# Patient Record
Sex: FEMALE | Race: WHITE | NOT HISPANIC OR LATINO | Employment: OTHER | URBAN - METROPOLITAN AREA
[De-identification: names, ages, dates, MRNs, and addresses within clinical notes are randomized per-mention and may not be internally consistent; named-entity substitution may affect disease eponyms.]

---

## 2017-01-03 ENCOUNTER — HOSPITAL ENCOUNTER (OUTPATIENT)
Dept: MAMMOGRAPHY | Facility: HOSPITAL | Age: 72
Discharge: HOME | End: 2017-01-03
Attending: FAMILY MEDICINE | Admitting: FAMILY MEDICINE

## 2017-01-03 DIAGNOSIS — Z12.31 VISIT FOR SCREENING MAMMOGRAM: ICD-10-CM

## 2017-01-03 PROCEDURE — 77063 BREAST TOMOSYNTHESIS BI: CPT | Performed by: RADIOLOGY

## 2017-01-03 PROCEDURE — G0202 SCR MAMMO BI INCL CAD: HCPCS

## 2017-01-03 PROCEDURE — G0202 SCR MAMMO BI INCL CAD: HCPCS | Performed by: RADIOLOGY

## 2017-01-03 PROCEDURE — 77063 BREAST TOMOSYNTHESIS BI: CPT

## 2017-01-24 ENCOUNTER — HOSPITAL ENCOUNTER (OUTPATIENT)
Dept: MAMMOGRAPHY | Facility: HOSPITAL | Age: 72
Discharge: HOME | End: 2017-01-24
Admitting: FAMILY MEDICINE

## 2017-01-24 DIAGNOSIS — R92.8 ABNORMAL MAMMOGRAM: ICD-10-CM

## 2017-01-24 PROCEDURE — G0206 DX MAMMO INCL CAD UNI: HCPCS

## 2017-01-24 PROCEDURE — G0206 DX MAMMO INCL CAD UNI: HCPCS | Performed by: RADIOLOGY

## 2017-01-24 PROCEDURE — G0279 TOMOSYNTHESIS, MAMMO: HCPCS | Performed by: RADIOLOGY

## 2017-01-24 PROCEDURE — G0279 TOMOSYNTHESIS, MAMMO: HCPCS

## 2017-09-23 ENCOUNTER — HOSPITAL ENCOUNTER (EMERGENCY)
Facility: HOSPITAL | Age: 72
Discharge: HOME | End: 2017-09-23
Attending: EMERGENCY MEDICINE | Admitting: EMERGENCY MEDICINE

## 2017-09-23 ENCOUNTER — APPOINTMENT (OUTPATIENT)
Dept: CT IMAGING | Facility: HOSPITAL | Age: 72
End: 2017-09-23

## 2017-09-23 VITALS
OXYGEN SATURATION: 91 % | DIASTOLIC BLOOD PRESSURE: 60 MMHG | BODY MASS INDEX: 24.6 KG/M2 | HEART RATE: 58 BPM | SYSTOLIC BLOOD PRESSURE: 146 MMHG | RESPIRATION RATE: 16 BRPM | TEMPERATURE: 97.5 F | HEIGHT: 59 IN | WEIGHT: 122 LBS

## 2017-09-23 DIAGNOSIS — K57.90 DIVERTICULOSIS OF INTESTINE WITHOUT BLEEDING, UNSPECIFIED INTESTINAL TRACT LOCATION: Primary | ICD-10-CM

## 2017-09-23 DIAGNOSIS — R10.32 LEFT LOWER QUADRANT PAIN: ICD-10-CM

## 2017-09-23 LAB
ALBUMIN SERPL-MCNC: 4.3 G/DL (ref 3.2–4.8)
ALBUMIN/GLOB SERPL: 1.3 G/DL (ref 1.5–2.5)
ALP SERPL-CCNC: 74 U/L (ref 25–100)
ALT SERPL W P-5'-P-CCNC: 14 U/L (ref 7–40)
ANION GAP SERPL CALCULATED.3IONS-SCNC: 5 MMOL/L (ref 3–11)
AST SERPL-CCNC: 20 U/L (ref 0–33)
BASOPHILS # BLD AUTO: 0.03 10*3/MM3 (ref 0–0.2)
BASOPHILS NFR BLD AUTO: 0.4 % (ref 0–1)
BILIRUB SERPL-MCNC: 0.5 MG/DL (ref 0.3–1.2)
BILIRUB UR QL STRIP: NEGATIVE
BUN BLD-MCNC: 30 MG/DL (ref 9–23)
BUN/CREAT SERPL: 27.3 (ref 7–25)
CALCIUM SPEC-SCNC: 9.5 MG/DL (ref 8.7–10.4)
CHLORIDE SERPL-SCNC: 108 MMOL/L (ref 99–109)
CLARITY UR: CLEAR
CO2 SERPL-SCNC: 27 MMOL/L (ref 20–31)
COLOR UR: YELLOW
CREAT BLD-MCNC: 1.1 MG/DL (ref 0.6–1.3)
DEPRECATED RDW RBC AUTO: 43.7 FL (ref 37–54)
EOSINOPHIL # BLD AUTO: 0.26 10*3/MM3 (ref 0–0.3)
EOSINOPHIL NFR BLD AUTO: 3.1 % (ref 0–3)
ERYTHROCYTE [DISTWIDTH] IN BLOOD BY AUTOMATED COUNT: 13.6 % (ref 11.3–14.5)
GFR SERPL CREATININE-BSD FRML MDRD: 49 ML/MIN/1.73
GLOBULIN UR ELPH-MCNC: 3.4 GM/DL
GLUCOSE BLD-MCNC: 153 MG/DL (ref 70–100)
GLUCOSE UR STRIP-MCNC: NEGATIVE MG/DL
HCT VFR BLD AUTO: 42.2 % (ref 34.5–44)
HGB BLD-MCNC: 13.8 G/DL (ref 11.5–15.5)
HGB UR QL STRIP.AUTO: NEGATIVE
HOLD SPECIMEN: NORMAL
IMM GRANULOCYTES # BLD: 0.03 10*3/MM3 (ref 0–0.03)
IMM GRANULOCYTES NFR BLD: 0.4 % (ref 0–0.6)
KETONES UR QL STRIP: NEGATIVE
LEUKOCYTE ESTERASE UR QL STRIP.AUTO: NEGATIVE
LIPASE SERPL-CCNC: 25 U/L (ref 6–51)
LYMPHOCYTES # BLD AUTO: 1.75 10*3/MM3 (ref 0.6–4.8)
LYMPHOCYTES NFR BLD AUTO: 20.9 % (ref 24–44)
MCH RBC QN AUTO: 28.6 PG (ref 27–31)
MCHC RBC AUTO-ENTMCNC: 32.7 G/DL (ref 32–36)
MCV RBC AUTO: 87.6 FL (ref 80–99)
MONOCYTES # BLD AUTO: 0.36 10*3/MM3 (ref 0–1)
MONOCYTES NFR BLD AUTO: 4.3 % (ref 0–12)
NEUTROPHILS # BLD AUTO: 5.93 10*3/MM3 (ref 1.5–8.3)
NEUTROPHILS NFR BLD AUTO: 70.9 % (ref 41–71)
NITRITE UR QL STRIP: NEGATIVE
NRBC BLD MANUAL-RTO: 0 /100 WBC (ref 0–0)
PH UR STRIP.AUTO: <=5 [PH] (ref 5–8)
PLATELET # BLD AUTO: 211 10*3/MM3 (ref 150–450)
PMV BLD AUTO: 11.5 FL (ref 6–12)
POTASSIUM BLD-SCNC: 4.3 MMOL/L (ref 3.5–5.5)
PROT SERPL-MCNC: 7.7 G/DL (ref 5.7–8.2)
PROT UR QL STRIP: NEGATIVE
RBC # BLD AUTO: 4.82 10*6/MM3 (ref 3.89–5.14)
SODIUM BLD-SCNC: 140 MMOL/L (ref 132–146)
SP GR UR STRIP: 1.02 (ref 1–1.03)
UROBILINOGEN UR QL STRIP: NORMAL
WBC NRBC COR # BLD: 8.36 10*3/MM3 (ref 3.5–10.8)
WHOLE BLOOD HOLD SPECIMEN: NORMAL
WHOLE BLOOD HOLD SPECIMEN: NORMAL

## 2017-09-23 PROCEDURE — 80053 COMPREHEN METABOLIC PANEL: CPT | Performed by: EMERGENCY MEDICINE

## 2017-09-23 PROCEDURE — 25010000002 MORPHINE PER 10 MG: Performed by: EMERGENCY MEDICINE

## 2017-09-23 PROCEDURE — 96375 TX/PRO/DX INJ NEW DRUG ADDON: CPT

## 2017-09-23 PROCEDURE — 96374 THER/PROPH/DIAG INJ IV PUSH: CPT

## 2017-09-23 PROCEDURE — 99283 EMERGENCY DEPT VISIT LOW MDM: CPT

## 2017-09-23 PROCEDURE — 83690 ASSAY OF LIPASE: CPT | Performed by: EMERGENCY MEDICINE

## 2017-09-23 PROCEDURE — 74176 CT ABD & PELVIS W/O CONTRAST: CPT

## 2017-09-23 PROCEDURE — 81003 URINALYSIS AUTO W/O SCOPE: CPT | Performed by: EMERGENCY MEDICINE

## 2017-09-23 PROCEDURE — 96361 HYDRATE IV INFUSION ADD-ON: CPT

## 2017-09-23 PROCEDURE — 85025 COMPLETE CBC W/AUTO DIFF WBC: CPT | Performed by: EMERGENCY MEDICINE

## 2017-09-23 PROCEDURE — 25010000002 ONDANSETRON PER 1 MG: Performed by: EMERGENCY MEDICINE

## 2017-09-23 RX ORDER — SODIUM CHLORIDE 0.9 % (FLUSH) 0.9 %
10 SYRINGE (ML) INJECTION AS NEEDED
Status: DISCONTINUED | OUTPATIENT
Start: 2017-09-23 | End: 2017-09-23 | Stop reason: HOSPADM

## 2017-09-23 RX ORDER — AMLODIPINE BESYLATE 5 MG/1
5 TABLET ORAL DAILY
COMMUNITY
End: 2022-01-28

## 2017-09-23 RX ORDER — FOLIC ACID 1 MG/1
1 TABLET ORAL DAILY
COMMUNITY
End: 2020-10-14

## 2017-09-23 RX ORDER — PRAVASTATIN SODIUM 10 MG
10 TABLET ORAL DAILY
COMMUNITY
End: 2022-10-28 | Stop reason: DRUGHIGH

## 2017-09-23 RX ORDER — LISINOPRIL 20 MG/1
20 TABLET ORAL DAILY
COMMUNITY
End: 2021-08-16 | Stop reason: DRUGHIGH

## 2017-09-23 RX ORDER — LEVOTHYROXINE SODIUM 0.07 MG/1
75 TABLET ORAL DAILY
COMMUNITY

## 2017-09-23 RX ORDER — MORPHINE SULFATE 4 MG/ML
4 INJECTION, SOLUTION INTRAMUSCULAR; INTRAVENOUS ONCE
Status: COMPLETED | OUTPATIENT
Start: 2017-09-23 | End: 2017-09-23

## 2017-09-23 RX ORDER — DIPHENHYDRAMINE HYDROCHLORIDE 25 MG/1
TABLET ORAL
COMMUNITY
End: 2020-10-14

## 2017-09-23 RX ORDER — METOPROLOL TARTRATE 100 MG/1
100 TABLET ORAL NIGHTLY
COMMUNITY
End: 2022-07-29

## 2017-09-23 RX ORDER — ONDANSETRON 2 MG/ML
4 INJECTION INTRAMUSCULAR; INTRAVENOUS ONCE
Status: COMPLETED | OUTPATIENT
Start: 2017-09-23 | End: 2017-09-23

## 2017-09-23 RX ORDER — CHLORTHALIDONE 25 MG/1
25 TABLET ORAL DAILY
COMMUNITY
End: 2020-10-14

## 2017-09-23 RX ORDER — HYDROCODONE BITARTRATE AND ACETAMINOPHEN 5; 325 MG/1; MG/1
1 TABLET ORAL EVERY 6 HOURS PRN
Qty: 15 TABLET | Refills: 0 | OUTPATIENT
Start: 2017-09-23 | End: 2020-10-14

## 2017-09-23 RX ORDER — INSULIN GLARGINE 100 [IU]/ML
14 INJECTION, SOLUTION SUBCUTANEOUS NIGHTLY
COMMUNITY
End: 2020-10-14

## 2017-09-23 RX ORDER — OXYBUTYNIN CHLORIDE 5 MG/1
5 TABLET ORAL DAILY
COMMUNITY
End: 2022-01-28

## 2017-09-23 RX ADMIN — SODIUM CHLORIDE 1000 ML: 9 INJECTION, SOLUTION INTRAVENOUS at 10:03

## 2017-09-23 RX ADMIN — ONDANSETRON 4 MG: 2 INJECTION INTRAMUSCULAR; INTRAVENOUS at 10:04

## 2017-09-23 RX ADMIN — MORPHINE SULFATE 4 MG: 4 INJECTION, SOLUTION INTRAMUSCULAR; INTRAVENOUS at 10:05

## 2017-09-23 NOTE — ED PROVIDER NOTES
Subjective   HPI Comments: Marilyn Kunz is a 72 y.o. female with diabetes who presents to the ED with c/o LLQ abdominal pain. The pain is described as a sharp and cramping sensation that radiates into her back and across her lower abdomen.  She states that she has had this pain for about 1 week now and began taking leftover antibiotics she had from the last time she had diverticulitis. She reported to the ED 4 days ago and it was confirmed that she had diverticulitis, although a CT was not done. She states that over the past 2 days, the pain has worsened significantly which prompted her visit to the ED. She also complains of some nausea, but denies any chest pain, SoA, dysuria, or any other complaints at this time    Patient is a 72 y.o. female presenting with abdominal pain.   History provided by:  Patient  Abdominal Pain   Pain location:  LLQ  Pain quality: cramping and sharp    Pain radiates to:  Back and RLQ  Pain severity:  Unable to specify  Onset quality:  Gradual  Timing:  Constant  Progression:  Worsening  Chronicity:  New  Relieved by:  Nothing  Worsened by:  Nothing  Ineffective treatments:  None tried  Associated symptoms: nausea    Associated symptoms: no chest pain, no chills, no dysuria, no fever and no shortness of breath        Review of Systems   Constitutional: Negative for chills and fever.   Respiratory: Negative for shortness of breath.    Cardiovascular: Negative for chest pain.   Gastrointestinal: Positive for abdominal pain and nausea.   Genitourinary: Positive for flank pain and frequency (Chronic). Negative for dysuria.   Musculoskeletal: Positive for back pain.   All other systems reviewed and are negative.      Past Medical History:   Diagnosis Date   • Disease of thyroid gland    • Hyperlipidemia    • Hypertension    • Psoriatic arthritis    • Type 1 diabetes        Allergies   Allergen Reactions   • Penicillins        Past Surgical History:   Procedure Laterality Date   • HAND  SURGERY         Family History   Problem Relation Age of Onset   • Breast cancer Neg Hx    • Ovarian cancer Neg Hx        Social History     Social History   • Marital status: Single     Spouse name: N/A   • Number of children: N/A   • Years of education: N/A     Social History Main Topics   • Smoking status: Never Smoker   • Smokeless tobacco: None   • Alcohol use No   • Drug use: No   • Sexual activity: Not Asked     Other Topics Concern   • None     Social History Narrative   • None         Objective   Physical Exam   Constitutional: She is oriented to person, place, and time. She appears well-developed and well-nourished. No distress.   HENT:   Head: Normocephalic and atraumatic.   Nose: Nose normal.   Eyes: Conjunctivae are normal. No scleral icterus.   Neck: Normal range of motion. Neck supple.   Cardiovascular: Normal rate, regular rhythm, normal heart sounds and intact distal pulses.    No murmur heard.  Pulmonary/Chest: Effort normal and breath sounds normal. No respiratory distress.   Abdominal: Soft. Bowel sounds are normal. There is tenderness (Mild tenderness to palpation of the suprapubic region and moderate tenderness to palpation to the LLQ).   No flank tenderness to palpation   Musculoskeletal: Normal range of motion. She exhibits no edema.   Lower extremities are normal.   Neurological: She is alert and oriented to person, place, and time.   Skin: Skin is warm and dry.   Psychiatric: She has a normal mood and affect. Her behavior is normal.   Nursing note and vitals reviewed.      Procedures         ED Course  ED Course   Comment By Time   Dr. Page is at the bedside re evaluating the patient. Harrison PETTY May 09/23 1145     Recent Results (from the past 24 hour(s))   Comprehensive Metabolic Panel    Collection Time: 09/23/17  9:49 AM   Result Value Ref Range    Glucose 153 (H) 70 - 100 mg/dL    BUN 30 (H) 9 - 23 mg/dL    Creatinine 1.10 0.60 - 1.30 mg/dL    Sodium 140 132 - 146 mmol/L    Potassium 4.3  3.5 - 5.5 mmol/L    Chloride 108 99 - 109 mmol/L    CO2 27.0 20.0 - 31.0 mmol/L    Calcium 9.5 8.7 - 10.4 mg/dL    Total Protein 7.7 5.7 - 8.2 g/dL    Albumin 4.30 3.20 - 4.80 g/dL    ALT (SGPT) 14 7 - 40 U/L    AST (SGOT) 20 0 - 33 U/L    Alkaline Phosphatase 74 25 - 100 U/L    Total Bilirubin 0.5 0.3 - 1.2 mg/dL    eGFR Non African Amer 49 (L) >60 mL/min/1.73    Globulin 3.4 gm/dL    A/G Ratio 1.3 (L) 1.5 - 2.5 g/dL    BUN/Creatinine Ratio 27.3 (H) 7.0 - 25.0    Anion Gap 5.0 3.0 - 11.0 mmol/L   Lipase    Collection Time: 09/23/17  9:49 AM   Result Value Ref Range    Lipase 25 6 - 51 U/L   Urinalysis With / Culture If Indicated    Collection Time: 09/23/17  9:49 AM   Result Value Ref Range    Color, UA Yellow Yellow, Straw    Appearance, UA Clear Clear    pH, UA <=5.0 5.0 - 8.0    Specific Gravity, UA 1.016 1.001 - 1.030    Glucose, UA Negative Negative    Ketones, UA Negative Negative    Bilirubin, UA Negative Negative    Blood, UA Negative Negative    Protein, UA Negative Negative    Leuk Esterase, UA Negative Negative    Nitrite, UA Negative Negative    Urobilinogen, UA 0.2 E.U./dL 0.2 - 1.0 E.U./dL   Light Blue Top    Collection Time: 09/23/17  9:49 AM   Result Value Ref Range    Extra Tube hold for add-on    Green Top (Gel)    Collection Time: 09/23/17  9:49 AM   Result Value Ref Range    Extra Tube Hold for add-ons.    Lavender Top    Collection Time: 09/23/17  9:49 AM   Result Value Ref Range    Extra Tube hold for add-on    CBC Auto Differential    Collection Time: 09/23/17  9:49 AM   Result Value Ref Range    WBC 8.36 3.50 - 10.80 10*3/mm3    RBC 4.82 3.89 - 5.14 10*6/mm3    Hemoglobin 13.8 11.5 - 15.5 g/dL    Hematocrit 42.2 34.5 - 44.0 %    MCV 87.6 80.0 - 99.0 fL    MCH 28.6 27.0 - 31.0 pg    MCHC 32.7 32.0 - 36.0 g/dL    RDW 13.6 11.3 - 14.5 %    RDW-SD 43.7 37.0 - 54.0 fl    MPV 11.5 6.0 - 12.0 fL    Platelets 211 150 - 450 10*3/mm3    Neutrophil % 70.9 41.0 - 71.0 %    Lymphocyte % 20.9 (L) 24.0 -  "44.0 %    Monocyte % 4.3 0.0 - 12.0 %    Eosinophil % 3.1 (H) 0.0 - 3.0 %    Basophil % 0.4 0.0 - 1.0 %    Immature Grans % 0.4 0.0 - 0.6 %    Neutrophils, Absolute 5.93 1.50 - 8.30 10*3/mm3    Lymphocytes, Absolute 1.75 0.60 - 4.80 10*3/mm3    Monocytes, Absolute 0.36 0.00 - 1.00 10*3/mm3    Eosinophils, Absolute 0.26 0.00 - 0.30 10*3/mm3    Basophils, Absolute 0.03 0.00 - 0.20 10*3/mm3    Immature Grans, Absolute 0.03 0.00 - 0.03 10*3/mm3    nRBC 0.0 0.0 - 0.0 /100 WBC     Note: In addition to lab results from this visit, the labs listed above may include labs taken at another facility or during a different encounter within the last 24 hours. Please correlate lab times with ED admission and discharge times for further clarification of the services performed during this visit.    CT Abdomen Pelvis Without Contrast    (Results Pending)     Vitals:    09/23/17 0947 09/23/17 0950 09/23/17 1030   BP:  (!) 182/81 160/75   BP Location:  Right arm    Patient Position:  Lying    Pulse:  73    Resp:  18    Temp: 98.1 °F (36.7 °C)     TempSrc: Oral     SpO2:  96% 92%   Weight: 122 lb (55.3 kg)     Height: 59\" (149.9 cm)       Medications   sodium chloride 0.9 % flush 10 mL (not administered)   sodium chloride 0.9 % bolus 1,000 mL (0 mL Intravenous Stopped 9/23/17 1051)   Morphine sulfate (PF) injection 4 mg (4 mg Intravenous Given 9/23/17 1005)   ondansetron (ZOFRAN) injection 4 mg (4 mg Intravenous Given 9/23/17 1004)     ECG/EMG Results (last 24 hours)     ** No results found for the last 24 hours. **                        MDM  Number of Diagnoses or Management Options  Diverticulosis of intestine without bleeding, unspecified intestinal tract location: new and requires workup  Left lower quadrant pain: new and requires workup  Diagnosis management comments: No acute abdomen maladies identified on laboratory evaluation, or CT scan of the abdomen and pelvis.    I suspect patient had diverticulitis, that is improving, " therefore not visualized on CT    We will give patient pain medication in the form of Lortab to go home with.       Amount and/or Complexity of Data Reviewed  Clinical lab tests: ordered and reviewed  Tests in the radiology section of CPT®: ordered and reviewed  Obtain history from someone other than the patient: yes  Review and summarize past medical records: yes  Independent visualization of images, tracings, or specimens: yes    Patient Progress  Patient progress: stable      Final diagnoses:   Diverticulosis of intestine without bleeding, unspecified intestinal tract location   Left lower quadrant pain       Documentation assistance provided by carmelina May.  Information recorded by the carmelina was done at my direction and has been verified and validated by me.     Harrison May  09/23/17 1019       Harrison May  09/23/17 1145       Harrison May  09/23/17 1149       Cammy Page MD  09/23/17 1211

## 2017-09-23 NOTE — DISCHARGE INSTRUCTIONS
ADvised to take pain medication as prescribed.     Followup with PCP within next week for repeat evaluation.

## 2017-09-29 ENCOUNTER — HOSPITAL ENCOUNTER (OUTPATIENT)
Dept: GENERAL RADIOLOGY | Facility: HOSPITAL | Age: 72
Discharge: HOME | End: 2017-09-29
Attending: FAMILY MEDICINE | Admitting: FAMILY MEDICINE

## 2017-09-29 ENCOUNTER — TRANSCRIBE ORDERS (OUTPATIENT)
Dept: ADMINISTRATIVE | Facility: HOSPITAL | Age: 72
End: 2017-09-29

## 2017-09-29 DIAGNOSIS — K57.32 DIVERTICULITIS, COLON: Primary | ICD-10-CM

## 2017-09-29 PROCEDURE — 72110 X-RAY EXAM L-2 SPINE 4/>VWS: CPT

## 2017-10-03 ENCOUNTER — TRANSCRIBE ORDERS (OUTPATIENT)
Dept: ADMINISTRATIVE | Facility: HOSPITAL | Age: 72
End: 2017-10-03

## 2017-10-03 DIAGNOSIS — M54.5 LOW BACK PAIN, UNSPECIFIED BACK PAIN LATERALITY, UNSPECIFIED CHRONICITY, WITH SCIATICA PRESENCE UNSPECIFIED: Primary | ICD-10-CM

## 2017-10-19 ENCOUNTER — HOSPITAL ENCOUNTER (OUTPATIENT)
Dept: GENERAL RADIOLOGY | Facility: HOSPITAL | Age: 72
Discharge: HOME | End: 2017-10-19
Attending: FAMILY MEDICINE | Admitting: FAMILY MEDICINE

## 2017-10-19 ENCOUNTER — TRANSCRIBE ORDERS (OUTPATIENT)
Dept: ADMINISTRATIVE | Facility: HOSPITAL | Age: 72
End: 2017-10-19

## 2017-10-19 DIAGNOSIS — M43.10 SPONDYLOLISTHESIS, UNSPECIFIED SPINAL REGION: Primary | ICD-10-CM

## 2017-10-19 PROCEDURE — 72120 X-RAY BEND ONLY L-S SPINE: CPT

## 2018-01-02 ENCOUNTER — TRANSCRIBE ORDERS (OUTPATIENT)
Dept: ADMINISTRATIVE | Facility: HOSPITAL | Age: 73
End: 2018-01-02

## 2018-01-02 DIAGNOSIS — Z12.31 VISIT FOR SCREENING MAMMOGRAM: Primary | ICD-10-CM

## 2018-02-05 ENCOUNTER — HOSPITAL ENCOUNTER (OUTPATIENT)
Dept: MAMMOGRAPHY | Facility: HOSPITAL | Age: 73
Discharge: HOME | End: 2018-02-05
Attending: FAMILY MEDICINE | Admitting: FAMILY MEDICINE

## 2018-02-05 DIAGNOSIS — Z12.31 VISIT FOR SCREENING MAMMOGRAM: ICD-10-CM

## 2018-02-05 PROCEDURE — 77063 BREAST TOMOSYNTHESIS BI: CPT

## 2018-02-05 PROCEDURE — 77067 SCR MAMMO BI INCL CAD: CPT

## 2018-02-05 PROCEDURE — 77063 BREAST TOMOSYNTHESIS BI: CPT | Performed by: RADIOLOGY

## 2018-02-05 PROCEDURE — 77067 SCR MAMMO BI INCL CAD: CPT | Performed by: RADIOLOGY

## 2019-01-08 ENCOUNTER — TELEPHONE (OUTPATIENT)
Dept: GASTROENTEROLOGY | Facility: CLINIC | Age: 74
End: 2019-01-08

## 2019-01-08 ENCOUNTER — TRANSCRIBE ORDERS (OUTPATIENT)
Dept: ADMINISTRATIVE | Facility: HOSPITAL | Age: 74
End: 2019-01-08

## 2019-01-08 DIAGNOSIS — Z12.31 VISIT FOR SCREENING MAMMOGRAM: Primary | ICD-10-CM

## 2019-01-08 DIAGNOSIS — Z12.11 SCREENING FOR COLON CANCER: Primary | ICD-10-CM

## 2019-01-08 NOTE — TELEPHONE ENCOUNTER
PATIENT CALLED M REGARDING FOOD DAY PRIOR TO PROCEDURE; PATIENT ASKED TO CALL BACK BECAUSE SHE IS DIABETIC AND TAKES INSULIN.

## 2019-01-09 NOTE — TELEPHONE ENCOUNTER
I called patient back. Informed her that she can't eat anything but she can drink apple juice, jello, broth, black coffee, soda. Patient voiced understanding.

## 2019-01-15 ENCOUNTER — OUTSIDE FACILITY SERVICE (OUTPATIENT)
Dept: GASTROENTEROLOGY | Facility: CLINIC | Age: 74
End: 2019-01-15

## 2019-01-15 PROCEDURE — G0105 COLORECTAL SCRN; HI RISK IND: HCPCS | Performed by: INTERNAL MEDICINE

## 2019-01-15 PROCEDURE — G0500 MOD SEDAT ENDO SERVICE >5YRS: HCPCS | Performed by: INTERNAL MEDICINE

## 2019-02-20 ENCOUNTER — HOSPITAL ENCOUNTER (OUTPATIENT)
Dept: MAMMOGRAPHY | Facility: HOSPITAL | Age: 74
Discharge: HOME | End: 2019-02-20
Attending: FAMILY MEDICINE | Admitting: FAMILY MEDICINE

## 2019-02-20 DIAGNOSIS — Z12.31 VISIT FOR SCREENING MAMMOGRAM: ICD-10-CM

## 2019-02-20 PROCEDURE — 77067 SCR MAMMO BI INCL CAD: CPT | Performed by: RADIOLOGY

## 2019-02-20 PROCEDURE — 77063 BREAST TOMOSYNTHESIS BI: CPT | Performed by: RADIOLOGY

## 2019-02-20 PROCEDURE — 77067 SCR MAMMO BI INCL CAD: CPT

## 2019-02-20 PROCEDURE — 77063 BREAST TOMOSYNTHESIS BI: CPT

## 2019-05-21 ENCOUNTER — TRANSCRIBE ORDERS (OUTPATIENT)
Dept: ADMINISTRATIVE | Facility: HOSPITAL | Age: 74
End: 2019-05-21

## 2019-05-21 ENCOUNTER — HOSPITAL ENCOUNTER (OUTPATIENT)
Dept: GENERAL RADIOLOGY | Facility: HOSPITAL | Age: 74
Discharge: HOME | End: 2019-05-21
Admitting: FAMILY MEDICINE

## 2019-05-21 DIAGNOSIS — M17.12 ARTHRITIS OF LEFT KNEE: Primary | ICD-10-CM

## 2019-05-21 PROCEDURE — 73562 X-RAY EXAM OF KNEE 3: CPT

## 2019-06-10 ENCOUNTER — TRANSCRIBE ORDERS (OUTPATIENT)
Dept: ADMINISTRATIVE | Facility: HOSPITAL | Age: 74
End: 2019-06-10

## 2019-06-10 ENCOUNTER — HOSPITAL ENCOUNTER (OUTPATIENT)
Dept: GENERAL RADIOLOGY | Facility: HOSPITAL | Age: 74
Discharge: HOME | End: 2019-06-10
Admitting: INTERNAL MEDICINE

## 2019-06-10 DIAGNOSIS — Z01.811 PRE-OP CHEST EXAM: Primary | ICD-10-CM

## 2019-06-10 PROCEDURE — 71046 X-RAY EXAM CHEST 2 VIEWS: CPT

## 2019-08-23 ENCOUNTER — TRANSCRIBE ORDERS (OUTPATIENT)
Dept: ADMINISTRATIVE | Facility: HOSPITAL | Age: 74
End: 2019-08-23

## 2019-08-23 ENCOUNTER — HOSPITAL ENCOUNTER (OUTPATIENT)
Dept: GENERAL RADIOLOGY | Facility: HOSPITAL | Age: 74
Discharge: HOME | End: 2019-08-23
Admitting: NURSE PRACTITIONER

## 2019-08-23 DIAGNOSIS — R07.81 RIB PAIN ON RIGHT SIDE: Primary | ICD-10-CM

## 2019-08-23 PROCEDURE — 71101 X-RAY EXAM UNILAT RIBS/CHEST: CPT

## 2020-01-17 ENCOUNTER — TRANSCRIBE ORDERS (OUTPATIENT)
Dept: ADMINISTRATIVE | Facility: HOSPITAL | Age: 75
End: 2020-01-17

## 2020-01-17 DIAGNOSIS — Z12.31 VISIT FOR SCREENING MAMMOGRAM: Primary | ICD-10-CM

## 2020-03-25 ENCOUNTER — APPOINTMENT (OUTPATIENT)
Dept: MAMMOGRAPHY | Facility: HOSPITAL | Age: 75
End: 2020-03-25

## 2020-05-22 ENCOUNTER — HOSPITAL ENCOUNTER (OUTPATIENT)
Dept: MAMMOGRAPHY | Facility: HOSPITAL | Age: 75
Discharge: HOME | End: 2020-05-22
Admitting: FAMILY MEDICINE

## 2020-05-22 DIAGNOSIS — Z12.31 VISIT FOR SCREENING MAMMOGRAM: ICD-10-CM

## 2020-05-22 PROCEDURE — 77063 BREAST TOMOSYNTHESIS BI: CPT | Performed by: RADIOLOGY

## 2020-05-22 PROCEDURE — 77067 SCR MAMMO BI INCL CAD: CPT | Performed by: RADIOLOGY

## 2020-05-22 PROCEDURE — 77063 BREAST TOMOSYNTHESIS BI: CPT

## 2020-05-22 PROCEDURE — 77067 SCR MAMMO BI INCL CAD: CPT

## 2020-10-14 ENCOUNTER — APPOINTMENT (OUTPATIENT)
Dept: GENERAL RADIOLOGY | Facility: HOSPITAL | Age: 75
End: 2020-10-14

## 2020-10-14 ENCOUNTER — APPOINTMENT (OUTPATIENT)
Dept: CT IMAGING | Facility: HOSPITAL | Age: 75
End: 2020-10-14

## 2020-10-14 ENCOUNTER — HOSPITAL ENCOUNTER (EMERGENCY)
Facility: HOSPITAL | Age: 75
Discharge: HOME | End: 2020-10-14
Attending: EMERGENCY MEDICINE | Admitting: EMERGENCY MEDICINE

## 2020-10-14 VITALS
BODY MASS INDEX: 24.6 KG/M2 | HEIGHT: 59 IN | DIASTOLIC BLOOD PRESSURE: 74 MMHG | HEART RATE: 64 BPM | TEMPERATURE: 98.3 F | RESPIRATION RATE: 18 BRPM | OXYGEN SATURATION: 96 % | WEIGHT: 122 LBS | SYSTOLIC BLOOD PRESSURE: 174 MMHG

## 2020-10-14 DIAGNOSIS — E11.65 TYPE 2 DIABETES MELLITUS WITH HYPERGLYCEMIA, UNSPECIFIED WHETHER LONG TERM INSULIN USE (HCC): ICD-10-CM

## 2020-10-14 DIAGNOSIS — I10 ELEVATED BLOOD PRESSURE READING WITH DIAGNOSIS OF HYPERTENSION: ICD-10-CM

## 2020-10-14 DIAGNOSIS — R07.9 CHEST PAIN, UNSPECIFIED TYPE: Primary | ICD-10-CM

## 2020-10-14 LAB
ALBUMIN SERPL-MCNC: 4.6 G/DL (ref 3.5–5.2)
ALBUMIN/GLOB SERPL: 1.4 G/DL
ALP SERPL-CCNC: 73 U/L (ref 39–117)
ALT SERPL W P-5'-P-CCNC: 17 U/L (ref 1–33)
ANION GAP SERPL CALCULATED.3IONS-SCNC: 11 MMOL/L (ref 5–15)
AST SERPL-CCNC: 24 U/L (ref 1–32)
BASOPHILS # BLD AUTO: 0.04 10*3/MM3 (ref 0–0.2)
BASOPHILS NFR BLD AUTO: 0.3 % (ref 0–1.5)
BILIRUB SERPL-MCNC: 0.4 MG/DL (ref 0–1.2)
BUN SERPL-MCNC: 17 MG/DL (ref 8–23)
BUN/CREAT SERPL: 18.7 (ref 7–25)
CALCIUM SPEC-SCNC: 9.4 MG/DL (ref 8.6–10.5)
CHLORIDE SERPL-SCNC: 104 MMOL/L (ref 98–107)
CO2 SERPL-SCNC: 25 MMOL/L (ref 22–29)
CREAT SERPL-MCNC: 0.91 MG/DL (ref 0.57–1)
DEPRECATED RDW RBC AUTO: 43 FL (ref 37–54)
EOSINOPHIL # BLD AUTO: 0.08 10*3/MM3 (ref 0–0.4)
EOSINOPHIL NFR BLD AUTO: 0.7 % (ref 0.3–6.2)
ERYTHROCYTE [DISTWIDTH] IN BLOOD BY AUTOMATED COUNT: 13.2 % (ref 12.3–15.4)
GFR SERPL CREATININE-BSD FRML MDRD: 60 ML/MIN/1.73
GLOBULIN UR ELPH-MCNC: 3.2 GM/DL
GLUCOSE BLDC GLUCOMTR-MCNC: 200 MG/DL (ref 70–130)
GLUCOSE SERPL-MCNC: 203 MG/DL (ref 65–99)
HCT VFR BLD AUTO: 39.8 % (ref 34–46.6)
HGB BLD-MCNC: 12.6 G/DL (ref 12–15.9)
HOLD SPECIMEN: NORMAL
HOLD SPECIMEN: NORMAL
IMM GRANULOCYTES # BLD AUTO: 0.04 10*3/MM3 (ref 0–0.05)
IMM GRANULOCYTES NFR BLD AUTO: 0.3 % (ref 0–0.5)
LIPASE SERPL-CCNC: 13 U/L (ref 13–60)
LYMPHOCYTES # BLD AUTO: 1.55 10*3/MM3 (ref 0.7–3.1)
LYMPHOCYTES NFR BLD AUTO: 13.1 % (ref 19.6–45.3)
MCH RBC QN AUTO: 28.3 PG (ref 26.6–33)
MCHC RBC AUTO-ENTMCNC: 31.7 G/DL (ref 31.5–35.7)
MCV RBC AUTO: 89.4 FL (ref 79–97)
MONOCYTES # BLD AUTO: 0.69 10*3/MM3 (ref 0.1–0.9)
MONOCYTES NFR BLD AUTO: 5.8 % (ref 5–12)
NEUTROPHILS NFR BLD AUTO: 79.8 % (ref 42.7–76)
NEUTROPHILS NFR BLD AUTO: 9.47 10*3/MM3 (ref 1.7–7)
NRBC BLD AUTO-RTO: 0 /100 WBC (ref 0–0.2)
NT-PROBNP SERPL-MCNC: 838.8 PG/ML (ref 0–1800)
PLATELET # BLD AUTO: 211 10*3/MM3 (ref 140–450)
PMV BLD AUTO: 11.4 FL (ref 6–12)
POTASSIUM SERPL-SCNC: 4.5 MMOL/L (ref 3.5–5.2)
PROT SERPL-MCNC: 7.8 G/DL (ref 6–8.5)
RBC # BLD AUTO: 4.45 10*6/MM3 (ref 3.77–5.28)
SODIUM SERPL-SCNC: 140 MMOL/L (ref 136–145)
TROPONIN T SERPL-MCNC: <0.01 NG/ML (ref 0–0.03)
TROPONIN T SERPL-MCNC: <0.01 NG/ML (ref 0–0.03)
WBC # BLD AUTO: 11.87 10*3/MM3 (ref 3.4–10.8)
WHOLE BLOOD HOLD SPECIMEN: NORMAL
WHOLE BLOOD HOLD SPECIMEN: NORMAL

## 2020-10-14 PROCEDURE — 83690 ASSAY OF LIPASE: CPT | Performed by: EMERGENCY MEDICINE

## 2020-10-14 PROCEDURE — 0 IOPAMIDOL PER 1 ML: Performed by: EMERGENCY MEDICINE

## 2020-10-14 PROCEDURE — 71275 CT ANGIOGRAPHY CHEST: CPT

## 2020-10-14 PROCEDURE — 85025 COMPLETE CBC W/AUTO DIFF WBC: CPT | Performed by: EMERGENCY MEDICINE

## 2020-10-14 PROCEDURE — 71045 X-RAY EXAM CHEST 1 VIEW: CPT

## 2020-10-14 PROCEDURE — 93005 ELECTROCARDIOGRAM TRACING: CPT | Performed by: EMERGENCY MEDICINE

## 2020-10-14 PROCEDURE — 99285 EMERGENCY DEPT VISIT HI MDM: CPT

## 2020-10-14 PROCEDURE — 80053 COMPREHEN METABOLIC PANEL: CPT | Performed by: EMERGENCY MEDICINE

## 2020-10-14 PROCEDURE — 83880 ASSAY OF NATRIURETIC PEPTIDE: CPT | Performed by: EMERGENCY MEDICINE

## 2020-10-14 PROCEDURE — 82962 GLUCOSE BLOOD TEST: CPT

## 2020-10-14 PROCEDURE — 84484 ASSAY OF TROPONIN QUANT: CPT | Performed by: EMERGENCY MEDICINE

## 2020-10-14 RX ORDER — ASPIRIN 81 MG/1
81 TABLET ORAL DAILY
Qty: 30 TABLET | Refills: 0 | Status: SHIPPED | OUTPATIENT
Start: 2020-10-14

## 2020-10-14 RX ORDER — FAMOTIDINE 20 MG/1
20 TABLET, FILM COATED ORAL 2 TIMES DAILY
Qty: 28 TABLET | Refills: 0 | Status: SHIPPED | OUTPATIENT
Start: 2020-10-14 | End: 2022-08-23

## 2020-10-14 RX ORDER — SECUKINUMAB 150 MG/ML
INJECTION SUBCUTANEOUS
COMMUNITY
End: 2022-05-07 | Stop reason: SDUPTHER

## 2020-10-14 RX ORDER — LEVOCETIRIZINE DIHYDROCHLORIDE 5 MG/1
5 TABLET, FILM COATED ORAL EVERY EVENING
COMMUNITY

## 2020-10-14 RX ORDER — SODIUM CHLORIDE 0.9 % (FLUSH) 0.9 %
10 SYRINGE (ML) INJECTION AS NEEDED
Status: DISCONTINUED | OUTPATIENT
Start: 2020-10-14 | End: 2020-10-15 | Stop reason: HOSPADM

## 2020-10-14 RX ADMIN — IOPAMIDOL 50 ML: 755 INJECTION, SOLUTION INTRAVENOUS at 19:53

## 2020-10-14 NOTE — ED PROVIDER NOTES
Subjective   Marilyn Kunz is a 75 y.o. female who presents to the ED via EMS with complaints of left sided chest pain onset yesterday evening. At onset, Ms. Kunz took her blood pressure measuring 150/75 with a pulse of 73 bpm. The pain lasted several minutes and occurred while sitting. Today at 1420, Ms. Kunz felt the same pain while sitting down. Her blood pressure was 187/92 and her pulse was 67 bpm. Ms. Kunz rates the pain 7/10 and reports that it does not radiate. Ms. Kunz then went to her PCP where she had an abnormal EKG and was brought to the ED via EMS. Ms. Kunz denies arm pain, shortness of breath, palpitations, dizziness, headache, abdominal pain, weakness, and urinary symptoms. She does endorse increased urinary frequency but suspects it may be related to her type 1 diabetes mellitus. Ms. Kunz also states that her PCP called a few days ago saying that she had a bad urinalysis and that she should monitor protein intake. Ms. Kunz has been taking Clindamycin for two days for a tooth problem. She has also been taking Sudafed once a day for the last two days in addition to a nasal spray. Ms. Kunz has a medical history of asthma, type 1 diabetes mellitus, chronic kidney disease, hypothyroid, and psoriatic arthritis. She has no pertinent surgical history. Ms. Kunz is a non-smoker. She does not use drugs or alcohol. Ms. Kunz has no other acute complaints at this time.      History provided by:  Patient and EMS personnel   used: No    Chest Pain  Pain location:  L chest  Pain quality: sharp    Pain radiates to:  Does not radiate  Pain severity:  Severe (7/10)  Onset quality:  Sudden  Timing:  Rare  Progression:  Unchanged  Chronicity:  New  Context: at rest    Relieved by:  None tried  Worsened by:  Nothing  Ineffective treatments:  None tried  Associated symptoms: no abdominal pain, no dizziness, no headache, no palpitations, no shortness  of breath and no weakness    Risk factors: diabetes mellitus        Review of Systems   Respiratory: Negative for shortness of breath.    Cardiovascular: Positive for chest pain (Left). Negative for palpitations.   Gastrointestinal: Negative for abdominal pain.   Genitourinary: Negative for difficulty urinating, dysuria, frequency, hematuria and urgency.   Musculoskeletal: Negative for myalgias (Arm pain).   Neurological: Negative for dizziness, weakness and headaches.   All other systems reviewed and are negative.      Past Medical History:   Diagnosis Date   • Disease of thyroid gland    • Hyperlipidemia    • Hypertension    • Psoriatic arthritis (CMS/HCC)    • Type 1 diabetes (CMS/HCC)        Allergies   Allergen Reactions   • Penicillins        Past Surgical History:   Procedure Laterality Date   • HAND SURGERY         Family History   Problem Relation Age of Onset   • Breast cancer Neg Hx    • Ovarian cancer Neg Hx        Social History     Socioeconomic History   • Marital status: Single     Spouse name: Not on file   • Number of children: Not on file   • Years of education: Not on file   • Highest education level: Not on file   Tobacco Use   • Smoking status: Never Smoker   • Smokeless tobacco: Never Used   Substance and Sexual Activity   • Alcohol use: No   • Drug use: No         Objective   Physical Exam  Vitals signs and nursing note reviewed.   Constitutional:       General: She is awake.      Appearance: Normal appearance. She is well-developed.   HENT:      Head: Normocephalic and atraumatic.      Jaw: There is normal jaw occlusion.      Nose: Nose normal.      Mouth/Throat:      Lips: Pink.      Mouth: Mucous membranes are moist.      Pharynx: Oropharynx is clear.   Eyes:      General: Lids are normal.      Conjunctiva/sclera: Conjunctivae normal.      Pupils: Pupils are equal, round, and reactive to light.   Neck:      Musculoskeletal: Full passive range of motion without pain, normal range of motion  and neck supple. No muscular tenderness.      Vascular: No JVD.   Cardiovascular:      Rate and Rhythm: Normal rate and regular rhythm.      Pulses: Normal pulses.      Heart sounds: Normal heart sounds.      Comments: Heart sounds normal.  Pulmonary:      Effort: Pulmonary effort is normal.      Breath sounds: Normal breath sounds and air entry.      Comments: Lungs are clear.  Abdominal:      General: Abdomen is flat. Bowel sounds are normal.      Palpations: Abdomen is soft.      Tenderness: There is no abdominal tenderness.      Comments: Bowel sounds normal.   Musculoskeletal: Normal range of motion.         General: No tenderness.      Comments: There is no calf pain.   Skin:     General: Skin is warm and dry.   Neurological:      General: No focal deficit present.      Mental Status: She is alert and oriented to person, place, and time. Mental status is at baseline.   Psychiatric:         Attention and Perception: Attention and perception normal.         Mood and Affect: Mood normal.         Speech: Speech normal.         Behavior: Behavior normal. Behavior is cooperative.         Thought Content: Thought content normal.         Judgment: Judgment normal.         Procedures         ED Course  ED Course as of Oct 15 1516   Wed Oct 14, 2020   1930 Troponin T: <0.010 [RS]   1930 proBNP: 838.8 [RS]   2116 Troponin T: <0.010 [RS]   2122 Patient has negative EKGs with 2 sets of negative heart test.  With the patient's presenting symptoms and risk factors, we will refer her to the chest pain clinic.  She does not want to be admitted to the hospital.  She is to return to the ER for any concerns or worsening.  She is to take a daily aspirin as well as refrain from any strenuous activity until cleared by cardiology. I had a discussion with the patient/family regarding diagnosis, diagnostic results, treatment plan, and medications.  The patient/family indicated understanding of these instructions.  I spent adequate time  at the bedside proceeding discharge necessary to personally discuss the aftercare instructions, giving patient education, providing explanations of the results of our evaluations/findings, and my decision making to assure that the patient/family understand the plan of care.  Time was allotted to answer questions at that time and throughout the ED course.  Emphasis was placed on timely follow-up after discharge.  I also discussed the potential for the development of an acute emergent condition requiring further evaluation, admission, or even surgical intervention. I discussed that we found nothing during the visit today indicating the need for further workup, admission, or the presence of an unstable medical condition.  I encouraged the patient to return to the emergency department immediately for ANY concerns, worsening, new complaints, or if symptoms persist and unable to seek follow-up in a timely fashion.  The patient/family expressed understanding and agreement with this plan.     [RS]      ED Course User Index  [RS] Sachin Caban MD     Recent Results (from the past 24 hour(s))   Troponin    Collection Time: 10/14/20  6:55 PM    Specimen: Blood   Result Value Ref Range    Troponin T <0.010 0.000 - 0.030 ng/mL   Comprehensive Metabolic Panel    Collection Time: 10/14/20  6:55 PM    Specimen: Blood   Result Value Ref Range    Glucose 203 (H) 65 - 99 mg/dL    BUN 17 8 - 23 mg/dL    Creatinine 0.91 0.57 - 1.00 mg/dL    Sodium 140 136 - 145 mmol/L    Potassium 4.5 3.5 - 5.2 mmol/L    Chloride 104 98 - 107 mmol/L    CO2 25.0 22.0 - 29.0 mmol/L    Calcium 9.4 8.6 - 10.5 mg/dL    Total Protein 7.8 6.0 - 8.5 g/dL    Albumin 4.60 3.50 - 5.20 g/dL    ALT (SGPT) 17 1 - 33 U/L    AST (SGOT) 24 1 - 32 U/L    Alkaline Phosphatase 73 39 - 117 U/L    Total Bilirubin 0.4 0.0 - 1.2 mg/dL    eGFR Non African Amer 60 (L) >60 mL/min/1.73    Globulin 3.2 gm/dL    A/G Ratio 1.4 g/dL    BUN/Creatinine Ratio 18.7 7.0 - 25.0     Anion Gap 11.0 5.0 - 15.0 mmol/L   Lipase    Collection Time: 10/14/20  6:55 PM    Specimen: Blood   Result Value Ref Range    Lipase 13 13 - 60 U/L   BNP    Collection Time: 10/14/20  6:55 PM    Specimen: Blood   Result Value Ref Range    proBNP 838.8 0.0-1,800.0 pg/mL   Light Blue Top    Collection Time: 10/14/20  6:55 PM   Result Value Ref Range    Extra Tube hold for add-on    Green Top (Gel)    Collection Time: 10/14/20  6:55 PM   Result Value Ref Range    Extra Tube Hold for add-ons.    Lavender Top    Collection Time: 10/14/20  6:55 PM   Result Value Ref Range    Extra Tube hold for add-on    Gold Top - SST    Collection Time: 10/14/20  6:55 PM   Result Value Ref Range    Extra Tube Hold for add-ons.    CBC Auto Differential    Collection Time: 10/14/20  6:55 PM    Specimen: Blood   Result Value Ref Range    WBC 11.87 (H) 3.40 - 10.80 10*3/mm3    RBC 4.45 3.77 - 5.28 10*6/mm3    Hemoglobin 12.6 12.0 - 15.9 g/dL    Hematocrit 39.8 34.0 - 46.6 %    MCV 89.4 79.0 - 97.0 fL    MCH 28.3 26.6 - 33.0 pg    MCHC 31.7 31.5 - 35.7 g/dL    RDW 13.2 12.3 - 15.4 %    RDW-SD 43.0 37.0 - 54.0 fl    MPV 11.4 6.0 - 12.0 fL    Platelets 211 140 - 450 10*3/mm3    Neutrophil % 79.8 (H) 42.7 - 76.0 %    Lymphocyte % 13.1 (L) 19.6 - 45.3 %    Monocyte % 5.8 5.0 - 12.0 %    Eosinophil % 0.7 0.3 - 6.2 %    Basophil % 0.3 0.0 - 1.5 %    Immature Grans % 0.3 0.0 - 0.5 %    Neutrophils, Absolute 9.47 (H) 1.70 - 7.00 10*3/mm3    Lymphocytes, Absolute 1.55 0.70 - 3.10 10*3/mm3    Monocytes, Absolute 0.69 0.10 - 0.90 10*3/mm3    Eosinophils, Absolute 0.08 0.00 - 0.40 10*3/mm3    Basophils, Absolute 0.04 0.00 - 0.20 10*3/mm3    Immature Grans, Absolute 0.04 0.00 - 0.05 10*3/mm3    nRBC 0.0 0.0 - 0.2 /100 WBC   Troponin    Collection Time: 10/14/20  8:44 PM    Specimen: Blood   Result Value Ref Range    Troponin T <0.010 0.000 - 0.030 ng/mL   POC Glucose Once    Collection Time: 10/14/20  8:45 PM    Specimen: Blood   Result Value Ref Range     Glucose 200 (H) 70 - 130 mg/dL     Note: In addition to lab results from this visit, the labs listed above may include labs taken at another facility or during a different encounter within the last 24 hours. Please correlate lab times with ED admission and discharge times for further clarification of the services performed during this visit.    CT Angiogram Chest   Final Result   No acute intrathoracic abnormality. No evidence of pulmonary embolus.      Signer Name: KRISSY Kennedy MD    Signed: 10/14/2020 8:10 PM    Workstation Name: [facility]     [facility]      XR Chest 1 View   Preliminary Result   Mildly increased perihilar lung markings may represent mild   congestion versus peribronchial inflammatory process without   consolidation. No pneumothorax or pleural effusion.        DICTATED:   10/14/2020   EDITED/ls :   10/14/2020             Vitals:    10/14/20 1810 10/14/20 1930 10/14/20 2100 10/14/20 2130   BP: 169/76 165/74 155/59 174/74   Pulse: 73  63 64   Resp: 18      Temp: 98.3 °F (36.8 °C)      TempSrc: Oral      SpO2: 97%  96% 96%   Weight:       Height:         Medications   iopamidol (ISOVUE-370) 76 % injection 50 mL (50 mL Intravenous Given 10/14/20 1953)     ECG/EMG Results (last 24 hours)     Procedure Component Value Units Date/Time    ECG 12 Lead [[id number]] Collected: 10/14/20 1816     Updated: 10/14/20 1836    Narrative:      Test Reason : chest pain  Blood Pressure : **/** mmHG  Vent. Rate : 066 BPM     Atrial Rate : 066 BPM     P-R Int : 122 ms          QRS Dur : 084 ms      QT Int : 434 ms       P-R-T Axes : 078 063 006 degrees     QTc Int : 454 ms    Normal sinus rhythm  Normal ECG  When compared with ECG of 19-MAR-2014 16:37,  Questionable change in QRS axis  T wave inversion now evident in Inferior leads  Confirmed by NABOR MOYA MD ([id number]) on 10/14/2020 6:36:28 PM    Referred By:  GORDY CANTOR           Confirmed By:NABOR MOYA MD        ECG 12 Lead   Final  Result   Test Reason : chest pain   Blood Pressure : **/** mmHG   Vent. Rate : 068 BPM     Atrial Rate : 068 BPM      P-R Int : 116 ms          QRS Dur : 072 ms       QT Int : 414 ms       P-R-T Axes : 075 061 004 degrees      QTc Int : 440 ms      Normal sinus rhythm   Normal ECG   When compared with ECG of 14-OCT-2020 18:16,   No significant change was found   Confirmed by NABOR MOYA MD ([id number]) on 10/14/2020 9:18:42 PM      Referred By:  NELLY           Confirmed By:NABOR MOYA MD      ECG 12 Lead   Final Result   Test Reason : chest pain   Blood Pressure : **/** mmHG   Vent. Rate : 066 BPM     Atrial Rate : 066 BPM      P-R Int : 122 ms          QRS Dur : 084 ms       QT Int : 434 ms       P-R-T Axes : 078 063 006 degrees      QTc Int : 454 ms      Normal sinus rhythm   Normal ECG   When compared with ECG of 19-MAR-2014 16:37,   Questionable change in QRS axis   T wave inversion now evident in Inferior leads   Confirmed by NABOR MOYA MD ([id number]) on 10/14/2020 6:36:28 PM      Referred By:  GORDY CANTOR           Confirmed By:NABOR MOYA MD          COVID-19 RISK SCREEN    Has the patient had close contact without PPE with a lab confirmed COVID-19 (+) person or a person under investigation (PUI) for COVID-19 infection?  -- NO     Has the patient had respiratory symptoms, worsened/new cough and/or SOA, unexplained fever, or sudden loss of smell and/or taste in the past 7 days? --  NO    Does the patient have baseline higher exposure risk such as working in healthcare field or currently residing in healthcare facility?  --  NO                                       MDM  Number of Diagnoses or Management Options  Chest pain, unspecified type:   Elevated blood pressure reading with diagnosis of hypertension:   Type 2 diabetes mellitus with hyperglycemia, unspecified whether long term insulin use (CMS/[facility]):      Amount and/or Complexity of Data Reviewed  Clinical lab tests: reviewed  Tests in the radiology section of  CPT®: reviewed  Independent visualization of images, tracings, or specimens: yes        Final diagnoses:   Chest pain, unspecified type   Elevated blood pressure reading with diagnosis of hypertension   Type 2 diabetes mellitus with hyperglycemia, unspecified whether long term insulin use (CMS/[facility])       Documentation assistance provided by carmelina Chan.  Information recorded by the scribe was done at my direction and has been verified and validated by me.     Santiago Chan  10/14/20 5567       Sachin Caban MD  10/15/20 [id number]

## 2020-10-16 ENCOUNTER — APPOINTMENT (OUTPATIENT)
Dept: GENERAL RADIOLOGY | Facility: HOSPITAL | Age: 75
End: 2020-10-16

## 2020-10-16 ENCOUNTER — HOSPITAL ENCOUNTER (OUTPATIENT)
Facility: HOSPITAL | Age: 75
Discharge: HOME | End: 2020-10-22
Attending: EMERGENCY MEDICINE | Admitting: INTERNAL MEDICINE

## 2020-10-16 DIAGNOSIS — R07.9 CHEST PAIN, UNSPECIFIED TYPE: Primary | ICD-10-CM

## 2020-10-16 DIAGNOSIS — J18.9 PNEUMONIA OF LEFT LOWER LOBE DUE TO INFECTIOUS ORGANISM: ICD-10-CM

## 2020-10-16 DIAGNOSIS — R13.10 ODYNOPHAGIA: ICD-10-CM

## 2020-10-16 PROBLEM — E11.9 DIABETES MELLITUS: Status: ACTIVE | Noted: 2020-10-16

## 2020-10-16 PROBLEM — E78.5 HLD (HYPERLIPIDEMIA): Status: ACTIVE | Noted: 2020-10-16

## 2020-10-16 PROBLEM — I10 ESSENTIAL HYPERTENSION: Status: ACTIVE | Noted: 2020-10-16

## 2020-10-16 LAB
ALBUMIN SERPL-MCNC: 4.5 G/DL (ref 3.5–5.2)
ALBUMIN/GLOB SERPL: 1.6 G/DL
ALP SERPL-CCNC: 65 U/L (ref 39–117)
ALT SERPL W P-5'-P-CCNC: 20 U/L (ref 1–33)
ANION GAP SERPL CALCULATED.3IONS-SCNC: 9 MMOL/L (ref 5–15)
AST SERPL-CCNC: 32 U/L (ref 1–32)
BASOPHILS # BLD AUTO: 0.05 10*3/MM3 (ref 0–0.2)
BASOPHILS NFR BLD AUTO: 0.7 % (ref 0–1.5)
BILIRUB SERPL-MCNC: 0.7 MG/DL (ref 0–1.2)
BUN SERPL-MCNC: 21 MG/DL (ref 8–23)
BUN/CREAT SERPL: 20 (ref 7–25)
CALCIUM SPEC-SCNC: 8.9 MG/DL (ref 8.6–10.5)
CHLORIDE SERPL-SCNC: 105 MMOL/L (ref 98–107)
CO2 SERPL-SCNC: 25 MMOL/L (ref 22–29)
CREAT SERPL-MCNC: 1.05 MG/DL (ref 0.57–1)
D-LACTATE SERPL-SCNC: 0.9 MMOL/L (ref 0.5–2)
DEPRECATED RDW RBC AUTO: 43.8 FL (ref 37–54)
EOSINOPHIL # BLD AUTO: 0.2 10*3/MM3 (ref 0–0.4)
EOSINOPHIL NFR BLD AUTO: 2.7 % (ref 0.3–6.2)
ERYTHROCYTE [DISTWIDTH] IN BLOOD BY AUTOMATED COUNT: 13.2 % (ref 12.3–15.4)
GFR SERPL CREATININE-BSD FRML MDRD: 51 ML/MIN/1.73
GLOBULIN UR ELPH-MCNC: 2.8 GM/DL
GLUCOSE BLDC GLUCOMTR-MCNC: 123 MG/DL (ref 70–130)
GLUCOSE BLDC GLUCOMTR-MCNC: 69 MG/DL (ref 70–130)
GLUCOSE SERPL-MCNC: 107 MG/DL (ref 65–99)
HCT VFR BLD AUTO: 38.8 % (ref 34–46.6)
HGB BLD-MCNC: 12.2 G/DL (ref 12–15.9)
HOLD SPECIMEN: NORMAL
HOLD SPECIMEN: NORMAL
IMM GRANULOCYTES # BLD AUTO: 0.02 10*3/MM3 (ref 0–0.05)
IMM GRANULOCYTES NFR BLD AUTO: 0.3 % (ref 0–0.5)
LIPASE SERPL-CCNC: 15 U/L (ref 13–60)
LYMPHOCYTES # BLD AUTO: 1.48 10*3/MM3 (ref 0.7–3.1)
LYMPHOCYTES NFR BLD AUTO: 19.6 % (ref 19.6–45.3)
MCH RBC QN AUTO: 28.2 PG (ref 26.6–33)
MCHC RBC AUTO-ENTMCNC: 31.4 G/DL (ref 31.5–35.7)
MCV RBC AUTO: 89.8 FL (ref 79–97)
MONOCYTES # BLD AUTO: 0.64 10*3/MM3 (ref 0.1–0.9)
MONOCYTES NFR BLD AUTO: 8.5 % (ref 5–12)
NEUTROPHILS NFR BLD AUTO: 5.15 10*3/MM3 (ref 1.7–7)
NEUTROPHILS NFR BLD AUTO: 68.2 % (ref 42.7–76)
NRBC BLD AUTO-RTO: 0 /100 WBC (ref 0–0.2)
NT-PROBNP SERPL-MCNC: 412.8 PG/ML (ref 0–1800)
PLATELET # BLD AUTO: 214 10*3/MM3 (ref 140–450)
PMV BLD AUTO: 11.7 FL (ref 6–12)
POTASSIUM SERPL-SCNC: 4.4 MMOL/L (ref 3.5–5.2)
PROT SERPL-MCNC: 7.3 G/DL (ref 6–8.5)
RBC # BLD AUTO: 4.32 10*6/MM3 (ref 3.77–5.28)
SARS-COV-2 RDRP RESP QL NAA+PROBE: NOT DETECTED
SODIUM SERPL-SCNC: 139 MMOL/L (ref 136–145)
TROPONIN T SERPL-MCNC: <0.01 NG/ML (ref 0–0.03)
TROPONIN T SERPL-MCNC: <0.01 NG/ML (ref 0–0.03)
WBC # BLD AUTO: 7.54 10*3/MM3 (ref 3.4–10.8)
WHOLE BLOOD HOLD SPECIMEN: NORMAL
WHOLE BLOOD HOLD SPECIMEN: NORMAL

## 2020-10-16 PROCEDURE — 25010000002 CEFTRIAXONE PER 250 MG: Performed by: EMERGENCY MEDICINE

## 2020-10-16 PROCEDURE — 96372 THER/PROPH/DIAG INJ SC/IM: CPT

## 2020-10-16 PROCEDURE — 99220 PR INITIAL OBSERVATION CARE/DAY 70 MINUTES: CPT | Performed by: FAMILY MEDICINE

## 2020-10-16 PROCEDURE — G0378 HOSPITAL OBSERVATION PER HR: HCPCS

## 2020-10-16 PROCEDURE — 85025 COMPLETE CBC W/AUTO DIFF WBC: CPT | Performed by: EMERGENCY MEDICINE

## 2020-10-16 PROCEDURE — 83690 ASSAY OF LIPASE: CPT | Performed by: EMERGENCY MEDICINE

## 2020-10-16 PROCEDURE — 96367 TX/PROPH/DG ADDL SEQ IV INF: CPT

## 2020-10-16 PROCEDURE — 93005 ELECTROCARDIOGRAM TRACING: CPT | Performed by: EMERGENCY MEDICINE

## 2020-10-16 PROCEDURE — 84484 ASSAY OF TROPONIN QUANT: CPT | Performed by: EMERGENCY MEDICINE

## 2020-10-16 PROCEDURE — 83880 ASSAY OF NATRIURETIC PEPTIDE: CPT | Performed by: EMERGENCY MEDICINE

## 2020-10-16 PROCEDURE — 63710000001 INSULIN DETEMIR PER 5 UNITS: Performed by: NURSE PRACTITIONER

## 2020-10-16 PROCEDURE — 25010000002 HEPARIN (PORCINE) PER 1000 UNITS: Performed by: NURSE PRACTITIONER

## 2020-10-16 PROCEDURE — 87040 BLOOD CULTURE FOR BACTERIA: CPT | Performed by: NURSE PRACTITIONER

## 2020-10-16 PROCEDURE — C9803 HOPD COVID-19 SPEC COLLECT: HCPCS

## 2020-10-16 PROCEDURE — 83605 ASSAY OF LACTIC ACID: CPT | Performed by: NURSE PRACTITIONER

## 2020-10-16 PROCEDURE — 96365 THER/PROPH/DIAG IV INF INIT: CPT

## 2020-10-16 PROCEDURE — 71045 X-RAY EXAM CHEST 1 VIEW: CPT

## 2020-10-16 PROCEDURE — 82962 GLUCOSE BLOOD TEST: CPT

## 2020-10-16 PROCEDURE — 87635 SARS-COV-2 COVID-19 AMP PRB: CPT | Performed by: NURSE PRACTITIONER

## 2020-10-16 PROCEDURE — 80053 COMPREHEN METABOLIC PANEL: CPT | Performed by: EMERGENCY MEDICINE

## 2020-10-16 PROCEDURE — 25010000002 AZITHROMYCIN PER 500 MG: Performed by: NURSE PRACTITIONER

## 2020-10-16 PROCEDURE — 99284 EMERGENCY DEPT VISIT MOD MDM: CPT

## 2020-10-16 RX ORDER — AMLODIPINE BESYLATE 5 MG/1
5 TABLET ORAL DAILY
Status: DISCONTINUED | OUTPATIENT
Start: 2020-10-17 | End: 2020-10-22 | Stop reason: HOSPADM

## 2020-10-16 RX ORDER — ASPIRIN 81 MG/1
324 TABLET, CHEWABLE ORAL ONCE
Status: COMPLETED | OUTPATIENT
Start: 2020-10-16 | End: 2020-10-16

## 2020-10-16 RX ORDER — CETIRIZINE HYDROCHLORIDE 10 MG/1
5 TABLET ORAL DAILY
Status: DISCONTINUED | OUTPATIENT
Start: 2020-10-17 | End: 2020-10-22 | Stop reason: HOSPADM

## 2020-10-16 RX ORDER — OXYBUTYNIN CHLORIDE 5 MG/1
5 TABLET ORAL DAILY
Status: DISCONTINUED | OUTPATIENT
Start: 2020-10-17 | End: 2020-10-22 | Stop reason: HOSPADM

## 2020-10-16 RX ORDER — ONDANSETRON 4 MG/1
4 TABLET, FILM COATED ORAL EVERY 6 HOURS PRN
Status: DISCONTINUED | OUTPATIENT
Start: 2020-10-16 | End: 2020-10-22 | Stop reason: HOSPADM

## 2020-10-16 RX ORDER — SODIUM CHLORIDE 0.9 % (FLUSH) 0.9 %
10 SYRINGE (ML) INJECTION AS NEEDED
Status: DISCONTINUED | OUTPATIENT
Start: 2020-10-16 | End: 2020-10-22 | Stop reason: HOSPADM

## 2020-10-16 RX ORDER — LEVOTHYROXINE SODIUM 0.07 MG/1
75 TABLET ORAL
Status: DISCONTINUED | OUTPATIENT
Start: 2020-10-17 | End: 2020-10-22 | Stop reason: HOSPADM

## 2020-10-16 RX ORDER — IPRATROPIUM BROMIDE AND ALBUTEROL SULFATE 2.5; .5 MG/3ML; MG/3ML
3 SOLUTION RESPIRATORY (INHALATION)
Status: DISCONTINUED | OUTPATIENT
Start: 2020-10-16 | End: 2020-10-19

## 2020-10-16 RX ORDER — LABETALOL HYDROCHLORIDE 5 MG/ML
10 INJECTION, SOLUTION INTRAVENOUS EVERY 6 HOURS PRN
Status: DISCONTINUED | OUTPATIENT
Start: 2020-10-16 | End: 2020-10-22 | Stop reason: HOSPADM

## 2020-10-16 RX ORDER — HEPARIN SODIUM 5000 [USP'U]/ML
5000 INJECTION, SOLUTION INTRAVENOUS; SUBCUTANEOUS EVERY 12 HOURS SCHEDULED
Status: DISCONTINUED | OUTPATIENT
Start: 2020-10-16 | End: 2020-10-22 | Stop reason: HOSPADM

## 2020-10-16 RX ORDER — ONDANSETRON 2 MG/ML
4 INJECTION INTRAMUSCULAR; INTRAVENOUS EVERY 6 HOURS PRN
Status: DISCONTINUED | OUTPATIENT
Start: 2020-10-16 | End: 2020-10-22 | Stop reason: HOSPADM

## 2020-10-16 RX ORDER — BISACODYL 10 MG
10 SUPPOSITORY, RECTAL RECTAL DAILY PRN
Status: DISCONTINUED | OUTPATIENT
Start: 2020-10-16 | End: 2020-10-22 | Stop reason: HOSPADM

## 2020-10-16 RX ORDER — ASPIRIN 81 MG/1
81 TABLET ORAL DAILY
Status: DISCONTINUED | OUTPATIENT
Start: 2020-10-17 | End: 2020-10-22 | Stop reason: HOSPADM

## 2020-10-16 RX ORDER — BISACODYL 5 MG/1
5 TABLET, DELAYED RELEASE ORAL DAILY PRN
Status: DISCONTINUED | OUTPATIENT
Start: 2020-10-16 | End: 2020-10-22 | Stop reason: HOSPADM

## 2020-10-16 RX ORDER — DEXTROSE MONOHYDRATE 25 G/50ML
25 INJECTION, SOLUTION INTRAVENOUS
Status: DISCONTINUED | OUTPATIENT
Start: 2020-10-16 | End: 2020-10-22 | Stop reason: HOSPADM

## 2020-10-16 RX ORDER — ACETAMINOPHEN 325 MG/1
650 TABLET ORAL EVERY 4 HOURS PRN
Status: DISCONTINUED | OUTPATIENT
Start: 2020-10-16 | End: 2020-10-22 | Stop reason: HOSPADM

## 2020-10-16 RX ORDER — PRAVASTATIN SODIUM 20 MG
10 TABLET ORAL DAILY
Status: DISCONTINUED | OUTPATIENT
Start: 2020-10-17 | End: 2020-10-22 | Stop reason: HOSPADM

## 2020-10-16 RX ORDER — SODIUM CHLORIDE 0.9 % (FLUSH) 0.9 %
10 SYRINGE (ML) INJECTION EVERY 12 HOURS SCHEDULED
Status: DISCONTINUED | OUTPATIENT
Start: 2020-10-16 | End: 2020-10-22 | Stop reason: HOSPADM

## 2020-10-16 RX ORDER — SACCHAROMYCES BOULARDII 250 MG
250 CAPSULE ORAL 2 TIMES DAILY
Status: DISCONTINUED | OUTPATIENT
Start: 2020-10-16 | End: 2020-10-22 | Stop reason: HOSPADM

## 2020-10-16 RX ORDER — NICOTINE POLACRILEX 4 MG
15 LOZENGE BUCCAL
Status: DISCONTINUED | OUTPATIENT
Start: 2020-10-16 | End: 2020-10-22 | Stop reason: HOSPADM

## 2020-10-16 RX ORDER — LISINOPRIL 20 MG/1
20 TABLET ORAL DAILY
Status: DISCONTINUED | OUTPATIENT
Start: 2020-10-17 | End: 2020-10-22 | Stop reason: HOSPADM

## 2020-10-16 RX ORDER — ACETAMINOPHEN 650 MG/1
650 SUPPOSITORY RECTAL EVERY 4 HOURS PRN
Status: DISCONTINUED | OUTPATIENT
Start: 2020-10-16 | End: 2020-10-22 | Stop reason: HOSPADM

## 2020-10-16 RX ORDER — INSULIN DEGLUDEC INJECTION 100 U/ML
14 INJECTION, SOLUTION SUBCUTANEOUS NIGHTLY
COMMUNITY
End: 2022-01-28

## 2020-10-16 RX ORDER — ACETAMINOPHEN 160 MG/5ML
650 SOLUTION ORAL EVERY 4 HOURS PRN
Status: DISCONTINUED | OUTPATIENT
Start: 2020-10-16 | End: 2020-10-22 | Stop reason: HOSPADM

## 2020-10-16 RX ORDER — CLINDAMYCIN HYDROCHLORIDE 300 MG/1
300 CAPSULE ORAL 3 TIMES DAILY
COMMUNITY
End: 2021-08-16

## 2020-10-16 RX ORDER — METOPROLOL TARTRATE 100 MG/1
100 TABLET ORAL NIGHTLY
Status: DISCONTINUED | OUTPATIENT
Start: 2020-10-16 | End: 2020-10-22 | Stop reason: HOSPADM

## 2020-10-16 RX ORDER — AMOXICILLIN 250 MG
2 CAPSULE ORAL 2 TIMES DAILY PRN
Status: DISCONTINUED | OUTPATIENT
Start: 2020-10-16 | End: 2020-10-22 | Stop reason: HOSPADM

## 2020-10-16 RX ORDER — FAMOTIDINE 20 MG/1
20 TABLET, FILM COATED ORAL 2 TIMES DAILY
Status: DISCONTINUED | OUTPATIENT
Start: 2020-10-16 | End: 2020-10-19

## 2020-10-16 RX ADMIN — AZITHROMYCIN 500 MG: 500 INJECTION, POWDER, LYOPHILIZED, FOR SOLUTION INTRAVENOUS at 21:47

## 2020-10-16 RX ADMIN — SODIUM CHLORIDE, PRESERVATIVE FREE 10 ML: 5 INJECTION INTRAVENOUS at 21:46

## 2020-10-16 RX ADMIN — ASPIRIN 324 MG: 81 TABLET, CHEWABLE ORAL at 13:42

## 2020-10-16 RX ADMIN — METOPROLOL TARTRATE 100 MG: 100 TABLET ORAL at 21:45

## 2020-10-16 RX ADMIN — INSULIN DETEMIR 10 UNITS: 100 INJECTION, SOLUTION SUBCUTANEOUS at 23:15

## 2020-10-16 RX ADMIN — CEFTRIAXONE 1 G: 1 INJECTION, POWDER, FOR SOLUTION INTRAMUSCULAR; INTRAVENOUS at 14:53

## 2020-10-16 RX ADMIN — HEPARIN SODIUM 5000 UNITS: 5000 INJECTION INTRAVENOUS; SUBCUTANEOUS at 21:45

## 2020-10-16 RX ADMIN — Medication 250 MG: at 21:45

## 2020-10-16 RX ADMIN — FAMOTIDINE 20 MG: 20 TABLET, FILM COATED ORAL at 21:45

## 2020-10-17 LAB
ANION GAP SERPL CALCULATED.3IONS-SCNC: 8 MMOL/L (ref 5–15)
BASOPHILS # BLD AUTO: 0.05 10*3/MM3 (ref 0–0.2)
BASOPHILS NFR BLD AUTO: 0.6 % (ref 0–1.5)
BUN SERPL-MCNC: 17 MG/DL (ref 8–23)
BUN/CREAT SERPL: 19.3 (ref 7–25)
CALCIUM SPEC-SCNC: 8.9 MG/DL (ref 8.6–10.5)
CHLORIDE SERPL-SCNC: 108 MMOL/L (ref 98–107)
CO2 SERPL-SCNC: 27 MMOL/L (ref 22–29)
CREAT SERPL-MCNC: 0.88 MG/DL (ref 0.57–1)
DEPRECATED RDW RBC AUTO: 44.1 FL (ref 37–54)
EOSINOPHIL # BLD AUTO: 0.21 10*3/MM3 (ref 0–0.4)
EOSINOPHIL NFR BLD AUTO: 2.7 % (ref 0.3–6.2)
ERYTHROCYTE [DISTWIDTH] IN BLOOD BY AUTOMATED COUNT: 13.1 % (ref 12.3–15.4)
GFR SERPL CREATININE-BSD FRML MDRD: 63 ML/MIN/1.73
GLUCOSE BLDC GLUCOMTR-MCNC: 125 MG/DL (ref 70–130)
GLUCOSE BLDC GLUCOMTR-MCNC: 196 MG/DL (ref 70–130)
GLUCOSE BLDC GLUCOMTR-MCNC: 242 MG/DL (ref 70–130)
GLUCOSE BLDC GLUCOMTR-MCNC: 293 MG/DL (ref 70–130)
GLUCOSE BLDC GLUCOMTR-MCNC: 49 MG/DL (ref 70–130)
GLUCOSE BLDC GLUCOMTR-MCNC: 49 MG/DL (ref 70–130)
GLUCOSE SERPL-MCNC: 50 MG/DL (ref 65–99)
HCT VFR BLD AUTO: 38.8 % (ref 34–46.6)
HGB BLD-MCNC: 12.2 G/DL (ref 12–15.9)
IMM GRANULOCYTES # BLD AUTO: 0.03 10*3/MM3 (ref 0–0.05)
IMM GRANULOCYTES NFR BLD AUTO: 0.4 % (ref 0–0.5)
LYMPHOCYTES # BLD AUTO: 1.61 10*3/MM3 (ref 0.7–3.1)
LYMPHOCYTES NFR BLD AUTO: 20.9 % (ref 19.6–45.3)
MCH RBC QN AUTO: 29 PG (ref 26.6–33)
MCHC RBC AUTO-ENTMCNC: 31.4 G/DL (ref 31.5–35.7)
MCV RBC AUTO: 92.4 FL (ref 79–97)
MONOCYTES # BLD AUTO: 0.75 10*3/MM3 (ref 0.1–0.9)
MONOCYTES NFR BLD AUTO: 9.7 % (ref 5–12)
NEUTROPHILS NFR BLD AUTO: 5.06 10*3/MM3 (ref 1.7–7)
NEUTROPHILS NFR BLD AUTO: 65.7 % (ref 42.7–76)
NRBC BLD AUTO-RTO: 0 /100 WBC (ref 0–0.2)
PLATELET # BLD AUTO: 196 10*3/MM3 (ref 140–450)
PMV BLD AUTO: 11.9 FL (ref 6–12)
POTASSIUM SERPL-SCNC: 4 MMOL/L (ref 3.5–5.2)
RBC # BLD AUTO: 4.2 10*6/MM3 (ref 3.77–5.28)
SODIUM SERPL-SCNC: 143 MMOL/L (ref 136–145)
WBC # BLD AUTO: 7.71 10*3/MM3 (ref 3.4–10.8)

## 2020-10-17 PROCEDURE — 25010000002 HEPARIN (PORCINE) PER 1000 UNITS: Performed by: NURSE PRACTITIONER

## 2020-10-17 PROCEDURE — 94799 UNLISTED PULMONARY SVC/PX: CPT

## 2020-10-17 PROCEDURE — 93010 ELECTROCARDIOGRAM REPORT: CPT | Performed by: INTERNAL MEDICINE

## 2020-10-17 PROCEDURE — 94640 AIRWAY INHALATION TREATMENT: CPT

## 2020-10-17 PROCEDURE — 25010000002 CEFTRIAXONE PER 250 MG: Performed by: NURSE PRACTITIONER

## 2020-10-17 PROCEDURE — G0378 HOSPITAL OBSERVATION PER HR: HCPCS

## 2020-10-17 PROCEDURE — 85025 COMPLETE CBC W/AUTO DIFF WBC: CPT | Performed by: NURSE PRACTITIONER

## 2020-10-17 PROCEDURE — 96372 THER/PROPH/DIAG INJ SC/IM: CPT

## 2020-10-17 PROCEDURE — 99226 PR SBSQ OBSERVATION CARE/DAY 35 MINUTES: CPT | Performed by: INTERNAL MEDICINE

## 2020-10-17 PROCEDURE — 82962 GLUCOSE BLOOD TEST: CPT

## 2020-10-17 PROCEDURE — 63710000001 INSULIN DETEMIR PER 5 UNITS: Performed by: FAMILY MEDICINE

## 2020-10-17 PROCEDURE — 25010000002 AZITHROMYCIN PER 500 MG: Performed by: NURSE PRACTITIONER

## 2020-10-17 PROCEDURE — 96366 THER/PROPH/DIAG IV INF ADDON: CPT

## 2020-10-17 PROCEDURE — 93005 ELECTROCARDIOGRAM TRACING: CPT | Performed by: INTERNAL MEDICINE

## 2020-10-17 PROCEDURE — 80048 BASIC METABOLIC PNL TOTAL CA: CPT | Performed by: NURSE PRACTITIONER

## 2020-10-17 RX ORDER — NITROGLYCERIN 0.4 MG/1
0.4 TABLET SUBLINGUAL
Status: DISCONTINUED | OUTPATIENT
Start: 2020-10-17 | End: 2020-10-22 | Stop reason: HOSPADM

## 2020-10-17 RX ADMIN — INSULIN LISPRO 3 UNITS: 100 INJECTION, SOLUTION INTRAVENOUS; SUBCUTANEOUS at 11:49

## 2020-10-17 RX ADMIN — METOPROLOL TARTRATE 100 MG: 100 TABLET ORAL at 20:43

## 2020-10-17 RX ADMIN — SODIUM CHLORIDE, PRESERVATIVE FREE 10 ML: 5 INJECTION INTRAVENOUS at 09:00

## 2020-10-17 RX ADMIN — Medication 250 MG: at 08:12

## 2020-10-17 RX ADMIN — INSULIN LISPRO 5 UNITS: 100 INJECTION, SOLUTION INTRAVENOUS; SUBCUTANEOUS at 18:21

## 2020-10-17 RX ADMIN — LEVOTHYROXINE SODIUM 75 MCG: 75 TABLET ORAL at 06:18

## 2020-10-17 RX ADMIN — AZITHROMYCIN 500 MG: 500 INJECTION, POWDER, LYOPHILIZED, FOR SOLUTION INTRAVENOUS at 20:44

## 2020-10-17 RX ADMIN — HEPARIN SODIUM 5000 UNITS: 5000 INJECTION INTRAVENOUS; SUBCUTANEOUS at 08:13

## 2020-10-17 RX ADMIN — IPRATROPIUM BROMIDE AND ALBUTEROL SULFATE 3 ML: 2.5; .5 SOLUTION RESPIRATORY (INHALATION) at 13:33

## 2020-10-17 RX ADMIN — PRAVASTATIN SODIUM 10 MG: 20 TABLET ORAL at 08:12

## 2020-10-17 RX ADMIN — CEFTRIAXONE SODIUM 1 G: 1 INJECTION, POWDER, FOR SOLUTION INTRAMUSCULAR; INTRAVENOUS at 10:13

## 2020-10-17 RX ADMIN — FAMOTIDINE 20 MG: 20 TABLET, FILM COATED ORAL at 20:43

## 2020-10-17 RX ADMIN — SODIUM CHLORIDE, PRESERVATIVE FREE 10 ML: 5 INJECTION INTRAVENOUS at 20:43

## 2020-10-17 RX ADMIN — CETIRIZINE HYDROCHLORIDE 5 MG: 10 TABLET, FILM COATED ORAL at 08:13

## 2020-10-17 RX ADMIN — INSULIN DETEMIR 5 UNITS: 100 INJECTION, SOLUTION SUBCUTANEOUS at 20:44

## 2020-10-17 RX ADMIN — IPRATROPIUM BROMIDE AND ALBUTEROL SULFATE 3 ML: 2.5; .5 SOLUTION RESPIRATORY (INHALATION) at 20:26

## 2020-10-17 RX ADMIN — AMLODIPINE BESYLATE 5 MG: 5 TABLET ORAL at 08:13

## 2020-10-17 RX ADMIN — OXYBUTYNIN CHLORIDE 5 MG: 5 TABLET ORAL at 08:13

## 2020-10-17 RX ADMIN — Medication 250 MG: at 20:43

## 2020-10-17 RX ADMIN — ASPIRIN 81 MG: 81 TABLET, COATED ORAL at 08:13

## 2020-10-17 RX ADMIN — IPRATROPIUM BROMIDE AND ALBUTEROL SULFATE 3 ML: 2.5; .5 SOLUTION RESPIRATORY (INHALATION) at 07:39

## 2020-10-17 RX ADMIN — HEPARIN SODIUM 5000 UNITS: 5000 INJECTION INTRAVENOUS; SUBCUTANEOUS at 20:42

## 2020-10-17 RX ADMIN — LISINOPRIL 20 MG: 20 TABLET ORAL at 08:13

## 2020-10-17 RX ADMIN — NITROGLYCERIN 0.4 MG: 0.4 TABLET, ORALLY DISINTEGRATING SUBLINGUAL at 18:34

## 2020-10-17 RX ADMIN — FAMOTIDINE 20 MG: 20 TABLET, FILM COATED ORAL at 08:13

## 2020-10-18 LAB
ANION GAP SERPL CALCULATED.3IONS-SCNC: 11 MMOL/L (ref 5–15)
BASOPHILS # BLD AUTO: 0.05 10*3/MM3 (ref 0–0.2)
BASOPHILS NFR BLD AUTO: 0.6 % (ref 0–1.5)
BUN SERPL-MCNC: 17 MG/DL (ref 8–23)
BUN/CREAT SERPL: 17.9 (ref 7–25)
CALCIUM SPEC-SCNC: 9.2 MG/DL (ref 8.6–10.5)
CHLORIDE SERPL-SCNC: 103 MMOL/L (ref 98–107)
CO2 SERPL-SCNC: 25 MMOL/L (ref 22–29)
CREAT SERPL-MCNC: 0.95 MG/DL (ref 0.57–1)
DEPRECATED RDW RBC AUTO: 43.8 FL (ref 37–54)
EOSINOPHIL # BLD AUTO: 0.13 10*3/MM3 (ref 0–0.4)
EOSINOPHIL NFR BLD AUTO: 1.5 % (ref 0.3–6.2)
ERYTHROCYTE [DISTWIDTH] IN BLOOD BY AUTOMATED COUNT: 13.2 % (ref 12.3–15.4)
GFR SERPL CREATININE-BSD FRML MDRD: 57 ML/MIN/1.73
GLUCOSE BLDC GLUCOMTR-MCNC: 137 MG/DL (ref 70–130)
GLUCOSE BLDC GLUCOMTR-MCNC: 150 MG/DL (ref 70–130)
GLUCOSE BLDC GLUCOMTR-MCNC: 213 MG/DL (ref 70–130)
GLUCOSE BLDC GLUCOMTR-MCNC: 249 MG/DL (ref 70–130)
GLUCOSE BLDC GLUCOMTR-MCNC: 331 MG/DL (ref 70–130)
GLUCOSE SERPL-MCNC: 170 MG/DL (ref 65–99)
HCT VFR BLD AUTO: 39 % (ref 34–46.6)
HGB BLD-MCNC: 12.2 G/DL (ref 12–15.9)
IMM GRANULOCYTES # BLD AUTO: 0.02 10*3/MM3 (ref 0–0.05)
IMM GRANULOCYTES NFR BLD AUTO: 0.2 % (ref 0–0.5)
LYMPHOCYTES # BLD AUTO: 1.76 10*3/MM3 (ref 0.7–3.1)
LYMPHOCYTES NFR BLD AUTO: 20.5 % (ref 19.6–45.3)
MCH RBC QN AUTO: 28.1 PG (ref 26.6–33)
MCHC RBC AUTO-ENTMCNC: 31.3 G/DL (ref 31.5–35.7)
MCV RBC AUTO: 89.9 FL (ref 79–97)
MONOCYTES # BLD AUTO: 0.72 10*3/MM3 (ref 0.1–0.9)
MONOCYTES NFR BLD AUTO: 8.4 % (ref 5–12)
NEUTROPHILS NFR BLD AUTO: 5.89 10*3/MM3 (ref 1.7–7)
NEUTROPHILS NFR BLD AUTO: 68.8 % (ref 42.7–76)
NRBC BLD AUTO-RTO: 0 /100 WBC (ref 0–0.2)
PLATELET # BLD AUTO: 223 10*3/MM3 (ref 140–450)
PMV BLD AUTO: 12.1 FL (ref 6–12)
POTASSIUM SERPL-SCNC: 4.1 MMOL/L (ref 3.5–5.2)
RBC # BLD AUTO: 4.34 10*6/MM3 (ref 3.77–5.28)
SARS-COV-2 RDRP RESP QL NAA+PROBE: NOT DETECTED
SODIUM SERPL-SCNC: 139 MMOL/L (ref 136–145)
WBC # BLD AUTO: 8.57 10*3/MM3 (ref 3.4–10.8)

## 2020-10-18 PROCEDURE — G0378 HOSPITAL OBSERVATION PER HR: HCPCS

## 2020-10-18 PROCEDURE — 94799 UNLISTED PULMONARY SVC/PX: CPT

## 2020-10-18 PROCEDURE — 87635 SARS-COV-2 COVID-19 AMP PRB: CPT | Performed by: NURSE PRACTITIONER

## 2020-10-18 PROCEDURE — 25010000002 CEFTRIAXONE PER 250 MG: Performed by: NURSE PRACTITIONER

## 2020-10-18 PROCEDURE — 82962 GLUCOSE BLOOD TEST: CPT

## 2020-10-18 PROCEDURE — 80048 BASIC METABOLIC PNL TOTAL CA: CPT | Performed by: INTERNAL MEDICINE

## 2020-10-18 PROCEDURE — 87186 SC STD MICRODIL/AGAR DIL: CPT | Performed by: NURSE PRACTITIONER

## 2020-10-18 PROCEDURE — 92610 EVALUATE SWALLOWING FUNCTION: CPT

## 2020-10-18 PROCEDURE — 99225 PR SBSQ OBSERVATION CARE/DAY 25 MINUTES: CPT | Performed by: NURSE PRACTITIONER

## 2020-10-18 PROCEDURE — 96366 THER/PROPH/DIAG IV INF ADDON: CPT

## 2020-10-18 PROCEDURE — 96372 THER/PROPH/DIAG INJ SC/IM: CPT

## 2020-10-18 PROCEDURE — 25010000002 HEPARIN (PORCINE) PER 1000 UNITS: Performed by: NURSE PRACTITIONER

## 2020-10-18 PROCEDURE — 85025 COMPLETE CBC W/AUTO DIFF WBC: CPT | Performed by: INTERNAL MEDICINE

## 2020-10-18 PROCEDURE — 87070 CULTURE OTHR SPECIMN AEROBIC: CPT | Performed by: NURSE PRACTITIONER

## 2020-10-18 PROCEDURE — 63710000001 INSULIN DETEMIR PER 5 UNITS: Performed by: FAMILY MEDICINE

## 2020-10-18 PROCEDURE — 87205 SMEAR GRAM STAIN: CPT | Performed by: NURSE PRACTITIONER

## 2020-10-18 PROCEDURE — 25010000002 AZITHROMYCIN PER 500 MG: Performed by: NURSE PRACTITIONER

## 2020-10-18 RX ADMIN — IPRATROPIUM BROMIDE AND ALBUTEROL SULFATE 3 ML: 2.5; .5 SOLUTION RESPIRATORY (INHALATION) at 01:24

## 2020-10-18 RX ADMIN — METOPROLOL TARTRATE 100 MG: 100 TABLET ORAL at 20:25

## 2020-10-18 RX ADMIN — ASPIRIN 81 MG: 81 TABLET, COATED ORAL at 08:58

## 2020-10-18 RX ADMIN — HEPARIN SODIUM 5000 UNITS: 5000 INJECTION INTRAVENOUS; SUBCUTANEOUS at 08:58

## 2020-10-18 RX ADMIN — AZITHROMYCIN 500 MG: 500 INJECTION, POWDER, LYOPHILIZED, FOR SOLUTION INTRAVENOUS at 20:25

## 2020-10-18 RX ADMIN — Medication 250 MG: at 08:58

## 2020-10-18 RX ADMIN — LEVOTHYROXINE SODIUM 75 MCG: 75 TABLET ORAL at 06:35

## 2020-10-18 RX ADMIN — HEPARIN SODIUM 5000 UNITS: 5000 INJECTION INTRAVENOUS; SUBCUTANEOUS at 20:25

## 2020-10-18 RX ADMIN — INSULIN DETEMIR 5 UNITS: 100 INJECTION, SOLUTION SUBCUTANEOUS at 21:57

## 2020-10-18 RX ADMIN — LISINOPRIL 20 MG: 20 TABLET ORAL at 08:58

## 2020-10-18 RX ADMIN — PRAVASTATIN SODIUM 10 MG: 20 TABLET ORAL at 08:58

## 2020-10-18 RX ADMIN — Medication 250 MG: at 20:25

## 2020-10-18 RX ADMIN — INSULIN LISPRO 5 UNITS: 100 INJECTION, SOLUTION INTRAVENOUS; SUBCUTANEOUS at 11:57

## 2020-10-18 RX ADMIN — CEFTRIAXONE SODIUM 1 G: 1 INJECTION, POWDER, FOR SOLUTION INTRAMUSCULAR; INTRAVENOUS at 09:00

## 2020-10-18 RX ADMIN — FAMOTIDINE 20 MG: 20 TABLET, FILM COATED ORAL at 08:58

## 2020-10-18 RX ADMIN — IPRATROPIUM BROMIDE AND ALBUTEROL SULFATE 3 ML: 2.5; .5 SOLUTION RESPIRATORY (INHALATION) at 23:42

## 2020-10-18 RX ADMIN — CETIRIZINE HYDROCHLORIDE 5 MG: 10 TABLET, FILM COATED ORAL at 08:59

## 2020-10-18 RX ADMIN — SODIUM CHLORIDE, PRESERVATIVE FREE 10 ML: 5 INJECTION INTRAVENOUS at 20:48

## 2020-10-18 RX ADMIN — OXYBUTYNIN CHLORIDE 5 MG: 5 TABLET ORAL at 08:59

## 2020-10-18 RX ADMIN — FAMOTIDINE 20 MG: 20 TABLET, FILM COATED ORAL at 20:25

## 2020-10-18 RX ADMIN — INSULIN LISPRO 10 UNITS: 100 INJECTION, SOLUTION INTRAVENOUS; SUBCUTANEOUS at 17:13

## 2020-10-18 RX ADMIN — AMLODIPINE BESYLATE 5 MG: 5 TABLET ORAL at 08:59

## 2020-10-18 RX ADMIN — SODIUM CHLORIDE, PRESERVATIVE FREE 10 ML: 5 INJECTION INTRAVENOUS at 08:59

## 2020-10-18 RX ADMIN — IPRATROPIUM BROMIDE AND ALBUTEROL SULFATE 3 ML: 2.5; .5 SOLUTION RESPIRATORY (INHALATION) at 07:50

## 2020-10-19 ENCOUNTER — APPOINTMENT (OUTPATIENT)
Dept: CARDIOLOGY | Facility: HOSPITAL | Age: 75
End: 2020-10-19

## 2020-10-19 LAB
BH CV NUCLEAR PRIOR STUDY: 3
BH CV STRESS BP STAGE 1: NORMAL
BH CV STRESS BP STAGE 2: NORMAL
BH CV STRESS BP STAGE 4: NORMAL
BH CV STRESS COMMENTS STAGE 1: NORMAL
BH CV STRESS DOSE REGADENOSON STAGE 1: 0.4
BH CV STRESS DURATION MIN STAGE 1: 1
BH CV STRESS DURATION MIN STAGE 2: 1
BH CV STRESS DURATION MIN STAGE 3: 1
BH CV STRESS DURATION MIN STAGE 4: 1
BH CV STRESS DURATION SEC STAGE 1: 0
BH CV STRESS DURATION SEC STAGE 2: 0
BH CV STRESS DURATION SEC STAGE 3: 0
BH CV STRESS DURATION SEC STAGE 4: 0
BH CV STRESS HR STAGE 1: 99
BH CV STRESS HR STAGE 2: 99
BH CV STRESS HR STAGE 3: 98
BH CV STRESS HR STAGE 4: 97
BH CV STRESS O2 STAGE 1: 98
BH CV STRESS O2 STAGE 2: 99
BH CV STRESS O2 STAGE 3: 98
BH CV STRESS O2 STAGE 4: 97
BH CV STRESS PROTOCOL 1: NORMAL
BH CV STRESS RECOVERY BP: NORMAL MMHG
BH CV STRESS RECOVERY HR: 94 BPM
BH CV STRESS RECOVERY O2: 95 %
BH CV STRESS STAGE 1: 1
BH CV STRESS STAGE 2: 2
BH CV STRESS STAGE 3: 3
BH CV STRESS STAGE 4: 4
GLUCOSE BLDC GLUCOMTR-MCNC: 138 MG/DL (ref 70–130)
GLUCOSE BLDC GLUCOMTR-MCNC: 237 MG/DL (ref 70–130)
GLUCOSE BLDC GLUCOMTR-MCNC: 278 MG/DL (ref 70–130)
GLUCOSE BLDC GLUCOMTR-MCNC: 371 MG/DL (ref 70–130)
LV EF NUC BP: 72 %
MAXIMAL PREDICTED HEART RATE: 145 BPM
PERCENT MAX PREDICTED HR: 71.72 %
STRESS BASELINE BP: NORMAL MMHG
STRESS BASELINE HR: 58 BPM
STRESS O2 SAT REST: 94 %
STRESS PERCENT HR: 84 %
STRESS POST O2 SAT PEAK: 97 %
STRESS POST PEAK BP: NORMAL MMHG
STRESS POST PEAK HR: 104 BPM
STRESS TARGET HR: 123 BPM

## 2020-10-19 PROCEDURE — G0378 HOSPITAL OBSERVATION PER HR: HCPCS

## 2020-10-19 PROCEDURE — 25010000002 AZITHROMYCIN PER 500 MG: Performed by: NURSE PRACTITIONER

## 2020-10-19 PROCEDURE — A9555 RB82 RUBIDIUM: HCPCS | Performed by: NURSE PRACTITIONER

## 2020-10-19 PROCEDURE — 0 RUBIDIUM CHLORIDE: Performed by: NURSE PRACTITIONER

## 2020-10-19 PROCEDURE — 78492 MYOCRD IMG PET MLT RST&STRS: CPT | Performed by: INTERNAL MEDICINE

## 2020-10-19 PROCEDURE — 93017 CV STRESS TEST TRACING ONLY: CPT

## 2020-10-19 PROCEDURE — 99204 OFFICE O/P NEW MOD 45 MIN: CPT | Performed by: PHYSICIAN ASSISTANT

## 2020-10-19 PROCEDURE — 25010000002 REGADENOSON 0.4 MG/5ML SOLUTION: Performed by: NURSE PRACTITIONER

## 2020-10-19 PROCEDURE — 25010000002 HEPARIN (PORCINE) PER 1000 UNITS: Performed by: NURSE PRACTITIONER

## 2020-10-19 PROCEDURE — 93018 CV STRESS TEST I&R ONLY: CPT | Performed by: INTERNAL MEDICINE

## 2020-10-19 PROCEDURE — 99226 PR SBSQ OBSERVATION CARE/DAY 35 MINUTES: CPT | Performed by: NURSE PRACTITIONER

## 2020-10-19 PROCEDURE — 96372 THER/PROPH/DIAG INJ SC/IM: CPT

## 2020-10-19 PROCEDURE — 96366 THER/PROPH/DIAG IV INF ADDON: CPT

## 2020-10-19 PROCEDURE — 78492 MYOCRD IMG PET MLT RST&STRS: CPT

## 2020-10-19 PROCEDURE — 82962 GLUCOSE BLOOD TEST: CPT

## 2020-10-19 PROCEDURE — 25010000002 CEFTRIAXONE PER 250 MG: Performed by: NURSE PRACTITIONER

## 2020-10-19 PROCEDURE — 63710000001 INSULIN DETEMIR PER 5 UNITS: Performed by: FAMILY MEDICINE

## 2020-10-19 RX ORDER — PANTOPRAZOLE SODIUM 40 MG/1
40 TABLET, DELAYED RELEASE ORAL
Status: DISCONTINUED | OUTPATIENT
Start: 2020-10-20 | End: 2020-10-20

## 2020-10-19 RX ORDER — IPRATROPIUM BROMIDE AND ALBUTEROL SULFATE 2.5; .5 MG/3ML; MG/3ML
3 SOLUTION RESPIRATORY (INHALATION) EVERY 6 HOURS PRN
Status: DISCONTINUED | OUTPATIENT
Start: 2020-10-19 | End: 2020-10-22 | Stop reason: HOSPADM

## 2020-10-19 RX ORDER — LIDOCAINE HYDROCHLORIDE 20 MG/ML
10 SOLUTION OROPHARYNGEAL
Status: DISCONTINUED | OUTPATIENT
Start: 2020-10-19 | End: 2020-10-22 | Stop reason: HOSPADM

## 2020-10-19 RX ADMIN — SODIUM CHLORIDE, PRESERVATIVE FREE 10 ML: 5 INJECTION INTRAVENOUS at 09:32

## 2020-10-19 RX ADMIN — RUBIDIUM CHLORIDE RB-82 1 DOSE: 150 INJECTION, SOLUTION INTRAVENOUS at 09:00

## 2020-10-19 RX ADMIN — OXYBUTYNIN CHLORIDE 5 MG: 5 TABLET ORAL at 09:31

## 2020-10-19 RX ADMIN — Medication 250 MG: at 20:02

## 2020-10-19 RX ADMIN — INSULIN DETEMIR 5 UNITS: 100 INJECTION, SOLUTION SUBCUTANEOUS at 21:00

## 2020-10-19 RX ADMIN — CETIRIZINE HYDROCHLORIDE 5 MG: 10 TABLET, FILM COATED ORAL at 09:31

## 2020-10-19 RX ADMIN — AMLODIPINE BESYLATE 5 MG: 5 TABLET ORAL at 09:32

## 2020-10-19 RX ADMIN — INSULIN LISPRO 5 UNITS: 100 INJECTION, SOLUTION INTRAVENOUS; SUBCUTANEOUS at 09:32

## 2020-10-19 RX ADMIN — LISINOPRIL 20 MG: 20 TABLET ORAL at 09:32

## 2020-10-19 RX ADMIN — SODIUM CHLORIDE, PRESERVATIVE FREE 10 ML: 5 INJECTION INTRAVENOUS at 20:04

## 2020-10-19 RX ADMIN — HEPARIN SODIUM 5000 UNITS: 5000 INJECTION INTRAVENOUS; SUBCUTANEOUS at 20:02

## 2020-10-19 RX ADMIN — REGADENOSON 0.4 MG: 0.08 INJECTION, SOLUTION INTRAVENOUS at 09:02

## 2020-10-19 RX ADMIN — CEFTRIAXONE SODIUM 1 G: 1 INJECTION, POWDER, FOR SOLUTION INTRAMUSCULAR; INTRAVENOUS at 09:30

## 2020-10-19 RX ADMIN — FAMOTIDINE 20 MG: 20 TABLET, FILM COATED ORAL at 09:31

## 2020-10-19 RX ADMIN — RUBIDIUM CHLORIDE RB-82 1 DOSE: 150 INJECTION, SOLUTION INTRAVENOUS at 08:50

## 2020-10-19 RX ADMIN — AZITHROMYCIN 500 MG: 500 INJECTION, POWDER, LYOPHILIZED, FOR SOLUTION INTRAVENOUS at 20:04

## 2020-10-19 RX ADMIN — ASPIRIN 81 MG: 81 TABLET, COATED ORAL at 09:31

## 2020-10-19 RX ADMIN — LEVOTHYROXINE SODIUM 75 MCG: 75 TABLET ORAL at 05:30

## 2020-10-19 RX ADMIN — METOPROLOL TARTRATE 100 MG: 100 TABLET ORAL at 20:02

## 2020-10-19 RX ADMIN — PRAVASTATIN SODIUM 10 MG: 20 TABLET ORAL at 09:31

## 2020-10-19 RX ADMIN — Medication 250 MG: at 09:32

## 2020-10-19 RX ADMIN — HEPARIN SODIUM 5000 UNITS: 5000 INJECTION INTRAVENOUS; SUBCUTANEOUS at 09:31

## 2020-10-20 ENCOUNTER — ANESTHESIA EVENT (OUTPATIENT)
Dept: GASTROENTEROLOGY | Facility: HOSPITAL | Age: 75
End: 2020-10-20

## 2020-10-20 ENCOUNTER — ANESTHESIA (OUTPATIENT)
Dept: GASTROENTEROLOGY | Facility: HOSPITAL | Age: 75
End: 2020-10-20

## 2020-10-20 PROBLEM — R13.10 ODYNOPHAGIA: Status: ACTIVE | Noted: 2020-10-16

## 2020-10-20 LAB
GLUCOSE BLDC GLUCOMTR-MCNC: 205 MG/DL (ref 70–130)
GLUCOSE BLDC GLUCOMTR-MCNC: 209 MG/DL (ref 70–130)
GLUCOSE BLDC GLUCOMTR-MCNC: 210 MG/DL (ref 70–130)
GLUCOSE BLDC GLUCOMTR-MCNC: 252 MG/DL (ref 70–130)
GLUCOSE BLDC GLUCOMTR-MCNC: 283 MG/DL (ref 70–130)
GLUCOSE BLDC GLUCOMTR-MCNC: 290 MG/DL (ref 70–130)
GLUCOSE BLDC GLUCOMTR-MCNC: 36 MG/DL (ref 70–130)
GLUCOSE BLDC GLUCOMTR-MCNC: 37 MG/DL (ref 70–130)

## 2020-10-20 PROCEDURE — 63710000001 LIDOCAINE VISCOUS HCL 2 % SOLUTION 15 ML CUP: Performed by: INTERNAL MEDICINE

## 2020-10-20 PROCEDURE — 96375 TX/PRO/DX INJ NEW DRUG ADDON: CPT

## 2020-10-20 PROCEDURE — 63710000001 PANTOPRAZOLE 40 MG TABLET DELAYED-RELEASE: Performed by: INTERNAL MEDICINE

## 2020-10-20 PROCEDURE — 63710000001 METOPROLOL TARTRATE 100 MG TABLET: Performed by: NURSE PRACTITIONER

## 2020-10-20 PROCEDURE — A9270 NON-COVERED ITEM OR SERVICE: HCPCS | Performed by: NURSE PRACTITIONER

## 2020-10-20 PROCEDURE — 25010000002 PROPOFOL 10 MG/ML EMULSION: Performed by: NURSE ANESTHETIST, CERTIFIED REGISTERED

## 2020-10-20 PROCEDURE — A9270 NON-COVERED ITEM OR SERVICE: HCPCS | Performed by: INTERNAL MEDICINE

## 2020-10-20 PROCEDURE — 25010000002 AZITHROMYCIN PER 500 MG: Performed by: INTERNAL MEDICINE

## 2020-10-20 PROCEDURE — 88312 SPECIAL STAINS GROUP 1: CPT | Performed by: INTERNAL MEDICINE

## 2020-10-20 PROCEDURE — G0378 HOSPITAL OBSERVATION PER HR: HCPCS

## 2020-10-20 PROCEDURE — 99226 PR SBSQ OBSERVATION CARE/DAY 35 MINUTES: CPT | Performed by: INTERNAL MEDICINE

## 2020-10-20 PROCEDURE — 63710000001 SACCHAROMYCES BOULARDII 250 MG CAPSULE: Performed by: NURSE PRACTITIONER

## 2020-10-20 PROCEDURE — 88305 TISSUE EXAM BY PATHOLOGIST: CPT | Performed by: INTERNAL MEDICINE

## 2020-10-20 PROCEDURE — 96372 THER/PROPH/DIAG INJ SC/IM: CPT

## 2020-10-20 PROCEDURE — 82962 GLUCOSE BLOOD TEST: CPT

## 2020-10-20 PROCEDURE — 25010000002 HEPARIN (PORCINE) PER 1000 UNITS: Performed by: NURSE PRACTITIONER

## 2020-10-20 PROCEDURE — 63710000001 ALUMINUM-MAGNESIUM HYDROXIDE-SIMETHICONE 400-400-40 MG/5ML SUSPENSION 30 ML CUP: Performed by: INTERNAL MEDICINE

## 2020-10-20 PROCEDURE — 25010000002 CEFTRIAXONE PER 250 MG: Performed by: NURSE PRACTITIONER

## 2020-10-20 PROCEDURE — 43239 EGD BIOPSY SINGLE/MULTIPLE: CPT | Performed by: INTERNAL MEDICINE

## 2020-10-20 RX ORDER — 0.9 % SODIUM CHLORIDE 0.9 %
10 VIAL (ML) INJECTION AS NEEDED
Status: DISCONTINUED | OUTPATIENT
Start: 2020-10-20 | End: 2020-10-20 | Stop reason: HOSPADM

## 2020-10-20 RX ORDER — PROPOFOL 10 MG/ML
VIAL (ML) INTRAVENOUS AS NEEDED
Status: DISCONTINUED | OUTPATIENT
Start: 2020-10-20 | End: 2020-10-20 | Stop reason: SURG

## 2020-10-20 RX ORDER — PANTOPRAZOLE SODIUM 40 MG/1
40 TABLET, DELAYED RELEASE ORAL
Status: DISCONTINUED | OUTPATIENT
Start: 2020-10-20 | End: 2020-10-22 | Stop reason: HOSPADM

## 2020-10-20 RX ORDER — SODIUM CHLORIDE, SODIUM LACTATE, POTASSIUM CHLORIDE, CALCIUM CHLORIDE 600; 310; 30; 20 MG/100ML; MG/100ML; MG/100ML; MG/100ML
9 INJECTION, SOLUTION INTRAVENOUS CONTINUOUS
Status: DISCONTINUED | OUTPATIENT
Start: 2020-10-20 | End: 2020-10-22 | Stop reason: HOSPADM

## 2020-10-20 RX ORDER — LIDOCAINE HYDROCHLORIDE 10 MG/ML
INJECTION, SOLUTION EPIDURAL; INFILTRATION; INTRACAUDAL; PERINEURAL AS NEEDED
Status: DISCONTINUED | OUTPATIENT
Start: 2020-10-20 | End: 2020-10-20 | Stop reason: SURG

## 2020-10-20 RX ORDER — ONDANSETRON 2 MG/ML
4 INJECTION INTRAMUSCULAR; INTRAVENOUS ONCE AS NEEDED
Status: DISCONTINUED | OUTPATIENT
Start: 2020-10-20 | End: 2020-10-20 | Stop reason: HOSPADM

## 2020-10-20 RX ADMIN — SODIUM CHLORIDE, PRESERVATIVE FREE 10 ML: 5 INJECTION INTRAVENOUS at 10:40

## 2020-10-20 RX ADMIN — HEPARIN SODIUM 5000 UNITS: 5000 INJECTION INTRAVENOUS; SUBCUTANEOUS at 20:31

## 2020-10-20 RX ADMIN — LIDOCAINE HYDROCHLORIDE: 20 SOLUTION ORAL; TOPICAL at 18:39

## 2020-10-20 RX ADMIN — PANTOPRAZOLE SODIUM 40 MG: 40 TABLET, DELAYED RELEASE ORAL at 05:57

## 2020-10-20 RX ADMIN — AMLODIPINE BESYLATE 5 MG: 5 TABLET ORAL at 09:47

## 2020-10-20 RX ADMIN — HEPARIN SODIUM 5000 UNITS: 5000 INJECTION INTRAVENOUS; SUBCUTANEOUS at 09:46

## 2020-10-20 RX ADMIN — ASPIRIN 81 MG: 81 TABLET, COATED ORAL at 09:47

## 2020-10-20 RX ADMIN — LISINOPRIL 20 MG: 20 TABLET ORAL at 09:47

## 2020-10-20 RX ADMIN — OXYBUTYNIN CHLORIDE 5 MG: 5 TABLET ORAL at 09:47

## 2020-10-20 RX ADMIN — LIDOCAINE HYDROCHLORIDE: 20 SOLUTION ORAL; TOPICAL at 13:57

## 2020-10-20 RX ADMIN — CEFTRIAXONE SODIUM 1 G: 1 INJECTION, POWDER, FOR SOLUTION INTRAMUSCULAR; INTRAVENOUS at 09:48

## 2020-10-20 RX ADMIN — LIDOCAINE HYDROCHLORIDE 80 MG: 10 INJECTION, SOLUTION EPIDURAL; INFILTRATION; INTRACAUDAL; PERINEURAL at 11:19

## 2020-10-20 RX ADMIN — Medication 250 MG: at 20:30

## 2020-10-20 RX ADMIN — METOPROLOL TARTRATE 100 MG: 100 TABLET ORAL at 20:30

## 2020-10-20 RX ADMIN — Medication 250 MG: at 09:47

## 2020-10-20 RX ADMIN — DEXTROSE 50 % IN WATER (D50W) INTRAVENOUS SYRINGE 25 G: at 00:51

## 2020-10-20 RX ADMIN — PRAVASTATIN SODIUM 10 MG: 20 TABLET ORAL at 09:46

## 2020-10-20 RX ADMIN — LEVOTHYROXINE SODIUM 75 MCG: 75 TABLET ORAL at 05:57

## 2020-10-20 RX ADMIN — AZITHROMYCIN MONOHYDRATE 500 MG: 500 INJECTION, POWDER, LYOPHILIZED, FOR SOLUTION INTRAVENOUS at 20:32

## 2020-10-20 RX ADMIN — INSULIN LISPRO 8 UNITS: 100 INJECTION, SOLUTION INTRAVENOUS; SUBCUTANEOUS at 18:30

## 2020-10-20 RX ADMIN — CETIRIZINE HYDROCHLORIDE 5 MG: 10 TABLET, FILM COATED ORAL at 09:47

## 2020-10-20 RX ADMIN — SODIUM CHLORIDE, POTASSIUM CHLORIDE, SODIUM LACTATE AND CALCIUM CHLORIDE 9 ML/HR: 600; 310; 30; 20 INJECTION, SOLUTION INTRAVENOUS at 10:40

## 2020-10-20 RX ADMIN — LIDOCAINE HYDROCHLORIDE: 20 SOLUTION ORAL; TOPICAL at 20:29

## 2020-10-20 RX ADMIN — PROPOFOL 100 MG: 10 INJECTION, EMULSION INTRAVENOUS at 11:19

## 2020-10-20 RX ADMIN — PROPOFOL 100 MCG/KG/MIN: 10 INJECTION, EMULSION INTRAVENOUS at 11:19

## 2020-10-20 RX ADMIN — PANTOPRAZOLE SODIUM 40 MG: 40 TABLET, DELAYED RELEASE ORAL at 18:30

## 2020-10-20 RX ADMIN — SODIUM CHLORIDE, PRESERVATIVE FREE 10 ML: 5 INJECTION INTRAVENOUS at 11:58

## 2020-10-20 NOTE — ANESTHESIA POSTPROCEDURE EVALUATION
Patient: Marilyn Kunz    Procedure Summary     Date: 10/20/20 Room / Location:  [facility] /  [facility]    Anesthesia Start: 1115 Anesthesia Stop:     Procedure: ESOPHAGOGASTRODUODENOSCOPY (N/A ) Diagnosis:       Odynophagia      (Odynophagia [R13.10])    Surgeon: Brunner, Mark I, MD Provider: Sajan Crowder MD    Anesthesia Type: general, MAC ASA Status: 3          Anesthesia Type: general, MAC    Vitals  No vitals data found for the desired time range.          Post Anesthesia Care and Evaluation    Patient location during evaluation: PACU  Patient participation: complete - patient participated  Level of consciousness: awake  Pain score: 0  Pain management: adequate  Airway patency: patent  Anesthetic complications: No anesthetic complications  PONV Status: none  Cardiovascular status: acceptable and stable  Respiratory status: nasal cannula, unassisted, acceptable and spontaneous ventilation  Hydration status: acceptable

## 2020-10-20 NOTE — ANESTHESIA PREPROCEDURE EVALUATION
Anesthesia Evaluation     Patient summary reviewed and Nursing notes reviewed   NPO Solid Status: > 8 hours  NPO Liquid Status: > 8 hours           Airway   Mallampati: II  TM distance: >3 FB  Neck ROM: full  No difficulty expected  Dental      Pulmonary    (+) pneumonia stable , asthma (MILD No MDI ),  (-) COPD, shortness of breath, recent URI, sleep apnea, not a smoker    ROS comment: L PNA mid lung   Cardiovascular     ECG reviewed  Patient on routine beta blocker    (+) hypertension, hyperlipidemia,   (-) valvular problems/murmurs (no murmur heard on admission ), past MI, dysrhythmias, angina, cardiac stents    ROS comment: ECG NSR LAE Nonspecific ST and T wave abnormality    GXT Abnormal/acceptable negative IV Lexiscan hybrid PET cardiac CT scan stress study  Low probability for significant focal obstructive CAD c  preserved EF 0.72.       Neuro/Psych  (-) seizures, CVA  GI/Hepatic/Renal/Endo    (+)   renal disease CRI, diabetes mellitus (BSL low 30's now 205 ) type 1 poorly controlled using insulin, thyroid problem hypothyroidism    ROS Comment: odynopagia     Musculoskeletal     Abdominal    Substance History      OB/GYN          Other   arthritis (psoriatic arthritis ), blood dyscrasia (leiden factor V),     ROS/Med Hx Other: L mid-lung CAP being treated   Chest Pain- secondary to PNA  (Troponin negative x 4  -No acute EKG changes  -CTA on 10/14 negative for PE)                Anesthesia Plan    ASA 3     general and MAC     intravenous induction     Anesthetic plan, all risks, benefits, and alternatives have been provided, discussed and informed consent has been obtained with: patient.    Plan discussed with CRNA.

## 2020-10-21 LAB
ANION GAP SERPL CALCULATED.3IONS-SCNC: 12 MMOL/L (ref 5–15)
BACTERIA SPEC AEROBE CULT: NORMAL
BACTERIA SPEC AEROBE CULT: NORMAL
BASOPHILS # BLD AUTO: 0.07 10*3/MM3 (ref 0–0.2)
BASOPHILS NFR BLD AUTO: 0.9 % (ref 0–1.5)
BUN SERPL-MCNC: 19 MG/DL (ref 8–23)
BUN/CREAT SERPL: 20.4 (ref 7–25)
CALCIUM SPEC-SCNC: 9.6 MG/DL (ref 8.6–10.5)
CHLORIDE SERPL-SCNC: 99 MMOL/L (ref 98–107)
CO2 SERPL-SCNC: 24 MMOL/L (ref 22–29)
CREAT SERPL-MCNC: 0.93 MG/DL (ref 0.57–1)
DEPRECATED RDW RBC AUTO: 43.2 FL (ref 37–54)
EOSINOPHIL # BLD AUTO: 0.34 10*3/MM3 (ref 0–0.4)
EOSINOPHIL NFR BLD AUTO: 4.2 % (ref 0.3–6.2)
ERYTHROCYTE [DISTWIDTH] IN BLOOD BY AUTOMATED COUNT: 13.2 % (ref 12.3–15.4)
GFR SERPL CREATININE-BSD FRML MDRD: 59 ML/MIN/1.73
GLUCOSE BLDC GLUCOMTR-MCNC: 273 MG/DL (ref 70–130)
GLUCOSE BLDC GLUCOMTR-MCNC: 287 MG/DL (ref 70–130)
GLUCOSE BLDC GLUCOMTR-MCNC: 329 MG/DL (ref 70–130)
GLUCOSE BLDC GLUCOMTR-MCNC: 411 MG/DL (ref 70–130)
GLUCOSE BLDC GLUCOMTR-MCNC: 439 MG/DL (ref 70–130)
GLUCOSE BLDC GLUCOMTR-MCNC: 452 MG/DL (ref 70–130)
GLUCOSE BLDC GLUCOMTR-MCNC: 516 MG/DL (ref 70–130)
GLUCOSE BLDC GLUCOMTR-MCNC: 531 MG/DL (ref 70–130)
GLUCOSE SERPL-MCNC: 356 MG/DL (ref 65–99)
HCT VFR BLD AUTO: 38.3 % (ref 34–46.6)
HGB BLD-MCNC: 12.2 G/DL (ref 12–15.9)
IMM GRANULOCYTES # BLD AUTO: 0.02 10*3/MM3 (ref 0–0.05)
IMM GRANULOCYTES NFR BLD AUTO: 0.2 % (ref 0–0.5)
LYMPHOCYTES # BLD AUTO: 1.89 10*3/MM3 (ref 0.7–3.1)
LYMPHOCYTES NFR BLD AUTO: 23.4 % (ref 19.6–45.3)
MCH RBC QN AUTO: 29.1 PG (ref 26.6–33)
MCHC RBC AUTO-ENTMCNC: 31.9 G/DL (ref 31.5–35.7)
MCV RBC AUTO: 91.4 FL (ref 79–97)
MONOCYTES # BLD AUTO: 0.7 10*3/MM3 (ref 0.1–0.9)
MONOCYTES NFR BLD AUTO: 8.7 % (ref 5–12)
NEUTROPHILS NFR BLD AUTO: 5.05 10*3/MM3 (ref 1.7–7)
NEUTROPHILS NFR BLD AUTO: 62.6 % (ref 42.7–76)
NRBC BLD AUTO-RTO: 0 /100 WBC (ref 0–0.2)
PLATELET # BLD AUTO: 222 10*3/MM3 (ref 140–450)
PMV BLD AUTO: 12.1 FL (ref 6–12)
POTASSIUM SERPL-SCNC: 5 MMOL/L (ref 3.5–5.2)
RBC # BLD AUTO: 4.19 10*6/MM3 (ref 3.77–5.28)
SODIUM SERPL-SCNC: 135 MMOL/L (ref 136–145)
WBC # BLD AUTO: 8.07 10*3/MM3 (ref 3.4–10.8)

## 2020-10-21 PROCEDURE — A9270 NON-COVERED ITEM OR SERVICE: HCPCS | Performed by: NURSE PRACTITIONER

## 2020-10-21 PROCEDURE — 63710000001 OXYBUTYNIN 5 MG TABLET: Performed by: NURSE PRACTITIONER

## 2020-10-21 PROCEDURE — 25010000002 AZITHROMYCIN PER 500 MG: Performed by: INTERNAL MEDICINE

## 2020-10-21 PROCEDURE — A9270 NON-COVERED ITEM OR SERVICE: HCPCS | Performed by: INTERNAL MEDICINE

## 2020-10-21 PROCEDURE — 63710000001 ASPIRIN 81 MG TABLET DELAYED-RELEASE: Performed by: NURSE PRACTITIONER

## 2020-10-21 PROCEDURE — 63710000001 CETIRIZINE 10 MG TABLET: Performed by: NURSE PRACTITIONER

## 2020-10-21 PROCEDURE — 63710000001 LISINOPRIL 20 MG TABLET: Performed by: NURSE PRACTITIONER

## 2020-10-21 PROCEDURE — 63710000001 SACCHAROMYCES BOULARDII 250 MG CAPSULE: Performed by: NURSE PRACTITIONER

## 2020-10-21 PROCEDURE — 85025 COMPLETE CBC W/AUTO DIFF WBC: CPT | Performed by: INTERNAL MEDICINE

## 2020-10-21 PROCEDURE — 99212 OFFICE O/P EST SF 10 MIN: CPT | Performed by: PHYSICIAN ASSISTANT

## 2020-10-21 PROCEDURE — 63710000001 AMLODIPINE 5 MG TABLET: Performed by: NURSE PRACTITIONER

## 2020-10-21 PROCEDURE — G0378 HOSPITAL OBSERVATION PER HR: HCPCS

## 2020-10-21 PROCEDURE — 25010000002 CEFTRIAXONE PER 250 MG: Performed by: INTERNAL MEDICINE

## 2020-10-21 PROCEDURE — 80048 BASIC METABOLIC PNL TOTAL CA: CPT | Performed by: INTERNAL MEDICINE

## 2020-10-21 PROCEDURE — 63710000001 ALUMINUM-MAGNESIUM HYDROXIDE-SIMETHICONE 400-400-40 MG/5ML SUSPENSION 30 ML CUP: Performed by: INTERNAL MEDICINE

## 2020-10-21 PROCEDURE — 63710000001 PANTOPRAZOLE 40 MG TABLET DELAYED-RELEASE: Performed by: INTERNAL MEDICINE

## 2020-10-21 PROCEDURE — 63710000001 INSULIN DETEMIR PER 5 UNITS: Performed by: INTERNAL MEDICINE

## 2020-10-21 PROCEDURE — 63710000001 LEVOTHYROXINE 75 MCG TABLET: Performed by: NURSE PRACTITIONER

## 2020-10-21 PROCEDURE — 63710000001 LIDOCAINE VISCOUS HCL 2 % SOLUTION 15 ML CUP: Performed by: INTERNAL MEDICINE

## 2020-10-21 PROCEDURE — 63710000001 PRAVASTATIN 20 MG TABLET: Performed by: NURSE PRACTITIONER

## 2020-10-21 PROCEDURE — 99226 PR SBSQ OBSERVATION CARE/DAY 35 MINUTES: CPT | Performed by: INTERNAL MEDICINE

## 2020-10-21 PROCEDURE — 25010000002 HEPARIN (PORCINE) PER 1000 UNITS: Performed by: NURSE PRACTITIONER

## 2020-10-21 PROCEDURE — 82962 GLUCOSE BLOOD TEST: CPT

## 2020-10-21 PROCEDURE — 63710000001 METOPROLOL TARTRATE 100 MG TABLET: Performed by: NURSE PRACTITIONER

## 2020-10-21 RX ADMIN — SODIUM CHLORIDE, PRESERVATIVE FREE 10 ML: 5 INJECTION INTRAVENOUS at 09:56

## 2020-10-21 RX ADMIN — CETIRIZINE HYDROCHLORIDE 5 MG: 10 TABLET, FILM COATED ORAL at 09:58

## 2020-10-21 RX ADMIN — LIDOCAINE HYDROCHLORIDE: 20 SOLUTION ORAL; TOPICAL at 17:17

## 2020-10-21 RX ADMIN — AZITHROMYCIN MONOHYDRATE 500 MG: 500 INJECTION, POWDER, LYOPHILIZED, FOR SOLUTION INTRAVENOUS at 23:35

## 2020-10-21 RX ADMIN — PRAVASTATIN SODIUM 10 MG: 20 TABLET ORAL at 09:59

## 2020-10-21 RX ADMIN — LISINOPRIL 20 MG: 20 TABLET ORAL at 09:58

## 2020-10-21 RX ADMIN — INSULIN LISPRO 14 UNITS: 100 INJECTION, SOLUTION INTRAVENOUS; SUBCUTANEOUS at 17:17

## 2020-10-21 RX ADMIN — HEPARIN SODIUM 5000 UNITS: 5000 INJECTION INTRAVENOUS; SUBCUTANEOUS at 21:10

## 2020-10-21 RX ADMIN — ASPIRIN 81 MG: 81 TABLET, COATED ORAL at 09:58

## 2020-10-21 RX ADMIN — LEVOTHYROXINE SODIUM 75 MCG: 75 TABLET ORAL at 06:12

## 2020-10-21 RX ADMIN — METOPROLOL TARTRATE 100 MG: 100 TABLET ORAL at 21:10

## 2020-10-21 RX ADMIN — OXYBUTYNIN CHLORIDE 5 MG: 5 TABLET ORAL at 09:58

## 2020-10-21 RX ADMIN — LIDOCAINE HYDROCHLORIDE: 20 SOLUTION ORAL; TOPICAL at 11:09

## 2020-10-21 RX ADMIN — INSULIN LISPRO 14 UNITS: 100 INJECTION, SOLUTION INTRAVENOUS; SUBCUTANEOUS at 11:08

## 2020-10-21 RX ADMIN — INSULIN LISPRO 5 UNITS: 100 INJECTION, SOLUTION INTRAVENOUS; SUBCUTANEOUS at 17:17

## 2020-10-21 RX ADMIN — Medication 250 MG: at 09:58

## 2020-10-21 RX ADMIN — PANTOPRAZOLE SODIUM 40 MG: 40 TABLET, DELAYED RELEASE ORAL at 09:58

## 2020-10-21 RX ADMIN — HEPARIN SODIUM 5000 UNITS: 5000 INJECTION INTRAVENOUS; SUBCUTANEOUS at 09:58

## 2020-10-21 RX ADMIN — SODIUM CHLORIDE, PRESERVATIVE FREE 10 ML: 5 INJECTION INTRAVENOUS at 21:11

## 2020-10-21 RX ADMIN — PANTOPRAZOLE SODIUM 40 MG: 40 TABLET, DELAYED RELEASE ORAL at 17:21

## 2020-10-21 RX ADMIN — INSULIN DETEMIR 5 UNITS: 100 INJECTION, SOLUTION SUBCUTANEOUS at 21:14

## 2020-10-21 RX ADMIN — Medication 250 MG: at 21:10

## 2020-10-21 RX ADMIN — CEFTRIAXONE SODIUM 1 G: 1 INJECTION, POWDER, FOR SOLUTION INTRAMUSCULAR; INTRAVENOUS at 09:56

## 2020-10-21 RX ADMIN — LIDOCAINE HYDROCHLORIDE: 20 SOLUTION ORAL; TOPICAL at 10:00

## 2020-10-21 RX ADMIN — INSULIN LISPRO 12 UNITS: 100 INJECTION, SOLUTION INTRAVENOUS; SUBCUTANEOUS at 09:57

## 2020-10-21 RX ADMIN — AMLODIPINE BESYLATE 5 MG: 5 TABLET ORAL at 09:58

## 2020-10-22 VITALS
RESPIRATION RATE: 18 BRPM | HEIGHT: 59 IN | TEMPERATURE: 97.7 F | SYSTOLIC BLOOD PRESSURE: 150 MMHG | WEIGHT: 118.25 LBS | HEART RATE: 66 BPM | BODY MASS INDEX: 23.84 KG/M2 | DIASTOLIC BLOOD PRESSURE: 71 MMHG | OXYGEN SATURATION: 96 %

## 2020-10-22 LAB
ANION GAP SERPL CALCULATED.3IONS-SCNC: 11 MMOL/L (ref 5–15)
BACTERIA SPEC RESP CULT: ABNORMAL
BACTERIA SPEC RESP CULT: ABNORMAL
BUN SERPL-MCNC: 22 MG/DL (ref 8–23)
BUN/CREAT SERPL: 25.3 (ref 7–25)
CALCIUM SPEC-SCNC: 8.9 MG/DL (ref 8.6–10.5)
CHLORIDE SERPL-SCNC: 100 MMOL/L (ref 98–107)
CO2 SERPL-SCNC: 25 MMOL/L (ref 22–29)
CREAT SERPL-MCNC: 0.87 MG/DL (ref 0.57–1)
CYTO UR: NORMAL
DEPRECATED RDW RBC AUTO: 43.5 FL (ref 37–54)
ERYTHROCYTE [DISTWIDTH] IN BLOOD BY AUTOMATED COUNT: 13.2 % (ref 12.3–15.4)
GFR SERPL CREATININE-BSD FRML MDRD: 63 ML/MIN/1.73
GLUCOSE BLDC GLUCOMTR-MCNC: 218 MG/DL (ref 70–130)
GLUCOSE BLDC GLUCOMTR-MCNC: 256 MG/DL (ref 70–130)
GLUCOSE BLDC GLUCOMTR-MCNC: 281 MG/DL (ref 70–130)
GLUCOSE SERPL-MCNC: 279 MG/DL (ref 65–99)
GRAM STN SPEC: ABNORMAL
HCT VFR BLD AUTO: 33.4 % (ref 34–46.6)
HGB BLD-MCNC: 10.6 G/DL (ref 12–15.9)
LAB AP CASE REPORT: NORMAL
LAB AP CLINICAL INFORMATION: NORMAL
MCH RBC QN AUTO: 28.8 PG (ref 26.6–33)
MCHC RBC AUTO-ENTMCNC: 31.7 G/DL (ref 31.5–35.7)
MCV RBC AUTO: 90.8 FL (ref 79–97)
PATH REPORT.FINAL DX SPEC: NORMAL
PATH REPORT.GROSS SPEC: NORMAL
PLATELET # BLD AUTO: 204 10*3/MM3 (ref 140–450)
PMV BLD AUTO: 12 FL (ref 6–12)
POTASSIUM SERPL-SCNC: 4 MMOL/L (ref 3.5–5.2)
RBC # BLD AUTO: 3.68 10*6/MM3 (ref 3.77–5.28)
SODIUM SERPL-SCNC: 136 MMOL/L (ref 136–145)
WBC # BLD AUTO: 8.08 10*3/MM3 (ref 3.4–10.8)

## 2020-10-22 PROCEDURE — 63710000001 INSULIN DETEMIR PER 5 UNITS: Performed by: INTERNAL MEDICINE

## 2020-10-22 PROCEDURE — 63710000001 SACCHAROMYCES BOULARDII 250 MG CAPSULE: Performed by: NURSE PRACTITIONER

## 2020-10-22 PROCEDURE — 85027 COMPLETE CBC AUTOMATED: CPT | Performed by: INTERNAL MEDICINE

## 2020-10-22 PROCEDURE — 25010000002 CEFTRIAXONE PER 250 MG: Performed by: INTERNAL MEDICINE

## 2020-10-22 PROCEDURE — A9270 NON-COVERED ITEM OR SERVICE: HCPCS | Performed by: NURSE PRACTITIONER

## 2020-10-22 PROCEDURE — A9270 NON-COVERED ITEM OR SERVICE: HCPCS | Performed by: INTERNAL MEDICINE

## 2020-10-22 PROCEDURE — 63710000001 CETIRIZINE 10 MG TABLET: Performed by: NURSE PRACTITIONER

## 2020-10-22 PROCEDURE — 63710000001 PANTOPRAZOLE 40 MG TABLET DELAYED-RELEASE: Performed by: INTERNAL MEDICINE

## 2020-10-22 PROCEDURE — 63710000001 ASPIRIN 81 MG TABLET DELAYED-RELEASE: Performed by: NURSE PRACTITIONER

## 2020-10-22 PROCEDURE — 25010000002 HEPARIN (PORCINE) PER 1000 UNITS: Performed by: NURSE PRACTITIONER

## 2020-10-22 PROCEDURE — 63710000001 AMLODIPINE 5 MG TABLET: Performed by: NURSE PRACTITIONER

## 2020-10-22 PROCEDURE — 63710000001 INSULIN LISPRO (HUMAN) PER 5 UNITS: Performed by: INTERNAL MEDICINE

## 2020-10-22 PROCEDURE — 63710000001 LIDOCAINE VISCOUS HCL 2 % SOLUTION: Performed by: PHYSICIAN ASSISTANT

## 2020-10-22 PROCEDURE — G0378 HOSPITAL OBSERVATION PER HR: HCPCS

## 2020-10-22 PROCEDURE — 82962 GLUCOSE BLOOD TEST: CPT

## 2020-10-22 PROCEDURE — 80048 BASIC METABOLIC PNL TOTAL CA: CPT | Performed by: INTERNAL MEDICINE

## 2020-10-22 PROCEDURE — 63710000001 LISINOPRIL 20 MG TABLET: Performed by: NURSE PRACTITIONER

## 2020-10-22 PROCEDURE — 63710000001 INSULIN LISPRO (HUMAN) PER 5 UNITS: Performed by: NURSE PRACTITIONER

## 2020-10-22 PROCEDURE — 63710000001 PRAVASTATIN 20 MG TABLET: Performed by: NURSE PRACTITIONER

## 2020-10-22 PROCEDURE — 63710000001 OXYBUTYNIN 5 MG TABLET: Performed by: NURSE PRACTITIONER

## 2020-10-22 PROCEDURE — A9270 NON-COVERED ITEM OR SERVICE: HCPCS | Performed by: PHYSICIAN ASSISTANT

## 2020-10-22 PROCEDURE — 63710000001 LEVOTHYROXINE 75 MCG TABLET: Performed by: NURSE PRACTITIONER

## 2020-10-22 PROCEDURE — 99217 PR OBSERVATION CARE DISCHARGE MANAGEMENT: CPT | Performed by: INTERNAL MEDICINE

## 2020-10-22 RX ORDER — PANTOPRAZOLE SODIUM 40 MG/1
40 TABLET, DELAYED RELEASE ORAL
Qty: 60 TABLET | Refills: 0 | Status: SHIPPED | OUTPATIENT
Start: 2020-10-22 | End: 2021-08-16

## 2020-10-22 RX ORDER — DOXYCYCLINE HYCLATE 100 MG
100 TABLET ORAL EVERY 12 HOURS
Qty: 14 TABLET | Refills: 0 | Status: SHIPPED | OUTPATIENT
Start: 2020-10-22 | End: 2020-10-22 | Stop reason: HOSPADM

## 2020-10-22 RX ORDER — SACCHAROMYCES BOULARDII 250 MG
250 CAPSULE ORAL 2 TIMES DAILY
Qty: 14 CAPSULE | Refills: 0 | Status: SHIPPED | OUTPATIENT
Start: 2020-10-22 | End: 2020-10-22 | Stop reason: HOSPADM

## 2020-10-22 RX ORDER — LIDOCAINE HYDROCHLORIDE 20 MG/ML
10 SOLUTION OROPHARYNGEAL
Qty: 100 ML | Refills: 0 | Status: SHIPPED | OUTPATIENT
Start: 2020-10-22 | End: 2021-08-16

## 2020-10-22 RX ADMIN — SODIUM CHLORIDE, PRESERVATIVE FREE 10 ML: 5 INJECTION INTRAVENOUS at 08:31

## 2020-10-22 RX ADMIN — ASPIRIN 81 MG: 81 TABLET, COATED ORAL at 08:33

## 2020-10-22 RX ADMIN — INSULIN LISPRO 5 UNITS: 100 INJECTION, SOLUTION INTRAVENOUS; SUBCUTANEOUS at 12:47

## 2020-10-22 RX ADMIN — INSULIN DETEMIR 5 UNITS: 100 INJECTION, SOLUTION SUBCUTANEOUS at 08:53

## 2020-10-22 RX ADMIN — INSULIN LISPRO 5 UNITS: 100 INJECTION, SOLUTION INTRAVENOUS; SUBCUTANEOUS at 08:37

## 2020-10-22 RX ADMIN — CEFTRIAXONE SODIUM 1 G: 1 INJECTION, POWDER, FOR SOLUTION INTRAMUSCULAR; INTRAVENOUS at 10:27

## 2020-10-22 RX ADMIN — OXYBUTYNIN CHLORIDE 5 MG: 5 TABLET ORAL at 08:33

## 2020-10-22 RX ADMIN — Medication 250 MG: at 08:33

## 2020-10-22 RX ADMIN — CETIRIZINE HYDROCHLORIDE 5 MG: 10 TABLET, FILM COATED ORAL at 08:33

## 2020-10-22 RX ADMIN — LIDOCAINE HYDROCHLORIDE 10 ML: 20 SOLUTION ORAL; TOPICAL at 08:37

## 2020-10-22 RX ADMIN — INSULIN LISPRO 8 UNITS: 100 INJECTION, SOLUTION INTRAVENOUS; SUBCUTANEOUS at 08:34

## 2020-10-22 RX ADMIN — PANTOPRAZOLE SODIUM 40 MG: 40 TABLET, DELAYED RELEASE ORAL at 08:32

## 2020-10-22 RX ADMIN — AMLODIPINE BESYLATE 5 MG: 5 TABLET ORAL at 08:32

## 2020-10-22 RX ADMIN — LEVOTHYROXINE SODIUM 75 MCG: 75 TABLET ORAL at 06:16

## 2020-10-22 RX ADMIN — PRAVASTATIN SODIUM 10 MG: 20 TABLET ORAL at 08:32

## 2020-10-22 RX ADMIN — HEPARIN SODIUM 5000 UNITS: 5000 INJECTION INTRAVENOUS; SUBCUTANEOUS at 08:33

## 2020-10-22 RX ADMIN — INSULIN LISPRO 5 UNITS: 100 INJECTION, SOLUTION INTRAVENOUS; SUBCUTANEOUS at 12:46

## 2020-10-22 RX ADMIN — LISINOPRIL 20 MG: 20 TABLET ORAL at 08:51

## 2021-03-30 ENCOUNTER — TRANSCRIBE ORDERS (OUTPATIENT)
Dept: ADMINISTRATIVE | Facility: HOSPITAL | Age: 76
End: 2021-03-30

## 2021-03-30 DIAGNOSIS — R10.9 ABDOMINAL PAIN IN FEMALE PATIENT: Primary | ICD-10-CM

## 2021-03-30 DIAGNOSIS — Z12.31 VISIT FOR SCREENING MAMMOGRAM: Primary | ICD-10-CM

## 2021-04-06 ENCOUNTER — HOSPITAL ENCOUNTER (OUTPATIENT)
Dept: MAMMOGRAPHY | Facility: HOSPITAL | Age: 76
Discharge: HOME | End: 2021-04-06

## 2021-04-06 DIAGNOSIS — Z12.31 VISIT FOR SCREENING MAMMOGRAM: ICD-10-CM

## 2021-04-20 ENCOUNTER — HOSPITAL ENCOUNTER (OUTPATIENT)
Dept: CT IMAGING | Facility: HOSPITAL | Age: 76
Discharge: HOME | End: 2021-04-20
Admitting: FAMILY MEDICINE

## 2021-04-20 DIAGNOSIS — R10.9 ABDOMINAL PAIN IN FEMALE PATIENT: ICD-10-CM

## 2021-04-20 PROCEDURE — 74176 CT ABD & PELVIS W/O CONTRAST: CPT

## 2021-04-28 ENCOUNTER — HOSPITAL ENCOUNTER (OUTPATIENT)
Dept: GENERAL RADIOLOGY | Facility: HOSPITAL | Age: 76
Discharge: HOME | End: 2021-04-28
Admitting: FAMILY MEDICINE

## 2021-04-28 ENCOUNTER — TRANSCRIBE ORDERS (OUTPATIENT)
Dept: ADMINISTRATIVE | Facility: HOSPITAL | Age: 76
End: 2021-04-28

## 2021-04-28 DIAGNOSIS — M43.16 SPONDYLOLISTHESIS OF LUMBAR REGION: Primary | ICD-10-CM

## 2021-04-28 PROCEDURE — 72114 X-RAY EXAM L-S SPINE BENDING: CPT

## 2021-05-24 ENCOUNTER — HOSPITAL ENCOUNTER (OUTPATIENT)
Dept: MAMMOGRAPHY | Facility: HOSPITAL | Age: 76
Discharge: HOME | End: 2021-05-24
Admitting: FAMILY MEDICINE

## 2021-05-24 PROCEDURE — 77063 BREAST TOMOSYNTHESIS BI: CPT

## 2021-05-24 PROCEDURE — 77067 SCR MAMMO BI INCL CAD: CPT | Performed by: RADIOLOGY

## 2021-05-24 PROCEDURE — 77067 SCR MAMMO BI INCL CAD: CPT

## 2021-05-24 PROCEDURE — 77063 BREAST TOMOSYNTHESIS BI: CPT | Performed by: RADIOLOGY

## 2021-08-16 ENCOUNTER — LAB (OUTPATIENT)
Dept: LAB | Facility: HOSPITAL | Age: 76
End: 2021-08-16

## 2021-08-16 ENCOUNTER — CONSULT (OUTPATIENT)
Dept: ONCOLOGY | Facility: CLINIC | Age: 76
End: 2021-08-16

## 2021-08-16 VITALS
RESPIRATION RATE: 16 BRPM | DIASTOLIC BLOOD PRESSURE: 61 MMHG | HEIGHT: 59 IN | WEIGHT: 127 LBS | OXYGEN SATURATION: 97 % | TEMPERATURE: 97.8 F | SYSTOLIC BLOOD PRESSURE: 173 MMHG | BODY MASS INDEX: 25.6 KG/M2 | HEART RATE: 67 BPM

## 2021-08-16 DIAGNOSIS — D68.51 FACTOR V LEIDEN (HCC): ICD-10-CM

## 2021-08-16 DIAGNOSIS — D68.51 FACTOR V LEIDEN (HCC): Primary | ICD-10-CM

## 2021-08-16 PROCEDURE — 81241 F5 GENE: CPT

## 2021-08-16 PROCEDURE — 36415 COLL VENOUS BLD VENIPUNCTURE: CPT

## 2021-08-16 PROCEDURE — 99204 OFFICE O/P NEW MOD 45 MIN: CPT | Performed by: INTERNAL MEDICINE

## 2021-08-16 RX ORDER — FLUTICASONE PROPIONATE 50 MCG
2 SPRAY, SUSPENSION (ML) NASAL DAILY
COMMUNITY
Start: 2021-06-01

## 2021-08-16 RX ORDER — METOPROLOL SUCCINATE 100 MG/1
TABLET, EXTENDED RELEASE ORAL
COMMUNITY
Start: 2021-06-19 | End: 2022-01-28

## 2021-08-16 RX ORDER — LISINOPRIL 40 MG/1
40 TABLET ORAL DAILY
COMMUNITY
Start: 2021-07-29 | End: 2022-07-19 | Stop reason: ALTCHOICE

## 2021-08-16 RX ORDER — AMLODIPINE BESYLATE 10 MG/1
10 TABLET ORAL DAILY
COMMUNITY
Start: 2021-07-30 | End: 2022-07-19 | Stop reason: ALTCHOICE

## 2021-08-16 RX ORDER — OXYBUTYNIN CHLORIDE 5 MG/1
TABLET, EXTENDED RELEASE ORAL
COMMUNITY
Start: 2021-07-12 | End: 2022-01-28

## 2021-08-16 NOTE — PROGRESS NOTES
New Patient Office Visit      Date: 2021     Patient Name: Marilyn Kunz  MRN: [id number]  : 1945  Referring Physician: Eder Baca    Chief Complaint: Establish care for heterozygous factor V Leiden    History of Present Illness: Marilyn Kunz is a pleasant 76 y.o. female past medical history of heterozygous factor V Leiden, hypertension, hyperlipidemia, hypothyroidism, type 2 diabetes, seasonal allergies, arthritis who presents today for evaluation of heterozygous factor V Leiden. The patient states that her sister had a hypercoagulable work-up after she presented with hot flashes.  Was noted to have a heterozygous factor V Leiden at that time about 20 years ago.  States that she was told the rest of her family need to be tested.  The patient had this test completed and it was noted to be positive for heterozygous factor V Leiden.  This was confirmed in .  She herself nor anyone in her family has a history of developing a blood clot.  She has had surgeries in the past as well as long distance travel and not had any issues with clotting.  She has significant arthritis in her left knee and is scheduled for a left knee replacement in the middle of September.  She is overall in good general health and states she is compliant with her home medications    Oncology History:    Oncology/Hematology History    No history exists.       Subjective      Review of Systems:     Constitutional: Negative for fevers, chills, or weight loss  Eyes: Negative for blurred vision or discharge         Ear/Nose/Throat: Negative for difficulty swallowing, sore throat, LAD                                                       Respiratory: Negative for cough, SOA, wheezing                                                                                        Cardiovascular: Negative for chest pain or palpitations                                                                  Gastrointestinal: Negative for  nausea, vomiting or diarrhea                                                                     Genitourinary: Negative for dysuria or hematuria                                                                                           Musculoskeletal: Negative for any joint pains or muscle aches                                                                        Neurologic: Negative for any weakness, headaches, dizziness                                                                         Hematologic: Negative for any easy bleeding or bruising                                                                                   Psychiatric: Negative for anxiety or depression                             Past Medical History:   Past Medical History:   Diagnosis Date   • Anemia    • Asthma    • Chronic kidney disease     pt told it was dx from Dr Baca and to not eat much protein   • Disease of thyroid gland    • Factor 5 Leiden mutation, heterozygous (CMS/HCC) 2012   • Hyperlipidemia    • Hypertension    • Murmur, cardiac    • Psoriatic arthritis (CMS/HCC)     hands   • Type 1 diabetes (CMS/HCC)        Past Surgical History:   Past Surgical History:   Procedure Laterality Date   • CATARACT EXTRACTION Bilateral    • ENDOSCOPY N/A 10/20/2020    Procedure: ESOPHAGOGASTRODUODENOSCOPY;  Surgeon: Brunner, Mark I, MD;  Location: [facility] ENDOSCOPY;  Service: Gastroenterology;  Laterality: N/A;   • HAND SURGERY Left 1987    no hardware   • PATENT DUCTUS ARTERIOUS LIGATION         Family History:   Family History   Problem Relation Age of Onset   • Cancer Mother    • Pancreatic cancer Mother    • Cancer Father    • Lung cancer Father    • Cancer Sister    • Colon cancer Sister    • Breast cancer Neg Hx    • Ovarian cancer Neg Hx        Social History:   Social History     Socioeconomic History   • Marital status: Single     Spouse name: Not on file   • Number of children: Not on file   • Years of education: Not on file    • Highest education level: Not on file   Tobacco Use   • Smoking status: Never Smoker   • Smokeless tobacco: Never Used   Vaping Use   • Vaping Use: Never used   Substance and Sexual Activity   • Alcohol use: No   • Drug use: No       Medications:     Current Outpatient Medications:   •  amLODIPine (NORVASC) 10 MG tablet, , Disp: , Rfl:   •  amLODIPine (NORVASC) 5 MG tablet, Take 5 mg by mouth Daily., Disp: , Rfl:   •  aspirin 81 MG EC tablet, Take 1 tablet by mouth Daily., Disp: 30 tablet, Rfl: 0  •  Diclofenac Sodium (VOLTAREN) 1 % gel gel, , Disp: , Rfl:   •  famotidine (PEPCID) 20 MG tablet, Take 1 tablet by mouth 2 (Two) Times a Day., Disp: 28 tablet, Rfl: 0  •  fluticasone (FLONASE) 50 MCG/ACT nasal spray, 2 sprays by Each Nare route Daily. Shake well before using., Disp: , Rfl:   •  insulin aspart (novoLOG FLEXPEN) 100 UNIT/ML solution pen-injector sc pen, Inject  under the skin 3 (Three) Times a Day With Meals., Disp: , Rfl:   •  Insulin Degludec (Tresiba) 100 UNIT/ML solution injection, Inject 14 Units under the skin into the appropriate area as directed Every Night., Disp: , Rfl:   •  levocetirizine (XYZAL) 5 MG tablet, Take 5 mg by mouth Every Evening., Disp: , Rfl:   •  levothyroxine (SYNTHROID, LEVOTHROID) 75 MCG tablet, Take 75 mcg by mouth Daily., Disp: , Rfl:   •  lisinopril (PRINIVIL,ZESTRIL) 40 MG tablet, , Disp: , Rfl:   •  metoprolol succinate XL (TOPROL-XL) 100 MG 24 hr tablet, , Disp: , Rfl:   •  metoprolol tartrate (LOPRESSOR) 100 MG tablet, Take 100 mg by mouth Every Night., Disp: , Rfl:   •  oxybutynin (DITROPAN) 5 MG tablet, Take 5 mg by mouth Daily., Disp: , Rfl:   •  oxybutynin XL (DITROPAN-XL) 5 MG 24 hr tablet, , Disp: , Rfl:   •  pravastatin (PRAVACHOL) 10 MG tablet, Take 10 mg by mouth Daily., Disp: , Rfl:   •  Secukinumab (Cosentyx) 150 MG/ML solution prefilled syringe, Inject  under the skin into the appropriate area as directed., Disp: , Rfl:     Allergies:   Allergies   Allergen  "Reactions   • Atorvastatin Other (See Comments)   • Colesevelam Other (See Comments)   • Penicillins Rash       Objective     Physical Exam:  Vital Signs:   Vitals:    08/16/21 0755   BP: 173/61   Pulse: 67   Resp: 16   Temp: 97.8 °F (36.6 °C)   SpO2: 97%   Weight: 57.6 kg (127 lb)   Height: 149.9 cm (59\")   PainSc: 0-No pain     Pain Score    08/16/21 0755   PainSc: 0-No pain     ECOG Performance Status: 0 - Asymptomatic    Constitutional: NAD, ECOG 0   Eyes: PERRLA, scleral anicteric  ENT: No LAD, no thyromegaly  Respiratory: CTAB, no wheezing, rales, rhonchi  Cardiovascular: RRR, no murmurs, pulses 2+ bilaterally  Abdomen: soft, NT/ND, no HSM  Musculoskeletal: strength 5/5 bilaterally, no c/c/e  Neurologic: A&O x 3, CN II-XII intact grossly  Psych: mood and affect congruent, no SI or HI    Results Review:   No visits with results within 2 Week(s) from this visit.   Latest known visit with results is:   No results displayed because visit has over 200 results.          No results found.    Assessment / Plan      Assessment/Plan:   1. Factor V Leiden (CMS/[facility]) (Primary)  -Patient with confirmed heterozygous factor V Leiden mutation in 2019  -No personal or family history of blood clots  -Patient was scheduled left knee replacement in September 2021  -Per patient she needs repeat testing for confirmation.  Will order today  -As she only has a heterozygous factor V Leiden mutation and no personal or family history of blood clots, she does not require prophylactic anticoagulation post surgery.  -Counseled patient on signs and symptoms of blood clots including but not limited to lower extremity swelling, redness, pain, chest tightness, shortness of breath  -She does not require any further hematologic work-up at this time and can follow-up in the clinic as needed  -     Factor 5 Leiden; Future           Follow Up:   Follow-up as needed     Reilly Martinez MD  Hematology and Oncology     Please note that portions of " this note may have been completed with a voice recognition program. Efforts were made to edit the dictations, but occasionally words are mistranscribed.

## 2021-08-18 LAB — F5 GENE MUT ANL BLD/T: ABNORMAL

## 2021-09-02 ENCOUNTER — APPOINTMENT (OUTPATIENT)
Dept: PREADMISSION TESTING | Facility: HOSPITAL | Age: 76
End: 2021-09-02

## 2021-09-13 ENCOUNTER — APPOINTMENT (OUTPATIENT)
Dept: PREADMISSION TESTING | Facility: HOSPITAL | Age: 76
End: 2021-09-13

## 2021-11-01 ENCOUNTER — LAB (OUTPATIENT)
Dept: LAB | Facility: HOSPITAL | Age: 76
End: 2021-11-01

## 2021-11-01 ENCOUNTER — TRANSCRIBE ORDERS (OUTPATIENT)
Dept: LAB | Facility: HOSPITAL | Age: 76
End: 2021-11-01

## 2021-11-01 DIAGNOSIS — L40.9 PSORIASIS: ICD-10-CM

## 2021-11-01 DIAGNOSIS — M15.4 EROSIVE OSTEOARTHRITIS: ICD-10-CM

## 2021-11-01 DIAGNOSIS — L40.50 PSORIATIC ARTHROPATHY (HCC): ICD-10-CM

## 2021-11-01 DIAGNOSIS — L40.50 PSORIATIC ARTHROPATHY (HCC): Primary | ICD-10-CM

## 2021-11-01 PROCEDURE — 86481 TB AG RESPONSE T-CELL SUSP: CPT

## 2021-11-01 PROCEDURE — 36415 COLL VENOUS BLD VENIPUNCTURE: CPT

## 2021-11-03 LAB
TSPOT INTERPRETATION: NEGATIVE
TSPOT NIL CONTROL INTERPRETATION: NORMAL
TSPOT PANEL A: 0
TSPOT PANEL B: 3
TSPOT POS CONTROL INTERPRETATION: NORMAL

## 2022-01-28 ENCOUNTER — OFFICE VISIT (OUTPATIENT)
Dept: ORTHOPEDIC SURGERY | Facility: CLINIC | Age: 77
End: 2022-01-28

## 2022-01-28 VITALS
WEIGHT: 120 LBS | DIASTOLIC BLOOD PRESSURE: 78 MMHG | BODY MASS INDEX: 24.19 KG/M2 | SYSTOLIC BLOOD PRESSURE: 122 MMHG | HEIGHT: 59 IN

## 2022-01-28 DIAGNOSIS — E11.9 TYPE 2 DIABETES MELLITUS WITHOUT COMPLICATION, WITH LONG-TERM CURRENT USE OF INSULIN: ICD-10-CM

## 2022-01-28 DIAGNOSIS — M79.671 PAIN IN BOTH FEET: Primary | ICD-10-CM

## 2022-01-28 DIAGNOSIS — I87.8 VENOUS STASIS: ICD-10-CM

## 2022-01-28 DIAGNOSIS — M79.672 PAIN IN BOTH FEET: Primary | ICD-10-CM

## 2022-01-28 DIAGNOSIS — L40.50 PSORIATIC ARTHRITIS: ICD-10-CM

## 2022-01-28 DIAGNOSIS — Z79.4 TYPE 2 DIABETES MELLITUS WITHOUT COMPLICATION, WITH LONG-TERM CURRENT USE OF INSULIN: ICD-10-CM

## 2022-01-28 PROCEDURE — 99204 OFFICE O/P NEW MOD 45 MIN: CPT | Performed by: ORTHOPAEDIC SURGERY

## 2022-01-28 RX ORDER — OXYBUTYNIN CHLORIDE 5 MG/1
5 TABLET, EXTENDED RELEASE ORAL DAILY
COMMUNITY
Start: 2021-09-13 | End: 2022-09-22 | Stop reason: DRUGHIGH

## 2022-01-28 RX ORDER — INSULIN DEGLUDEC INJECTION 100 U/ML
16-20 INJECTION, SOLUTION SUBCUTANEOUS NIGHTLY
Status: ON HOLD | COMMUNITY
Start: 2021-12-20 | End: 2023-03-09 | Stop reason: SDUPTHER

## 2022-01-28 NOTE — PROGRESS NOTES
NEW PATIENT    Patient: Marilyn Kunz  : 1945    Primary Care Provider: Peter Baca MD    Requesting Provider: As above    Pain of the Left Foot and Pain of the Right Foot      History    Chief Complaint: Bilateral foot pain, right worse than left    History of Present Illness: This is an extremely pleasant 76-year-old woman with psoriatic arthritis.  She is followed by Dr. Eun Eagle [facility].  She also has a 20+ year history of diabetes.  She reports that her most recent hemoglobin A1c was 6.7.  She also has a heterogeneous factor V Leiden, looking at the hematology consult they did not recommend any unusual postop anticoagulation measures.  Neither she nor her family has had a clot.  She had a left total knee done in 2021.  She is here due to bilateral foot problems.  The right is worse than the left.  She has had progressive deformity bilaterally with especially the right second toe rigidly crossing the great toe.  She has been worried that she might get an infection in the great toe and lose her toe.  She has seen podiatry and tried many different spacers and pads.  She has a Ziploc bag full of these.  She has not as yet had any open wounds but she has been concerned.  Interestingly she has a twin brother who lives in [place] who had what sounds like a fusion of his great toe 2 weeks ago.  He however does not have the significant hand arthritis involvement that she does.  She cannot close either hand into a fist.  She is able to use her 4-prong cane.  She had difficulty using a walker after her knee replacement, even a walker with elbow rests.    Current Outpatient Medications on File Prior to Visit   Medication Sig Dispense Refill   • amLODIPine (NORVASC) 10 MG tablet      • aspirin 81 MG EC tablet Take 1 tablet by mouth Daily. 30 tablet 0   • famotidine (PEPCID) 20 MG tablet Take 1 tablet by mouth 2 (Two) Times a Day. 28 tablet 0   • fluticasone (FLONASE)  50 MCG/ACT nasal spray 2 sprays by Each Nare route Daily. Shake well before using.     • insulin aspart (novoLOG FLEXPEN) 100 UNIT/ML solution pen-injector sc pen Inject  under the skin 3 (Three) Times a Day With Meals.     • levocetirizine (XYZAL) 5 MG tablet Take 5 mg by mouth Every Evening.     • levothyroxine (SYNTHROID, LEVOTHROID) 75 MCG tablet Take 75 mcg by mouth Daily.     • lisinopril (PRINIVIL,ZESTRIL) 40 MG tablet      • metoprolol tartrate (LOPRESSOR) 100 MG tablet Take 100 mg by mouth Every Night.     • oxybutynin XL (DITROPAN-XL) 5 MG 24 hr tablet 5 mg.     • pravastatin (PRAVACHOL) 10 MG tablet Take 10 mg by mouth Daily.     • Secukinumab (Cosentyx) 150 MG/ML solution prefilled syringe Inject  under the skin into the appropriate area as directed.     • Tresiba FlexTouch 100 UNIT/ML solution pen-injector injection      • [DISCONTINUED] amLODIPine (NORVASC) 5 MG tablet Take 5 mg by mouth Daily.     • [DISCONTINUED] Diclofenac Sodium (VOLTAREN) 1 % gel gel      • [DISCONTINUED] Insulin Degludec (Tresiba) 100 UNIT/ML solution injection Inject 14 Units under the skin into the appropriate area as directed Every Night.     • [DISCONTINUED] metoprolol succinate XL (TOPROL-XL) 100 MG 24 hr tablet      • [DISCONTINUED] oxybutynin (DITROPAN) 5 MG tablet Take 5 mg by mouth Daily.     • [DISCONTINUED] oxybutynin XL (DITROPAN-XL) 5 MG 24 hr tablet        No current facility-administered medications on file prior to visit.      Allergies   Allergen Reactions   • Atorvastatin Other (See Comments)   • Colesevelam Other (See Comments)   • Penicillins Rash      Past Medical History:   Diagnosis Date   • Anemia    • Asthma    • Chronic kidney disease     pt told it was dx from Dr Baca and to not eat much protein   • Disease of thyroid gland    • Factor 5 Leiden mutation, heterozygous (HCC) 2012   • Hyperlipidemia    • Hypertension    • Murmur, cardiac    • Psoriatic arthritis (HCC)     hands   • Type 1 diabetes  "(HCC)      Past Surgical History:   Procedure Laterality Date   • CATARACT EXTRACTION Bilateral    • ENDOSCOPY N/A 10/20/2020    Procedure: ESOPHAGOGASTRODUODENOSCOPY;  Surgeon: Brunner, Mark I, MD;  Location: [facility] ENDOSCOPY;  Service: Gastroenterology;  Laterality: N/A;   • HAND SURGERY Left 1987    no hardware   • PATENT DUCTUS ARTERIOUS LIGATION       Family History   Problem Relation Age of Onset   • Cancer Mother    • Pancreatic cancer Mother    • Cancer Father    • Lung cancer Father    • Cancer Sister    • Colon cancer Sister    • Breast cancer Neg Hx    • Ovarian cancer Neg Hx       Social History     Socioeconomic History   • Marital status: Single   Tobacco Use   • Smoking status: Never Smoker   • Smokeless tobacco: Never Used   Vaping Use   • Vaping Use: Never used   Substance and Sexual Activity   • Alcohol use: No   • Drug use: No        Review of Systems   Constitutional: Negative.    HENT: Negative.    Eyes: Negative.    Respiratory: Negative.    Cardiovascular: Negative.    Gastrointestinal: Negative.    Endocrine: Negative.    Genitourinary: Negative.    Musculoskeletal: Positive for arthralgias.   Skin: Negative.    Allergic/Immunologic: Negative.    Neurological: Negative.    Hematological: Negative.    Psychiatric/Behavioral: Negative.        The following portions of the patient's history were reviewed and updated as appropriate: allergies, current medications, past family history, past medical history, past social history, past surgical history and problem list.    Physical Exam:   /78   Ht 149.9 cm (59.02\")   Wt 54.4 kg (120 lb)   BMI 24.22 kg/m²   GENERAL: Body habitus: normal weight for height    Lower extremity edema: Right: 1+ pitting; Left: 2+ pitting    Varicose veins:  Right: venous stasis pigment; Left: venous stasis pigment    Gait: using cane     Mental Status:  awake and alert; oriented to person, place, and time    Voice:  clear  SKIN:  Lower extremity: warm and " dry    Hair Growth(lower extremity):  Right:diminished; Left:  diminished  NAILS: Toenails: thick  HEENT: Head: Normocephalic, atraumatic,  without obvious abnormality.  eye: normal external eye, no icterus  ears:normal external ears  PULM:  Repiratory effort normal  CV:  Dorsalis Pedis:  Right: 2+; Left:2+    Posterior Tibial: Right:2+; Left:2+    Capillary Refill:  Brisk  MSK:  Hand:All joints are very stiff      Tibia:  Right:  non tender; Left:  non tender      Ankle:  Right: non tender; Left:  non tender      Foot:  Right:  Second toe very stiffly crosses the great toe, it is not reducible at the MTPJ, it causes an indention in the great toenail and the soft tissue, no active ulceration however, no signs of infection, the other small toes are also quite stiff and angled medially great toe is stiff with osteophytes; Left:  Milder angulation of the small toes but just is stiff, great toe is stiff with osteophytes      NEURO:      Happy Jack-Miguel 5.07 monofilament test: normal    Lower extremity sensation: intact           Motor Function: All motors fire, stiffness in the toes makes strength evaluation unreliable         Medical Decision Making    Data Review:   ordered and reviewed x-rays today and reviewed outside records-I reviewed her outside records from podiatry    Assessment and Plan/ Diagnosis/Treatment options:   1. Pain in both feet  Toes are quite stiff bilaterally with arthritic change in the great toe and feet.  On the right she is very worried about the second toe causing an infection in the great toe.  She has tried multiple different pads as noted above she brought with her a bag full of them.  She does have sensation, she was intact to the Happy Jack Miguel fiber, so I think the risk of ulceration on the great toe is fairly low, but it is not 0.  She reports is very difficult to wear any type of shoe.  The second toe rubs in all of her shoes.  She is interested in surgery to help prevent problems.   We talked about the options.  I think there is 2 surgical options.  One is similar to what it sounds like her brother had, a fusion of the first MTPJ, however he would also have to add straightening toes 2 through 5.  This is a big surgery.  She would have to be nonweightbearing.  She lives alone, she cannot use crutches or walker, I think it would be inappropriate to recommend the surgery to her and we discussed it very frankly.  The second surgical option is simple amputation of the toe.  She reports that that is what she discussed with Dr. Vikash Yeager.  If we did that she could be weightbearing in a postop shoe, we can do it as an outpatient, stitches can be removed at 2 to 3 weeks.  She would like to proceed with this.  We will plan to amputate the right second toe as an outpatient.  We discussed the risks including but not limited to: death, infection, neurovascular damage, strokes, heart attacks, blood clots, chronic pain, deformity, stiffness, need for  further surgery,  amputation, etc.  Questions asked and answered in detail.  We discussed her factor Leiden, given the hematology consult we will not need to do any unusual postop anticoagulation.      - XR Foot 2 View Bilateral    2. Venous stasis  She does have venous stasis, she reports she has trouble pulling on her compression stockings.  We talked about using thick rubber gloves, that is a very useful trick for people with very arthritic hands to prolong her compression stockings.    3. Type 2 diabetes mellitus without complication, with long-term current use of insulin (HCC)  Despite her longstanding diabetes, she does not have any obvious neuropathy      4. Psoriatic arthritis- significant joint stiffness in hands and feet      Part of this encounter note is an electronic transcription/translation of spoken language to printed text. The electronic translation of spoken language may permit erroneous, or at times, nonsensical words or phrases to be  inadvertently transcribed; Although I have reviewed the note for such errors, some may still exist.          Layla Ye MD

## 2022-01-31 DIAGNOSIS — M79.671 PAIN IN BOTH FEET: Primary | ICD-10-CM

## 2022-01-31 DIAGNOSIS — M79.672 PAIN IN BOTH FEET: Primary | ICD-10-CM

## 2022-04-29 ENCOUNTER — HOSPITAL ENCOUNTER (OUTPATIENT)
Dept: GENERAL RADIOLOGY | Facility: HOSPITAL | Age: 77
Discharge: HOME | End: 2022-04-29
Admitting: FAMILY MEDICINE

## 2022-04-29 ENCOUNTER — TRANSCRIBE ORDERS (OUTPATIENT)
Dept: ADMINISTRATIVE | Facility: HOSPITAL | Age: 77
End: 2022-04-29

## 2022-04-29 DIAGNOSIS — M79.641 PAIN IN RIGHT HAND: Primary | ICD-10-CM

## 2022-04-29 DIAGNOSIS — M79.641 PAIN IN RIGHT HAND: ICD-10-CM

## 2022-04-29 PROCEDURE — 73130 X-RAY EXAM OF HAND: CPT

## 2022-05-04 ENCOUNTER — APPOINTMENT (OUTPATIENT)
Dept: GENERAL RADIOLOGY | Facility: HOSPITAL | Age: 77
End: 2022-05-04

## 2022-05-04 ENCOUNTER — APPOINTMENT (OUTPATIENT)
Dept: MRI IMAGING | Facility: HOSPITAL | Age: 77
End: 2022-05-04

## 2022-05-04 ENCOUNTER — APPOINTMENT (OUTPATIENT)
Dept: CT IMAGING | Facility: HOSPITAL | Age: 77
End: 2022-05-04

## 2022-05-04 ENCOUNTER — HOSPITAL ENCOUNTER (OUTPATIENT)
Facility: HOSPITAL | Age: 77
Setting detail: OBSERVATION
Discharge: HOME | End: 2022-05-06
Attending: EMERGENCY MEDICINE | Admitting: INTERNAL MEDICINE

## 2022-05-04 DIAGNOSIS — I63.9 CEREBROVASCULAR ACCIDENT (CVA), UNSPECIFIED MECHANISM: ICD-10-CM

## 2022-05-04 DIAGNOSIS — L40.50 PSORIATIC ARTHRITIS: ICD-10-CM

## 2022-05-04 DIAGNOSIS — R29.898 WEAKNESS OF RIGHT UPPER EXTREMITY: Primary | ICD-10-CM

## 2022-05-04 PROBLEM — E11.9 DIABETES MELLITUS: Status: ACTIVE | Noted: 2022-05-04

## 2022-05-04 PROBLEM — E10.9 TYPE 1 DIABETES MELLITUS (HCC): Status: ACTIVE | Noted: 2022-05-04

## 2022-05-04 LAB
ALBUMIN SERPL-MCNC: 4.7 G/DL (ref 3.5–5.2)
ALBUMIN/GLOB SERPL: 1.4 G/DL
ALP SERPL-CCNC: 73 U/L (ref 39–117)
ALT SERPL W P-5'-P-CCNC: 13 U/L (ref 1–33)
ALT SERPL W P-5'-P-CCNC: 14 U/L (ref 1–33)
ANION GAP SERPL CALCULATED.3IONS-SCNC: 17 MMOL/L (ref 5–15)
APTT PPP: 22.9 SECONDS (ref 22–39)
AST SERPL-CCNC: 27 U/L (ref 1–32)
AST SERPL-CCNC: 27 U/L (ref 1–32)
BASOPHILS # BLD AUTO: 0.05 10*3/MM3 (ref 0–0.2)
BASOPHILS NFR BLD AUTO: 0.5 % (ref 0–1.5)
BILIRUB SERPL-MCNC: 0.6 MG/DL (ref 0–1.2)
BILIRUB UR QL STRIP: NEGATIVE
BUN SERPL-MCNC: 28 MG/DL (ref 8–23)
BUN/CREAT SERPL: 26.2 (ref 7–25)
CALCIUM SPEC-SCNC: 10.1 MG/DL (ref 8.6–10.5)
CHLORIDE SERPL-SCNC: 101 MMOL/L (ref 98–107)
CLARITY UR: CLEAR
CO2 SERPL-SCNC: 21 MMOL/L (ref 22–29)
COLOR UR: YELLOW
CREAT SERPL-MCNC: 1.07 MG/DL (ref 0.57–1)
CRP SERPL-MCNC: 0.61 MG/DL (ref 0–0.5)
D-LACTATE SERPL-SCNC: 1.1 MMOL/L (ref 0.5–2)
DEPRECATED RDW RBC AUTO: 42.6 FL (ref 37–54)
EGFRCR SERPLBLD CKD-EPI 2021: 53.9 ML/MIN/1.73
EOSINOPHIL # BLD AUTO: 0.09 10*3/MM3 (ref 0–0.4)
EOSINOPHIL NFR BLD AUTO: 0.9 % (ref 0.3–6.2)
ERYTHROCYTE [DISTWIDTH] IN BLOOD BY AUTOMATED COUNT: 13.2 % (ref 12.3–15.4)
ERYTHROCYTE [SEDIMENTATION RATE] IN BLOOD: 44 MM/HR (ref 0–30)
FLUAV RNA RESP QL NAA+PROBE: NOT DETECTED
FLUBV RNA RESP QL NAA+PROBE: NOT DETECTED
GLOBULIN UR ELPH-MCNC: 3.4 GM/DL
GLUCOSE SERPL-MCNC: 95 MG/DL (ref 65–99)
GLUCOSE UR STRIP-MCNC: NEGATIVE MG/DL
HCT VFR BLD AUTO: 37.6 % (ref 34–46.6)
HGB BLD-MCNC: 12.3 G/DL (ref 12–15.9)
HGB UR QL STRIP.AUTO: NEGATIVE
HOLD SPECIMEN: NORMAL
HOLD SPECIMEN: NORMAL
IMM GRANULOCYTES # BLD AUTO: 0.02 10*3/MM3 (ref 0–0.05)
IMM GRANULOCYTES NFR BLD AUTO: 0.2 % (ref 0–0.5)
KETONES UR QL STRIP: NEGATIVE
LEUKOCYTE ESTERASE UR QL STRIP.AUTO: NEGATIVE
LYMPHOCYTES # BLD AUTO: 1.87 10*3/MM3 (ref 0.7–3.1)
LYMPHOCYTES NFR BLD AUTO: 18.2 % (ref 19.6–45.3)
MCH RBC QN AUTO: 28.7 PG (ref 26.6–33)
MCHC RBC AUTO-ENTMCNC: 32.7 G/DL (ref 31.5–35.7)
MCV RBC AUTO: 87.9 FL (ref 79–97)
MONOCYTES # BLD AUTO: 0.76 10*3/MM3 (ref 0.1–0.9)
MONOCYTES NFR BLD AUTO: 7.4 % (ref 5–12)
NEUTROPHILS NFR BLD AUTO: 7.49 10*3/MM3 (ref 1.7–7)
NEUTROPHILS NFR BLD AUTO: 72.8 % (ref 42.7–76)
NITRITE UR QL STRIP: NEGATIVE
NRBC BLD AUTO-RTO: 0 /100 WBC (ref 0–0.2)
PH UR STRIP.AUTO: 6.5 [PH] (ref 5–8)
PLATELET # BLD AUTO: 249 10*3/MM3 (ref 140–450)
PMV BLD AUTO: 11.2 FL (ref 6–12)
POTASSIUM SERPL-SCNC: 4.4 MMOL/L (ref 3.5–5.2)
PROCALCITONIN SERPL-MCNC: 0.11 NG/ML (ref 0–0.25)
PROT SERPL-MCNC: 8.1 G/DL (ref 6–8.5)
PROT UR QL STRIP: NEGATIVE
RBC # BLD AUTO: 4.28 10*6/MM3 (ref 3.77–5.28)
SARS-COV-2 RNA RESP QL NAA+PROBE: NOT DETECTED
SODIUM SERPL-SCNC: 139 MMOL/L (ref 136–145)
SP GR UR STRIP: 1.02 (ref 1–1.03)
TROPONIN T SERPL-MCNC: <0.01 NG/ML (ref 0–0.03)
TSH SERPL DL<=0.05 MIU/L-ACNC: 2.28 UIU/ML (ref 0.27–4.2)
UROBILINOGEN UR QL STRIP: NORMAL
WBC NRBC COR # BLD: 10.28 10*3/MM3 (ref 3.4–10.8)
WHOLE BLOOD HOLD SPECIMEN: NORMAL
WHOLE BLOOD HOLD SPECIMEN: NORMAL

## 2022-05-04 PROCEDURE — 99214 OFFICE O/P EST MOD 30 MIN: CPT

## 2022-05-04 PROCEDURE — 83605 ASSAY OF LACTIC ACID: CPT | Performed by: INTERNAL MEDICINE

## 2022-05-04 PROCEDURE — 99219 PR INITIAL OBSERVATION CARE/DAY 50 MINUTES: CPT | Performed by: INTERNAL MEDICINE

## 2022-05-04 PROCEDURE — 0 IOPAMIDOL PER 1 ML: Performed by: EMERGENCY MEDICINE

## 2022-05-04 PROCEDURE — 0042T HC CT CEREBRAL PERFUSION W/WO CONTRAST: CPT

## 2022-05-04 PROCEDURE — 80053 COMPREHEN METABOLIC PANEL: CPT | Performed by: EMERGENCY MEDICINE

## 2022-05-04 PROCEDURE — 85730 THROMBOPLASTIN TIME PARTIAL: CPT

## 2022-05-04 PROCEDURE — G0378 HOSPITAL OBSERVATION PER HR: HCPCS

## 2022-05-04 PROCEDURE — 70496 CT ANGIOGRAPHY HEAD: CPT

## 2022-05-04 PROCEDURE — 86140 C-REACTIVE PROTEIN: CPT | Performed by: NURSE PRACTITIONER

## 2022-05-04 PROCEDURE — 93005 ELECTROCARDIOGRAM TRACING: CPT

## 2022-05-04 PROCEDURE — 87636 SARSCOV2 & INF A&B AMP PRB: CPT | Performed by: INTERNAL MEDICINE

## 2022-05-04 PROCEDURE — 70450 CT HEAD/BRAIN W/O DYE: CPT

## 2022-05-04 PROCEDURE — 70551 MRI BRAIN STEM W/O DYE: CPT

## 2022-05-04 PROCEDURE — 84484 ASSAY OF TROPONIN QUANT: CPT

## 2022-05-04 PROCEDURE — C9803 HOPD COVID-19 SPEC COLLECT: HCPCS

## 2022-05-04 PROCEDURE — 84443 ASSAY THYROID STIM HORMONE: CPT | Performed by: NURSE PRACTITIONER

## 2022-05-04 PROCEDURE — 84450 TRANSFERASE (AST) (SGOT): CPT

## 2022-05-04 PROCEDURE — 87086 URINE CULTURE/COLONY COUNT: CPT | Performed by: INTERNAL MEDICINE

## 2022-05-04 PROCEDURE — 36415 COLL VENOUS BLD VENIPUNCTURE: CPT

## 2022-05-04 PROCEDURE — 99284 EMERGENCY DEPT VISIT MOD MDM: CPT

## 2022-05-04 PROCEDURE — 84460 ALANINE AMINO (ALT) (SGPT): CPT

## 2022-05-04 PROCEDURE — 81003 URINALYSIS AUTO W/O SCOPE: CPT | Performed by: INTERNAL MEDICINE

## 2022-05-04 PROCEDURE — 84145 PROCALCITONIN (PCT): CPT | Performed by: INTERNAL MEDICINE

## 2022-05-04 PROCEDURE — 85652 RBC SED RATE AUTOMATED: CPT | Performed by: NURSE PRACTITIONER

## 2022-05-04 PROCEDURE — 71045 X-RAY EXAM CHEST 1 VIEW: CPT

## 2022-05-04 PROCEDURE — 85025 COMPLETE CBC W/AUTO DIFF WBC: CPT

## 2022-05-04 PROCEDURE — 70498 CT ANGIOGRAPHY NECK: CPT

## 2022-05-04 RX ORDER — ASPIRIN 300 MG/1
300 SUPPOSITORY RECTAL DAILY
Status: DISCONTINUED | OUTPATIENT
Start: 2022-05-05 | End: 2022-05-06 | Stop reason: HOSPADM

## 2022-05-04 RX ORDER — ASPIRIN 81 MG/1
81 TABLET, CHEWABLE ORAL DAILY
Status: DISCONTINUED | OUTPATIENT
Start: 2022-05-05 | End: 2022-05-06 | Stop reason: HOSPADM

## 2022-05-04 RX ORDER — SODIUM CHLORIDE 0.9 % (FLUSH) 0.9 %
10 SYRINGE (ML) INJECTION AS NEEDED
Status: DISCONTINUED | OUTPATIENT
Start: 2022-05-04 | End: 2022-05-05

## 2022-05-04 RX ADMIN — IOPAMIDOL 115 ML: 755 INJECTION, SOLUTION INTRAVENOUS at 20:48

## 2022-05-05 ENCOUNTER — APPOINTMENT (OUTPATIENT)
Dept: GENERAL RADIOLOGY | Facility: HOSPITAL | Age: 77
End: 2022-05-05

## 2022-05-05 ENCOUNTER — APPOINTMENT (OUTPATIENT)
Dept: CARDIOLOGY | Facility: HOSPITAL | Age: 77
End: 2022-05-05

## 2022-05-05 PROBLEM — N18.2 CKD (CHRONIC KIDNEY DISEASE) STAGE 2, GFR 60-89 ML/MIN: Status: ACTIVE | Noted: 2022-05-05

## 2022-05-05 PROBLEM — L40.50 PSORIATIC ARTHRITIS: Status: ACTIVE | Noted: 2022-05-05

## 2022-05-05 LAB
ANION GAP SERPL CALCULATED.3IONS-SCNC: 15 MMOL/L (ref 5–15)
BACTERIA SPEC AEROBE CULT: NO GROWTH
BASOPHILS # BLD AUTO: 0.04 10*3/MM3 (ref 0–0.2)
BASOPHILS NFR BLD AUTO: 0.4 % (ref 0–1.5)
BH CV ECHO MEAS - AO MAX PG: 8.9 MMHG
BH CV ECHO MEAS - AO MEAN PG: 4.4 MMHG
BH CV ECHO MEAS - AO ROOT DIAM: 2.6 CM
BH CV ECHO MEAS - AO V2 MAX: 148.8 CM/SEC
BH CV ECHO MEAS - AO V2 VTI: 31.9 CM
BH CV ECHO MEAS - AVA(I,D): 2.22 CM2
BH CV ECHO MEAS - EDV(CUBED): 82.2 ML
BH CV ECHO MEAS - EDV(MOD-SP2): 69 ML
BH CV ECHO MEAS - EDV(MOD-SP4): 72 ML
BH CV ECHO MEAS - EF(MOD-BP): 72 %
BH CV ECHO MEAS - EF(MOD-SP2): 73.9 %
BH CV ECHO MEAS - EF(MOD-SP4): 72.2 %
BH CV ECHO MEAS - ESV(CUBED): 16.5 ML
BH CV ECHO MEAS - ESV(MOD-SP2): 18 ML
BH CV ECHO MEAS - ESV(MOD-SP4): 20 ML
BH CV ECHO MEAS - FS: 41.5 %
BH CV ECHO MEAS - IVS/LVPW: 0.79 CM
BH CV ECHO MEAS - IVSD: 0.56 CM
BH CV ECHO MEAS - LV DIASTOLIC VOL/BSA (35-75): 46.9 CM2
BH CV ECHO MEAS - LV MASS(C)D: 79.7 GRAMS
BH CV ECHO MEAS - LV MAX PG: 4.5 MMHG
BH CV ECHO MEAS - LV MEAN PG: 2.12 MMHG
BH CV ECHO MEAS - LV SYSTOLIC VOL/BSA (12-30): 13 CM2
BH CV ECHO MEAS - LV V1 MAX: 105.6 CM/SEC
BH CV ECHO MEAS - LV V1 VTI: 26 CM
BH CV ECHO MEAS - LVIDD: 4.3 CM
BH CV ECHO MEAS - LVIDS: 2.5 CM
BH CV ECHO MEAS - LVOT AREA: 2.7 CM2
BH CV ECHO MEAS - LVOT DIAM: 1.86 CM
BH CV ECHO MEAS - LVPWD: 0.71 CM
BH CV ECHO MEAS - MV A MAX VEL: 62.4 CM/SEC
BH CV ECHO MEAS - MV DEC SLOPE: 514 CM/SEC2
BH CV ECHO MEAS - MV DEC TIME: 0.2 MSEC
BH CV ECHO MEAS - MV E MAX VEL: 97.4 CM/SEC
BH CV ECHO MEAS - MV E/A: 1.56
BH CV ECHO MEAS - MV P1/2T: 81.8 MSEC
BH CV ECHO MEAS - MVA(P1/2T): 2.7 CM2
BH CV ECHO MEAS - PA ACC SLOPE: 1296 CM/SEC2
BH CV ECHO MEAS - PA ACC TIME: 0.1 SEC
BH CV ECHO MEAS - PA PR(ACCEL): 33.1 MMHG
BH CV ECHO MEAS - PI END-D VEL: 117.3 CM/SEC
BH CV ECHO MEAS - RAP SYSTOLE: 8 MMHG
BH CV ECHO MEAS - RVSP: 46.3 MMHG
BH CV ECHO MEAS - SI(MOD-SP2): 33.2 ML/M2
BH CV ECHO MEAS - SI(MOD-SP4): 33.9 ML/M2
BH CV ECHO MEAS - SV(LVOT): 70.7 ML
BH CV ECHO MEAS - SV(MOD-SP2): 51 ML
BH CV ECHO MEAS - SV(MOD-SP4): 52 ML
BH CV ECHO MEAS - TAPSE (>1.6): 1.9 CM
BH CV ECHO MEAS - TR MAX PG: 38.3 MMHG
BH CV ECHO MEAS - TR MAX VEL: 309.5 CM/SEC
BH CV LOWER VASCULAR LEFT COMMON FEMORAL AUGMENT: NORMAL
BH CV LOWER VASCULAR LEFT COMMON FEMORAL COMPRESS: NORMAL
BH CV LOWER VASCULAR LEFT COMMON FEMORAL PHASIC: NORMAL
BH CV LOWER VASCULAR LEFT COMMON FEMORAL SPONT: NORMAL
BH CV LOWER VASCULAR LEFT DISTAL FEMORAL COMPRESS: NORMAL
BH CV LOWER VASCULAR LEFT GASTRONEMIUS COMPRESS: NORMAL
BH CV LOWER VASCULAR LEFT GREATER SAPH AK COMPRESS: NORMAL
BH CV LOWER VASCULAR LEFT GREATER SAPH BK COMPRESS: NORMAL
BH CV LOWER VASCULAR LEFT LESSER SAPH COMPRESS: NORMAL
BH CV LOWER VASCULAR LEFT MID FEMORAL AUGMENT: NORMAL
BH CV LOWER VASCULAR LEFT MID FEMORAL COMPRESS: NORMAL
BH CV LOWER VASCULAR LEFT MID FEMORAL PHASIC: NORMAL
BH CV LOWER VASCULAR LEFT MID FEMORAL SPONT: NORMAL
BH CV LOWER VASCULAR LEFT PERONEAL COMPRESS: NORMAL
BH CV LOWER VASCULAR LEFT POPLITEAL AUGMENT: NORMAL
BH CV LOWER VASCULAR LEFT POPLITEAL COMPRESS: NORMAL
BH CV LOWER VASCULAR LEFT POPLITEAL PHASIC: NORMAL
BH CV LOWER VASCULAR LEFT POPLITEAL SPONT: NORMAL
BH CV LOWER VASCULAR LEFT POSTERIOR TIBIAL COMPRESS: NORMAL
BH CV LOWER VASCULAR LEFT PROFUNDA FEMORAL COMPRESS: NORMAL
BH CV LOWER VASCULAR LEFT PROXIMAL FEMORAL COMPRESS: NORMAL
BH CV LOWER VASCULAR LEFT SAPHENOFEMORAL JUNCTION COMPRESS: NORMAL
BH CV LOWER VASCULAR RIGHT COMMON FEMORAL AUGMENT: NORMAL
BH CV LOWER VASCULAR RIGHT COMMON FEMORAL COMPRESS: NORMAL
BH CV LOWER VASCULAR RIGHT COMMON FEMORAL PHASIC: NORMAL
BH CV LOWER VASCULAR RIGHT COMMON FEMORAL SPONT: NORMAL
BH CV LOWER VASCULAR RIGHT DISTAL FEMORAL COMPRESS: NORMAL
BH CV LOWER VASCULAR RIGHT GASTRONEMIUS COMPRESS: NORMAL
BH CV LOWER VASCULAR RIGHT GREATER SAPH AK COMPRESS: NORMAL
BH CV LOWER VASCULAR RIGHT GREATER SAPH BK COMPRESS: NORMAL
BH CV LOWER VASCULAR RIGHT LESSER SAPH COMPRESS: NORMAL
BH CV LOWER VASCULAR RIGHT MID FEMORAL AUGMENT: NORMAL
BH CV LOWER VASCULAR RIGHT MID FEMORAL COMPRESS: NORMAL
BH CV LOWER VASCULAR RIGHT MID FEMORAL PHASIC: NORMAL
BH CV LOWER VASCULAR RIGHT MID FEMORAL SPONT: NORMAL
BH CV LOWER VASCULAR RIGHT PERONEAL COMPRESS: NORMAL
BH CV LOWER VASCULAR RIGHT POPLITEAL AUGMENT: NORMAL
BH CV LOWER VASCULAR RIGHT POPLITEAL COMPRESS: NORMAL
BH CV LOWER VASCULAR RIGHT POPLITEAL PHASIC: NORMAL
BH CV LOWER VASCULAR RIGHT POPLITEAL SPONT: NORMAL
BH CV LOWER VASCULAR RIGHT POSTERIOR TIBIAL COMPRESS: NORMAL
BH CV LOWER VASCULAR RIGHT PROFUNDA FEMORAL COMPRESS: NORMAL
BH CV LOWER VASCULAR RIGHT PROXIMAL FEMORAL COMPRESS: NORMAL
BH CV LOWER VASCULAR RIGHT SAPHENOFEMORAL JUNCTION COMPRESS: NORMAL
BH CV VAS BP RIGHT ARM: NORMAL MMHG
BH CV XLRA - RV BASE: 3.7 CM
BH CV XLRA - RV LENGTH: 6.2 CM
BH CV XLRA - RV MID: 2.8 CM
BH CV XLRA - TDI S': 15.7 CM/SEC
BUN SERPL-MCNC: 26 MG/DL (ref 8–23)
BUN/CREAT SERPL: 26.3 (ref 7–25)
CALCIUM SPEC-SCNC: 9.8 MG/DL (ref 8.6–10.5)
CHLORIDE SERPL-SCNC: 100 MMOL/L (ref 98–107)
CHOLEST SERPL-MCNC: 167 MG/DL (ref 0–200)
CO2 SERPL-SCNC: 22 MMOL/L (ref 22–29)
CREAT SERPL-MCNC: 0.99 MG/DL (ref 0.57–1)
DEPRECATED RDW RBC AUTO: 40.6 FL (ref 37–54)
EGFRCR SERPLBLD CKD-EPI 2021: 59.2 ML/MIN/1.73
EOSINOPHIL # BLD AUTO: 0.09 10*3/MM3 (ref 0–0.4)
EOSINOPHIL NFR BLD AUTO: 0.8 % (ref 0.3–6.2)
ERYTHROCYTE [DISTWIDTH] IN BLOOD BY AUTOMATED COUNT: 13.2 % (ref 12.3–15.4)
GLUCOSE BLDC GLUCOMTR-MCNC: 140 MG/DL (ref 70–130)
GLUCOSE BLDC GLUCOMTR-MCNC: 144 MG/DL (ref 70–130)
GLUCOSE BLDC GLUCOMTR-MCNC: 174 MG/DL (ref 70–130)
GLUCOSE BLDC GLUCOMTR-MCNC: 235 MG/DL (ref 70–130)
GLUCOSE BLDC GLUCOMTR-MCNC: 269 MG/DL (ref 70–130)
GLUCOSE SERPL-MCNC: 163 MG/DL (ref 65–99)
HBA1C MFR BLD: 6.9 % (ref 4.8–5.6)
HCT VFR BLD AUTO: 33 % (ref 34–46.6)
HDLC SERPL-MCNC: 79 MG/DL (ref 40–60)
HGB BLD-MCNC: 11.1 G/DL (ref 12–15.9)
HOLD SPECIMEN: NORMAL
IMM GRANULOCYTES # BLD AUTO: 0.03 10*3/MM3 (ref 0–0.05)
IMM GRANULOCYTES NFR BLD AUTO: 0.3 % (ref 0–0.5)
LDLC SERPL CALC-MCNC: 76 MG/DL (ref 0–100)
LDLC/HDLC SERPL: 0.95 {RATIO}
LEFT ATRIUM VOLUME INDEX: 41.6 ML/M2
LEFT ATRIUM VOLUME: 64 CM3
LYMPHOCYTES # BLD AUTO: 2.38 10*3/MM3 (ref 0.7–3.1)
LYMPHOCYTES NFR BLD AUTO: 21.6 % (ref 19.6–45.3)
MAGNESIUM SERPL-MCNC: 2.1 MG/DL (ref 1.6–2.4)
MAXIMAL PREDICTED HEART RATE: 144 BPM
MAXIMAL PREDICTED HEART RATE: 144 BPM
MCH RBC QN AUTO: 28.7 PG (ref 26.6–33)
MCHC RBC AUTO-ENTMCNC: 33.6 G/DL (ref 31.5–35.7)
MCV RBC AUTO: 85.3 FL (ref 79–97)
MONOCYTES # BLD AUTO: 0.96 10*3/MM3 (ref 0.1–0.9)
MONOCYTES NFR BLD AUTO: 8.7 % (ref 5–12)
NEUTROPHILS NFR BLD AUTO: 68.2 % (ref 42.7–76)
NEUTROPHILS NFR BLD AUTO: 7.5 10*3/MM3 (ref 1.7–7)
NRBC BLD AUTO-RTO: 0 /100 WBC (ref 0–0.2)
PLATELET # BLD AUTO: 235 10*3/MM3 (ref 140–450)
PMV BLD AUTO: 11 FL (ref 6–12)
POTASSIUM SERPL-SCNC: 4.1 MMOL/L (ref 3.5–5.2)
QT INTERVAL: 438 MS
QTC INTERVAL: 486 MS
RBC # BLD AUTO: 3.87 10*6/MM3 (ref 3.77–5.28)
SODIUM SERPL-SCNC: 137 MMOL/L (ref 136–145)
STRESS TARGET HR: 122 BPM
STRESS TARGET HR: 122 BPM
TRIGL SERPL-MCNC: 63 MG/DL (ref 0–150)
TROPONIN T SERPL-MCNC: <0.01 NG/ML (ref 0–0.03)
VLDLC SERPL-MCNC: 12 MG/DL (ref 5–40)
WBC NRBC COR # BLD: 11 10*3/MM3 (ref 3.4–10.8)

## 2022-05-05 PROCEDURE — 25010000002 SULFUR HEXAFLUORIDE MICROSPH 60.7-25 MG RECONSTITUTED SUSPENSION

## 2022-05-05 PROCEDURE — G0378 HOSPITAL OBSERVATION PER HR: HCPCS

## 2022-05-05 PROCEDURE — 99225 PR SBSQ OBSERVATION CARE/DAY 25 MINUTES: CPT | Performed by: INTERNAL MEDICINE

## 2022-05-05 PROCEDURE — 83735 ASSAY OF MAGNESIUM: CPT | Performed by: NURSE PRACTITIONER

## 2022-05-05 PROCEDURE — 96361 HYDRATE IV INFUSION ADD-ON: CPT

## 2022-05-05 PROCEDURE — 80048 BASIC METABOLIC PNL TOTAL CA: CPT | Performed by: NURSE PRACTITIONER

## 2022-05-05 PROCEDURE — 93306 TTE W/DOPPLER COMPLETE: CPT | Performed by: INTERNAL MEDICINE

## 2022-05-05 PROCEDURE — 63710000001 INSULIN DETEMIR PER 5 UNITS: Performed by: INTERNAL MEDICINE

## 2022-05-05 PROCEDURE — 92523 SPEECH SOUND LANG COMPREHEN: CPT | Performed by: SPEECH-LANGUAGE PATHOLOGIST

## 2022-05-05 PROCEDURE — 93970 EXTREMITY STUDY: CPT

## 2022-05-05 PROCEDURE — 97165 OT EVAL LOW COMPLEX 30 MIN: CPT

## 2022-05-05 PROCEDURE — 63710000001 INSULIN LISPRO (HUMAN) PER 5 UNITS: Performed by: INTERNAL MEDICINE

## 2022-05-05 PROCEDURE — 99214 OFFICE O/P EST MOD 30 MIN: CPT | Performed by: STUDENT IN AN ORGANIZED HEALTH CARE EDUCATION/TRAINING PROGRAM

## 2022-05-05 PROCEDURE — 83036 HEMOGLOBIN GLYCOSYLATED A1C: CPT

## 2022-05-05 PROCEDURE — 93970 EXTREMITY STUDY: CPT | Performed by: INTERNAL MEDICINE

## 2022-05-05 PROCEDURE — 63710000001 INSULIN LISPRO (HUMAN) PER 5 UNITS: Performed by: NURSE PRACTITIONER

## 2022-05-05 PROCEDURE — 96360 HYDRATION IV INFUSION INIT: CPT

## 2022-05-05 PROCEDURE — 73030 X-RAY EXAM OF SHOULDER: CPT

## 2022-05-05 PROCEDURE — 93010 ELECTROCARDIOGRAM REPORT: CPT | Performed by: INTERNAL MEDICINE

## 2022-05-05 PROCEDURE — 93005 ELECTROCARDIOGRAM TRACING: CPT | Performed by: INTERNAL MEDICINE

## 2022-05-05 PROCEDURE — 93306 TTE W/DOPPLER COMPLETE: CPT

## 2022-05-05 PROCEDURE — 84484 ASSAY OF TROPONIN QUANT: CPT | Performed by: INTERNAL MEDICINE

## 2022-05-05 PROCEDURE — 82962 GLUCOSE BLOOD TEST: CPT

## 2022-05-05 PROCEDURE — 80061 LIPID PANEL: CPT

## 2022-05-05 PROCEDURE — 85025 COMPLETE CBC W/AUTO DIFF WBC: CPT | Performed by: NURSE PRACTITIONER

## 2022-05-05 RX ORDER — OXYBUTYNIN CHLORIDE 5 MG/1
5 TABLET, EXTENDED RELEASE ORAL DAILY
Status: DISCONTINUED | OUTPATIENT
Start: 2022-05-05 | End: 2022-05-06 | Stop reason: HOSPADM

## 2022-05-05 RX ORDER — ASPIRIN 81 MG/1
81 TABLET ORAL DAILY
Status: DISCONTINUED | OUTPATIENT
Start: 2022-05-05 | End: 2022-05-05

## 2022-05-05 RX ORDER — SODIUM CHLORIDE 0.9 % (FLUSH) 0.9 %
10 SYRINGE (ML) INJECTION AS NEEDED
Status: DISCONTINUED | OUTPATIENT
Start: 2022-05-05 | End: 2022-05-06 | Stop reason: HOSPADM

## 2022-05-05 RX ORDER — FLUTICASONE PROPIONATE 50 MCG
2 SPRAY, SUSPENSION (ML) NASAL DAILY
Status: DISCONTINUED | OUTPATIENT
Start: 2022-05-05 | End: 2022-05-06 | Stop reason: HOSPADM

## 2022-05-05 RX ORDER — LISINOPRIL 40 MG/1
40 TABLET ORAL
Status: DISCONTINUED | OUTPATIENT
Start: 2022-05-05 | End: 2022-05-06 | Stop reason: HOSPADM

## 2022-05-05 RX ORDER — FAMOTIDINE 20 MG/1
20 TABLET, FILM COATED ORAL DAILY
Status: DISCONTINUED | OUTPATIENT
Start: 2022-05-06 | End: 2022-05-06 | Stop reason: HOSPADM

## 2022-05-05 RX ORDER — INSULIN LISPRO 100 [IU]/ML
4 INJECTION, SOLUTION INTRAVENOUS; SUBCUTANEOUS
Status: DISCONTINUED | OUTPATIENT
Start: 2022-05-05 | End: 2022-05-06 | Stop reason: HOSPADM

## 2022-05-05 RX ORDER — SODIUM CHLORIDE 9 MG/ML
75 INJECTION, SOLUTION INTRAVENOUS CONTINUOUS
Status: DISCONTINUED | OUTPATIENT
Start: 2022-05-05 | End: 2022-05-05

## 2022-05-05 RX ORDER — FAMOTIDINE 20 MG/1
20 TABLET, FILM COATED ORAL 2 TIMES DAILY
Status: DISCONTINUED | OUTPATIENT
Start: 2022-05-05 | End: 2022-05-05

## 2022-05-05 RX ORDER — DEXTROSE MONOHYDRATE 25 G/50ML
25 INJECTION, SOLUTION INTRAVENOUS
Status: DISCONTINUED | OUTPATIENT
Start: 2022-05-05 | End: 2022-05-06 | Stop reason: HOSPADM

## 2022-05-05 RX ORDER — INSULIN LISPRO 100 [IU]/ML
0-9 INJECTION, SOLUTION INTRAVENOUS; SUBCUTANEOUS
Status: DISCONTINUED | OUTPATIENT
Start: 2022-05-05 | End: 2022-05-06 | Stop reason: HOSPADM

## 2022-05-05 RX ORDER — SODIUM CHLORIDE 0.9 % (FLUSH) 0.9 %
10 SYRINGE (ML) INJECTION AS NEEDED
Status: DISCONTINUED | OUTPATIENT
Start: 2022-05-05 | End: 2022-05-05

## 2022-05-05 RX ORDER — ACETAMINOPHEN 160 MG/5ML
650 SOLUTION ORAL EVERY 4 HOURS PRN
Status: DISCONTINUED | OUTPATIENT
Start: 2022-05-05 | End: 2022-05-06 | Stop reason: HOSPADM

## 2022-05-05 RX ORDER — CETIRIZINE HYDROCHLORIDE 10 MG/1
5 TABLET ORAL NIGHTLY
Status: DISCONTINUED | OUTPATIENT
Start: 2022-05-05 | End: 2022-05-06 | Stop reason: HOSPADM

## 2022-05-05 RX ORDER — DULOXETIN HYDROCHLORIDE 20 MG/1
20 CAPSULE, DELAYED RELEASE ORAL DAILY
Status: DISCONTINUED | OUTPATIENT
Start: 2022-05-05 | End: 2022-05-06 | Stop reason: HOSPADM

## 2022-05-05 RX ORDER — ACETAMINOPHEN 650 MG/1
650 SUPPOSITORY RECTAL EVERY 4 HOURS PRN
Status: DISCONTINUED | OUTPATIENT
Start: 2022-05-05 | End: 2022-05-06 | Stop reason: HOSPADM

## 2022-05-05 RX ORDER — ACETAMINOPHEN 325 MG/1
650 TABLET ORAL EVERY 4 HOURS PRN
Status: DISCONTINUED | OUTPATIENT
Start: 2022-05-05 | End: 2022-05-06 | Stop reason: HOSPADM

## 2022-05-05 RX ORDER — METOPROLOL TARTRATE 100 MG/1
100 TABLET ORAL NIGHTLY
Status: DISCONTINUED | OUTPATIENT
Start: 2022-05-05 | End: 2022-05-06 | Stop reason: HOSPADM

## 2022-05-05 RX ORDER — INSULIN LISPRO 100 [IU]/ML
2 INJECTION, SOLUTION INTRAVENOUS; SUBCUTANEOUS ONCE
Status: COMPLETED | OUTPATIENT
Start: 2022-05-05 | End: 2022-05-05

## 2022-05-05 RX ORDER — LEVOTHYROXINE SODIUM 0.07 MG/1
75 TABLET ORAL
Status: DISCONTINUED | OUTPATIENT
Start: 2022-05-05 | End: 2022-05-06 | Stop reason: HOSPADM

## 2022-05-05 RX ORDER — SODIUM CHLORIDE 0.9 % (FLUSH) 0.9 %
10 SYRINGE (ML) INJECTION EVERY 12 HOURS SCHEDULED
Status: DISCONTINUED | OUTPATIENT
Start: 2022-05-05 | End: 2022-05-06 | Stop reason: HOSPADM

## 2022-05-05 RX ORDER — PRAVASTATIN SODIUM 10 MG
10 TABLET ORAL NIGHTLY
Status: DISCONTINUED | OUTPATIENT
Start: 2022-05-05 | End: 2022-05-06 | Stop reason: HOSPADM

## 2022-05-05 RX ORDER — NICOTINE POLACRILEX 4 MG
15 LOZENGE BUCCAL
Status: DISCONTINUED | OUTPATIENT
Start: 2022-05-05 | End: 2022-05-06 | Stop reason: HOSPADM

## 2022-05-05 RX ORDER — SODIUM CHLORIDE 0.9 % (FLUSH) 0.9 %
10 SYRINGE (ML) INJECTION EVERY 12 HOURS SCHEDULED
Status: DISCONTINUED | OUTPATIENT
Start: 2022-05-05 | End: 2022-05-05

## 2022-05-05 RX ADMIN — Medication 10 ML: at 21:34

## 2022-05-05 RX ADMIN — INSULIN LISPRO 4 UNITS: 100 INJECTION, SOLUTION INTRAVENOUS; SUBCUTANEOUS at 12:33

## 2022-05-05 RX ADMIN — INSULIN LISPRO 4 UNITS: 100 INJECTION, SOLUTION INTRAVENOUS; SUBCUTANEOUS at 17:12

## 2022-05-05 RX ADMIN — ACETAMINOPHEN 650 MG: 325 TABLET ORAL at 03:15

## 2022-05-05 RX ADMIN — PRAVASTATIN SODIUM 10 MG: 10 TABLET ORAL at 21:33

## 2022-05-05 RX ADMIN — INSULIN DETEMIR 15 UNITS: 100 INJECTION, SOLUTION SUBCUTANEOUS at 21:33

## 2022-05-05 RX ADMIN — OXYBUTYNIN CHLORIDE 5 MG: 5 TABLET, EXTENDED RELEASE ORAL at 08:25

## 2022-05-05 RX ADMIN — LEVOTHYROXINE SODIUM 75 MCG: 0.07 TABLET ORAL at 05:22

## 2022-05-05 RX ADMIN — CETIRIZINE HYDROCHLORIDE 5 MG: 10 TABLET, FILM COATED ORAL at 02:16

## 2022-05-05 RX ADMIN — INSULIN LISPRO 6 UNITS: 100 INJECTION, SOLUTION INTRAVENOUS; SUBCUTANEOUS at 17:12

## 2022-05-05 RX ADMIN — SODIUM CHLORIDE 75 ML/HR: 9 INJECTION, SOLUTION INTRAVENOUS at 03:15

## 2022-05-05 RX ADMIN — Medication 10 ML: at 02:18

## 2022-05-05 RX ADMIN — INSULIN LISPRO 2 UNITS: 100 INJECTION, SOLUTION INTRAVENOUS; SUBCUTANEOUS at 21:33

## 2022-05-05 RX ADMIN — FAMOTIDINE 20 MG: 20 TABLET ORAL at 02:16

## 2022-05-05 RX ADMIN — Medication 10 ML: at 02:16

## 2022-05-05 RX ADMIN — Medication 10 ML: at 08:25

## 2022-05-05 RX ADMIN — LISINOPRIL 40 MG: 40 TABLET ORAL at 17:12

## 2022-05-05 RX ADMIN — ASPIRIN 81 MG 81 MG: 81 TABLET ORAL at 08:25

## 2022-05-05 RX ADMIN — SULFUR HEXAFLUORIDE 2 ML: KIT at 10:31

## 2022-05-05 RX ADMIN — CETIRIZINE HYDROCHLORIDE 5 MG: 10 TABLET, FILM COATED ORAL at 21:33

## 2022-05-05 RX ADMIN — METOPROLOL TARTRATE 100 MG: 100 TABLET, FILM COATED ORAL at 21:33

## 2022-05-05 RX ADMIN — DULOXETINE HYDROCHLORIDE 20 MG: 20 CAPSULE, DELAYED RELEASE ORAL at 14:53

## 2022-05-05 RX ADMIN — INSULIN LISPRO 2 UNITS: 100 INJECTION, SOLUTION INTRAVENOUS; SUBCUTANEOUS at 05:22

## 2022-05-05 RX ADMIN — PRAVASTATIN SODIUM 10 MG: 10 TABLET ORAL at 02:16

## 2022-05-05 RX ADMIN — FLUTICASONE PROPIONATE 2 SPRAY: 50 SPRAY, METERED NASAL at 08:25

## 2022-05-05 NOTE — PROGRESS NOTES
Stroke Progress Note       Chief Complaint:  Right arm pain     Subjective    Subjective     Subjective:  No acute events overnight     Review of Systems   Constitutional: No fatigue  HENT: Negative for nosebleeds and rhinorrhea.    Eyes: Negative for redness.   Respiratory: Negative for cough.    Gastrointestinal: Negative for anal bleeding.   Endocrine: Negative for polydipsia.   Genitourinary: Negative for enuresis and urgency.   Musculoskeletal: joint pain  Neurological: Negative for tremors.   Psychiatric/Behavioral: Negative for hallucinations.     Objective      Temp:  [97.2 °F (36.2 °C)-98 °F (36.7 °C)] 97.7 °F (36.5 °C)  Heart Rate:  [63-86] 82  Resp:  [16-20] 18  BP: (126-177)/(64-81) 139/78    NEURO    MENTAL STATUS: AAOx3, memory intact, fund of knowledge appropriate    LANG/SPEECH: Naming and repetition intact, fluent, follows 3-step commands    CRANIAL NERVES:    Pupils equal and reactive, EOMI intact, no gaze deviation, no nystagmus  No facial droop, cough and gag intact, shoulder shrug intact, tongue midline     MOTOR:  Moves all extremities equally    SENSORY:dysesthesia on the right arm     COORDINATION: Normal finger to nose and heel to shin, no tremor, no dysmetria    STATION: Not assessed due to patient condition    GAIT: Not assessed due to patient condition  Results Review:    I reviewed the patient's new clinical results.    Lab Results (last 24 hours)     Procedure Component Value Units Date/Time    POC Glucose Once [[id number]]  (Abnormal) Collected: 05/05/22 1126    Specimen: Blood Updated: 05/05/22 1130     Glucose 235 mg/dL      Comment: Meter: [id number] : [id number] Izaiah Vasquez       Urine Culture - Urine, Urine, Random Void [[id number]]  (Normal) Collected: 05/04/22 2220    Specimen: Urine, Random Void Updated: 05/05/22 1013     Urine Culture No growth    POC Glucose Once [[id number]]  (Abnormal) Collected: 05/05/22 0746    Specimen: Blood Updated: 05/05/22 0748     Glucose 144 mg/dL       Comment: Meter: [id number] : [id number] Izaiah Vasquez       Basic Metabolic Panel [[id number]]  (Abnormal) Collected: 05/05/22 0204    Specimen: Blood Updated: 05/05/22 0257     Glucose 163 mg/dL      BUN 26 mg/dL      Creatinine 0.99 mg/dL      Sodium 137 mmol/L      Potassium 4.1 mmol/L      Chloride 100 mmol/L      CO2 22.0 mmol/L      Calcium 9.8 mg/dL      BUN/Creatinine Ratio 26.3     Anion Gap 15.0 mmol/L      eGFR 59.2 mL/min/1.73      Comment: National Kidney Foundation and American Society of Nephrology (ASN) Task Force recommended calculation based on the Chronic Kidney Disease Epidemiology Collaboration (CKD-EPI) equation refit without adjustment for race.       Narrative:      GFR Normal >60  Chronic Kidney Disease <60  Kidney Failure <15      Lipid Panel [[id number]]  (Abnormal) Collected: 05/05/22 0204    Specimen: Blood Updated: 05/05/22 0257     Total Cholesterol 167 mg/dL      Triglycerides 63 mg/dL      HDL Cholesterol 79 mg/dL      LDL Cholesterol  76 mg/dL      VLDL Cholesterol 12 mg/dL      LDL/HDL Ratio 0.95    Narrative:      Cholesterol Reference Ranges  (U.S. Department of Health and Human Services ATP III Classifications)    Desirable          <200 mg/dL  Borderline High    200-239 mg/dL  High Risk          >240 mg/dL      Triglyceride Reference Ranges  (U.S. Department of Health and Human Services ATP III Classifications)    Normal           <150 mg/dL  Borderline High  150-199 mg/dL  High             200-499 mg/dL  Very High        >500 mg/dL    HDL Reference Ranges  (U.S. Department of Health and Human Services ATP III Classifications)    Low     <40 mg/dl (major risk factor for CHD)  High    >60 mg/dl ('negative' risk factor for CHD)        LDL Reference Ranges  (U.S. Department of Health and Human Services ATP III Classifications)    Optimal          <100 mg/dL  Near Optimal     100-129 mg/dL  Borderline High  130-159 mg/dL  High             160-189 mg/dL  Very High        >189  mg/dL    Troponin [[id number]]  (Normal) Collected: 05/05/22 0204    Specimen: Blood Updated: 05/05/22 0257     Troponin T <0.010 ng/mL     Narrative:      Troponin T Reference Range:  <= 0.03 ng/mL-   Negative for AMI  >0.03 ng/mL-     Abnormal for myocardial necrosis.  Clinicians would have to utilize clinical acumen, EKG, Troponin and serial changes to determine if it is an Acute Myocardial Infarction or myocardial injury due to an underlying chronic condition.       Results may be falsely decreased if patient taking Biotin.      Magnesium [[id number]]  (Normal) Collected: 05/05/22 0204    Specimen: Blood Updated: 05/05/22 0257     Magnesium 2.1 mg/dL     Hemoglobin A1c [[id number]]  (Abnormal) Collected: 05/05/22 0204    Specimen: Blood Updated: 05/05/22 0248     Hemoglobin A1C 6.90 %     Narrative:      Hemoglobin A1C Ranges:    Increased Risk for Diabetes  5.7% to 6.4%  Diabetes                     >= 6.5%  Diabetic Goal                < 7.0%    CBC Auto Differential [[id number]]  (Abnormal) Collected: 05/05/22 0204    Specimen: Blood Updated: 05/05/22 0231     WBC 11.00 10*3/mm3      RBC 3.87 10*6/mm3      Hemoglobin 11.1 g/dL      Hematocrit 33.0 %      MCV 85.3 fL      MCH 28.7 pg      MCHC 33.6 g/dL      RDW 13.2 %      RDW-SD 40.6 fl      MPV 11.0 fL      Platelets 235 10*3/mm3      Neutrophil % 68.2 %      Lymphocyte % 21.6 %      Monocyte % 8.7 %      Eosinophil % 0.8 %      Basophil % 0.4 %      Immature Grans % 0.3 %      Neutrophils, Absolute 7.50 10*3/mm3      Lymphocytes, Absolute 2.38 10*3/mm3      Monocytes, Absolute 0.96 10*3/mm3      Eosinophils, Absolute 0.09 10*3/mm3      Basophils, Absolute 0.04 10*3/mm3      Immature Grans, Absolute 0.03 10*3/mm3      nRBC 0.0 /100 WBC     POC Glucose Once [[id number]]  (Abnormal) Collected: 05/05/22 0113    Specimen: Blood Updated: 05/05/22 0133     Glucose 140 mg/dL      Comment: Meter: [id number] : [id number] Pam Aponte  [[id number]] Collected: 05/04/22 2049    Specimen: Blood Updated: 05/05/22 0102    Narrative:      The following orders were created for panel order Forrest Draw.  Procedure                               Abnormality         Status                     ---------                               -----------         ------                     Green Top (Gel)[[id number]]                                  Final result               Lavender Top[[id number]]                                     Final result               Gold Top - SST[[id number]]                                   Final result               Gray Top[[id number]]                                         Final result               Light Blue Top[[id number]]                                   Final result                 Please view results for these tests on the individual orders.    Schmidt Top [[id number]] Collected: 05/04/22 2049    Specimen: Blood Updated: 05/05/22 0102     Extra Tube Hold for add-ons.     Comment: Auto resulted.       Light Blue Top [[id number]] Collected: 05/04/22 2237    Specimen: Blood Updated: 05/04/22 2347     Extra Tube hold for add-on     Comment: Auto resulted       Sedimentation Rate [[id number]]  (Abnormal) Collected: 05/04/22 2049    Specimen: Blood Updated: 05/04/22 2342     Sed Rate 44 mm/hr     aPTT [[id number]]  (Normal) Collected: 05/04/22 2237    Specimen: Blood Updated: 05/04/22 2334     PTT 22.9 seconds     Narrative:      PTT = The equivalent PTT values for the therapeutic range of heparin levels at 0.3 to 0.5 U/ml are 60 to 70 seconds.    C-reactive Protein [[id number]]  (Abnormal) Collected: 05/04/22 2049    Specimen: Blood Updated: 05/04/22 2331     C-Reactive Protein 0.61 mg/dL     Lactic Acid, Plasma [[id number]]  (Normal) Collected: 05/04/22 2237    Specimen: Blood Updated: 05/04/22 2313     Lactate 1.1 mmol/L      Comment: Falsely depressed results may occur on samples drawn from patients receiving N-Acetylcysteine (NAC) or  "Metamizole.       Procalcitonin [[id number]]  (Normal) Collected: 05/04/22 2049    Specimen: Blood Updated: 05/04/22 2256     Procalcitonin 0.11 ng/mL     Narrative:      As a Marker for Sepsis (Non-Neonates):    1. <0.5 ng/mL represents a low risk of severe sepsis and/or septic shock.  2. >2 ng/mL represents a high risk of severe sepsis and/or septic shock.    As a Marker for Lower Respiratory Tract Infections that require antibiotic therapy:    PCT on Admission    Antibiotic Therapy       6-12 Hrs later    >0.5                Strongly Recommended  >0.25 - <0.5        Recommended   0.1 - 0.25          Discouraged              Remeasure/reassess PCT  <0.1                Strongly Discouraged     Remeasure/reassess PCT    As 28 day mortality risk marker: \"Change in Procalcitonin Result\" (>80% or <=80%) if Day 0 (or Day 1) and Day 4 values are available. Refer to [email or web address]    Change in PCT <=80%  A decrease of PCT levels below or equal to 80% defines a positive change in PCT test result representing a higher risk for 28-day all-cause mortality of patients diagnosed with severe sepsis for septic shock.    Change in PCT >80%  A decrease of PCT levels of more than 80% defines a negative change in PCT result representing a lower risk for 28-day all-cause mortality of patients diagnosed with severe sepsis or septic shock.       COVID PRE-OP / PRE-PROCEDURE SCREENING ORDER (NO ISOLATION) - Swab, Nasopharynx [[id number]]  (Normal) Collected: 05/04/22 2220    Specimen: Swab from Nasopharynx Updated: 05/04/22 2251    Narrative:      The following orders were created for panel order COVID PRE-OP / PRE-PROCEDURE SCREENING ORDER (NO ISOLATION) - Swab, Nasopharynx.  Procedure                               Abnormality         Status                     ---------                               -----------         ------                     COVID-19 and FLU A/B PCR...[[id number]]  Normal              Final " result                 Please view results for these tests on the individual orders.    COVID-19 and FLU A/B PCR - Swab, Nasopharynx [[id number]]  (Normal) Collected: 05/04/22 2220    Specimen: Swab from Nasopharynx Updated: 05/04/22 2251     COVID19 Not Detected     Influenza A PCR Not Detected     Influenza B PCR Not Detected    Narrative:      Fact sheet for providers: https://www.fda.gov/media/432678/download    Fact sheet for patients: https://www.fda.gov/media/216567/download    Test performed by PCR.    TSH [[id number]]  (Normal) Collected: 05/04/22 2049    Specimen: Blood Updated: 05/04/22 2238     TSH 2.280 uIU/mL     Urinalysis With Culture If Indicated - Urine, Random Void [[id number]]  (Normal) Collected: 05/04/22 2220    Specimen: Urine, Random Void Updated: 05/04/22 2234     Color, UA Yellow     Appearance, UA Clear     pH, UA 6.5     Specific Gravity, UA 1.024     Glucose, UA Negative     Ketones, UA Negative     Bilirubin, UA Negative     Blood, UA Negative     Protein, UA Negative     Leuk Esterase, UA Negative     Nitrite, UA Negative     Urobilinogen, UA 0.2 E.U./dL    Narrative:      Urine microscopic not indicated.    Comprehensive Metabolic Panel [[id number]]  (Abnormal) Collected: 05/04/22 2049    Specimen: Blood Updated: 05/04/22 2207     Glucose 95 mg/dL      BUN 28 mg/dL      Creatinine 1.07 mg/dL      Sodium 139 mmol/L      Potassium 4.4 mmol/L      Comment: Specimen hemolyzed.  Results may be affected.        Chloride 101 mmol/L      CO2 21.0 mmol/L      Calcium 10.1 mg/dL      Total Protein 8.1 g/dL      Albumin 4.70 g/dL      ALT (SGPT) 14 U/L      Comment: Specimen hemolyzed.  Results may be affected.        AST (SGOT) 27 U/L      Alkaline Phosphatase 73 U/L      Total Bilirubin 0.6 mg/dL      Globulin 3.4 gm/dL      Comment: Calculated Result        A/G Ratio 1.4 g/dL      BUN/Creatinine Ratio 26.2     Anion Gap 17.0 mmol/L      eGFR 53.9 mL/min/1.73      Comment: National Kidney  Foundation and American Society of Nephrology (ASN) Task Force recommended calculation based on the Chronic Kidney Disease Epidemiology Collaboration (CKD-EPI) equation refit without adjustment for race.       Narrative:      GFR Normal >60  Chronic Kidney Disease <60  Kidney Failure <15      Green Top (Gel) [[id number]] Collected: 05/04/22 2049    Specimen: Blood Updated: 05/04/22 2202     Extra Tube Hold for add-ons.     Comment: Auto resulted.       Lavender Top [[id number]] Collected: 05/04/22 2049    Specimen: Blood Updated: 05/04/22 2202     Extra Tube hold for add-on     Comment: Auto resulted       Gold Top - SST [[id number]] Collected: 05/04/22 2049    Specimen: Blood Updated: 05/04/22 2202     Extra Tube Hold for add-ons.     Comment: Auto resulted.       ALT [[id number]]  (Normal) Collected: 05/04/22 2049    Specimen: Blood Updated: 05/04/22 2122     ALT (SGPT) 13 U/L      Comment: Specimen hemolyzed.  Results may be affected.       AST [[id number]]  (Normal) Collected: 05/04/22 2049    Specimen: Blood Updated: 05/04/22 2122     AST (SGOT) 27 U/L     Troponin [[id number]]  (Normal) Collected: 05/04/22 2049    Specimen: Blood Updated: 05/04/22 2119     Troponin T <0.010 ng/mL     Narrative:      Troponin T Reference Range:  <= 0.03 ng/mL-   Negative for AMI  >0.03 ng/mL-     Abnormal for myocardial necrosis.  Clinicians would have to utilize clinical acumen, EKG, Troponin and serial changes to determine if it is an Acute Myocardial Infarction or myocardial injury due to an underlying chronic condition.       Results may be falsely decreased if patient taking Biotin.      CBC & Differential [[id number]]  (Abnormal) Collected: 05/04/22 2049    Specimen: Blood Updated: 05/04/22 2102    Narrative:      The following orders were created for panel order CBC & Differential.  Procedure                               Abnormality         Status                     ---------                               -----------          ------                     CBC Auto Differential[[id number]]        Abnormal            Final result                 Please view results for these tests on the individual orders.    CBC Auto Differential [[id number]]  (Abnormal) Collected: 05/04/22 2049    Specimen: Blood Updated: 05/04/22 2102     WBC 10.28 10*3/mm3      RBC 4.28 10*6/mm3      Hemoglobin 12.3 g/dL      Hematocrit 37.6 %      MCV 87.9 fL      MCH 28.7 pg      MCHC 32.7 g/dL      RDW 13.2 %      RDW-SD 42.6 fl      MPV 11.2 fL      Platelets 249 10*3/mm3      Neutrophil % 72.8 %      Lymphocyte % 18.2 %      Monocyte % 7.4 %      Eosinophil % 0.9 %      Basophil % 0.5 %      Immature Grans % 0.2 %      Neutrophils, Absolute 7.49 10*3/mm3      Lymphocytes, Absolute 1.87 10*3/mm3      Monocytes, Absolute 0.76 10*3/mm3      Eosinophils, Absolute 0.09 10*3/mm3      Basophils, Absolute 0.05 10*3/mm3      Immature Grans, Absolute 0.02 10*3/mm3      nRBC 0.0 /100 WBC         CT Angiogram Neck    Result Date: 5/4/2022  1. No focal area of decreased cerebral blood flow (CBF) is seen to suggest an acute infarct in a large vessel territory.  No defects are seen to suggest a core infarct or an area of reversible ischemia.   CTA head and neck:  Technique: Axial volumetric contrast-enhanced CT angiographic images of the head and neck were acquired utilizing 115 mL Isovue-370  IV contrast. 3-D reconstructions were created for interpretation. Automated exposure control and iterative reconstruction methods were used.   Findings: Vascular findings:  Study is slightly limited due to venous enhancement. Patient had a aborted scan with immediate repeat causing venous contamination.  CTA NECK: Limited imaging of the aortic arch is unremarkable  The origin of the left subclavian artery and subclavian artery is grossly unremarkable in appearance.  Origin of the left vertebral artery is somewhat motion compromise. The cervical portion of the left vertebral artery appears to  opacify unremarkably to the skull base. Evaluation is somewhat limited due to venous flow from prior study.  Left common carotid artery, carotid bifurcation and external carotid artery grossly unremarkable in appearance. Streak artifact somewhat limits evaluation of the bifurcation and proximal ICA. No acute abnormality noted of the cervical portion of the left ICA  The brachiocephalic artery and origin of the right common carotid artery is grossly unremarkable. The common carotid artery, carotid bifurcation and external carotid artery grossly unremarkable streak artifact from dental hardware somewhat limits evaluation.  There is mild narrowing of less than 50% of the mid right internal carotid artery.  The right vertebral artery is somewhat diminutive but appears patent to the base of the head.  CTA head:  Posterior circulation demonstrates a diminutive right sided vertebral artery and the prominent left vertebral artery joins to form the basilar artery. Basilar artery is unremarkable in appearance.  There appears be a fetal origin of the left PCA with minimal contribution from the basilar artery questioned. The right PCA is unremarkable.  The petrous portion of the left internal carotid artery in the cavernous portion are unremarkable. The petrous portion of the right internal carotid artery and the cavernous portion unremarkable.  Intracranial portions of the internal carotid arteries demonstrate no acute abnormality. The left MCA and left BRAD are unremarkable. Right intracranial internal carotid artery, right MCA and right BRAD are grossly unremarkable. No evidence of significant stenosis, large vessel occlusion or aneurysm noted    Nonvascular findings: Limited imaging of the lung apices demonstrates mild prominence of the paraseptal markings and groundglass opacities which may represent a degree of underlying pulmonary edema. This is limited motion compromise imaging.  Limited imaging of the upper mediastinum  is unremarkable. Limited imaging of the soft tissues of the head and neck are grossly unremarkable in appearance.  .  IMPRESSION:  No significant vascular abnormality. No significant stenosis, aneurysm or dissection.  Findings were discussed with the stroke coordinator at 9:21 PM  This report was finalized on 5/4/2022 9:27 PM by Fabian Fernandez.      MRI Brain Without Contrast    Result Date: 5/5/2022  1.  No acute intracranial abnormality. No acute infarct. 2.  Moderate chronic small vessel ischemic changes. Mild volume loss. 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 Electronically signed by:  Vasquez Smith DO  5/4/2022 10:08 PM Mountain Time    XR Chest 1 View    Result Date: 5/4/2022  IMPRESSION : Mild pulmonary vascular congestion[  This report was finalized on 5/4/2022 9:37 PM by Fabian Fernandez.      CT Head Without Contrast Stroke Protocol    Result Date: 5/4/2022  No acute intracranial hemorrhage  White matter changes and areas of low-attenuation within the brain parenchyma suggesting prior small vessel ischemic change.  Findings discussed with the stroke coordinator at 8:36 PM  This report was finalized on 5/4/2022 8:40 PM by Fabian Fernandez.      CT Angiogram Head w AI Analysis of LVO    Result Date: 5/4/2022  1. No focal area of decreased cerebral blood flow (CBF) is seen to suggest an acute infarct in a large vessel territory.  No defects are seen to suggest a core infarct or an area of reversible ischemia.   CTA head and neck:  Technique: Axial volumetric contrast-enhanced CT angiographic images of the head and neck were acquired utilizing 115 mL Isovue-370  IV contrast. 3-D reconstructions were created for interpretation. Automated exposure control and iterative reconstruction methods were used.   Findings: Vascular findings:  Study is slightly limited due to venous enhancement. Patient had a aborted scan with immediate repeat causing venous contamination.  CTA NECK: Limited imaging of the  aortic arch is unremarkable  The origin of the left subclavian artery and subclavian artery is grossly unremarkable in appearance.  Origin of the left vertebral artery is somewhat motion compromise. The cervical portion of the left vertebral artery appears to opacify unremarkably to the skull base. Evaluation is somewhat limited due to venous flow from prior study.  Left common carotid artery, carotid bifurcation and external carotid artery grossly unremarkable in appearance. Streak artifact somewhat limits evaluation of the bifurcation and proximal ICA. No acute abnormality noted of the cervical portion of the left ICA  The brachiocephalic artery and origin of the right common carotid artery is grossly unremarkable. The common carotid artery, carotid bifurcation and external carotid artery grossly unremarkable streak artifact from dental hardware somewhat limits evaluation.  There is mild narrowing of less than 50% of the mid right internal carotid artery.  The right vertebral artery is somewhat diminutive but appears patent to the base of the head.  CTA head:  Posterior circulation demonstrates a diminutive right sided vertebral artery and the prominent left vertebral artery joins to form the basilar artery. Basilar artery is unremarkable in appearance.  There appears be a fetal origin of the left PCA with minimal contribution from the basilar artery questioned. The right PCA is unremarkable.  The petrous portion of the left internal carotid artery in the cavernous portion are unremarkable. The petrous portion of the right internal carotid artery and the cavernous portion unremarkable.  Intracranial portions of the internal carotid arteries demonstrate no acute abnormality. The left MCA and left BRAD are unremarkable. Right intracranial internal carotid artery, right MCA and right BRAD are grossly unremarkable. No evidence of significant stenosis, large vessel occlusion or aneurysm noted    Nonvascular findings:  Limited imaging of the lung apices demonstrates mild prominence of the paraseptal markings and groundglass opacities which may represent a degree of underlying pulmonary edema. This is limited motion compromise imaging.  Limited imaging of the upper mediastinum is unremarkable. Limited imaging of the soft tissues of the head and neck are grossly unremarkable in appearance.  .  IMPRESSION:  No significant vascular abnormality. No significant stenosis, aneurysm or dissection.  Findings were discussed with the stroke coordinator at 9:21 PM  This report was finalized on 5/4/2022 9:27 PM by Fabian Fernandez.      CT CEREBRAL PERFUSION WITH & WITHOUT CONTRAST    Result Date: 5/4/2022  1. No focal area of decreased cerebral blood flow (CBF) is seen to suggest an acute infarct in a large vessel territory.  No defects are seen to suggest a core infarct or an area of reversible ischemia.   CTA head and neck:  Technique: Axial volumetric contrast-enhanced CT angiographic images of the head and neck were acquired utilizing 115 mL Isovue-370  IV contrast. 3-D reconstructions were created for interpretation. Automated exposure control and iterative reconstruction methods were used.   Findings: Vascular findings:  Study is slightly limited due to venous enhancement. Patient had a aborted scan with immediate repeat causing venous contamination.  CTA NECK: Limited imaging of the aortic arch is unremarkable  The origin of the left subclavian artery and subclavian artery is grossly unremarkable in appearance.  Origin of the left vertebral artery is somewhat motion compromise. The cervical portion of the left vertebral artery appears to opacify unremarkably to the skull base. Evaluation is somewhat limited due to venous flow from prior study.  Left common carotid artery, carotid bifurcation and external carotid artery grossly unremarkable in appearance. Streak artifact somewhat limits evaluation of the bifurcation and proximal ICA. No  acute abnormality noted of the cervical portion of the left ICA  The brachiocephalic artery and origin of the right common carotid artery is grossly unremarkable. The common carotid artery, carotid bifurcation and external carotid artery grossly unremarkable streak artifact from dental hardware somewhat limits evaluation.  There is mild narrowing of less than 50% of the mid right internal carotid artery.  The right vertebral artery is somewhat diminutive but appears patent to the base of the head.  CTA head:  Posterior circulation demonstrates a diminutive right sided vertebral artery and the prominent left vertebral artery joins to form the basilar artery. Basilar artery is unremarkable in appearance.  There appears be a fetal origin of the left PCA with minimal contribution from the basilar artery questioned. The right PCA is unremarkable.  The petrous portion of the left internal carotid artery in the cavernous portion are unremarkable. The petrous portion of the right internal carotid artery and the cavernous portion unremarkable.  Intracranial portions of the internal carotid arteries demonstrate no acute abnormality. The left MCA and left BRAD are unremarkable. Right intracranial internal carotid artery, right MCA and right BRAD are grossly unremarkable. No evidence of significant stenosis, large vessel occlusion or aneurysm noted    Nonvascular findings: Limited imaging of the lung apices demonstrates mild prominence of the paraseptal markings and groundglass opacities which may represent a degree of underlying pulmonary edema. This is limited motion compromise imaging.  Limited imaging of the upper mediastinum is unremarkable. Limited imaging of the soft tissues of the head and neck are grossly unremarkable in appearance.  .  IMPRESSION:  No significant vascular abnormality. No significant stenosis, aneurysm or dissection.  Findings were discussed with the stroke coordinator at 9:21 PM  This report was  finalized on 5/4/2022 9:27 PM by Fabian Fernandez.                Assessment/Plan     Assessment/Plan:    76-year-old with vascular risk factors presents with right shoulder pain and radiating sensation to her forearm and inability to use her hand.   Acute stroke imaging is unrevealing. No acute infarct. No flow limiting stenosis/occlusion.       Her exam  is suggestive of peripheral neuropathy vs progression of arthritis    Plan  -motor sensory neuropathy panel sent  -x ray of right shoulder  -supplement with folate and vitamin B12  -will start her on Cymbalta low dose.   -outpatient neurology follow up.         Don Mckeon MD  05/05/22  13:12 EDT

## 2022-05-05 NOTE — PLAN OF CARE
Goal Outcome Evaluation:  Plan of Care Reviewed With: patient        Progress:  (initial eval)   SLP evaluation completed. Will sign-off for [facility] at this time, no observed deficits. Please see note for further details and recommendations.

## 2022-05-05 NOTE — CONSULTS
Stroke Consult Note    Patient Name: Marilyn Kunz   MRN: [id number]  Age: 76 y.o.  Sex: female  : 1945    Primary Care Physician: Peter Baca MD  Referring Physician:  No ref. provider found    TIME STROKE TEAM CALLED: 20:28 EST     TIME PATIENT SEEN: 20:35 EST    Handedness: Right Hand  Race:     Chief Complaint/Reason for Consultation: Right upper extremity weakness    Subjective .  HPI: Ms. Kunz is a 76-year-old  female with past medical history significant for HTN, T1DM, HLD, disease of the thyroid, factor V Leiden mutation heterozygous, cardiac murmur, psoriatic arthritis (bilateral hands), chronic kidney disease, asthma, anemia.  She presents to [facility] ED for further evaluation of right-sided weakness.  She states today around 8:00 AM when she woke up, she was normal.  Sometime between the times of 8:00 AM to 9:00 AM she began to have difficulty with her right arm.  She reports that she had weakness and was unable to hold her diabetic syringe which she kept dropping.  She continued to have difficulty with the right hand throughout the day and felt that she needed to seek medical attention due to it being her dominant hand.    Patient was called as a code stroke from the ED lobby.  Due to her chief complaint and vascular risk factors, she was taken immediately to the CT scanner.  Patient was placed on the CT scanner and neurological examination was conducted.  She is able to follow commands and state her name, age, and month.  She she did demonstrate right upper extremity drift and right hand  weakness.  She did not complain of any dizziness, headache, or lightheadedness.  Her current NIH score is a 1.  Advanced imaging was obtained.  Noncontrast CT head reveals no acute cranial abnormalities or hemorrhaging.  CTA head and neck revealed no significant vascular abnormalities with no LVO, stenosis, or aneurysm.  CT perfusion reveals no perfusion  deficit.    Patient states that she lives independently at home and ambulates independently.  She is compliant with all medications.  She is on home antiplatelet therapy of aspirin 81 mg and she is naïve to anticoagulation therapy.        Last Known Normal Date/Time: Wednesday, May 4, 2022 08:00 EST     Review of Systems   Constitutional: Negative for fatigue.   HENT: Negative for trouble swallowing.    Eyes: Negative for photophobia and visual disturbance.   Respiratory: Negative for chest tightness and shortness of breath.    Cardiovascular: Negative for chest pain and palpitations.   Gastrointestinal: Negative for nausea and vomiting.   Genitourinary: Negative for difficulty urinating and dysuria.   Musculoskeletal: Positive for arthralgias (bilateral hands).   Skin: Negative for color change.   Neurological: Positive for weakness (right upper extremity). Negative for dizziness, facial asymmetry, speech difficulty and headaches.   Psychiatric/Behavioral: Negative for agitation, behavioral problems and confusion.      Past Medical History:   Diagnosis Date   • Anemia    • Asthma    • Chronic kidney disease     pt told it was dx from Dr Baca and to not eat much protein   • Disease of thyroid gland    • Factor 5 Leiden mutation, heterozygous (HCC) 2012   • Hyperlipidemia    • Hypertension    • Murmur, cardiac    • Psoriatic arthritis (HCC)     hands   • Type 1 diabetes (HCC)      Past Surgical History:   Procedure Laterality Date   • CATARACT EXTRACTION Bilateral    • ENDOSCOPY N/A 10/20/2020    Procedure: ESOPHAGOGASTRODUODENOSCOPY;  Surgeon: Brunner, Mark I, MD;  Location: [facility] ENDOSCOPY;  Service: Gastroenterology;  Laterality: N/A;   • HAND SURGERY Left 1987    no hardware   • PATENT DUCTUS ARTERIOUS LIGATION       Family History   Problem Relation Age of Onset   • Cancer Mother    • Pancreatic cancer Mother    • Cancer Father    • Lung cancer Father    • Cancer Sister    • Colon cancer Sister    •  Breast cancer Neg Hx    • Ovarian cancer Neg Hx      Social History     Socioeconomic History   • Marital status: Single   Tobacco Use   • Smoking status: Never Smoker   • Smokeless tobacco: Never Used   Vaping Use   • Vaping Use: Never used   Substance and Sexual Activity   • Alcohol use: No   • Drug use: No     Allergies   Allergen Reactions   • Atorvastatin Other (See Comments)   • Colesevelam Other (See Comments)   • Penicillins Rash     Prior to Admission medications    Medication Sig Start Date End Date Taking? Authorizing Provider   amLODIPine (NORVASC) 10 MG tablet  7/30/21   Lashay Gillette MD   aspirin 81 MG EC tablet Take 1 tablet by mouth Daily. 10/14/20   Sachin Caban MD   famotidine (PEPCID) 20 MG tablet Take 1 tablet by mouth 2 (Two) Times a Day. 10/14/20   Sachin Caban MD   fluticasone (FLONASE) 50 MCG/ACT nasal spray 2 sprays by Each Nare route Daily. Shake well before using. 6/1/21   Lashay Gillette MD   insulin aspart (novoLOG FLEXPEN) 100 UNIT/ML solution pen-injector sc pen Inject  under the skin 3 (Three) Times a Day With Meals.    Lashay Gillette MD   levocetirizine (XYZAL) 5 MG tablet Take 5 mg by mouth Every Evening.    Lashay Gillette MD   levothyroxine (SYNTHROID, LEVOTHROID) 75 MCG tablet Take 75 mcg by mouth Daily.    Lashay Gillette MD   lisinopril (PRINIVIL,ZESTRIL) 40 MG tablet  7/29/21   Lashay Gillette MD   metoprolol tartrate (LOPRESSOR) 100 MG tablet Take 100 mg by mouth Every Night.    Lashay Gillette MD   oxybutynin XL (DITROPAN-XL) 5 MG 24 hr tablet 5 mg. 9/13/21   Lashay Gillette MD   pravastatin (PRAVACHOL) 10 MG tablet Take 10 mg by mouth Daily.    Lashay Gillette MD   Secukinumab (Cosentyx) 150 MG/ML solution prefilled syringe Inject  under the skin into the appropriate area as directed.    Lashay Gillette MD   Tresiba FlexTouch 100 UNIT/ML solution pen-injector injection  12/20/21   Leta  MD Lashay             Objective     Temp:  [97.8 °F (36.6 °C)] 97.8 °F (36.6 °C)  Heart Rate:  [72-86] 78  Resp:  [16-20] 20  BP: (126-177)/(71-75) 126/75  Neurological Exam  Mental Status  Awake, alert and oriented to person, place and time.Alert. Oriented to person, place and time. Oriented to person, place, and time. Memory is normal. Recent and remote memory are intact. Speech is normal. Language is fluent with no aphasia. Attention and concentration are normal. Fund of knowledge is appropriate for level of education.    Cranial Nerves  CN II: Right visual acuity: counts fingers. Left visual acuity: counts fingers. Visual fields full to confrontation.  CN III, IV, VI: Extraocular movements intact bilaterally. Normal lids and orbits bilaterally. Pupils equal round and reactive to light bilaterally.  CN V:  Right: Facial sensation is normal.  Left: Facial sensation is normal on the left.  CN VII: Full and symmetric facial movement.  CN VIII: Bilateral hearing appears to be intact.  CN IX, X: Palate elevates symmetrically  CN XI:  Right: Sternocleidomastoid strength is weak. Trapezius strength is weak.  Left: Sternocleidomastoid strength is normal. Trapezius strength is normal.  CN XII: Tongue midline without atrophy or fasciculations.    Motor  Normal muscle bulk throughout. No fasciculations present. Normal muscle tone. No abnormal involuntary movements.  +3/5 right upper extremity with drift; +5/5 left upper extremity and bilateral lower extremities.    Sensory  Normal sensation.Light touch is normal in upper and lower extremities.     Coordination  Right: Finger-to-nose normal. Rapid alternating movement abnormality: Heel-to-shin normal.Left: Finger-to-nose normal. Heel-to-shin abnormality:  Unable to conduct rapid alternating movements secondary to psoriatic arthritis in bilateral hands.    Gait    Not observed.      Physical Exam  Vitals reviewed.   Constitutional:       General: She is not in acute  distress.  HENT:      Head: Normocephalic.      Mouth/Throat:      Mouth: Mucous membranes are moist.      Pharynx: Oropharynx is clear.   Eyes:      General: Lids are normal.      Extraocular Movements: Extraocular movements intact.      Pupils: Pupils are equal, round, and reactive to light.   Cardiovascular:      Rate and Rhythm: Normal rate.      Pulses: Normal pulses.   Pulmonary:      Effort: Pulmonary effort is normal. No respiratory distress.   Musculoskeletal:         General: Deformity (Bilateral hands secondary to psoriatic arthritis) present.      Cervical back: Normal range of motion.      Right lower leg: No edema.      Left lower leg: No edema.   Skin:     General: Skin is warm and dry.      Comments: Petechiae noted on bilateral arms   Neurological:      Mental Status: She is alert and oriented to person, place, and time.      Cranial Nerves: Cranial nerves are intact.      Sensory: Sensation is intact.      Motor: Weakness present.   Psychiatric:         Speech: Speech normal.         Behavior: Behavior is cooperative.         Cognition and Memory: Cognition and memory normal.         Acute Stroke Data    IV Thrombolytic (TPA/Tenecteplase) Inclusion / Exclusion Criteria    Time: 20:35 EDT  Person Administering Scale: JAGDISH Bhatia    Inclusion Criteria  [x]   18 years of age or greater   []   Onset of symptoms < 4.5 hours before beginning treatment (stroke onset = time patient was last seen well or without symptoms).   []   Diagnosis of acute ischemic stroke causing measurable disabling deficit (Complete Hemianopia, Any Aphasia, Visual or Sensory Extinction, Any weakness limiting sustained effort against gravity)   []   Any remaining deficit considered potentially disabling in view of patient and practitioner   Exclusion criteria (Do not proceed with Alteplase if any are checked under exclusion criteria)  [x]   Onset unknown or GREATER than 4.5 hours   []   ICH on CT/MRI   []   CT  demonstrates hypodensity representing acute or subacute infarct   []   Significant head trauma or prior stroke in the previous 3 months   []   Symptoms suggestive of subarachnoid hemorrhage   []   History of un-ruptured intracranial aneurysm GREATER than 10 mm   []   Recent intracranial or intraspinal surgery within the last 3 months   []   Arterial puncture at a non-compressible site in the previous 7 days   []   Active internal bleeding   []   Acute bleeding tendency   []   Platelet count LESS than 100,000 for known hematological diseases such as leukemia, thrombocytopenia or chronic cirrhosis   []   Current use of anticoagulant with INR GREATER than 1.7 or PT GREATER than 15 seconds, aPTT GREATER than 40 seconds   []   Heparin received within 48 hours, resulting in abnormally elevated aPTT GREATER than upper limit of normal   []   Current use of direct thrombin inhibitors or direct factor Xa inhibitors in the past 48 hours   []   Elevated blood pressure refractory to treatment (systolic GREATER than 185 mm/Hg or diastolic  GREATER than 110 mm/Hg   []   Suspected infective endocarditis and aortic arch dissection   []   Current use of therapeutic treatment dose of low-molecular-weight heparin (LMWH) within the previous 24 hours   []   Structural GI malignancy or bleed   Relative exclusion for all patients  []   Only minor nondisabling symptoms   []   Pregnancy   []   Seizure at onset with postictal residual neurological impairments   []   Major surgery or previous trauma within past 14 days   []   History of previous spontaneous ICH, intracranial neoplasm, or AV malformation   []   Postpartum (within previous 14 days)   []   Recent GI or urinary tract hemorrhage (within previous 21 days)   []   Recent acute MI (within previous 3 months)   []   History of unruptured intracranial aneurysm LESS than 10 mm   []   History of ruptured intracranial aneurysm   []   Blood glucose LESS than 50 mg/dL (2.7 mmol/L)   []   Dural  puncture within the last 7 days   []   Known GREATER than 10 cerebral microbleeds   Additional exclusions for patients with symptoms onset between 3 and 4.5 hours.  []   Age > 80.   []   On any anticoagulants regardless of INR  >>> Warfarin (Coumadin), Heparin, Enoxaparin (Lovenox), fondaparinux (Arixtra), bivalirudin (Angiomax), Argatroban, dabigatran (Pradaxa), rivaroxaban (Xarelto), or apixaban (Eliquis)   []   Severe stroke (NIHSS > 25).   []   History of BOTH diabetes and previous ischemic stroke.   []   The risks and benefits have been discussed with the patient or family related to the administration of IV alteplase for stroke symptoms.   []   I have discussed and reviewed the patient's case and imaging with the attending prior to IV Thrombolytic (TPA/Tenecteplase).   N/A Time Thrombolytic administered       Hospital Meds:  Scheduled- [START ON 5/5/2022] aspirin, 81 mg, Oral, Daily   Or  [START ON 5/5/2022] aspirin, 300 mg, Rectal, Daily      Infusions-     PRNs- sodium chloride    Functional Status Prior to Current Stroke/Stutsman Score: 0    MODIFIED FARHEEN SCALE (to be assessed for each patient having history of stroke) []Stroke history but not assessed  [x]0: No symptoms at all  []1: No significant disability despite symptoms  []2: Slight disability  []3: Moderate disability  []4: Moderately severe disability  []5: Severe disability  []6: Death     NIH Stroke Scale  Time: 20:35 EDT  Person Administering Scale: JAGDISH Bhatia    1a  Level of consciousness: 0=alert; keenly responsive   1b. LOC questions:  0=Performs both tasks correctly   1c. LOC commands: 0=Performs both tasks correctly   2.  Best Gaze: 0=normal   3.  Visual: 0=No visual loss   4. Facial Palsy: 0=Normal symmetric movement   5a.  Motor left arm: 0=No drift, limb holds 90 (or 45) degrees for full 10 seconds   5b.  Motor right arm: 1=Drift, limb holds 90 (or 45) degrees but drifts down before full 10 seconds: does not hit bed   6a.  motor left le=No drift, limb holds 90 (or 45) degrees for full 10 seconds   6b  Motor right le=No drift, limb holds 90 (or 45) degrees for full 10 seconds   7. Limb Ataxia: 0=Absent   8.  Sensory: 0=Normal; no sensory loss   9. Best Language:  0=No aphasia, normal   10. Dysarthria: 0=Normal   11. Extinction and Inattention: 0=No abnormality    Total:   1       Results Reviewed:  I have personally reviewed current lab, radiology, and data and agree with results.    Lab Results   Component Value Date    GLUCOSE 95 2022    BUN 28 (H) 2022    CREATININE 1.07 (H) 2022    EGFRIFNONA 63 10/22/2020    BCR 26.2 (H) 2022    K 4.4 2022    CO2 21.0 (L) 2022    CALCIUM 10.1 2022    ALBUMIN 4.70 2022    AST 27 2022    AST 27 2022    ALT 13 2022    ALT 14 2022     WBC   Date Value Ref Range Status   2022 10.28 3.40 - 10.80 10*3/mm3 Final     RBC   Date Value Ref Range Status   2022 4.28 3.77 - 5.28 10*6/mm3 Final     Hemoglobin   Date Value Ref Range Status   2022 12.3 12.0 - 15.9 g/dL Final     Hematocrit   Date Value Ref Range Status   2022 37.6 34.0 - 46.6 % Final     MCV   Date Value Ref Range Status   2022 87.9 79.0 - 97.0 fL Final     MCH   Date Value Ref Range Status   2022 28.7 26.6 - 33.0 pg Final     MCHC   Date Value Ref Range Status   2022 32.7 31.5 - 35.7 g/dL Final     RDW   Date Value Ref Range Status   2022 13.2 12.3 - 15.4 % Final     RDW-SD   Date Value Ref Range Status   2022 42.6 37.0 - 54.0 fl Final     MPV   Date Value Ref Range Status   2022 11.2 6.0 - 12.0 fL Final     Platelets   Date Value Ref Range Status   2022 249 140 - 450 10*3/mm3 Final     Neutrophil %   Date Value Ref Range Status   2022 72.8 42.7 - 76.0 % Final     Lymphocyte %   Date Value Ref Range Status   2022 18.2 (L) 19.6 - 45.3 % Final     Monocyte %   Date Value Ref Range Status    05/04/2022 7.4 5.0 - 12.0 % Final     Eosinophil %   Date Value Ref Range Status   05/04/2022 0.9 0.3 - 6.2 % Final     Basophil %   Date Value Ref Range Status   05/04/2022 0.5 0.0 - 1.5 % Final     Immature Grans %   Date Value Ref Range Status   05/04/2022 0.2 0.0 - 0.5 % Final     Neutrophils, Absolute   Date Value Ref Range Status   05/04/2022 7.49 (H) 1.70 - 7.00 10*3/mm3 Final     Lymphocytes, Absolute   Date Value Ref Range Status   05/04/2022 1.87 0.70 - 3.10 10*3/mm3 Final     Monocytes, Absolute   Date Value Ref Range Status   05/04/2022 0.76 0.10 - 0.90 10*3/mm3 Final     Eosinophils, Absolute   Date Value Ref Range Status   05/04/2022 0.09 0.00 - 0.40 10*3/mm3 Final     Basophils, Absolute   Date Value Ref Range Status   05/04/2022 0.05 0.00 - 0.20 10*3/mm3 Final     Immature Grans, Absolute   Date Value Ref Range Status   05/04/2022 0.02 0.00 - 0.05 10*3/mm3 Final     nRBC   Date Value Ref Range Status   05/04/2022 0.0 0.0 - 0.2 /100 WBC Final     C-reactive protein: 0.61  Procalcitonin: 0.11  Lactate: 1.1  PTT: 22.9  Troponin T: <0.010  Sed rate: 44    Brief Urine Lab Results  (Last result in the past 365 days)      Color   Clarity   Blood   Leuk Est   Nitrite   Protein   CREAT   Urine HCG        05/04/22 2220 Yellow   Clear   Negative   Negative   Negative   Negative               CT Angiogram Neck    Result Date: 5/4/2022  1. No focal area of decreased cerebral blood flow (CBF) is seen to suggest an acute infarct in a large vessel territory.  No defects are seen to suggest a core infarct or an area of reversible ischemia.   CTA head and neck:  Technique: Axial volumetric contrast-enhanced CT angiographic images of the head and neck were acquired utilizing 115 mL Isovue-370  IV contrast. 3-D reconstructions were created for interpretation. Automated exposure control and iterative reconstruction methods were used.   Findings: Vascular findings:  Study is slightly limited due to venous enhancement.  Patient had a aborted scan with immediate repeat causing venous contamination.  CTA NECK: Limited imaging of the aortic arch is unremarkable  The origin of the left subclavian artery and subclavian artery is grossly unremarkable in appearance.  Origin of the left vertebral artery is somewhat motion compromise. The cervical portion of the left vertebral artery appears to opacify unremarkably to the skull base. Evaluation is somewhat limited due to venous flow from prior study.  Left common carotid artery, carotid bifurcation and external carotid artery grossly unremarkable in appearance. Streak artifact somewhat limits evaluation of the bifurcation and proximal ICA. No acute abnormality noted of the cervical portion of the left ICA  The brachiocephalic artery and origin of the right common carotid artery is grossly unremarkable. The common carotid artery, carotid bifurcation and external carotid artery grossly unremarkable streak artifact from dental hardware somewhat limits evaluation.  There is mild narrowing of less than 50% of the mid right internal carotid artery.  The right vertebral artery is somewhat diminutive but appears patent to the base of the head.  CTA head:  Posterior circulation demonstrates a diminutive right sided vertebral artery and the prominent left vertebral artery joins to form the basilar artery. Basilar artery is unremarkable in appearance.  There appears be a fetal origin of the left PCA with minimal contribution from the basilar artery questioned. The right PCA is unremarkable.  The petrous portion of the left internal carotid artery in the cavernous portion are unremarkable. The petrous portion of the right internal carotid artery and the cavernous portion unremarkable.  Intracranial portions of the internal carotid arteries demonstrate no acute abnormality. The left MCA and left BRAD are unremarkable. Right intracranial internal carotid artery, right MCA and right BRAD are grossly  unremarkable. No evidence of significant stenosis, large vessel occlusion or aneurysm noted    Nonvascular findings: Limited imaging of the lung apices demonstrates mild prominence of the paraseptal markings and groundglass opacities which may represent a degree of underlying pulmonary edema. This is limited motion compromise imaging.  Limited imaging of the upper mediastinum is unremarkable. Limited imaging of the soft tissues of the head and neck are grossly unremarkable in appearance.  .  IMPRESSION:  No significant vascular abnormality. No significant stenosis, aneurysm or dissection.  Findings were discussed with the stroke coordinator at 9:21 PM  This report was finalized on 5/4/2022 9:27 PM by Fabian Fernandez.      XR Chest 1 View    Result Date: 5/4/2022  IMPRESSION : Mild pulmonary vascular congestion[  This report was finalized on 5/4/2022 9:37 PM by Fabain Fernandez.      CT Head Without Contrast Stroke Protocol    Result Date: 5/4/2022  No acute intracranial hemorrhage  White matter changes and areas of low-attenuation within the brain parenchyma suggesting prior small vessel ischemic change.  Findings discussed with the stroke coordinator at 8:36 PM  This report was finalized on 5/4/2022 8:40 PM by Fabian Fernandez.      CT Angiogram Head w AI Analysis of LVO    Result Date: 5/4/2022  1. No focal area of decreased cerebral blood flow (CBF) is seen to suggest an acute infarct in a large vessel territory.  No defects are seen to suggest a core infarct or an area of reversible ischemia.   CTA head and neck:  Technique: Axial volumetric contrast-enhanced CT angiographic images of the head and neck were acquired utilizing 115 mL Isovue-370  IV contrast. 3-D reconstructions were created for interpretation. Automated exposure control and iterative reconstruction methods were used.   Findings: Vascular findings:  Study is slightly limited due to venous enhancement. Patient had a aborted scan with immediate  repeat causing venous contamination.  CTA NECK: Limited imaging of the aortic arch is unremarkable  The origin of the left subclavian artery and subclavian artery is grossly unremarkable in appearance.  Origin of the left vertebral artery is somewhat motion compromise. The cervical portion of the left vertebral artery appears to opacify unremarkably to the skull base. Evaluation is somewhat limited due to venous flow from prior study.  Left common carotid artery, carotid bifurcation and external carotid artery grossly unremarkable in appearance. Streak artifact somewhat limits evaluation of the bifurcation and proximal ICA. No acute abnormality noted of the cervical portion of the left ICA  The brachiocephalic artery and origin of the right common carotid artery is grossly unremarkable. The common carotid artery, carotid bifurcation and external carotid artery grossly unremarkable streak artifact from dental hardware somewhat limits evaluation.  There is mild narrowing of less than 50% of the mid right internal carotid artery.  The right vertebral artery is somewhat diminutive but appears patent to the base of the head.  CTA head:  Posterior circulation demonstrates a diminutive right sided vertebral artery and the prominent left vertebral artery joins to form the basilar artery. Basilar artery is unremarkable in appearance.  There appears be a fetal origin of the left PCA with minimal contribution from the basilar artery questioned. The right PCA is unremarkable.  The petrous portion of the left internal carotid artery in the cavernous portion are unremarkable. The petrous portion of the right internal carotid artery and the cavernous portion unremarkable.  Intracranial portions of the internal carotid arteries demonstrate no acute abnormality. The left MCA and left BRAD are unremarkable. Right intracranial internal carotid artery, right MCA and right BRAD are grossly unremarkable. No evidence of significant  stenosis, large vessel occlusion or aneurysm noted    Nonvascular findings: Limited imaging of the lung apices demonstrates mild prominence of the paraseptal markings and groundglass opacities which may represent a degree of underlying pulmonary edema. This is limited motion compromise imaging.  Limited imaging of the upper mediastinum is unremarkable. Limited imaging of the soft tissues of the head and neck are grossly unremarkable in appearance.  .  IMPRESSION:  No significant vascular abnormality. No significant stenosis, aneurysm or dissection.  Findings were discussed with the stroke coordinator at 9:21 PM  This report was finalized on 5/4/2022 9:27 PM by Fabian Fernandez.      CT CEREBRAL PERFUSION WITH & WITHOUT CONTRAST    Result Date: 5/4/2022  1. No focal area of decreased cerebral blood flow (CBF) is seen to suggest an acute infarct in a large vessel territory.  No defects are seen to suggest a core infarct or an area of reversible ischemia.   CTA head and neck:  Technique: Axial volumetric contrast-enhanced CT angiographic images of the head and neck were acquired utilizing 115 mL [id number]-370  IV contrast. 3-D reconstructions were created for interpretation. Automated exposure control and iterative reconstruction methods were used.   Findings: Vascular findings:  Study is slightly limited due to venous enhancement. Patient had a aborted scan with immediate repeat causing venous contamination.  CTA NECK: Limited imaging of the aortic arch is unremarkable  The origin of the left subclavian artery and subclavian artery is grossly unremarkable in appearance.  Origin of the left vertebral artery is somewhat motion compromise. The cervical portion of the left vertebral artery appears to opacify unremarkably to the skull base. Evaluation is somewhat limited due to venous flow from prior study.  Left common carotid artery, carotid bifurcation and external carotid artery grossly unremarkable in appearance. Streak  artifact somewhat limits evaluation of the bifurcation and proximal ICA. No acute abnormality noted of the cervical portion of the left ICA  The brachiocephalic artery and origin of the right common carotid artery is grossly unremarkable. The common carotid artery, carotid bifurcation and external carotid artery grossly unremarkable streak artifact from dental hardware somewhat limits evaluation.  There is mild narrowing of less than 50% of the mid right internal carotid artery.  The right vertebral artery is somewhat diminutive but appears patent to the base of the head.  CTA head:  Posterior circulation demonstrates a diminutive right sided vertebral artery and the prominent left vertebral artery joins to form the basilar artery. Basilar artery is unremarkable in appearance.  There appears be a fetal origin of the left PCA with minimal contribution from the basilar artery questioned. The right PCA is unremarkable.  The petrous portion of the left internal carotid artery in the cavernous portion are unremarkable. The petrous portion of the right internal carotid artery and the cavernous portion unremarkable.  Intracranial portions of the internal carotid arteries demonstrate no acute abnormality. The left MCA and left BRAD are unremarkable. Right intracranial internal carotid artery, right MCA and right BRAD are grossly unremarkable. No evidence of significant stenosis, large vessel occlusion or aneurysm noted    Nonvascular findings: Limited imaging of the lung apices demonstrates mild prominence of the paraseptal markings and groundglass opacities which may represent a degree of underlying pulmonary edema. This is limited motion compromise imaging.  Limited imaging of the upper mediastinum is unremarkable. Limited imaging of the soft tissues of the head and neck are grossly unremarkable in appearance.  .  IMPRESSION:  No significant vascular abnormality. No significant stenosis, aneurysm or dissection.  Findings  were discussed with the stroke coordinator at 9:21 PM  This report was finalized on 5/4/2022 9:27 PM by Fabian Fernandez.        Assessment/Plan: This is a 76-year-old  female with past medical history significant for HTN, T1DM, HLD, disease of the thyroid, factor V Leiden mutation heterozygous, cardiac murmur, psoriatic arthritis (bilateral hands), chronic kidney disease, asthma, anemia.  She presents to [facility] ED for further evaluation of right-sided weakness. Right upper extremity drift noted and right  strength weakness.  Current NIH score is a 1.  Advanced imaging was completed.  She will be admitted into [facility] telemetry unit for further stroke work-up.      1. Right upper extremity weakness   -Differential diagnosis include TIA, acute ischemic stroke, infectious process secondary to psoriatic arthritis   -CTH reveals no acute intracranial abnormalities or hemorrhaging   -CTA H/N reveals no vascular abnormalities.  No LVO, stenosis, and aneurysm   -CTP reveals no perfusion deficit   -MRI brain without contrast pending   -TIA/acute ischemic stroke without thrombolytic therapy order set initiated   -Started ASA 81 mg or ASA suppository 300 mg daily   -Passed bedside dysphagia screening, currently on a regular consistent carbohydrate diet   -Fall precautions    -Activity as tolerated   -PT/OT/SLP evaluate and treat   -TTE with bubble study in the a.m.   -Neurochecks   -NIHSS assessment    2. Hypertension    -Please allow for autoregulation of blood pressure for tonight.  SBP less than 220    3. Hyperlipidemia    -FLP in the a.m.   -Restarted home pravastatin 10 mg daily    4. Type 1 Diabetes Mellitus     -Hemoglobin A1c in a.m.   -Inpatient diabetes educator consult if A1c is greater than 7   -SSI management per primary team    5. Factor V Leiden, heterozygous   -Management per primary team    Plan of care discussed with Dr. Page, the patient, and the nursing staff.  Neurology stroke will follow.   If there are any questions or concerns please feel free to contact.  Thank you for the consult.        Tarah Andres, APRN  May 4, 2022  23:15 EDT

## 2022-05-05 NOTE — CASE MANAGEMENT/SOCIAL WORK
Discharge Planning Assessment  [facility]     Patient Name: Marilyn Kunz  MRN: [id number]  Today's Date: 5/5/2022    Admit Date: 5/4/2022     Discharge Needs Assessment     Row Name 05/05/22 1338       Living Environment    People in Home alone    Current Living Arrangements home    Primary Care Provided by self    Able to Return to Prior Arrangements yes       Transition Planning    Patient/Family Anticipates Transition to home    Transportation Anticipated family or friend will provide       Discharge Needs Assessment    Readmission Within the Last 30 Days no previous admission in last 30 days    Equipment Currently Used at Home glucometer    Concerns to be Addressed discharge planning    Current Discharge Risk lives alone;lack of support system/caregiver               Discharge Plan     Row Name 05/05/22 1339       Plan    Provided Post Acute Provider Quality & Resource List? Yes    Post Acute Provider Quality and Resource List Nursing Home    Delivered To Patient    Method of Delivery In person    Plan Comments I met with Mrs. Kunz at the bedside. She lives in [facility] alone. She ambulates independently at baseline and is independent with all activities of daily living/household chores. She drvies herself when she leaves the home. She is admitted for evaluation of a stroke. Her stroke workup was negative. OT recommends home with home health services. Mrs. uKnz is not sure at this moment that she will be able to return home and care for herself. She reports left upper extremity weakness and reports that she is requiring assistance currently with toileting and hygiene. She is diabetic and states that she cannot go home until she is certain that she can administer insulin independently. Her only family in the area is her nephew. He is available to assist with transportation and emergencies, but is unable to care for her after this hospitalization. PT consult is still pending. I provided her  with a map of facilities in [facility] and requested that she choose 3 facilities that I could send referrals to if she is unable to return home independently. Case management will follow up.    Final Discharge Disposition Code 30 - still a patient              Continued Care and Services - Admitted Since 5/4/2022    Coordination has not been started for this encounter.          Demographic Summary     Row Name 05/05/22 1629       General Information    General Information Comments I confirmed that Peter Baca is Mrs. Kunz's PCP. [employer] medicare is her insurer.               Functional Status     Row Name 05/05/22 1333       Functional Status, IADL    Medications independent    Meal Preparation independent    Housekeeping independent    Laundry independent    Shopping independent               Psychosocial    No documentation.                Abuse/Neglect    No documentation.                Legal    No documentation.                Substance Abuse    No documentation.                Patient Forms    No documentation.                   Sabas Carranza RN

## 2022-05-05 NOTE — PLAN OF CARE
Problem: Adult Inpatient Plan of Care  Goal: Plan of Care Review  Recent Flowsheet Documentation  Taken 5/5/2022 0844 by Madi Leonard OT  Progress: (OT eval) no change  Plan of Care Reviewed With: patient  Outcome Evaluation: OT evaluation completed. Pt presents at baseline for ADL completion, which is limited by progressive psoriatic arthritis impacting B hand ROM and strength. Spoke a length w/ Patient and she verbalized safe use of multiple AE for ADL/IADLs, expresses no current needs. OT's concern is decreasing independence w/ medication management regarding insulin use, OT recommends d/c to home w/ HHOT visit to evaluate home environment and daily medication routine.

## 2022-05-05 NOTE — ED PROVIDER NOTES
Subjective   76-year-old female presents for evaluation of right upper extremity weakness and decreased  strength in the right upper extremity.  The patient reports that upon awakening at 8 AM this morning she felt fine but between 8 and 9 AM this morning she noticed that she was weak/had heaviness in her right arm.  She also reports difficulty gripping a syringe as she was going to give herself an injection of her medication.  She reports the symptoms have continued to persist throughout the day and therefore she has now presented for further evaluation.  She denies weakness in the legs.  She denies any facial weakness or change in speech or mentation.  No reported chest pain cough or shortness of breath.  No abdominal pain, no nausea vomiting, no reported change in bowel or urinary function.  She denies previous history of stroke.  She is a known diabetic.  No sick contacts or new infectious symptoms.  She does not take any anticoagulation.  No other acute complaints.          Review of Systems   Constitutional: Negative for chills, fatigue and fever.   HENT: Negative for congestion, ear pain, postnasal drip, sinus pressure and sore throat.    Eyes: Negative for pain, redness and visual disturbance.   Respiratory: Negative for cough, chest tightness and shortness of breath.    Cardiovascular: Negative for chest pain, palpitations and leg swelling.   Gastrointestinal: Negative for abdominal pain, anal bleeding, blood in stool, diarrhea, nausea and vomiting.   Endocrine: Negative for polydipsia and polyuria.   Genitourinary: Negative for difficulty urinating, dysuria, frequency and urgency.   Musculoskeletal: Negative for arthralgias, back pain and neck pain.   Skin: Negative for pallor and rash.   Allergic/Immunologic: Negative for environmental allergies and immunocompromised state.   Neurological: Positive for weakness. Negative for dizziness and headaches.   Hematological: Negative for adenopathy.    Psychiatric/Behavioral: Negative for confusion, self-injury and suicidal ideas. The patient is not nervous/anxious.    All other systems reviewed and are negative.      Past Medical History:   Diagnosis Date   • Anemia    • Asthma    • Chronic kidney disease     pt told it was dx from Dr Baca and to not eat much protein   • Disease of thyroid gland    • Factor 5 Leiden mutation, heterozygous (HCC) 2012   • Hyperlipidemia    • Hypertension    • Murmur, cardiac    • Psoriatic arthritis (HCC)     hands   • Type 1 diabetes (HCC)        Allergies   Allergen Reactions   • Atorvastatin Other (See Comments)   • Colesevelam Other (See Comments)   • Penicillins Rash       Past Surgical History:   Procedure Laterality Date   • CATARACT EXTRACTION Bilateral    • ENDOSCOPY N/A 10/20/2020    Procedure: ESOPHAGOGASTRODUODENOSCOPY;  Surgeon: Brunner, Mark I, MD;  Location: [facility] ENDOSCOPY;  Service: Gastroenterology;  Laterality: N/A;   • HAND SURGERY Left 1987    no hardware   • PATENT DUCTUS ARTERIOUS LIGATION         Family History   Problem Relation Age of Onset   • Cancer Mother    • Pancreatic cancer Mother    • Cancer Father    • Lung cancer Father    • Cancer Sister    • Colon cancer Sister    • Breast cancer Neg Hx    • Ovarian cancer Neg Hx        Social History     Socioeconomic History   • Marital status: Single   Tobacco Use   • Smoking status: Never Smoker   • Smokeless tobacco: Never Used   Vaping Use   • Vaping Use: Never used   Substance and Sexual Activity   • Alcohol use: No   • Drug use: No           Objective   Physical Exam  Vitals and nursing note reviewed.   Constitutional:       General: She is not in acute distress.     Appearance: Normal appearance. She is well-developed. She is not toxic-appearing or diaphoretic.   HENT:      Head: Normocephalic and atraumatic.      Right Ear: External ear normal.      Left Ear: External ear normal.      Nose: Nose normal.   Eyes:      General: Lids are  normal.      Pupils: Pupils are equal, round, and reactive to light.   Neck:      Trachea: No tracheal deviation.   Cardiovascular:      Rate and Rhythm: Normal rate and regular rhythm.      Pulses: No decreased pulses.      Heart sounds: Normal heart sounds. No murmur heard.    No friction rub. No gallop.   Pulmonary:      Effort: Pulmonary effort is normal. No respiratory distress.      Breath sounds: Normal breath sounds. No decreased breath sounds, wheezing, rhonchi or rales.   Abdominal:      General: Bowel sounds are normal.      Palpations: Abdomen is soft.      Tenderness: There is no abdominal tenderness. There is no guarding or rebound.   Musculoskeletal:         General: No deformity. Normal range of motion.      Cervical back: Normal range of motion and neck supple.   Lymphadenopathy:      Cervical: No cervical adenopathy.   Skin:     General: Skin is warm and dry.      Findings: No rash.   Neurological:      Mental Status: She is alert and oriented to person, place, and time.      GCS: GCS eye subscore is 4. GCS verbal subscore is 5. GCS motor subscore is 6.      Cranial Nerves: No cranial nerve deficit.      Sensory: No sensory deficit.      Comments: Slightly decreased  strength in right hand   Psychiatric:         Speech: Speech normal.         Behavior: Behavior normal.         Thought Content: Thought content normal.         Judgment: Judgment normal.         Procedures           ED Course                                                 MDM  Number of Diagnoses or Management Options  Cerebrovascular accident (CVA), unspecified mechanism (HCC): new and requires workup  Weakness of right upper extremity: new and requires workup  Diagnosis management comments: No definitive focal abnormality was identified on multiple CT imaging studies performed on the head and neck.    The stroke service desires admission for further work-up.    Discussed the patient with the hospitalist, Dr. Ventura.  The  hospital service will consult on the patient to determine status of admission.       Amount and/or Complexity of Data Reviewed  Clinical lab tests: ordered and reviewed  Tests in the radiology section of CPT®: ordered and reviewed  Review and summarize past medical records: yes  Discuss the patient with other providers: yes  Independent visualization of images, tracings, or specimens: yes        Final diagnoses:   Weakness of right upper extremity   Cerebrovascular accident (CVA), unspecified mechanism (HCC)       ED Disposition  ED Disposition     ED Disposition   Decision to Admit    Condition   --    Comment   Level of Care: Telemetry [5]   Diagnosis: Weakness of right upper extremity [206225]   Admitting Physician: ARCHIE AMEZCUA [[id number]]   Attending Physician: ARCHIE AMEZCUA [[id number]]   Bed Request Comments: tele               No follow-up provider specified.       Medication List      No changes were made to your prescriptions during this visit.          Cammy Page MD  05/04/22 7261

## 2022-05-05 NOTE — H&P
[facility] Medicine Services  HISTORY AND PHYSICAL    Patient Name: Marilyn Kunz  : 1945  MRN: [id number]  Primary Care Physician: Peter Baca MD  Date of admission: 2022    Subjective   Subjective     Chief Complaint:  Right arm pain and weakness     HPI:  Marilyn Kunz is a 76 y.o. female with PMH significant for CKD, hypothyroid, HLD, HTN, Factor V Leiden (not on anticoagulation), DM1, psoriatic arthritis who presents to the ED with complaint of right hand weakness.   She states that she has known arthritis in her hands at baseline with some mild associated pain. Today she developed acute worsening of pain in her right arm and hand that progressed throughout the day. She notes that she has also had associated weakness in her right had that has progressively worsened throughout the day to the point she had difficulty giving herself her insulin injection. She states that she did take 4 baby ASA at home due to her symptoms and concern for heart attack or stroke.   Of note, she also reports worsening diffuse rash and swelling of her BLE extremities. She notes that the rash has been present on her ankles and lower legs since she had her knee replacement in 2021. She states that over the past week it has now progressed up over her knees. The rash is also present on her BUE but not as severe. She is unsure what the rash is from.   She states that her PCP advised her to use ACE wrap for her BLE edema as she is unable to get compression stockings on and off due to her arthritis in her hands. She states that the swelling has been ongoing.   She does have known history of Factor V Leiden but has never required anticoagulation.   Upon arrival to the ED, CT head, CTA head/neck, and CT perfusion are negative for acute findings. She is evaluated per stroke navigator. Labs note elevated cr+. CXR shows mild pulmonary congestion. EKG is negative for acute findings.    She will be admitted to Hospital Medicine for further evaluation.     Review of Systems   Constitutional: Positive for activity change. Negative for appetite change, chills, diaphoresis, fatigue and fever.   HENT: Negative.    Eyes: Negative.  Negative for visual disturbance.   Respiratory: Negative for cough, chest tightness, shortness of breath and wheezing.    Cardiovascular: Positive for leg swelling. Negative for chest pain and palpitations.   Gastrointestinal: Negative for abdominal distention, abdominal pain, constipation, diarrhea, nausea and vomiting.   Endocrine: Negative.    Genitourinary: Positive for frequency. Negative for difficulty urinating, dysuria and urgency.   Musculoskeletal: Positive for arthralgias, back pain and joint swelling. Negative for myalgias.   Skin: Positive for rash. Negative for color change, pallor and wound.   Allergic/Immunologic: Negative.  Negative for immunocompromised state.   Neurological: Positive for weakness. Negative for dizziness, syncope, facial asymmetry, speech difficulty, light-headedness, numbness and headaches.   Hematological: Negative.    Psychiatric/Behavioral: Negative.  Negative for confusion. The patient is not nervous/anxious.         All other systems reviewed and are negative.     Personal History     Past Medical History:   Diagnosis Date   • Anemia    • Asthma    • Chronic kidney disease     pt told it was dx from Dr Baca and to not eat much protein   • Disease of thyroid gland    • Factor 5 Leiden mutation, heterozygous (HCC) 2012   • Hyperlipidemia    • Hypertension    • Murmur, cardiac    • Psoriatic arthritis (HCC)     hands   • Type 1 diabetes (HCC)              Past Surgical History:   Procedure Laterality Date   • CATARACT EXTRACTION Bilateral    • ENDOSCOPY N/A 10/20/2020    Procedure: ESOPHAGOGASTRODUODENOSCOPY;  Surgeon: Brunner, Mark I, MD;  Location: [facility] ENDOSCOPY;  Service: Gastroenterology;  Laterality: N/A;   • HAND SURGERY  Left 1987    no hardware   • PATENT DUCTUS ARTERIOUS LIGATION         Family History:  family history includes Cancer in her father, mother, and sister; Colon cancer in her sister; Lung cancer in her father; Pancreatic cancer in her mother. Otherwise pertinent FHx was reviewed and unremarkable.     Social History:  reports that she has never smoked. She has never used smokeless tobacco. She reports that she does not drink alcohol and does not use drugs.  Social History     Social History Narrative    Independent with adl's        Medications:  Secukinumab, amLODIPine, aspirin, famotidine, fluticasone, insulin aspart, insulin degludec, levocetirizine, levothyroxine, lisinopril, metoprolol tartrate, oxybutynin XL, and pravastatin    Allergies   Allergen Reactions   • Atorvastatin Other (See Comments)   • Colesevelam Other (See Comments)   • Penicillins Rash       Objective   Objective     Vital Signs:   Temp:  [97.8 °F (36.6 °C)-98 °F (36.7 °C)] 98 °F (36.7 °C)  Heart Rate:  [71-86] 71  Resp:  [16-20] 20  BP: (126-177)/(68-75) 152/68    Physical Exam   Constitutional: Awake, alert  Eyes: PERRLA, sclerae anicteric, no conjunctival injection  HENT: NCAT, mucous membranes moist  Neck: Supple, no thyromegaly, no lymphadenopathy, trachea midline  Respiratory: Clear to auscultation bilaterally, nonlabored respirations   Cardiovascular: RRR, no murmurs, rubs, or gallops, palpable pedal pulses bilaterally  Gastrointestinal: Positive bowel sounds, soft, nontender, nondistended  Musculoskeletal:mild bilateral ankle edema L>R, no clubbing or cyanosis to extremities  Psychiatric: Appropriate affect, cooperative  Neurologic: Oriented x 3, strength symmetric in all extremities, Cranial Nerves grossly intact to confrontation, speech clear  Skin: diffuse BLE rash, diffuse BUE rash      Results Reviewed:  I have personally reviewed most recent indicated data and agree with findings including:  [x]  Laboratory  [x]  Radiology  [x]   EKG/Telemetry  []  Pathology  []  Cardiac/Vascular Studies  [x]  Old records  []  Other:  Most pertinent findings include:      LAB RESULTS:      Lab 05/04/22 2237 05/04/22 2049   WBC  --  10.28   HEMOGLOBIN  --  12.3   HEMATOCRIT  --  37.6   PLATELETS  --  249   NEUTROS ABS  --  7.49*   IMMATURE GRANS (ABS)  --  0.02   LYMPHS ABS  --  1.87   MONOS ABS  --  0.76   EOS ABS  --  0.09   MCV  --  87.9   SED RATE  --  44*   CRP  --  0.61*   PROCALCITONIN  --  0.11   LACTATE 1.1  --    APTT 22.9  --          Lab 05/04/22 2049   SODIUM 139   POTASSIUM 4.4   CHLORIDE 101   CO2 21.0*   ANION GAP 17.0*   BUN 28*   CREATININE 1.07*   EGFR 53.9*   GLUCOSE 95   CALCIUM 10.1   TSH 2.280         Lab 05/04/22 2049   TOTAL PROTEIN 8.1   ALBUMIN 4.70   GLOBULIN 3.4   ALT (SGPT) 14  13   AST (SGOT) 27  27   BILIRUBIN 0.6   ALK PHOS 73         Lab 05/04/22 2049   TROPONIN T <0.010                 Brief Urine Lab Results  (Last result in the past 365 days)      Color   Clarity   Blood   Leuk Est   Nitrite   Protein   CREAT   Urine HCG        05/04/22 2220 Yellow   Clear   Negative   Negative   Negative   Negative               Microbiology Results (last 10 days)     Procedure Component Value - Date/Time    COVID PRE-OP / PRE-PROCEDURE SCREENING ORDER (NO ISOLATION) - Swab, Nasopharynx [[id number]]  (Normal) Collected: 05/04/22 2220    Lab Status: Final result Specimen: Swab from Nasopharynx Updated: 05/04/22 2251    Narrative:      The following orders were created for panel order COVID PRE-OP / PRE-PROCEDURE SCREENING ORDER (NO ISOLATION) - Swab, Nasopharynx.  Procedure                               Abnormality         Status                     ---------                               -----------         ------                     COVID-19 and FLU A/B PCR...[[id number]]  Normal              Final result                 Please view results for these tests on the individual orders.    COVID-19 and FLU A/B PCR - Swab, Nasopharynx  [[id number]]  (Normal) Collected: 05/04/22 2220    Lab Status: Final result Specimen: Swab from Nasopharynx Updated: 05/04/22 2251     COVID19 Not Detected     Influenza A PCR Not Detected     Influenza B PCR Not Detected    Narrative:      Fact sheet for providers: https://www.fda.gov/media/842085/download    Fact sheet for patients: https://www.fda.gov/media/798333/download    Test performed by PCR.          CT Angiogram Neck    Result Date: 5/4/2022  EXAMINATION: CT CEREBRAL PERFUSION W WO CONTRAST-, CT ANGIOGRAM NECK-, CT ANGIOGRAM HEAD W AI ANALYSIS OF LVO-  DATE OF EXAM: 5/4/2022 8:40 PM  INDICATION: Neuro deficit, acute, stroke suspected.  COMPARISON: CT head performed same day and prior  CT head perfusion:  TECHNIQUE: Axial CT images of the brain were obtained prior to and after the administration of 115 mL cyst Isovue-370. CT Perfusion protocol was utilized. Automated post processing was performed by RAPID software and submitted to PACS for interpretation. Automated exposure control and iterative reconstruction was utilized.  FINDINGS: CT Perfusion: CBF (<30%) volume: 0 mL Tmax (>6.0s) volume: 0 mL Mismatch volume: 0 mL Mismatch ratio: None  The examination appears to be technically adequate. There is no reduced cerebral blood volume (CBV) or reduced cerebral blood flow (CBF) to suggest an area of acute infarction in a large vessel territory. The cerebral perfusion appears symmetric. No increased mean transit time (MTT) is seen. No areas of mismatch to suggest presence of a penumbra.      Impression: 1. No focal area of decreased cerebral blood flow (CBF) is seen to suggest an acute infarct in a large vessel territory.  No defects are seen to suggest a core infarct or an area of reversible ischemia.   CTA head and neck:  Technique: Axial volumetric contrast-enhanced CT angiographic images of the head and neck were acquired utilizing 115 mL Isovue-370  IV contrast. 3-D reconstructions were created for  interpretation. Automated exposure control and iterative reconstruction methods were used.   Findings: Vascular findings:  Study is slightly limited due to venous enhancement. Patient had a aborted scan with immediate repeat causing venous contamination.  CTA NECK: Limited imaging of the aortic arch is unremarkable  The origin of the left subclavian artery and subclavian artery is grossly unremarkable in appearance.  Origin of the left vertebral artery is somewhat motion compromise. The cervical portion of the left vertebral artery appears to opacify unremarkably to the skull base. Evaluation is somewhat limited due to venous flow from prior study.  Left common carotid artery, carotid bifurcation and external carotid artery grossly unremarkable in appearance. Streak artifact somewhat limits evaluation of the bifurcation and proximal ICA. No acute abnormality noted of the cervical portion of the left ICA  The brachiocephalic artery and origin of the right common carotid artery is grossly unremarkable. The common carotid artery, carotid bifurcation and external carotid artery grossly unremarkable streak artifact from dental hardware somewhat limits evaluation.  There is mild narrowing of less than 50% of the mid right internal carotid artery.  The right vertebral artery is somewhat diminutive but appears patent to the base of the head.  CTA head:  Posterior circulation demonstrates a diminutive right sided vertebral artery and the prominent left vertebral artery joins to form the basilar artery. Basilar artery is unremarkable in appearance.  There appears be a fetal origin of the left PCA with minimal contribution from the basilar artery questioned. The right PCA is unremarkable.  The petrous portion of the left internal carotid artery in the cavernous portion are unremarkable. The petrous portion of the right internal carotid artery and the cavernous portion unremarkable.  Intracranial portions of the internal  carotid arteries demonstrate no acute abnormality. The left MCA and left BRAD are unremarkable. Right intracranial internal carotid artery, right MCA and right BRAD are grossly unremarkable. No evidence of significant stenosis, large vessel occlusion or aneurysm noted    Nonvascular findings: Limited imaging of the lung apices demonstrates mild prominence of the paraseptal markings and groundglass opacities which may represent a degree of underlying pulmonary edema. This is limited motion compromise imaging.  Limited imaging of the upper mediastinum is unremarkable. Limited imaging of the soft tissues of the head and neck are grossly unremarkable in appearance.  .  IMPRESSION:  No significant vascular abnormality. No significant stenosis, aneurysm or dissection.  Findings were discussed with the stroke coordinator at 9:21 PM  This report was finalized on 5/4/2022 9:27 PM by Fabian Fernandez.      MRI Brain Without Contrast    Result Date: 5/5/2022  EXAMINATION: MRI BRAIN WO CONTRAST DATE: 5/4/2022 11:25 PM  INDICATION: Stroke follow-up. Right upper extremity weakness.  COMPARISON: CT head from earlier today. MRI brain March 20, 2014. TECHNIQUE: Multiplanar noncontrast imaging of the brain was performed in the usual manner. FINDINGS: No diffusion signal abnormality to suggest acute ischemia. No acute hemorrhage. No extra-axial fluid collection. 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 Moderate burden of T2/FLAIR hyperintensity in the white matter, nonspecific, but compatible with sequela of chronic microvascular ischemia. Mild volume loss. No intracranial mass or mass effect. No hydrocephalus. Basal cisterns are patent. Expected flow voids are seen in the major intracranial vessels. Bilateral ocular lens surgeries. Imaged paranasal sinuses are essentially clear. Imaged mastoid air cells are essentially clear. Visualized portions of the upper neck are unremarkable. Marrow signal in the skull base is normal.      Impression: 1.  No acute intracranial abnormality. No acute infarct. 2.  Moderate chronic small vessel ischemic changes. Mild volume loss. 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 Electronically signed by:  Vasquez Smith DO  5/4/2022 10:08 PM Mountain Time    XR Chest 1 View    Result Date: 5/4/2022   DATE OF EXAM: 5/4/2022 8:39 PM  PROCEDURE: XR CHEST 1 VW-  INDICATIONS: Acute Stroke Protocol (onset < 12 hrs)  COMPARISON: 10/16/2020 and prior  TECHNIQUE: Portable Chest  FINDINGS:  Heart size is within normal limits. Pulmonary vascularity is somewhat indistinct. Mild increase hazy and interstitial changes are noted similar to the prior study. There is scarring along the left heart border similar to previous studies. No focal consolidation is otherwise noted. Osseous structures demonstrate no acute abnormality      Impression: IMPRESSION : Mild pulmonary vascular congestion[  This report was finalized on 5/4/2022 9:37 PM by Fabian Fernandez.      CT Head Without Contrast Stroke Protocol    Result Date: 5/4/2022  CT HEAD WO CONTRAST STROKE PROTOCOL-  Date of Exam: 5/4/2022 8:31 PM  Indication: Neuro deficit, acute, stroke suspected.  Comparison: 03/19/2014  Technique:  Contiguous axial CT images of the head were obtained without contrast from skull base to vertex.  Coronal and sagittal reconstructions were performed.  Automated exposure control and iterative reconstruction methods were used.   FINDINGS: Brain/Ventricles: No acute intracranial hemorrhage or mass effect is noted. Moderate to advanced deep and periventricular white matter changes compatible small vessel ischemic disease in this age group is noted. Some calcification noted within the left basal ganglia. Mild atherosclerotic changes are noted of the internal carotid arteries. No suspicious extra-axial fluid collection noted. Remote left cerebellar infarct is noted. This is new from the comparison. MRI more sensitive for acute ischemic change   Orbits: The visualized portion of the orbits demonstrate no acute abnormality.  Sinuses:The visualized paranasal sinuses and mastoid air cells demonstrate no acute abnormality.  Soft Tissues/Skull: No acute abnormality of the visualized skull or soft tissues.      Impression: No acute intracranial hemorrhage  White matter changes and areas of low-attenuation within the brain parenchyma suggesting prior small vessel ischemic change.  Findings discussed with the stroke coordinator at 8:36 PM  This report was finalized on 5/4/2022 8:40 PM by Fabian Fernandez.      CT Angiogram Head w AI Analysis of LVO    Result Date: 5/4/2022  EXAMINATION: CT CEREBRAL PERFUSION W WO CONTRAST-, CT ANGIOGRAM NECK-, CT ANGIOGRAM HEAD W AI ANALYSIS OF LVO-  DATE OF EXAM: 5/4/2022 8:40 PM  INDICATION: Neuro deficit, acute, stroke suspected.  COMPARISON: CT head performed same day and prior  CT head perfusion:  TECHNIQUE: Axial CT images of the brain were obtained prior to and after the administration of 115 mL cyst Isovue-370. CT Perfusion protocol was utilized. Automated post processing was performed by RAPID software and submitted to PACS for interpretation. Automated exposure control and iterative reconstruction was utilized.  FINDINGS: CT Perfusion: CBF (<30%) volume: 0 mL Tmax (>6.0s) volume: 0 mL Mismatch volume: 0 mL Mismatch ratio: None  The examination appears to be technically adequate. There is no reduced cerebral blood volume (CBV) or reduced cerebral blood flow (CBF) to suggest an area of acute infarction in a large vessel territory. The cerebral perfusion appears symmetric. No increased mean transit time (MTT) is seen. No areas of mismatch to suggest presence of a penumbra.      Impression: 1. No focal area of decreased cerebral blood flow (CBF) is seen to suggest an acute infarct in a large vessel territory.  No defects are seen to suggest a core infarct or an area of reversible ischemia.   CTA head and neck:  Technique:  Axial volumetric contrast-enhanced CT angiographic images of the head and neck were acquired utilizing 115 mL Isovue-370  IV contrast. 3-D reconstructions were created for interpretation. Automated exposure control and iterative reconstruction methods were used.   Findings: Vascular findings:  Study is slightly limited due to venous enhancement. Patient had a aborted scan with immediate repeat causing venous contamination.  CTA NECK: Limited imaging of the aortic arch is unremarkable  The origin of the left subclavian artery and subclavian artery is grossly unremarkable in appearance.  Origin of the left vertebral artery is somewhat motion compromise. The cervical portion of the left vertebral artery appears to opacify unremarkably to the skull base. Evaluation is somewhat limited due to venous flow from prior study.  Left common carotid artery, carotid bifurcation and external carotid artery grossly unremarkable in appearance. Streak artifact somewhat limits evaluation of the bifurcation and proximal ICA. No acute abnormality noted of the cervical portion of the left ICA  The brachiocephalic artery and origin of the right common carotid artery is grossly unremarkable. The common carotid artery, carotid bifurcation and external carotid artery grossly unremarkable streak artifact from dental hardware somewhat limits evaluation.  There is mild narrowing of less than 50% of the mid right internal carotid artery.  The right vertebral artery is somewhat diminutive but appears patent to the base of the head.  CTA head:  Posterior circulation demonstrates a diminutive right sided vertebral artery and the prominent left vertebral artery joins to form the basilar artery. Basilar artery is unremarkable in appearance.  There appears be a fetal origin of the left PCA with minimal contribution from the basilar artery questioned. The right PCA is unremarkable.  The petrous portion of the left internal carotid artery in the  cavernous portion are unremarkable. The petrous portion of the right internal carotid artery and the cavernous portion unremarkable.  Intracranial portions of the internal carotid arteries demonstrate no acute abnormality. The left MCA and left BRAD are unremarkable. Right intracranial internal carotid artery, right MCA and right BRAD are grossly unremarkable. No evidence of significant stenosis, large vessel occlusion or aneurysm noted    Nonvascular findings: Limited imaging of the lung apices demonstrates mild prominence of the paraseptal markings and groundglass opacities which may represent a degree of underlying pulmonary edema. This is limited motion compromise imaging.  Limited imaging of the upper mediastinum is unremarkable. Limited imaging of the soft tissues of the head and neck are grossly unremarkable in appearance.  .  IMPRESSION:  No significant vascular abnormality. No significant stenosis, aneurysm or dissection.  Findings were discussed with the stroke coordinator at 9:21 PM  This report was finalized on 5/4/2022 9:27 PM by Fabian Fernandez.      CT CEREBRAL PERFUSION WITH & WITHOUT CONTRAST    Result Date: 5/4/2022  EXAMINATION: CT CEREBRAL PERFUSION W WO CONTRAST-, CT ANGIOGRAM NECK-, CT ANGIOGRAM HEAD W AI ANALYSIS OF LVO-  DATE OF EXAM: 5/4/2022 8:40 PM  INDICATION: Neuro deficit, acute, stroke suspected.  COMPARISON: CT head performed same day and prior  CT head perfusion:  TECHNIQUE: Axial CT images of the brain were obtained prior to and after the administration of 115 mL cyst Isovue-370. CT Perfusion protocol was utilized. Automated post processing was performed by RAPID software and submitted to PACS for interpretation. Automated exposure control and iterative reconstruction was utilized.  FINDINGS: CT Perfusion: CBF (<30%) volume: 0 mL Tmax (>6.0s) volume: 0 mL Mismatch volume: 0 mL Mismatch ratio: None  The examination appears to be technically adequate. There is no reduced cerebral blood  volume (CBV) or reduced cerebral blood flow (CBF) to suggest an area of acute infarction in a large vessel territory. The cerebral perfusion appears symmetric. No increased mean transit time (MTT) is seen. No areas of mismatch to suggest presence of a penumbra.      Impression: 1. No focal area of decreased cerebral blood flow (CBF) is seen to suggest an acute infarct in a large vessel territory.  No defects are seen to suggest a core infarct or an area of reversible ischemia.   CTA head and neck:  Technique: Axial volumetric contrast-enhanced CT angiographic images of the head and neck were acquired utilizing 115 mL Isovue-370  IV contrast. 3-D reconstructions were created for interpretation. Automated exposure control and iterative reconstruction methods were used.   Findings: Vascular findings:  Study is slightly limited due to venous enhancement. Patient had a aborted scan with immediate repeat causing venous contamination.  CTA NECK: Limited imaging of the aortic arch is unremarkable  The origin of the left subclavian artery and subclavian artery is grossly unremarkable in appearance.  Origin of the left vertebral artery is somewhat motion compromise. The cervical portion of the left vertebral artery appears to opacify unremarkably to the skull base. Evaluation is somewhat limited due to venous flow from prior study.  Left common carotid artery, carotid bifurcation and external carotid artery grossly unremarkable in appearance. Streak artifact somewhat limits evaluation of the bifurcation and proximal ICA. No acute abnormality noted of the cervical portion of the left ICA  The brachiocephalic artery and origin of the right common carotid artery is grossly unremarkable. The common carotid artery, carotid bifurcation and external carotid artery grossly unremarkable streak artifact from dental hardware somewhat limits evaluation.  There is mild narrowing of less than 50% of the mid right internal carotid artery.   The right vertebral artery is somewhat diminutive but appears patent to the base of the head.  CTA head:  Posterior circulation demonstrates a diminutive right sided vertebral artery and the prominent left vertebral artery joins to form the basilar artery. Basilar artery is unremarkable in appearance.  There appears be a fetal origin of the left PCA with minimal contribution from the basilar artery questioned. The right PCA is unremarkable.  The petrous portion of the left internal carotid artery in the cavernous portion are unremarkable. The petrous portion of the right internal carotid artery and the cavernous portion unremarkable.  Intracranial portions of the internal carotid arteries demonstrate no acute abnormality. The left MCA and left BRAD are unremarkable. Right intracranial internal carotid artery, right MCA and right BRAD are grossly unremarkable. No evidence of significant stenosis, large vessel occlusion or aneurysm noted    Nonvascular findings: Limited imaging of the lung apices demonstrates mild prominence of the paraseptal markings and groundglass opacities which may represent a degree of underlying pulmonary edema. This is limited motion compromise imaging.  Limited imaging of the upper mediastinum is unremarkable. Limited imaging of the soft tissues of the head and neck are grossly unremarkable in appearance.  .  IMPRESSION:  No significant vascular abnormality. No significant stenosis, aneurysm or dissection.  Findings were discussed with the stroke coordinator at 9:21 PM  This report was finalized on 5/4/2022 9:27 PM by Fabian Fernandez.            Assessment/Plan   Assessment & Plan       Essential hypertension    HLD (hyperlipidemia)    Factor V Leiden (HCC)    Weakness of right upper extremity    Type 1 diabetes mellitus (HCC)    Psoriatic arthritis (HCC)    76 y.o. female with PMH significant for CKD, hypothyroid, HLD, HTN, Factor V Leiden (not on anticoagulation), DM1, psoriatic arthritis  who presents to the ED with complaint of right hand weakness.     Right Upper Extremity Weakness/pain   -Evaluation per stroke navigator   -CT head, CTA head/Neck, CT perfusion negative for acute findings as noted above  -MRI pending   -Neurology consult in am   -CBC, BMP, lipid panel, TSH   -Trend troponin and EKG to evaluate for angina equivalent right arm pain.  Consider cardiology consult/stress test  -no neck or shoulder pain on PE    Elevated cr+   -baseline 0.87-0.95, currently mildly elevated at 1.07  -gentle IVF x 8 hours   -BMP in am     Rash  Lower extremity edema   -Known hx of Factor V Leiden mutation, has not required anticoagulation   -BLE venous duplex in am   -CRP and Sed rate pending   -May need dermatology referral    Diabetes Mellitus 1  -follows outpt with endocrinology   -FSBG ACHS  -ss insulin   -Nursing order to confirm Tresiba dose.  Will need to start long acting insulin   -HgA1c in am     Hypertension   Hyperlipidemia   -continue daily ASA  -allow permissive hypertension for now pending MRI   -plan to continue lisinopril, metoprolol, and amlodipine pending MRI     Psoriatic arthritis  -on cosentyx     Hypothyroid   -continue levothyroxine   -TSH in am     DVT prophylaxis:  mechanical pending MRI     CODE STATUS:    Code Status (Patient has no pulse and is not breathing): CPR (Attempt to Resuscitate)  Medical Interventions (Patient has pulse or is breathing): Full Support      This note has been completed as part of a split-shared workflow.     Signature:     Electronically signed by JAGDISH Hannah, 05/04/22, 11:23 PM EDT.      Attending   Admission Attestation       I have seen and examined the patient, performing an independent face-to-face diagnostic evaluation with plan of care reviewed and developed with the advanced practice clinician (APC).      Brief Summary Statement:   Marilyn Kunz is a 76 y.o. female With a PMH significant for CKD, hypothyroidism, HTN, HLD, factor V  Angelden not on anticoagulation, diabetes mellitus type 1, psoriatic arthritis immunosuppressed on Cosentyx who presents to the ED due to right arm pain and weakness.  Patient reports right arm pain which began today.  Her symptoms worsened as the day went on and she began to have difficulty with coordinating and using her right hand which she attributes to weakness.  She denies neck pain, injury.  She denies difficulty speaking, weakness or numbness in her legs.      Remainder of detailed HPI is as noted by APC and has been reviewed and/or edited by me for completeness.    Attending Physical Exam:  Constitutional: Awake, alert  Eyes: PERRLA, sclerae anicteric, no conjunctival injection  HENT: NCAT, mucous membranes moist  Neck: Supple, no thyromegaly, no lymphadenopathy, trachea midline  Respiratory: Clear to auscultation bilaterally, nonlabored respirations   Cardiovascular: RRR, no murmurs, rubs, or gallops, palpable pedal pulses bilaterally  Gastrointestinal: Positive bowel sounds, soft, nontender, nondistended  Musculoskeletal: No bilateral ankle edema, no clubbing or cyanosis to extremities.  No pain with passive range of motion to neck.  Obvious arthritic deformities to bilateral hands  Psychiatric: Appropriate affect, cooperative  Neurologic: Oriented x 3, strength symmetric in all extremities, Cranial Nerves grossly intact to confrontation, speech clear  Skin: Diffuse red pinpoint rash covering bilateral lower extremities      Brief Assessment/Plan :  See detailed assessment and plan developed with APC which I have reviewed and/or edited for completeness.        Pily Ventura,   05/05/22

## 2022-05-05 NOTE — THERAPY DISCHARGE NOTE
Acute Care - Occupational Therapy Discharge   [facility]    Patient Name: Marilyn Kunz  : 1945    MRN: [id number]                              Today's Date: 2022       Admit Date: 2022    Visit Dx:     ICD-10-CM ICD-9-CM   1. Weakness of right upper extremity  R29.898 729.89   2. Cerebrovascular accident (CVA), unspecified mechanism (HCC)  I63.9 434.91     Patient Active Problem List   Diagnosis   • Chest pain   • Pneumonia   • Essential hypertension   • HLD (hyperlipidemia)   • Odynophagia   • Factor V Leiden (HCC)   • Weakness of right upper extremity   • Type 1 diabetes mellitus (HCC)   • Psoriatic arthritis (HCC)     Past Medical History:   Diagnosis Date   • Anemia    • Asthma    • Chronic kidney disease     pt told it was dx from Dr Baca and to not eat much protein   • Disease of thyroid gland    • Factor 5 Leiden mutation, heterozygous (HCC)    • Hyperlipidemia    • Hypertension    • Murmur, cardiac    • Psoriatic arthritis (HCC)     hands   • Type 1 diabetes (HCC)      Past Surgical History:   Procedure Laterality Date   • CATARACT EXTRACTION Bilateral    • ENDOSCOPY N/A 10/20/2020    Procedure: ESOPHAGOGASTRODUODENOSCOPY;  Surgeon: Brunner, Mark I, MD;  Location: [facility] ENDOSCOPY;  Service: Gastroenterology;  Laterality: N/A;   • HAND SURGERY Left     no hardware   • PATENT DUCTUS ARTERIOUS LIGATION        General Information     Row Name 22 0830          OT Time and Intention    Document Type discharge evaluation/summary  -CS     Mode of Treatment occupational therapy  -CS     Row Name 22 0830          General Information    Patient Profile Reviewed yes  -CS     Prior Level of Function independent:;all household mobility;ADL's;driving;using stairs  Pt reports owning/utilizing multiple AE to help mitigate limited use of her hands. Walk-in shower w/ fold down bench. Owns RW/SPC following recent L TKA in 2021  -CS     Existing Precautions/Restrictions  other (see comments)  psoriatic arthritis w/ limited UE ROM  -CS     Barriers to Rehab medically complex  -CS     Row Name 05/05/22 0830          Occupational Profile    Environmental Supports and Barriers (Occupational Profile) walk-in shower, grab bars, bath seating, AE for IADLs  -CS     Row Name 05/05/22 0830          Living Environment    People in Home alone  -CS     Row Name 05/05/22 0830          Home Main Entrance    Number of Stairs, Main Entrance two  -CS     Stair Railings, Main Entrance railings safe and in good condition  -CS     Row Name 05/05/22 0830          Stairs Within Home, Primary    Number of Stairs, Within Home, Primary none  -CS     Row Name 05/05/22 0830          Cognition    Orientation Status (Cognition) oriented x 4  -CS     Row Name 05/05/22 0830          Safety Issues, Functional Mobility    Impairments Affecting Function (Mobility) range of motion (ROM);pain;strength  -CS           User Key  (r) = Recorded By, (t) = Taken By, (c) = Cosigned By    Initials Name Provider Type    CS Madi Leonard OT Occupational Therapist               Mobility/ADL's     Row Name 05/05/22 0835          Bed Mobility    Bed Mobility supine-sit;scooting/bridging  -CS     Scooting/Bridging Benedict (Bed Mobility) supervision  -CS     Supine-Sit Benedict (Bed Mobility) supervision  -CS     Bed Mobility, Safety Issues decreased use of arms for pushing/pulling  -CS     Assistive Device (Bed Mobility) head of bed elevated  -CS     Comment, (Bed Mobility) no dizziness reported upon sitting EOB  -CS     Row Name 05/05/22 0835          Transfers    Transfers toilet transfer;bed-chair transfer  -CS     Comment, (Transfers) no AD, no LOB, no dizziness reported  -     Bed-Chair Benedict (Transfers) supervision  -CS     Benedict Level (Toilet Transfer) supervision  -     Row Name 05/05/22 0835          Toilet Transfer    Type (Toilet Transfer) sit-stand;stand-sit  -     Row Name 05/05/22 0835           Functional Mobility    Functional Mobility- Comment Supervision for in-room distance(s) to toilet, sink, and recliner, no AD/LOB  -     Row Name 05/05/22 0835          Activities of Daily Living    BADL Assessment/Intervention lower body dressing;grooming;toileting  -     Row Name 05/05/22 0835          Lower Body Dressing Assessment/Training    Cobb Level (Lower Body Dressing) don;socks;standby assist  -     Position (Lower Body Dressing) edge of bed sitting  -CS     Row Name 05/05/22 0835          Grooming Assessment/Training    Cobb Level (Grooming) grooming skills;wash face, hands;independent  -CS     Position (Grooming) sink side  -CS     Row Name 05/05/22 0835          Toileting Assessment/Training    Cobb Level (Toileting) toileting skills;adjust/manage clothing;perform perineal hygiene;independent  -CS     Position (Toileting) unsupported sitting;unsupported standing  -CS     Comment, (Toileting) demo'd safe use of grab bars  -           User Key  (r) = Recorded By, (t) = Taken By, (c) = Cosigned By    Initials Name Provider Type    CS Madi Leonard, OT Occupational Therapist               Obj/Interventions     Row Name 05/05/22 0839          Sensory Assessment (Somatosensory)    Sensory Assessment (Somatosensory) UE sensation intact  -     Row Name 05/05/22 0839          Vision Assessment/Intervention    Visual Impairment/Limitations WFL;corrective lenses full-time  -     Row Name 05/05/22 0839          Range of Motion Comprehensive    General Range of Motion upper extremity range of motion deficits identified  -CS     Comment, General Range of Motion Pt with both psoriatic and osteo arthritis which limits full  and finger ext ROM, B shoulders flexion limited to approx 120, B elbows grossly WFL  -CS     Row Name 05/05/22 0839          Strength Comprehensive (MMT)    General Manual Muscle Testing (MMT) Assessment upper extremity strength deficits identified   -CS     Comment, General Manual Muscle Testing (MMT) Assessment BUE grossly 4/5, appropriate for age and condition, no significant asymmetry observed  -CS     Row Name 05/05/22 0839          Motor Skills    Motor Skills coordination;functional endurance  -CS     Coordination bilateral;upper extremity;fine motor deficit;moderate impairment  -CS     Functional Endurance O2 sats stable on RA  -CS     Row Name 05/05/22 0839          Balance    Balance Assessment sitting static balance;sitting dynamic balance;standing static balance;standing dynamic balance  -CS     Static Sitting Balance independent  -CS     Dynamic Sitting Balance independent  -CS     Position, Sitting Balance unsupported;sitting edge of bed  -CS     Static Standing Balance independent  -CS     Dynamic Standing Balance independent  -CS     Position/Device Used, Standing Balance unsupported  -CS           User Key  (r) = Recorded By, (t) = Taken By, (c) = Cosigned By    Initials Name Provider Type    Madi Pettit OT Occupational Therapist               Goals/Plan    No documentation.                Clinical Impression     Row Name 05/05/22 0844          Pain Assessment    Pretreatment Pain Rating 0/10 - no pain  -CS     Posttreatment Pain Rating 0/10 - no pain  -CS     Pre/Posttreatment Pain Comment tolerated eval  -CS     Row Name 05/05/22 0844          Plan of Care Review    Plan of Care Reviewed With patient  -CS     Progress no change  OT eval  -CS     Outcome Evaluation OT evaluation completed. Pt presents at baseline for ADL completion, which is limited by progressive psoriatic arthritis impacting B hand ROM and strength. Spoke a length w/ Patient and she verbalized safe use of multiple AE for ADL/IADLs, expresses no current needs. OT's concern is decreasing independence w/ medication management regarding insulin use, OT recommends d/c to home w/ HHOT visit to evaluate home environment and daily medication routine.  -CS     Row Name  05/05/22 0844          Therapy Assessment/Plan (OT)    Criteria for Skilled Therapeutic Interventions Met (OT) no;does not meet criteria for skilled intervention  -CS     Therapy Frequency (OT) evaluation only  -CS     Row Name 05/05/22 0844          Therapy Plan Review/Discharge Plan (OT)    Anticipated Discharge Disposition (OT) home;home with home health  -CS     Row Name 05/05/22 0844          Vital Signs    Pre Systolic BP Rehab 153  RN cleared for eval, VSS on RA  -CS     Pre Treatment Diastolic BP 64  -CS     Post Systolic BP Rehab 153  -CS     Post Treatment Diastolic BP 57  -CS     O2 Delivery Pre Treatment room air  -CS     O2 Delivery Intra Treatment room air  -CS     O2 Delivery Post Treatment room air  -CS     Pre Patient Position Supine  -CS     Intra Patient Position Standing  -CS     Post Patient Position Sitting  -CS     Row Name 05/05/22 0844          Positioning and Restraints    Pre-Treatment Position in bed  -CS     Post Treatment Position chair  -CS     In Chair notified nsg;reclined;sitting;encouraged to call for assist;exit alarm on;call light within reach;waffle cushion;legs elevated  -CS           User Key  (r) = Recorded By, (t) = Taken By, (c) = Cosigned By    Initials Name Provider Type    CS Madi Leonard, OT Occupational Therapist               Outcome Measures     Row Name 05/05/22 0849          How much help from another is currently needed...    Putting on and taking off regular lower body clothing? 3  -CS     Bathing (including washing, rinsing, and drying) 4  -CS     Toileting (which includes using toilet bed pan or urinal) 4  -CS     Putting on and taking off regular upper body clothing 4  -CS     Taking care of personal grooming (such as brushing teeth) 4  -CS     Eating meals 4  -CS     AM-PAC 6 Clicks Score (OT) 23  -CS     Row Name 05/05/22 0030          How much help from another person do you currently need...    Turning from your back to your side while in flat bed  without using bedrails? 4  -KB     Moving from lying on back to sitting on the side of a flat bed without bedrails? 4  -KB     Moving to and from a bed to a chair (including a wheelchair)? 4  -KB     Standing up from a chair using your arms (e.g., wheelchair, bedside chair)? 3  -KB     Climbing 3-5 steps with a railing? 3  -KB     To walk in hospital room? 3  -KB     AM-PAC 6 Clicks Score (PT) 21  -KB     Highest level of mobility 6 --> Walked 10 steps or more  -KB     Row Name 05/05/22 0849          Functional Assessment    Outcome Measure Options AM-PAC 6 Clicks Daily Activity (OT)  -CS           User Key  (r) = Recorded By, (t) = Taken By, (c) = Cosigned By    Initials Name Provider Type    Juliana Bro, RN Registered Nurse    Madi Pettit OT Occupational Therapist              Occupational Therapy Education                 Title: PT OT SLP Therapies (In Progress)     Topic: Occupational Therapy (In Progress)     Point: ADL training (Not Started)     Description:   Instruct learner(s) on proper safety adaptation and remediation techniques during self care or transfers.   Instruct in proper use of assistive devices.              Learner Progress:  Not documented in this visit.          Point: Home exercise program (Not Started)     Description:   Instruct learner(s) on appropriate technique for monitoring, assisting and/or progressing therapeutic exercises/activities.              Learner Progress:  Not documented in this visit.          Point: Precautions (Done)     Description:   Instruct learner(s) on prescribed precautions during self-care and functional transfers.              Learning Progress Summary           Patient Acceptance, E,D, DU,VU by TINA at 5/5/2022 0852                   Point: Body mechanics (Done)     Description:   Instruct learner(s) on proper positioning and spine alignment during self-care, functional mobility activities and/or exercises.              Learning Progress Summary            Patient Acceptance, E,D, DU,VU by  at 5/5/2022 0852                               User Key     Initials Effective Dates Name Provider Type Discipline     06/16/21 -  Madi Leonard OT Occupational Therapist OT              OT Recommendation and Plan  Therapy Frequency (OT): evaluation only  Plan of Care Review  Plan of Care Reviewed With: patient  Progress: no change (OT eval)  Outcome Evaluation: OT evaluation completed. Pt presents at baseline for ADL completion, which is limited by progressive psoriatic arthritis impacting B hand ROM and strength. Spoke a length w/ Patient and she verbalized safe use of multiple AE for ADL/IADLs, expresses no current needs. OT's concern is decreasing independence w/ medication management regarding insulin use, OT recommends d/c to home w/ HHOT visit to evaluate home environment and daily medication routine.  Plan of Care Reviewed With: patient  Outcome Evaluation: OT evaluation completed. Pt presents at baseline for ADL completion, which is limited by progressive psoriatic arthritis impacting B hand ROM and strength. Spoke a length w/ Patient and she verbalized safe use of multiple AE for ADL/IADLs, expresses no current needs. OT's concern is decreasing independence w/ medication management regarding insulin use, OT recommends d/c to home w/ HHOT visit to evaluate home environment and daily medication routine.     Time Calculation:    Time Calculation- OT     Row Name 05/05/22 0852             Time Calculation- OT    OT Start Time 0750  -CS      OT Received On 05/05/22  -CS              Untimed Charges    OT Eval/Re-eval Minutes 51  -CS              Total Minutes    Untimed Charges Total Minutes 51  -CS       Total Minutes 51  -CS            User Key  (r) = Recorded By, (t) = Taken By, (c) = Cosigned By    Initials Name Provider Type     Madi Leonard OT Occupational Therapist              Therapy Charges for Today     Code Description Service Date Service  Provider Modifiers Qty    [id number] HC OT EVAL LOW COMPLEXITY 4 5/5/2022 Madi Leonard, OT GO 1             OT Discharge Summary  Anticipated Discharge Disposition (OT): home, home with home health    Madi Leonard OT  5/5/2022

## 2022-05-05 NOTE — THERAPY EVALUATION
Acute Care - Speech Language Pathology Initial Evaluation  [facility]   Cognitive-Communication Evaluation       Patient Name: Marilyn Kunz  : 1945  MRN: [id number]  Today's Date: 2022               Admit Date: 2022     Visit Dx:    ICD-10-CM ICD-9-CM   1. Weakness of right upper extremity  R29.898 729.89   2. Cerebrovascular accident (CVA), unspecified mechanism (HCC)  I63.9 434.91     Patient Active Problem List   Diagnosis   • Chest pain   • Pneumonia   • Essential hypertension   • HLD (hyperlipidemia)   • Odynophagia   • Factor V Leiden (HCC)   • Weakness of right upper extremity   • Type 1 diabetes mellitus (HCC)   • Psoriatic arthritis (HCC)     Past Medical History:   Diagnosis Date   • Anemia    • Asthma    • Chronic kidney disease     pt told it was dx from Dr Baca and to not eat much protein   • Disease of thyroid gland    • Factor 5 Leiden mutation, heterozygous (HCC)    • Hyperlipidemia    • Hypertension    • Murmur, cardiac    • Psoriatic arthritis (HCC)     hands   • Type 1 diabetes (HCC)      Past Surgical History:   Procedure Laterality Date   • CATARACT EXTRACTION Bilateral    • ENDOSCOPY N/A 10/20/2020    Procedure: ESOPHAGOGASTRODUODENOSCOPY;  Surgeon: Brunner, Mark I, MD;  Location: [facility] ENDOSCOPY;  Service: Gastroenterology;  Laterality: N/A;   • HAND SURGERY Left     no hardware   • PATENT DUCTUS ARTERIOUS LIGATION         SLP Recommendation and Plan  SLP Diagnosis: Per this evaluation Mrs. Kunz presents w/ wfl speech/language/cog skills w/ no observed deficits at this time. SLP will sign off (22)        SLC Criteria for Skilled Therapy Interventions Met: no problems identified which require skilled intervention (22)  Anticipated Discharge Disposition (SLP): home (22)                             Plan of Care Reviewed With: patient (22)  Progress:  (initial eval) (22)      SLP EVALUATION (last 72  hours)     SLP SLC Evaluation     Row Name 05/05/22 1100                   Communication Assessment/Intervention    Document Type discharge evaluation/summary  -CJ        Subjective Information no complaints  -CJ        Patient Observations alert;cooperative  -CJ        Patient/Family/Caregiver Comments/Observations no family present  -CJ        Patient Effort good  -CJ        Symptoms Noted During/After Treatment none  -CJ                  General Information    Patient Profile Reviewed yes  -CJ        Pertinent History Of Current Problem Pt adm w/ weakness of RUE on stroke pathway; has sig h/o HTN, HLD, Factor V Leiden, diabetes, PNA. MRI revealed small chronic L infarcts in L cerebellum  -CJ        Precautions/Limitations, Vision WFL;for purposes of eval  -CJ        Precautions/Limitations, Hearing WFL;for purposes of eval  -CJ        Prior Level of Function-Communication WFL  -        Plans/Goals Discussed with patient  -CJ        Barriers to Rehab none identified  -CJ        Patient's Goals for Discharge patient did not state  -CJ                  Pain    Additional Documentation Pain Scale: FACES Pre/Post-Treatment (Group)  -                  Pain Scale: FACES Pre/Post-Treatment    Pain: FACES Scale, Pretreatment 0-->no hurt  -CJ        Posttreatment Pain Rating 0-->no hurt  -CJ                  Comprehension Assessment/Intervention    Comprehension Assessment/Intervention Auditory Comprehension;Reading Comprehension  -                  Auditory Comprehension Assessment/Intervention    Auditory Comprehension (Communication) WFL  -                  Reading Comprehension Assessment/Intervention    Reading Comprehension (Communication) WFL  -                  Expression Assessment/Intervention    Expression Assessment/Intervention verbal expression;graphic expression  -                  Verbal Expression Assessment/Intervention    Verbal Expression WFL  -                  Graphic Expression  Assessment/Intervention    Graphic Expression [facility]  -                  Oral Motor Structure and Function    Oral Motor Structure and Function [facility]  -                  Oral Musculature and Cranial Nerve Assessment    Oral Motor General Assessment [facility]  -CJ                  Motor Speech Assessment/Intervention    Motor Speech Function [facility]  -CJ                  Cognitive Assessment Intervention- SLP    Cognitive Function (Cognition) WFL  -                  SLP Evaluation Clinical Impressions    SLP Diagnosis Per this evaluation Mrs. Kunz presents w/ wfl speech/language/cog skills w/ no observed deficits at this time. SLP will sign off  -CJ        Rehab Potential/Prognosis excellent  -CJ        SLC Criteria for Skilled Therapy Interventions Met no problems identified which require skilled intervention  -CJ        Functional Impact no impact on function  -CJ                  Recommendations    Therapy Frequency (SLP SLC) evaluation only  -CJ        Anticipated Discharge Disposition (SLP) home  -CJ              User Key  (r) = Recorded By, (t) = Taken By, (c) = Cosigned By    Initials Name Effective Dates    Emy Stern MS CCC-SLP 06/16/21 -                    EDUCATION  The patient has been educated in the following areas:     Cognitive Impairment Communication Impairment.                      Time Calculation:      Time Calculation- SLP     Row Name 05/05/22 1115             Time Calculation- SLP    SLP Start Time 1100  -CJ      SLP Received On 05/05/22  -CJ              Untimed Charges    68564-PH Eval Speech and Production w/ Language Minutes 54  -CJ              Total Minutes    Untimed Charges Total Minutes 54  -CJ       Total Minutes 54  -CJ            User Key  (r) = Recorded By, (t) = Taken By, (c) = Cosigned By    Initials Name Provider Type    Emy Stern MS CCC-SLP Speech and Language Pathologist                Therapy Charges for Today     Code Description Service Date Service  Provider Modifiers Qty    [id number]  ST EVAL SPEECH AND PROD W LANG  4 5/5/2022 Emy Ureña, MS CCC-SLP GN 1                     Emy Ureña, MS GEE-SLP  5/5/2022

## 2022-05-05 NOTE — PROGRESS NOTES
[facility] Medicine Services  PROGRESS NOTE    Patient Name: Marilyn Kunz  : 1945  MRN: [id number]    Date of Admission: 2022  Primary Care Physician: Peter Baca MD    Subjective   Subjective     CC:  Right arm pain/weakness    HPI:  Feels like she is doing better today.  Still some pain.  Feels like she is having to used both hands to drink, etc.    ROS:  Gen- No fevers, chills  CV- No chest pain, palpitations  Resp- No cough, dyspnea  GI- No N/V/D, abd pain    Objective   Objective     Vital Signs:   Temp:  [97.2 °F (36.2 °C)-98 °F (36.7 °C)] 97.7 °F (36.5 °C)  Heart Rate:  [63-86] 82  Resp:  [16-20] 18  BP: (126-177)/(64-81) 139/78     Physical Exam:  Constitutional: No acute distress, awake, alert, sitting up on side of bed  HENT: NCAT, mucous membranes moist  Respiratory: Clear to auscultation bilaterally, respiratory effort normal   Cardiovascular: RRR, no murmurs, rubs, or gallops  Gastrointestinal: Positive bowel sounds, soft, nontender, nondistended  Musculoskeletal: 1+ bilateral ankle edema, significant arthritis to hands bilaterally  Psychiatric: Appropriate affect, cooperative  Neurologic: Oriented x 3, no real focal deficits, some limitation in  related to arthristis  Skin: No rashes      Results Reviewed:  LAB RESULTS:      Lab 22  0204 22   WBC 11.00*  --  10.28   HEMOGLOBIN 11.1*  --  12.3   HEMATOCRIT 33.0*  --  37.6   PLATELETS 235  --  249   NEUTROS ABS 7.50*  --  7.49*   IMMATURE GRANS (ABS) 0.03  --  0.02   LYMPHS ABS 2.38  --  1.87   MONOS ABS 0.96*  --  0.76   EOS ABS 0.09  --  0.09   MCV 85.3  --  87.9   SED RATE  --   --  44*   CRP  --   --  0.61*   PROCALCITONIN  --   --  0.11   LACTATE  --  1.1  --    APTT  --  22.9  --          Lab 22  0204 22   SODIUM 137 139   POTASSIUM 4.1 4.4   CHLORIDE 100 101   CO2 22.0 21.0*   ANION GAP 15.0 17.0*   BUN 26* 28*   CREATININE 0.99 1.07*    EGFR 59.2* 53.9*   GLUCOSE 163* 95   CALCIUM 9.8 10.1   MAGNESIUM 2.1  --    HEMOGLOBIN A1C 6.90*  --    TSH  --  2.280         Lab 05/04/22 2049   TOTAL PROTEIN 8.1   ALBUMIN 4.70   GLOBULIN 3.4   ALT (SGPT) 14  13   AST (SGOT) 27  27   BILIRUBIN 0.6   ALK PHOS 73         Lab 05/05/22  0204 05/04/22 2049   TROPONIN T <0.010 <0.010         Lab 05/05/22 0204   CHOLESTEROL 167   LDL CHOL 76   HDL CHOL 79*   TRIGLYCERIDES 63             Brief Urine Lab Results  (Last result in the past 365 days)      Color   Clarity   Blood   Leuk Est   Nitrite   Protein   CREAT   Urine HCG        05/04/22 2220 Yellow   Clear   Negative   Negative   Negative   Negative                 Microbiology Results Abnormal     Procedure Component Value - Date/Time    Urine Culture - Urine, Urine, Random Void [[id number]]  (Normal) Collected: 05/04/22 2220    Lab Status: Preliminary result Specimen: Urine, Random Void Updated: 05/05/22 1013     Urine Culture No growth    COVID PRE-OP / PRE-PROCEDURE SCREENING ORDER (NO ISOLATION) - Swab, Nasopharynx [[id number]]  (Normal) Collected: 05/04/22 2220    Lab Status: Final result Specimen: Swab from Nasopharynx Updated: 05/04/22 2251    Narrative:      The following orders were created for panel order COVID PRE-OP / PRE-PROCEDURE SCREENING ORDER (NO ISOLATION) - Swab, Nasopharynx.  Procedure                               Abnormality         Status                     ---------                               -----------         ------                     COVID-19 and FLU A/B PCR...[[id number]]  Normal              Final result                 Please view results for these tests on the individual orders.    COVID-19 and FLU A/B PCR - Swab, Nasopharynx [[id number]]  (Normal) Collected: 05/04/22 2220    Lab Status: Final result Specimen: Swab from Nasopharynx Updated: 05/04/22 2251     COVID19 Not Detected     Influenza A PCR Not Detected     Influenza B PCR Not Detected    Narrative:      Fact sheet  for providers: https://www.fda.gov/media/121962/download    Fact sheet for patients: https://www.fda.gov/media/465764/download    Test performed by PCR.          Adult Transthoracic Echo Complete W/ Cont if Necessary Per Protocol (With Agitated Saline)    Result Date: 5/5/2022  · Saline test results are negative. · Estimated left ventricular EF was in agreement with the calculated left ventricular EF. Left ventricular ejection fraction appears to be greater than 70%. Left ventricular systolic function is hyperdynamic (EF > 70%). · Left atrial volume is mildly increased. · Lumason contrast shows normal LV systolic function and wall motion with an ejection fraction of 72%      XR Shoulder 2+ View Right    Result Date: 5/5/2022  DATE OF EXAM: 5/5/2022 1:26 PM  PROCEDURE: XR SHOULDER 2+ VW RIGHT-  INDICATIONS: pain; R29.898-Other symptoms and signs involving the musculoskeletal system; I63.9-Cerebral infarction, unspecified  COMPARISON: No comparisons available.  TECHNIQUE: A minimum of two radiologic views of the right shoulder were obtained.  FINDINGS: Cortical margins are intact without evidence of fracture or dislocation. Moderate acromioclavicular and glenohumeral arthrosis changes are present with narrowing, sclerosis and small osteophytes.      Impression: Cortical margins are intact without evidence of fracture or dislocation. Moderate acromioclavicular and glenohumeral arthrosis changes are present with narrowing, sclerosis and small osteophytes.  This report was finalized on 5/5/2022 2:54 PM by Chava Grimaldo.      CT Angiogram Neck    Result Date: 5/4/2022  EXAMINATION: CT CEREBRAL PERFUSION W WO CONTRAST-, CT ANGIOGRAM NECK-, CT ANGIOGRAM HEAD W AI ANALYSIS OF LVO-  DATE OF EXAM: 5/4/2022 8:40 PM  INDICATION: Neuro deficit, acute, stroke suspected.  COMPARISON: CT head performed same day and prior  CT head perfusion:  TECHNIQUE: Axial CT images of the brain were obtained prior to and after the  administration of 115 mL cyst Isovue-370. CT Perfusion protocol was utilized. Automated post processing was performed by RAPID software and submitted to PACS for interpretation. Automated exposure control and iterative reconstruction was utilized.  FINDINGS: CT Perfusion: CBF (<30%) volume: 0 mL Tmax (>6.0s) volume: 0 mL Mismatch volume: 0 mL Mismatch ratio: None  The examination appears to be technically adequate. There is no reduced cerebral blood volume (CBV) or reduced cerebral blood flow (CBF) to suggest an area of acute infarction in a large vessel territory. The cerebral perfusion appears symmetric. No increased mean transit time (MTT) is seen. No areas of mismatch to suggest presence of a penumbra.      Impression: 1. No focal area of decreased cerebral blood flow (CBF) is seen to suggest an acute infarct in a large vessel territory.  No defects are seen to suggest a core infarct or an area of reversible ischemia.   CTA head and neck:  Technique: Axial volumetric contrast-enhanced CT angiographic images of the head and neck were acquired utilizing 115 mL Isovue-370  IV contrast. 3-D reconstructions were created for interpretation. Automated exposure control and iterative reconstruction methods were used.   Findings: Vascular findings:  Study is slightly limited due to venous enhancement. Patient had a aborted scan with immediate repeat causing venous contamination.  CTA NECK: Limited imaging of the aortic arch is unremarkable  The origin of the left subclavian artery and subclavian artery is grossly unremarkable in appearance.  Origin of the left vertebral artery is somewhat motion compromise. The cervical portion of the left vertebral artery appears to opacify unremarkably to the skull base. Evaluation is somewhat limited due to venous flow from prior study.  Left common carotid artery, carotid bifurcation and external carotid artery grossly unremarkable in appearance. Streak artifact somewhat limits  evaluation of the bifurcation and proximal ICA. No acute abnormality noted of the cervical portion of the left ICA  The brachiocephalic artery and origin of the right common carotid artery is grossly unremarkable. The common carotid artery, carotid bifurcation and external carotid artery grossly unremarkable streak artifact from dental hardware somewhat limits evaluation.  There is mild narrowing of less than 50% of the mid right internal carotid artery.  The right vertebral artery is somewhat diminutive but appears patent to the base of the head.  CTA head:  Posterior circulation demonstrates a diminutive right sided vertebral artery and the prominent left vertebral artery joins to form the basilar artery. Basilar artery is unremarkable in appearance.  There appears be a fetal origin of the left PCA with minimal contribution from the basilar artery questioned. The right PCA is unremarkable.  The petrous portion of the left internal carotid artery in the cavernous portion are unremarkable. The petrous portion of the right internal carotid artery and the cavernous portion unremarkable.  Intracranial portions of the internal carotid arteries demonstrate no acute abnormality. The left MCA and left BRAD are unremarkable. Right intracranial internal carotid artery, right MCA and right BRAD are grossly unremarkable. No evidence of significant stenosis, large vessel occlusion or aneurysm noted    Nonvascular findings: Limited imaging of the lung apices demonstrates mild prominence of the paraseptal markings and groundglass opacities which may represent a degree of underlying pulmonary edema. This is limited motion compromise imaging.  Limited imaging of the upper mediastinum is unremarkable. Limited imaging of the soft tissues of the head and neck are grossly unremarkable in appearance.  .  IMPRESSION:  No significant vascular abnormality. No significant stenosis, aneurysm or dissection.  Findings were discussed with the  stroke coordinator at 9:21 PM  This report was finalized on 5/4/2022 9:27 PM by Fabian Fernandez.      MRI Brain Without Contrast    Result Date: 5/5/2022  EXAMINATION: MRI BRAIN WO CONTRAST DATE: 5/4/2022 11:25 PM  INDICATION: Stroke follow-up. Right upper extremity weakness.  COMPARISON: CT head from earlier today. MRI brain March 20, 2014. TECHNIQUE: Multiplanar noncontrast imaging of the brain was performed in the usual manner. FINDINGS: No diffusion signal abnormality to suggest acute ischemia. No acute hemorrhage. No extra-axial fluid collection. 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 Moderate burden of T2/FLAIR hyperintensity in the white matter, nonspecific, but compatible with sequela of chronic microvascular ischemia. Mild volume loss. No intracranial mass or mass effect. No hydrocephalus. Basal cisterns are patent. Expected flow voids are seen in the major intracranial vessels. Bilateral ocular lens surgeries. Imaged paranasal sinuses are essentially clear. Imaged mastoid air cells are essentially clear. Visualized portions of the upper neck are unremarkable. Marrow signal in the skull base is normal.     Impression: 1.  No acute intracranial abnormality. No acute infarct. 2.  Moderate chronic small vessel ischemic changes. Mild volume loss. 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 Electronically signed by:  Vasquez Smith DO  5/4/2022 10:08 PM Mountain Time    XR Chest 1 View    Result Date: 5/4/2022   DATE OF EXAM: 5/4/2022 8:39 PM  PROCEDURE: XR CHEST 1 VW-  INDICATIONS: Acute Stroke Protocol (onset < 12 hrs)  COMPARISON: 10/16/2020 and prior  TECHNIQUE: Portable Chest  FINDINGS:  Heart size is within normal limits. Pulmonary vascularity is somewhat indistinct. Mild increase hazy and interstitial changes are noted similar to the prior study. There is scarring along the left heart border similar to previous studies. No focal consolidation is otherwise noted. Osseous structures demonstrate no  acute abnormality      Impression: IMPRESSION : Mild pulmonary vascular congestion[  This report was finalized on 5/4/2022 9:37 PM by Fabian Fernandez.      Duplex Venous Lower Extremity - Bilateral CAR    Result Date: 5/5/2022  · Normal bilateral lower extremity venous duplex scan.      CT Head Without Contrast Stroke Protocol    Result Date: 5/4/2022  CT HEAD WO CONTRAST STROKE PROTOCOL-  Date of Exam: 5/4/2022 8:31 PM  Indication: Neuro deficit, acute, stroke suspected.  Comparison: 03/19/2014  Technique:  Contiguous axial CT images of the head were obtained without contrast from skull base to vertex.  Coronal and sagittal reconstructions were performed.  Automated exposure control and iterative reconstruction methods were used.   FINDINGS: Brain/Ventricles: No acute intracranial hemorrhage or mass effect is noted. Moderate to advanced deep and periventricular white matter changes compatible small vessel ischemic disease in this age group is noted. Some calcification noted within the left basal ganglia. Mild atherosclerotic changes are noted of the internal carotid arteries. No suspicious extra-axial fluid collection noted. Remote left cerebellar infarct is noted. This is new from the comparison. MRI more sensitive for acute ischemic change  Orbits: The visualized portion of the orbits demonstrate no acute abnormality.  Sinuses:The visualized paranasal sinuses and mastoid air cells demonstrate no acute abnormality.  Soft Tissues/Skull: No acute abnormality of the visualized skull or soft tissues.      Impression: No acute intracranial hemorrhage  White matter changes and areas of low-attenuation within the brain parenchyma suggesting prior small vessel ischemic change.  Findings discussed with the stroke coordinator at 8:36 PM  This report was finalized on 5/4/2022 8:40 PM by Fabian Fernandez.      CT Angiogram Head w AI Analysis of LVO    Result Date: 5/4/2022  EXAMINATION: CT CEREBRAL PERFUSION W WO CONTRAST-,  CT ANGIOGRAM NECK-, CT ANGIOGRAM HEAD W AI ANALYSIS OF LVO-  DATE OF EXAM: 5/4/2022 8:40 PM  INDICATION: Neuro deficit, acute, stroke suspected.  COMPARISON: CT head performed same day and prior  CT head perfusion:  TECHNIQUE: Axial CT images of the brain were obtained prior to and after the administration of 115 mL cyst Isovue-370. CT Perfusion protocol was utilized. Automated post processing was performed by RAPID software and submitted to PACS for interpretation. Automated exposure control and iterative reconstruction was utilized.  FINDINGS: CT Perfusion: CBF (<30%) volume: 0 mL Tmax (>6.0s) volume: 0 mL Mismatch volume: 0 mL Mismatch ratio: None  The examination appears to be technically adequate. There is no reduced cerebral blood volume (CBV) or reduced cerebral blood flow (CBF) to suggest an area of acute infarction in a large vessel territory. The cerebral perfusion appears symmetric. No increased mean transit time (MTT) is seen. No areas of mismatch to suggest presence of a penumbra.      Impression: 1. No focal area of decreased cerebral blood flow (CBF) is seen to suggest an acute infarct in a large vessel territory.  No defects are seen to suggest a core infarct or an area of reversible ischemia.   CTA head and neck:  Technique: Axial volumetric contrast-enhanced CT angiographic images of the head and neck were acquired utilizing 115 mL Isovue-370  IV contrast. 3-D reconstructions were created for interpretation. Automated exposure control and iterative reconstruction methods were used.   Findings: Vascular findings:  Study is slightly limited due to venous enhancement. Patient had a aborted scan with immediate repeat causing venous contamination.  CTA NECK: Limited imaging of the aortic arch is unremarkable  The origin of the left subclavian artery and subclavian artery is grossly unremarkable in appearance.  Origin of the left vertebral artery is somewhat motion compromise. The cervical portion of the  left vertebral artery appears to opacify unremarkably to the skull base. Evaluation is somewhat limited due to venous flow from prior study.  Left common carotid artery, carotid bifurcation and external carotid artery grossly unremarkable in appearance. Streak artifact somewhat limits evaluation of the bifurcation and proximal ICA. No acute abnormality noted of the cervical portion of the left ICA  The brachiocephalic artery and origin of the right common carotid artery is grossly unremarkable. The common carotid artery, carotid bifurcation and external carotid artery grossly unremarkable streak artifact from dental hardware somewhat limits evaluation.  There is mild narrowing of less than 50% of the mid right internal carotid artery.  The right vertebral artery is somewhat diminutive but appears patent to the base of the head.  CTA head:  Posterior circulation demonstrates a diminutive right sided vertebral artery and the prominent left vertebral artery joins to form the basilar artery. Basilar artery is unremarkable in appearance.  There appears be a fetal origin of the left PCA with minimal contribution from the basilar artery questioned. The right PCA is unremarkable.  The petrous portion of the left internal carotid artery in the cavernous portion are unremarkable. The petrous portion of the right internal carotid artery and the cavernous portion unremarkable.  Intracranial portions of the internal carotid arteries demonstrate no acute abnormality. The left MCA and left BRAD are unremarkable. Right intracranial internal carotid artery, right MCA and right BRAD are grossly unremarkable. No evidence of significant stenosis, large vessel occlusion or aneurysm noted    Nonvascular findings: Limited imaging of the lung apices demonstrates mild prominence of the paraseptal markings and groundglass opacities which may represent a degree of underlying pulmonary edema. This is limited motion compromise imaging.  Limited  imaging of the upper mediastinum is unremarkable. Limited imaging of the soft tissues of the head and neck are grossly unremarkable in appearance.  .  IMPRESSION:  No significant vascular abnormality. No significant stenosis, aneurysm or dissection.  Findings were discussed with the stroke coordinator at 9:21 PM  This report was finalized on 5/4/2022 9:27 PM by Fabian Fernandez.      CT CEREBRAL PERFUSION WITH & WITHOUT CONTRAST    Result Date: 5/4/2022  EXAMINATION: CT CEREBRAL PERFUSION W WO CONTRAST-, CT ANGIOGRAM NECK-, CT ANGIOGRAM HEAD W AI ANALYSIS OF LVO-  DATE OF EXAM: 5/4/2022 8:40 PM  INDICATION: Neuro deficit, acute, stroke suspected.  COMPARISON: CT head performed same day and prior  CT head perfusion:  TECHNIQUE: Axial CT images of the brain were obtained prior to and after the administration of 115 mL cyst Isovue-370. CT Perfusion protocol was utilized. Automated post processing was performed by RAPID software and submitted to PACS for interpretation. Automated exposure control and iterative reconstruction was utilized.  FINDINGS: CT Perfusion: CBF (<30%) volume: 0 mL Tmax (>6.0s) volume: 0 mL Mismatch volume: 0 mL Mismatch ratio: None  The examination appears to be technically adequate. There is no reduced cerebral blood volume (CBV) or reduced cerebral blood flow (CBF) to suggest an area of acute infarction in a large vessel territory. The cerebral perfusion appears symmetric. No increased mean transit time (MTT) is seen. No areas of mismatch to suggest presence of a penumbra.      Impression: 1. No focal area of decreased cerebral blood flow (CBF) is seen to suggest an acute infarct in a large vessel territory.  No defects are seen to suggest a core infarct or an area of reversible ischemia.   CTA head and neck:  Technique: Axial volumetric contrast-enhanced CT angiographic images of the head and neck were acquired utilizing 115 mL Isovue-370  IV contrast. 3-D reconstructions were created for  interpretation. Automated exposure control and iterative reconstruction methods were used.   Findings: Vascular findings:  Study is slightly limited due to venous enhancement. Patient had a aborted scan with immediate repeat causing venous contamination.  CTA NECK: Limited imaging of the aortic arch is unremarkable  The origin of the left subclavian artery and subclavian artery is grossly unremarkable in appearance.  Origin of the left vertebral artery is somewhat motion compromise. The cervical portion of the left vertebral artery appears to opacify unremarkably to the skull base. Evaluation is somewhat limited due to venous flow from prior study.  Left common carotid artery, carotid bifurcation and external carotid artery grossly unremarkable in appearance. Streak artifact somewhat limits evaluation of the bifurcation and proximal ICA. No acute abnormality noted of the cervical portion of the left ICA  The brachiocephalic artery and origin of the right common carotid artery is grossly unremarkable. The common carotid artery, carotid bifurcation and external carotid artery grossly unremarkable streak artifact from dental hardware somewhat limits evaluation.  There is mild narrowing of less than 50% of the mid right internal carotid artery.  The right vertebral artery is somewhat diminutive but appears patent to the base of the head.  CTA head:  Posterior circulation demonstrates a diminutive right sided vertebral artery and the prominent left vertebral artery joins to form the basilar artery. Basilar artery is unremarkable in appearance.  There appears be a fetal origin of the left PCA with minimal contribution from the basilar artery questioned. The right PCA is unremarkable.  The petrous portion of the left internal carotid artery in the cavernous portion are unremarkable. The petrous portion of the right internal carotid artery and the cavernous portion unremarkable.  Intracranial portions of the internal  carotid arteries demonstrate no acute abnormality. The left MCA and left BRAD are unremarkable. Right intracranial internal carotid artery, right MCA and right BRAD are grossly unremarkable. No evidence of significant stenosis, large vessel occlusion or aneurysm noted    Nonvascular findings: Limited imaging of the lung apices demonstrates mild prominence of the paraseptal markings and groundglass opacities which may represent a degree of underlying pulmonary edema. This is limited motion compromise imaging.  Limited imaging of the upper mediastinum is unremarkable. Limited imaging of the soft tissues of the head and neck are grossly unremarkable in appearance.  .  IMPRESSION:  No significant vascular abnormality. No significant stenosis, aneurysm or dissection.  Findings were discussed with the stroke coordinator at 9:21 PM  This report was finalized on 5/4/2022 9:27 PM by Fabian Fernandez.        Results for orders placed during the hospital encounter of 05/04/22    Adult Transthoracic Echo Complete W/ Cont if Necessary Per Protocol (With Agitated Saline)    Interpretation Summary  · Saline test results are negative.  · Estimated left ventricular EF was in agreement with the calculated left ventricular EF. Left ventricular ejection fraction appears to be greater than 70%. Left ventricular systolic function is hyperdynamic (EF > 70%).  · Left atrial volume is mildly increased.  · Lumason contrast shows normal LV systolic function and wall motion with an ejection fraction of 72%      I have reviewed the medications:  Scheduled Meds:aspirin, 81 mg, Oral, Daily   Or  aspirin, 300 mg, Rectal, Daily  cetirizine, 5 mg, Oral, Nightly  DULoxetine, 20 mg, Oral, Daily  [START ON 5/6/2022] famotidine, 20 mg, Oral, Daily  fluticasone, 2 spray, Each Nare, Daily  insulin detemir, 10 Units, Subcutaneous, Nightly  insulin lispro, 0-9 Units, Subcutaneous, 4x Daily With Meals & Nightly  levothyroxine, 75 mcg, Oral, Q AM  oxybutynin  XL, 5 mg, Oral, Daily  pravastatin, 10 mg, Oral, Nightly  sodium chloride, 10 mL, Intravenous, Q12H      Continuous Infusions:   PRN Meds:.•  acetaminophen **OR** acetaminophen **OR** acetaminophen  •  dextrose  •  dextrose  •  glucagon (human recombinant)  •  sodium chloride    Assessment/Plan   Assessment & Plan     Active Hospital Problems    Diagnosis  POA   • **Weakness of right upper extremity [R29.898]  Yes     Priority: Medium   • Psoriatic arthritis (HCC) [L40.50]  Yes   • Type 1 diabetes mellitus (HCC) [E10.9]  Yes   • Factor V Leiden (HCC) [D68.51]  Yes   • Essential hypertension [I10]  Yes   • HLD (hyperlipidemia) [E78.5]  Yes      Resolved Hospital Problems   No resolved problems to display.        Brief Hospital Course to date:  Marilyn Kunz is a 76 y.o. female PMHx significant for CKD, hypothyroid, HLD, HTN, Factor V Leiden (not on anticoagulation), DM1, psoriatic arthritis who presents to the ED with complaint of right hand weakness.      Right Upper Extremity Weakness/pain   - all neuroimaging is negative including MRI  - seems more consistent with musckuloskeletal in orirgin  - right should film negative  - neuro starting cymbalta for neuropathy     CKD 2  - stable, off IVF     Rash  Lower extremity edema   -Known hx of Factor V Leiden mutation, has not required anticoagulation   -BLE venous duplex is negative  - can f/u outpatient for rash     Diabetes Mellitus 1, a1c=6.9%  -follows outpt with endocrinology   -will increase basal/bolus while here for better control     Hypertension   Hyperlipidemia   -continue daily ASA  - resume ACE and BB today, resume norvasc tomorrow if BP ok     Psoriatic arthritis  -on cosentyx      Hypothyroid   -continue levothyroxine       DVT prophylaxis:  Mechanical DVT prophylaxis orders are present.       AM-PAC 6 Clicks Score (PT): 21 (05/05/22 0800)    Disposition: I expect the patient to be discharged home vs SNF.    CODE STATUS:   Code Status and Medical  Interventions:   Ordered at: 05/04/22 2322     Code Status (Patient has no pulse and is not breathing):    CPR (Attempt to Resuscitate)     Medical Interventions (Patient has pulse or is breathing):    Full Support       Izaiah Graf MD  05/05/22

## 2022-05-06 ENCOUNTER — HOME HEALTH ADMISSION (OUTPATIENT)
Dept: HOME HEALTH SERVICES | Facility: HOME HEALTHCARE | Age: 77
End: 2022-05-06

## 2022-05-06 ENCOUNTER — TELEPHONE (OUTPATIENT)
Dept: DIABETES SERVICES | Facility: HOSPITAL | Age: 77
End: 2022-05-06

## 2022-05-06 VITALS
BODY MASS INDEX: 26.22 KG/M2 | DIASTOLIC BLOOD PRESSURE: 72 MMHG | HEIGHT: 59 IN | HEART RATE: 61 BPM | TEMPERATURE: 97.9 F | OXYGEN SATURATION: 95 % | SYSTOLIC BLOOD PRESSURE: 157 MMHG | WEIGHT: 130.07 LBS | RESPIRATION RATE: 18 BRPM

## 2022-05-06 LAB
GLUCOSE BLDC GLUCOMTR-MCNC: 109 MG/DL (ref 70–130)
GLUCOSE BLDC GLUCOMTR-MCNC: 120 MG/DL (ref 70–130)
QT INTERVAL: 406 MS
QTC INTERVAL: 453 MS
VIT B12 BLD-MCNC: 633 PG/ML (ref 211–946)

## 2022-05-06 PROCEDURE — 84155 ASSAY OF PROTEIN SERUM: CPT

## 2022-05-06 PROCEDURE — 83516 IMMUNOASSAY NONANTIBODY: CPT

## 2022-05-06 PROCEDURE — 97161 PT EVAL LOW COMPLEX 20 MIN: CPT

## 2022-05-06 PROCEDURE — G0378 HOSPITAL OBSERVATION PER HR: HCPCS

## 2022-05-06 PROCEDURE — 82784 ASSAY IGA/IGD/IGG/IGM EACH: CPT

## 2022-05-06 PROCEDURE — 84165 PROTEIN E-PHORESIS SERUM: CPT

## 2022-05-06 PROCEDURE — 63710000001 INSULIN LISPRO (HUMAN) PER 5 UNITS: Performed by: INTERNAL MEDICINE

## 2022-05-06 PROCEDURE — G0108 DIAB MANAGE TRN  PER INDIV: HCPCS

## 2022-05-06 PROCEDURE — 99217 PR OBSERVATION CARE DISCHARGE MANAGEMENT: CPT | Performed by: INTERNAL MEDICINE

## 2022-05-06 PROCEDURE — 97530 THERAPEUTIC ACTIVITIES: CPT

## 2022-05-06 PROCEDURE — 82607 VITAMIN B-12: CPT | Performed by: STUDENT IN AN ORGANIZED HEALTH CARE EDUCATION/TRAINING PROGRAM

## 2022-05-06 PROCEDURE — 82962 GLUCOSE BLOOD TEST: CPT

## 2022-05-06 PROCEDURE — 86255 FLUORESCENT ANTIBODY SCREEN: CPT

## 2022-05-06 RX ORDER — DULOXETIN HYDROCHLORIDE 20 MG/1
20 CAPSULE, DELAYED RELEASE ORAL DAILY
Qty: 30 CAPSULE | Refills: 2 | Status: SHIPPED | OUTPATIENT
Start: 2022-05-07 | Stop reason: DRUGHIGH

## 2022-05-06 RX ADMIN — LEVOTHYROXINE SODIUM 75 MCG: 0.07 TABLET ORAL at 05:47

## 2022-05-06 RX ADMIN — LISINOPRIL 40 MG: 40 TABLET ORAL at 08:59

## 2022-05-06 RX ADMIN — FAMOTIDINE 20 MG: 20 TABLET ORAL at 08:59

## 2022-05-06 RX ADMIN — Medication 10 ML: at 08:59

## 2022-05-06 RX ADMIN — DULOXETINE HYDROCHLORIDE 20 MG: 20 CAPSULE, DELAYED RELEASE ORAL at 08:59

## 2022-05-06 RX ADMIN — OXYBUTYNIN CHLORIDE 5 MG: 5 TABLET, EXTENDED RELEASE ORAL at 08:58

## 2022-05-06 RX ADMIN — INSULIN LISPRO 4 UNITS: 100 INJECTION, SOLUTION INTRAVENOUS; SUBCUTANEOUS at 12:35

## 2022-05-06 RX ADMIN — INSULIN LISPRO 4 UNITS: 100 INJECTION, SOLUTION INTRAVENOUS; SUBCUTANEOUS at 08:59

## 2022-05-06 RX ADMIN — FLUTICASONE PROPIONATE 2 SPRAY: 50 SPRAY, METERED NASAL at 08:58

## 2022-05-06 RX ADMIN — ASPIRIN 81 MG 81 MG: 81 TABLET ORAL at 08:58

## 2022-05-06 NOTE — PLAN OF CARE
Goal Outcome Evaluation:      Patient progressing to discharge. No complaints over night. Will continue to monitor.

## 2022-05-06 NOTE — DISCHARGE SUMMARY
[facility]  DISCHARGE SUMMARY    Patient Name: Marilyn Kunz  : 1945  MRN: [id number]    Date of Admission: 2022  8:27 PM  Date of Discharge:  22  Primary Care Physician: Peter Baca MD    Consults     Date and Time Order Name Status Description    2022  8:31 PM Inpatient Neurology Consult Stroke            Hospital Course     Presenting Problem:   Weakness of right upper extremity [R29.898]    Active Hospital Problems    Diagnosis  POA   • **Weakness of right upper extremity [R29.898]  Yes     Priority: Medium   • Psoriatic arthritis (HCC) [L40.50]  Yes   • CKD (chronic kidney disease) stage 2, GFR 60-89 ml/min [N18.2]  Yes   • Type 1 diabetes mellitus (HCC) [E10.9]  Yes   • Factor V Leiden (HCC) [D68.51]  Yes   • Essential hypertension [I10]  Yes   • HLD (hyperlipidemia) [E78.5]  Yes      Resolved Hospital Problems   No resolved problems to display.          Hospital Course:  Marilyn Kunz is a 76 y.o. female with a history of CKD stage II, hypothyroidism, hyperlipidemia, factor V Leiden not on anticoagulation, type 1 diabetes, and severe psoriatic arthritis who presented to the emergency room with complaints of right hand pain and weakness.  It began earlier in the day and gradually progressed.  She was having difficulty giving herself her insulin injection.  She took 4 baby aspirin at home and came in for further evaluation.  Neuro imaging initially was negative including CT head, CTA of head and neck and perfusion study.  Follow-up MRI did not show any acute abnormalities but some moderate chronic small vessel disease and small chronic infarcts in the left cerebellum.  Neurology felt this likely related to peripheral neuropathy versus progression of her arthritis or combination.  Recommended supplementation with multivitamin.  Also started on low-dose Cymbalta for neuropathic effects.  The day of discharge she is nearing her  baseline.  Feels comfortable going home with home health.  Would like her to see her primary care physician within 1 week.      Discharge Follow Up Recommendations:  Follow up PCP 1 week    Day of Discharge     HPI:   No new issues.  Right arm feels better.  Feels like she is ok to go home.    Review of Systems  Gen- No fevers, chills  CV- No chest pain, palpitations  Resp- No cough, dyspnea  GI- No N/V/D, abd pain    Vital Signs:   Temp:  [97.6 °F (36.4 °C)-98.2 °F (36.8 °C)] 97.8 °F (36.6 °C)  Heart Rate:  [59-86] 59  Resp:  [18] 18  BP: (139-155)/(60-78) 154/68      Physical Exam:  Constitutional: No acute distress, awake, alert, sitting up in bed  HENT: NCAT, mucous membranes moist  Respiratory: Clear to auscultation bilaterally, respiratory effort normal on RA  Cardiovascular: RRR, no murmurs, rubs, or gallops  Gastrointestinal: Positive bowel sounds, soft, nontender, nondistended  Musculoskeletal: severe bilateral hand arthritic changes  Psychiatric: Appropriate affect, cooperative  Neurologic: Oriented x 3, strength symmetric in all extremities, Cranial Nerves grossly intact to confrontation, speech clear  Skin: No rashes      Pertinent  and/or Most Recent Results     LAB RESULTS:      Lab 05/05/22 0204 05/04/22 2237 05/04/22 2049   WBC 11.00*  --  10.28   HEMOGLOBIN 11.1*  --  12.3   HEMATOCRIT 33.0*  --  37.6   PLATELETS 235  --  249   NEUTROS ABS 7.50*  --  7.49*   IMMATURE GRANS (ABS) 0.03  --  0.02   LYMPHS ABS 2.38  --  1.87   MONOS ABS 0.96*  --  0.76   EOS ABS 0.09  --  0.09   MCV 85.3  --  87.9   SED RATE  --   --  44*   CRP  --   --  0.61*   PROCALCITONIN  --   --  0.11   LACTATE  --  1.1  --    APTT  --  22.9  --          Lab 05/05/22 0204 05/04/22 2049   SODIUM 137 139   POTASSIUM 4.1 4.4   CHLORIDE 100 101   CO2 22.0 21.0*   ANION GAP 15.0 17.0*   BUN 26* 28*   CREATININE 0.99 1.07*   EGFR 59.2* 53.9*   GLUCOSE 163* 95   CALCIUM 9.8 10.1   MAGNESIUM 2.1  --    HEMOGLOBIN A1C 6.90*  --    TSH   --  2.280         Lab 05/04/22 2049   TOTAL PROTEIN 8.1   ALBUMIN 4.70   GLOBULIN 3.4   ALT (SGPT) 14  13   AST (SGOT) 27  27   BILIRUBIN 0.6   ALK PHOS 73         Lab 05/05/22 0204 05/04/22 2049   TROPONIN T <0.010 <0.010         Lab 05/05/22 0204   CHOLESTEROL 167   LDL CHOL 76   HDL CHOL 79*   TRIGLYCERIDES 63             Brief Urine Lab Results  (Last result in the past 365 days)      Color   Clarity   Blood   Leuk Est   Nitrite   Protein   CREAT   Urine HCG        05/04/22 2220 Yellow   Clear   Negative   Negative   Negative   Negative               Microbiology Results (last 10 days)     Procedure Component Value - Date/Time    COVID PRE-OP / PRE-PROCEDURE SCREENING ORDER (NO ISOLATION) - Swab, Nasopharynx [[id number]]  (Normal) Collected: 05/04/22 2220    Lab Status: Final result Specimen: Swab from Nasopharynx Updated: 05/04/22 2251    Narrative:      The following orders were created for panel order COVID PRE-OP / PRE-PROCEDURE SCREENING ORDER (NO ISOLATION) - Swab, Nasopharynx.  Procedure                               Abnormality         Status                     ---------                               -----------         ------                     COVID-19 and FLU A/B PCR...[[id number]]  Normal              Final result                 Please view results for these tests on the individual orders.    COVID-19 and FLU A/B PCR - Swab, Nasopharynx [[id number]]  (Normal) Collected: 05/04/22 2220    Lab Status: Final result Specimen: Swab from Nasopharynx Updated: 05/04/22 2251     COVID19 Not Detected     Influenza A PCR Not Detected     Influenza B PCR Not Detected    Narrative:      Fact sheet for providers: https://www.fda.gov/media/760020/download    Fact sheet for patients: https://www.fda.gov/media/313584/download    Test performed by PCR.    Urine Culture - Urine, Urine, Random Void [[id number]]  (Normal) Collected: 05/04/22 2220    Lab Status: Final result Specimen: Urine, Random Void Updated:  05/05/22 2226     Urine Culture No growth          Adult Transthoracic Echo Complete W/ Cont if Necessary Per Protocol (With Agitated Saline)    Result Date: 5/5/2022  · Saline test results are negative. · Estimated left ventricular EF was in agreement with the calculated left ventricular EF. Left ventricular ejection fraction appears to be greater than 70%. Left ventricular systolic function is hyperdynamic (EF > 70%). · Left atrial volume is mildly increased. · Lumason contrast shows normal LV systolic function and wall motion with an ejection fraction of 72%      XR Shoulder 2+ View Right    Result Date: 5/5/2022  DATE OF EXAM: 5/5/2022 1:26 PM  PROCEDURE: XR SHOULDER 2+ VW RIGHT-  INDICATIONS: pain; R29.898-Other symptoms and signs involving the musculoskeletal system; I63.9-Cerebral infarction, unspecified  COMPARISON: No comparisons available.  TECHNIQUE: A minimum of two radiologic views of the right shoulder were obtained.  FINDINGS: Cortical margins are intact without evidence of fracture or dislocation. Moderate acromioclavicular and glenohumeral arthrosis changes are present with narrowing, sclerosis and small osteophytes.      Cortical margins are intact without evidence of fracture or dislocation. Moderate acromioclavicular and glenohumeral arthrosis changes are present with narrowing, sclerosis and small osteophytes.  This report was finalized on 5/5/2022 2:54 PM by Chava Grimaldo.      XR Hand 3+ View Right    Result Date: 4/29/2022  DATE OF EXAM: 4/29/2022 11:59 AM  PROCEDURE: XR HAND 3+ VW RIGHT-  INDICATIONS: pain in right hand; M79.641-Pain in right hand  COMPARISON: No comparisons available.  TECHNIQUE: A minimum of three routine standard radiographic views were obtained of the right hand.  FINDINGS: No fracture, destructive bone lesion, or acute bony abnormality is demonstrated. There is prominent osteoarthritis of the first CMC and first IP joints. There is joint space loss at the MCP joints  with no subchondral sclerosis or osteophytosis other than mild osteophytosis at the fifth MCP joint. There is bony fusion of the second, fourth, and fifth PIP joints and partial fusion at the third PIP joint with ulnar deviation. There is bony fusion at the fifth DIP joint. There is joint space loss at the third through fourth DIP joints with articular surface erosions and marginal osteophyte formation.       1. No acute abnormality 2. Findings compatible with inflammatory arthropathy including joint space loss without secondary degenerative changes at the MCP joints and bony fusion with ulnar deviation at the PIP joints 3. 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      CT Angiogram Neck    Result Date: 5/4/2022  EXAMINATION: CT CEREBRAL PERFUSION W WO CONTRAST-, CT ANGIOGRAM NECK-, CT ANGIOGRAM HEAD W AI ANALYSIS OF LVO-  DATE OF EXAM: 5/4/2022 8:40 PM  INDICATION: Neuro deficit, acute, stroke suspected.  COMPARISON: CT head performed same day and prior  CT head perfusion:  TECHNIQUE: Axial CT images of the brain were obtained prior to and after the administration of 115 mL cyst Isovue-370. CT Perfusion protocol was utilized. Automated post processing was performed by RAPID software and submitted to PACS for interpretation. Automated exposure control and iterative reconstruction was utilized.  FINDINGS: CT Perfusion: CBF (<30%) volume: 0 mL Tmax (>6.0s) volume: 0 mL Mismatch volume: 0 mL Mismatch ratio: None  The examination appears to be technically adequate. There is no reduced cerebral blood volume (CBV) or reduced cerebral blood flow (CBF) to suggest an area of acute infarction in a large vessel territory. The cerebral perfusion appears symmetric. No increased mean transit time (MTT) is seen. No areas of mismatch to suggest presence of a penumbra.      1. No focal area of decreased  cerebral blood flow (CBF) is seen to suggest an acute infarct in a large vessel territory.  No defects are seen to suggest a core infarct or an area of reversible ischemia.   CTA head and neck:  Technique: Axial volumetric contrast-enhanced CT angiographic images of the head and neck were acquired utilizing 115 mL Isovue-370  IV contrast. 3-D reconstructions were created for interpretation. Automated exposure control and iterative reconstruction methods were used.   Findings: Vascular findings:  Study is slightly limited due to venous enhancement. Patient had a aborted scan with immediate repeat causing venous contamination.  CTA NECK: Limited imaging of the aortic arch is unremarkable  The origin of the left subclavian artery and subclavian artery is grossly unremarkable in appearance.  Origin of the left vertebral artery is somewhat motion compromise. The cervical portion of the left vertebral artery appears to opacify unremarkably to the skull base. Evaluation is somewhat limited due to venous flow from prior study.  Left common carotid artery, carotid bifurcation and external carotid artery grossly unremarkable in appearance. Streak artifact somewhat limits evaluation of the bifurcation and proximal ICA. No acute abnormality noted of the cervical portion of the left ICA  The brachiocephalic artery and origin of the right common carotid artery is grossly unremarkable. The common carotid artery, carotid bifurcation and external carotid artery grossly unremarkable streak artifact from dental hardware somewhat limits evaluation.  There is mild narrowing of less than 50% of the mid right internal carotid artery.  The right vertebral artery is somewhat diminutive but appears patent to the base of the head.  CTA head:  Posterior circulation demonstrates a diminutive right sided vertebral artery and the prominent left vertebral artery joins to form the basilar artery. Basilar artery is unremarkable in appearance.  There  appears be a fetal origin of the left PCA with minimal contribution from the basilar artery questioned. The right PCA is unremarkable.  The petrous portion of the left internal carotid artery in the cavernous portion are unremarkable. The petrous portion of the right internal carotid artery and the cavernous portion unremarkable.  Intracranial portions of the internal carotid arteries demonstrate no acute abnormality. The left MCA and left BRAD are unremarkable. Right intracranial internal carotid artery, right MCA and right BRAD are grossly unremarkable. No evidence of significant stenosis, large vessel occlusion or aneurysm noted    Nonvascular findings: Limited imaging of the lung apices demonstrates mild prominence of the paraseptal markings and groundglass opacities which may represent a degree of underlying pulmonary edema. This is limited motion compromise imaging.  Limited imaging of the upper mediastinum is unremarkable. Limited imaging of the soft tissues of the head and neck are grossly unremarkable in appearance.  .  IMPRESSION:  No significant vascular abnormality. No significant stenosis, aneurysm or dissection.  Findings were discussed with the stroke coordinator at 9:21 PM  This report was finalized on 5/4/2022 9:27 PM by Fabian Fernandez.      MRI Brain Without Contrast    Result Date: 5/5/2022  EXAMINATION: MRI BRAIN WO CONTRAST DATE: 5/4/2022 11:25 PM  INDICATION: Stroke follow-up. Right upper extremity weakness.  COMPARISON: CT head from earlier today. MRI brain March 20, 2014. TECHNIQUE: Multiplanar noncontrast imaging of the brain was performed in the usual manner. FINDINGS: No diffusion signal abnormality to suggest acute ischemia. No acute hemorrhage. No extra-axial fluid collection. 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 Moderate burden of T2/FLAIR hyperintensity in the white matter, nonspecific, but compatible with sequela of chronic microvascular ischemia. Mild volume loss. No  intracranial mass or mass effect. No hydrocephalus. Basal cisterns are patent. Expected flow voids are seen in the major intracranial vessels. Bilateral ocular lens surgeries. Imaged paranasal sinuses are essentially clear. Imaged mastoid air cells are essentially clear. Visualized portions of the upper neck are unremarkable. Marrow signal in the skull base is normal.     1.  No acute intracranial abnormality. No acute infarct. 2.  Moderate chronic small vessel ischemic changes. Mild volume loss. 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 Electronically signed by:  Vasquez Smith DO  5/4/2022 10:08 PM Mountain Time    XR Chest 1 View    Result Date: 5/4/2022   DATE OF EXAM: 5/4/2022 8:39 PM  PROCEDURE: XR CHEST 1 VW-  INDICATIONS: Acute Stroke Protocol (onset < 12 hrs)  COMPARISON: 10/16/2020 and prior  TECHNIQUE: Portable Chest  FINDINGS:  Heart size is within normal limits. Pulmonary vascularity is somewhat indistinct. Mild increase hazy and interstitial changes are noted similar to the prior study. There is scarring along the left heart border similar to previous studies. No focal consolidation is otherwise noted. Osseous structures demonstrate no acute abnormality      IMPRESSION : Mild pulmonary vascular congestion[  This report was finalized on 5/4/2022 9:37 PM by Fabian Fernandez.      Duplex Venous Lower Extremity - Bilateral CAR    Result Date: 5/5/2022  · Normal bilateral lower extremity venous duplex scan.      CT Head Without Contrast Stroke Protocol    Result Date: 5/4/2022  CT HEAD WO CONTRAST STROKE PROTOCOL-  Date of Exam: 5/4/2022 8:31 PM  Indication: Neuro deficit, acute, stroke suspected.  Comparison: 03/19/2014  Technique:  Contiguous axial CT images of the head were obtained without contrast from skull base to vertex.  Coronal and sagittal reconstructions were performed.  Automated exposure control and iterative reconstruction methods were used.   FINDINGS: Brain/Ventricles: No acute  intracranial hemorrhage or mass effect is noted. Moderate to advanced deep and periventricular white matter changes compatible small vessel ischemic disease in this age group is noted. Some calcification noted within the left basal ganglia. Mild atherosclerotic changes are noted of the internal carotid arteries. No suspicious extra-axial fluid collection noted. Remote left cerebellar infarct is noted. This is new from the comparison. MRI more sensitive for acute ischemic change  Orbits: The visualized portion of the orbits demonstrate no acute abnormality.  Sinuses:The visualized paranasal sinuses and mastoid air cells demonstrate no acute abnormality.  Soft Tissues/Skull: No acute abnormality of the visualized skull or soft tissues.      No acute intracranial hemorrhage  White matter changes and areas of low-attenuation within the brain parenchyma suggesting prior small vessel ischemic change.  Findings discussed with the stroke coordinator at 8:36 PM  This report was finalized on 5/4/2022 8:40 PM by Fabian Fernandez.      CT Angiogram Head w AI Analysis of LVO    Result Date: 5/4/2022  EXAMINATION: CT CEREBRAL PERFUSION W WO CONTRAST-, CT ANGIOGRAM NECK-, CT ANGIOGRAM HEAD W AI ANALYSIS OF LVO-  DATE OF EXAM: 5/4/2022 8:40 PM  INDICATION: Neuro deficit, acute, stroke suspected.  COMPARISON: CT head performed same day and prior  CT head perfusion:  TECHNIQUE: Axial CT images of the brain were obtained prior to and after the administration of 115 mL cyst Isovue-370. CT Perfusion protocol was utilized. Automated post processing was performed by RAPID software and submitted to PACS for interpretation. Automated exposure control and iterative reconstruction was utilized.  FINDINGS: CT Perfusion: CBF (<30%) volume: 0 mL Tmax (>6.0s) volume: 0 mL Mismatch volume: 0 mL Mismatch ratio: None  The examination appears to be technically adequate. There is no reduced cerebral blood volume (CBV) or reduced cerebral blood flow  (CBF) to suggest an area of acute infarction in a large vessel territory. The cerebral perfusion appears symmetric. No increased mean transit time (MTT) is seen. No areas of mismatch to suggest presence of a penumbra.      1. No focal area of decreased cerebral blood flow (CBF) is seen to suggest an acute infarct in a large vessel territory.  No defects are seen to suggest a core infarct or an area of reversible ischemia.   CTA head and neck:  Technique: Axial volumetric contrast-enhanced CT angiographic images of the head and neck were acquired utilizing 115 mL Isovue-370  IV contrast. 3-D reconstructions were created for interpretation. Automated exposure control and iterative reconstruction methods were used.   Findings: Vascular findings:  Study is slightly limited due to venous enhancement. Patient had a aborted scan with immediate repeat causing venous contamination.  CTA NECK: Limited imaging of the aortic arch is unremarkable  The origin of the left subclavian artery and subclavian artery is grossly unremarkable in appearance.  Origin of the left vertebral artery is somewhat motion compromise. The cervical portion of the left vertebral artery appears to opacify unremarkably to the skull base. Evaluation is somewhat limited due to venous flow from prior study.  Left common carotid artery, carotid bifurcation and external carotid artery grossly unremarkable in appearance. Streak artifact somewhat limits evaluation of the bifurcation and proximal ICA. No acute abnormality noted of the cervical portion of the left ICA  The brachiocephalic artery and origin of the right common carotid artery is grossly unremarkable. The common carotid artery, carotid bifurcation and external carotid artery grossly unremarkable streak artifact from dental hardware somewhat limits evaluation.  There is mild narrowing of less than 50% of the mid right internal carotid artery.  The right vertebral artery is somewhat diminutive but  appears patent to the base of the head.  CTA head:  Posterior circulation demonstrates a diminutive right sided vertebral artery and the prominent left vertebral artery joins to form the basilar artery. Basilar artery is unremarkable in appearance.  There appears be a fetal origin of the left PCA with minimal contribution from the basilar artery questioned. The right PCA is unremarkable.  The petrous portion of the left internal carotid artery in the cavernous portion are unremarkable. The petrous portion of the right internal carotid artery and the cavernous portion unremarkable.  Intracranial portions of the internal carotid arteries demonstrate no acute abnormality. The left MCA and left BRAD are unremarkable. Right intracranial internal carotid artery, right MCA and right BRAD are grossly unremarkable. No evidence of significant stenosis, large vessel occlusion or aneurysm noted    Nonvascular findings: Limited imaging of the lung apices demonstrates mild prominence of the paraseptal markings and groundglass opacities which may represent a degree of underlying pulmonary edema. This is limited motion compromise imaging.  Limited imaging of the upper mediastinum is unremarkable. Limited imaging of the soft tissues of the head and neck are grossly unremarkable in appearance.  .  IMPRESSION:  No significant vascular abnormality. No significant stenosis, aneurysm or dissection.  Findings were discussed with the stroke coordinator at 9:21 PM  This report was finalized on 5/4/2022 9:27 PM by Fabian Fernandez.      CT CEREBRAL PERFUSION WITH & WITHOUT CONTRAST    Result Date: 5/4/2022  EXAMINATION: CT CEREBRAL PERFUSION W WO CONTRAST-, CT ANGIOGRAM NECK-, CT ANGIOGRAM HEAD W AI ANALYSIS OF LVO-  DATE OF EXAM: 5/4/2022 8:40 PM  INDICATION: Neuro deficit, acute, stroke suspected.  COMPARISON: CT head performed same day and prior  CT head perfusion:  TECHNIQUE: Axial CT images of the brain were obtained prior to and after  the administration of 115 mL cyst Isovue-370. CT Perfusion protocol was utilized. Automated post processing was performed by RAPID software and submitted to PACS for interpretation. Automated exposure control and iterative reconstruction was utilized.  FINDINGS: CT Perfusion: CBF (<30%) volume: 0 mL Tmax (>6.0s) volume: 0 mL Mismatch volume: 0 mL Mismatch ratio: None  The examination appears to be technically adequate. There is no reduced cerebral blood volume (CBV) or reduced cerebral blood flow (CBF) to suggest an area of acute infarction in a large vessel territory. The cerebral perfusion appears symmetric. No increased mean transit time (MTT) is seen. No areas of mismatch to suggest presence of a penumbra.      1. No focal area of decreased cerebral blood flow (CBF) is seen to suggest an acute infarct in a large vessel territory.  No defects are seen to suggest a core infarct or an area of reversible ischemia.   CTA head and neck:  Technique: Axial volumetric contrast-enhanced CT angiographic images of the head and neck were acquired utilizing 115 mL Isovue-370  IV contrast. 3-D reconstructions were created for interpretation. Automated exposure control and iterative reconstruction methods were used.   Findings: Vascular findings:  Study is slightly limited due to venous enhancement. Patient had a aborted scan with immediate repeat causing venous contamination.  CTA NECK: Limited imaging of the aortic arch is unremarkable  The origin of the left subclavian artery and subclavian artery is grossly unremarkable in appearance.  Origin of the left vertebral artery is somewhat motion compromise. The cervical portion of the left vertebral artery appears to opacify unremarkably to the skull base. Evaluation is somewhat limited due to venous flow from prior study.  Left common carotid artery, carotid bifurcation and external carotid artery grossly unremarkable in appearance. Streak artifact somewhat limits evaluation of  the bifurcation and proximal ICA. No acute abnormality noted of the cervical portion of the left ICA  The brachiocephalic artery and origin of the right common carotid artery is grossly unremarkable. The common carotid artery, carotid bifurcation and external carotid artery grossly unremarkable streak artifact from dental hardware somewhat limits evaluation.  There is mild narrowing of less than 50% of the mid right internal carotid artery.  The right vertebral artery is somewhat diminutive but appears patent to the base of the head.  CTA head:  Posterior circulation demonstrates a diminutive right sided vertebral artery and the prominent left vertebral artery joins to form the basilar artery. Basilar artery is unremarkable in appearance.  There appears be a fetal origin of the left PCA with minimal contribution from the basilar artery questioned. The right PCA is unremarkable.  The petrous portion of the left internal carotid artery in the cavernous portion are unremarkable. The petrous portion of the right internal carotid artery and the cavernous portion unremarkable.  Intracranial portions of the internal carotid arteries demonstrate no acute abnormality. The left MCA and left BRAD are unremarkable. Right intracranial internal carotid artery, right MCA and right BRAD are grossly unremarkable. No evidence of significant stenosis, large vessel occlusion or aneurysm noted    Nonvascular findings: Limited imaging of the lung apices demonstrates mild prominence of the paraseptal markings and groundglass opacities which may represent a degree of underlying pulmonary edema. This is limited motion compromise imaging.  Limited imaging of the upper mediastinum is unremarkable. Limited imaging of the soft tissues of the head and neck are grossly unremarkable in appearance.  .  IMPRESSION:  No significant vascular abnormality. No significant stenosis, aneurysm or dissection.  Findings were discussed with the stroke  coordinator at 9:21 PM  This report was finalized on 5/4/2022 9:27 PM by Fabian Fernandez.        Results for orders placed during the hospital encounter of 05/04/22    Duplex Venous Lower Extremity - Bilateral CAR    Interpretation Summary  · Normal bilateral lower extremity venous duplex scan.      Results for orders placed during the hospital encounter of 05/04/22    Duplex Venous Lower Extremity - Bilateral CAR    Interpretation Summary  · Normal bilateral lower extremity venous duplex scan.      Results for orders placed during the hospital encounter of 05/04/22    Adult Transthoracic Echo Complete W/ Cont if Necessary Per Protocol (With Agitated Saline)    Interpretation Summary  · Saline test results are negative.  · Estimated left ventricular EF was in agreement with the calculated left ventricular EF. Left ventricular ejection fraction appears to be greater than 70%. Left ventricular systolic function is hyperdynamic (EF > 70%).  · Left atrial volume is mildly increased.  · Lumason contrast shows normal LV systolic function and wall motion with an ejection fraction of 72%        Discharge Details        Discharge Medications      New Medications      Instructions Start Date   DULoxetine 20 MG capsule  Commonly known as: CYMBALTA   20 mg, Oral, Daily   Start Date: May 7, 2022        Continue These Medications      Instructions Start Date   amLODIPine 10 MG tablet  Commonly known as: NORVASC   No dose, route, or frequency recorded.      aspirin 81 MG EC tablet   81 mg, Oral, Daily      Cosentyx 150 MG/ML solution prefilled syringe  Generic drug: Secukinumab   Subcutaneous      famotidine 20 MG tablet  Commonly known as: PEPCID   20 mg, Oral, 2 Times Daily      fluticasone 50 MCG/ACT nasal spray  Commonly known as: FLONASE   2 sprays, Each Nare, Daily, Shake well before using.       insulin aspart 100 UNIT/ML solution pen-injector sc pen  Commonly known as: novoLOG FLEXPEN   Subcutaneous, 3 Times Daily With  Meals      levocetirizine 5 MG tablet  Commonly known as: XYZAL   5 mg, Oral, Every Evening      levothyroxine 75 MCG tablet  Commonly known as: SYNTHROID, LEVOTHROID   75 mcg, Oral, Daily      lisinopril 40 MG tablet  Commonly known as: PRINIVIL,ZESTRIL   No dose, route, or frequency recorded.      metoprolol tartrate 100 MG tablet  Commonly known as: LOPRESSOR   100 mg, Oral, Nightly      oxybutynin XL 5 MG 24 hr tablet  Commonly known as: DITROPAN-XL   5 mg      pravastatin 10 MG tablet  Commonly known as: PRAVACHOL   10 mg, Oral, Daily      Tresiba FlexTouch 100 UNIT/ML solution pen-injector injection  Generic drug: insulin degludec   13 Units, Subcutaneous, Nightly             Allergies   Allergen Reactions   • Atorvastatin Other (See Comments)   • Colesevelam Other (See Comments)   • Penicillins Rash         Discharge Disposition:  Home or Self Care    Diet:  Hospital:  Diet Order   Procedures   • Diet Regular; Consistent Carbohydrate       Activity:  Activity Instructions     Activity as Tolerated               CODE STATUS:    Code Status and Medical Interventions:   Ordered at: 05/04/22 2322     Code Status (Patient has no pulse and is not breathing):    CPR (Attempt to Resuscitate)     Medical Interventions (Patient has pulse or is breathing):    Full Support       Future Appointments   Date Time Provider Department Center   6/9/2022 10:15 AM PAT 1 DOT BH DOT PAT DOT   6/14/2022 10:00 AM C19 DOT [facility] DOT C19AL DOT   6/16/2022  8:00 AM Layla Vázquez MD [facility] DOT DOT   7/1/2022 10:00 AM Layla Vázquez MD [facility] DOT DOT       Additional Instructions for the Follow-ups that You Need to Schedule     Ambulatory Referral to Home Health   As directed      Face to Face Visit Date: 5/6/2022    Follow-up provider for Plan of Care?: I treated the patient in an acute care facility and will not continue treatment after discharge.    Follow-up provider: GÉNESIS JERRY [[id number]]    Reason/Clinical  Findings: Psoriatic arthritis, Diabetes, right upper extremity weakness    Describe mobility limitations that make leaving home difficult: impaired physical mobility and gait endurance    Nursing/Therapeutic Services Requested: Skilled Nursing Physical Therapy Occupational Therapy    Skilled nursing orders: Medication education    PT orders: Therapeutic exercise Gait Training Strengthening    Weight Bearing Status: As Tolerated    Occupational orders: Activities of daily living Energy conservation Strengthening Home safety assessment    Frequency: 1 Week 1         Discharge Follow-up with PCP   As directed       Currently Documented PCP:    Peter Baca MD    PCP Phone Number:    [phone]     Follow Up Details: PCP 1 week               Izaiah Graf MD  05/06/22      Time Spent on Discharge:  I spent 32 minutes on this discharge activity which included: face-to-face encounter with the patient, reviewing the data in the system, coordination of the care with the nursing staff as well as consultants, documentation, and entering orders.

## 2022-05-06 NOTE — CONSULTS
"Diabetes Education    Patient Name:  Marilyn Kunz  YOB: 1945  MRN: [id number]  Admit Date:  5/4/2022        Saw pt at bedside for diabetes education consult per stroke protocol and for insulin teaching; pt gave permission for visit. Noted per chart review no acute infarct; pt does not meet criteria to be scheduled for OP diabetes/stroke follow up class.   Pt has had diabetes for many years. She follows w/ Dr. Reid, endocrinologist at [facility]. She confirms she takes tresiba at home for long acting insulin, and states she uses novolog for mealtime and correction insulin; she states she uses carb ratio of 1:20 and has a \"sliding scale\" that she uses to correct as well. She wears a freestyle libre2 CGM and has a glucose meter for back up. Her CGM does have alarm capabilities and she notes that when EMS came to her house that she was having a low blood sugar, she thinks it got as low as 30--she states that she had only had boost to drink that day for lunch and dinner because she wasn't hungry. She states she does have hypoglycemia occasionally but unable to state how often. Suggested discussing w/ her endo regarding decreased appetite, reviewed that mealtime insulin should be taken 5-10 minutes before eating, and reviewed how to dose using carb ratio as well as importance of accurate carb counting. Pt has carb counted for many years and feels that she is doing this appropriately at home. Reviewed possible causes for hypo including not enough intake, insulin dosing, missed or late meals, increased activity, taking insulin during or after eating. Also reviewed how to treat hypoglycemia using the \"rule of 15s\".   Pt is concerned that she cannot use her insulin pens at home as she had trouble prior to calling 911 both tearing alcohol pad package open as well as pulling caps off of pen needle due to the RUE weakness she was experiencing. She states her RUE feels \"better\" but \"not 100%\". Upon " "assessment noted pt's hands are very arthritic appearing. Using demo flex touch insulin pen, pen needle, and demo pillow pt demonstrated how she would use and give her insulin at home. She was not able to tear open alcohol pad, appeared due to lack of  strength. We discussed she could use rubbing alcohol from bottle w/ cotton balls or cotton pads--she has both of these available at home. She was able to pull cap off pen and screw pen needle onto pen. However, the first time she tried to remove inner pen needle cap w/ her right hand, she pulled needle cap off at an angle due to tremor in her hand and lack of fine motor control, and the pen needle bent--she states that this is what happened at home as well. She tried again with a new pen needle--asked her to hold the pen in her right hand since she has less control w/ that hand, and pull the needle caps off with her left hand. She successfully removed both needle caps without bending the needle, then correctly gave injection into demo pillow. We reviewed appropriate insulin injection sites as she states she always uses her abdomen and inquired if any other site okay to use. Reviewed importance of rotating sites. Pt also states she has been having difficulty opening her freestyle libre2 packaging prior to inserting sensor due to her lack of  strength. She had her new sensor package at the bedside in unopened box and asked if I would unscrew/break seal on sensor pack so that she can replace her sensor when she gets home (pt had to remove sensor yesterday for MRI). Did this as pt asked, did not fully open sensor pack, just slightly unscrewed to break seal then screwed back together so pt can easily open when she gets home. She states she has a  \"tool\" she uses at home normally to assist but was anxious about getting this open w/ change in strength w/ her RUE. Provided pt w/ [facility] \"types of insulin\" and insulin education handout which includes appropriate " injection sites. Provided OP diabetes ed contact information. Thank you for the consult.       Electronically signed by:  Kim Bee RN, [facility]  05/06/22 11:52 EDT

## 2022-05-06 NOTE — NURSING NOTE
Discharge instructions reviewed with patient. Stated understanding. Acknowledges follow up appointments as indicated. Awaiting transport to assist patient to main lobby.

## 2022-05-06 NOTE — TELEPHONE ENCOUNTER
"Diabetes education:  Received phone call from Redd w/ [facility] as well as message from Liset Byers RN Clinical Manager w/ [facility], that pt had declined home health services and had stated \"Kim with diabetes education\" along with patient had decided pt didn't need home health. Pt asked that home health have me call her. Attempted to call pt at both home and cell numbers in pt's chart and left voicemails at both numbers. Spoke w/ Redd and reviewed that home health was not discussed w/ pt by diabetes educator during inpatient visit, and if pt returns call would advise that she follow provider and d/c orders regarding home health services.     Addendum 1645 Pt returned call. Explained that per d/c summary home health has been ordered. Pt states she thought she didn't need home health because she demonstrated she was able to give her own insulin. Explained that home health services would encompass other services and help beyond helping w/ her insulin. Pt states she will accept home health eval, she states she did not feel like she was told at d/c that this was being ordered. Communicated w/ Liset Byers w/ [facility] this communication w/ pt.    Kim BeeRN,[facility]  "

## 2022-05-06 NOTE — PLAN OF CARE
Goal Outcome Evaluation:  Plan of Care Reviewed With: patient        Progress: no change  Outcome Evaluation: PT evaluation completed. Pt presents with decreased functional mobility, decreased balance, and decreased independence. Pt ambulated 350ft with CGA, unsupported. Pt noted to have one LOB during initial STS, pt was able to self correct. Pt also noted to have decreased endurance with activity, pt had SOA near end of session. Recommend continued skilled IP PT interventions. Recommend D/C home with assist and HHPT based on current functional mobility.

## 2022-05-06 NOTE — PROGRESS NOTES
Met with patient to discuss home health and she is agreeable to [facility]. Verified PCP is Dr Eder Baca--Redd DAVIS([facility] [place])Hospital Liaison

## 2022-05-06 NOTE — THERAPY EVALUATION
Patient Name: Marilyn Kunz  : 1945    MRN: [id number]                              Today's Date: 2022       Admit Date: 2022    Visit Dx:     ICD-10-CM ICD-9-CM   1. Weakness of right upper extremity  R29.898 729.89   2. Cerebrovascular accident (CVA), unspecified mechanism (HCC)  I63.9 434.91   3. Psoriatic arthritis (HCC)  L40.50 696.0     Patient Active Problem List   Diagnosis   • Chest pain   • Pneumonia   • Essential hypertension   • HLD (hyperlipidemia)   • Odynophagia   • Factor V Leiden (HCC)   • Weakness of right upper extremity   • Type 1 diabetes mellitus (HCC)   • Psoriatic arthritis (HCC)   • CKD (chronic kidney disease) stage 2, GFR 60-89 ml/min     Past Medical History:   Diagnosis Date   • Anemia    • Asthma    • Chronic kidney disease     pt told it was dx from Dr Baca and to not eat much protein   • Disease of thyroid gland    • Factor 5 Leiden mutation, heterozygous (HCC)    • Hyperlipidemia    • Hypertension    • Murmur, cardiac    • Psoriatic arthritis (HCC)     hands   • Type 1 diabetes (HCC)      Past Surgical History:   Procedure Laterality Date   • CATARACT EXTRACTION Bilateral    • ENDOSCOPY N/A 10/20/2020    Procedure: ESOPHAGOGASTRODUODENOSCOPY;  Surgeon: Brunner, Mark I, MD;  Location: [facility] ENDOSCOPY;  Service: Gastroenterology;  Laterality: N/A;   • HAND SURGERY Left     no hardware   • PATENT DUCTUS ARTERIOUS LIGATION        General Information     Row Name 22 0901          Physical Therapy Time and Intention    Document Type evaluation  -AE     Mode of Treatment physical therapy  -AE     Row Name 22 09          General Information    Patient Profile Reviewed yes  -AE     Prior Level of Function independent:;all household mobility;gait;transfer;bed mobility;ADL's;dressing;bathing  -AE     Existing Precautions/Restrictions fall;other (see comments)  Psoratic arthritis  -AE     Barriers to Rehab medically complex  -AE     Row Name  05/06/22 0901          Living Environment    People in Home alone  -AE     Row Name 05/06/22 0901          Home Main Entrance    Number of Stairs, Main Entrance two  -AE     Stair Railings, Main Entrance railings safe and in good condition  -AE     Row Name 05/06/22 0901          Stairs Within Home, Primary    Number of Stairs, Within Home, Primary none  -AE     Stair Railings, Within Home, Primary none  -AE     Row Name 05/06/22 0901          Cognition    Orientation Status (Cognition) oriented x 4  -AE     Row Name 05/06/22 0901          Safety Issues, Functional Mobility    Safety Issues Affecting Function (Mobility) awareness of need for assistance;insight into deficits/self-awareness;safety precaution awareness;safety precautions follow-through/compliance  -AE     Impairments Affecting Function (Mobility) balance;endurance/activity tolerance;range of motion (ROM);shortness of breath;strength  -AE           User Key  (r) = Recorded By, (t) = Taken By, (c) = Cosigned By    Initials Name Provider Type    AE Geovanni Grant, PT Physical Therapist               Mobility     Row Name 05/06/22 0903          Bed Mobility    Bed Mobility supine-sit;sit-supine  -AE     Scooting/Bridging [place] (Bed Mobility) supervision  -AE     Supine-Sit [place] (Bed Mobility) supervision  -AE     Sit-Supine [place] (Bed Mobility) supervision  -AE     Assistive Device (Bed Mobility) head of bed elevated;bed rails  -AE     Comment, (Bed Mobility) VCs for hand placement to improve independence. Pt demo good sequencing overall.  -AE     Row Name 05/06/22 0903          Transfers    Comment, (Transfers) Good sequencing, one LOB noted on initial STS that was self-corrected. No dizziness noted.  -AE     Row Name 05/06/22 0903          Sit-Stand Transfer    Sit-Stand [place] (Transfers) contact guard;1 person assist;verbal cues  -AE     Comment, (Sit-Stand Transfer) LOB with initial STS, self-corrected  -AE     Row Name  05/06/22 0903          Gait/Stairs (Locomotion)    [place] Level (Gait) contact guard  -AE     Assistive Device (Gait) other (see comments)  Unsupported  -AE     Distance in Feet (Gait) 350  -AE     Deviations/Abnormal Patterns (Gait) base of support, narrow;sam decreased;gait speed decreased;stride length decreased  -AE     Comment, (Gait/Stairs) Pt demo step through gait pattern with slowed sam, decreased step length, and decreased gait speed. Pt talks frequently and requires frequent VCs to improve attention to task and improve gait mechanics.  -AE           User Key  (r) = Recorded By, (t) = Taken By, (c) = Cosigned By    Initials Name Provider Type    AE Geovanni Grant PT Physical Therapist               Obj/Interventions     Row Name 05/06/22 0909          Range of Motion Comprehensive    General Range of Motion bilateral lower extremity ROM WFL  -AE     Row Name 05/06/22 0909          Strength Comprehensive (MMT)    Comment, General Manual Muscle Testing (MMT) Assessment BLE grossly 4+/5  -AE     Row Name 05/06/22 0909          Balance    Balance Assessment sitting static balance;sitting dynamic balance;sit to stand dynamic balance;standing static balance;standing dynamic balance  -AE     Static Sitting Balance standby assist  -AE     Dynamic Sitting Balance standby assist  -AE     Position, Sitting Balance unsupported;sitting edge of bed  -AE     Sit to Stand Dynamic Balance contact guard;1-person assist  -AE     Static Standing Balance standby assist  -AE     Dynamic Standing Balance contact guard  -AE     Position/Device Used, Standing Balance unsupported  -AE     Row Name 05/06/22 0909          Sensory Assessment (Somatosensory)    Sensory Assessment (Somatosensory) LE sensation intact  -AE           User Key  (r) = Recorded By, (t) = Taken By, (c) = Cosigned By    Initials Name Provider Type    AE Geovanni Grant PT Physical Therapist               Goals/Plan     Row Name 05/06/22 0942           Bed Mobility Goal 1 (PT)    Activity/Assistive Device (Bed Mobility Goal 1, PT) sit to supine/supine to sit  -AE     [place] Level/Cues Needed (Bed Mobility Goal 1, PT) independent  -AE     Time Frame (Bed Mobility Goal 1, PT) long term goal (LTG);2 weeks  -AE     Progress/Outcomes (Bed Mobility Goal 1, PT) goal ongoing  -AE     Row Name 05/06/22 0948          Transfer Goal 1 (PT)    Activity/Assistive Device (Transfer Goal 1, PT) sit-to-stand/stand-to-sit;bed-to-chair/chair-to-bed  -AE     [place] Level/Cues Needed (Transfer Goal 1, PT) independent  -AE     Time Frame (Transfer Goal 1, PT) long term goal (LTG);2 weeks  -AE     Progress/Outcome (Transfer Goal 1, PT) goal ongoing  -AE     Row Name 05/06/22 0948          Gait Training Goal 1 (PT)    Activity/Assistive Device (Gait Training Goal 1, PT) gait (walking locomotion)  -AE     [place] Level (Gait Training Goal 1, PT) independent  -AE     Distance (Gait Training Goal 1, PT) 500ft  -AE     Time Frame (Gait Training Goal 1, PT) long term goal (LTG);2 weeks  -AE     Progress/Outcome (Gait Training Goal 1, PT) goal ongoing  -AE     Row Name 05/06/22 0948          Stairs Goal 1 (PT)    Activity/Assistive Device (Stairs Goal 1, PT) ascending stairs;descending stairs;step-to-step  -AE     [place] Level/Cues Needed (Stairs Goal 1, PT) standby assist  -AE     Number of Stairs (Stairs Goal 1, PT) 2  -AE     Time Frame (Stairs Goal 1, PT) long term goal (LTG);2 weeks  -AE     Progress/Outcome (Stairs Goal 1, PT) goal ongoing  -AE     Row Name 05/06/22 0948          Therapy Assessment/Plan (PT)    Planned Therapy Interventions (PT) balance training;bed mobility training;gait training;home exercise program;postural re-education;patient/family education;ROM (range of motion);stair training;strengthening;transfer training  -AE           User Key  (r) = Recorded By, (t) = Taken By, (c) = Cosigned By    Initials Name Provider Type    AE Estridge,  Geovanni, PT Physical Therapist               Clinical Impression     Row Name 05/06/22 0943          Pain    Pretreatment Pain Rating 0/10 - no pain  -AE     Posttreatment Pain Rating 0/10 - no pain  -AE     Row Name 05/06/22 0943          Plan of Care Review    Plan of Care Reviewed With patient  -AE     Progress no change  -AE     Outcome Evaluation PT evaluation completed. Pt presents with decreased functional mobility, decreased balance, and decreased independence. Pt ambulated 350ft with CGA, unsupported. Pt noted to have one LOB during initial STS, pt was able to self correct. Pt also noted to have decreased endurance with activity, pt had SOA near end of session. Recommend continued skilled IP PT interventions. Recommend D/C home with assist and HHPT based on current functional mobility.  -AE     Row Name 05/06/22 0943          Therapy Assessment/Plan (PT)    Patient/Family Therapy Goals Statement (PT) Return to PLOF  -AE     Rehab Potential (PT) good, to achieve stated therapy goals  -AE     Criteria for Skilled Interventions Met (PT) yes  -AE     Therapy Frequency (PT) daily  -AE     Row Name 05/06/22 0943          Vital Signs    Pre Systolic BP Rehab 154  -AE     Pre Treatment Diastolic BP 68  -AE     Pretreatment Heart Rate (beats/min) 64  -AE     Posttreatment Heart Rate (beats/min) 72  -AE     Pre SpO2 (%) --  VSS  -AE     Pre Patient Position Supine  -AE     Intra Patient Position Standing  -AE     Post Patient Position Supine  -AE     Row Name 05/06/22 0943          Positioning and Restraints    Pre-Treatment Position in bed  -AE     Post Treatment Position bed  -AE     In Bed notified nsg;fowlers;call light within reach;encouraged to call for assist;exit alarm on;side rails up x3;legs elevated  -AE           User Key  (r) = Recorded By, (t) = Taken By, (c) = Cosigned By    Initials Name Provider Type    AE Geovanni Grant, PT Physical Therapist               Outcome Measures     Row Name 05/06/22  0954          How much help from another person do you currently need...    Turning from your back to your side while in flat bed without using bedrails? 4  -AE     Moving from lying on back to sitting on the side of a flat bed without bedrails? 4  -AE     Moving to and from a bed to a chair (including a wheelchair)? 3  -AE     Standing up from a chair using your arms (e.g., wheelchair, bedside chair)? 3  -AE     Climbing 3-5 steps with a railing? 3  -AE     To walk in hospital room? 3  -AE     AM-PAC 6 Clicks Score (PT) 20  -AE     Highest level of mobility 6 --> Walked 10 steps or more  -AE     Row Name 05/06/22 0954          Functional Assessment    Outcome Measure Options AM-PAC 6 Clicks Basic Mobility (PT)  -AE           User Key  (r) = Recorded By, (t) = Taken By, (c) = Cosigned By    Initials Name Provider Type    AE Geovanni Grant PT Physical Therapist                             Physical Therapy Education                 Title: PT OT SLP Therapies (In Progress)     Topic: Physical Therapy (In Progress)     Point: Mobility training (In Progress)     Learning Progress Summary           Patient Acceptance, E, NR by AE at 5/6/2022 0825                   Point: Home exercise program (In Progress)     Learning Progress Summary           Patient Acceptance, E, NR by AE at 5/6/2022 0825                   Point: Body mechanics (In Progress)     Learning Progress Summary           Patient Acceptance, E, NR by AE at 5/6/2022 0825                   Point: Precautions (In Progress)     Learning Progress Summary           Patient Acceptance, E, NR by AE at 5/6/2022 0825                               User Key     Initials Effective Dates Name Provider Type Discipline    AE 09/21/21 -  Geovanni Grant, PT Physical Therapist PT              PT Recommendation and Plan  Planned Therapy Interventions (PT): balance training, bed mobility training, gait training, home exercise program, postural re-education, patient/family  education, ROM (range of motion), stair training, strengthening, transfer training  Plan of Care Reviewed With: patient  Progress: no change  Outcome Evaluation: PT evaluation completed. Pt presents with decreased functional mobility, decreased balance, and decreased independence. Pt ambulated 350ft with CGA, unsupported. Pt noted to have one LOB during initial STS, pt was able to self correct. Pt also noted to have decreased endurance with activity, pt had SOA near end of session. Recommend continued skilled IP PT interventions. Recommend D/C home with assist and HHPT based on current functional mobility.     Time Calculation:    PT Charges     Row Name 05/06/22 0956             Time Calculation    Start Time 0825  -AE      PT Received On 05/06/22  -AE      PT Goal Re-Cert Due Date 05/16/22  -AE              Time Calculation- PT    Total Timed Code Minutes- PT 15 minute(s)  -AE              Timed Charges    00581 - PT Therapeutic Activity Minutes 15  -AE              Untimed Charges    PT Eval/Re-eval Minutes 48  -AE              Total Minutes    Timed Charges Total Minutes 15  -AE      Untimed Charges Total Minutes 48  -AE       Total Minutes 63  -AE            User Key  (r) = Recorded By, (t) = Taken By, (c) = Cosigned By    Initials Name Provider Type    AE Geovanni Grant PT Physical Therapist              Therapy Charges for Today     Code Description Service Date Service Provider Modifiers Qty    48920658745 HC PT THERAPEUTIC ACT EA 15 MIN 5/6/2022 Geovanni Grant, PT GP 1    49816919628 HC PT EVAL LOW COMPLEXITY 4 5/6/2022 Geovanni Grant PT GP 1          PT G-Codes  Outcome Measure Options: AM-PAC 6 Clicks Basic Mobility (PT)  AM-PAC 6 Clicks Score (PT): 20  AM-PAC 6 Clicks Score (OT): 23    Geovanni Grant PT  5/6/2022

## 2022-05-06 NOTE — CASE MANAGEMENT/SOCIAL WORK
Case Management Discharge Note      Final Note: I met with Mrs. Kunz at the bedside to discuss her discharge plan. She anticipates hospital discharge today. She plans on returning home at the time of discharge. She is agreeable to home health services. I have called a referral to [facility] with [facility]. They will follow Mrs. Kunz with skilled nursing, PT and OT. Mrs. Kunz reports that her nephew is unavailable today to transport her home. Case management will arrange a LYFT at the time of discharge.    Provided Post Acute Provider Quality & Resource List?: Yes  Post Acute Provider Quality and Resource List: Nursing Home  Delivered To: Patient  Method of Delivery: In person    Selected Continued Care - Admitted Since 5/4/2022     Destination    No services have been selected for the patient.              Durable Medical Equipment    No services have been selected for the patient.              Dialysis/Infusion    No services have been selected for the patient.              Home Medical Care Coordination complete.    Service Provider Selected Services Address Phone Fax Patient Preferred    [facility]  [facility] [address] [id number] [phone] --          Therapy    No services have been selected for the patient.              Community Resources    No services have been selected for the patient.              Community & DME    No services have been selected for the patient.                       Final Discharge Disposition Code: 06 - home with home health care

## 2022-05-07 ENCOUNTER — HOME CARE VISIT (OUTPATIENT)
Dept: HOME HEALTH SERVICES | Facility: HOME HEALTHCARE | Age: 77
End: 2022-05-07

## 2022-05-07 VITALS
BODY MASS INDEX: 26.21 KG/M2 | SYSTOLIC BLOOD PRESSURE: 130 MMHG | WEIGHT: 130 LBS | HEIGHT: 59 IN | OXYGEN SATURATION: 96 % | DIASTOLIC BLOOD PRESSURE: 70 MMHG | HEART RATE: 60 BPM | TEMPERATURE: 98.1 F | RESPIRATION RATE: 16 BRPM

## 2022-05-07 PROCEDURE — G0299 HHS/HOSPICE OF RN EA 15 MIN: HCPCS

## 2022-05-09 ENCOUNTER — HOME CARE VISIT (OUTPATIENT)
Dept: HOME HEALTH SERVICES | Facility: HOME HEALTHCARE | Age: 77
End: 2022-05-09

## 2022-05-09 VITALS
TEMPERATURE: 97.8 F | OXYGEN SATURATION: 96 % | HEART RATE: 74 BPM | DIASTOLIC BLOOD PRESSURE: 70 MMHG | RESPIRATION RATE: 19 BRPM | SYSTOLIC BLOOD PRESSURE: 141 MMHG

## 2022-05-09 PROCEDURE — G0151 HHCP-SERV OF PT,EA 15 MIN: HCPCS

## 2022-05-09 NOTE — HOME HEALTH
"Last Wednesday, awoke with throbbing severe pain in her R UE, especially the hand.  By 5pm that evening, could not manipulate objects for her diabetic test strips. Wears glasses, lives alone, Testing revealed a past CVA, R arm \"just feels heavy\". Amb without AD. R hand dominant, does drive. She lives with her sister in a 1 story home.  She declines need for further PT intervention, and instead would like to focus on R shoulder strength, RUE pain mgmt."

## 2022-05-10 ENCOUNTER — TELEPHONE (OUTPATIENT)
Dept: ORTHOPEDIC SURGERY | Facility: CLINIC | Age: 77
End: 2022-05-10

## 2022-05-10 NOTE — TELEPHONE ENCOUNTER
Caller: ANDREY  Relationship to Patient: SELF     Phone Number: [phone]  Reason for Call: PATIENT CALLED STATING SHE IS HAVING SOME OTHER ISSUES SUCH AS STROKE AND NEEDS TO CANCEL HER SX FOR 06/16/22 FOR NOW

## 2022-05-11 ENCOUNTER — HOME CARE VISIT (OUTPATIENT)
Dept: HOME HEALTH SERVICES | Facility: HOME HEALTHCARE | Age: 77
End: 2022-05-11

## 2022-05-11 VITALS
RESPIRATION RATE: 16 BRPM | OXYGEN SATURATION: 97 % | DIASTOLIC BLOOD PRESSURE: 66 MMHG | TEMPERATURE: 97 F | SYSTOLIC BLOOD PRESSURE: 122 MMHG | HEART RATE: 78 BPM

## 2022-05-11 PROCEDURE — G0495 RN CARE TRAIN/EDU IN HH: HCPCS

## 2022-05-11 NOTE — HOME HEALTH
Pt having some swings in blood sugars more than usual since coming home from [facility].  She knows how to manage them with her diet and meds and glucose tablets.  She feels hospitalization has caused some of these swings. SHe is going to continue to monitor and call MD if it continues.  She has some increased pain in her shouler and back.  She has a call into Dr White and is waiting for a response back.

## 2022-05-12 ENCOUNTER — HOME CARE VISIT (OUTPATIENT)
Dept: HOME HEALTH SERVICES | Facility: HOME HEALTHCARE | Age: 77
End: 2022-05-12

## 2022-05-12 VITALS — SYSTOLIC BLOOD PRESSURE: 156 MMHG | HEART RATE: 57 BPM | DIASTOLIC BLOOD PRESSURE: 70 MMHG | OXYGEN SATURATION: 95 %

## 2022-05-12 PROCEDURE — G0152 HHCP-SERV OF OT,EA 15 MIN: HCPCS

## 2022-05-12 NOTE — HOME HEALTH
Pt is a 75 yo white female admitted to [facility] for services of SN/PT/OT due to recent hospitalization from 5/4-5/6/22 due to weakness of (R) upper extremity and Psoriatic arthritis. Pt lives at home alone and has a nephew that assist her. Pt has a hx: HTN, HLD, T1DM, CKD, and Factor V Leiden. Pt does not use an assistive device to ambulate. Pt does have trouble holding items and has to use both hands.

## 2022-05-13 NOTE — HOME HEALTH
From: Tejal Burak  To: Jasmeet Cardenas MD  Sent: 1/15/2020 7:06 PM CST  Subject: Non-Urgent Medical Question    Hi, do I need to have the AST values rechecked before my next 1 year check up?   Thanks Patient recently hospitalized at [facility] 5/4-5/6/22 due to weakness of (R) upper extremity and Psoriatic arthritis. PMH includes but is not limited to: HTN, HLD, T1DM, CKD, and Factor V Leiden. Patient seen today for OT evaluation in which she presents with decreased strength and increased pain of BUE's, mostly RUE, and also in her back. Patient also having decreased sensation in hands with temperatures. OT to see pt an additional 1w2 to increase UB strength, and manipulation/dexterity of hands needed for functional tasks

## 2022-05-16 LAB — REF LAB TEST RESULTS: NORMAL

## 2022-05-17 ENCOUNTER — TRANSCRIBE ORDERS (OUTPATIENT)
Dept: ADMINISTRATIVE | Facility: HOSPITAL | Age: 77
End: 2022-05-17

## 2022-05-17 ENCOUNTER — HOME CARE VISIT (OUTPATIENT)
Dept: HOME HEALTH SERVICES | Facility: HOME HEALTHCARE | Age: 77
End: 2022-05-17

## 2022-05-17 VITALS — SYSTOLIC BLOOD PRESSURE: 132 MMHG | HEART RATE: 68 BPM | DIASTOLIC BLOOD PRESSURE: 68 MMHG | OXYGEN SATURATION: 99 %

## 2022-05-17 DIAGNOSIS — M46.92 CERVICAL SPONDYLITIS WITH RADICULITIS: Primary | ICD-10-CM

## 2022-05-17 DIAGNOSIS — M54.12 CERVICAL SPONDYLITIS WITH RADICULITIS: Primary | ICD-10-CM

## 2022-05-17 PROCEDURE — G0152 HHCP-SERV OF OT,EA 15 MIN: HCPCS

## 2022-05-17 NOTE — HOME HEALTH
Routine Visit Note:    Skill/education provided: Establishing HEP, functional transfers/mobility, and ADL's    Patient/caregiver response: Patient tolerated therapy session well, is motivated to improve UB strength    Plan for next visit: Upgrade/review HEP, d/c    Other pertinent info:

## 2022-05-18 ENCOUNTER — HOME CARE VISIT (OUTPATIENT)
Dept: HOME HEALTH SERVICES | Facility: HOME HEALTHCARE | Age: 77
End: 2022-05-18

## 2022-05-18 VITALS
DIASTOLIC BLOOD PRESSURE: 63 MMHG | OXYGEN SATURATION: 97 % | TEMPERATURE: 97.7 F | SYSTOLIC BLOOD PRESSURE: 165 MMHG | HEART RATE: 53 BPM

## 2022-05-18 PROCEDURE — G0299 HHS/HOSPICE OF RN EA 15 MIN: HCPCS

## 2022-05-25 ENCOUNTER — HOME CARE VISIT (OUTPATIENT)
Dept: HOME HEALTH SERVICES | Facility: HOME HEALTHCARE | Age: 77
End: 2022-05-25

## 2022-05-25 VITALS
SYSTOLIC BLOOD PRESSURE: 156 MMHG | DIASTOLIC BLOOD PRESSURE: 82 MMHG | OXYGEN SATURATION: 97 % | HEART RATE: 57 BPM | TEMPERATURE: 97.8 F

## 2022-05-25 PROCEDURE — G0152 HHCP-SERV OF OT,EA 15 MIN: HCPCS

## 2022-05-25 NOTE — HOME HEALTH
Routine Visit Note:    Skill/education provided: FMC exercises and recommendations for AE/modifications for use with Freestyle for DM management, ADLs    Patient/caregiver response: Patient tolerated therapy session well, has been improving over last few weeks. Plans to see MD tomorrow and try out insulin pump but they are worried with her arthritis that she might have difficulty managing    Plan for next visit: D/c visit, assess modifications/assist pt in ways to use new insulin device if she gets it     Other pertinent info:

## 2022-06-01 ENCOUNTER — HOME CARE VISIT (OUTPATIENT)
Dept: HOME HEALTH SERVICES | Facility: HOME HEALTHCARE | Age: 77
End: 2022-06-01

## 2022-06-01 VITALS
OXYGEN SATURATION: 97 % | RESPIRATION RATE: 16 BRPM | SYSTOLIC BLOOD PRESSURE: 138 MMHG | TEMPERATURE: 97.7 F | DIASTOLIC BLOOD PRESSURE: 82 MMHG | HEART RATE: 50 BPM

## 2022-06-01 PROCEDURE — G0495 RN CARE TRAIN/EDU IN HH: HCPCS

## 2022-06-01 NOTE — HOME HEALTH
Patient discharged from [facility] today. Upon vital sign assessment, patient's HR was 50, confirmed with radial palpation. Patient's recordings for the last several days have been 50s-60s. Patient stated her physician had increased her blood pressure meds a couple months ago. Instructed patient to alert physician if HR drops into 40s and/or if becoming symptomatic. OT has a visit with patient tomorrow. Notified OT to let nursing know if HR is still low tomorrow.

## 2022-06-02 ENCOUNTER — HOME CARE VISIT (OUTPATIENT)
Dept: HOME HEALTH SERVICES | Facility: HOME HEALTHCARE | Age: 77
End: 2022-06-02

## 2022-06-02 VITALS
TEMPERATURE: 98.1 F | SYSTOLIC BLOOD PRESSURE: 172 MMHG | HEART RATE: 50 BPM | OXYGEN SATURATION: 97 % | DIASTOLIC BLOOD PRESSURE: 78 MMHG

## 2022-06-02 PROCEDURE — G0152 HHCP-SERV OF OT,EA 15 MIN: HCPCS

## 2022-06-02 NOTE — HOME HEALTH
OT [place] discharge completed today 6/2/22. Patient has improved in all goal areas since SOC, and reports being at her baseline. Patient is independent with all BADL's/IADL's, functional mobility and transfers in the home without an AD. Pt is also compliant with her UB HEP, and is knowledgable of all medications and side effects.

## 2022-06-09 ENCOUNTER — APPOINTMENT (OUTPATIENT)
Dept: PREADMISSION TESTING | Facility: HOSPITAL | Age: 77
End: 2022-06-09

## 2022-06-13 NOTE — CASE COMMUNICATION
Patient missed a {HH Visit Type: SN visit from [facility] on {DATE:5/25/22}.     Reason: {HH Missed Visit Reason:Patient cancelled, ststed she can be seen nwxt week}.       For your records only.   As per home health protocol, MD must be notified of missed/cancelled visits; therefore the prescribed frequency was not met.

## 2022-06-14 ENCOUNTER — APPOINTMENT (OUTPATIENT)
Dept: PREADMISSION TESTING | Facility: HOSPITAL | Age: 77
End: 2022-06-14

## 2022-06-17 ENCOUNTER — HOSPITAL ENCOUNTER (OUTPATIENT)
Dept: MRI IMAGING | Facility: HOSPITAL | Age: 77
Discharge: HOME | End: 2022-06-17
Admitting: FAMILY MEDICINE

## 2022-06-17 DIAGNOSIS — M46.92 CERVICAL SPONDYLITIS WITH RADICULITIS: ICD-10-CM

## 2022-06-17 DIAGNOSIS — M54.12 CERVICAL SPONDYLITIS WITH RADICULITIS: ICD-10-CM

## 2022-06-17 PROCEDURE — 72141 MRI NECK SPINE W/O DYE: CPT

## 2022-06-23 ENCOUNTER — LAB (OUTPATIENT)
Dept: LAB | Facility: HOSPITAL | Age: 77
End: 2022-06-23

## 2022-06-23 ENCOUNTER — OFFICE VISIT (OUTPATIENT)
Dept: NEUROLOGY | Facility: CLINIC | Age: 77
End: 2022-06-23

## 2022-06-23 VITALS
SYSTOLIC BLOOD PRESSURE: 150 MMHG | DIASTOLIC BLOOD PRESSURE: 70 MMHG | HEART RATE: 76 BPM | WEIGHT: 131.2 LBS | TEMPERATURE: 97.3 F | HEIGHT: 59 IN | BODY MASS INDEX: 26.45 KG/M2 | OXYGEN SATURATION: 98 %

## 2022-06-23 DIAGNOSIS — N88.9 CERVICAL LESION: ICD-10-CM

## 2022-06-23 DIAGNOSIS — R20.2 PARESTHESIA: ICD-10-CM

## 2022-06-23 DIAGNOSIS — G37.9 DEMYELINATING DISEASE: ICD-10-CM

## 2022-06-23 DIAGNOSIS — G37.9 DEMYELINATING DISEASE: Primary | ICD-10-CM

## 2022-06-23 PROBLEM — R26.89 IMPAIRMENT OF BALANCE: Status: ACTIVE | Noted: 2022-06-23

## 2022-06-23 PROBLEM — N18.30 TYPE 2 DIABETES MELLITUS WITH STAGE 3 CHRONIC KIDNEY DISEASE, WITH LONG-TERM CURRENT USE OF INSULIN: Status: ACTIVE | Noted: 2022-06-23

## 2022-06-23 PROBLEM — Z96.652 TOTAL KNEE REPLACEMENT STATUS, LEFT: Status: ACTIVE | Noted: 2022-06-23

## 2022-06-23 PROBLEM — Z00.00 ROUTINE HISTORY AND PHYSICAL EXAMINATION OF ADULT: Status: ACTIVE | Noted: 2022-06-23

## 2022-06-23 PROBLEM — M94.9 DISORDER OF BONE AND CARTILAGE: Status: ACTIVE | Noted: 2022-06-23

## 2022-06-23 PROBLEM — M62.838 MUSCLE SPASM: Status: ACTIVE | Noted: 2022-06-23

## 2022-06-23 PROBLEM — E11.22 TYPE 2 DIABETES MELLITUS WITH STAGE 3 CHRONIC KIDNEY DISEASE, WITH LONG-TERM CURRENT USE OF INSULIN: Status: ACTIVE | Noted: 2022-06-23

## 2022-06-23 PROBLEM — I77.6 VASCULITIS: Status: ACTIVE | Noted: 2022-06-23

## 2022-06-23 PROBLEM — M89.9 DISORDER OF BONE AND CARTILAGE: Status: ACTIVE | Noted: 2022-06-23

## 2022-06-23 PROBLEM — M19.071: Status: ACTIVE | Noted: 2022-06-23

## 2022-06-23 PROBLEM — E11.65 UNCONTROLLED DIABETES MELLITUS WITH HYPERGLYCEMIA, WITH LONG-TERM CURRENT USE OF INSULIN: Status: ACTIVE | Noted: 2022-06-23

## 2022-06-23 PROBLEM — Z91.89 AT RISK FOR VENOUS THROMBOEMBOLISM (VTE): Status: ACTIVE | Noted: 2021-09-10

## 2022-06-23 PROBLEM — M46.92 CERVICAL SPONDYLITIS WITH RADICULITIS: Status: ACTIVE | Noted: 2022-06-23

## 2022-06-23 PROBLEM — M54.12 CERVICAL SPONDYLITIS WITH RADICULITIS: Status: ACTIVE | Noted: 2022-06-23

## 2022-06-23 PROBLEM — M43.10 SPONDYLOLISTHESIS, GRADE 2: Status: ACTIVE | Noted: 2022-06-23

## 2022-06-23 PROBLEM — I10 BENIGN ESSENTIAL HYPERTENSION: Status: ACTIVE | Noted: 2022-06-23

## 2022-06-23 PROBLEM — Z79.4 TYPE 2 DIABETES MELLITUS WITH STAGE 3 CHRONIC KIDNEY DISEASE, WITH LONG-TERM CURRENT USE OF INSULIN: Status: ACTIVE | Noted: 2022-06-23

## 2022-06-23 PROBLEM — N32.81 OVERACTIVE BLADDER: Status: ACTIVE | Noted: 2022-06-23

## 2022-06-23 PROBLEM — Z79.4 UNCONTROLLED DIABETES MELLITUS WITH HYPERGLYCEMIA, WITH LONG-TERM CURRENT USE OF INSULIN: Status: ACTIVE | Noted: 2022-06-23

## 2022-06-23 PROBLEM — Z79.4 LONG TERM CURRENT USE OF INSULIN (HCC): Status: ACTIVE | Noted: 2022-06-23

## 2022-06-23 PROBLEM — E78.00 HYPERCHOLESTEROLEMIA: Status: ACTIVE | Noted: 2022-06-23

## 2022-06-23 PROBLEM — Z09 HOSPITAL DISCHARGE FOLLOW-UP: Status: ACTIVE | Noted: 2022-06-23

## 2022-06-23 PROBLEM — M79.641 PAIN IN RIGHT HAND: Status: ACTIVE | Noted: 2022-06-23

## 2022-06-23 PROBLEM — L21.9 SEBORRHEIC DERMATITIS: Status: ACTIVE | Noted: 2022-06-23

## 2022-06-23 LAB
ALBUMIN SERPL-MCNC: 4.8 G/DL (ref 3.5–5.2)
ALBUMIN/GLOB SERPL: 1.4 G/DL
ALP SERPL-CCNC: 84 U/L (ref 39–117)
ALT SERPL W P-5'-P-CCNC: 16 U/L (ref 1–33)
ANION GAP SERPL CALCULATED.3IONS-SCNC: 9 MMOL/L (ref 5–15)
AST SERPL-CCNC: 17 U/L (ref 1–32)
BASOPHILS # BLD AUTO: 0.06 10*3/MM3 (ref 0–0.2)
BASOPHILS NFR BLD AUTO: 0.7 % (ref 0–1.5)
BILIRUB SERPL-MCNC: 0.4 MG/DL (ref 0–1.2)
BUN SERPL-MCNC: 31 MG/DL (ref 8–23)
BUN/CREAT SERPL: 29.5 (ref 7–25)
CALCIUM SPEC-SCNC: 10.3 MG/DL (ref 8.6–10.5)
CHLORIDE SERPL-SCNC: 99 MMOL/L (ref 98–107)
CK SERPL-CCNC: 105 U/L (ref 20–180)
CO2 SERPL-SCNC: 29 MMOL/L (ref 22–29)
CREAT SERPL-MCNC: 1.05 MG/DL (ref 0.57–1)
CRP SERPL-MCNC: <0.3 MG/DL (ref 0–0.5)
DEPRECATED RDW RBC AUTO: 42.9 FL (ref 37–54)
EGFRCR SERPLBLD CKD-EPI 2021: 55.2 ML/MIN/1.73
EOSINOPHIL # BLD AUTO: 0.22 10*3/MM3 (ref 0–0.4)
EOSINOPHIL NFR BLD AUTO: 2.6 % (ref 0.3–6.2)
ERYTHROCYTE [DISTWIDTH] IN BLOOD BY AUTOMATED COUNT: 13.1 % (ref 12.3–15.4)
ERYTHROCYTE [SEDIMENTATION RATE] IN BLOOD: 37 MM/HR (ref 0–30)
GLOBULIN UR ELPH-MCNC: 3.4 GM/DL
GLUCOSE SERPL-MCNC: 103 MG/DL (ref 65–99)
HCT VFR BLD AUTO: 40.3 % (ref 34–46.6)
HGB BLD-MCNC: 13.1 G/DL (ref 12–15.9)
IMM GRANULOCYTES # BLD AUTO: 0.03 10*3/MM3 (ref 0–0.05)
IMM GRANULOCYTES NFR BLD AUTO: 0.4 % (ref 0–0.5)
LYMPHOCYTES # BLD AUTO: 2.05 10*3/MM3 (ref 0.7–3.1)
LYMPHOCYTES NFR BLD AUTO: 24 % (ref 19.6–45.3)
MCH RBC QN AUTO: 29.1 PG (ref 26.6–33)
MCHC RBC AUTO-ENTMCNC: 32.5 G/DL (ref 31.5–35.7)
MCV RBC AUTO: 89.6 FL (ref 79–97)
MONOCYTES # BLD AUTO: 0.61 10*3/MM3 (ref 0.1–0.9)
MONOCYTES NFR BLD AUTO: 7.1 % (ref 5–12)
NEUTROPHILS NFR BLD AUTO: 5.57 10*3/MM3 (ref 1.7–7)
NEUTROPHILS NFR BLD AUTO: 65.2 % (ref 42.7–76)
NRBC BLD AUTO-RTO: 0 /100 WBC (ref 0–0.2)
PLATELET # BLD AUTO: 226 10*3/MM3 (ref 140–450)
PMV BLD AUTO: 12.4 FL (ref 6–12)
POTASSIUM SERPL-SCNC: 4.2 MMOL/L (ref 3.5–5.2)
PROT SERPL-MCNC: 8.2 G/DL (ref 6–8.5)
RBC # BLD AUTO: 4.5 10*6/MM3 (ref 3.77–5.28)
SODIUM SERPL-SCNC: 137 MMOL/L (ref 136–145)
WBC NRBC COR # BLD: 8.54 10*3/MM3 (ref 3.4–10.8)

## 2022-06-23 PROCEDURE — 82550 ASSAY OF CK (CPK): CPT

## 2022-06-23 PROCEDURE — 36415 COLL VENOUS BLD VENIPUNCTURE: CPT

## 2022-06-23 PROCEDURE — 86051 AQUAPORIN-4 ANTB ELISA: CPT

## 2022-06-23 PROCEDURE — 84165 PROTEIN E-PHORESIS SERUM: CPT

## 2022-06-23 PROCEDURE — 85652 RBC SED RATE AUTOMATED: CPT

## 2022-06-23 PROCEDURE — 82595 ASSAY OF CRYOGLOBULIN: CPT

## 2022-06-23 PROCEDURE — 99214 OFFICE O/P EST MOD 30 MIN: CPT | Performed by: NURSE PRACTITIONER

## 2022-06-23 PROCEDURE — 82784 ASSAY IGA/IGD/IGG/IGM EACH: CPT

## 2022-06-23 PROCEDURE — 80053 COMPREHEN METABOLIC PANEL: CPT

## 2022-06-23 PROCEDURE — 85025 COMPLETE CBC W/AUTO DIFF WBC: CPT

## 2022-06-23 PROCEDURE — 86140 C-REACTIVE PROTEIN: CPT

## 2022-06-23 PROCEDURE — 86362 MOG-IGG1 ANTB CBA EACH: CPT

## 2022-06-23 PROCEDURE — 86334 IMMUNOFIX E-PHORESIS SERUM: CPT

## 2022-06-23 RX ORDER — TRIAMCINOLONE ACETONIDE 1 MG/G
CREAM TOPICAL
COMMUNITY
Start: 2022-03-25

## 2022-06-23 RX ORDER — PEN NEEDLE, DIABETIC 32GX 5/32"
NEEDLE, DISPOSABLE MISCELLANEOUS
COMMUNITY
Start: 2022-05-21

## 2022-06-23 RX ORDER — IRBESARTAN 300 MG/1
TABLET ORAL
COMMUNITY
End: 2022-09-13

## 2022-06-23 RX ORDER — BLOOD SUGAR DIAGNOSTIC
STRIP MISCELLANEOUS
COMMUNITY
Start: 2022-05-20

## 2022-06-23 RX ORDER — AMMONIUM LACTATE 12 G/100G
CREAM TOPICAL
COMMUNITY
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
## 2022-06-27 LAB
ALBUMIN SERPL ELPH-MCNC: 4.1 G/DL (ref 2.9–4.4)
ALBUMIN/GLOB SERPL: 1.1 {RATIO} (ref 0.7–1.7)
ALPHA1 GLOB SERPL ELPH-MCNC: 0.3 G/DL (ref 0–0.4)
ALPHA2 GLOB SERPL ELPH-MCNC: 0.9 G/DL (ref 0.4–1)
B-GLOBULIN SERPL ELPH-MCNC: 1.2 G/DL (ref 0.7–1.3)
GAMMA GLOB SERPL ELPH-MCNC: 1.3 G/DL (ref 0.4–1.8)
GLOBULIN SER-MCNC: 3.8 G/DL (ref 2.2–3.9)
IGA SERPL-MCNC: 340 MG/DL (ref 64–422)
IGG SERPL-MCNC: 1190 MG/DL (ref 586–1602)
IGM SERPL-MCNC: 99 MG/DL (ref 26–217)
INTERPRETATION SERPL IEP-IMP: NORMAL
LABORATORY COMMENT REPORT: NORMAL
M PROTEIN SERPL ELPH-MCNC: NORMAL G/DL
MOG AB SER QL CBA IFA: NEGATIVE
PROT SERPL-MCNC: 7.9 G/DL (ref 6–8.5)

## 2022-06-28 LAB — AQP4 H2O CHANNEL IGG SERPL IA-ACNC: <1.5 U/ML (ref 0–3)

## 2022-06-30 ENCOUNTER — TELEPHONE (OUTPATIENT)
Dept: NEUROLOGY | Facility: CLINIC | Age: 77
End: 2022-06-30

## 2022-06-30 LAB — CRYOGLOB SER QL 1D COLD INC: NORMAL

## 2022-06-30 NOTE — TELEPHONE ENCOUNTER
Called patient and gave results.  Patient was understanding and appreciative.    ----- Message from Bryson Murrell DNP, JAGDISH sent at 6/30/2022 11:34 AM EDT -----  Please let her know labs were normal except her kidney function was slightly low. Other labs are stable with no concerns except mild inflammation.

## 2022-07-06 ENCOUNTER — HOSPITAL ENCOUNTER (OUTPATIENT)
Dept: GENERAL RADIOLOGY | Facility: HOSPITAL | Age: 77
Discharge: HOME | End: 2022-07-06
Admitting: NURSE PRACTITIONER

## 2022-07-06 ENCOUNTER — TELEPHONE (OUTPATIENT)
Dept: NEUROLOGY | Facility: CLINIC | Age: 77
End: 2022-07-06

## 2022-07-06 VITALS
RESPIRATION RATE: 18 BRPM | TEMPERATURE: 97.4 F | BODY MASS INDEX: 26.21 KG/M2 | DIASTOLIC BLOOD PRESSURE: 69 MMHG | HEART RATE: 58 BPM | OXYGEN SATURATION: 96 % | SYSTOLIC BLOOD PRESSURE: 145 MMHG | WEIGHT: 130 LBS | HEIGHT: 59 IN

## 2022-07-06 DIAGNOSIS — G37.9 DEMYELINATING DISEASE: ICD-10-CM

## 2022-07-06 LAB
APPEARANCE CSF: CLEAR
APPEARANCE CSF: CLEAR
COLOR CSF: COLORLESS
COLOR CSF: COLORLESS
COLOR SPUN CSF: COLORLESS
COLOR SPUN CSF: COLORLESS
GLUCOSE CSF-MCNC: 83 MG/DL (ref 40–70)
PROT CSF-MCNC: 42.1 MG/DL (ref 15–45)
RBC # CSF MANUAL: 2 /MM3 (ref 0–5)
RBC # CSF MANUAL: 35 /MM3 (ref 0–5)
SPECIMEN VOL CSF: 8 ML
SPECIMEN VOL CSF: 8 ML
TUBE # CSF: 1
TUBE # CSF: 4
WBC # CSF MANUAL: 0 /MM3 (ref 0–5)
WBC # CSF MANUAL: 1 /MM3 (ref 0–5)

## 2022-07-06 PROCEDURE — 82945 GLUCOSE OTHER FLUID: CPT | Performed by: NURSE PRACTITIONER

## 2022-07-06 PROCEDURE — 84157 ASSAY OF PROTEIN OTHER: CPT | Performed by: NURSE PRACTITIONER

## 2022-07-06 PROCEDURE — 87015 SPECIMEN INFECT AGNT CONCNTJ: CPT | Performed by: NURSE PRACTITIONER

## 2022-07-06 PROCEDURE — 87206 SMEAR FLUORESCENT/ACID STAI: CPT | Performed by: NURSE PRACTITIONER

## 2022-07-06 PROCEDURE — 87070 CULTURE OTHR SPECIMN AEROBIC: CPT | Performed by: NURSE PRACTITIONER

## 2022-07-06 PROCEDURE — 87102 FUNGUS ISOLATION CULTURE: CPT | Performed by: NURSE PRACTITIONER

## 2022-07-06 PROCEDURE — 82784 ASSAY IGA/IGD/IGG/IGM EACH: CPT | Performed by: NURSE PRACTITIONER

## 2022-07-06 PROCEDURE — 83615 LACTATE (LD) (LDH) ENZYME: CPT | Performed by: NURSE PRACTITIONER

## 2022-07-06 PROCEDURE — 82040 ASSAY OF SERUM ALBUMIN: CPT | Performed by: NURSE PRACTITIONER

## 2022-07-06 PROCEDURE — 82164 ANGIOTENSIN I ENZYME TEST: CPT | Performed by: NURSE PRACTITIONER

## 2022-07-06 PROCEDURE — 83916 OLIGOCLONAL BANDS: CPT | Performed by: NURSE PRACTITIONER

## 2022-07-06 PROCEDURE — 87116 MYCOBACTERIA CULTURE: CPT | Performed by: NURSE PRACTITIONER

## 2022-07-06 PROCEDURE — 82042 OTHER SOURCE ALBUMIN QUAN EA: CPT | Performed by: NURSE PRACTITIONER

## 2022-07-06 PROCEDURE — 89050 BODY FLUID CELL COUNT: CPT | Performed by: NURSE PRACTITIONER

## 2022-07-06 PROCEDURE — 87075 CULTR BACTERIA EXCEPT BLOOD: CPT | Performed by: NURSE PRACTITIONER

## 2022-07-06 PROCEDURE — 87205 SMEAR GRAM STAIN: CPT | Performed by: NURSE PRACTITIONER

## 2022-07-06 RX ORDER — LIDOCAINE HYDROCHLORIDE 10 MG/ML
5 INJECTION, SOLUTION EPIDURAL; INFILTRATION; INTRACAUDAL; PERINEURAL ONCE
Status: COMPLETED | OUTPATIENT
Start: 2022-07-06 | End: 2022-07-06

## 2022-07-06 RX ADMIN — LIDOCAINE HYDROCHLORIDE 5 ML: 10 INJECTION, SOLUTION EPIDURAL; INFILTRATION; INTRACAUDAL; PERINEURAL at 09:32

## 2022-07-06 NOTE — POST-PROCEDURE NOTE
Radiology Procedure    Pre-procedure: procedure, risks discussed with patient. Patient indicated understanding and consented to procedure.     Procedure Performed: lumbar puncture     IV Sedation and/or Anesthesia:  No    Complications: none    Preliminary Findings: pending    Specimen Removed: 8cc clear, colorless CSF    Estimated Blood Loss:  0ml    Post-Procedure Diagnosis: pending    Post-Procedure Plan: encourage fluids, bed rest x 2 hours    Standard Discharge Instructions Given:yes     TRICE Hdez  07/06/22  09:25 EDT

## 2022-07-06 NOTE — TELEPHONE ENCOUNTER
Caller: KRISHNA    Relationship: W/ [facility] LAB PROCESSING    Best call back number: [phone]    What was the call regarding: KRISHNA STATES THAT NOT ENOUGH SPINAL FLUID WAS COLLECTED TO SEND OFF FOR MS PROFILE LAB ORDER. NOT ENOUGH FLUID TO FILE THE 2 ADDITIONAL TUBES THAT ARE APART OF THE TEST. KRISHNA IS WONDERING IF PT CAN BE CALLED BACK IN TO HAVE ADDITIONAL SPINAL FLUID DRAWN SO LAB ORDER CAN BE COMPETED.     I DID TRY WARM-TRANSFERRING THE CALL TO CLINICAL STAFF, HOWEVER, PER CECILE, NEITHER LIAN OR GELACIO AVAILABLE.    Do you require a callback: YES, PLEASE.    PLEASE REVIEW AND ADVISE.

## 2022-07-06 NOTE — TELEPHONE ENCOUNTER
Called patient and let her know that she needs to go back to have more drawn.  Patient was understanding.

## 2022-07-06 NOTE — TELEPHONE ENCOUNTER
Spoke with lab and they also verified patient did not need to come back.  Called patient and let her know not to come back.    Spoke with Radiology again and they verified they have enough samples and do not need patient to come back.      Called and spoke with lab.  They were going to take a message and call patient back in morning to schedule.  While on phone with lab I received another call from Radiology and they stated they had drawn enough at the LP to fill tubes so they were going to call lab.  Patient had called them in distress about having to go back for another procedure.

## 2022-07-07 ENCOUNTER — TELEPHONE (OUTPATIENT)
Dept: INFUSION THERAPY | Facility: HOSPITAL | Age: 77
End: 2022-07-07

## 2022-07-07 LAB — LDH FLD L TO P-CCNC: 27 IU/L

## 2022-07-08 LAB
ACE CSF-CCNC: <1.5 U/L (ref 0–3.1)
ALB CSF/SERPL: 6 {RATIO} (ref 0–8)
ALBUMIN CSF-MCNC: 25 MG/DL (ref 10–46)
ALBUMIN SERPL-MCNC: 4.4 G/DL (ref 3.7–4.7)
IGG CSF-MCNC: 3.2 MG/DL (ref 0–6.7)
IGG SERPL-MCNC: 1095 MG/DL (ref 586–1602)
IGG SYNTH RATE SER+CSF CALC-MRATE: -2 MG/DAY
IGG/ALB CLEAR SER+CSF-RTO: 0.5 (ref 0–0.7)
IGG/ALB CSF: 0.13 {RATIO} (ref 0–0.25)
OLIGOCLONAL BANDS.IT SER+CSF QL: NORMAL

## 2022-07-09 LAB
BACTERIA SPEC AEROBE CULT: NORMAL
GRAM STN SPEC: NORMAL

## 2022-07-11 LAB — BACTERIA SPEC ANAEROBE CULT: NORMAL

## 2022-07-12 ENCOUNTER — TELEPHONE (OUTPATIENT)
Dept: NEUROLOGY | Facility: CLINIC | Age: 77
End: 2022-07-12

## 2022-07-12 NOTE — TELEPHONE ENCOUNTER
Called patient and gave results.  Patient was understanding and appreciative.    ----- Message from Bryson Murrell DNP, APRN sent at 7/12/2022 12:50 PM EDT -----  Please notify pt her lumbar puncture does not show signs of MS.

## 2022-07-18 NOTE — PROGRESS NOTES
[facility] Cardiology   Consult  Marilyn Kunz  1945    VISIT DATE:  07/18/22    PCP:   Peter Baca MD  [address] [phone]        CC:  No chief complaint on file.      Problem List:    1.  HTN  2.  Type I DM  3.  HLD  4.  Psoriatic arthritis  5.  Hypothyroidism  6.  DJD  7.  CKD 2  8.  Factor V Leiden  9.  Prior cerebellar infarct  10.  Surgery for patent ductus arteriosus age 11    Cardiac testing:    Echo May 2022:  · Saline test results are negative.  · Estimated left ventricular EF was in agreement with the calculated left ventricular EF. Left ventricular ejection fraction appears to be greater than 70%. Left ventricular systolic function is hyperdynamic (EF > 70%).  · Left atrial volume is mildly increased.  · Lumason contrast shows normal LV systolic function and wall motion with an ejection fraction of 72%    Venous duplex May 2022: Normal    Stress test October 2020  · Left ventricular ejection fraction is hyperdynamic (calculated EF > 70%); WNL TID = 1.07.  · There is no prior study available for comparison. Rest EF: 58%, Stress EF: 72%..  · Myocardial perfusion imaging indicates a normal myocardial perfusion study with no evidence of ischemia.  · Impressions are consistent with a low risk study.    MRI May 2022    1.  No acute intracranial abnormality. No acute infarct.  2.  Moderate chronic small vessel ischemic changes. Mild volume loss. 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    History of Present Illness:  Marilyn Kunz  Is a 77 y.o. female with pertinent cardiac history detailed above.  Patient was admitted in May with complaints of bilateral hand pain and weakness.  She was evaluated for acute stroke, there is no acute abnormality on MRI.  There were small chronic infarcts in the cerebellum and small vessel disease.  C-spine MRI cystic myelomalacic change and spondylosis.  During her admission in May she also had CP but ruled  out for MI.  EKG today shows nonspecific ST changes similar to those in May.  She continues to have pain down both arms, facial numbness.    In addition to above she also has had increased redness and itching of the skin since October, seen by dermatology.  Concerned about possible side effects of BP medications causing this.  She is on both lisinopril and irbesartan.  She also takes amlodipine which has a known swelling side effect.  Home blood pressure ranges are variable sometimes elevated.  Within normal limits in the office today.      Patient Active Problem List    Diagnosis Date Noted   • Body mass index (BMI) of 24.0-24.9 in adult 06/23/2022   • Body mass index (BMI) of 27.0-27.9 in adult 06/23/2022   • Cervical spondylitis with radiculitis ([facility]) 06/23/2022   • Degenerative arthritis of toe joint, right 06/23/2022   • Disorder of bone and cartilage 06/23/2022   • Hospital discharge follow-up 06/23/2022   • Impairment of balance 06/23/2022   • Long term current use of insulin ([facility]) 06/23/2022   • Muscle spasm 06/23/2022   • Overactive bladder 06/23/2022   • Pain in right hand 06/23/2022   • Routine history and physical examination of adult 06/23/2022   • Seborrheic dermatitis 06/23/2022   • Spondylolisthesis, grade 2 06/23/2022   • Total knee replacement status, left 06/23/2022   • Vasculitis ([facility]) 06/23/2022   • Benign essential hypertension 06/23/2022   • Hypercholesterolemia 06/23/2022   • Type 2 diabetes mellitus with stage 3 chronic kidney disease, with long-term current use of insulin ([facility]) 06/23/2022   • Uncontrolled diabetes mellitus with hyperglycemia, with long-term current use of insulin ([facility]) 06/23/2022   • Psoriatic arthritis ([facility]) 05/05/2022   • CKD (chronic kidney disease) stage 2, GFR 60-89 ml/min 05/05/2022   • Weakness of right upper extremity 05/04/2022   • Type 1 diabetes mellitus ([facility]) 05/04/2022   • At risk for venous thromboembolism (VTE) 09/10/2021     Note Last Updated: 6/23/2022      Problem added by Discern Expert Rule: [id number]_VTERISKPROB_3     • Factor V Leiden (HCC) 08/16/2021   • Chest pain 10/16/2020   • Pneumonia 10/16/2020   • Essential hypertension 10/16/2020   • HLD (hyperlipidemia) 10/16/2020   • Odynophagia 10/16/2020     Note Last Updated: 10/20/2020     Added automatically from request for surgery [id number]     • Diabetic nephropathy associated with type 1 diabetes mellitus (HCC) 06/16/2016   • Chronic kidney disease, stage 3 (HCC) 06/16/2016   • Hypertensive disorder 03/17/2016   • Hypoglycemia 12/17/2015   • Hypothyroidism 09/16/2015   • Hyperlipidemia 09/16/2015   • Type 1 diabetes mellitus (HCC) 09/16/2015       Allergies   Allergen Reactions   • Atorvastatin Other (See Comments)   • Colesevelam Other (See Comments)   • Cephalexin Rash   • Penicillins Rash       Social History     Socioeconomic History   • Marital status: Single   Tobacco Use   • Smoking status: Never Smoker   • Smokeless tobacco: Never Used   Vaping Use   • Vaping Use: Never used   Substance and Sexual Activity   • Alcohol use: No   • Drug use: No       Family History   Problem Relation Age of Onset   • Cancer Mother    • Pancreatic cancer Mother    • Cancer Father    • Lung cancer Father    • Cancer Sister    • Colon cancer Sister    • Breast cancer Neg Hx    • Ovarian cancer Neg Hx        Current Medications:    Current Outpatient Medications:   •  amLODIPine (NORVASC) 10 MG tablet, Take 10 mg by mouth Daily., Disp: , Rfl:   •  ammonium lactate (AMLACTIN) 12 % cream, (12 %), Disp: , Rfl:   •  aspirin 81 MG EC tablet, Take 1 tablet by mouth Daily., Disp: 30 tablet, Rfl: 0  •  Continuous Blood Gluc  (FreeStyle Marie 2 Dickinson) device, FreeStyle Marie 2 Dickinson  change q 14 days, Disp: , Rfl:   •  Diclofenac Sodium (VOLTAREN) 1 % gel gel, (1 %), Disp: , Rfl:   •  Droplet Pen Needles 32G X 4 MM misc, , Disp: , Rfl:   •  DULoxetine (CYMBALTA) 20 MG capsule, Take 1 capsule by mouth Daily., Disp: 30 capsule, Rfl:  2  •  famotidine (PEPCID) 20 MG tablet, Take 1 tablet by mouth 2 (Two) Times a Day., Disp: 28 tablet, Rfl: 0  •  fluticasone (FLONASE) 50 MCG/ACT nasal spray, 2 sprays by Each Nare route Daily. Shake well before using., Disp: , Rfl:   •  FreeStyle Precision Ant Test test strip, , Disp: , Rfl:   •  insulin aspart (novoLOG FLEXPEN) 100 UNIT/ML solution pen-injector sc pen, Inject  under the skin 3 (Three) Times a Day With Meals., Disp: , Rfl:   •  irbesartan (AVAPRO) 300 MG tablet, irbesartan 300 mg tablet  Take 1 tablet every day by oral route., Disp: , Rfl:   •  levocetirizine (XYZAL) 5 MG tablet, Take 5 mg by mouth Every Evening., Disp: , Rfl:   •  levothyroxine (SYNTHROID, LEVOTHROID) 75 MCG tablet, Take 75 mcg by mouth Daily., Disp: , Rfl:   •  lisinopril (PRINIVIL,ZESTRIL) 40 MG tablet, Take 40 mg by mouth Daily., Disp: , Rfl:   •  metoprolol tartrate (LOPRESSOR) 100 MG tablet, Take 100 mg by mouth Every Night., Disp: , Rfl:   •  oxybutynin XL (DITROPAN-XL) 5 MG 24 hr tablet, Take 5 mg by mouth Daily., Disp: , Rfl:   •  pravastatin (PRAVACHOL) 10 MG tablet, Take 10 mg by mouth Daily., Disp: , Rfl:   •  Secukinumab (Cosentyx) 150 MG/ML solution prefilled syringe, Inject 150 mg under the skin into the appropriate area as directed Every 28 (Twenty-Eight) Days., Disp: , Rfl:   •  Tresiba FlexTouch 100 UNIT/ML solution pen-injector injection, Inject 13 Units under the skin into the appropriate area as directed Every Night., Disp: , Rfl:   •  triamcinolone (KENALOG) 0.1 % cream, APPLY A THIN LAYER TO THE AFFECTED AREA TWICE DAILY FOR 1 WEEK THEN ONCE DAILY FOR 1 WEEK, Disp: , Rfl:      Review of Systems   Cardiovascular: Negative for chest pain.   Skin: Positive for itching.   Musculoskeletal: Positive for arthritis, muscle weakness and neck pain.   Neurological: Positive for numbness and paresthesias.       There were no vitals filed for this visit.    Physical Exam  Constitutional:       Appearance: Normal  appearance.   HENT:      Head: Normocephalic and atraumatic.   Neck:      Vascular: No carotid bruit.   Cardiovascular:      Rate and Rhythm: Normal rate and regular rhythm.      Comments: Soft systolic murmur  Pulmonary:      Effort: Pulmonary effort is normal.      Breath sounds: Normal breath sounds.   Musculoskeletal:      Right lower leg: Edema present.      Left lower leg: Edema present.      Comments: Trace lower extremity edema bilaterally   Neurological:      Mental Status: She is alert.         Diagnostic Data:    ECG 12 Lead    Date/Time: 7/19/2022 8:57 AM  Performed by: Yao Maya MD  Authorized by: Yao Maya MD   Comparison: compared with previous ECG from 5/5/2022  Rhythm: sinus rhythm  Rate: normal  BPM: 65  QRS axis: normal  Other findings: non-specific ST-T wave changes    Clinical impression: abnormal EKG          Lab Results   Component Value Date    TRIG 63 05/05/2022    HDL 79 (H) 05/05/2022     Lab Results   Component Value Date    GLUCOSE 103 (H) 06/23/2022    BUN 31 (H) 06/23/2022    CREATININE 1.05 (H) 06/23/2022     06/23/2022    K 4.2 06/23/2022    CL 99 06/23/2022    CO2 29.0 06/23/2022     Lab Results   Component Value Date    HGBA1C 6.90 (H) 05/05/2022     Lab Results   Component Value Date    WBC 8.54 06/23/2022    HGB 13.1 06/23/2022    HCT 40.3 06/23/2022     06/23/2022       Assessment:  No diagnosis found.    Plan:      1.  HTN  -Creatinine 1.05  -medications include metoprolol, irbesartan, lisinopril, amlodipine  -c/o rash, itching, swelling.  This may be secondary to combination use of ACE and ARB  -d/c lisinopril, and amlodipine today  -start chlorthalidone 25mg daily  -check BMP 1 week  -If patient's blood pressure is elevated on this regimen next anticipated change will be changing her evening metoprolol to Bystolic or carvedilol  -Check renal artery duplex    2.  HLD  -Total 167, HDL 79, LDL 76, triglycerides 63  -Statins caused LFT  elevation  -No changes made today    3.  Type I DM  -A1c 6.9  -on insulin regimen follows with endocrine    4.  Prior cerebellar infarct  -negative CTA  -no cardiac source on echo  -negative venous duplex  -14-day Holter to screen for atrial fibrillation    5.  Factor V leiden  -heterozygous  -No known history of DVT    6.  Intermittent chest pains with multiple cardiac risk factors  -Obtain stress myocardial perfusion study    F/u 6 weeks      Yao Maya MD [facility]

## 2022-07-19 ENCOUNTER — OFFICE VISIT (OUTPATIENT)
Dept: CARDIOLOGY | Facility: CLINIC | Age: 77
End: 2022-07-19

## 2022-07-19 VITALS
WEIGHT: 132 LBS | OXYGEN SATURATION: 97 % | DIASTOLIC BLOOD PRESSURE: 72 MMHG | SYSTOLIC BLOOD PRESSURE: 134 MMHG | HEIGHT: 59 IN | HEART RATE: 65 BPM | BODY MASS INDEX: 26.61 KG/M2

## 2022-07-19 DIAGNOSIS — R00.2 PALPITATIONS: ICD-10-CM

## 2022-07-19 DIAGNOSIS — I10 ESSENTIAL HYPERTENSION: Primary | ICD-10-CM

## 2022-07-19 DIAGNOSIS — R07.9 CHRONIC CHEST PAIN WITH HIGH RISK FOR CAD: ICD-10-CM

## 2022-07-19 DIAGNOSIS — G89.29 CHRONIC CHEST PAIN WITH HIGH RISK FOR CAD: ICD-10-CM

## 2022-07-19 DIAGNOSIS — I63.9 CEREBELLAR INFARCT: ICD-10-CM

## 2022-07-19 DIAGNOSIS — Z91.89 CHRONIC CHEST PAIN WITH HIGH RISK FOR CAD: ICD-10-CM

## 2022-07-19 PROCEDURE — 99204 OFFICE O/P NEW MOD 45 MIN: CPT | Performed by: INTERNAL MEDICINE

## 2022-07-19 PROCEDURE — 93000 ELECTROCARDIOGRAM COMPLETE: CPT | Performed by: INTERNAL MEDICINE

## 2022-07-19 RX ORDER — CHLORTHALIDONE 25 MG/1
25 TABLET ORAL DAILY
Qty: 45 TABLET | Refills: 3 | Status: SHIPPED | OUTPATIENT
Start: 2022-07-19 | End: 2023-01-03 | Stop reason: SDUPTHER

## 2022-07-29 ENCOUNTER — TELEPHONE (OUTPATIENT)
Dept: CARDIOLOGY | Facility: CLINIC | Age: 77
End: 2022-07-29

## 2022-07-29 RX ORDER — NEBIVOLOL 10 MG/1
10 TABLET ORAL DAILY
Qty: 30 TABLET | Refills: 0 | Status: SHIPPED | OUTPATIENT
Start: 2022-07-29 | End: 2022-07-29 | Stop reason: SDUPTHER

## 2022-07-29 RX ORDER — NEBIVOLOL 10 MG/1
10 TABLET ORAL DAILY
Qty: 90 TABLET | Refills: 1 | Status: SHIPPED | OUTPATIENT
Start: 2022-07-29 | End: 2022-08-03 | Stop reason: SDUPTHER

## 2022-07-29 NOTE — TELEPHONE ENCOUNTER
Patient aware. 30 day supply called into [employer] per her request and 90 days to mail order. She will call back if she has any issues.

## 2022-07-29 NOTE — TELEPHONE ENCOUNTER
Patient called to report that her BP is still elevated. Most recently:    207/95  180/82  184/91  181/81    Her last visit, you added chlorthalidone 25 mg daily and discontinued lisinopril and amlodipine for c/o rash and itching. You had mentioned changing her evening metoprolol to either Bystolic or carvedilol.     She is currently on metoprolol tartrate nightly and irbesartan 300 mg daily.    Did you want to change her metoprolol?

## 2022-07-30 ENCOUNTER — HOSPITAL ENCOUNTER (OUTPATIENT)
Dept: MRI IMAGING | Facility: HOSPITAL | Age: 77
Discharge: HOME | End: 2022-07-30

## 2022-07-30 DIAGNOSIS — G37.9 DEMYELINATING DISEASE: ICD-10-CM

## 2022-07-30 DIAGNOSIS — N88.9 CERVICAL LESION: ICD-10-CM

## 2022-07-30 PROCEDURE — 0 GADOBENATE DIMEGLUMINE 529 MG/ML SOLUTION: Performed by: NURSE PRACTITIONER

## 2022-07-30 PROCEDURE — A9577 INJ MULTIHANCE: HCPCS | Performed by: NURSE PRACTITIONER

## 2022-07-30 PROCEDURE — 72156 MRI NECK SPINE W/O & W/DYE: CPT

## 2022-07-30 PROCEDURE — 70553 MRI BRAIN STEM W/O & W/DYE: CPT

## 2022-07-30 RX ADMIN — GADOBENATE DIMEGLUMINE 12 ML: 529 INJECTION, SOLUTION INTRAVENOUS at 17:40

## 2022-08-03 ENCOUNTER — HOSPITAL ENCOUNTER (OUTPATIENT)
Dept: CARDIOLOGY | Facility: HOSPITAL | Age: 77
Discharge: HOME | End: 2022-08-03
Admitting: INTERNAL MEDICINE

## 2022-08-03 VITALS — BODY MASS INDEX: 26.62 KG/M2 | HEIGHT: 59 IN | WEIGHT: 132.06 LBS

## 2022-08-03 DIAGNOSIS — I10 ESSENTIAL HYPERTENSION: ICD-10-CM

## 2022-08-03 LAB
BH CV ECHO MEAS - DIST REN A EDV LEFT: 14.1 CM/S
BH CV ECHO MEAS - DIST REN A PSV LEFT: 77 CM/S
BH CV ECHO MEAS - MID REN A EDV LEFT: 32.1 CM/S
BH CV ECHO MEAS - MID REN A PSV LEFT: 159 CM/S
BH CV ECHO MEAS - PROX REN A EDV LEFT: -19.3 CM/S
BH CV ECHO MEAS - PROX REN A PSV LEFT: -118 CM/S
BH CV VAS RENAL AORTIC MID EDV: 14.6 CM/S
BH CV VAS RENAL AORTIC MID PSV: 99.4 CM/S
BH CV VAS RENAL HILUM LEFT EDV: 12.3 CM/S
BH CV VAS RENAL HILUM LEFT PSV: 50.2 CM/S
BH CV VAS RENAL HILUM RIGHT EDV: 13.2 CM/S
BH CV VAS RENAL HILUM RIGHT PSV: 53.4 CM/S
BH CV XLRA MEAS - KID L LEFT: 9.2 CM
BH CV XLRA MEAS DIST REN A EDV RIGHT: 9.1 CM/S
BH CV XLRA MEAS DIST REN A PSV RIGHT: 57.9 CM/S
BH CV XLRA MEAS KID L RIGHT: 9 CM
BH CV XLRA MEAS MID REN A EDV RIGHT: 13.5 CM/S
BH CV XLRA MEAS MID REN A PSV RIGHT: 123 CM/S
BH CV XLRA MEAS PROX REN A EDV RIGHT: 22 CM/S
BH CV XLRA MEAS PROX REN A PSV RIGHT: 152 CM/S
BH CV XLRA MEAS RAR LEFT: 1.6
BH CV XLRA MEAS RAR RIGHT: 1.5
BH CV XLRA MEAS RENAL A ORG EDV LEFT: 16.4 CM/S
BH CV XLRA MEAS RENAL A ORG EDV RIGHT: 23.2 CM/S
BH CV XLRA MEAS RENAL A ORG PSV LEFT: 137 CM/S
BH CV XLRA MEAS RENAL A ORG PSV RIGHT: 137 CM/S
LEFT RENAL UPPER PARENCHYMA MAX: 23 CM/S
LEFT RENAL UPPER PARENCHYMA MIN: 5.5 CM/S
LEFT RENAL UPPER PARENCHYMA RI: 0.76
MAXIMAL PREDICTED HEART RATE: 143 BPM
RIGHT RENAL UPPER PARENCHYMA MAX: 29 CM/S
RIGHT RENAL UPPER PARENCHYMA MIN: 7.6 CM/S
RIGHT RENAL UPPER PARENCHYMA RI: 0.74
STRESS TARGET HR: 122 BPM

## 2022-08-03 PROCEDURE — 93975 VASCULAR STUDY: CPT

## 2022-08-03 PROCEDURE — 93975 VASCULAR STUDY: CPT | Performed by: INTERNAL MEDICINE

## 2022-08-03 RX ORDER — NEBIVOLOL 10 MG/1
10 TABLET ORAL DAILY
Qty: 90 TABLET | Refills: 3 | Status: SHIPPED | OUTPATIENT
Start: 2022-08-03 | End: 2022-08-23

## 2022-08-10 ENCOUNTER — TELEPHONE (OUTPATIENT)
Dept: NEUROLOGY | Facility: CLINIC | Age: 77
End: 2022-08-10

## 2022-08-11 ENCOUNTER — TELEPHONE (OUTPATIENT)
Dept: NEUROLOGY | Facility: CLINIC | Age: 77
End: 2022-08-11

## 2022-08-11 DIAGNOSIS — G36.9 NEUROMYELITIS: Primary | ICD-10-CM

## 2022-08-11 RX ORDER — PREDNISONE 10 MG/1
TABLET ORAL
Qty: 60 TABLET | Refills: 1 | Status: SHIPPED | OUTPATIENT
Start: 2022-08-11 | End: 2022-08-23 | Stop reason: SINTOL

## 2022-08-11 NOTE — TELEPHONE ENCOUNTER
I called and spoke to pt. She continues to have severe pain in her neck radiating to her arms and spine. Still w severe fatigue. She is willing to take steroids. A1c has been controlled. Understands she will need additional insulin. Will move her appt to Tue Aug 23 at 8:00am with me.  Consider Imuran if steroids improves pain.

## 2022-08-11 NOTE — TELEPHONE ENCOUNTER
I have called the pt and left 2 messages for her to call back. Would like to discuss her symptoms and possibly starting her on steroids.

## 2022-08-17 LAB
FUNGUS WND CULT: NORMAL
MYCOBACTERIUM SPEC CULT: NORMAL
NIGHT BLUE STAIN TISS: NORMAL

## 2022-08-23 ENCOUNTER — OFFICE VISIT (OUTPATIENT)
Dept: NEUROLOGY | Facility: CLINIC | Age: 77
End: 2022-08-23

## 2022-08-23 VITALS
HEIGHT: 59 IN | DIASTOLIC BLOOD PRESSURE: 76 MMHG | SYSTOLIC BLOOD PRESSURE: 142 MMHG | HEART RATE: 64 BPM | WEIGHT: 131.2 LBS | BODY MASS INDEX: 26.45 KG/M2 | TEMPERATURE: 97.1 F | OXYGEN SATURATION: 95 %

## 2022-08-23 DIAGNOSIS — L40.50 PSORIATIC ARTHRITIS: ICD-10-CM

## 2022-08-23 DIAGNOSIS — R27.0 ATAXIA: ICD-10-CM

## 2022-08-23 DIAGNOSIS — G37.9 DEMYELINATING DISEASE: Primary | ICD-10-CM

## 2022-08-23 DIAGNOSIS — N88.9 CERVICAL LESION: ICD-10-CM

## 2022-08-23 DIAGNOSIS — E10.69 TYPE 1 DIABETES MELLITUS WITH OTHER SPECIFIED COMPLICATION: ICD-10-CM

## 2022-08-23 DIAGNOSIS — E03.9 HYPOTHYROIDISM, UNSPECIFIED TYPE: ICD-10-CM

## 2022-08-23 DIAGNOSIS — R20.2 PARESTHESIA: ICD-10-CM

## 2022-08-23 PROCEDURE — 99214 OFFICE O/P EST MOD 30 MIN: CPT | Performed by: NURSE PRACTITIONER

## 2022-08-23 RX ORDER — AZATHIOPRINE 50 MG/1
50 TABLET ORAL 2 TIMES DAILY
Qty: 60 TABLET | Refills: 2 | Status: SHIPPED | OUTPATIENT
Start: 2022-08-23 | End: 2022-09-12 | Stop reason: SDUPTHER
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
## 2022-08-24 ENCOUNTER — TELEPHONE (OUTPATIENT)
Dept: CARDIOLOGY | Facility: CLINIC | Age: 77
End: 2022-08-24

## 2022-08-24 NOTE — TELEPHONE ENCOUNTER
Pt called inquiring about her upcoming stress - she has been started on a new medication by neuro and she is concerned, as this medication can/may affect her immunity.     I messaged Bryson Murrell with Dr. Hernandez and she stated that pt should be good to proceed with the scheduled stress test.     I tried to call pt twice today but got answering machine both times. The second time I did a message stating this info, the first message I just asked that she call back.

## 2022-08-31 ENCOUNTER — HOSPITAL ENCOUNTER (OUTPATIENT)
Dept: CARDIOLOGY | Facility: HOSPITAL | Age: 77
Discharge: HOME | End: 2022-08-31

## 2022-08-31 ENCOUNTER — APPOINTMENT (OUTPATIENT)
Dept: CARDIOLOGY | Facility: HOSPITAL | Age: 77
End: 2022-08-31

## 2022-08-31 VITALS
HEIGHT: 59 IN | BODY MASS INDEX: 26.44 KG/M2 | WEIGHT: 131.17 LBS | SYSTOLIC BLOOD PRESSURE: 130 MMHG | HEART RATE: 57 BPM | DIASTOLIC BLOOD PRESSURE: 80 MMHG

## 2022-08-31 DIAGNOSIS — Z91.89 CHRONIC CHEST PAIN WITH HIGH RISK FOR CAD: ICD-10-CM

## 2022-08-31 DIAGNOSIS — R07.9 CHRONIC CHEST PAIN WITH HIGH RISK FOR CAD: ICD-10-CM

## 2022-08-31 DIAGNOSIS — G89.29 CHRONIC CHEST PAIN WITH HIGH RISK FOR CAD: ICD-10-CM

## 2022-08-31 LAB
BH CV REST NUCLEAR ISOTOPE DOSE: 9.8 MCI
BH CV STRESS BP STAGE 2: NORMAL
BH CV STRESS BP STAGE 3: NORMAL
BH CV STRESS COMMENTS STAGE 1: NORMAL
BH CV STRESS DOSE REGADENOSON STAGE 1: 0.4
BH CV STRESS DURATION MIN STAGE 1: 1
BH CV STRESS DURATION MIN STAGE 2: 1
BH CV STRESS DURATION MIN STAGE 3: 1
BH CV STRESS DURATION MIN STAGE 4: 1
BH CV STRESS HR STAGE 1: 56
BH CV STRESS HR STAGE 2: 88
BH CV STRESS HR STAGE 3: 90
BH CV STRESS HR STAGE 4: 89
BH CV STRESS NUCLEAR ISOTOPE DOSE: 33 MCI
BH CV STRESS O2 STAGE 1: 97
BH CV STRESS O2 STAGE 2: 96
BH CV STRESS O2 STAGE 3: 99
BH CV STRESS O2 STAGE 4: 98
BH CV STRESS PROTOCOL 1: NORMAL
BH CV STRESS RECOVERY BP: NORMAL MMHG
BH CV STRESS RECOVERY HR: 82 BPM
BH CV STRESS STAGE 1: 1
BH CV STRESS STAGE 2: 2
BH CV STRESS STAGE 3: 3
BH CV STRESS STAGE 4: 4
LV EF NUC BP: 74 %
MAXIMAL PREDICTED HEART RATE: 143 BPM
PERCENT MAX PREDICTED HR: 65.03 %
STRESS BASELINE BP: NORMAL MMHG
STRESS BASELINE HR: 50 BPM
STRESS O2 SAT REST: 97 %
STRESS PERCENT HR: 77 %
STRESS POST EXERCISE DUR MIN: 4 MIN
STRESS POST EXERCISE DUR SEC: 0 SEC
STRESS POST O2 SAT PEAK: 98 %
STRESS POST PEAK BP: NORMAL MMHG
STRESS POST PEAK HR: 93 BPM
STRESS TARGET HR: 122 BPM

## 2022-08-31 PROCEDURE — 93018 CV STRESS TEST I&R ONLY: CPT | Performed by: INTERNAL MEDICINE

## 2022-08-31 PROCEDURE — 93017 CV STRESS TEST TRACING ONLY: CPT

## 2022-08-31 PROCEDURE — 78452 HT MUSCLE IMAGE SPECT MULT: CPT

## 2022-08-31 PROCEDURE — A9500 TC99M SESTAMIBI: HCPCS | Performed by: INTERNAL MEDICINE

## 2022-08-31 PROCEDURE — 0 TECHNETIUM SESTAMIBI: Performed by: INTERNAL MEDICINE

## 2022-08-31 PROCEDURE — 25010000002 REGADENOSON 0.4 MG/5ML SOLUTION: Performed by: INTERNAL MEDICINE

## 2022-08-31 PROCEDURE — 78452 HT MUSCLE IMAGE SPECT MULT: CPT | Performed by: INTERNAL MEDICINE

## 2022-08-31 RX ADMIN — TECHNETIUM TC 99M SESTAMIBI 1 DOSE: 1 INJECTION INTRAVENOUS at 13:15

## 2022-08-31 RX ADMIN — REGADENOSON 0.4 MG: 0.08 INJECTION, SOLUTION INTRAVENOUS at 13:11

## 2022-08-31 RX ADMIN — TECHNETIUM TC 99M SESTAMIBI 1 DOSE: 1 INJECTION INTRAVENOUS at 11:19

## 2022-09-01 ENCOUNTER — TELEPHONE (OUTPATIENT)
Dept: CARDIOLOGY | Facility: CLINIC | Age: 77
End: 2022-09-01

## 2022-09-01 NOTE — TELEPHONE ENCOUNTER
Called patient regarding NSK results above. All questions answered at this time. Patient verbalizes understanding and agreeable to plan.

## 2022-09-01 NOTE — TELEPHONE ENCOUNTER
----- Message from Yao Maya MD sent at 9/1/2022  9:57 AM EDT -----  Can we let patient know that stress test yesterday showed normal blood flow to the heart without evidence of significant heart artery blockages.

## 2022-09-12 ENCOUNTER — TELEPHONE (OUTPATIENT)
Dept: NEUROLOGY | Facility: CLINIC | Age: 77
End: 2022-09-12

## 2022-09-12 DIAGNOSIS — G37.9 DEMYELINATING DISEASE: ICD-10-CM

## 2022-09-12 PROBLEM — Q25.0 PDA (PATENT DUCTUS ARTERIOSUS): Status: ACTIVE | Noted: 2022-09-12

## 2022-09-12 RX ORDER — AZATHIOPRINE 50 MG/1
100 TABLET ORAL 2 TIMES DAILY
Qty: 120 TABLET | Refills: 2 | Status: SHIPPED | OUTPATIENT
Start: 2022-09-12 | End: 2022-10-28 | Stop reason: SDUPTHER

## 2022-09-12 NOTE — PROGRESS NOTES
Subjective:     Encounter Date:09/13/2022      Patient ID: Marilyn Kunz is a 77 y.o. single white female from [place], [place].    PHYSICIAN: Peter Baca MD    Chief Complaint:   Chief Complaint   Patient presents with   • Essential hypertension     Problem List:  1. Patent ductus arteriosus  a. Surgery at age 11-data deficit  b. Stress test October 2020: LVEF greater than 70%, myocardial perfusion imaging indicates a normal myocardial perfusion study with no evidence of ischemia  c. Echocardiogram May 2022: Saline test results negative, EF greater than 70%, LA volume mildly increased  d. Regadenoson stress test 8/31/2022: Normal myocardial perfusion study with no evidence of ischemia.  LVEF greater than 70%, mild coronary artery calcification  2. Hypertension with renal artery duplex August 2022 showing no hemodynamically significant DOREEN bilaterally, resistive indices within normal limits, renal veins patent  3. Type 1 diabetes mellitus; hemoglobin A1c 6.9% May 2022  4. Hyperlipidemia; on statin therapy  5. Hypothyroidism, on replacement therapy  6. Chronic kidney disease stage II  7. Factor V Leiden with no known history of DVT  8. Degenerative joint disease  9. Demyelinating disease with MRI brain July 2022 showing extensive nonspecific T2 hyperintense supratentorial white matter changes that are favored to reflect typical chronic microvascular ischemia.  10. History of CVA   a. MRI brain May 2022 showing no acute intracranial abnormality.  Infarct.  Moderate chronic small vessel ischemic changes.  Small chronic infarcts in the left cerebellum  b. Event monitor July 2022: Episodes of atrial tachycardia with the longest lasting 20 seconds.  Frequent PACs with 6.2% burden, no atrial fibrillation  11. Surgical history:  a. PDA ligation  b. Cataract extraction bilateral  c. Left hand surgery          Allergies   Allergen Reactions   • Atorvastatin Other (See Comments)   • Colesevelam Other  (See Comments)   • Cephalexin Rash   • Penicillins Rash       Current Outpatient Medications   Medication Instructions   • ammonium lactate (AMLACTIN) 12 % cream (12 %)   • aspirin 81 mg, Oral, Daily   • azaTHIOprine (IMURAN) 100 mg, Oral, 2 Times Daily   • chlorthalidone (HYGROTON) 25 mg, Oral, Daily   • Continuous Blood Gluc  (FreeStyle Marie 2 [place]) device FreeStyle Marie 2 [place]   change q 14 days   • Cosentyx 150 mg, Subcutaneous, Every 28 Days   • Droplet Pen Needles 32G X 4 MM misc No dose, route, or frequency recorded.   • DULoxetine (CYMBALTA) 20 mg, Oral, Daily   • fluticasone (FLONASE) 50 MCG/ACT nasal spray 2 sprays, Each Nare, Daily, Shake well before using.    • FreeStyle Precision Ant Test test strip No dose, route, or frequency recorded.   • insulin aspart (novoLOG FLEXPEN) 100 UNIT/ML solution pen-injector sc pen Subcutaneous, 3 Times Daily With Meals   • irbesartan (AVAPRO) 300 MG tablet irbesartan 300 mg tablet   Take 1 tablet every day by oral route.   • levocetirizine (XYZAL) 5 mg, Oral, Every Evening   • levothyroxine (SYNTHROID, LEVOTHROID) 75 mcg, Oral, Daily   • nebivolol (BYSTOLIC) 10 mg, Oral, Daily   • oxybutynin XL (DITROPAN-XL) 5 mg, Oral, Daily   • pravastatin (PRAVACHOL) 10 mg, Oral, Daily   • Tresiba FlexTouch 13 Units, Subcutaneous, Nightly   • triamcinolone (KENALOG) 0.1 % cream APPLY A THIN LAYER TO THE AFFECTED AREA TWICE DAILY FOR 1 WEEK THEN ONCE DAILY FOR 1 WEEK         HISTORY OF PRESENT ILLNESS:  The patient is here for a 4-month follow-up after having a 2-day [facility] hospitalization May 2022 for rightness of the right upper extremity.  MRI of the brain was negative for acute stroke.  She was found to have demyelinating disease with MRI brain July 2022 showing extensive nonspecific T2 hyperintense supratentorial white matter changes that are favored to reflect typical chronic microvascular ischemia.Event monitor July 2022 showed episodes of atrial tachycardia with the  "longest lasting 20 seconds.  Frequent PACs with 6.2% burden, no atrial fibrillation.  She had a stress test August 2022 with no evidence of ischemia.  The patient says that she occasionally will have some shortness of breath but is not frequent and does not last long.  She feels like her pedal edema has improved on chlorthalidone.  She denies any chest pain, presyncope, or syncope.  She was told that she has some PACs after she wore a heart monitor.  She brought her blood pressure log with her today for me to review.  Her blood pressures have been elevated, particularly since starting her new cancer medication a couple weeks ago.  She is on limited activity due to her demyelinating disease.  Her average blood pressures have been between 130-170 systolic.  Lately they have been been running more in the 160-180 systolic range.      ROS   All other systems reviewed and otherwise negative.    Procedures       Objective:       Vitals:    09/13/22 1302   BP: (!) 186/78   BP Location: Right arm   Patient Position: Sitting   Pulse: 64   SpO2: 96%   Weight: 61.1 kg (134 lb 12.8 oz)   Height: 149.9 cm (59\")   Recheck blood pressure right arm sitting was 178/70  Body mass index is 27.23 kg/m².  Last weight August 2022 was 131 pounds  Constitutional:       Appearance: Healthy appearance. Not in distress.   Neck:      Vascular: No JVR. JVD normal.   Pulmonary:      Effort: Pulmonary effort is normal.      Breath sounds: Normal breath sounds. No wheezing. No rhonchi. No rales.   Chest:      Chest wall: Not tender to palpatation.   Cardiovascular:      PMI at left midclavicular line. Normal rate. Regular rhythm. Normal S2.      Murmurs: There is a grade 1/6 systolic murmur at the LLSB.      No gallop. No click. No rub.   Pulses:     Dorsalis pedis: 1+ bilaterally.     Posterior tibial: 1+ bilaterally.  Edema:     Peripheral edema absent.   Abdominal:      General: Bowel sounds are normal.      Palpations: Abdomen is soft.      " Tenderness: There is no abdominal tenderness.   Musculoskeletal: Normal range of motion.         General: No tenderness. Skin:     General: Skin is warm and dry.   Neurological:      General: No focal deficit present.      Mental Status: Alert and oriented to person, place and time.           Lab Review:   Lab Results   Component Value Date    GLUCOSE 103 (H) 06/23/2022    BUN 31 (H) 06/23/2022    CREATININE 1.05 (H) 06/23/2022    EGFRIFNONA 63 10/22/2020    BCR 29.5 (H) 06/23/2022    CO2 29.0 06/23/2022    CALCIUM 10.3 06/23/2022    PROTENTOTREF 7.9 06/23/2022    ALBUMIN 4.4 07/06/2022    LABIL2 1.1 06/23/2022    AST 17 06/23/2022    ALT 16 06/23/2022       Lab Results   Component Value Date    WBC 8.54 06/23/2022    HGB 13.1 06/23/2022    HCT 40.3 06/23/2022    MCV 89.6 06/23/2022     06/23/2022       Lab Results   Component Value Date    HGBA1C 6.90 (H) 05/05/2022       Lab Results   Component Value Date    TSH 2.280 05/04/2022       Lab Results   Component Value Date    CHOL 167 05/05/2022     Lab Results   Component Value Date    TRIG 63 05/05/2022     Lab Results   Component Value Date    HDL 79 (H) 05/05/2022     Lab Results   Component Value Date    LDL 76 05/05/2022     Advance Care Planning              Assessment:       Overall continued acceptable course with no new interim cardiopulmonary complaints with fair functional status on limited activity. We will defer additional diagnostic or therapeutic intervention from a cardiac perspective at this time.Event monitor July 2022 showed episodes of atrial tachycardia with the longest lasting 20 seconds.  Frequent PACs with 6.2% burden, no atrial fibrillation and she will continue to nebivolol.  She had a stress test August 2022 with no evidence of ischemia.  Her blood pressure is uncontrolled.  We will add amlodipine 5 mg daily and have her call back in 1-2 weeks with her blood pressure readings.     Diagnosis Plan   1. PDA (patent ductus arteriosus)   She had a stress test August 2022 with no evidence of ischemia.   2. Essential hypertension   Uncontrolled, add amlodipine 5 mg daily   3. Mixed hyperlipidemia   Acceptable lipid panel May 2022, continue pravastatin          Plan:         1. Her blood pressure has been running high we will add amlodipine 5 mg daily and see how she responds.  Patient to continue current medications and close follow up with the above providers.  2. Tentative cardiology follow up in February 2023 or patient may return sooner PRN.  3. Amlodipine 5mg daily  4. Patient to call back in 1-2 weeks with blood pressure readings      Scribed for Anusha Evans MD by Juliana Meyers, JAGDISH. 9/13/2022  13:31 EDT

## 2022-09-12 NOTE — TELEPHONE ENCOUNTER
Called patient and gave her provider's note that he increased her medication to 100 mg BID.  Patient was understanding.

## 2022-09-12 NOTE — TELEPHONE ENCOUNTER
Provider: PEACE  Caller: PATIENT  Relationship to Patient: SELF  Pharmacy: [id number]  Phone Number: [phone]  Reason for Call: PATIENT TELEPHONED; SAID HANDS, ARMS ARE BURNING & FACE IS TINGLING.    THE AZATHIOPRINE (IMURAN) 50 MG TABLET IS NOT WORKING. IS THERE SOMETHING ELSE THAT CAN BE PRESCRIBED?    PLEASE CALL & ADVISE.    THANK YOU.

## 2022-09-13 ENCOUNTER — TELEPHONE (OUTPATIENT)
Dept: CARDIOLOGY | Facility: CLINIC | Age: 77
End: 2022-09-13

## 2022-09-13 ENCOUNTER — OFFICE VISIT (OUTPATIENT)
Dept: CARDIOLOGY | Facility: CLINIC | Age: 77
End: 2022-09-13

## 2022-09-13 VITALS
DIASTOLIC BLOOD PRESSURE: 78 MMHG | HEIGHT: 59 IN | SYSTOLIC BLOOD PRESSURE: 186 MMHG | BODY MASS INDEX: 27.17 KG/M2 | WEIGHT: 134.8 LBS | HEART RATE: 64 BPM | OXYGEN SATURATION: 96 %

## 2022-09-13 DIAGNOSIS — Q25.0 PDA (PATENT DUCTUS ARTERIOSUS): Primary | ICD-10-CM

## 2022-09-13 DIAGNOSIS — E78.2 MIXED HYPERLIPIDEMIA: ICD-10-CM

## 2022-09-13 DIAGNOSIS — I10 ESSENTIAL HYPERTENSION: ICD-10-CM

## 2022-09-13 PROCEDURE — 99214 OFFICE O/P EST MOD 30 MIN: CPT | Performed by: INTERNAL MEDICINE

## 2022-09-13 RX ORDER — AMLODIPINE BESYLATE 5 MG/1
5 TABLET ORAL DAILY
Qty: 90 TABLET | Refills: 3 | Status: SHIPPED | OUTPATIENT
Start: 2022-09-13 | End: 2023-01-03 | Stop reason: DRUGHIGH

## 2022-09-13 RX ORDER — IRBESARTAN AND HYDROCHLOROTHIAZIDE 300; 12.5 MG/1; MG/1
1 TABLET, FILM COATED ORAL DAILY
COMMUNITY
End: 2022-12-14 | Stop reason: ALTCHOICE

## 2022-09-13 RX ORDER — NEBIVOLOL 10 MG/1
10 TABLET ORAL DAILY
COMMUNITY
End: 2022-12-15 | Stop reason: SDUPTHER

## 2022-09-13 NOTE — TELEPHONE ENCOUNTER
Patient called back, stated that she is actually taking irbesartan-HCTZ 300-12.5 mg daily.     Patient was previously on amlodipine 10 mg and lisinopril 40 mg which were both discontinued 7/19/22 due to C/O rash, itching.     Per Dr. Anusha Evans- have patient take amlodipine 5 mg and let us know if she has any recurrence of symptoms.    Discussed MA recommendations above. Pt verbalizes understanding and agreeable to plan.    Med list updated

## 2022-09-19 ENCOUNTER — TRANSCRIBE ORDERS (OUTPATIENT)
Dept: ADMINISTRATIVE | Facility: HOSPITAL | Age: 77
End: 2022-09-19

## 2022-09-19 ENCOUNTER — HOSPITAL ENCOUNTER (OUTPATIENT)
Dept: GENERAL RADIOLOGY | Facility: HOSPITAL | Age: 77
Discharge: HOME | End: 2022-09-19
Admitting: FAMILY MEDICINE

## 2022-09-19 DIAGNOSIS — R06.02 SHORTNESS OF BREATH ON EXERTION: Primary | ICD-10-CM

## 2022-09-19 PROCEDURE — 71046 X-RAY EXAM CHEST 2 VIEWS: CPT

## 2022-09-22 ENCOUNTER — OFFICE VISIT (OUTPATIENT)
Dept: PULMONOLOGY | Facility: CLINIC | Age: 77
End: 2022-09-22

## 2022-09-22 VITALS
HEIGHT: 59 IN | BODY MASS INDEX: 26.61 KG/M2 | SYSTOLIC BLOOD PRESSURE: 172 MMHG | DIASTOLIC BLOOD PRESSURE: 78 MMHG | TEMPERATURE: 97.7 F | OXYGEN SATURATION: 97 % | WEIGHT: 132 LBS | HEART RATE: 68 BPM

## 2022-09-22 DIAGNOSIS — R06.02 SHORTNESS OF BREATH: Primary | ICD-10-CM

## 2022-09-22 DIAGNOSIS — G37.9 DEMYELINATING DISEASE: ICD-10-CM

## 2022-09-22 DIAGNOSIS — G47.33 OBSTRUCTIVE SLEEP APNEA: ICD-10-CM

## 2022-09-22 DIAGNOSIS — R53.82 CHRONIC FATIGUE: ICD-10-CM

## 2022-09-22 DIAGNOSIS — L40.50 PSORIATIC ARTHRITIS: ICD-10-CM

## 2022-09-22 PROBLEM — E11.65 UNCONTROLLED DIABETES MELLITUS WITH HYPERGLYCEMIA, WITH LONG-TERM CURRENT USE OF INSULIN (HCC): Status: RESOLVED | Noted: 2022-06-23 | Resolved: 2022-09-22

## 2022-09-22 PROBLEM — Z79.4 UNCONTROLLED DIABETES MELLITUS WITH HYPERGLYCEMIA, WITH LONG-TERM CURRENT USE OF INSULIN (HCC): Status: RESOLVED | Noted: 2022-06-23 | Resolved: 2022-09-22

## 2022-09-22 PROBLEM — Z79.4 LONG TERM CURRENT USE OF INSULIN (HCC): Status: RESOLVED | Noted: 2022-06-23 | Resolved: 2022-09-22

## 2022-09-22 PROCEDURE — 94618 PULMONARY STRESS TESTING: CPT | Performed by: INTERNAL MEDICINE

## 2022-09-22 PROCEDURE — 99205 OFFICE O/P NEW HI 60 MIN: CPT | Performed by: INTERNAL MEDICINE

## 2022-09-22 PROCEDURE — 94729 DIFFUSING CAPACITY: CPT | Performed by: INTERNAL MEDICINE

## 2022-09-22 PROCEDURE — 94375 RESPIRATORY FLOW VOLUME LOOP: CPT | Performed by: INTERNAL MEDICINE

## 2022-09-22 PROCEDURE — 94726 PLETHYSMOGRAPHY LUNG VOLUMES: CPT | Performed by: INTERNAL MEDICINE

## 2022-09-22 RX ORDER — OXYBUTYNIN CHLORIDE 10 MG/1
10 TABLET, EXTENDED RELEASE ORAL DAILY
COMMUNITY
Start: 2022-09-20

## 2022-09-22 RX ORDER — ALBUTEROL SULFATE 90 UG/1
2 AEROSOL, METERED RESPIRATORY (INHALATION)
COMMUNITY
Start: 2022-09-20

## 2022-09-22 NOTE — PROGRESS NOTES
New Patient Pulmonary Office Visit      Patient Name: Marilyn Kunz    Referring Physician: Peter Baca,*    Chief Complaint:    Chief Complaint   Patient presents with   • Shortness of Breath       History of Present Illness: Marilyn Kunz is a 77 y.o. female who is here today to establish care with Pulmonary.  Patient has a past medical history significant for hypertension, hyperlipidemia, factor V Leiden, type 2 diabetes mellitus, hypothyroidism, neuropathy, and CKD stage III.  He was referred to pulmonary for evaluation of shortness of breath.  Patient notes that she is really just feeling significant amount of fatigue.  States that this been going on for about the last year.  She ended up having a work-up through neurology who found a demyelinating lesion in her spine, did not think this was MS but may be related some autoimmune disease.  She was ended up putting on prednisone which caused her blood sugars to spike quite significantly and then eventually were switched over to azathioprine.  She is unaware if she has any snoring.  But she is having to take naps throughout the day.  She does feels that the sooner she wakes up she cannot get around to do anything.  She denies any chest pain, nausea, fever, or chills.    Patient exhibits the following signs and symptoms of sleep disordered breathing; daytime sleepiness and morning headaches.    Patient has been experiencing symptoms for 1 year(s).    Patient's sleepiness has been evaluated with an Grover Sleepiness Scale.                                                        Grover Sleepiness Scale    Situation Chance of Dozing or Sleeping   Sitting and reading 3 - high chance of dosing or sleeping   Watching TV 3 - high chance of dosing or sleeping   Sitting inactive in a public place 2 - moderate chance of dosing or sleeping   Being a passenger in a motor vehicle for an hour or more 2 - moderate chance of dosing or sleeping   Lying down  in the afternoon 3 - high chance of dosing or sleeping   Sitting and talking to someone 1 - slight chance of dosing or sleeping   Sitting quietly after lunch (no alcohol) 3 - high chance of dosing or sleeping   Stopped for a few minutes in traffic while driving 0 - would never dose or sleep   Total score (add the scores up) 17       Patient is suspected to have Obstructive sleep apnea (adult) (pediatric) G47.33.      Review of Systems:   Review of Systems   Constitutional: Positive for activity change and fatigue. Negative for appetite change, chills and diaphoresis.   HENT: Negative for congestion, postnasal drip, sinus pressure and voice change.    Eyes: Negative for blurred vision.   Respiratory: Positive for shortness of breath. Negative for cough and wheezing.    Cardiovascular: Negative for chest pain.   Gastrointestinal: Negative for abdominal pain.   Musculoskeletal: Negative for myalgias.   Skin: Negative for color change and dry skin.   Allergic/Immunologic: Negative for environmental allergies.   Neurological: Positive for dizziness, weakness and numbness. Negative for confusion.   Hematological: Negative for adenopathy.   Psychiatric/Behavioral: Negative for sleep disturbance and depressed mood.       Past Medical History:   Past Medical History:   Diagnosis Date   • Abnormal ECG Check [facility] Date or [facility] Records    Brought to [facility] from [facility]   • Anemia    • Asthma    • Chronic kidney disease     pt told it was dx from Dr Baca and to not eat much protein   • Congenital heart disease 1950's Patent 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    Surgery [facility] 1950s close   • CTS (carpal tunnel syndrome)     Psoriatic arthritis hands could be   • Disease of thyroid gland    • Factor 5 Leiden mutation, heterozygous ([facility]) 2012   • Hyperlipidemia    • Hypertension    • Movement disorder 10/2021    knee replacement left knww   • Murmur, cardiac    • Neuropathy in diabetes ([facility])     redness swelling  legs   • Peripheral neuropathy 05/03/2022    hands, arms. shoulders, back   • Psoriatic arthritis (HCC)     hands   • Sleep apnea Always    diabetic do not sleep well up and down   • Stroke (HCC) had one don't know when    showed on MRI Brain   • Type 1 diabetes (HCC)        Past Surgical History:   Past Surgical History:   Procedure Laterality Date   • CARDIAC CATHETERIZATION  1952 2 of them    Patent Dictus operation   • CATARACT EXTRACTION Bilateral    • ENDOSCOPY N/A 10/20/2020    Procedure: ESOPHAGOGASTRODUODENOSCOPY;  Surgeon: Brunner, Mark I, MD;  Location: [facility] ENDOSCOPY;  Service: Gastroenterology;  Laterality: N/A;   • HAND SURGERY Left 1987    no hardware   • PATENT DUCTUS ARTERIOUS LIGATION         Family History:   Family History   Problem Relation Age of Onset   • Cancer Mother    • Pancreatic cancer Mother    • Stroke Mother         Mini strokes   • Cancer Father    • Lung cancer Father    • Cancer Sister    • Colon cancer Sister    • Diabetes Sister    • Breast cancer Neg Hx    • Ovarian cancer Neg Hx        Social History:   Social History     Socioeconomic History   • Marital status: Single   Tobacco Use   • Smoking status: Never Smoker   • Smokeless tobacco: Never Used   Vaping Use   • Vaping Use: Never used   Substance and Sexual Activity   • Alcohol use: No   • Drug use: No   • Sexual activity: Not Currently     Partners: Male     Birth control/protection: Abstinence       Medications:     Current Outpatient Medications:   •  albuterol sulfate  (90 Base) MCG/ACT inhaler, Inhale 2 puffs., Disp: , Rfl:   •  amLODIPine (NORVASC) 5 MG tablet, Take 1 tablet by mouth Daily., Disp: 90 tablet, Rfl: 3  •  ammonium lactate (AMLACTIN) 12 % cream, (12 %), Disp: , Rfl:   •  aspirin 81 MG EC tablet, Take 1 tablet by mouth Daily., Disp: 30 tablet, Rfl: 0  •  azaTHIOprine (Imuran) 50 MG tablet, Take 2 tablets by mouth 2 (Two) Times a Day., Disp: 120 tablet, Rfl: 2  •  chlorthalidone (HYGROTON) 25 MG  tablet, Take 1 tablet by mouth Daily., Disp: 45 tablet, Rfl: 3  •  Continuous Blood Gluc  (FreeStyle Marie 2 [place]) device, FreeStyle Marie 2 [place]  change q 14 days, Disp: , Rfl:   •  Droplet Pen Needles 32G X 4 MM misc, , Disp: , Rfl:   •  DULoxetine (CYMBALTA) 20 MG capsule, Take 1 capsule by mouth Daily., Disp: 30 capsule, Rfl: 2  •  fluticasone (FLONASE) 50 MCG/ACT nasal spray, 2 sprays by Each Nare route Daily. Shake well before using., Disp: , Rfl:   •  FreeStyle Precision Ant Test test strip, , Disp: , Rfl:   •  insulin aspart (novoLOG FLEXPEN) 100 UNIT/ML solution pen-injector sc pen, Inject  under the skin 3 (Three) Times a Day With Meals., Disp: , Rfl:   •  irbesartan-hydrochlorothiazide (AVALIDE) 300-12.5 MG tablet, Take 1 tablet by mouth Daily., Disp: , Rfl:   •  levocetirizine (XYZAL) 5 MG tablet, Take 5 mg by mouth Every Evening., Disp: , Rfl:   •  levothyroxine (SYNTHROID, LEVOTHROID) 75 MCG tablet, Take 75 mcg by mouth Daily., Disp: , Rfl:   •  nebivolol (BYSTOLIC) 10 MG tablet, Take 10 mg by mouth Daily., Disp: , Rfl:   •  oxybutynin XL (DITROPAN-XL) 10 MG 24 hr tablet, Take  by mouth., Disp: , Rfl:   •  pravastatin (PRAVACHOL) 10 MG tablet, Take 10 mg by mouth Daily., Disp: , Rfl:   •  Secukinumab (Cosentyx) 150 MG/ML solution prefilled syringe, Inject 150 mg under the skin into the appropriate area as directed Every 28 (Twenty-Eight) Days., Disp: , Rfl:   •  Tresiba FlexTouch 100 UNIT/ML solution pen-injector injection, Inject 13 Units under the skin into the appropriate area as directed Every Night., Disp: , Rfl:   •  triamcinolone (KENALOG) 0.1 % cream, APPLY A THIN LAYER TO THE AFFECTED AREA TWICE DAILY FOR 1 WEEK THEN ONCE DAILY FOR 1 WEEK, Disp: , Rfl:     Allergies:   Allergies   Allergen Reactions   • Atorvastatin Other (See Comments)   • Colesevelam Other (See Comments)   • Cephalexin Rash   • Penicillins Rash       Physical Exam:  Vital Signs:   Vitals:    09/22/22 1039   BP:  "172/78   Pulse: 68   Temp: 97.7 °F (36.5 °C)   SpO2: 97%  Comment: resting, room air   Weight: 59.9 kg (132 lb)   Height: 149.9 cm (59\")       Physical Exam  Vitals and nursing note reviewed.   Constitutional:       General: She is not in acute distress.     Appearance: She is well-developed and normal weight. She is not ill-appearing or toxic-appearing.   HENT:      Head: Normocephalic and atraumatic.   Cardiovascular:      Rate and Rhythm: Normal rate and regular rhythm.      Pulses: Normal pulses.      Heart sounds: Normal heart sounds. No murmur heard.    No friction rub. No gallop.   Pulmonary:      Effort: Pulmonary effort is normal. No respiratory distress.      Breath sounds: Normal breath sounds. No wheezing, rhonchi or rales.   Musculoskeletal:      Right lower leg: No edema.      Left lower leg: No edema.   Skin:     General: Skin is warm and dry.   Neurological:      Mental Status: She is alert and oriented to person, place, and time.         Immunization History   Administered Date(s) Administered   • COVID-19 (PFIZER) PURPLE CAP 02/06/2021, 03/03/2021, 10/14/2021   • Covid-19 (Pfizer) Gray Cap 04/27/2022       Results Review:   - I personally reviewed the pts imaging from chest x-ray 9/19/2022 showed no acute cardiopulmonary process  - I personally viewed the patient's pulmonary function testing from 9/22/2002, which was extremely poor and inadequate, this is uninterpretable.  - I personally reviewed the pts spirometry from 9/19/2022, extremely poor testing, uninterpretable  -I personally reviewed the patient's 6-minute walk test from 9/22/2022 and when she started at 97% and maintained at 96% throughout the entire test, dyspnea scale started 01 up to 2, she was able to walk 137 m which was 34% of predicted.  - I personally reviewed the pts Echo report from 5/5/2022 showed an EF of 70%, saline test was negative, mildly increased left atrial volume.  - I personally reviewed the pts stress test result " from 8/31/2022 showed mild coronary artery calcifications, nonspecific ST changes, considered to be a low risk study.  EF was noted to be 70%.    Assessment / Plan:   1. Shortness of breath (Primary)  2. Chronic fatigue  3. Psoriatic arthritis (HCC)  4. Demyelinating disease (HCC)  5. Obstructive sleep apnea  -I am not sure that the patient shortness of breath is really coming from a pulmonary component.  She has autoimmune diseases in this demyelinative disease.  That could be playing a significant role.  This also could be just part of an overall fatigue standpoint.  She has had a significant work-up for fatigue which has been very thorough so far.  I think that we can rule out all other pulmonary causes though to try to be sure that were not missing anything which I think is very appropriate.  Unfortunately her pulmonary function testing are not uninterpretable on multiple attempts now so do not and there provide me any significant benefit.  -Given the known cervical arthritis, and possible underlying autoimmune diseases I am going to have her get a high-resolution CT scan of the chest to look for any signs of ILD.  -6-minute walk test was normal on today's visit  -I am also going to have her get a home sleep study as one of the other causes of chronic fatigue would be obstructive sleep apnea unfortunately she has no one that lives with her to tell us if she snores or not.  But her daytime fatigue is quite significant at this point I think it is worth ruling out.  -She is already had blood work performed by her PCP and was within normal limits.  -The above work-up is entirely negative that I would look more towards other causes of chronic fatigue, autoimmune disease, or demyelinating disease that could be causing the patient's symptoms.    Level of service justified based on 65 minutes spent in patient care on this date of service including, but not limited to: preparing to see the patient, obtaining and/or  reviewing history, performing medically appropriate examination, ordering tests/medicine/procedures, independently interpreting results, documenting clinical information in EHR, and counseling/education of patient/family/caregiver (Excluding time spent on other separate services such as performing procedures or test interpretation, if applicable). (Level 4 45-59 minutes; Level 5 60-74 minutes)    Follow Up:   Return in about 6 weeks (around 11/3/2022) for CT Chest with next visit.     SUKHDEV Haley, DO  Pulmonary and Critical Care Medicine  Note Electronically Signed    Part of this note may be an electronic transcription/translation of spoken language to printed text using the Dragon Dictation System.

## 2022-09-23 ENCOUNTER — TELEPHONE (OUTPATIENT)
Dept: PULMONOLOGY | Facility: CLINIC | Age: 77
End: 2022-09-23

## 2022-09-23 DIAGNOSIS — R09.81 NASAL CONGESTION: Primary | ICD-10-CM

## 2022-09-23 DIAGNOSIS — G47.33 OBSTRUCTIVE SLEEP APNEA: Primary | ICD-10-CM

## 2022-09-23 NOTE — TELEPHONE ENCOUNTER
Pt called today requesting a referral to see an Allergist. She forgot to mention this to you yesterday at her apt.

## 2022-09-26 ENCOUNTER — APPOINTMENT (OUTPATIENT)
Dept: CT IMAGING | Facility: HOSPITAL | Age: 77
End: 2022-09-26

## 2022-09-26 ENCOUNTER — APPOINTMENT (OUTPATIENT)
Dept: GENERAL RADIOLOGY | Facility: HOSPITAL | Age: 77
End: 2022-09-26

## 2022-09-26 ENCOUNTER — HOSPITAL ENCOUNTER (EMERGENCY)
Facility: HOSPITAL | Age: 77
Discharge: HOME | End: 2022-09-26
Attending: EMERGENCY MEDICINE | Admitting: EMERGENCY MEDICINE

## 2022-09-26 VITALS
DIASTOLIC BLOOD PRESSURE: 58 MMHG | HEART RATE: 63 BPM | HEIGHT: 59 IN | WEIGHT: 132 LBS | RESPIRATION RATE: 16 BRPM | SYSTOLIC BLOOD PRESSURE: 143 MMHG | OXYGEN SATURATION: 94 % | BODY MASS INDEX: 26.61 KG/M2 | TEMPERATURE: 98.6 F

## 2022-09-26 DIAGNOSIS — J45.41 MODERATE PERSISTENT ASTHMA WITH EXACERBATION: Primary | ICD-10-CM

## 2022-09-26 DIAGNOSIS — R06.02 SHORTNESS OF BREATH: ICD-10-CM

## 2022-09-26 LAB
ALBUMIN SERPL-MCNC: 4.4 G/DL (ref 3.5–5.2)
ALBUMIN/GLOB SERPL: 1.7 G/DL
ALP SERPL-CCNC: 84 U/L (ref 39–117)
ALT SERPL W P-5'-P-CCNC: 21 U/L (ref 1–33)
ANION GAP SERPL CALCULATED.3IONS-SCNC: 14 MMOL/L (ref 5–15)
AST SERPL-CCNC: 26 U/L (ref 1–32)
BASOPHILS # BLD AUTO: 0.04 10*3/MM3 (ref 0–0.2)
BASOPHILS NFR BLD AUTO: 0.5 % (ref 0–1.5)
BILIRUB SERPL-MCNC: 1.4 MG/DL (ref 0–1.2)
BUN SERPL-MCNC: 21 MG/DL (ref 8–23)
BUN/CREAT SERPL: 22.3 (ref 7–25)
CALCIUM SPEC-SCNC: 9.2 MG/DL (ref 8.6–10.5)
CHLORIDE SERPL-SCNC: 94 MMOL/L (ref 98–107)
CK SERPL-CCNC: 87 U/L (ref 20–180)
CO2 SERPL-SCNC: 23 MMOL/L (ref 22–29)
CREAT SERPL-MCNC: 0.94 MG/DL (ref 0.57–1)
D DIMER PPP FEU-MCNC: 2.41 MCGFEU/ML (ref 0.01–0.5)
DEPRECATED RDW RBC AUTO: 44.3 FL (ref 37–54)
EGFRCR SERPLBLD CKD-EPI 2021: 62.6 ML/MIN/1.73
EOSINOPHIL # BLD AUTO: 0.06 10*3/MM3 (ref 0–0.4)
EOSINOPHIL NFR BLD AUTO: 0.7 % (ref 0.3–6.2)
ERYTHROCYTE [DISTWIDTH] IN BLOOD BY AUTOMATED COUNT: 12.8 % (ref 12.3–15.4)
GLOBULIN UR ELPH-MCNC: 2.6 GM/DL
GLUCOSE SERPL-MCNC: 267 MG/DL (ref 65–99)
HCT VFR BLD AUTO: 38 % (ref 34–46.6)
HGB BLD-MCNC: 11.9 G/DL (ref 12–15.9)
HOLD SPECIMEN: NORMAL
IMM GRANULOCYTES # BLD AUTO: 0.04 10*3/MM3 (ref 0–0.05)
IMM GRANULOCYTES NFR BLD AUTO: 0.5 % (ref 0–0.5)
LYMPHOCYTES # BLD AUTO: 0.51 10*3/MM3 (ref 0.7–3.1)
LYMPHOCYTES NFR BLD AUTO: 6 % (ref 19.6–45.3)
MCH RBC QN AUTO: 29.4 PG (ref 26.6–33)
MCHC RBC AUTO-ENTMCNC: 31.3 G/DL (ref 31.5–35.7)
MCV RBC AUTO: 93.8 FL (ref 79–97)
MONOCYTES # BLD AUTO: 0.42 10*3/MM3 (ref 0.1–0.9)
MONOCYTES NFR BLD AUTO: 5 % (ref 5–12)
NEUTROPHILS NFR BLD AUTO: 7.4 10*3/MM3 (ref 1.7–7)
NEUTROPHILS NFR BLD AUTO: 87.3 % (ref 42.7–76)
NRBC BLD AUTO-RTO: 0 /100 WBC (ref 0–0.2)
NT-PROBNP SERPL-MCNC: 1525 PG/ML (ref 0–1800)
PLATELET # BLD AUTO: 282 10*3/MM3 (ref 140–450)
PMV BLD AUTO: 10.9 FL (ref 6–12)
POTASSIUM SERPL-SCNC: 4.1 MMOL/L (ref 3.5–5.2)
PROT SERPL-MCNC: 7 G/DL (ref 6–8.5)
RBC # BLD AUTO: 4.05 10*6/MM3 (ref 3.77–5.28)
SODIUM SERPL-SCNC: 131 MMOL/L (ref 136–145)
TROPONIN T SERPL-MCNC: <0.01 NG/ML (ref 0–0.03)
WBC NRBC COR # BLD: 8.47 10*3/MM3 (ref 3.4–10.8)
WHOLE BLOOD HOLD COAG: NORMAL
WHOLE BLOOD HOLD SPECIMEN: NORMAL

## 2022-09-26 PROCEDURE — 85025 COMPLETE CBC W/AUTO DIFF WBC: CPT | Performed by: EMERGENCY MEDICINE

## 2022-09-26 PROCEDURE — 71275 CT ANGIOGRAPHY CHEST: CPT

## 2022-09-26 PROCEDURE — 80053 COMPREHEN METABOLIC PANEL: CPT | Performed by: EMERGENCY MEDICINE

## 2022-09-26 PROCEDURE — 93005 ELECTROCARDIOGRAM TRACING: CPT | Performed by: EMERGENCY MEDICINE

## 2022-09-26 PROCEDURE — 99284 EMERGENCY DEPT VISIT MOD MDM: CPT

## 2022-09-26 PROCEDURE — 94640 AIRWAY INHALATION TREATMENT: CPT

## 2022-09-26 PROCEDURE — 85379 FIBRIN DEGRADATION QUANT: CPT | Performed by: EMERGENCY MEDICINE

## 2022-09-26 PROCEDURE — 83880 ASSAY OF NATRIURETIC PEPTIDE: CPT | Performed by: EMERGENCY MEDICINE

## 2022-09-26 PROCEDURE — 25010000002 METHYLPREDNISOLONE PER 40 MG: Performed by: EMERGENCY MEDICINE

## 2022-09-26 PROCEDURE — 94799 UNLISTED PULMONARY SVC/PX: CPT

## 2022-09-26 PROCEDURE — 84484 ASSAY OF TROPONIN QUANT: CPT | Performed by: EMERGENCY MEDICINE

## 2022-09-26 PROCEDURE — 96374 THER/PROPH/DIAG INJ IV PUSH: CPT

## 2022-09-26 PROCEDURE — 71045 X-RAY EXAM CHEST 1 VIEW: CPT

## 2022-09-26 PROCEDURE — 0 IOPAMIDOL PER 1 ML: Performed by: EMERGENCY MEDICINE

## 2022-09-26 PROCEDURE — 82550 ASSAY OF CK (CPK): CPT | Performed by: EMERGENCY MEDICINE

## 2022-09-26 RX ORDER — ALBUTEROL SULFATE 2.5 MG/3ML
2.5 SOLUTION RESPIRATORY (INHALATION) ONCE
Status: COMPLETED | OUTPATIENT
Start: 2022-09-26 | End: 2022-09-26

## 2022-09-26 RX ORDER — METHYLPREDNISOLONE SODIUM SUCCINATE 40 MG/ML
80 INJECTION, POWDER, LYOPHILIZED, FOR SOLUTION INTRAMUSCULAR; INTRAVENOUS ONCE
Status: COMPLETED | OUTPATIENT
Start: 2022-09-26 | End: 2022-09-26

## 2022-09-26 RX ORDER — SODIUM CHLORIDE 0.9 % (FLUSH) 0.9 %
10 SYRINGE (ML) INJECTION AS NEEDED
Status: DISCONTINUED | OUTPATIENT
Start: 2022-09-26 | End: 2022-09-26 | Stop reason: HOSPADM

## 2022-09-26 RX ORDER — BUDESONIDE AND FORMOTEROL FUMARATE DIHYDRATE 80; 4.5 UG/1; UG/1
2 AEROSOL RESPIRATORY (INHALATION)
Qty: 10.2 G | Refills: 0 | Status: SHIPPED | OUTPATIENT
Start: 2022-09-26 | End: 2022-10-31 | Stop reason: ALTCHOICE

## 2022-09-26 RX ADMIN — METHYLPREDNISOLONE SODIUM SUCCINATE 80 MG: 40 INJECTION, POWDER, FOR SOLUTION INTRAMUSCULAR; INTRAVENOUS at 12:05

## 2022-09-26 RX ADMIN — IOPAMIDOL 85 ML: 755 INJECTION, SOLUTION INTRAVENOUS at 13:29

## 2022-09-26 RX ADMIN — ALBUTEROL SULFATE 2.5 MG: 2.5 SOLUTION RESPIRATORY (INHALATION) at 12:07

## 2022-09-26 NOTE — ED PROVIDER NOTES
"Subjective   History of Present Illness  Mrs. Kunz presents by ambulance from home with multiple complaints.  Her main complaint is that of shortness of breath.  She also reports severe fatigue.  This has been going on since at least May of this year.  She complains of pain and tingling over her entire body.  When asked about chest pain she tells me she has had a pressure in her chest that she relates to her asthma.  Its been there for at least 3 days.  She reports quite a bit of concern over a \"lesion\" on her spine.  She reports that when she got up today she was having a headache and shaking of her right hand.  She denies fevers or chills.  She denies upper respiratory symptoms.  Review of her records indicates that she sees neurology for a cervical demyelinating lesion.  It is felt to be a benign cyst and was not changed from MRIs obtained in early May and the end of July.  She has history of asthma.  She just saw Dr. Haley in his clinic about a week ago.    History provided by:  Patient  Shortness of Breath  Severity:  Moderate  Onset quality:  Gradual  Timing:  Constant  Progression:  Worsening  Chronicity:  Recurrent  Context: activity    Relieved by:  Rest  Worsened by:  Activity  Ineffective treatments:  Inhaler  Associated symptoms: chest pain, headaches and wheezing    Associated symptoms: no abdominal pain, no cough, no fever and no vomiting        Review of Systems   Constitutional: Negative for chills and fever.   HENT: Negative for congestion and rhinorrhea.    Respiratory: Positive for shortness of breath and wheezing. Negative for cough.    Cardiovascular: Positive for chest pain.   Gastrointestinal: Negative for abdominal pain and vomiting.   Genitourinary: Negative for difficulty urinating.   Neurological: Positive for weakness and headaches.   All other systems reviewed and are negative.      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 shoulders, back   • Psoriatic arthritis (HCC)     hands   • Sleep apnea Always    diabetic do not sleep well up and down   • Stroke (HCC) had one don't know when    showed on MRI Brain   • Type 1 diabetes (HCC)        Allergies   Allergen Reactions   • Atorvastatin Other (See Comments)   • Colesevelam Other (See Comments)   • Cephalexin Rash   • Penicillins Rash       Past Surgical History:   Procedure Laterality Date   • CARDIAC CATHETERIZATION  1952 2 of them    Patent Dictus operation   • CATARACT EXTRACTION Bilateral    • ENDOSCOPY N/A 10/20/2020    Procedure: ESOPHAGOGASTRODUODENOSCOPY;  Surgeon: Brunner, Mark I, MD;  Location: [facility] ENDOSCOPY;  Service: Gastroenterology;  Laterality: N/A;   • HAND SURGERY Left 1987    no hardware   • PATENT DUCTUS ARTERIOUS LIGATION         Family History   Problem Relation Age of Onset   • Cancer Mother    • Pancreatic cancer Mother    • Stroke Mother         Mini strokes   • Cancer Father    • Lung cancer Father    • Cancer Sister    • Colon cancer Sister    • Diabetes Sister    • Breast cancer Neg Hx    • Ovarian cancer Neg Hx        Social History     Socioeconomic History   • Marital status: Single   Tobacco Use   • Smoking status: Never Smoker   • Smokeless tobacco: Never Used   Vaping Use   • Vaping Use: Never used   Substance and  Sexual Activity   • Alcohol use: No   • Drug use: No   • Sexual activity: Not Currently     Partners: Male     Birth control/protection: Abstinence           Objective   Physical Exam  Constitutional:       General: She is not in acute distress.     Appearance: Normal appearance. She is well-developed.   HENT:      Head: Normocephalic and atraumatic.      Nose: Nose normal. No congestion or rhinorrhea.   Eyes:      General: No scleral icterus.     Conjunctiva/sclera: Conjunctivae normal.   Neck:      Vascular: No JVD.      Trachea: No tracheal deviation.      Comments: No JVD  Cardiovascular:      Rate and Rhythm: Normal rate and regular rhythm.      Heart sounds: Normal heart sounds. No murmur heard.    No friction rub. No gallop.   Pulmonary:      Effort: Pulmonary effort is normal. No respiratory distress.      Breath sounds: No stridor. Decreased breath sounds and wheezing present. No rhonchi or rales.      Comments: She has severe expiratory wheezing with decreased air movement.  She is able to speak sentences  Chest:      Chest wall: No tenderness.   Abdominal:      General: Bowel sounds are normal. There is no distension.      Palpations: Abdomen is soft.      Tenderness: There is no abdominal tenderness. There is no guarding or rebound.   Musculoskeletal:         General: No tenderness or deformity. Normal range of motion.      Cervical back: Normal range of motion and neck supple.      Right lower leg: No edema.      Left lower leg: No edema.   Skin:     General: Skin is warm and dry.      Findings: No erythema or rash.   Neurological:      Mental Status: She is alert and oriented to person, place, and time.      Motor: No weakness.      Coordination: Coordination normal.   Psychiatric:         Mood and Affect: Mood normal.         Behavior: Behavior normal.         Thought Content: Thought content normal.         Procedures           ED Course  ED Course as of 09/27/22 0618   Mon Sep 26, 2022   1258 Chest  x-ray and labs unremarkable with the exception of elevated D-dimer.  Will obtain CT angiogram. [DT]   1419 Upon repeat exam she feels better.  CT angiogram is negative for any acute problem.  Lungs are now completely clear.  I spoke with her about treatment of this and raised the possibility of steroids again.  She tells me that her sugars went over 400 and even after increasing her insulin and working with her endocrinologist she had difficulty getting them back down.  She just saw Dr. Haley in the office a few days ago.  Will discuss with him to help formulate a plan. [DT]   1438 D/w Dr Haley.  He recommends adding Symbicort. [DT]      ED Course User Index  [DT] Los Sanchez MD                                           MDM  Number of Diagnoses or Management Options  Moderate persistent asthma with exacerbation: new and requires workup  Shortness of breath: new and requires workup     Amount and/or Complexity of Data Reviewed  Clinical lab tests: ordered and reviewed  Tests in the radiology section of CPT®: ordered and reviewed  Review and summarize past medical records: yes  Discuss the patient with other providers: yes  Independent visualization of images, tracings, or specimens: yes    Patient Progress  Patient progress: improved      Final diagnoses:   Moderate persistent asthma with exacerbation   Shortness of breath       ED Disposition  ED Disposition     ED Disposition   Discharge    Condition   Stable    Comment   --             Peter Baca MD  [address] [place], [place] [id number] [phone]               Medication List      New Prescriptions    budesonide-formoterol 80-4.5 MCG/ACT inhaler  Commonly known as: Symbicort  Inhale 2 puffs 2 (Two) Times a Day.           Where to Get Your Medications      These medications were sent to [id number] - [place], [place] - [address] AT [facility] - [phone]  -  [phone] FX  [address], [facility] [address]    Phone: [phone]   · budesonide-formoterol 80-4.5 MCG/ACT inhaler          Los Sanchez MD  09/27/22 0619

## 2022-09-26 NOTE — DISCHARGE INSTRUCTIONS
I have sent in a prescription for another inhaler.  I would like you to take this twice a day in addition to the 1 you are already using.  Call Dr. Gaines and arrange follow-up with him.  Come back here anytime if worsening of your breathing or any other concerns.

## 2022-09-27 LAB
QT INTERVAL: 430 MS
QTC INTERVAL: 454 MS

## 2022-09-30 ENCOUNTER — TELEPHONE (OUTPATIENT)
Dept: NEUROLOGY | Facility: CLINIC | Age: 77
End: 2022-09-30

## 2022-09-30 NOTE — TELEPHONE ENCOUNTER
Provider: NHAN HAND  Caller: PATIENT  Relationship to Patient: SELF  Pharmacy: LISTED  Phone Number: [phone]  Reason for Call: PATIENT THINKS THE AZATHIOPRINE IS NOT HELPING.  PATIENT STARTED THE MEDICATION ON 8/25/22 AND STILL DOES NOT FEEL ANY BETTER.  WHEN SHE WAKES UP IN THE MORNING SHE FEELS LIKE SHE HAS RUN A MARATHON, FEELS TOTALLY FATIGUED ALL DAY. WHOLE BODY IS TINGLING.  PATIENT WOULD LIKE A CALL BACK PLEASE.      PLEASE REVIEW AND ADVISE     THANK YOU

## 2022-09-30 NOTE — TELEPHONE ENCOUNTER
Returned patient's phone call and she hung up.  Called patient back and left  for her to return call.

## 2022-09-30 NOTE — TELEPHONE ENCOUNTER
Called patient and spoke with her regarding a follow up appointment per Dr. Hernandez to change her medication and patient has trouble getting transportation to the clinic and will call back to get a new appointment.

## 2022-09-30 NOTE — TELEPHONE ENCOUNTER
Caller: Marilyn Kunz     Relationship: Self     Best call back number: [phone] (PLEASE TRY THIS NUMBER FIRST. PT'S HOME PHONE IS HANGING UP WHEN PT ANSWERS AN INCOMING CALL.)  [phone]    PT CALLED BACK TO SPEAK WITH GELACIO. PT SAID SHE HAS HAD ISSUES WITH HER PHONE AND THEY GOT DISCONNECTED.    UNABLE TO WARM TRANSFER PT TO [facility].    PT IS WANTING TO KEEP THE APPT ON 10-28-22.     PT IS WANTING TO SEE IF IT'S POSSIBLE TO HAVE HER RX CHANGED AS HER CURRENT RX IS NOT HELPING OR SHOULD SHE CONTINUE TAKING WHAT RX SHE ALREADY HAS.    THE PT HAS BEEN TOTALLY EXHAUSTED. PT CALLED [phone] ON 9-26-22. PT IS HAVING THE SAME SYMPTOMS THAT SHE HAD WHEN SHE WAS FIRST SEEN.     PT USES [facility] AT [facility].    PLEASE CALL PT BACK [phone].

## 2022-09-30 NOTE — TELEPHONE ENCOUNTER
Called patient and could not reach her on home phone so left a vm on her mobile phone to call us back to reschedule her appointment soon with other provider Ngoc Asif.

## 2022-09-30 NOTE — TELEPHONE ENCOUNTER
Caller: Marilyn Kunz    Relationship: Self    Best call back number: [phone]    What is the best time to reach you: ANYTIME    Who are you requesting to speak with (clinical staff, provider,  specific staff member): CLINICAL STAFF    Do you know the name of the person who called: GELACIO    What was the call regarding: MEDICATION    Do you require a callback: YES

## 2022-10-05 ENCOUNTER — APPOINTMENT (OUTPATIENT)
Dept: CT IMAGING | Facility: HOSPITAL | Age: 77
End: 2022-10-05

## 2022-10-07 ENCOUNTER — HOSPITAL ENCOUNTER (OUTPATIENT)
Dept: CT IMAGING | Facility: HOSPITAL | Age: 77
Discharge: HOME | End: 2022-10-07
Admitting: INTERNAL MEDICINE

## 2022-10-07 DIAGNOSIS — R06.02 SHORTNESS OF BREATH: ICD-10-CM

## 2022-10-07 PROCEDURE — 71250 CT THORAX DX C-: CPT

## 2022-10-26 ENCOUNTER — TRANSCRIBE ORDERS (OUTPATIENT)
Dept: ADMINISTRATIVE | Facility: HOSPITAL | Age: 77
End: 2022-10-26

## 2022-10-26 DIAGNOSIS — Z12.31 VISIT FOR SCREENING MAMMOGRAM: Primary | ICD-10-CM

## 2022-10-28 ENCOUNTER — TELEPHONE (OUTPATIENT)
Dept: NEUROLOGY | Facility: CLINIC | Age: 77
End: 2022-10-28

## 2022-10-28 ENCOUNTER — OFFICE VISIT (OUTPATIENT)
Dept: NEUROLOGY | Facility: CLINIC | Age: 77
End: 2022-10-28

## 2022-10-28 VITALS
TEMPERATURE: 97.1 F | BODY MASS INDEX: 26.73 KG/M2 | WEIGHT: 132.6 LBS | SYSTOLIC BLOOD PRESSURE: 120 MMHG | HEART RATE: 65 BPM | DIASTOLIC BLOOD PRESSURE: 58 MMHG | HEIGHT: 59 IN | OXYGEN SATURATION: 96 %

## 2022-10-28 DIAGNOSIS — G37.9 DEMYELINATING DISEASE: ICD-10-CM

## 2022-10-28 DIAGNOSIS — R20.2 PARESTHESIA: ICD-10-CM

## 2022-10-28 DIAGNOSIS — R27.0 ATAXIA: ICD-10-CM

## 2022-10-28 DIAGNOSIS — Z86.73 HISTORY OF STROKE: Primary | ICD-10-CM

## 2022-10-28 DIAGNOSIS — L40.50 PSORIATIC ARTHRITIS: ICD-10-CM

## 2022-10-28 DIAGNOSIS — N88.9 CERVICAL LESION: ICD-10-CM

## 2022-10-28 DIAGNOSIS — Z86.73 HISTORY OF STROKE: ICD-10-CM

## 2022-10-28 DIAGNOSIS — E03.9 HYPOTHYROIDISM, UNSPECIFIED TYPE: ICD-10-CM

## 2022-10-28 PROBLEM — Z13.31 SCREENING FOR DEPRESSION: Status: ACTIVE | Noted: 2022-10-28

## 2022-10-28 PROBLEM — Z23 NEED FOR VACCINATION AGAINST STREPTOCOCCUS PNEUMONIAE: Status: ACTIVE | Noted: 2022-10-28

## 2022-10-28 PROBLEM — E10.43 DIABETIC AUTONOMIC NEUROPATHY ASSOCIATED WITH TYPE 1 DIABETES MELLITUS (HCC): Status: ACTIVE | Noted: 2022-10-28

## 2022-10-28 PROBLEM — R06.02 SHORTNESS OF BREATH ON EXERTION: Status: ACTIVE | Noted: 2022-10-28

## 2022-10-28 PROCEDURE — 99214 OFFICE O/P EST MOD 30 MIN: CPT | Performed by: NURSE PRACTITIONER

## 2022-10-28 RX ORDER — INHALER,ASSIST DEVICE,LG MASK
SPACER (EA) MISCELLANEOUS
COMMUNITY
Start: 2022-10-11

## 2022-10-28 RX ORDER — PRAVASTATIN SODIUM 20 MG
20 TABLET ORAL EVERY EVENING
Qty: 90 TABLET | Refills: 3 | Status: SHIPPED | OUTPATIENT
Start: 2022-10-28 | End: 2023-10-28

## 2022-10-28 RX ORDER — AZATHIOPRINE 50 MG/1
100 TABLET ORAL 2 TIMES DAILY
Qty: 120 TABLET | Refills: 2 | Status: SHIPPED | OUTPATIENT
Start: 2022-10-28 | End: 2022-12-30

## 2022-10-28 RX ORDER — PRAVASTATIN SODIUM 20 MG
20 TABLET ORAL EVERY EVENING
Qty: 90 TABLET | Refills: 3 | Status: SHIPPED | OUTPATIENT
Start: 2022-10-28 | End: 2022-10-28 | Stop reason: SDUPTHER

## 2022-10-28 NOTE — TELEPHONE ENCOUNTER
Rx Refill Note  Requested Prescriptions     Pending Prescriptions Disp Refills   • azaTHIOprine (Imuran) 50 MG tablet 120 tablet 2     Sig: Take 2 tablets by mouth 2 (Two) Times a Day.   • pravastatin (Pravachol) 20 MG tablet 90 tablet 3     Sig: Take 1 tablet by mouth Every Evening.      Last filled: Azathioprine 09/12/2022 120 with 2 refills.  Pravastatin 10/28/2022 90 with 3 refills.  Last office visit with prescribing clinician: 10/28/2022      Next office visit with prescribing clinician: 2/6/2023     Azathioprine Sent in 120 with 3 refills.  Pravastatin Sent in 90 with 3 refills.    Robina Sainz MA  10/28/22, 13:24 EDT

## 2022-10-28 NOTE — TELEPHONE ENCOUNTER
Caller: Marilyn Kunz    Relationship: Self    Best call back number: [phone]    Requested Prescriptions:   Requested Prescriptions     Pending Prescriptions Disp Refills   • azaTHIOprine (Imuran) 50 MG tablet 120 tablet 2     Sig: Take 2 tablets by mouth 2 (Two) Times a Day.   • pravastatin (Pravachol) 20 MG tablet 90 tablet 3     Sig: Take 1 tablet by mouth Every Evening.        Pharmacy where request should be sent:  [facility]     Additional details provided by patient: CANCEL RX'S SENT TO [facility] AS SHE STATES IT NEEDS TO GO THROUGH [facility] FOR 90 DAY SUPPLY FOR BOTH     Does the patient have less than a 3 day supply:  [] Yes  [x] No    Natalie Olson Rep   10/28/22 12:37 EDT

## 2022-10-28 NOTE — PROGRESS NOTES
Neuro Office Visit      Encounter Date: 10/28/2022   Patient Name: Marilyn Kunz  : 1945   MRN: [id number]   PCP: Dr Eder Baca  Chief Complaint:    Chief Complaint   Patient presents with   • Demyelinating disease       History of Present Illness: Marilyn Kunz is a 77 y.o. female who is here today in Neurology for demyelinating disease and cervical lesion    Last visit 22-Start Imuran 50 bid, repeat MRI 6-12 months, stop steroids, use cane/walker to prevent falls. Cont Cosentyx.    Interval history taking Imuran 100 bid. Seen in ED for sob. Seeing Dr Haley and allergist. Taking Symbicort.       Demyelinating disease/Ataxia/Cervical Lesion/Paresthesia  Still with itching and burning in her hands with tingling in shoulder and arms. Weakness in hands is stable, but not worse. Still taking Cosyntex once a month.  Tolerating Azothioprine 100m bid. No side effects.  Pain in arms and back is intermittent and improved. Fatigue is improved.     Treatment: cymbalta offered no relief. Steroids.    History of stroke  Taking Asa 81mg and Pravastatin 10mg. BP is well controlled on current meds.  MRI brain 22 with small infarct left cerebellum.    Denies slurred speech and dysphagia. Denies vision changes. No falls. Gait is steady. Not using assistive devices.    PH  Admitted at [facility]5/4-22 for right hand pain and weakness.  Acute worsening of right hand and arm pain w worsening rash and swelling of BLE. Rash present since knee replacement Oct 2021.  CTH-neg  CTA H/N-neg  CT perfuison-neg      Labs cmp, TSH, T4, crp, mag, vit b12, JESSICA, cryoglobulin, CBC, IgM, IgA, IgG, NMO, MOG, ACE-neg  A1c 6.9, Factor V Leiden-heterozygous, sed rate 37  CSF-0OCB, 2 paired bands in csf prot 42.1     MRI Brain Without Contrast (2022 23:45)-small infarcts left cerebellum. Mod T2 hyperintensity.  Developed numbness, tingling, throbbing with electric shock like pain in hands up to shoulder and down her  spine. Denies leg symptoms.  MRI Brain With & Without Contrast (07/30/2022 17:36)-Stable contrast-enhanced MRI of the brain demonstrating fairly extensive  nonspecific T2 hyperintense supratentorial white matter changes. These  are favored to reflect typical chronic microvascular ischemia, however  could reflect chronic sequela of demyelination in a patient of this age.  MRI Cervical Spine With & Without Contrast (07/30/2022 17:41)-Contrast-enhanced MRI of the cervical spine, also unchanged  demonstrating a short segment area of focal T2 hyperintensity, favoring  fluid intensity along the central and ventral aspect of the cord  spanning C5 and C6. Appearance is again most consistent with some cystic  myelomalacia. There is no new focal cord signal abnormality. 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 Lives alone.  Subjective      Past Medical History:   Past Medical History:   Diagnosis Date   • Abnormal ECG Check [facility] Date or [facility]    Brought to [facility] from [facility]   • Anemia    • Asthma    • Chronic kidney disease     pt told it was dx from Dr Baca and to not eat much protein   • Congenital heart disease 1950's Patent 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    Surgery [facility] 1950s close   • CTS (carpal tunnel syndrome)     Psoriatic arthritis hands could be   • Disease of thyroid gland    • Factor 5 Leiden mutation, heterozygous (HCC) 2012   • Hyperlipidemia    • Hypertension    • Movement disorder 10/2021    knee replacement left knww   • Murmur, cardiac    • Neuropathy in diabetes (HCC)     redness swelling legs   • Peripheral neuropathy 05/03/2022    hands, arms. shoulders, back   • Psoriatic arthritis (HCC)     hands   • Sleep apnea Always    diabetic do not sleep well up and down   •  Stroke (HCC) had one don't know when    showed on MRI Brain   • Type 1 diabetes (HCC)        Past Surgical History:   Past Surgical History:   Procedure Laterality Date   • CARDIAC CATHETERIZATION  1952 2 of them    Patent Dictus operation   • CATARACT EXTRACTION Bilateral    • ENDOSCOPY N/A 10/20/2020    Procedure: ESOPHAGOGASTRODUODENOSCOPY;  Surgeon: Brunner, Mark I, MD;  Location: [facility] ENDOSCOPY;  Service: Gastroenterology;  Laterality: N/A;   • HAND SURGERY Left 1987    no hardware   • PATENT DUCTUS ARTERIOUS LIGATION         Family History:   Family History   Problem Relation Age of Onset   • Cancer Mother    • Pancreatic cancer Mother    • Stroke Mother         Mini strokes   • Cancer Father    • Lung cancer Father    • Cancer Sister    • Colon cancer Sister    • Diabetes Sister    • Breast cancer Neg Hx    • Ovarian cancer Neg Hx        Social History:   Social History     Socioeconomic History   • Marital status: Single   Tobacco Use   • Smoking status: Never   • Smokeless tobacco: Never   Vaping Use   • Vaping Use: Never used   Substance and Sexual Activity   • Alcohol use: No   • Drug use: No   • Sexual activity: Not Currently     Partners: Male     Birth control/protection: Abstinence       Medications:     Current Outpatient Medications:   •  albuterol sulfate  (90 Base) MCG/ACT inhaler, Inhale 2 puffs., Disp: , Rfl:   •  amLODIPine (NORVASC) 5 MG tablet, Take 1 tablet by mouth Daily., Disp: 90 tablet, Rfl: 3  •  ammonium lactate (AMLACTIN) 12 % cream, (12 %), Disp: , Rfl:   •  aspirin 81 MG EC tablet, Take 1 tablet by mouth Daily., Disp: 30 tablet, Rfl: 0  •  azaTHIOprine (Imuran) 50 MG tablet, Take 2 tablets by mouth 2 (Two) Times a Day., Disp: 120 tablet, Rfl: 2  •  budesonide-formoterol (Symbicort) 80-4.5 MCG/ACT inhaler, Inhale 2 puffs 2 (Two) Times a Day., Disp: 10.2 g, Rfl: 0  •  chlorthalidone (HYGROTON) 25 MG tablet, Take 1 tablet by mouth Daily., Disp: 45 tablet, Rfl: 3  •  Continuous  Blood Gluc  (FreeStyle Marie 2 Griffin) device, FreeStyle Marie 2 Griffin  change q 14 days, Disp: , Rfl:   •  Droplet Pen Needles 32G X 4 MM misc, , Disp: , Rfl:   •  DULoxetine (CYMBALTA) 20 MG capsule, Take 1 capsule by mouth Daily., Disp: 30 capsule, Rfl: 2  •  fluticasone (FLONASE) 50 MCG/ACT nasal spray, 2 sprays by Each Nare route Daily. Shake well before using., Disp: , Rfl:   •  FreeStyle Precision Ant Test test strip, , Disp: , Rfl:   •  insulin aspart (novoLOG FLEXPEN) 100 UNIT/ML solution pen-injector sc pen, Inject  under the skin 3 (Three) Times a Day With Meals., Disp: , Rfl:   •  irbesartan-hydrochlorothiazide (AVALIDE) 300-12.5 MG tablet, Take 1 tablet by mouth Daily., Disp: , Rfl:   •  levocetirizine (XYZAL) 5 MG tablet, Take 5 mg by mouth Every Evening., Disp: , Rfl:   •  levothyroxine (SYNTHROID, LEVOTHROID) 75 MCG tablet, Take 75 mcg by mouth Daily., Disp: , Rfl:   •  nebivolol (BYSTOLIC) 10 MG tablet, Take 10 mg by mouth Daily., Disp: , Rfl:   •  oxybutynin XL (DITROPAN-XL) 10 MG 24 hr tablet, Take  by mouth., Disp: , Rfl:   •  Secukinumab (Cosentyx) 150 MG/ML solution prefilled syringe, Inject 150 mg under the skin into the appropriate area as directed Every 28 (Twenty-Eight) Days., Disp: , Rfl:   •  Spacer/Aero-Holding Chambers (OptiChamber Paula-Lg Mask) device, USE AS DIRECTED WITH MULTI DOSE INHALER, Disp: , Rfl:   •  Tresiba FlexTouch 100 UNIT/ML solution pen-injector injection, Inject 13 Units under the skin into the appropriate area as directed Every Night., Disp: , Rfl:   •  triamcinolone (KENALOG) 0.1 % cream, APPLY A THIN LAYER TO THE AFFECTED AREA TWICE DAILY FOR 1 WEEK THEN ONCE DAILY FOR 1 WEEK, Disp: , Rfl:   •  pravastatin (Pravachol) 20 MG tablet, Take 1 tablet by mouth Every Evening., Disp: 90 tablet, Rfl: 3    Allergies:   Allergies   Allergen Reactions   • Atorvastatin Other (See Comments)   • Colesevelam Other (See Comments)   • Cephalexin Rash   • Penicillins Rash        PHQ-9 Total Score:     STEADI Fall Risk Assessment was completed, and patient is at LOW risk for falls.Assessment completed on:10/28/2022    Objective     Physical Exam:   Physical Exam  Eyes:      Pupils: Pupils are equal, round, and reactive to light.   Neurological:      Mental Status: She is oriented to person, place, and time.      Coordination: Heel to Shin Test abnormal.      Gait: Tandem walk abnormal.      Deep Tendon Reflexes:      Reflex Scores:       Tricep reflexes are 2+ on the right side and 2+ on the left side.       Bicep reflexes are 2+ on the right side and 2+ on the left side.       Brachioradialis reflexes are 2+ on the right side and 2+ on the left side.       Patellar reflexes are 2+ on the right side and 2+ on the left side.       Achilles reflexes are 2+ on the right side and 2+ on the left side.  Psychiatric:         Speech: Speech normal.         Neurologic Exam     Mental Status   Oriented to person, place, and time.   Follows 3 step commands.   Attention: normal. Concentration: normal.   Speech: speech is normal   Level of consciousness: alert  Knowledge: consistent with education.   Normal comprehension.     Cranial Nerves     CN III, IV, VI   Pupils are equal, round, and reactive to light.  Right pupil: Accommodation: intact.   Left pupil: Accommodation: intact.   CN III: no CN III palsy  CN VI: no CN VI palsy  Nystagmus: none   Diplopia: none  Upgaze: normal  Downgaze: normal  Conjugate gaze: present    CN VII   Facial expression full, symmetric.     CN VIII   Hearing: intact    CN XII   CN XII normal.     Motor Exam   Muscle bulk: normal  Overall muscle tone: normal    Strength   Right neck flexion: 4/5  Left neck flexion: 4/5  Right neck extension: 4/5  Left neck extension: 4/5  Right deltoid: 3/5  Left deltoid: 3/5  Right biceps: 3/5  Left biceps: 3/5  Right triceps: 3/5  Left triceps: 3/5  Right wrist flexion: 3/5  Left wrist flexion: 3/5  Right wrist extension: 3/5  Left  "wrist extension: 3/5  Right interossei: 2/5  Left interossei: 2/5  Right abdominals: 4/5  Left abdominals: 4/5  Right iliopsoas: 4/5  Left iliopsoas: 4/5  Right quadriceps: 4/5  Left quadriceps: 4/5  Right hamstrin/5  Left hamstrin/5  Right glutei: 4/5  Left glutei: 4/5  Right anterior tibial: 4/5  Left anterior tibial: 4/5  Right posterior tibial: 4/5  Left posterior tibial: 4/5  Right peroneal: 4/5  Left peroneal: 4/5  Right gastroc: 4/5  Left gastroc: 4/5    Sensory Exam   Light touch normal.     Gait, Coordination, and Reflexes     Coordination   Heel to shin coordination: abnormal  Tandem walking coordination: abnormal    Tremor   Resting tremor: absent  Action tremor: absent    Reflexes   Right brachioradialis: 2+  Left brachioradialis: 2+  Right biceps: 2+  Left biceps: 2+  Right triceps: 2+  Left triceps: 2+  Right patellar: 2+  Left patellar: 2+  Right achilles: 2+  Left achilles: 2+  Right : 2+  Left : 2+       Vital Signs:   Vitals:    10/28/22 1023   BP: 120/58   Pulse: 65   Temp: 97.1 °F (36.2 °C)   SpO2: 96%   Weight: 60.1 kg (132 lb 9.6 oz)   Height: 149.9 cm (59.02\")     Body mass index is 26.77 kg/m².     Results:   Imaging:   MRI Brain With & Without Contrast    Result Date: 2022  Stable contrast-enhanced MRI of the brain demonstrating fairly extensive nonspecific T2 hyperintense supratentorial white matter changes. These are favored to reflect typical chronic microvascular ischemia, however could reflect chronic sequela of demyelination in a patient of this age.  Contrast-enhanced MRI of the cervical spine, also unchanged demonstrating a short segment area of focal T2 hyperintensity, favoring fluid intensity along the central and ventral aspect of the cord spanning C5 and C6. Appearance is again most consistent with some cystic myelomalacia. There is no new focal cord signal abnormality. There is no abnormal enhancement.  This report was finalized on 2022 2:13 PM by Chava" Dillan.      MRI Cervical Spine With & Without Contrast    Result Date: 8/2/2022  Stable contrast-enhanced MRI of the brain demonstrating fairly extensive nonspecific T2 hyperintense supratentorial white matter changes. These are favored to reflect typical chronic microvascular ischemia, however could reflect chronic sequela of demyelination in a patient of this age.  Contrast-enhanced MRI of the cervical spine, also unchanged demonstrating a short segment area of focal T2 hyperintensity, favoring fluid intensity along the central and ventral aspect of the cord spanning C5 and C6. Appearance is again most consistent with some cystic myelomalacia. There is no new focal cord signal abnormality. There is no abnormal enhancement.  This report was finalized on 8/2/2022 2:13 PM by Chava Grimaldo.            Assessment / Plan      Assessment/Plan:   Diagnoses and all orders for this visit:    1. History of stroke (Primary)  Comments:  Cont asa. Inc Pravastatin to 20mg. Low tolerance for statins  Orders:  -     pravastatin (Pravachol) 20 MG tablet; Take 1 tablet by mouth Every Evening.  Dispense: 90 tablet; Refill: 3    2. Demyelinating disease (HCC)  Comments:  Repeat MRI brain and c-spine in Feb. Cont Azothioprine  Orders:  -     MRI Cervical Spine With & Without Contrast; Future  -     MRI Brain With & Without Contrast; Future    3. Cervical lesion  -     MRI Cervical Spine With & Without Contrast; Future    4. Paresthesia  -     MRI Brain With & Without Contrast; Future    5. Hypothyroidism, unspecified type    6. Psoriatic arthritis (HCC)  Comments:  Cont cosentyx    7. Ataxia  Comments:  Use cane to prevent falls         Patient Education:       Reviewed medications, potential side effects and signs and symptoms to report. Discussed risk versus benefits of treatment plan with patient and/or family-including medications, labs and radiology that may be ordered. Addressed questions and concerns during visit. Patient  and/or family verbalized understanding and agree with plan. Instructed to call the office with any questions and report to ER with any life-threatening symptoms.     Follow Up:   Return in about 4 months (around 2/28/2023) for Recheck.    During this visit the following were done:  Labs Reviewed [x]    Labs Ordered []    Radiology Reports Reviewed [x]    Radiology Ordered [x]    PCP Records Reviewed []    Referring Provider Records Reviewed []    ER Records Reviewed [x]    Hospital Records Reviewed []    History Obtained From Family []    Radiology Images Reviewed []    Other Reviewed [x]    Records Requested []      Bryson Murrell, DNP, APRN

## 2022-10-31 ENCOUNTER — OFFICE VISIT (OUTPATIENT)
Dept: PULMONOLOGY | Facility: CLINIC | Age: 77
End: 2022-10-31

## 2022-10-31 VITALS
TEMPERATURE: 97.9 F | WEIGHT: 132 LBS | BODY MASS INDEX: 26.61 KG/M2 | SYSTOLIC BLOOD PRESSURE: 112 MMHG | OXYGEN SATURATION: 96 % | DIASTOLIC BLOOD PRESSURE: 70 MMHG | HEIGHT: 59 IN | HEART RATE: 62 BPM

## 2022-10-31 DIAGNOSIS — G47.33 OBSTRUCTIVE SLEEP APNEA: ICD-10-CM

## 2022-10-31 DIAGNOSIS — R53.82 CHRONIC FATIGUE: ICD-10-CM

## 2022-10-31 DIAGNOSIS — J30.89 NON-SEASONAL ALLERGIC RHINITIS, UNSPECIFIED TRIGGER: ICD-10-CM

## 2022-10-31 DIAGNOSIS — R06.02 SHORTNESS OF BREATH: Primary | ICD-10-CM

## 2022-10-31 DIAGNOSIS — G37.9 DEMYELINATING DISEASE: ICD-10-CM

## 2022-10-31 PROCEDURE — 99214 OFFICE O/P EST MOD 30 MIN: CPT | Performed by: INTERNAL MEDICINE

## 2022-10-31 RX ORDER — BUDESONIDE AND FORMOTEROL FUMARATE DIHYDRATE 160; 4.5 UG/1; UG/1
2 AEROSOL RESPIRATORY (INHALATION)
Qty: 10.2 G | Refills: 11 | Status: SHIPPED | OUTPATIENT
Start: 2022-10-31 | End: 2023-02-09

## 2022-10-31 RX ORDER — BUDESONIDE AND FORMOTEROL FUMARATE DIHYDRATE 160; 4.5 UG/1; UG/1
2 AEROSOL RESPIRATORY (INHALATION)
COMMUNITY
End: 2022-10-31 | Stop reason: SDUPTHER

## 2022-10-31 NOTE — PROGRESS NOTES
"Follow Up Office Note       Patient Name: Marilyn Kunz    Referring Physician: No ref. provider found    Chief Complaint:    Chief Complaint   Patient presents with   • Shortness of Breath   • Follow-up       History of Present Illness: Marilyn Kunz is a 77 y.o. female who is here today to follow-up care with Pulmonary.  Patient has a past medical history significant for hypertension, hyperlipidemia, factor V Leiden, type 2 diabetes mellitus, hypothyroidism, neuropathy, and CKD stage III.  Patient symptoms overall stable.  Continues to be short of breath.  Has been utilizing Symbicort but not on a daily basis.  States that she thinks may helps a little bit.  She has not done her sleep study yet.  Feels that she is walking better since seeing neurology.  Is now seeing allergy as well with the plans to do allergy testing this afternoon.  She is having increased watery eyes.  No other acute complaints.    Review of Systems:   Review of Systems   Constitutional: Negative for chills, fatigue and fever.   HENT: Negative for congestion and voice change.    Eyes: Negative for blurred vision.   Respiratory: Positive for chest tightness and shortness of breath. Negative for cough and wheezing.    Cardiovascular: Negative for chest pain.   Skin: Negative for dry skin.   Hematological: Negative for adenopathy.   Psychiatric/Behavioral: Negative for agitation and depressed mood.       The following portions of the patient's history were reviewed and updated as appropriate: allergies, current medications, past family history, past medical history, past social history, past surgical history and problem list.    Physical Exam:  Vital Signs:   Vitals:    10/31/22 1014   BP: 112/70   Pulse: 62   Temp: 97.9 °F (36.6 °C)   SpO2: 96%  Comment: resting, room air   Weight: 59.9 kg (132 lb)   Height: 149.9 cm (59.02\")       Physical Exam  Vitals and nursing note reviewed.   Constitutional:       General: She is not in acute " distress.     Appearance: She is well-developed and normal weight. She is not ill-appearing or toxic-appearing.   HENT:      Head: Normocephalic and atraumatic.   Cardiovascular:      Rate and Rhythm: Normal rate and regular rhythm.      Pulses: Normal pulses.      Heart sounds: Normal heart sounds. No murmur heard.    No friction rub. No gallop.   Pulmonary:      Effort: Pulmonary effort is normal. No respiratory distress.      Breath sounds: Normal breath sounds. No wheezing, rhonchi or rales.   Musculoskeletal:      Right lower leg: No edema.      Left lower leg: No edema.   Skin:     General: Skin is warm and dry.   Neurological:      Mental Status: She is alert and oriented to person, place, and time.         Immunization History   Administered Date(s) Administered   • COVID-19 (PFIZER) BIVALENT BOOSTER 12+YRS 09/17/2022   • COVID-19 (PFIZER) PURPLE CAP 02/06/2021, 03/03/2021, 10/14/2021   • COVID-19 (UNSPECIFIED) 02/06/2021, 03/03/2021   • Covid-19 (Pfizer) Gray Cap 04/27/2022   • Flu Vaccine Intradermal Quad 18-64YR 10/01/2019   • Fluad Quad 65+ 09/14/2022   • Fluzone High Dose =>65 Years ([employer] ONLY) 09/06/2016, 09/20/2017, 10/17/2018, 10/01/2019   • Fluzone High-Dose 65+yrs 09/03/2021   • Influenza Injectable Mdck Pf Quad 09/18/2020   • Pneumococcal Conjugate 20-Valent (PCV20) 09/19/2022   • Pneumococcal Polysaccharide (PPSV23) 10/17/2018   • Shingrix 06/11/2019       Results Review:   -I personally reviewed the patient's high-resolution CT scan from 10/7/2022 which showed some biapical scarring, no signs of interstitial lung disease, there is no clinically significant bronchiectasis.  - I personally reviewed the pts imaging from  CT scan of the chest 9/26/2022 shows no pulmonary emboli, no nodules, masses, or infiltrates.   - chest x-ray 9/19/2022 showed no acute cardiopulmonary process  - pulmonary function testing from 9/22/2002, which was extremely poor and inadequate, this is  uninterpretable.   -spirometry from 9/19/2022, extremely poor testing, uninterpretable  -6-minute walk test from 9/22/2022 and when she started at 97% and maintained at 96% throughout the entire test, dyspnea scale started 01 up to 2, she was able to walk 137 m which was 34% of predicted.  - Echo report from 5/5/2022 showed an EF of 70%, saline test was negative, mildly increased left atrial volume.  - stress test result from 8/31/2022 showed mild coronary artery calcifications, nonspecific ST changes, considered to be a low risk study.  EF was noted to be 70%.  -I personally reviewed the patient's chart with regards to her recent ER evaluation since the last time I saw her.    Assessment / Plan:   Diagnoses and all orders for this visit:    1. Shortness of breath (Primary)  -Patient was unable to perform PFTs therefore still not sure if she has underlying asthma and she thinks there is benefit in the Symbicort.  I have asked her to keep using Symbicort 160 mcg 2 puffs twice daily for the next 3 months with albuterol on an as-needed basis.  To see if there is any benefit.  If there is we will continue with therapy for underlying asthma if there is not and I will try to get her off the medications at the next visit.    2. Chronic fatigue  -This is complicated due to the fact that this could be the main part of her shortness of breath which is more deconditioning, as well as a combination of the demyelinating diseases or sleep apnea.  Each of those are being addressed as noted below.    3. Non-seasonal allergic rhinitis, unspecified trigger  -Her allergies do appear to be worsening at this time.  She has seen Dr. Banegas with allergy.  Planning for testing this afternoon therefore we will hold on any new medications and allow her to perform her testing.    4. Obstructive sleep apnea  -Patient has consistent symptoms of daytime sleepiness, she is unaware if she snores.  Mallampati class II.  Given these findings I have  ordered a home sleep study which is still actively pending.  I would recommend that she get the testing performed.    5. Demyelinating disease (HCC)  -Unclear if it is contributing to her current status symptoms such as new onset fatigue.  Continue to recommend follow-up with neurology.      Follow Up:   Return in about 3 months (around 1/31/2023).       SUKHDEV Haley, DO  Pulmonary and Critical Care Medicine  Note Electronically Signed    Part of this note may be an electronic transcription/translation of spoken language to printed text using the Dragon Dictation System.

## 2022-11-30 ENCOUNTER — TELEPHONE (OUTPATIENT)
Dept: NEUROLOGY | Facility: CLINIC | Age: 77
End: 2022-11-30

## 2022-11-30 NOTE — TELEPHONE ENCOUNTER
Caller: ANDREY  Relationship to Patient: SELF  Phone Number: [phone]    Reason for Call: PT STATES THAT ON Saturday SHE HAD THE SAME SYMPTOMS THAT SHE EXPERIENCED BACK IN MAY WHEN SHE THOUGHT SHE HAD A STROKE, SHE STATES THAT SHE TOOK A DULoxetine (CYMBALTA) 20 MG capsule AND SHE STATES IT DID SUBSIDE WITHIN 30 MINS , SHE STATES CONSTANT BURNING AND TINGLING AND HER HANDS ARE STILL ITCHY SINCE HER LAST HOSPITALIZATION BACK IN MAY, SHE ADVISED SHE NEVER WENT TO E.D ON SATURDAY

## 2022-12-01 ENCOUNTER — LAB (OUTPATIENT)
Dept: LAB | Facility: HOSPITAL | Age: 77
End: 2022-12-01

## 2022-12-01 ENCOUNTER — OFFICE VISIT (OUTPATIENT)
Dept: NEUROLOGY | Facility: CLINIC | Age: 77
End: 2022-12-01

## 2022-12-01 VITALS
HEART RATE: 62 BPM | DIASTOLIC BLOOD PRESSURE: 72 MMHG | SYSTOLIC BLOOD PRESSURE: 134 MMHG | OXYGEN SATURATION: 94 % | HEIGHT: 59 IN | WEIGHT: 132 LBS | BODY MASS INDEX: 26.61 KG/M2

## 2022-12-01 DIAGNOSIS — G37.9 DEMYELINATING CHANGES IN BRAIN: Primary | ICD-10-CM

## 2022-12-01 DIAGNOSIS — G37.9 DEMYELINATING CHANGES IN BRAIN: ICD-10-CM

## 2022-12-01 LAB
ALBUMIN SERPL-MCNC: 4.8 G/DL (ref 3.5–5.2)
ALBUMIN/GLOB SERPL: 1.8 G/DL
ALP SERPL-CCNC: 71 U/L (ref 39–117)
ALT SERPL W P-5'-P-CCNC: 8 U/L (ref 1–33)
ANION GAP SERPL CALCULATED.3IONS-SCNC: 12 MMOL/L (ref 5–15)
AST SERPL-CCNC: 18 U/L (ref 1–32)
BILIRUB SERPL-MCNC: 0.6 MG/DL (ref 0–1.2)
BUN SERPL-MCNC: 20 MG/DL (ref 8–23)
BUN/CREAT SERPL: 19.8 (ref 7–25)
CALCIUM SPEC-SCNC: 9.9 MG/DL (ref 8.6–10.5)
CHLORIDE SERPL-SCNC: 100 MMOL/L (ref 98–107)
CO2 SERPL-SCNC: 26 MMOL/L (ref 22–29)
CREAT SERPL-MCNC: 1.01 MG/DL (ref 0.57–1)
EGFRCR SERPLBLD CKD-EPI 2021: 57.5 ML/MIN/1.73
GLOBULIN UR ELPH-MCNC: 2.7 GM/DL
GLUCOSE SERPL-MCNC: 233 MG/DL (ref 65–99)
POTASSIUM SERPL-SCNC: 4.3 MMOL/L (ref 3.5–5.2)
PROT SERPL-MCNC: 7.5 G/DL (ref 6–8.5)
SODIUM SERPL-SCNC: 138 MMOL/L (ref 136–145)

## 2022-12-01 PROCEDURE — 85025 COMPLETE CBC W/AUTO DIFF WBC: CPT

## 2022-12-01 PROCEDURE — 99214 OFFICE O/P EST MOD 30 MIN: CPT | Performed by: PSYCHIATRY & NEUROLOGY

## 2022-12-01 PROCEDURE — 36415 COLL VENOUS BLD VENIPUNCTURE: CPT

## 2022-12-01 PROCEDURE — 80053 COMPREHEN METABOLIC PANEL: CPT

## 2022-12-01 RX ORDER — TOBRAMYCIN AND DEXAMETHASONE 3; 1 MG/ML; MG/ML
SUSPENSION/ DROPS OPHTHALMIC
COMMUNITY
Start: 2022-11-10

## 2022-12-01 RX ORDER — AMLODIPINE BESYLATE 10 MG/1
TABLET ORAL
COMMUNITY
Start: 2022-11-02 | End: 2022-12-14

## 2022-12-01 RX ORDER — MONTELUKAST SODIUM 10 MG/1
10 TABLET ORAL NIGHTLY
COMMUNITY
Start: 2022-10-31

## 2022-12-01 RX ORDER — AZELASTINE 1 MG/ML
1 SPRAY, METERED NASAL DAILY PRN
COMMUNITY
Start: 2022-10-31

## 2022-12-01 NOTE — PROGRESS NOTES
"Chief Complaint    Demyelinating disease     Subjective        Marilyn Kunz presents to [facility] NEUROLOGY [facility]     History of Present Illness    77 y.o. female returns in follow up.  Last visit on 10/28/22 continued ASA/statin, ordered MRI B/C.    MRI B/C, my review of films, 7/30/22 extensive T2 PVWM, C5/6 cord lesion    CSF - MS profile normal, WBC 1, RBC 2, Protein 42.1    2 - 3 years of hands burning and itching.  Scheduled MRI B/C for January 19.    Waves of N/T.      mg BID improving sx slightly.  Itching intensity decreasing.    Hands are sensitive to touch.        Objective   Vital Signs:  /72   Pulse 62   Ht 149.9 cm (59.02\")   Wt 59.9 kg (132 lb)   SpO2 94%   BMI 26.65 kg/m²   Estimated body mass index is 26.65 kg/m² as calculated from the following:    Height as of this encounter: 149.9 cm (59.02\").    Weight as of this encounter: 59.9 kg (132 lb).          Physical Exam  Eyes:      Extraocular Movements: EOM normal.      Pupils: Pupils are equal, round, and reactive to light.   Neurological:      Mental Status: She is oriented to person, place, and time.      Cranial Nerves: Cranial nerves 2-12 are intact.      Motor: Motor strength is normal.   Psychiatric:         Speech: Speech normal.          Neurologic Exam     Mental Status   Oriented to person, place, and time.   Speech: speech is normal   Level of consciousness: alert  Knowledge: good and consistent with education.   Normal comprehension.     Cranial Nerves   Cranial nerves II through XII intact.     CN II   Visual fields full to confrontation.   Visual acuity: normal  Right visual field deficit: none  Left visual field deficit: none     CN III, IV, VI   Pupils are equal, round, and reactive to light.  Extraocular motions are normal.   Nystagmus: none   Diplopia: none  Ophthalmoparesis: none  Upgaze: normal  Downgaze: normal  Conjugate gaze: present    CN V   Facial sensation intact.   Right " corneal reflex: normal  Left corneal reflex: normal    CN VII   Right facial weakness: none  Left facial weakness: none    CN VIII   Hearing: intact    CN IX, X   Palate: symmetric  Right gag reflex: normal  Left gag reflex: normal    CN XI   Right sternocleidomastoid strength: normal  Left sternocleidomastoid strength: normal    CN XII   Tongue: not atrophic  Fasciculations: absent  Tongue deviation: none    Motor Exam   Muscle bulk: normal  Overall muscle tone: normal    Strength   Strength 5/5 throughout.   Right neck flexion: 4/5  Left neck flexion: 4/5  Right neck extension: 4/5  Left neck extension: 4/5  Right deltoid: 4/5  Left deltoid: 4/5  Right biceps: 4/5  Left biceps: 4/5  Right triceps: 4/5  Left triceps: 4/5  Right wrist flexion: 3/5  Left wrist flexion: 3/5  Right wrist extension: 3/5  Left wrist extension: 3/5  Right interossei: 3/5  Left interossei: 3/5  Right abdominals: 4/5  Left abdominals: 4/5  Right iliopsoas: 4/5  Left iliopsoas: 4/5  Right quadriceps: 4/5  Left quadriceps: 4/5  Right hamstrin/5  Left hamstrin/5  Right glutei: 4/5  Left glutei: 4/5  Right anterior tibial: 4/5  Left anterior tibial: 4/5  Right posterior tibial: 4/5  Left posterior tibial: 4/5  Right peroneal: 4/5  Left peroneal: 4/5  Right gastroc: 4/5  Left gastroc: 4/5    Sensory Exam   Right arm light touch: decreased from fingers  Left arm light touch: decreased from fingers  Right arm pinprick: decreased from fingers  Left arm pinprick: decreased from fingers    Gait, Coordination, and Reflexes     Gait  Gait: wide-based    Tremor   Resting tremor: absent  Intention tremor: absent  Action tremor: absent    Reflexes   Reflexes 2+ except as noted.      Result Review :  The following data was reviewed by: Charly Hernandez MD on 2022:  Common labs    Common Labs 22    1400 1400 1400  1127 1127   Glucose  103 (A)    267 (A)   BUN  31 (A)    21   Creatinine  1.05 (A)     0.94   Sodium  137    131 (A)   Potassium  4.2    4.1   Chloride  99    94 (A)   Calcium  10.3    9.2   Total Protein   7.9      Albumin  4.80 4.1 4.4  4.40   Total Bilirubin  0.4    1.4 (A)   Alkaline Phosphatase  84    84   AST (SGOT)  17    26   ALT (SGPT)  16    21   WBC 8.54    8.47    Hemoglobin 13.1    11.9 (A)    Hematocrit 40.3    38.0    Platelets 226    282    (A) Abnormal value       Comments are available for some flowsheets but are not being displayed.           Data reviewed: Radiologic studies MRI B/C          Assessment and Plan   Diagnoses and all orders for this visit:    1. Demyelinating changes in brain (HCC) (Primary)  Assessment & Plan:  Slight improvement in sx on AZA     MRI B/C    CBC,CMP     Orders:  -     CBC & Differential; Future  -     Comprehensive Metabolic Panel; Future           Follow Up   No follow-ups on file.  Patient was given instructions and counseling regarding her condition or for health maintenance advice. Please see specific information pulled into the AVS if appropriate.

## 2022-12-02 ENCOUNTER — HOSPITAL ENCOUNTER (OUTPATIENT)
Dept: MAMMOGRAPHY | Facility: HOSPITAL | Age: 77
Discharge: HOME | End: 2022-12-02
Admitting: FAMILY MEDICINE

## 2022-12-02 DIAGNOSIS — Z12.31 VISIT FOR SCREENING MAMMOGRAM: ICD-10-CM

## 2022-12-02 LAB
BASOPHILS # BLD AUTO: 0.06 10*3/MM3 (ref 0–0.2)
BASOPHILS NFR BLD AUTO: 0.8 % (ref 0–1.5)
DEPRECATED RDW RBC AUTO: 50.4 FL (ref 37–54)
EOSINOPHIL # BLD AUTO: 0.46 10*3/MM3 (ref 0–0.4)
EOSINOPHIL NFR BLD AUTO: 6.3 % (ref 0.3–6.2)
ERYTHROCYTE [DISTWIDTH] IN BLOOD BY AUTOMATED COUNT: 14.7 % (ref 12.3–15.4)
HCT VFR BLD AUTO: 36.8 % (ref 34–46.6)
HGB BLD-MCNC: 11.8 G/DL (ref 12–15.9)
IMM GRANULOCYTES # BLD AUTO: 0.03 10*3/MM3 (ref 0–0.05)
IMM GRANULOCYTES NFR BLD AUTO: 0.4 % (ref 0–0.5)
LYMPHOCYTES # BLD AUTO: 1.12 10*3/MM3 (ref 0.7–3.1)
LYMPHOCYTES NFR BLD AUTO: 15.4 % (ref 19.6–45.3)
MCH RBC QN AUTO: 29.8 PG (ref 26.6–33)
MCHC RBC AUTO-ENTMCNC: 32.1 G/DL (ref 31.5–35.7)
MCV RBC AUTO: 92.9 FL (ref 79–97)
MONOCYTES # BLD AUTO: 0.77 10*3/MM3 (ref 0.1–0.9)
MONOCYTES NFR BLD AUTO: 10.6 % (ref 5–12)
NEUTROPHILS NFR BLD AUTO: 4.84 10*3/MM3 (ref 1.7–7)
NEUTROPHILS NFR BLD AUTO: 66.5 % (ref 42.7–76)
NRBC BLD AUTO-RTO: 0 /100 WBC (ref 0–0.2)
PLATELET # BLD AUTO: 314 10*3/MM3 (ref 140–450)
PMV BLD AUTO: 11.1 FL (ref 6–12)
RBC # BLD AUTO: 3.96 10*6/MM3 (ref 3.77–5.28)
WBC NRBC COR # BLD: 7.28 10*3/MM3 (ref 3.4–10.8)

## 2022-12-02 PROCEDURE — 77063 BREAST TOMOSYNTHESIS BI: CPT | Performed by: RADIOLOGY

## 2022-12-02 PROCEDURE — 77067 SCR MAMMO BI INCL CAD: CPT | Performed by: RADIOLOGY

## 2022-12-02 PROCEDURE — 77063 BREAST TOMOSYNTHESIS BI: CPT

## 2022-12-02 PROCEDURE — 77067 SCR MAMMO BI INCL CAD: CPT

## 2022-12-14 ENCOUNTER — OFFICE VISIT (OUTPATIENT)
Dept: CARDIOLOGY | Facility: CLINIC | Age: 77
End: 2022-12-14

## 2022-12-14 VITALS
DIASTOLIC BLOOD PRESSURE: 60 MMHG | OXYGEN SATURATION: 95 % | SYSTOLIC BLOOD PRESSURE: 160 MMHG | BODY MASS INDEX: 26.97 KG/M2 | WEIGHT: 133.8 LBS | HEIGHT: 59 IN | HEART RATE: 86 BPM

## 2022-12-14 DIAGNOSIS — I10 ESSENTIAL HYPERTENSION: Primary | ICD-10-CM

## 2022-12-14 PROCEDURE — 99214 OFFICE O/P EST MOD 30 MIN: CPT | Performed by: INTERNAL MEDICINE

## 2022-12-14 NOTE — PROGRESS NOTES
[facility] Cardiology  Follow Up Visit  Marilyn Kunz  1945    VISIT DATE:  12/15/22    PCP:   Peter Baca MD  [address] [place], [place] [id number]          CC:  PDA (patent ductus arteriosus) and Hypertension      Problem List:  1.  HTN  2.  Type I DM  3.  HLD  4.  Psoriatic arthritis  5.  Hypothyroidism  6.  DJD  7.  CKD 2  8.  Factor V Leiden  9.  Prior cerebellar infarct  10.  Surgery for patent ductus arteriosus age 11  11.  Demyelinating disease on azathioprine     Cardiac testing:     Echo May 2022:  • Saline test results are negative.  • Estimated left ventricular EF was in agreement with the calculated left ventricular EF. Left ventricular ejection fraction appears to be greater than 70%. Left ventricular systolic function is hyperdynamic (EF > 70%).  • Left atrial volume is mildly increased.  • Lumason contrast shows normal LV systolic function and wall motion with an ejection fraction of 72%     Venous duplex May 2022: Normal     Stress test August 2022  • Patient denied chest pain with Lexiscan.  • Baseline EKG sinus with nonspecific ST changes. She did have ST depression in the inferior and lateral leads. Maximum of 1 mm.  • Myocardial perfusion imaging indicates a normal myocardial perfusion study with no evidence of ischemia. No TID  • Left ventricular ejection fraction is hyperdynamic (Calculated EF > 70%).  • There is mild coronary artery calcification  • Impressions are consistent with a low risk study.         MRI May 2022     1.  No acute intracranial abnormality. No acute infarct.  2.  Moderate chronic small vessel ischemic changes. Mild volume loss. 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       Renal artery duplex August 2022  • No hemodynamically significant renal artery stenosis bilaterally.  • Resistive indices within normal limits.  • Renal veins patent      History of Present Illness:  Marilyn Kunz  Is a 77 y.o. female with  pertinent cardiac history detailed above.  Since last visit with me the patient has had a stress test showing no ischemia and a renal artery duplex with no significant stenosis.  She saw Dr. Evans for a follow-up visit in September and blood pressure was still high she was put back on amlodipine 5 mg at that time.  She has not had significant swelling in the legs since restarting.  She has been since diagnosed with a demyelinating process and is on azathioprine and following with neurology.  This continues to produce some episodes of paresthesias and numbness in the hands and face    Blood pressures remain above goal typically above 140 at home.  There is some confusion as to which medication she is on.  She has a listed prescription for irbesartan-HCTZ but then also chlorthalidone.  She was previously on metoprolol and now has nebivolol listed but she does not believe she is taking this.  She has increased her pravastatin dose since last visit with me      Patient Active Problem List    Diagnosis Date Noted   • Demyelinating changes in brain ([facility]) 10/28/2022   • Diabetic autonomic neuropathy associated with type 1 diabetes mellitus ([facility]) 10/28/2022   • Need for vaccination against Streptococcus pneumoniae 10/28/2022   • Shortness of breath on exertion 10/28/2022   • Screening for depression 10/28/2022   • PDA (patent ductus arteriosus) 09/12/2022   • Body mass index (BMI) of 27.0-27.9 in adult 06/23/2022   • Cervical spondylitis with radiculitis ([facility]) 06/23/2022   • Degenerative arthritis of toe joint, right 06/23/2022   • Disorder of bone and cartilage 06/23/2022   • Hospital discharge follow-up 06/23/2022   • Impairment of balance 06/23/2022   • Muscle spasm 06/23/2022   • Overactive bladder 06/23/2022   • Pain in right hand 06/23/2022   • Routine history and physical examination of adult 06/23/2022   • Seborrheic dermatitis 06/23/2022   • Spondylolisthesis, grade 2 06/23/2022   • Total knee replacement status,  left 06/23/2022   • Vasculitis (HCC) 06/23/2022   • Hypercholesterolemia 06/23/2022   • Type 2 diabetes mellitus with stage 3 chronic kidney disease, with long-term current use of insulin (HCC) 06/23/2022   • Psoriatic arthritis ([facility]) 05/05/2022   • Weakness of right upper extremity 05/04/2022   • At risk for venous thromboembolism (VTE) 09/10/2021     Note Last Updated: 6/23/2022     Problem added by Discern Expert Rule: [id number]_VTERISKPROB_3     • Factor V Leiden (HCC) 08/16/2021   • Chest pain 10/16/2020   • Essential hypertension 10/16/2020   • HLD (hyperlipidemia) 10/16/2020   • Odynophagia 10/16/2020     Note Last Updated: 10/20/2020     Added automatically from request for surgery [id number]     • Diabetic nephropathy associated with type 2 diabetes mellitus (HCC) 06/16/2016   • Chronic kidney disease, stage 3 ([facility]) 06/16/2016   • Hypothyroidism 09/16/2015   • Hyperlipidemia 09/16/2015       Allergies   Allergen Reactions   • Atorvastatin Other (See Comments)   • Colesevelam Other (See Comments)   • Cephalexin Rash   • Penicillins Nausea And Vomiting and Rash       Social History     Socioeconomic History   • Marital status: Single   Tobacco Use   • Smoking status: Never   • Smokeless tobacco: Never   Vaping Use   • Vaping Use: Never used   Substance and Sexual Activity   • Alcohol use: No   • Drug use: No   • Sexual activity: Not Currently     Partners: Male     Birth control/protection: Abstinence       Family History   Problem Relation Age of Onset   • Cancer Mother    • Pancreatic cancer Mother    • Stroke Mother         Mini strokes   • Cancer Father    • Lung cancer Father    • Cancer Sister    • Colon cancer Sister    • Diabetes Sister    • Breast cancer Neg Hx    • Ovarian cancer Neg Hx        Current Medications:    Current Outpatient Medications:   •  albuterol sulfate  (90 Base) MCG/ACT inhaler, Inhale 2 puffs., Disp: , Rfl:   •  amLODIPine (NORVASC) 5 MG tablet, Take 1 tablet by mouth Daily.,  Disp: 90 tablet, Rfl: 3  •  ammonium lactate (AMLACTIN) 12 % cream, (12 %), Disp: , Rfl:   •  aspirin 81 MG EC tablet, Take 1 tablet by mouth Daily., Disp: 30 tablet, Rfl: 0  •  azaTHIOprine (Imuran) 50 MG tablet, Take 2 tablets by mouth 2 (Two) Times a Day., Disp: 120 tablet, Rfl: 2  •  azelastine (ASTELIN) 0.1 % nasal spray, 1 spray into the nostril(s) as directed by provider Daily As Needed., Disp: , Rfl:   •  budesonide-formoterol (SYMBICORT) 160-4.5 MCG/ACT inhaler, Inhale 2 puffs 2 (Two) Times a Day., Disp: 10.2 g, Rfl: 11  •  chlorthalidone (HYGROTON) 25 MG tablet, Take 1 tablet by mouth Daily., Disp: 45 tablet, Rfl: 3  •  Continuous Blood Gluc  (FreeStyle Marie 2 [place]) device, FreeStyle Marie 2 [place]  change q 14 days, Disp: , Rfl:   •  Droplet Pen Needles 32G X 4 MM misc, , Disp: , Rfl:   •  DULoxetine (CYMBALTA) 20 MG capsule, Take 1 capsule by mouth Daily., Disp: 30 capsule, Rfl: 2  •  fluticasone (FLONASE) 50 MCG/ACT nasal spray, 2 sprays by Each Nare route Daily. Shake well before using., Disp: , Rfl:   •  FreeStyle Precision Ant Test test strip, , Disp: , Rfl:   •  insulin aspart (novoLOG FLEXPEN) 100 UNIT/ML solution pen-injector sc pen, Inject  under the skin into the appropriate area as directed 3 (Three) Times a Day With Meals. Sliding scale, Disp: , Rfl:   •  levocetirizine (XYZAL) 5 MG tablet, Take 5 mg by mouth Every Evening., Disp: , Rfl:   •  levothyroxine (SYNTHROID, LEVOTHROID) 75 MCG tablet, Take 75 mcg by mouth Daily., Disp: , Rfl:   •  montelukast (SINGULAIR) 10 MG tablet, Take 10 mg by mouth Every Night., Disp: , Rfl:   •  nebivolol (BYSTOLIC) 20 MG tablet, Take 1 tablet by mouth Daily., Disp: 90 tablet, Rfl: 6  •  oxybutynin XL (DITROPAN-XL) 10 MG 24 hr tablet, Take 10 mg by mouth Daily., Disp: , Rfl:   •  pravastatin (Pravachol) 20 MG tablet, Take 1 tablet by mouth Every Evening., Disp: 90 tablet, Rfl: 3  •  Secukinumab (Cosentyx) 150 MG/ML solution prefilled syringe, Inject  "150 mg under the skin into the appropriate area as directed Every 28 (Twenty-Eight) Days., Disp: , Rfl:   •  Spacer/Aero-Holding Chambers (OptiChamber Paula-Lg Mask) device, USE AS DIRECTED WITH MULTI DOSE INHALER, Disp: , Rfl:   •  tobramycin-dexamethasone (TOBRADEX) 0.3-0.1 % ophthalmic suspension, INSTILL 1 DROP IN LEFT EYE 4 TIMES DAILY, Disp: , Rfl:   •  Tresiba FlexTouch 100 UNIT/ML solution pen-injector injection, Inject 16-20 Units under the skin into the appropriate area as directed Every Night., Disp: , Rfl:   •  triamcinolone (KENALOG) 0.1 % cream, APPLY A THIN LAYER TO THE AFFECTED AREA TWICE DAILY FOR 1 WEEK THEN ONCE DAILY FOR 1 WEEK, Disp: , Rfl:   •  irbesartan (Avapro) 300 MG tablet, Take 1 tablet by mouth Every Night., Disp: 90 tablet, Rfl: 6     Review of Systems   Cardiovascular: Negative for chest pain, dyspnea on exertion and leg swelling.   Skin: Negative for rash.   Neurological: Positive for numbness and paresthesias.       Vitals:    12/14/22 1151   BP: 160/60   BP Location: Right arm   Patient Position: Sitting   Pulse: 86   SpO2: 95%   Weight: 60.7 kg (133 lb 12.8 oz)   Height: 149.9 cm (59\")       Physical Exam  Constitutional:       Appearance: Normal appearance.   Cardiovascular:      Rate and Rhythm: Normal rate and regular rhythm.      Pulses: Normal pulses.      Heart sounds: Normal heart sounds.   Pulmonary:      Effort: Pulmonary effort is normal.      Breath sounds: Normal breath sounds.   Musculoskeletal:      Right lower leg: No edema.      Left lower leg: No edema.   Neurological:      General: No focal deficit present.      Mental Status: She is alert.         Diagnostic Data:  Procedures  Lab Results   Component Value Date    TRIG 63 05/05/2022    HDL 79 (H) 05/05/2022     Lab Results   Component Value Date    GLUCOSE 233 (H) 12/01/2022    BUN 20 12/01/2022    CREATININE 1.01 (H) 12/01/2022     12/01/2022    K 4.3 12/01/2022     12/01/2022    CO2 26.0 " 12/01/2022     Lab Results   Component Value Date    HGBA1C 6.90 (H) 05/05/2022     Lab Results   Component Value Date    WBC 7.28 12/01/2022    HGB 11.8 (L) 12/01/2022    HCT 36.8 12/01/2022     12/01/2022       Assessment:  No diagnosis found.    Plan:      1.  HTN  -Creatinine 1.05  -Renal artery duplex completed without stenosis  -We will need to optimize her blood pressure  -I would have her continue chlorthalidone and continue irbesartan 300 mg.  I will discontinue the hydrochlorothiazide portion of her medications to avoid duplicate diuretics.  -We will increase her Bystolic to 20 mg daily  -Continue amlodipine 5 mg for now  -Recheck blood pressure in 2 weeks     2.  HLD  -Total 167, HDL 79, LDL 76, triglycerides 63  -She is now taking 20 mg of pravastatin     3.  Type I DM  -A1c 6.9  -on insulin regimen follows with endocrine     4.  Prior cerebellar infarct  -negative CTA  -no cardiac source on echo  -negative venous duplex  -14-day Holter with atrial tachycardia but no A. fib     5.  Factor V leiden  -heterozygous  -No known history of DVT     6.  Intermittent chest pains with multiple cardiac risk factors   -His complaint is currently resolved  -Her stress test negative for ischemia     7.  Demyelinating disease  -On azathioprine and following with neurology    Yao Maya MD [facility]

## 2022-12-15 ENCOUNTER — TELEPHONE (OUTPATIENT)
Dept: CARDIOLOGY | Facility: CLINIC | Age: 77
End: 2022-12-15

## 2022-12-15 RX ORDER — IRBESARTAN 300 MG/1
300 TABLET ORAL NIGHTLY
Qty: 90 TABLET | Refills: 6 | Status: SHIPPED | OUTPATIENT
Start: 2022-12-15

## 2022-12-15 RX ORDER — NEBIVOLOL 20 MG/1
20 TABLET ORAL DAILY
Qty: 90 TABLET | Refills: 6 | Status: SHIPPED | OUTPATIENT
Start: 2022-12-15

## 2022-12-15 NOTE — TELEPHONE ENCOUNTER
I was going to make the following changes    DC combination irbesartan-HCTZ  I have represcribed irbesartan 300 mg alone  Continue amlodipine, chlorthalidone  Have sent in a prescription for increased dose of Bystolic 20 mg    Would like her to keep tracking her blood pressures and if we could get an update in about 2 weeks we will see if we need to titrate medications further.

## 2022-12-15 NOTE — TELEPHONE ENCOUNTER
Patient called to update med list as requested at office visit yesterday.    Oxybutin 10 mg daily  Irbesartan-hctz 300-12.5 mg daily  Chlorthalidone 25 mg daily  Amlodipine 5 mg daily  Azathioprine 50 mg BID  levothyroxine 75 mcg daily   ASA 81 mg daily  nebivolol 10 mg PM  Montelukast 10 mg PM  Pravastatin 20 mg PM    Novolog freestyle flex   Tresbia   Freestyle doe 2 sensor    azelastine nasal spray  Fluticasone nasal spray    Symbicort inhaler  Albuterol inhaler  Tylenol 500 mg 2 tablets at night    Please advise

## 2022-12-30 DIAGNOSIS — G37.9 DEMYELINATING DISEASE: ICD-10-CM

## 2022-12-30 RX ORDER — AZATHIOPRINE 50 MG/1
TABLET ORAL
Qty: 120 TABLET | Refills: 5 | Status: SHIPPED | OUTPATIENT
Start: 2022-12-30

## 2022-12-30 NOTE — TELEPHONE ENCOUNTER
Rx Refill Note  Requested Prescriptions     Pending Prescriptions Disp Refills   • azaTHIOprine (IMURAN) 50 MG tablet [Pharmacy Med Name: AZATHIOPRINE 50 MG Tablet] 360 tablet      Sig: TAKE 2 TABLETS TWICE DAILY      Last filled: 10/28/2022 120 with 2 refills.  Last office visit with prescribing clinician: 10/28/2022      Next office visit with prescribing clinician: 2/6/2023     Sent in 120 with 5 refills.    Robina Sainz MA  12/30/22, 13:10 EST

## 2023-01-03 ENCOUNTER — TELEPHONE (OUTPATIENT)
Dept: CARDIOLOGY | Facility: CLINIC | Age: 78
End: 2023-01-03
Payer: MEDICARE

## 2023-01-03 RX ORDER — CHLORTHALIDONE 25 MG/1
25 TABLET ORAL DAILY
Qty: 45 TABLET | Refills: 3 | Status: SHIPPED | OUTPATIENT
Start: 2023-01-03 | End: 2023-02-26 | Stop reason: HOSPADM

## 2023-01-03 RX ORDER — AMLODIPINE BESYLATE 10 MG/1
10 TABLET ORAL DAILY
Qty: 90 TABLET | Refills: 3 | Status: SHIPPED | OUTPATIENT
Start: 2023-01-03

## 2023-01-03 NOTE — TELEPHONE ENCOUNTER
Called pt and gave NSK recommendations above. Pt verbalizes understanding and agreeable to plan.

## 2023-01-03 NOTE — TELEPHONE ENCOUNTER
Lets have her increase amlodipine to 10 mg daily since blood pressure is still elevated.  Monitor for increased leg swelling.  If she experiences this ask her to call us back and and we can adjust medications further as needed

## 2023-01-03 NOTE — TELEPHONE ENCOUNTER
Patient called to update BP readings since med changes after LOV.     12/16 139/63 63  12/17 154/63 63  12/18 152/73 58  12/19 156/64 59  12/20 162/64 63  12/21 143/63 60  22 146/62 57  12/24 157/69 59  12/26 165/70 61  12/27 119/53 52  12/29 160/75 60  12/30 152/69 59  12/31 149/69 60  1/1 145/63 60  1/2 165/70 59  1/3 165/75 61    Pt denies SOB, chest pain, dizziness, swelling, palpitations.     Confirmed med list in Epic.    Please advise.

## 2023-01-19 ENCOUNTER — HOSPITAL ENCOUNTER (OUTPATIENT)
Dept: MRI IMAGING | Facility: HOSPITAL | Age: 78
Discharge: HOME | End: 2023-01-19
Payer: MEDICARE

## 2023-01-19 DIAGNOSIS — G37.9 DEMYELINATING DISEASE: ICD-10-CM

## 2023-01-19 DIAGNOSIS — N88.9 CERVICAL LESION: ICD-10-CM

## 2023-01-19 DIAGNOSIS — R20.2 PARESTHESIA: ICD-10-CM

## 2023-01-19 PROCEDURE — 0 GADOBENATE DIMEGLUMINE 529 MG/ML SOLUTION: Performed by: NURSE PRACTITIONER

## 2023-01-19 PROCEDURE — A9577 INJ MULTIHANCE: HCPCS | Performed by: NURSE PRACTITIONER

## 2023-01-19 PROCEDURE — 70553 MRI BRAIN STEM W/O & W/DYE: CPT

## 2023-01-19 PROCEDURE — 72156 MRI NECK SPINE W/O & W/DYE: CPT

## 2023-01-19 RX ADMIN — GADOBENATE DIMEGLUMINE 10 ML: 529 INJECTION, SOLUTION INTRAVENOUS at 14:49

## 2023-01-23 ENCOUNTER — TELEPHONE (OUTPATIENT)
Dept: NEUROLOGY | Facility: CLINIC | Age: 78
End: 2023-01-23
Payer: MEDICARE

## 2023-01-23 NOTE — TELEPHONE ENCOUNTER
Called patient and gave results.  Patient was understanding and appreciative.    ----- Message from Bryson Murrell DNP, APRN sent at 1/20/2023  4:28 PM EST -----  Please notify pt MRI of brain is stable.

## 2023-01-23 NOTE — TELEPHONE ENCOUNTER
Called patient and left  with results.    ----- Message from Bryson Murrell DNP, APRN sent at 1/23/2023 10:25 AM EST -----  Please notify pt cervical cord lesion at C6-7 is stable.

## 2023-02-01 ENCOUNTER — TRANSCRIBE ORDERS (OUTPATIENT)
Dept: ADMINISTRATIVE | Facility: HOSPITAL | Age: 78
End: 2023-02-01
Payer: MEDICARE

## 2023-02-01 ENCOUNTER — HOSPITAL ENCOUNTER (OUTPATIENT)
Dept: GENERAL RADIOLOGY | Facility: HOSPITAL | Age: 78
Discharge: HOME | End: 2023-02-01
Admitting: CLINIC/CENTER
Payer: MEDICARE

## 2023-02-01 DIAGNOSIS — M79.631 RIGHT FOREARM PAIN: Primary | ICD-10-CM

## 2023-02-01 DIAGNOSIS — M79.645 FINGER PAIN, LEFT: ICD-10-CM

## 2023-02-01 PROCEDURE — 73140 X-RAY EXAM OF FINGER(S): CPT

## 2023-02-01 PROCEDURE — 73090 X-RAY EXAM OF FOREARM: CPT

## 2023-02-06 ENCOUNTER — OFFICE VISIT (OUTPATIENT)
Dept: NEUROLOGY | Facility: CLINIC | Age: 78
End: 2023-02-06
Payer: MEDICARE

## 2023-02-06 VITALS
WEIGHT: 134.8 LBS | SYSTOLIC BLOOD PRESSURE: 126 MMHG | HEART RATE: 60 BPM | DIASTOLIC BLOOD PRESSURE: 62 MMHG | BODY MASS INDEX: 27.17 KG/M2 | TEMPERATURE: 97.5 F | OXYGEN SATURATION: 96 % | HEIGHT: 59 IN

## 2023-02-06 DIAGNOSIS — G37.9 DEMYELINATING DISEASE: ICD-10-CM

## 2023-02-06 DIAGNOSIS — R20.2 PARESTHESIA: Primary | ICD-10-CM

## 2023-02-06 DIAGNOSIS — R41.89 EPISODE OF ALTERED COGNITION: ICD-10-CM

## 2023-02-06 DIAGNOSIS — Z86.73 HISTORY OF STROKE: ICD-10-CM

## 2023-02-06 DIAGNOSIS — R51.9 WORSENING HEADACHES: ICD-10-CM

## 2023-02-06 PROBLEM — Z79.4 LONG-TERM INSULIN USE: Status: ACTIVE | Noted: 2023-02-06

## 2023-02-06 PROBLEM — M43.10 SPONDYLOLISTHESIS, GRADE 2: Status: ACTIVE | Noted: 2023-02-06

## 2023-02-06 PROBLEM — J45.41 MODERATE PERSISTENT ASTHMA WITH EXACERBATION: Status: ACTIVE | Noted: 2023-02-06

## 2023-02-06 PROBLEM — K20.80 PILL ESOPHAGITIS: Status: ACTIVE | Noted: 2023-02-06

## 2023-02-06 PROBLEM — K57.92 DIVERTICULITIS: Status: ACTIVE | Noted: 2023-02-06

## 2023-02-06 PROBLEM — R07.89 ATYPICAL CHEST PAIN: Status: ACTIVE | Noted: 2023-02-06

## 2023-02-06 PROBLEM — Z72.89 OTHER PROBLEMS RELATED TO LIFESTYLE: Status: ACTIVE | Noted: 2023-02-06

## 2023-02-06 PROBLEM — E55.9 VITAMIN D DEFICIENCY: Status: ACTIVE | Noted: 2023-02-06

## 2023-02-06 PROBLEM — B37.31 YEAST VAGINITIS: Status: ACTIVE | Noted: 2020-10-26

## 2023-02-06 PROBLEM — E11.65 UNCONTROLLED DIABETES MELLITUS WITH HYPERGLYCEMIA, WITH LONG-TERM CURRENT USE OF INSULIN: Status: ACTIVE | Noted: 2023-02-06

## 2023-02-06 PROBLEM — M54.50 LOW BACK PAIN: Status: ACTIVE | Noted: 2017-10-23

## 2023-02-06 PROBLEM — Z23 NEED FOR PROPHYLACTIC VACCINATION AGAINST STREPTOCOCCUS PNEUMONIAE (PNEUMOCOCCUS) AND INFLUENZA: Status: ACTIVE | Noted: 2023-02-06

## 2023-02-06 PROBLEM — Z23 NEED FOR IMMUNIZATION AGAINST INFLUENZA: Status: ACTIVE | Noted: 2023-02-06

## 2023-02-06 PROBLEM — M25.569 ARTHRALGIA OF KNEE: Status: ACTIVE | Noted: 2019-05-31

## 2023-02-06 PROBLEM — I16.1 HYPERTENSIVE EMERGENCY: Status: ACTIVE | Noted: 2023-02-06

## 2023-02-06 PROBLEM — E11.43 DIABETIC AUTONOMIC NEUROPATHY ASSOCIATED WITH TYPE 2 DIABETES MELLITUS: Status: ACTIVE | Noted: 2023-02-06

## 2023-02-06 PROBLEM — R10.9 ABDOMINAL PAIN IN FEMALE: Status: ACTIVE | Noted: 2023-02-06

## 2023-02-06 PROBLEM — J01.90 ACUTE SINUSITIS: Status: ACTIVE | Noted: 2023-02-06

## 2023-02-06 PROBLEM — E11.22 TYPE 2 DIABETES MELLITUS WITH STAGE 3 CHRONIC KIDNEY DISEASE, WITH LONG-TERM CURRENT USE OF INSULIN: Status: ACTIVE | Noted: 2023-02-06

## 2023-02-06 PROBLEM — T50.905A PILL ESOPHAGITIS: Status: ACTIVE | Noted: 2023-02-06

## 2023-02-06 PROBLEM — M25.512 PAIN, JOINT, SHOULDER, LEFT: Status: ACTIVE | Noted: 2023-02-06

## 2023-02-06 PROBLEM — I10 BENIGN ESSENTIAL HYPERTENSION: Status: ACTIVE | Noted: 2023-02-06

## 2023-02-06 PROBLEM — Z79.4 UNCONTROLLED DIABETES MELLITUS WITH HYPERGLYCEMIA, WITH LONG-TERM CURRENT USE OF INSULIN: Status: ACTIVE | Noted: 2023-02-06

## 2023-02-06 PROBLEM — Z00.00 ENCOUNTER FOR GENERAL ADULT MEDICAL EXAMINATION WITHOUT ABNORMAL FINDINGS: Status: ACTIVE | Noted: 2023-02-06

## 2023-02-06 PROBLEM — N18.30 TYPE 2 DIABETES MELLITUS WITH STAGE 3 CHRONIC KIDNEY DISEASE, WITH LONG-TERM CURRENT USE OF INSULIN (HCC): Status: ACTIVE | Noted: 2023-02-06

## 2023-02-06 PROBLEM — Z12.39 BREAST CANCER SCREENING: Status: ACTIVE | Noted: 2023-02-06

## 2023-02-06 PROBLEM — J18.9 CAP (COMMUNITY ACQUIRED PNEUMONIA): Status: ACTIVE | Noted: 2023-02-06

## 2023-02-06 PROBLEM — Z01.818 ENCOUNTER FOR PRE-OPERATIVE EXAMINATION: Status: ACTIVE | Noted: 2023-02-06

## 2023-02-06 PROBLEM — T78.40XA ALLERGIES: Status: ACTIVE | Noted: 2023-02-06

## 2023-02-06 PROBLEM — Z79.4 TYPE 2 DIABETES MELLITUS WITH STAGE 3 CHRONIC KIDNEY DISEASE, WITH LONG-TERM CURRENT USE OF INSULIN (HCC): Status: ACTIVE | Noted: 2023-02-06

## 2023-02-06 PROBLEM — R94.31: Status: ACTIVE | Noted: 2023-02-06

## 2023-02-06 PROBLEM — K59.00 CONSTIPATION: Status: ACTIVE | Noted: 2023-02-06

## 2023-02-06 PROBLEM — Z79.4 LONG TERM CURRENT USE OF INSULIN (HCC): Status: ACTIVE | Noted: 2023-02-06

## 2023-02-06 PROCEDURE — 99214 OFFICE O/P EST MOD 30 MIN: CPT | Performed by: NURSE PRACTITIONER

## 2023-02-06 RX ORDER — CEPHALEXIN 500 MG/1
CAPSULE ORAL
COMMUNITY
Start: 2023-01-17 | End: 2023-02-26 | Stop reason: HOSPADM

## 2023-02-06 RX ORDER — DULOXETIN HYDROCHLORIDE 30 MG/1
30 CAPSULE, DELAYED RELEASE ORAL DAILY
Qty: 90 CAPSULE | Refills: 5 | Status: SHIPPED | OUTPATIENT
Start: 2023-02-06 | End: 2023-03-09 | Stop reason: HOSPADM

## 2023-02-06 NOTE — PROGRESS NOTES
"   Neuro Office Visit      Encounter Date: 2023   Patient Name: Marilyn Kunz  : 1945   MRN: [id number]     Chief Complaint:    Chief Complaint   Patient presents with   • Stroke       History of Present Illness: Marilyn Kunz is a 77 y.o. female who is here today in Neurology for demyelinating disease.    Last visit 2022 w Dr Hernandez-MRI brain/c-spine, labs  Labs: Glucose 222, egfr 57.5, cbc stable hgb 11.8    MRI Cervical Spine With & Without Contrast (2023 14:50)-stable C6-7 cord lesion  MRI Brain With & Without Contrast (2023 14:48)-Heavy WM disease. No new lesions.    Fall/Episode  2023. Was in bed at 5:30 am had a strange sensation and then called out \"Oh God help me\". Denies LOC but lost time and became aware that she knocked over a lamp and injured her right arm and left finger. Has not had this sensation in the past. Did not go to ER. She doesn't remember falling but believes she did.     Demyelinating Disease  At some point she stopped duloxetine.  Taking  bid w slight improvement. Itching intensity decreased. Pain in arms and back is intermittent and improved. Fatigue is improved  Started Imuran 22, cont cosentyx  Denies slurred speech and dysphagia. Denies vision changes. No falls. Gait is steady. Not using assistive devices.    PH  3 years of hands burning and itching, numbness, tremor is int. Weakness in hands is stable, but not worse. Still taking Cosyntex once a month.    Admitted at [facility] for right hand pain and weakness.  Acute worsening of right hand and arm pain w worsening rash and swelling of BLE. Rash present since knee replacement Oct 2021.  CTH-neg  CTA H/N-neg  CT perfuison-neg  MRI B/C 22 extensive T2 PVWM, C5/6 cord lesion     CSF-0OCB, 2 paired bands in csf prot 42.1    Labs cmp, TSH, T4, crp, mag, vit b12, JESSICA, cryoglobulin, CBC, IgM, IgA, IgG, NMO, MOG, ACE-neg  A1c 6.9, Factor V Leiden-heterozygous, sed rate " 37    History of stroke  Taking Asa 81 and pravastatin 20mg.  Neg CTA, echo nml, neg Venous doppler. Holter w no a-fib    Psorriatic Arthritis  Took Methotrexate for 17 years, Enbrel 2091, and Cosyntex    Headache  Right occipital and temporal area with int throbbing. Symptoms are daily for 2 weeks. Duration is 30 minutes. Intermittent. Treated with tylenol at night.   No nausea, vomiting, photophobia, phonophobia, dizziness. 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 Sister with multiple autoimmune diseases.  SH: -etoh/tob use. Lives alone.    Subjective      Past Medical History:   Past Medical History:   Diagnosis Date   • Abnormal ECG Check [facility] Date or [facility]    Brought to  from [facility]   • Acute sinusitis 2/6/2023   • Anemia    • Asthma    • CAP (community acquired pneumonia) 2/6/2023   • Chronic kidney disease     pt told it was dx from Dr Baca and to not eat much protein   • Congenital heart disease 1950's Patent 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    Surgery [facility] 1950s close   • CTS (carpal tunnel syndrome)     Psoriatic arthritis hands could be   • Disease of thyroid gland    • Factor 5 Leiden mutation, heterozygous (HCC) 2012   • Head injury    • Hyperlipidemia    • Hypertension    • Movement disorder 10/2021    knee replacement left knww   • Murmur, cardiac    • Neuropathy in diabetes (HCC)     redness swelling legs   • Peripheral neuropathy 05/03/2022    hands, arms. shoulders, back   • Pill esophagitis 2/6/2023   • Psoriatic arthritis (HCC)     hands   • Sleep apnea Always    diabetic do not sleep well up and down   • Stroke (HCC) had one don't know when    showed on MRI Brain   • Type 1 diabetes (HCC)        Past Surgical History:   Past Surgical History:    Procedure Laterality Date   • CARDIAC CATHETERIZATION  1952 2 of them    Patent Dictus operation   • CATARACT EXTRACTION Bilateral    • ENDOSCOPY N/A 10/20/2020    Procedure: ESOPHAGOGASTRODUODENOSCOPY;  Surgeon: Brunner, Mark I, MD;  Location: [facility] ENDOSCOPY;  Service: Gastroenterology;  Laterality: N/A;   • HAND SURGERY Left 1987    no hardware   • PATENT DUCTUS ARTERIOUS LIGATION         Family History:   Family History   Problem Relation Age of Onset   • Cancer Mother    • Pancreatic cancer Mother    • Stroke Mother         Mini strokes   • Cancer Father    • Lung cancer Father    • Cancer Sister    • Colon cancer Sister    • Diabetes Sister    • Breast cancer Neg Hx    • Ovarian cancer Neg Hx        Social History:   Social History     Socioeconomic History   • Marital status: Single   Tobacco Use   • Smoking status: Never   • Smokeless tobacco: Never   Vaping Use   • Vaping Use: Never used   Substance and Sexual Activity   • Alcohol use: No   • Drug use: No   • Sexual activity: Not Currently     Partners: Male     Birth control/protection: Abstinence       Medications:     Current Outpatient Medications:   •  albuterol sulfate  (90 Base) MCG/ACT inhaler, Inhale 2 puffs., Disp: , Rfl:   •  amLODIPine (NORVASC) 10 MG tablet, Take 1 tablet by mouth Daily., Disp: 90 tablet, Rfl: 3  •  ammonium lactate (AMLACTIN) 12 % cream, (12 %), Disp: , Rfl:   •  aspirin 81 MG EC tablet, Take 1 tablet by mouth Daily., Disp: 30 tablet, Rfl: 0  •  azaTHIOprine (IMURAN) 50 MG tablet, TAKE 2 TABLETS TWICE DAILY, Disp: 120 tablet, Rfl: 5  •  azelastine (ASTELIN) 0.1 % nasal spray, 1 spray into the nostril(s) as directed by provider Daily As Needed., Disp: , Rfl:   •  budesonide-formoterol (SYMBICORT) 160-4.5 MCG/ACT inhaler, Inhale 2 puffs 2 (Two) Times a Day., Disp: 10.2 g, Rfl: 11  •  chlorthalidone (HYGROTON) 25 MG tablet, Take 1 tablet by mouth Daily., Disp: 45 tablet, Rfl: 3  •  Continuous Blood Gluc   (FreeStyle Marie 2 Jeddo) device, FreeStyle Marie 2 Jeddo  change q 14 days, Disp: , Rfl:   •  Droplet Pen Needles 32G X 4 MM misc, , Disp: , Rfl:   •  fluticasone (FLONASE) 50 MCG/ACT nasal spray, 2 sprays by Each Nare route Daily. Shake well before using., Disp: , Rfl:   •  FreeStyle Precision Ant Test test strip, , Disp: , Rfl:   •  insulin aspart (novoLOG FLEXPEN) 100 UNIT/ML solution pen-injector sc pen, Inject  under the skin into the appropriate area as directed 3 (Three) Times a Day With Meals. Sliding scale, Disp: , Rfl:   •  irbesartan (Avapro) 300 MG tablet, Take 1 tablet by mouth Every Night., Disp: 90 tablet, Rfl: 6  •  levocetirizine (XYZAL) 5 MG tablet, Take 5 mg by mouth Every Evening., Disp: , Rfl:   •  levothyroxine (SYNTHROID, LEVOTHROID) 75 MCG tablet, Take 75 mcg by mouth Daily., Disp: , Rfl:   •  montelukast (SINGULAIR) 10 MG tablet, Take 10 mg by mouth Every Night., Disp: , Rfl:   •  nebivolol (BYSTOLIC) 20 MG tablet, Take 1 tablet by mouth Daily., Disp: 90 tablet, Rfl: 6  •  oxybutynin XL (DITROPAN-XL) 10 MG 24 hr tablet, Take 10 mg by mouth Daily., Disp: , Rfl:   •  pravastatin (Pravachol) 20 MG tablet, Take 1 tablet by mouth Every Evening. (Patient taking differently: Take 20 mg by mouth Every Evening. Monday, Wednesday and Friday.), Disp: 90 tablet, Rfl: 3  •  Secukinumab (Cosentyx) 150 MG/ML solution prefilled syringe, Inject 150 mg under the skin into the appropriate area as directed Every 28 (Twenty-Eight) Days., Disp: , Rfl:   •  Spacer/Aero-Holding Chambers (OptiChamber Paula-Lg Mask) device, USE AS DIRECTED WITH MULTI DOSE INHALER, Disp: , Rfl:   •  tobramycin-dexamethasone (TOBRADEX) 0.3-0.1 % ophthalmic suspension, INSTILL 1 DROP IN LEFT EYE 4 TIMES DAILY, Disp: , Rfl:   •  Tresiba FlexTouch 100 UNIT/ML solution pen-injector injection, Inject 16-20 Units under the skin into the appropriate area as directed Every Night., Disp: , Rfl:   •  triamcinolone (KENALOG) 0.1 % cream,  APPLY A THIN LAYER TO THE AFFECTED AREA TWICE DAILY FOR 1 WEEK THEN ONCE DAILY FOR 1 WEEK, Disp: , Rfl:   •  cephalexin (KEFLEX) 500 MG capsule, TAKE 4 CAPSULE BY MOUTH 1 HOUR PRIOR TO DENTAL APPOINTMENT, Disp: , Rfl:   •  DULoxetine (Cymbalta) 30 MG capsule, Take 1 capsule by mouth Daily., Disp: 90 capsule, Rfl: 5    Allergies:   Allergies   Allergen Reactions   • Atorvastatin Other (See Comments)   • Colesevelam Other (See Comments)   • Cephalexin Rash   • Penicillins Nausea And Vomiting and Rash       PHQ-9 Total Score:     STEADI Fall Risk Assessment was completed, and patient is at HIGH risk for falls. Assessment completed on:2/6/2023    Objective     Physical Exam:   Physical Exam  Eyes:      Pupils: Pupils are equal, round, and reactive to light.   Neurological:      Mental Status: She is oriented to person, place, and time.      Deep Tendon Reflexes:      Reflex Scores:       Tricep reflexes are 1+ on the right side and 1+ on the left side.       Bicep reflexes are 1+ on the right side and 1+ on the left side.       Brachioradialis reflexes are 1+ on the right side and 1+ on the left side.       Patellar reflexes are 1+ on the right side and 1+ on the left side.       Achilles reflexes are 1+ on the right side and 1+ on the left side.  Psychiatric:         Speech: Speech normal.         Neurologic Exam     Mental Status   Oriented to person, place, and time.   Follows 3 step commands.   Attention: normal. Concentration: normal.   Speech: speech is normal   Level of consciousness: alert  Knowledge: consistent with education.   Normal comprehension.     Cranial Nerves     CN III, IV, VI   Pupils are equal, round, and reactive to light.  Right pupil: Accommodation: intact.   Left pupil: Accommodation: intact.   CN III: no CN III palsy  CN VI: no CN VI palsy  Nystagmus: none   Diplopia: none  Upgaze: normal  Downgaze: normal  Conjugate gaze: present    CN VII   Facial expression full, symmetric.     CN VIII  "  Hearing: intact    CN XII   CN XII normal.     Motor Exam   Muscle bulk: normal  Overall muscle tone: normal    Strength   Right deltoid: 3/5  Left deltoid: 3/5  Right biceps: 3/5  Left biceps: 3/5  Right triceps: 3/5  Left triceps: 3/5  Right interossei: 3/5  Left interossei: 3/5  Right quadriceps: 4/5  Left quadriceps: 4/5  Right hamstrin/5  Left hamstrin/5  Right anterior tibial: 4/5  Left anterior tibial: 4/5  Right posterior tibial: 4/5  Left posterior tibial: 4/5  Right peroneal: 4/5  Left peroneal: 4/5  Right gastroc: 4/5  Left gastroc: 4/5    Sensory Exam   Right arm light touch: decreased from fingers  Left arm light touch: decreased from fingers  Right leg light touch: decreased from toes  Left leg light touch: decreased from toes    Gait, Coordination, and Reflexes     Gait  Gait: non-neurologic    Tremor   Resting tremor: absent  Action tremor: absent    Reflexes   Right brachioradialis: 1+  Left brachioradialis: 1+  Right biceps: 1+  Left biceps: 1+  Right triceps: 1+  Left triceps: 1+  Right patellar: 1+  Left patellar: 1+  Right achilles: 1+  Left achilles: 1+  Right : 1+  Left : 1+       Vital Signs:   Vitals:    23 1016   BP: 126/62   Pulse: 60   Temp: 97.5 °F (36.4 °C)   SpO2: 96%   Weight: 61.1 kg (134 lb 12.8 oz)   Height: 149.9 cm (59.02\")     Body mass index is 27.21 kg/m².     Results:   Imaging:   XR Forearm 2 View Right    Result Date: 2023  No acute fracture or malalignment.  This report was finalized on 2023 12:24 PM by Sachin Silva MD.      MRI Brain With & Without Contrast    Result Date: 2023  Impression: 1.There are white matter changes involving the cerebral hemispheres similar in appearance to prior study. There also is signal within the left cerebellar hemisphere. This has been noted as well. These findings could be more reflective of small vessel ischemic change as opposed to demyelinating process. Correlation with patient's clinical history " would be recommended. Please see icometrix data sheet for white matter abnormality volume assessment. Electronically Signed: Jesse Durham  1/19/2023 10:30 PM EST  Workstation ID: [id number]    MRI Cervical Spine With & Without Contrast    Result Date: 1/21/2023  Impression: 1. Stable abnormal increased T2 signal within the ventral cord at the C6/7 level. No associated contrast enhancement. No change from multiple prior exams. 2. Multilevel degenerative disc disease and degenerative facet change resulting in multilevel mild neural foraminal narrowing as detailed above. No significant canal stenosis. 3. No new area of abnormal T2 signal within the cord. 4. No pathologic contrast enhancement. Electronically Signed: Robert Mensah  1/21/2023 7:14 AM EST  Workstation ID: [id number]    XR Finger 2+ View Left    Result Date: 2/1/2023  No definite fracture or traumatic malalignment.  This report was finalized on 2/1/2023 12:29 PM by Sachin Silva MD.          Assessment / Plan      Assessment/Plan:   Diagnoses and all orders for this visit:    1. Paresthesia (Primary)  Comments:  Resume cymbalta 30mg  Orders:  -     DULoxetine (Cymbalta) 30 MG capsule; Take 1 capsule by mouth Daily.  Dispense: 90 capsule; Refill: 5  -     EEG (Hospital Performed); Future    2. Episode of altered cognition  Comments:  Check EEG. Consider TIA  Orders:  -     EEG (Hospital Performed); Future    3. Worsening headaches  Comments:  Add cymbalta, control bp w goal 130/80    4. Demyelinating disease (HCC)  Comments:  Cont Asa 50mg 2 tabs bid and cosentyx    5. History of stroke  Comments:  Cont Asa, statin and control bp         Patient Education:     Reviewed medications, potential side effects and signs and symptoms to report. Discussed risk versus benefits of treatment plan with patient and/or family-including medications, labs and radiology that may be ordered. Addressed questions and concerns during visit. Patient and/or family verbalized  understanding and agree with plan. Instructed to call the office with any questions and report to ER with any life-threatening symptoms.     Follow Up:   Return in about 3 months (around 5/6/2023).    During this visit the following were done:  Labs Reviewed [x]    Labs Ordered []    Radiology Reports Reviewed [x]    Radiology Ordered []    PCP Records Reviewed []    Referring Provider Records Reviewed []    ER Records Reviewed []    Hospital Records Reviewed []    History Obtained From Family []    Radiology Images Reviewed []    Other Reviewed [x]    Records Requested []      Bryson Murrell, DNP, APRN

## 2023-02-09 ENCOUNTER — OFFICE VISIT (OUTPATIENT)
Dept: PULMONOLOGY | Facility: CLINIC | Age: 78
End: 2023-02-09
Payer: MEDICARE

## 2023-02-09 VITALS
HEART RATE: 74 BPM | DIASTOLIC BLOOD PRESSURE: 70 MMHG | TEMPERATURE: 97.5 F | OXYGEN SATURATION: 94 % | BODY MASS INDEX: 26.77 KG/M2 | WEIGHT: 132.8 LBS | HEIGHT: 59 IN | SYSTOLIC BLOOD PRESSURE: 192 MMHG

## 2023-02-09 DIAGNOSIS — I10 PRIMARY HYPERTENSION: ICD-10-CM

## 2023-02-09 DIAGNOSIS — G37.9 DEMYELINATING DISEASE: ICD-10-CM

## 2023-02-09 DIAGNOSIS — J30.89 NON-SEASONAL ALLERGIC RHINITIS, UNSPECIFIED TRIGGER: ICD-10-CM

## 2023-02-09 DIAGNOSIS — R06.02 SHORTNESS OF BREATH: Primary | ICD-10-CM

## 2023-02-09 DIAGNOSIS — R53.82 CHRONIC FATIGUE: ICD-10-CM

## 2023-02-09 PROBLEM — J45.41 MODERATE PERSISTENT ASTHMA WITH EXACERBATION: Status: RESOLVED | Noted: 2023-02-06 | Resolved: 2023-02-09

## 2023-02-09 PROBLEM — J18.9 CAP (COMMUNITY ACQUIRED PNEUMONIA): Status: RESOLVED | Noted: 2023-02-06 | Resolved: 2023-02-09

## 2023-02-09 PROCEDURE — 99214 OFFICE O/P EST MOD 30 MIN: CPT | Performed by: INTERNAL MEDICINE

## 2023-02-09 RX ORDER — CLONIDINE HYDROCHLORIDE 0.1 MG/1
0.1 TABLET ORAL 3 TIMES DAILY PRN
Qty: 90 TABLET | Refills: 3 | Status: SHIPPED | OUTPATIENT
Start: 2023-02-09

## 2023-02-09 NOTE — PROGRESS NOTES
Follow Up Office Note       Patient Name: Marilyn Kunz    Referring Physician: No ref. provider found    Chief Complaint:    Chief Complaint   Patient presents with   • Shortness of Breath     F/u        History of Present Illness: Marilyn Kunz is a 77 y.o. female who is here today to follow-up care with Pulmonary.  Patient has a past medical history significant for hypertension, hyperlipidemia, factor V Leiden, type 2 diabetes mellitus, hypothyroidism, neuropathy, and CKD stage III.   Patient states her breathing is stable.  She has had issues with blood pressure recently noted that her blood pressure was in the 200 last night.  She is taking all of her blood pressure meds as she supposed to.  She had an episode where she passed out a couple of days ago and hit her arm.  She denies any breathing complaints.  She continues to have chronic fatigue.  No other acute complaints.    Review of Systems:   Review of Systems   Constitutional: Positive for fatigue. Negative for chills and fever.   HENT: Negative for congestion and voice change.    Eyes: Negative for blurred vision.   Respiratory: Negative for cough, shortness of breath and wheezing.    Cardiovascular: Negative for chest pain.   Musculoskeletal: Positive for back pain.   Skin: Negative for dry skin.   Neurological: Positive for dizziness and syncope.   Hematological: Negative for adenopathy.   Psychiatric/Behavioral: Negative for agitation and depressed mood.       The following portions of the patient's history were reviewed and updated as appropriate: allergies, current medications, past family history, past medical history, past social history, past surgical history and problem list.    Physical Exam:  Vital Signs:   Vitals:    02/09/23 0946   BP: (!) 192/70   BP Location: Right arm   Patient Position: Sitting   Cuff Size: Adult   Pulse: 74   Temp: 97.5 °F (36.4 °C)   TempSrc: Infrared   SpO2: 94%  Comment: resting room air   Weight: 60.2 kg  "(132 lb 12.8 oz)   Height: 149.9 cm (59.02\")       Physical Exam  Vitals and nursing note reviewed.   Constitutional:       General: She is not in acute distress.     Appearance: She is well-developed and normal weight. She is not ill-appearing or toxic-appearing.   HENT:      Head: Normocephalic and atraumatic.   Cardiovascular:      Rate and Rhythm: Normal rate and regular rhythm.      Pulses: Normal pulses.      Heart sounds: Normal heart sounds. No murmur heard.    No friction rub. No gallop.   Pulmonary:      Effort: Pulmonary effort is normal. No respiratory distress.      Breath sounds: Normal breath sounds. No wheezing, rhonchi or rales.   Musculoskeletal:      Right lower leg: No edema.      Left lower leg: No edema.   Skin:     General: Skin is warm and dry.   Neurological:      Mental Status: She is alert and oriented to person, place, and time.         Immunization History   Administered Date(s) Administered   • COVID-19 (PFIZER) BIVALENT BOOSTER 12+YRS 09/17/2022   • COVID-19 (PFIZER) PURPLE CAP 02/06/2021, 03/03/2021, 10/14/2021   • COVID-19 (UNSPECIFIED) 02/06/2021, 03/03/2021   • Covid-19 (Pfizer) Gray Cap 04/27/2022   • Flu Vaccine Intradermal Quad 18-64YR 10/01/2019   • FluLaval/Fluzone >6mos 09/18/2020   • Fluad Quad 65+ 09/14/2022   • Fluzone High Dose =>65 Years ([employer] ONLY) 09/04/2012, 09/18/2013, 10/01/2015, 09/06/2016, 09/20/2017, 10/17/2018, 10/01/2019   • Fluzone High-Dose 65+yrs 09/03/2021   • Influenza Injectable Mdck Pf Quad 09/18/2020   • Influenza Quad Vaccine (Inpatient) 10/24/2008   • Pneumococcal Conjugate 13-Valent (PCV13) 12/01/2015   • Pneumococcal Conjugate 20-Valent (PCV20) 09/19/2022   • Pneumococcal Polysaccharide (PPSV23) 10/17/2018   • Shingrix 06/11/2019       Results Review:   - high-resolution CT scan from 10/7/2022 which showed some biapical scarring, no signs of interstitial lung disease, there is no clinically significant bronchiectasis.   - CT scan of the chest " 9/26/2022 shows no pulmonary emboli, no nodules, masses, or infiltrates.              - chest x-ray 9/19/2022 showed no acute cardiopulmonary process  - pulmonary function testing from 9/22/2002, which was extremely poor and inadequate, this is uninterpretable.              -spirometry from 9/19/2022, extremely poor testing, uninterpretable  -6-minute walk test from 9/22/2022 and when she started at 97% and maintained at 96% throughout the entire test, dyspnea scale started 01 up to 2, she was able to walk 137 m which was 34% of predicted.  - Echo report from 5/5/2022 showed an EF of 70%, saline test was negative, mildly increased left atrial volume.  - stress test result from 8/31/2022 showed mild coronary artery calcifications, nonspecific ST changes, considered to be a low risk study.  EF was noted to be 70%.    Assessment / Plan:   Diagnoses and all orders for this visit:    1. Shortness of breath (Primary)  -The patient's shortness of breath is actually quite a bit better now she is not utilizing any inhalers.  Only albuterol on a as needed basis.  Therefore I want her to go ahead and discontinue the Symbicort I have not been convinced at any point time that she actually has asthma or utilizing the Symbicort due to the fact that she was unable to perform PFTs.  She can always go back on the medication starts having more issues in the future.  I suspect that a lot of her shortness of breath is actually more related to just chronic fatigue and her underlying autoimmune/demyelinating disease process.    2. Chronic fatigue  3. Demyelinating disease (HCC)  -Continue to follow with neurology and her PCP with regards to ongoing treatments.    4. Non-seasonal allergic rhinitis, unspecified trigger  -Currently well controlled continue Xyzal, Flonase, and Singulair.    5. Primary hypertension  -Patient is already on irbesartan, amlodipine, and chlorthalidone.  I wanted to follow-up with his PCP.  I did recheck her blood  pressure in the office today and she still was in the 190s systolic.  Therefore I also gave her prescription for clonidine 0.1 mg 3 times daily as needed to utilize if her systolic blood pressure is greater than 180.  Then she should also follow-up with her PCP/cardiologist for ongoing adjustments.      Follow Up:   Return in about 6 months (around 8/9/2023).       SUKHDEV Haley, DO  Pulmonary and Critical Care Medicine  Note Electronically Signed    Part of this note may be an electronic transcription/translation of spoken language to printed text using the Dragon Dictation System.

## 2023-02-10 ENCOUNTER — HOSPITAL ENCOUNTER (OUTPATIENT)
Dept: NEUROLOGY | Facility: HOSPITAL | Age: 78
Discharge: HOME | End: 2023-02-10
Admitting: NURSE PRACTITIONER
Payer: MEDICARE

## 2023-02-10 ENCOUNTER — TELEPHONE (OUTPATIENT)
Dept: CARDIOLOGY | Facility: CLINIC | Age: 78
End: 2023-02-10
Payer: MEDICARE

## 2023-02-10 DIAGNOSIS — R41.89 EPISODE OF ALTERED COGNITION: ICD-10-CM

## 2023-02-10 DIAGNOSIS — R20.2 PARESTHESIA: ICD-10-CM

## 2023-02-10 PROCEDURE — 95816 EEG AWAKE AND DROWSY: CPT

## 2023-02-10 RX ORDER — TERAZOSIN 2 MG/1
2 CAPSULE ORAL NIGHTLY
Qty: 30 CAPSULE | Refills: 5 | Status: SHIPPED | OUTPATIENT
Start: 2023-02-10 | End: 2023-03-09 | Stop reason: HOSPADM

## 2023-02-10 NOTE — TELEPHONE ENCOUNTER
Dr. Maya patient.    Patient called and states that BP is running high (150s-160s systolic) for the past few weeks, but in the 170s-200s systolic on 2/7/23.    Pt's pulmonologist Dr. Haley added PRN clonidine yesterday, but she has not used it. She was told to use if BP >180 systolic.     Pt denies chest pain, SOB, dizziness, palpitations. Pt states that she is starting to itch and have some slight swelling in ankles, which she thinks may be from increase in amlodipine to 10 mg on 1/3/23.     Patient's meds up to date in Epic.     Please advise.

## 2023-02-10 NOTE — TELEPHONE ENCOUNTER
Called pt and gave HTR recommendations above. Pt verbalizes understanding and agreeable to plan.

## 2023-02-13 ENCOUNTER — TELEPHONE (OUTPATIENT)
Dept: NEUROLOGY | Facility: CLINIC | Age: 78
End: 2023-02-13
Payer: MEDICARE

## 2023-02-13 NOTE — TELEPHONE ENCOUNTER
Called patient and gave results.  Patient was asking about Terazosin and I told patient it was prescribed by a different doctor and she will need to contact them.  Patient was understanding and appreciative.    ----- Message from Bryson Murrell DNP, APRN sent at 2/10/2023  2:48 PM EST -----  Please notify pt EEG is negative for epilepsy.

## 2023-02-24 ENCOUNTER — APPOINTMENT (OUTPATIENT)
Dept: GENERAL RADIOLOGY | Facility: HOSPITAL | Age: 78
DRG: 640 | End: 2023-02-24
Payer: MEDICARE

## 2023-02-24 ENCOUNTER — HOSPITAL ENCOUNTER (INPATIENT)
Facility: HOSPITAL | Age: 78
LOS: 1 days | Discharge: HOME HEALTH | DRG: 640 | End: 2023-02-26
Attending: EMERGENCY MEDICINE | Admitting: FAMILY MEDICINE
Payer: MEDICARE

## 2023-02-24 DIAGNOSIS — J96.01 ACUTE RESPIRATORY FAILURE WITH HYPOXIA: Primary | ICD-10-CM

## 2023-02-24 DIAGNOSIS — N39.0 ACUTE UTI: ICD-10-CM

## 2023-02-24 DIAGNOSIS — E87.1 HYPONATREMIA: ICD-10-CM

## 2023-02-24 DIAGNOSIS — R53.1 GENERALIZED WEAKNESS: ICD-10-CM

## 2023-02-24 DIAGNOSIS — Z86.73 HISTORY OF STROKE: ICD-10-CM

## 2023-02-24 PROBLEM — E86.0 DEHYDRATION: Status: ACTIVE | Noted: 2023-02-24

## 2023-02-24 PROBLEM — R53.83 FATIGUE: Status: ACTIVE | Noted: 2023-02-24

## 2023-02-24 PROBLEM — Z86.69 HISTORY OF NEUROMUSCULAR DISORDER: Chronic | Status: ACTIVE | Noted: 2023-02-24

## 2023-02-24 LAB
ALBUMIN SERPL-MCNC: 4.5 G/DL (ref 3.5–5.2)
ALBUMIN/GLOB SERPL: 1.5 G/DL
ALP SERPL-CCNC: 70 U/L (ref 39–117)
ALT SERPL W P-5'-P-CCNC: 11 U/L (ref 1–33)
ANION GAP SERPL CALCULATED.3IONS-SCNC: 14 MMOL/L (ref 5–15)
AST SERPL-CCNC: 14 U/L (ref 1–32)
B PARAPERT DNA SPEC QL NAA+PROBE: NOT DETECTED
B PERT DNA SPEC QL NAA+PROBE: NOT DETECTED
BACTERIA UR QL AUTO: ABNORMAL /HPF
BASOPHILS # BLD AUTO: 0.02 10*3/MM3 (ref 0–0.2)
BASOPHILS NFR BLD AUTO: 0.2 % (ref 0–1.5)
BILIRUB SERPL-MCNC: 1 MG/DL (ref 0–1.2)
BILIRUB UR QL STRIP: NEGATIVE
BUN SERPL-MCNC: 19 MG/DL (ref 8–23)
BUN/CREAT SERPL: 17.6 (ref 7–25)
C PNEUM DNA NPH QL NAA+NON-PROBE: NOT DETECTED
CALCIUM SPEC-SCNC: 9.5 MG/DL (ref 8.6–10.5)
CHLORIDE SERPL-SCNC: 90 MMOL/L (ref 98–107)
CK SERPL-CCNC: 91 U/L (ref 20–180)
CLARITY UR: ABNORMAL
CO2 SERPL-SCNC: 24 MMOL/L (ref 22–29)
COLOR UR: ABNORMAL
CREAT SERPL-MCNC: 1.08 MG/DL (ref 0.57–1)
DEPRECATED RDW RBC AUTO: 43.5 FL (ref 37–54)
EGFRCR SERPLBLD CKD-EPI 2021: 53 ML/MIN/1.73
EOSINOPHIL # BLD AUTO: 0.04 10*3/MM3 (ref 0–0.4)
EOSINOPHIL NFR BLD AUTO: 0.4 % (ref 0.3–6.2)
ERYTHROCYTE [DISTWIDTH] IN BLOOD BY AUTOMATED COUNT: 13.2 % (ref 12.3–15.4)
FLUAV RNA RESP QL NAA+PROBE: NOT DETECTED
FLUAV SUBTYP SPEC NAA+PROBE: NOT DETECTED
FLUBV RNA ISLT QL NAA+PROBE: NOT DETECTED
FLUBV RNA RESP QL NAA+PROBE: NOT DETECTED
GEN 5 2HR TROPONIN T REFLEX: 18 NG/L
GLOBULIN UR ELPH-MCNC: 3.1 GM/DL
GLUCOSE BLDC GLUCOMTR-MCNC: 273 MG/DL (ref 70–130)
GLUCOSE SERPL-MCNC: 305 MG/DL (ref 65–99)
GLUCOSE UR STRIP-MCNC: NEGATIVE MG/DL
HADV DNA SPEC NAA+PROBE: NOT DETECTED
HCOV 229E RNA SPEC QL NAA+PROBE: NOT DETECTED
HCOV HKU1 RNA SPEC QL NAA+PROBE: NOT DETECTED
HCOV NL63 RNA SPEC QL NAA+PROBE: NOT DETECTED
HCOV OC43 RNA SPEC QL NAA+PROBE: NOT DETECTED
HCT VFR BLD AUTO: 34.8 % (ref 34–46.6)
HGB BLD-MCNC: 11.5 G/DL (ref 12–15.9)
HGB UR QL STRIP.AUTO: NEGATIVE
HMPV RNA NPH QL NAA+NON-PROBE: NOT DETECTED
HOLD SPECIMEN: NORMAL
HPIV1 RNA ISLT QL NAA+PROBE: NOT DETECTED
HPIV2 RNA SPEC QL NAA+PROBE: NOT DETECTED
HPIV3 RNA NPH QL NAA+PROBE: NOT DETECTED
HPIV4 P GENE NPH QL NAA+PROBE: NOT DETECTED
HYALINE CASTS UR QL AUTO: ABNORMAL /LPF
IMM GRANULOCYTES # BLD AUTO: 0.04 10*3/MM3 (ref 0–0.05)
IMM GRANULOCYTES NFR BLD AUTO: 0.4 % (ref 0–0.5)
KETONES UR QL STRIP: ABNORMAL
LEUKOCYTE ESTERASE UR QL STRIP.AUTO: ABNORMAL
LYMPHOCYTES # BLD AUTO: 0.78 10*3/MM3 (ref 0.7–3.1)
LYMPHOCYTES NFR BLD AUTO: 8.3 % (ref 19.6–45.3)
M PNEUMO IGG SER IA-ACNC: NOT DETECTED
MAGNESIUM SERPL-MCNC: 1.7 MG/DL (ref 1.6–2.4)
MCH RBC QN AUTO: 30 PG (ref 26.6–33)
MCHC RBC AUTO-ENTMCNC: 33 G/DL (ref 31.5–35.7)
MCV RBC AUTO: 90.9 FL (ref 79–97)
MONOCYTES # BLD AUTO: 0.67 10*3/MM3 (ref 0.1–0.9)
MONOCYTES NFR BLD AUTO: 7.2 % (ref 5–12)
NEUTROPHILS NFR BLD AUTO: 7.8 10*3/MM3 (ref 1.7–7)
NEUTROPHILS NFR BLD AUTO: 83.5 % (ref 42.7–76)
NITRITE UR QL STRIP: NEGATIVE
NRBC BLD AUTO-RTO: 0 /100 WBC (ref 0–0.2)
NT-PROBNP SERPL-MCNC: 4022 PG/ML (ref 0–1800)
PH UR STRIP.AUTO: <=5 [PH] (ref 5–8)
PLATELET # BLD AUTO: 244 10*3/MM3 (ref 140–450)
PMV BLD AUTO: 11.3 FL (ref 6–12)
POTASSIUM SERPL-SCNC: 4 MMOL/L (ref 3.5–5.2)
PROT SERPL-MCNC: 7.6 G/DL (ref 6–8.5)
PROT UR QL STRIP: ABNORMAL
QT INTERVAL: 420 MS
QTC INTERVAL: 443 MS
RBC # BLD AUTO: 3.83 10*6/MM3 (ref 3.77–5.28)
RBC # UR STRIP: ABNORMAL /HPF
REF LAB TEST METHOD: ABNORMAL
RHINOVIRUS RNA SPEC NAA+PROBE: NOT DETECTED
RSV RNA NPH QL NAA+NON-PROBE: NOT DETECTED
RSV RNA NPH QL NAA+NON-PROBE: NOT DETECTED
SARS-COV-2 RNA NPH QL NAA+NON-PROBE: NOT DETECTED
SARS-COV-2 RNA RESP QL NAA+PROBE: NOT DETECTED
SODIUM SERPL-SCNC: 128 MMOL/L (ref 136–145)
SP GR UR STRIP: 1.02 (ref 1–1.03)
SQUAMOUS #/AREA URNS HPF: ABNORMAL /HPF
T4 FREE SERPL-MCNC: 1.72 NG/DL (ref 0.93–1.7)
TROPONIN T DELTA: 4 NG/L
TROPONIN T SERPL HS-MCNC: 14 NG/L
TROPONIN T SERPL HS-MCNC: 14 NG/L
TSH SERPL DL<=0.05 MIU/L-ACNC: 2.77 UIU/ML (ref 0.27–4.2)
UROBILINOGEN UR QL STRIP: ABNORMAL
WBC # UR STRIP: ABNORMAL /HPF
WBC NRBC COR # BLD: 9.35 10*3/MM3 (ref 3.4–10.8)
WHOLE BLOOD HOLD COAG: NORMAL
WHOLE BLOOD HOLD SPECIMEN: NORMAL

## 2023-02-24 PROCEDURE — 99285 EMERGENCY DEPT VISIT HI MDM: CPT

## 2023-02-24 PROCEDURE — 84484 ASSAY OF TROPONIN QUANT: CPT | Performed by: EMERGENCY MEDICINE

## 2023-02-24 PROCEDURE — 0202U NFCT DS 22 TRGT SARS-COV-2: CPT | Performed by: NURSE PRACTITIONER

## 2023-02-24 PROCEDURE — 94799 UNLISTED PULMONARY SVC/PX: CPT

## 2023-02-24 PROCEDURE — 85025 COMPLETE CBC W/AUTO DIFF WBC: CPT | Performed by: EMERGENCY MEDICINE

## 2023-02-24 PROCEDURE — 25010000002 ERTAPENEM PER 500 MG: Performed by: NURSE PRACTITIONER

## 2023-02-24 PROCEDURE — A9270 NON-COVERED ITEM OR SERVICE: HCPCS | Performed by: NURSE PRACTITIONER

## 2023-02-24 PROCEDURE — 87086 URINE CULTURE/COLONY COUNT: CPT | Performed by: EMERGENCY MEDICINE

## 2023-02-24 PROCEDURE — 83735 ASSAY OF MAGNESIUM: CPT | Performed by: EMERGENCY MEDICINE

## 2023-02-24 PROCEDURE — 99223 1ST HOSP IP/OBS HIGH 75: CPT | Performed by: INTERNAL MEDICINE

## 2023-02-24 PROCEDURE — 93005 ELECTROCARDIOGRAM TRACING: CPT

## 2023-02-24 PROCEDURE — 93005 ELECTROCARDIOGRAM TRACING: CPT | Performed by: EMERGENCY MEDICINE

## 2023-02-24 PROCEDURE — 71045 X-RAY EXAM CHEST 1 VIEW: CPT

## 2023-02-24 PROCEDURE — 84439 ASSAY OF FREE THYROXINE: CPT | Performed by: EMERGENCY MEDICINE

## 2023-02-24 PROCEDURE — 63710000001 INSULIN LISPRO (HUMAN) PER 5 UNITS: Performed by: NURSE PRACTITIONER

## 2023-02-24 PROCEDURE — 63710000001 SENNOSIDES-DOCUSATE 8.6-50 MG TABLET: Performed by: NURSE PRACTITIONER

## 2023-02-24 PROCEDURE — 63710000001 INSULIN DETEMIR PER 5 UNITS: Performed by: NURSE PRACTITIONER

## 2023-02-24 PROCEDURE — 84484 ASSAY OF TROPONIN QUANT: CPT | Performed by: NURSE PRACTITIONER

## 2023-02-24 PROCEDURE — 82962 GLUCOSE BLOOD TEST: CPT

## 2023-02-24 PROCEDURE — 83880 ASSAY OF NATRIURETIC PEPTIDE: CPT | Performed by: EMERGENCY MEDICINE

## 2023-02-24 PROCEDURE — 87637 SARSCOV2&INF A&B&RSV AMP PRB: CPT | Performed by: EMERGENCY MEDICINE

## 2023-02-24 PROCEDURE — 94640 AIRWAY INHALATION TREATMENT: CPT

## 2023-02-24 PROCEDURE — 82550 ASSAY OF CK (CPK): CPT | Performed by: EMERGENCY MEDICINE

## 2023-02-24 PROCEDURE — 81001 URINALYSIS AUTO W/SCOPE: CPT | Performed by: EMERGENCY MEDICINE

## 2023-02-24 PROCEDURE — 80053 COMPREHEN METABOLIC PANEL: CPT | Performed by: EMERGENCY MEDICINE

## 2023-02-24 PROCEDURE — 84443 ASSAY THYROID STIM HORMONE: CPT | Performed by: EMERGENCY MEDICINE

## 2023-02-24 PROCEDURE — 25010000002 ONDANSETRON PER 1 MG: Performed by: INTERNAL MEDICINE

## 2023-02-24 RX ORDER — ONDANSETRON 2 MG/ML
4 INJECTION INTRAMUSCULAR; INTRAVENOUS EVERY 6 HOURS PRN
Status: DISCONTINUED | OUTPATIENT
Start: 2023-02-24 | End: 2023-02-26 | Stop reason: HOSPADM

## 2023-02-24 RX ORDER — ACETAMINOPHEN 160 MG/5ML
650 SOLUTION ORAL EVERY 4 HOURS PRN
Status: DISCONTINUED | OUTPATIENT
Start: 2023-02-24 | End: 2023-02-26 | Stop reason: HOSPADM

## 2023-02-24 RX ORDER — SODIUM CHLORIDE 0.9 % (FLUSH) 0.9 %
10 SYRINGE (ML) INJECTION EVERY 12 HOURS SCHEDULED
Status: DISCONTINUED | OUTPATIENT
Start: 2023-02-24 | End: 2023-02-26 | Stop reason: HOSPADM

## 2023-02-24 RX ORDER — INSULIN LISPRO 100 [IU]/ML
3 INJECTION, SOLUTION INTRAVENOUS; SUBCUTANEOUS
Status: DISCONTINUED | OUTPATIENT
Start: 2023-02-24 | End: 2023-02-26 | Stop reason: HOSPADM

## 2023-02-24 RX ORDER — DEXTROSE MONOHYDRATE 25 G/50ML
25 INJECTION, SOLUTION INTRAVENOUS
Status: DISCONTINUED | OUTPATIENT
Start: 2023-02-24 | End: 2023-02-26 | Stop reason: HOSPADM

## 2023-02-24 RX ORDER — SODIUM CHLORIDE 0.9 % (FLUSH) 0.9 %
10 SYRINGE (ML) INJECTION AS NEEDED
Status: DISCONTINUED | OUTPATIENT
Start: 2023-02-24 | End: 2023-02-26 | Stop reason: HOSPADM

## 2023-02-24 RX ORDER — ACETAMINOPHEN 325 MG/1
650 TABLET ORAL EVERY 4 HOURS PRN
Status: DISCONTINUED | OUTPATIENT
Start: 2023-02-24 | End: 2023-02-26 | Stop reason: HOSPADM

## 2023-02-24 RX ORDER — BISACODYL 5 MG/1
5 TABLET, DELAYED RELEASE ORAL DAILY PRN
Status: DISCONTINUED | OUTPATIENT
Start: 2023-02-24 | End: 2023-02-26 | Stop reason: HOSPADM

## 2023-02-24 RX ORDER — INSULIN LISPRO 100 [IU]/ML
0-7 INJECTION, SOLUTION INTRAVENOUS; SUBCUTANEOUS
Status: DISCONTINUED | OUTPATIENT
Start: 2023-02-24 | End: 2023-02-26 | Stop reason: HOSPADM

## 2023-02-24 RX ORDER — NICOTINE POLACRILEX 4 MG
15 LOZENGE BUCCAL
Status: DISCONTINUED | OUTPATIENT
Start: 2023-02-24 | End: 2023-02-26 | Stop reason: HOSPADM

## 2023-02-24 RX ORDER — ACETAMINOPHEN 650 MG/1
650 SUPPOSITORY RECTAL EVERY 4 HOURS PRN
Status: DISCONTINUED | OUTPATIENT
Start: 2023-02-24 | End: 2023-02-26 | Stop reason: HOSPADM

## 2023-02-24 RX ORDER — IBUPROFEN 600 MG/1
1 TABLET ORAL
Status: DISCONTINUED | OUTPATIENT
Start: 2023-02-24 | End: 2023-02-26 | Stop reason: HOSPADM

## 2023-02-24 RX ORDER — BISACODYL 10 MG
10 SUPPOSITORY, RECTAL RECTAL DAILY PRN
Status: DISCONTINUED | OUTPATIENT
Start: 2023-02-24 | End: 2023-02-26 | Stop reason: HOSPADM

## 2023-02-24 RX ORDER — POLYETHYLENE GLYCOL 3350 17 G/17G
17 POWDER, FOR SOLUTION ORAL DAILY PRN
Status: DISCONTINUED | OUTPATIENT
Start: 2023-02-24 | End: 2023-02-26 | Stop reason: HOSPADM

## 2023-02-24 RX ORDER — SODIUM CHLORIDE 9 MG/ML
50 INJECTION, SOLUTION INTRAVENOUS CONTINUOUS
Status: ACTIVE | OUTPATIENT
Start: 2023-02-24 | End: 2023-02-25

## 2023-02-24 RX ORDER — IPRATROPIUM BROMIDE AND ALBUTEROL SULFATE 2.5; .5 MG/3ML; MG/3ML
3 SOLUTION RESPIRATORY (INHALATION)
Status: DISCONTINUED | OUTPATIENT
Start: 2023-02-24 | End: 2023-02-26 | Stop reason: HOSPADM

## 2023-02-24 RX ORDER — SODIUM CHLORIDE 9 MG/ML
40 INJECTION, SOLUTION INTRAVENOUS AS NEEDED
Status: DISCONTINUED | OUTPATIENT
Start: 2023-02-24 | End: 2023-02-26 | Stop reason: HOSPADM

## 2023-02-24 RX ORDER — AMOXICILLIN 250 MG
2 CAPSULE ORAL 2 TIMES DAILY
Status: DISCONTINUED | OUTPATIENT
Start: 2023-02-24 | End: 2023-02-26 | Stop reason: HOSPADM

## 2023-02-24 RX ADMIN — SODIUM CHLORIDE 500 ML: 9 INJECTION, SOLUTION INTRAVENOUS at 11:20

## 2023-02-24 RX ADMIN — IPRATROPIUM BROMIDE AND ALBUTEROL SULFATE 3 ML: 2.5; .5 SOLUTION RESPIRATORY (INHALATION) at 21:09

## 2023-02-24 RX ADMIN — INSULIN LISPRO 4 UNITS: 100 INJECTION, SOLUTION INTRAVENOUS; SUBCUTANEOUS at 19:18

## 2023-02-24 RX ADMIN — SODIUM CHLORIDE 500 ML: 9 INJECTION, SOLUTION INTRAVENOUS at 13:01

## 2023-02-24 RX ADMIN — ONDANSETRON 4 MG: 2 INJECTION INTRAMUSCULAR; INTRAVENOUS at 19:18

## 2023-02-24 RX ADMIN — INSULIN DETEMIR 9 UNITS: 100 INJECTION, SOLUTION SUBCUTANEOUS at 20:14

## 2023-02-24 RX ADMIN — ERTAPENEM 1 G: 1 INJECTION INTRAMUSCULAR; INTRAVENOUS at 21:35

## 2023-02-24 RX ADMIN — SODIUM CHLORIDE 50 ML/HR: 9 INJECTION, SOLUTION INTRAVENOUS at 18:26

## 2023-02-24 RX ADMIN — SENNOSIDES AND DOCUSATE SODIUM 2 TABLET: 50; 8.6 TABLET ORAL at 20:14

## 2023-02-25 ENCOUNTER — APPOINTMENT (OUTPATIENT)
Dept: CARDIOLOGY | Facility: HOSPITAL | Age: 78
DRG: 640 | End: 2023-02-25
Payer: MEDICARE

## 2023-02-25 ENCOUNTER — APPOINTMENT (OUTPATIENT)
Dept: CT IMAGING | Facility: HOSPITAL | Age: 78
DRG: 640 | End: 2023-02-25
Payer: MEDICARE

## 2023-02-25 LAB
ANION GAP SERPL CALCULATED.3IONS-SCNC: 10 MMOL/L (ref 5–15)
BACTERIA SPEC AEROBE CULT: NORMAL
BASOPHILS # BLD AUTO: 0.03 10*3/MM3 (ref 0–0.2)
BASOPHILS NFR BLD AUTO: 0.3 % (ref 0–1.5)
BH CV ECHO MEAS - AO MAX PG: 8.6 MMHG
BH CV ECHO MEAS - AO MEAN PG: 5 MMHG
BH CV ECHO MEAS - AO ROOT DIAM: 1.9 CM
BH CV ECHO MEAS - AO V2 MAX: 147 CM/SEC
BH CV ECHO MEAS - AO V2 VTI: 31.6 CM
BH CV ECHO MEAS - AVA(I,D): 2.11 CM2
BH CV ECHO MEAS - EDV(CUBED): 64 ML
BH CV ECHO MEAS - EDV(MOD-SP2): 33.7 ML
BH CV ECHO MEAS - EDV(MOD-SP4): 41.4 ML
BH CV ECHO MEAS - EF(MOD-BP): 64.1 %
BH CV ECHO MEAS - EF(MOD-SP2): 60.5 %
BH CV ECHO MEAS - EF(MOD-SP4): 66.4 %
BH CV ECHO MEAS - ESV(CUBED): 12.2 ML
BH CV ECHO MEAS - ESV(MOD-SP2): 13.3 ML
BH CV ECHO MEAS - ESV(MOD-SP4): 13.9 ML
BH CV ECHO MEAS - FS: 42.5 %
BH CV ECHO MEAS - IVS/LVPW: 1 CM
BH CV ECHO MEAS - IVSD: 0.8 CM
BH CV ECHO MEAS - LA DIMENSION: 3.7 CM
BH CV ECHO MEAS - LAT PEAK E' VEL: 9.4 CM/SEC
BH CV ECHO MEAS - LV MASS(C)D: 93.5 GRAMS
BH CV ECHO MEAS - LV MAX PG: 5.1 MMHG
BH CV ECHO MEAS - LV MEAN PG: 3 MMHG
BH CV ECHO MEAS - LV V1 MAX: 113 CM/SEC
BH CV ECHO MEAS - LV V1 VTI: 26.2 CM
BH CV ECHO MEAS - LVIDD: 4 CM
BH CV ECHO MEAS - LVIDS: 2.3 CM
BH CV ECHO MEAS - LVOT AREA: 2.5 CM2
BH CV ECHO MEAS - LVOT DIAM: 1.8 CM
BH CV ECHO MEAS - LVPWD: 0.8 CM
BH CV ECHO MEAS - MED PEAK E' VEL: 5.6 CM/SEC
BH CV ECHO MEAS - MV A MAX VEL: 85.3 CM/SEC
BH CV ECHO MEAS - MV DEC SLOPE: 614 CM/SEC2
BH CV ECHO MEAS - MV DEC TIME: 0.14 MSEC
BH CV ECHO MEAS - MV E MAX VEL: 131 CM/SEC
BH CV ECHO MEAS - MV E/A: 1.54
BH CV ECHO MEAS - MV MAX PG: 10.6 MMHG
BH CV ECHO MEAS - MV MEAN PG: 3 MMHG
BH CV ECHO MEAS - MV P1/2T: 77.3 MSEC
BH CV ECHO MEAS - MV V2 VTI: 32.5 CM
BH CV ECHO MEAS - MVA(P1/2T): 2.8 CM2
BH CV ECHO MEAS - MVA(VTI): 2.05 CM2
BH CV ECHO MEAS - PA ACC TIME: 0.1 SEC
BH CV ECHO MEAS - PA PR(ACCEL): 33.1 MMHG
BH CV ECHO MEAS - PI END-D VEL: 109 CM/SEC
BH CV ECHO MEAS - RAP SYSTOLE: 3 MMHG
BH CV ECHO MEAS - RVSP: 40.9 MMHG
BH CV ECHO MEAS - SV(LVOT): 66.7 ML
BH CV ECHO MEAS - SV(MOD-SP2): 20.4 ML
BH CV ECHO MEAS - SV(MOD-SP4): 27.5 ML
BH CV ECHO MEAS - TAPSE (>1.6): 2.7 CM
BH CV ECHO MEAS - TR MAX PG: 37.9 MMHG
BH CV ECHO MEAS - TR MAX VEL: 306 CM/SEC
BH CV ECHO MEASUREMENTS AVERAGE E/E' RATIO: 17.47
BH CV XLRA - RV BASE: 3 CM
BH CV XLRA - RV LENGTH: 5.1 CM
BH CV XLRA - RV MID: 2.3 CM
BH CV XLRA - TDI S': 14 CM/SEC
BUN SERPL-MCNC: 13 MG/DL (ref 8–23)
BUN/CREAT SERPL: 16.5 (ref 7–25)
CALCIUM SPEC-SCNC: 8.9 MG/DL (ref 8.6–10.5)
CHLORIDE SERPL-SCNC: 95 MMOL/L (ref 98–107)
CO2 SERPL-SCNC: 26 MMOL/L (ref 22–29)
CREAT SERPL-MCNC: 0.79 MG/DL (ref 0.57–1)
DEPRECATED RDW RBC AUTO: 44.6 FL (ref 37–54)
EGFRCR SERPLBLD CKD-EPI 2021: 77.2 ML/MIN/1.73
EOSINOPHIL # BLD AUTO: 0.04 10*3/MM3 (ref 0–0.4)
EOSINOPHIL NFR BLD AUTO: 0.4 % (ref 0.3–6.2)
ERYTHROCYTE [DISTWIDTH] IN BLOOD BY AUTOMATED COUNT: 13.5 % (ref 12.3–15.4)
GLUCOSE BLDC GLUCOMTR-MCNC: 148 MG/DL (ref 70–130)
GLUCOSE BLDC GLUCOMTR-MCNC: 248 MG/DL (ref 70–130)
GLUCOSE BLDC GLUCOMTR-MCNC: 257 MG/DL (ref 70–130)
GLUCOSE BLDC GLUCOMTR-MCNC: 317 MG/DL (ref 70–130)
GLUCOSE SERPL-MCNC: 113 MG/DL (ref 65–99)
HBA1C MFR BLD: 7.6 % (ref 4.8–5.6)
HCT VFR BLD AUTO: 31.6 % (ref 34–46.6)
HGB BLD-MCNC: 10.5 G/DL (ref 12–15.9)
IMM GRANULOCYTES # BLD AUTO: 0.04 10*3/MM3 (ref 0–0.05)
IMM GRANULOCYTES NFR BLD AUTO: 0.4 % (ref 0–0.5)
LEFT ATRIUM VOLUME INDEX: 41 ML/M2
LYMPHOCYTES # BLD AUTO: 0.98 10*3/MM3 (ref 0.7–3.1)
LYMPHOCYTES NFR BLD AUTO: 9.5 % (ref 19.6–45.3)
MAGNESIUM SERPL-MCNC: 1.7 MG/DL (ref 1.6–2.4)
MAXIMAL PREDICTED HEART RATE: 143 BPM
MCH RBC QN AUTO: 30.2 PG (ref 26.6–33)
MCHC RBC AUTO-ENTMCNC: 33.2 G/DL (ref 31.5–35.7)
MCV RBC AUTO: 90.8 FL (ref 79–97)
MONOCYTES # BLD AUTO: 0.8 10*3/MM3 (ref 0.1–0.9)
MONOCYTES NFR BLD AUTO: 7.8 % (ref 5–12)
NEUTROPHILS NFR BLD AUTO: 8.39 10*3/MM3 (ref 1.7–7)
NEUTROPHILS NFR BLD AUTO: 81.6 % (ref 42.7–76)
NRBC BLD AUTO-RTO: 0 /100 WBC (ref 0–0.2)
PHOSPHATE SERPL-MCNC: 3.7 MG/DL (ref 2.5–4.5)
PLATELET # BLD AUTO: 225 10*3/MM3 (ref 140–450)
PMV BLD AUTO: 11.7 FL (ref 6–12)
POTASSIUM SERPL-SCNC: 4.2 MMOL/L (ref 3.5–5.2)
RBC # BLD AUTO: 3.48 10*6/MM3 (ref 3.77–5.28)
SODIUM SERPL-SCNC: 131 MMOL/L (ref 136–145)
STRESS TARGET HR: 122 BPM
TROPONIN T SERPL HS-MCNC: 14 NG/L
WBC NRBC COR # BLD: 10.28 10*3/MM3 (ref 3.4–10.8)

## 2023-02-25 PROCEDURE — 63710000001 INSULIN LISPRO (HUMAN) PER 5 UNITS: Performed by: NURSE PRACTITIONER

## 2023-02-25 PROCEDURE — 25010000002 ONDANSETRON PER 1 MG: Performed by: INTERNAL MEDICINE

## 2023-02-25 PROCEDURE — A9270 NON-COVERED ITEM OR SERVICE: HCPCS | Performed by: NURSE PRACTITIONER

## 2023-02-25 PROCEDURE — A9270 NON-COVERED ITEM OR SERVICE: HCPCS | Performed by: FAMILY MEDICINE

## 2023-02-25 PROCEDURE — 63710000001 CETIRIZINE 10 MG TABLET: Performed by: FAMILY MEDICINE

## 2023-02-25 PROCEDURE — 63710000001 FLUTICASONE 50 MCG/ACT SUSPENSION 16 G BOTTLE: Performed by: FAMILY MEDICINE

## 2023-02-25 PROCEDURE — 94799 UNLISTED PULMONARY SVC/PX: CPT

## 2023-02-25 PROCEDURE — 83735 ASSAY OF MAGNESIUM: CPT | Performed by: NURSE PRACTITIONER

## 2023-02-25 PROCEDURE — 99232 SBSQ HOSP IP/OBS MODERATE 35: CPT | Performed by: FAMILY MEDICINE

## 2023-02-25 PROCEDURE — 84484 ASSAY OF TROPONIN QUANT: CPT | Performed by: INTERNAL MEDICINE

## 2023-02-25 PROCEDURE — 25510000001 IOPAMIDOL PER 1 ML: Performed by: FAMILY MEDICINE

## 2023-02-25 PROCEDURE — 97166 OT EVAL MOD COMPLEX 45 MIN: CPT

## 2023-02-25 PROCEDURE — 97116 GAIT TRAINING THERAPY: CPT

## 2023-02-25 PROCEDURE — 85025 COMPLETE CBC W/AUTO DIFF WBC: CPT | Performed by: NURSE PRACTITIONER

## 2023-02-25 PROCEDURE — 84100 ASSAY OF PHOSPHORUS: CPT | Performed by: NURSE PRACTITIONER

## 2023-02-25 PROCEDURE — 71275 CT ANGIOGRAPHY CHEST: CPT

## 2023-02-25 PROCEDURE — 63710000001 OXYBUTYNIN XL 5 MG TABLET SUSTAINED-RELEASE 24 HOUR: Performed by: FAMILY MEDICINE

## 2023-02-25 PROCEDURE — 63710000001 AZATHIOPRINE PER 50 MG: Performed by: FAMILY MEDICINE

## 2023-02-25 PROCEDURE — 82962 GLUCOSE BLOOD TEST: CPT

## 2023-02-25 PROCEDURE — 93306 TTE W/DOPPLER COMPLETE: CPT

## 2023-02-25 PROCEDURE — 97162 PT EVAL MOD COMPLEX 30 MIN: CPT

## 2023-02-25 PROCEDURE — 94664 DEMO&/EVAL PT USE INHALER: CPT

## 2023-02-25 PROCEDURE — 63710000001 LEVOTHYROXINE 75 MCG TABLET: Performed by: FAMILY MEDICINE

## 2023-02-25 PROCEDURE — 63710000001 LOSARTAN 50 MG TABLET: Performed by: FAMILY MEDICINE

## 2023-02-25 PROCEDURE — 83036 HEMOGLOBIN GLYCOSYLATED A1C: CPT | Performed by: NURSE PRACTITIONER

## 2023-02-25 PROCEDURE — 97535 SELF CARE MNGMENT TRAINING: CPT

## 2023-02-25 PROCEDURE — 25010000002 LEVOFLOXACIN PER 250 MG: Performed by: FAMILY MEDICINE

## 2023-02-25 PROCEDURE — 97530 THERAPEUTIC ACTIVITIES: CPT

## 2023-02-25 PROCEDURE — 97110 THERAPEUTIC EXERCISES: CPT

## 2023-02-25 PROCEDURE — 93306 TTE W/DOPPLER COMPLETE: CPT | Performed by: INTERNAL MEDICINE

## 2023-02-25 PROCEDURE — 63710000001: Performed by: FAMILY MEDICINE

## 2023-02-25 PROCEDURE — 80048 BASIC METABOLIC PNL TOTAL CA: CPT | Performed by: NURSE PRACTITIONER

## 2023-02-25 PROCEDURE — 63710000001 BUDESONIDE-FORMOTEROL 160-4.5 MCG/ACT AEROSOL 6 G INHALER: Performed by: FAMILY MEDICINE

## 2023-02-25 PROCEDURE — 63710000001 ASPIRIN 81 MG TABLET DELAYED-RELEASE: Performed by: FAMILY MEDICINE

## 2023-02-25 PROCEDURE — 63710000001 AMLODIPINE 10 MG TABLET: Performed by: FAMILY MEDICINE

## 2023-02-25 PROCEDURE — 63710000001 INSULIN DETEMIR PER 5 UNITS: Performed by: NURSE PRACTITIONER

## 2023-02-25 PROCEDURE — 63710000001 DULOXETINE 30 MG CAPSULE DELAYED-RELEASE PARTICLES: Performed by: FAMILY MEDICINE

## 2023-02-25 RX ORDER — CETIRIZINE HYDROCHLORIDE 10 MG/1
10 TABLET ORAL DAILY
Status: DISCONTINUED | OUTPATIENT
Start: 2023-02-25 | End: 2023-02-26 | Stop reason: HOSPADM

## 2023-02-25 RX ORDER — PRAVASTATIN SODIUM 20 MG
20 TABLET ORAL EVERY EVENING
Status: DISCONTINUED | OUTPATIENT
Start: 2023-02-25 | End: 2023-02-26 | Stop reason: HOSPADM

## 2023-02-25 RX ORDER — LEVOTHYROXINE SODIUM 0.07 MG/1
75 TABLET ORAL
Status: DISCONTINUED | OUTPATIENT
Start: 2023-02-25 | End: 2023-02-26 | Stop reason: HOSPADM

## 2023-02-25 RX ORDER — NEBIVOLOL 20 MG/1
20 TABLET ORAL DAILY
Status: DISCONTINUED | OUTPATIENT
Start: 2023-02-25 | End: 2023-02-26 | Stop reason: HOSPADM

## 2023-02-25 RX ORDER — LEVOFLOXACIN 5 MG/ML
500 INJECTION, SOLUTION INTRAVENOUS EVERY 24 HOURS
Status: DISCONTINUED | OUTPATIENT
Start: 2023-02-25 | End: 2023-02-26 | Stop reason: HOSPADM

## 2023-02-25 RX ORDER — DULOXETIN HYDROCHLORIDE 30 MG/1
30 CAPSULE, DELAYED RELEASE ORAL DAILY
Status: DISCONTINUED | OUTPATIENT
Start: 2023-02-25 | End: 2023-02-26 | Stop reason: HOSPADM

## 2023-02-25 RX ORDER — FLUTICASONE PROPIONATE 50 MCG
2 SPRAY, SUSPENSION (ML) NASAL DAILY
Status: DISCONTINUED | OUTPATIENT
Start: 2023-02-25 | End: 2023-02-26 | Stop reason: HOSPADM

## 2023-02-25 RX ORDER — TERAZOSIN 2 MG/1
2 CAPSULE ORAL NIGHTLY
Status: DISCONTINUED | OUTPATIENT
Start: 2023-02-25 | End: 2023-02-26 | Stop reason: HOSPADM

## 2023-02-25 RX ORDER — BUDESONIDE AND FORMOTEROL FUMARATE DIHYDRATE 80; 4.5 UG/1; UG/1
2 AEROSOL RESPIRATORY (INHALATION)
Status: DISCONTINUED | OUTPATIENT
Start: 2023-02-25 | End: 2023-02-25 | Stop reason: ALTCHOICE

## 2023-02-25 RX ORDER — AZELASTINE 1 MG/ML
1 SPRAY, METERED NASAL DAILY PRN
Status: DISCONTINUED | OUTPATIENT
Start: 2023-02-25 | End: 2023-02-26 | Stop reason: HOSPADM

## 2023-02-25 RX ORDER — CLONIDINE HYDROCHLORIDE 0.1 MG/1
0.1 TABLET ORAL 3 TIMES DAILY PRN
Status: DISCONTINUED | OUTPATIENT
Start: 2023-02-25 | End: 2023-02-26 | Stop reason: HOSPADM

## 2023-02-25 RX ORDER — MONTELUKAST SODIUM 10 MG/1
10 TABLET ORAL NIGHTLY
Status: DISCONTINUED | OUTPATIENT
Start: 2023-02-25 | End: 2023-02-26 | Stop reason: HOSPADM

## 2023-02-25 RX ORDER — ASPIRIN 81 MG/1
81 TABLET ORAL DAILY
Status: DISCONTINUED | OUTPATIENT
Start: 2023-02-25 | End: 2023-02-26 | Stop reason: HOSPADM

## 2023-02-25 RX ORDER — BUDESONIDE AND FORMOTEROL FUMARATE DIHYDRATE 160; 4.5 UG/1; UG/1
1 AEROSOL RESPIRATORY (INHALATION)
Status: DISCONTINUED | OUTPATIENT
Start: 2023-02-25 | End: 2023-02-26 | Stop reason: HOSPADM

## 2023-02-25 RX ORDER — LOSARTAN POTASSIUM 50 MG/1
100 TABLET ORAL
Status: DISCONTINUED | OUTPATIENT
Start: 2023-02-25 | End: 2023-02-26 | Stop reason: HOSPADM

## 2023-02-25 RX ORDER — OXYBUTYNIN CHLORIDE 5 MG/1
10 TABLET, EXTENDED RELEASE ORAL DAILY
Status: DISCONTINUED | OUTPATIENT
Start: 2023-02-25 | End: 2023-02-26 | Stop reason: HOSPADM

## 2023-02-25 RX ORDER — AZATHIOPRINE 50 MG/1
100 TABLET ORAL 2 TIMES DAILY
Status: DISCONTINUED | OUTPATIENT
Start: 2023-02-25 | End: 2023-02-26 | Stop reason: HOSPADM

## 2023-02-25 RX ORDER — LEVOFLOXACIN 5 MG/ML
500 INJECTION, SOLUTION INTRAVENOUS EVERY 24 HOURS
Status: DISCONTINUED | OUTPATIENT
Start: 2023-02-25 | End: 2023-02-25

## 2023-02-25 RX ORDER — AMLODIPINE BESYLATE 10 MG/1
10 TABLET ORAL DAILY
Status: DISCONTINUED | OUTPATIENT
Start: 2023-02-25 | End: 2023-02-26 | Stop reason: HOSPADM

## 2023-02-25 RX ORDER — ALBUTEROL SULFATE 2.5 MG/3ML
2.5 SOLUTION RESPIRATORY (INHALATION) EVERY 6 HOURS PRN
Status: DISCONTINUED | OUTPATIENT
Start: 2023-02-25 | End: 2023-02-26 | Stop reason: HOSPADM

## 2023-02-25 RX ADMIN — LOSARTAN POTASSIUM 100 MG: 50 TABLET, FILM COATED ORAL at 11:44

## 2023-02-25 RX ADMIN — IPRATROPIUM BROMIDE AND ALBUTEROL SULFATE 3 ML: 2.5; .5 SOLUTION RESPIRATORY (INHALATION) at 16:26

## 2023-02-25 RX ADMIN — BUDESONIDE AND FORMOTEROL FUMARATE DIHYDRATE 1 PUFF: 160; 4.5 AEROSOL RESPIRATORY (INHALATION) at 19:52

## 2023-02-25 RX ADMIN — ASPIRIN 81 MG: 81 TABLET, COATED ORAL at 11:44

## 2023-02-25 RX ADMIN — INSULIN LISPRO 5 UNITS: 100 INJECTION, SOLUTION INTRAVENOUS; SUBCUTANEOUS at 17:01

## 2023-02-25 RX ADMIN — INSULIN DETEMIR 9 UNITS: 100 INJECTION, SOLUTION SUBCUTANEOUS at 20:07

## 2023-02-25 RX ADMIN — IOPAMIDOL 62 ML: 755 INJECTION, SOLUTION INTRAVENOUS at 15:00

## 2023-02-25 RX ADMIN — NEBIVOLOL 20 MG: 20 TABLET ORAL at 11:44

## 2023-02-25 RX ADMIN — OXYBUTYNIN CHLORIDE 10 MG: 5 TABLET, EXTENDED RELEASE ORAL at 11:44

## 2023-02-25 RX ADMIN — ONDANSETRON 4 MG: 2 INJECTION INTRAMUSCULAR; INTRAVENOUS at 09:25

## 2023-02-25 RX ADMIN — Medication 10 ML: at 08:33

## 2023-02-25 RX ADMIN — FLUTICASONE PROPIONATE 2 SPRAY: 50 SPRAY, METERED NASAL at 11:44

## 2023-02-25 RX ADMIN — TERAZOSIN HYDROCHLORIDE 2 MG: 2 CAPSULE ORAL at 20:07

## 2023-02-25 RX ADMIN — AZATHIOPRINE 100 MG: 50 TABLET ORAL at 11:44

## 2023-02-25 RX ADMIN — INSULIN LISPRO 3 UNITS: 100 INJECTION, SOLUTION INTRAVENOUS; SUBCUTANEOUS at 12:46

## 2023-02-25 RX ADMIN — INSULIN LISPRO 3 UNITS: 100 INJECTION, SOLUTION INTRAVENOUS; SUBCUTANEOUS at 12:44

## 2023-02-25 RX ADMIN — LEVOFLOXACIN 500 MG: 500 INJECTION, SOLUTION INTRAVENOUS at 11:44

## 2023-02-25 RX ADMIN — AZATHIOPRINE 100 MG: 50 TABLET ORAL at 20:11

## 2023-02-25 RX ADMIN — IPRATROPIUM BROMIDE AND ALBUTEROL SULFATE 3 ML: 2.5; .5 SOLUTION RESPIRATORY (INHALATION) at 19:52

## 2023-02-25 RX ADMIN — AMLODIPINE BESYLATE 10 MG: 10 TABLET ORAL at 11:44

## 2023-02-25 RX ADMIN — CETIRIZINE HYDROCHLORIDE 10 MG: 10 TABLET, FILM COATED ORAL at 11:44

## 2023-02-25 RX ADMIN — IPRATROPIUM BROMIDE AND ALBUTEROL SULFATE 3 ML: 2.5; .5 SOLUTION RESPIRATORY (INHALATION) at 07:54

## 2023-02-25 RX ADMIN — SENNOSIDES AND DOCUSATE SODIUM 2 TABLET: 50; 8.6 TABLET ORAL at 20:07

## 2023-02-25 RX ADMIN — BUDESONIDE AND FORMOTEROL FUMARATE DIHYDRATE 1 PUFF: 160; 4.5 AEROSOL RESPIRATORY (INHALATION) at 12:57

## 2023-02-25 RX ADMIN — LEVOTHYROXINE SODIUM 75 MCG: 0.07 TABLET ORAL at 11:44

## 2023-02-25 RX ADMIN — MONTELUKAST 10 MG: 10 TABLET, FILM COATED ORAL at 20:07

## 2023-02-25 RX ADMIN — DULOXETINE HYDROCHLORIDE 30 MG: 30 CAPSULE, DELAYED RELEASE ORAL at 11:44

## 2023-02-25 RX ADMIN — PRAVASTATIN SODIUM 20 MG: 20 TABLET ORAL at 16:59

## 2023-02-25 RX ADMIN — INSULIN LISPRO 3 UNITS: 100 INJECTION, SOLUTION INTRAVENOUS; SUBCUTANEOUS at 17:02

## 2023-02-25 RX ADMIN — IPRATROPIUM BROMIDE AND ALBUTEROL SULFATE 3 ML: 2.5; .5 SOLUTION RESPIRATORY (INHALATION) at 12:55

## 2023-02-26 ENCOUNTER — READMISSION MANAGEMENT (OUTPATIENT)
Dept: CALL CENTER | Facility: HOSPITAL | Age: 78
End: 2023-02-26
Payer: MEDICARE

## 2023-02-26 VITALS
WEIGHT: 135.6 LBS | BODY MASS INDEX: 27.34 KG/M2 | SYSTOLIC BLOOD PRESSURE: 113 MMHG | HEART RATE: 63 BPM | DIASTOLIC BLOOD PRESSURE: 64 MMHG | OXYGEN SATURATION: 99 % | TEMPERATURE: 97.5 F | RESPIRATION RATE: 18 BRPM | HEIGHT: 59 IN

## 2023-02-26 PROBLEM — E86.0 DEHYDRATION: Status: RESOLVED | Noted: 2023-02-24 | Resolved: 2023-02-26

## 2023-02-26 PROBLEM — J96.01 ACUTE RESPIRATORY FAILURE WITH HYPOXIA: Status: RESOLVED | Noted: 2023-02-24 | Resolved: 2023-02-26

## 2023-02-26 PROBLEM — R06.02 SHORTNESS OF BREATH ON EXERTION: Status: RESOLVED | Noted: 2022-10-28 | Resolved: 2023-02-26

## 2023-02-26 LAB
ANION GAP SERPL CALCULATED.3IONS-SCNC: 10 MMOL/L (ref 5–15)
BUN SERPL-MCNC: 14 MG/DL (ref 8–23)
BUN/CREAT SERPL: 17.7 (ref 7–25)
CALCIUM SPEC-SCNC: 8.8 MG/DL (ref 8.6–10.5)
CHLORIDE SERPL-SCNC: 97 MMOL/L (ref 98–107)
CO2 SERPL-SCNC: 25 MMOL/L (ref 22–29)
CREAT SERPL-MCNC: 0.79 MG/DL (ref 0.57–1)
DEPRECATED RDW RBC AUTO: 46.4 FL (ref 37–54)
EGFRCR SERPLBLD CKD-EPI 2021: 77.2 ML/MIN/1.73
ERYTHROCYTE [DISTWIDTH] IN BLOOD BY AUTOMATED COUNT: 13.4 % (ref 12.3–15.4)
GLUCOSE BLDC GLUCOMTR-MCNC: 135 MG/DL (ref 70–130)
GLUCOSE BLDC GLUCOMTR-MCNC: 379 MG/DL (ref 70–130)
GLUCOSE SERPL-MCNC: 135 MG/DL (ref 65–99)
HCT VFR BLD AUTO: 30.7 % (ref 34–46.6)
HGB BLD-MCNC: 10.1 G/DL (ref 12–15.9)
MCH RBC QN AUTO: 30.8 PG (ref 26.6–33)
MCHC RBC AUTO-ENTMCNC: 32.9 G/DL (ref 31.5–35.7)
MCV RBC AUTO: 93.6 FL (ref 79–97)
PLATELET # BLD AUTO: 200 10*3/MM3 (ref 140–450)
PMV BLD AUTO: 11.2 FL (ref 6–12)
POTASSIUM SERPL-SCNC: 3.8 MMOL/L (ref 3.5–5.2)
RBC # BLD AUTO: 3.28 10*6/MM3 (ref 3.77–5.28)
SODIUM SERPL-SCNC: 132 MMOL/L (ref 136–145)
WBC NRBC COR # BLD: 8.71 10*3/MM3 (ref 3.4–10.8)

## 2023-02-26 PROCEDURE — 94761 N-INVAS EAR/PLS OXIMETRY MLT: CPT

## 2023-02-26 PROCEDURE — 94799 UNLISTED PULMONARY SVC/PX: CPT

## 2023-02-26 PROCEDURE — 63710000001 INSULIN LISPRO (HUMAN) PER 5 UNITS: Performed by: NURSE PRACTITIONER

## 2023-02-26 PROCEDURE — 82962 GLUCOSE BLOOD TEST: CPT

## 2023-02-26 PROCEDURE — 80048 BASIC METABOLIC PNL TOTAL CA: CPT | Performed by: FAMILY MEDICINE

## 2023-02-26 PROCEDURE — 85027 COMPLETE CBC AUTOMATED: CPT | Performed by: FAMILY MEDICINE

## 2023-02-26 PROCEDURE — 63710000001 AZATHIOPRINE PER 50 MG: Performed by: FAMILY MEDICINE

## 2023-02-26 PROCEDURE — 99239 HOSP IP/OBS DSCHRG MGMT >30: CPT | Performed by: FAMILY MEDICINE

## 2023-02-26 RX ORDER — BUDESONIDE AND FORMOTEROL FUMARATE DIHYDRATE 160; 4.5 UG/1; UG/1
1 AEROSOL RESPIRATORY (INHALATION)
Start: 2023-02-26

## 2023-02-26 RX ORDER — LEVOFLOXACIN 500 MG/1
500 TABLET, FILM COATED ORAL DAILY
Qty: 3 TABLET | Refills: 0 | Status: SHIPPED | OUTPATIENT
Start: 2023-02-26 | End: 2023-03-09 | Stop reason: HOSPADM

## 2023-02-26 RX ADMIN — INSULIN LISPRO 3 UNITS: 100 INJECTION, SOLUTION INTRAVENOUS; SUBCUTANEOUS at 12:56

## 2023-02-26 RX ADMIN — DULOXETINE HYDROCHLORIDE 30 MG: 30 CAPSULE, DELAYED RELEASE ORAL at 09:00

## 2023-02-26 RX ADMIN — INSULIN LISPRO 6 UNITS: 100 INJECTION, SOLUTION INTRAVENOUS; SUBCUTANEOUS at 12:56

## 2023-02-26 RX ADMIN — AZATHIOPRINE 100 MG: 50 TABLET ORAL at 09:00

## 2023-02-26 RX ADMIN — ASPIRIN 81 MG: 81 TABLET, COATED ORAL at 09:00

## 2023-02-26 RX ADMIN — BUDESONIDE AND FORMOTEROL FUMARATE DIHYDRATE 1 PUFF: 160; 4.5 AEROSOL RESPIRATORY (INHALATION) at 07:02

## 2023-02-26 RX ADMIN — FLUTICASONE PROPIONATE 2 SPRAY: 50 SPRAY, METERED NASAL at 09:01

## 2023-02-26 RX ADMIN — Medication 10 ML: at 09:01

## 2023-02-26 RX ADMIN — CETIRIZINE HYDROCHLORIDE 10 MG: 10 TABLET, FILM COATED ORAL at 09:00

## 2023-02-26 RX ADMIN — LOSARTAN POTASSIUM 100 MG: 50 TABLET, FILM COATED ORAL at 09:00

## 2023-02-26 RX ADMIN — NEBIVOLOL 20 MG: 20 TABLET ORAL at 09:00

## 2023-02-26 RX ADMIN — IPRATROPIUM BROMIDE AND ALBUTEROL SULFATE 3 ML: 2.5; .5 SOLUTION RESPIRATORY (INHALATION) at 11:02

## 2023-02-26 RX ADMIN — OXYBUTYNIN CHLORIDE 10 MG: 5 TABLET, EXTENDED RELEASE ORAL at 09:00

## 2023-02-26 RX ADMIN — SENNOSIDES AND DOCUSATE SODIUM 2 TABLET: 50; 8.6 TABLET ORAL at 09:00

## 2023-02-26 RX ADMIN — IPRATROPIUM BROMIDE AND ALBUTEROL SULFATE 3 ML: 2.5; .5 SOLUTION RESPIRATORY (INHALATION) at 07:02

## 2023-02-26 RX ADMIN — LEVOTHYROXINE SODIUM 75 MCG: 0.07 TABLET ORAL at 05:29

## 2023-02-26 RX ADMIN — INSULIN LISPRO 3 UNITS: 100 INJECTION, SOLUTION INTRAVENOUS; SUBCUTANEOUS at 09:01

## 2023-02-26 RX ADMIN — AMLODIPINE BESYLATE 10 MG: 10 TABLET ORAL at 09:00

## 2023-02-27 ENCOUNTER — TELEPHONE (OUTPATIENT)
Dept: NEUROLOGY | Facility: CLINIC | Age: 78
End: 2023-02-27
Payer: MEDICARE

## 2023-02-27 NOTE — TELEPHONE ENCOUNTER
Caller: Marilyn Kunz    Relationship: Self    Best call back number: [phone]    What medications are you currently taking:   Current Outpatient Medications on File Prior to Visit   Medication Sig Dispense Refill   • albuterol sulfate  (90 Base) MCG/ACT inhaler Inhale 2 puffs.     • amLODIPine (NORVASC) 10 MG tablet Take 1 tablet by mouth Daily. 90 tablet 3   • ammonium lactate (AMLACTIN) 12 % cream (12 %)     • aspirin 81 MG EC tablet Take 1 tablet by mouth Daily. 30 tablet 0   • azaTHIOprine (IMURAN) 50 MG tablet TAKE 2 TABLETS TWICE DAILY 120 tablet 5   • azelastine (ASTELIN) 0.1 % nasal spray 1 spray into the nostril(s) as directed by provider Daily As Needed.     • budesonide-formoterol (SYMBICORT) 160-4.5 MCG/ACT inhaler Inhale 1 puff 2 (Two) Times a Day.     • cloNIDine (CATAPRES) 0.1 MG tablet Take 1 tablet by mouth 3 (Three) Times a Day As Needed for High Blood Pressure (systolic BP greater than 180). 90 tablet 3   • Continuous Blood Gluc  (FreeStyle Marie 2 [place]) device FreeStyle Marie 2 [place]   change q 14 days     • Droplet Pen Needles 32G X 4 MM misc      • DULoxetine (Cymbalta) 30 MG capsule Take 1 capsule by mouth Daily. 90 capsule 5   • fluticasone (FLONASE) 50 MCG/ACT nasal spray 2 sprays by Each Nare route Daily. Shake well before using.     • FreeStyle Precision Ant Test test strip      • insulin aspart (novoLOG FLEXPEN) 100 UNIT/ML solution pen-injector sc pen Inject  under the skin into the appropriate area as directed 3 (Three) Times a Day With Meals. Sliding scale     • irbesartan (Avapro) 300 MG tablet Take 1 tablet by mouth Every Night. 90 tablet 6   • levocetirizine (XYZAL) 5 MG tablet Take 5 mg by mouth Every Evening.     • levoFLOXacin (Levaquin) 500 MG tablet Take 1 tablet by mouth Daily for 3 doses. 3 tablet 0   • levothyroxine (SYNTHROID, LEVOTHROID) 75 MCG tablet Take 75 mcg by mouth Daily.     • montelukast (SINGULAIR) 10 MG tablet Take 10 mg by mouth  Every Night.     • nebivolol (BYSTOLIC) 20 MG tablet Take 1 tablet by mouth Daily. 90 tablet 6   • oxybutynin XL (DITROPAN-XL) 10 MG 24 hr tablet Take 10 mg by mouth Daily.     • pravastatin (Pravachol) 20 MG tablet Take 1 tablet by mouth Every Evening. 90 tablet 3   • Secukinumab (Cosentyx) 150 MG/ML solution prefilled syringe Inject 150 mg under the skin into the appropriate area as directed Every 28 (Twenty-Eight) Days.     • Spacer/Aero-Holding Chambers (OptiChamber Paula-Lg Mask) device USE AS DIRECTED WITH MULTI DOSE INHALER     • terazosin (HYTRIN) 2 MG capsule Take 1 capsule by mouth Every Night. 30 capsule 5   • tobramycin-dexamethasone (TOBRADEX) 0.3-0.1 % ophthalmic suspension INSTILL 1 DROP IN LEFT EYE 4 TIMES DAILY     • Tresiba FlexTouch 100 UNIT/ML solution pen-injector injection Inject 16-20 Units under the skin into the appropriate area as directed Every Night.     • triamcinolone (KENALOG) 0.1 % cream APPLY A THIN LAYER TO THE AFFECTED AREA TWICE DAILY FOR 1 WEEK THEN ONCE DAILY FOR 1 WEEK     • [DISCONTINUED] DULoxetine (CYMBALTA) 20 MG capsule Take 1 capsule by mouth Daily. 30 capsule 2     Current Facility-Administered Medications on File Prior to Visit   Medication Dose Route Frequency Provider Last Rate Last Admin   • [DISCONTINUED] acetaminophen (TYLENOL) 160 MG/5ML solution 650 mg  650 mg Oral Q4H PRN Sheila Burgess, APRN       • [DISCONTINUED] acetaminophen (TYLENOL) suppository 650 mg  650 mg Rectal Q4H PRN Sheila Burgess, APRN       • [DISCONTINUED] acetaminophen (TYLENOL) tablet 650 mg  650 mg Oral Q4H PRN Sheila Burgess, APRN       • [DISCONTINUED] albuterol (PROVENTIL) nebulizer solution 0.083% 2.5 mg/3mL  2.5 mg Nebulization Q6H PRN Katie Gracia DO       • [DISCONTINUED] amLODIPine (NORVASC) tablet 10 mg  10 mg Oral Daily Katie Gracia DO   10 mg at 02/26/23 0900   • [DISCONTINUED] aspirin EC tablet 81 mg  81 mg Oral Daily Basil  Katie MARQUEZ DO   81 mg at 02/26/23 0900   • [DISCONTINUED] azaTHIOprine (IMURAN) tablet 100 mg  100 mg Oral BID Katie Gracia DO   100 mg at 02/26/23 0900   • [DISCONTINUED] azelastine (ASTELIN) nasal spray 1 spray  1 spray Each Nare Daily PRN Katie Gracia DO       • [DISCONTINUED] bisacodyl (DULCOLAX) EC tablet 5 mg  5 mg Oral Daily PRN Sheila Burgess, APRSARAVANAN       • [DISCONTINUED] bisacodyl (DULCOLAX) suppository 10 mg  10 mg Rectal Daily PRN Sheila Burgess, APRN       • [DISCONTINUED] budesonide-formoterol (SYMBICORT) 160-4.5 MCG/ACT inhaler 1 puff  1 puff Inhalation BID - RT Katie Gracia DO   1 puff at 02/26/23 0702   • [DISCONTINUED] cetirizine (zyrTEC) tablet 10 mg  10 mg Oral Daily Katie Gracia DO   10 mg at 02/26/23 0900   • [DISCONTINUED] cloNIDine (CATAPRES) tablet 0.1 mg  0.1 mg Oral TID PRN Katie Gracia DO       • [DISCONTINUED] dextrose (D50W) (25 g/50 mL) IV injection 25 g  25 g Intravenous Q15 Min PRN Sheila Burgess, APRSARAVANAN       • [DISCONTINUED] dextrose (GLUTOSE) oral gel 15 g  15 g Oral Q15 Min PRN Sheila Burgess, APRN       • [DISCONTINUED] DULoxetine (CYMBALTA) DR capsule 30 mg  30 mg Oral Daily Katie Gracia DO   30 mg at 02/26/23 0900   • [DISCONTINUED] fluticasone (FLONASE) 50 MCG/ACT nasal spray 2 spray  2 spray Each Nare Daily Katie Gracia DO   2 spray at 02/26/23 0901   • [DISCONTINUED] glucagon (GLUCAGEN) injection 1 mg  1 mg Intramuscular Q15 Min PRN Sheila Burgess, APRSARAVANAN       • [DISCONTINUED] insulin detemir (LEVEMIR) injection 9 Units  9 Units Subcutaneous Nightly Sheila Burgess, APRN   9 Units at 02/25/23 2007   • [DISCONTINUED] Insulin Lispro (humaLOG) injection 0-7 Units  0-7 Units Subcutaneous TID AC Sheila Burgess APRN   6 Units at 02/26/23 1256   • [DISCONTINUED] Insulin Lispro (humaLOG) injection 3 Units  3 Units Subcutaneous TID With Meals Sheila Burgess  JAGDISH Hoover   3 Units at 02/26/23 1256   • [DISCONTINUED] ipratropium-albuterol (DUO-NEB) nebulizer solution 3 mL  3 mL Nebulization 4x Daily - RT Sheila Burgess APRN   3 mL at 02/26/23 1102   • [DISCONTINUED] levoFLOXacin (LEVAQUIN) 500 mg/100 mL D5W (premix) (LEVAQUIN) 500 mg  500 mg Intravenous Q24H Katie Gracia,  mL/hr at 02/25/23 1144 500 mg at 02/25/23 1144   • [DISCONTINUED] levothyroxine (SYNTHROID, LEVOTHROID) tablet 75 mcg  75 mcg Oral Q AM Katie Gracia, DO   75 mcg at 02/26/23 0529   • [DISCONTINUED] losartan (COZAAR) tablet 100 mg  100 mg Oral Q24H Katie Gracia, DO   100 mg at 02/26/23 0900   • [DISCONTINUED] montelukast (SINGULAIR) tablet 10 mg  10 mg Oral Nightly Katie Gracia, DO   10 mg at 02/25/23 2007   • [DISCONTINUED] nebivolol (BYSTOLIC) tablet 20 mg  20 mg Oral Daily Katie Gracia, DO   20 mg at 02/26/23 0900   • [DISCONTINUED] ondansetron (ZOFRAN) injection 4 mg  4 mg Intravenous Q6H PRN Jose A Pope III, DO   4 mg at 02/25/23 0925   • [DISCONTINUED] oxybutynin XL (DITROPAN-XL) 24 hr tablet 10 mg  10 mg Oral Daily Katie Gracia, DO   10 mg at 02/26/23 0900   • [DISCONTINUED] polyethylene glycol (MIRALAX) packet 17 g  17 g Oral Daily PRN Sheila Burgess, JAGDISH       • [DISCONTINUED] pravastatin (PRAVACHOL) tablet 20 mg  20 mg Oral Q PM Katie Gracia, DO   20 mg at 02/25/23 1659   • [DISCONTINUED] sennosides-docusate (PERICOLACE) 8.6-50 MG per tablet 2 tablet  2 tablet Oral BID Sheila Burgess APRN   2 tablet at 02/26/23 0900   • [DISCONTINUED] sodium chloride 0.9 % flush 10 mL  10 mL Intravenous PRN Luis Finney MD       • [DISCONTINUED] sodium chloride 0.9 % flush 10 mL  10 mL Intravenous Q12H Sheila Burgess APRN   10 mL at 02/26/23 0901   • [DISCONTINUED] sodium chloride 0.9 % flush 10 mL  10 mL Intravenous PRN Sheila Burgess, JAGDISH       • [DISCONTINUED] sodium chloride 0.9 %  infusion 40 mL  40 mL Intravenous PRN Sheila Burgess APRN       • [DISCONTINUED] terazosin (HYTRIN) capsule 2 mg  2 mg Oral Nightly Naya Gracia, DO   2 mg at 02/25/23 2007          When did you start taking these medications:SHE HAS NOT TAKEN THE MEDICATION YET, IT WAS PRESCRIBED THROUGH THE ER VISIT-DISCHARGED ON  2/24/23    Which medication are you concerned about:  LEVOFLOXACIN (LEVAQUIN) 500 MG     Who prescribed you this medication: NAYA     What are your concerns: PATIENT WAS PRESCRIBED LEVOFLOXACIN (LEVAQUIN) 500 MG TABLET ON RECENT ER VISIT. THE SIDE EFFECTS STATES MEDS CAN EFFECT THE NERVOUS SYSTEM IN ARMS, LEGS, HANDS, & FEETS. PATIENT HAS NERVE ISSUES IN HER HAND, ETC & SHE WANTS TO KNOW IF SHE SHOULD TAKE THIS MEDICATION FOR 3 DAYS AS ADVISED BY ER.    How long have you had these concerns: A FEW DAYS

## 2023-02-27 NOTE — OUTREACH NOTE
Prep Survey    Flowsheet Row Responses   [facility] facility patient discharged from? [facility]   Is LACE score < 7 ? No   Eligibility Readm Mgmt   Discharge diagnosis Acute respiratory failure with hypoxia    Does the patient have one of the following disease processes/diagnoses(primary or secondary)? Other   Does the patient have Home health ordered? No   Is there a DME ordered? No   Prep survey completed? Yes          Grisel ROGESR - Registered Nurse

## 2023-03-01 ENCOUNTER — READMISSION MANAGEMENT (OUTPATIENT)
Dept: CALL CENTER | Facility: HOSPITAL | Age: 78
End: 2023-03-01
Payer: MEDICARE

## 2023-03-01 NOTE — OUTREACH NOTE
Medical Week 1 Survey    Flowsheet Row Responses   [facility] patient discharged from? [facility]   Does the patient have one of the following disease processes/diagnoses(primary or secondary)? Other   Week 1 attempt successful? Yes   Call start time 1529   Call end time 1539   Discharge diagnosis Acute respiratory failure with hypoxia    Meds reviewed with patient/caregiver? Yes   Is the patient having any side effects they believe may be caused by any medication additions or changes? No   Does the patient have all medications ordered at discharge? Yes   Is the patient taking all medications as directed (includes completed medication regime)? Yes   Does the patient have a primary care provider?  Yes   Does the patient have an appointment with their PCP within 7 days of discharge? Greater than 7 days   What is preventing the patient from scheduling follow up appointments within 7 days of discharge? Earlier appointment not available   Nursing Interventions Verified appointment date/time/provider   Has the patient kept scheduled appointments due by today? N/A   Has home health visited the patient within 72 hours of discharge? N/A   Psychosocial issues? No   Did the patient receive a copy of their discharge instructions? No  [has been able to get support to see AVS on [facility]]   Nursing interventions Reviewed instructions with patient, Educated on [employer]   What is the patient's perception of their health status since discharge? Improving   Is the patient/caregiver able to teach back signs and symptoms related to disease process for when to call PCP? Yes   Is the patient/caregiver able to teach back signs and symptoms related to disease process for when to call 911? Yes   Is the patient/caregiver able to teach back the hierarchy of who to call/visit for symptoms/problems? PCP, Specialist, Home health nurse, Urgent Care, ED, 911 Yes   If the patient is a current smoker, are they able to teach back resources for  cessation? Not a smoker   Additional teach back comments states having some SOB when waking up and exertion   Week 1 call completed? Yes          Cristin ROGERS - Registered Nurse

## 2023-03-02 ENCOUNTER — APPOINTMENT (OUTPATIENT)
Dept: GENERAL RADIOLOGY | Facility: HOSPITAL | Age: 78
DRG: 643 | End: 2023-03-02
Payer: MEDICARE

## 2023-03-02 ENCOUNTER — HOSPITAL ENCOUNTER (INPATIENT)
Facility: HOSPITAL | Age: 78
LOS: 6 days | Discharge: NURSING, REHAB OR LONG-TERM CARE | DRG: 643 | End: 2023-03-09
Attending: EMERGENCY MEDICINE | Admitting: INTERNAL MEDICINE
Payer: MEDICARE

## 2023-03-02 ENCOUNTER — READMISSION MANAGEMENT (OUTPATIENT)
Dept: CALL CENTER | Facility: HOSPITAL | Age: 78
End: 2023-03-02
Payer: MEDICARE

## 2023-03-02 ENCOUNTER — APPOINTMENT (OUTPATIENT)
Dept: CT IMAGING | Facility: HOSPITAL | Age: 78
DRG: 643 | End: 2023-03-02
Payer: MEDICARE

## 2023-03-02 DIAGNOSIS — J96.01 ACUTE RESPIRATORY FAILURE WITH HYPOXIA: Primary | ICD-10-CM

## 2023-03-02 DIAGNOSIS — E87.1 HYPONATREMIA: ICD-10-CM

## 2023-03-02 LAB
ALBUMIN SERPL-MCNC: 4.5 G/DL (ref 3.5–5.2)
ALBUMIN/GLOB SERPL: 1.5 G/DL
ALP SERPL-CCNC: 66 U/L (ref 39–117)
ALT SERPL W P-5'-P-CCNC: 16 U/L (ref 1–33)
ANION GAP SERPL CALCULATED.3IONS-SCNC: 14 MMOL/L (ref 5–15)
AST SERPL-CCNC: 24 U/L (ref 1–32)
BASOPHILS # BLD AUTO: 0 10*3/MM3 (ref 0–0.2)
BASOPHILS NFR BLD AUTO: 0 % (ref 0–1.5)
BILIRUB SERPL-MCNC: 1.5 MG/DL (ref 0–1.2)
BUN SERPL-MCNC: 19 MG/DL (ref 8–23)
BUN/CREAT SERPL: 21.6 (ref 7–25)
CALCIUM SPEC-SCNC: 9.4 MG/DL (ref 8.6–10.5)
CHLORIDE SERPL-SCNC: 81 MMOL/L (ref 98–107)
CO2 SERPL-SCNC: 23 MMOL/L (ref 22–29)
CREAT SERPL-MCNC: 0.88 MG/DL (ref 0.57–1)
D-LACTATE SERPL-SCNC: 1.6 MMOL/L (ref 0.5–2)
DEPRECATED RDW RBC AUTO: 41.1 FL (ref 37–54)
EGFRCR SERPLBLD CKD-EPI 2021: 67.8 ML/MIN/1.73
EOSINOPHIL # BLD AUTO: 0.01 10*3/MM3 (ref 0–0.4)
EOSINOPHIL NFR BLD AUTO: 0.1 % (ref 0.3–6.2)
ERYTHROCYTE [DISTWIDTH] IN BLOOD BY AUTOMATED COUNT: 12.8 % (ref 12.3–15.4)
FLUAV RNA RESP QL NAA+PROBE: NOT DETECTED
FLUBV RNA RESP QL NAA+PROBE: NOT DETECTED
GLOBULIN UR ELPH-MCNC: 3.1 GM/DL
GLUCOSE BLDC GLUCOMTR-MCNC: 231 MG/DL (ref 70–130)
GLUCOSE SERPL-MCNC: 252 MG/DL (ref 65–99)
HCT VFR BLD AUTO: 32.6 % (ref 34–46.6)
HGB BLD-MCNC: 10.9 G/DL (ref 12–15.9)
HOLD SPECIMEN: NORMAL
IMM GRANULOCYTES # BLD AUTO: 0.06 10*3/MM3 (ref 0–0.05)
IMM GRANULOCYTES NFR BLD AUTO: 0.6 % (ref 0–0.5)
LYMPHOCYTES # BLD AUTO: 0.78 10*3/MM3 (ref 0.7–3.1)
LYMPHOCYTES NFR BLD AUTO: 8.4 % (ref 19.6–45.3)
MCH RBC QN AUTO: 29.5 PG (ref 26.6–33)
MCHC RBC AUTO-ENTMCNC: 33.4 G/DL (ref 31.5–35.7)
MCV RBC AUTO: 88.3 FL (ref 79–97)
MONOCYTES # BLD AUTO: 0.56 10*3/MM3 (ref 0.1–0.9)
MONOCYTES NFR BLD AUTO: 6 % (ref 5–12)
NEUTROPHILS NFR BLD AUTO: 7.86 10*3/MM3 (ref 1.7–7)
NEUTROPHILS NFR BLD AUTO: 84.9 % (ref 42.7–76)
NRBC BLD AUTO-RTO: 0 /100 WBC (ref 0–0.2)
NT-PROBNP SERPL-MCNC: 6798 PG/ML (ref 0–1800)
PLATELET # BLD AUTO: 301 10*3/MM3 (ref 140–450)
PMV BLD AUTO: 10.6 FL (ref 6–12)
POTASSIUM SERPL-SCNC: 4.4 MMOL/L (ref 3.5–5.2)
PROCALCITONIN SERPL-MCNC: 0.09 NG/ML (ref 0–0.25)
PROT SERPL-MCNC: 7.6 G/DL (ref 6–8.5)
RBC # BLD AUTO: 3.69 10*6/MM3 (ref 3.77–5.28)
SARS-COV-2 RNA RESP QL NAA+PROBE: NOT DETECTED
SODIUM SERPL-SCNC: 118 MMOL/L (ref 136–145)
SODIUM SERPL-SCNC: 118 MMOL/L (ref 136–145)
TROPONIN T SERPL HS-MCNC: 19 NG/L
WBC NRBC COR # BLD: 9.27 10*3/MM3 (ref 3.4–10.8)
WHOLE BLOOD HOLD COAG: NORMAL
WHOLE BLOOD HOLD SPECIMEN: NORMAL

## 2023-03-02 PROCEDURE — 94799 UNLISTED PULMONARY SVC/PX: CPT

## 2023-03-02 PROCEDURE — 85025 COMPLETE CBC W/AUTO DIFF WBC: CPT

## 2023-03-02 PROCEDURE — 25010000002 LEVOFLOXACIN PER 250 MG: Performed by: INTERNAL MEDICINE

## 2023-03-02 PROCEDURE — 25510000001 IOPAMIDOL PER 1 ML: Performed by: INTERNAL MEDICINE

## 2023-03-02 PROCEDURE — 80053 COMPREHEN METABOLIC PANEL: CPT

## 2023-03-02 PROCEDURE — 84484 ASSAY OF TROPONIN QUANT: CPT

## 2023-03-02 PROCEDURE — 99285 EMERGENCY DEPT VISIT HI MDM: CPT

## 2023-03-02 PROCEDURE — 36415 COLL VENOUS BLD VENIPUNCTURE: CPT

## 2023-03-02 PROCEDURE — 93005 ELECTROCARDIOGRAM TRACING: CPT

## 2023-03-02 PROCEDURE — G0378 HOSPITAL OBSERVATION PER HR: HCPCS

## 2023-03-02 PROCEDURE — 82962 GLUCOSE BLOOD TEST: CPT

## 2023-03-02 PROCEDURE — 84145 PROCALCITONIN (PCT): CPT | Performed by: NURSE PRACTITIONER

## 2023-03-02 PROCEDURE — 84295 ASSAY OF SERUM SODIUM: CPT | Performed by: INTERNAL MEDICINE

## 2023-03-02 PROCEDURE — 94640 AIRWAY INHALATION TREATMENT: CPT

## 2023-03-02 PROCEDURE — 87636 SARSCOV2 & INF A&B AMP PRB: CPT | Performed by: NURSE PRACTITIONER

## 2023-03-02 PROCEDURE — 99223 1ST HOSP IP/OBS HIGH 75: CPT | Performed by: INTERNAL MEDICINE

## 2023-03-02 PROCEDURE — 71045 X-RAY EXAM CHEST 1 VIEW: CPT

## 2023-03-02 PROCEDURE — 63710000001 INSULIN LISPRO (HUMAN) PER 5 UNITS: Performed by: INTERNAL MEDICINE

## 2023-03-02 PROCEDURE — 63710000001 INSULIN DETEMIR PER 5 UNITS: Performed by: INTERNAL MEDICINE

## 2023-03-02 PROCEDURE — 87040 BLOOD CULTURE FOR BACTERIA: CPT | Performed by: NURSE PRACTITIONER

## 2023-03-02 PROCEDURE — 83605 ASSAY OF LACTIC ACID: CPT | Performed by: NURSE PRACTITIONER

## 2023-03-02 PROCEDURE — 71275 CT ANGIOGRAPHY CHEST: CPT

## 2023-03-02 PROCEDURE — 83880 ASSAY OF NATRIURETIC PEPTIDE: CPT

## 2023-03-02 RX ORDER — SODIUM CHLORIDE 0.9 % (FLUSH) 0.9 %
10 SYRINGE (ML) INJECTION AS NEEDED
Status: DISCONTINUED | OUTPATIENT
Start: 2023-03-02 | End: 2023-03-09 | Stop reason: HOSPADM

## 2023-03-02 RX ORDER — BUDESONIDE AND FORMOTEROL FUMARATE DIHYDRATE 160; 4.5 UG/1; UG/1
1 AEROSOL RESPIRATORY (INHALATION)
Status: DISCONTINUED | OUTPATIENT
Start: 2023-03-02 | End: 2023-03-09 | Stop reason: HOSPADM

## 2023-03-02 RX ORDER — MONTELUKAST SODIUM 10 MG/1
10 TABLET ORAL NIGHTLY
Status: DISCONTINUED | OUTPATIENT
Start: 2023-03-02 | End: 2023-03-09 | Stop reason: HOSPADM

## 2023-03-02 RX ORDER — IBUPROFEN 600 MG/1
1 TABLET ORAL
Status: DISCONTINUED | OUTPATIENT
Start: 2023-03-02 | End: 2023-03-09 | Stop reason: HOSPADM

## 2023-03-02 RX ORDER — FLUTICASONE PROPIONATE 50 MCG
2 SPRAY, SUSPENSION (ML) NASAL DAILY
Status: DISCONTINUED | OUTPATIENT
Start: 2023-03-03 | End: 2023-03-09 | Stop reason: HOSPADM

## 2023-03-02 RX ORDER — NEBIVOLOL 5 MG/1
20 TABLET ORAL DAILY
Status: DISCONTINUED | OUTPATIENT
Start: 2023-03-03 | End: 2023-03-09 | Stop reason: HOSPADM

## 2023-03-02 RX ORDER — LEVOFLOXACIN 5 MG/ML
750 INJECTION, SOLUTION INTRAVENOUS
Status: DISCONTINUED | OUTPATIENT
Start: 2023-03-02 | End: 2023-03-03

## 2023-03-02 RX ORDER — ALBUTEROL SULFATE 90 UG/1
2 AEROSOL, METERED RESPIRATORY (INHALATION) EVERY 4 HOURS PRN
Status: DISCONTINUED | OUTPATIENT
Start: 2023-03-02 | End: 2023-03-09 | Stop reason: HOSPADM

## 2023-03-02 RX ORDER — DEXTROSE MONOHYDRATE 25 G/50ML
25 INJECTION, SOLUTION INTRAVENOUS
Status: DISCONTINUED | OUTPATIENT
Start: 2023-03-02 | End: 2023-03-09 | Stop reason: HOSPADM

## 2023-03-02 RX ORDER — CETIRIZINE HYDROCHLORIDE 10 MG/1
5 TABLET ORAL DAILY
Status: DISCONTINUED | OUTPATIENT
Start: 2023-03-03 | End: 2023-03-09 | Stop reason: HOSPADM

## 2023-03-02 RX ORDER — IPRATROPIUM BROMIDE AND ALBUTEROL SULFATE 2.5; .5 MG/3ML; MG/3ML
3 SOLUTION RESPIRATORY (INHALATION)
Status: DISCONTINUED | OUTPATIENT
Start: 2023-03-02 | End: 2023-03-09 | Stop reason: HOSPADM

## 2023-03-02 RX ORDER — TOBRAMYCIN AND DEXAMETHASONE 3; 1 MG/ML; MG/ML
1 SUSPENSION/ DROPS OPHTHALMIC
Status: DISCONTINUED | OUTPATIENT
Start: 2023-03-02 | End: 2023-03-09

## 2023-03-02 RX ORDER — SODIUM CHLORIDE 9 MG/ML
100 INJECTION, SOLUTION INTRAVENOUS CONTINUOUS
Status: DISCONTINUED | OUTPATIENT
Start: 2023-03-02 | End: 2023-03-02

## 2023-03-02 RX ORDER — PRAVASTATIN SODIUM 20 MG
20 TABLET ORAL EVERY EVENING
Status: DISCONTINUED | OUTPATIENT
Start: 2023-03-02 | End: 2023-03-09 | Stop reason: HOSPADM

## 2023-03-02 RX ORDER — INSULIN LISPRO 100 [IU]/ML
0-7 INJECTION, SOLUTION INTRAVENOUS; SUBCUTANEOUS
Status: DISCONTINUED | OUTPATIENT
Start: 2023-03-02 | End: 2023-03-09 | Stop reason: HOSPADM

## 2023-03-02 RX ORDER — NICOTINE POLACRILEX 4 MG
15 LOZENGE BUCCAL
Status: DISCONTINUED | OUTPATIENT
Start: 2023-03-02 | End: 2023-03-09 | Stop reason: HOSPADM

## 2023-03-02 RX ORDER — ASPIRIN 81 MG/1
81 TABLET ORAL DAILY
Status: DISCONTINUED | OUTPATIENT
Start: 2023-03-03 | End: 2023-03-09 | Stop reason: HOSPADM

## 2023-03-02 RX ORDER — AMLODIPINE BESYLATE 10 MG/1
10 TABLET ORAL DAILY
Status: DISCONTINUED | OUTPATIENT
Start: 2023-03-03 | End: 2023-03-09 | Stop reason: HOSPADM

## 2023-03-02 RX ORDER — DULOXETIN HYDROCHLORIDE 30 MG/1
30 CAPSULE, DELAYED RELEASE ORAL DAILY
Status: DISCONTINUED | OUTPATIENT
Start: 2023-03-03 | End: 2023-03-05

## 2023-03-02 RX ORDER — SODIUM CHLORIDE 9 MG/ML
50 INJECTION, SOLUTION INTRAVENOUS CONTINUOUS
Status: DISCONTINUED | OUTPATIENT
Start: 2023-03-02 | End: 2023-03-04

## 2023-03-02 RX ORDER — TERAZOSIN 2 MG/1
2 CAPSULE ORAL NIGHTLY
Status: DISCONTINUED | OUTPATIENT
Start: 2023-03-02 | End: 2023-03-09 | Stop reason: HOSPADM

## 2023-03-02 RX ORDER — LEVOTHYROXINE SODIUM 0.07 MG/1
75 TABLET ORAL
Status: DISCONTINUED | OUTPATIENT
Start: 2023-03-03 | End: 2023-03-09 | Stop reason: HOSPADM

## 2023-03-02 RX ORDER — OXYBUTYNIN CHLORIDE 5 MG/1
10 TABLET, EXTENDED RELEASE ORAL DAILY
Status: DISCONTINUED | OUTPATIENT
Start: 2023-03-03 | End: 2023-03-09 | Stop reason: HOSPADM

## 2023-03-02 RX ADMIN — BUDESONIDE AND FORMOTEROL FUMARATE DIHYDRATE 1 PUFF: 160; 4.5 AEROSOL RESPIRATORY (INHALATION) at 21:03

## 2023-03-02 RX ADMIN — INSULIN DETEMIR 10 UNITS: 100 INJECTION, SOLUTION SUBCUTANEOUS at 21:28

## 2023-03-02 RX ADMIN — IPRATROPIUM BROMIDE AND ALBUTEROL SULFATE 3 ML: 2.5; .5 SOLUTION RESPIRATORY (INHALATION) at 21:03

## 2023-03-02 RX ADMIN — SODIUM CHLORIDE 25 ML/HR: 9 INJECTION, SOLUTION INTRAVENOUS at 22:11

## 2023-03-02 RX ADMIN — PRAVASTATIN SODIUM 20 MG: 20 TABLET ORAL at 21:27

## 2023-03-02 RX ADMIN — INSULIN LISPRO 3 UNITS: 100 INJECTION, SOLUTION INTRAVENOUS; SUBCUTANEOUS at 21:28

## 2023-03-02 RX ADMIN — IOPAMIDOL 79 ML: 755 INJECTION, SOLUTION INTRAVENOUS at 21:47

## 2023-03-02 RX ADMIN — TERAZOSIN HYDROCHLORIDE 2 MG: 2 CAPSULE ORAL at 21:27

## 2023-03-02 RX ADMIN — SODIUM CHLORIDE 2 G: 900 INJECTION INTRAVENOUS at 22:10

## 2023-03-02 RX ADMIN — LEVOFLOXACIN 750 MG: 750 INJECTION, SOLUTION INTRAVENOUS at 22:53

## 2023-03-02 RX ADMIN — MONTELUKAST 10 MG: 10 TABLET, FILM COATED ORAL at 21:27

## 2023-03-02 NOTE — ED PROVIDER NOTES
Subjective   History of Present Illness  Pleasant lady who presents to the ER with difficulty breathing.  Recently admitted to our hospital for similar.  She tells me she been at home for last several days getting worse.  As recommended by an outside provider come back to the hospital for further evaluation.  She was satting in the 80% range on room air.  Currently on 2 to 3 L nasal cannula.  She tells me she does live alone she has difficulty getting around related to her exertional difficulty breathing.  Recent CAT scan was negative for PE but did show early interstitial pulmonary edema.        Review of Systems   Constitutional: Negative for chills, diaphoresis and fever.   HENT: Negative for congestion and sore throat.    Respiratory: Positive for cough, shortness of breath and wheezing. Negative for choking and chest tightness.    Cardiovascular: Negative for chest pain and leg swelling.   Gastrointestinal: Negative for abdominal distention, abdominal pain, anal bleeding, blood in stool, constipation, diarrhea, nausea and vomiting.   Genitourinary: Negative for difficulty urinating, dysuria, flank pain, frequency, hematuria and urgency.   All other systems reviewed and are negative.      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 shoulders, back   • Pill esophagitis 2/6/2023   • Psoriatic arthritis (HCC)     hands   • Sleep apnea Always    diabetic do not sleep well up and down   • Stroke (HCC) had one don't know when    showed on MRI Brain   • Type 1 diabetes (HCC)        Allergies   Allergen Reactions   • Atorvastatin Other (See Comments)   • Colesevelam Other (See Comments)   • Cephalexin Rash   • Hygroton [Chlorthalidone] Rash     Facial rash   • Penicillins Nausea And Vomiting and Rash       Past Surgical History:   Procedure Laterality Date   • CARDIAC CATHETERIZATION  1952 2 of them    Patent Dictus operation   • CATARACT EXTRACTION Bilateral    • ENDOSCOPY N/A 10/20/2020    Procedure: ESOPHAGOGASTRODUODENOSCOPY;  Surgeon: Brunner, Mark I, MD;  Location: [facility] ENDOSCOPY;  Service: Gastroenterology;  Laterality: N/A;   • HAND SURGERY Left 1987    no hardware   • PATENT DUCTUS ARTERIOUS LIGATION         Family History   Problem Relation Age of Onset   • Cancer Mother    • Pancreatic cancer Mother    • Stroke Mother         Mini strokes   • Cancer Father    • Lung cancer Father    • Cancer Sister    • Colon cancer Sister    • Diabetes Sister    • Breast cancer Neg Hx    • Ovarian cancer Neg Hx        Social History     Socioeconomic History   • Marital status: Single   Tobacco Use   • Smoking status: Never   • Smokeless tobacco: Never   Vaping Use   • Vaping Use: Never used   Substance and Sexual Activity   • Alcohol use: No   • Drug use: No   • Sexual activity: Not Currently     Partners: Male     Birth control/protection: Abstinence           Objective   Physical Exam  Constitutional:       Appearance: She is well-developed. She is ill-appearing.   HENT:      Head: Normocephalic and atraumatic.      Right Ear: External ear normal.      Left Ear: External ear normal.      Nose: Nose normal.   Eyes:      Conjunctiva/sclera:  Conjunctivae normal.      Pupils: Pupils are equal, round, and reactive to light.   Cardiovascular:      Rate and Rhythm: Normal rate and regular rhythm.      Heart sounds: Normal heart sounds.   Pulmonary:      Effort: Pulmonary effort is normal.      Breath sounds: Decreased breath sounds and wheezing present.   Abdominal:      General: Bowel sounds are normal.      Palpations: Abdomen is soft.   Musculoskeletal:         General: Normal range of motion.      Cervical back: Normal range of motion and neck supple.   Skin:     General: Skin is warm and dry.   Neurological:      Mental Status: She is alert and oriented to person, place, and time.   Psychiatric:         Behavior: Behavior normal.         Thought Content: Thought content normal.         Judgment: Judgment normal.         Critical Care  Performed by: Matthew Luque APRN  Authorized by: Cammy Page MD     Critical care provider statement:     Critical care time (minutes):  35    Critical care time was exclusive of:  Separately billable procedures and treating other patients    Critical care was necessary to treat or prevent imminent or life-threatening deterioration of the following conditions:  Respiratory failure    Critical care was time spent personally by me on the following activities:  Ordering and performing treatments and interventions, ordering and review of laboratory studies, ordering and review of radiographic studies, pulse oximetry, re-evaluation of patient's condition, review of old charts, obtaining history from patient or surrogate, examination of patient, evaluation of patient's response to treatment, discussions with consultants and development of treatment plan with patient or surrogate               ED Course  ED Course as of 03/02/23 1700   Thu Mar 02, 2023   1657 Possible pneumonia on the chest x-ray.  We will get a CTA for further evaluation.  Patient's sodium is 118.  I have started her on a very low sodium rate normal  saline.  I have discussed the case with the hospitalist via secure chat who will text me back.  Patient will need to be admitted to the hospital related to her low sodium her difficulty breathing as well as her hypoxia. [JM]      ED Course User Index  [JM] Matthew Luque APRN                                           Medical Decision Making  Acute respiratory failure with hypoxia (HCC): complicated acute illness or injury  Hyponatremia: complicated acute illness or injury  Amount and/or Complexity of Data Reviewed  External Data Reviewed: labs, radiology, ECG and notes.  Labs: ordered. Decision-making details documented in ED Course.  Radiology: ordered. Decision-making details documented in ED Course.  ECG/medicine tests:  Decision-making details documented in ED Course.      Risk  Prescription drug management.      Critical Care  Total time providing critical care: 35 minutes      Final diagnoses:   Acute respiratory failure with hypoxia (HCC)   Hyponatremia       ED Disposition  ED Disposition     ED Disposition   Decision to Admit    Condition   --    Comment   --             No follow-up provider specified.       Medication List      ASK your doctor about these medications    levoFLOXacin 500 MG tablet  Commonly known as: Levaquin  Take 1 tablet by mouth Daily for 3 doses.  Ask about: Should I take this medication?             Matthew Luque APRN  03/02/23 1700

## 2023-03-03 LAB
ANION GAP SERPL CALCULATED.3IONS-SCNC: 11 MMOL/L (ref 5–15)
ANION GAP SERPL CALCULATED.3IONS-SCNC: 12 MMOL/L (ref 5–15)
ANION GAP SERPL CALCULATED.3IONS-SCNC: 12 MMOL/L (ref 5–15)
BUN SERPL-MCNC: 14 MG/DL (ref 8–23)
BUN SERPL-MCNC: 16 MG/DL (ref 8–23)
BUN SERPL-MCNC: 19 MG/DL (ref 8–23)
BUN/CREAT SERPL: 14.7 (ref 7–25)
BUN/CREAT SERPL: 17.8 (ref 7–25)
BUN/CREAT SERPL: 20.7 (ref 7–25)
CA-I SERPL ISE-MCNC: 1.2 MMOL/L (ref 1.12–1.32)
CALCIUM SPEC-SCNC: 8.4 MG/DL (ref 8.6–10.5)
CALCIUM SPEC-SCNC: 8.7 MG/DL (ref 8.6–10.5)
CALCIUM SPEC-SCNC: 8.8 MG/DL (ref 8.6–10.5)
CHLORIDE SERPL-SCNC: 82 MMOL/L (ref 98–107)
CHLORIDE SERPL-SCNC: 85 MMOL/L (ref 98–107)
CHLORIDE SERPL-SCNC: 87 MMOL/L (ref 98–107)
CO2 SERPL-SCNC: 23 MMOL/L (ref 22–29)
CREAT SERPL-MCNC: 0.9 MG/DL (ref 0.57–1)
CREAT SERPL-MCNC: 0.92 MG/DL (ref 0.57–1)
CREAT SERPL-MCNC: 0.95 MG/DL (ref 0.57–1)
CREAT UR-MCNC: 56.4 MG/DL
DEPRECATED RDW RBC AUTO: 42.1 FL (ref 37–54)
EGFRCR SERPLBLD CKD-EPI 2021: 61.8 ML/MIN/1.73
EGFRCR SERPLBLD CKD-EPI 2021: 64.3 ML/MIN/1.73
EGFRCR SERPLBLD CKD-EPI 2021: 66 ML/MIN/1.73
ERYTHROCYTE [DISTWIDTH] IN BLOOD BY AUTOMATED COUNT: 13.1 % (ref 12.3–15.4)
GLUCOSE BLDC GLUCOMTR-MCNC: 130 MG/DL (ref 70–130)
GLUCOSE BLDC GLUCOMTR-MCNC: 148 MG/DL (ref 70–130)
GLUCOSE BLDC GLUCOMTR-MCNC: 189 MG/DL (ref 70–130)
GLUCOSE BLDC GLUCOMTR-MCNC: 277 MG/DL (ref 70–130)
GLUCOSE SERPL-MCNC: 120 MG/DL (ref 65–99)
GLUCOSE SERPL-MCNC: 199 MG/DL (ref 65–99)
GLUCOSE SERPL-MCNC: 239 MG/DL (ref 65–99)
HCT VFR BLD AUTO: 27.7 % (ref 34–46.6)
HGB BLD-MCNC: 9.4 G/DL (ref 12–15.9)
MAGNESIUM SERPL-MCNC: 1.5 MG/DL (ref 1.6–2.4)
MCH RBC QN AUTO: 30 PG (ref 26.6–33)
MCHC RBC AUTO-ENTMCNC: 33.9 G/DL (ref 31.5–35.7)
MCV RBC AUTO: 88.5 FL (ref 79–97)
NT-PROBNP SERPL-MCNC: 5883 PG/ML (ref 0–1800)
OSMOLALITY SERPL: 263 MOSM/KG (ref 275–295)
OSMOLALITY UR: 351 MOSM/KG (ref 300–1100)
PHOSPHATE SERPL-MCNC: 3.2 MG/DL (ref 2.5–4.5)
PLATELET # BLD AUTO: 267 10*3/MM3 (ref 140–450)
PMV BLD AUTO: 10.8 FL (ref 6–12)
POTASSIUM SERPL-SCNC: 3.9 MMOL/L (ref 3.5–5.2)
POTASSIUM SERPL-SCNC: 4.4 MMOL/L (ref 3.5–5.2)
POTASSIUM SERPL-SCNC: 4.5 MMOL/L (ref 3.5–5.2)
PROT ?TM UR-MCNC: 5.6 MG/DL
RBC # BLD AUTO: 3.13 10*6/MM3 (ref 3.77–5.28)
SODIUM SERPL-SCNC: 117 MMOL/L (ref 136–145)
SODIUM SERPL-SCNC: 119 MMOL/L (ref 136–145)
SODIUM SERPL-SCNC: 122 MMOL/L (ref 136–145)
SODIUM UR-SCNC: 41 MMOL/L
WBC NRBC COR # BLD: 8.43 10*3/MM3 (ref 3.4–10.8)

## 2023-03-03 PROCEDURE — 82962 GLUCOSE BLOOD TEST: CPT

## 2023-03-03 PROCEDURE — 83930 ASSAY OF BLOOD OSMOLALITY: CPT | Performed by: INTERNAL MEDICINE

## 2023-03-03 PROCEDURE — 83935 ASSAY OF URINE OSMOLALITY: CPT | Performed by: INTERNAL MEDICINE

## 2023-03-03 PROCEDURE — 80048 BASIC METABOLIC PNL TOTAL CA: CPT | Performed by: INTERNAL MEDICINE

## 2023-03-03 PROCEDURE — 94761 N-INVAS EAR/PLS OXIMETRY MLT: CPT

## 2023-03-03 PROCEDURE — 82570 ASSAY OF URINE CREATININE: CPT | Performed by: INTERNAL MEDICINE

## 2023-03-03 PROCEDURE — 63710000001 INSULIN LISPRO (HUMAN) PER 5 UNITS: Performed by: INTERNAL MEDICINE

## 2023-03-03 PROCEDURE — 85027 COMPLETE CBC AUTOMATED: CPT | Performed by: INTERNAL MEDICINE

## 2023-03-03 PROCEDURE — 25010000002 CEFTRIAXONE PER 250 MG: Performed by: FAMILY MEDICINE

## 2023-03-03 PROCEDURE — 25010000002 ENOXAPARIN PER 10 MG: Performed by: NURSE PRACTITIONER

## 2023-03-03 PROCEDURE — 83880 ASSAY OF NATRIURETIC PEPTIDE: CPT | Performed by: FAMILY MEDICINE

## 2023-03-03 PROCEDURE — 84100 ASSAY OF PHOSPHORUS: CPT | Performed by: INTERNAL MEDICINE

## 2023-03-03 PROCEDURE — 84300 ASSAY OF URINE SODIUM: CPT | Performed by: INTERNAL MEDICINE

## 2023-03-03 PROCEDURE — 82330 ASSAY OF CALCIUM: CPT | Performed by: INTERNAL MEDICINE

## 2023-03-03 PROCEDURE — 94799 UNLISTED PULMONARY SVC/PX: CPT

## 2023-03-03 PROCEDURE — 63710000001 INSULIN DETEMIR PER 5 UNITS: Performed by: INTERNAL MEDICINE

## 2023-03-03 PROCEDURE — 25010000002 AZITHROMYCIN PER 500 MG: Performed by: FAMILY MEDICINE

## 2023-03-03 PROCEDURE — 83735 ASSAY OF MAGNESIUM: CPT | Performed by: INTERNAL MEDICINE

## 2023-03-03 PROCEDURE — 84156 ASSAY OF PROTEIN URINE: CPT | Performed by: INTERNAL MEDICINE

## 2023-03-03 RX ORDER — ENOXAPARIN SODIUM 100 MG/ML
40 INJECTION SUBCUTANEOUS EVERY 24 HOURS
Status: DISCONTINUED | OUTPATIENT
Start: 2023-03-03 | End: 2023-03-09 | Stop reason: HOSPADM

## 2023-03-03 RX ADMIN — FLUTICASONE PROPIONATE 2 SPRAY: 50 SPRAY, METERED NASAL at 09:44

## 2023-03-03 RX ADMIN — MONTELUKAST 10 MG: 10 TABLET, FILM COATED ORAL at 22:06

## 2023-03-03 RX ADMIN — TOBRAMYCIN AND DEXAMETHASONE 1 DROP: 3; 1 SUSPENSION/ DROPS OPHTHALMIC at 05:47

## 2023-03-03 RX ADMIN — SODIUM CHLORIDE 50 ML/HR: 9 INJECTION, SOLUTION INTRAVENOUS at 14:35

## 2023-03-03 RX ADMIN — TERAZOSIN HYDROCHLORIDE 2 MG: 2 CAPSULE ORAL at 22:06

## 2023-03-03 RX ADMIN — LEVOTHYROXINE SODIUM 75 MCG: 0.07 TABLET ORAL at 05:47

## 2023-03-03 RX ADMIN — CETIRIZINE HYDROCHLORIDE 5 MG: 10 TABLET, FILM COATED ORAL at 09:45

## 2023-03-03 RX ADMIN — TOBRAMYCIN AND DEXAMETHASONE 1 DROP: 3; 1 SUSPENSION/ DROPS OPHTHALMIC at 14:53

## 2023-03-03 RX ADMIN — BUDESONIDE AND FORMOTEROL FUMARATE DIHYDRATE 1 PUFF: 160; 4.5 AEROSOL RESPIRATORY (INHALATION) at 20:07

## 2023-03-03 RX ADMIN — CEFTRIAXONE 2 G: 2 INJECTION, POWDER, FOR SOLUTION INTRAMUSCULAR; INTRAVENOUS at 16:18

## 2023-03-03 RX ADMIN — AZITHROMYCIN 500 MG: 500 INJECTION, POWDER, LYOPHILIZED, FOR SOLUTION INTRAVENOUS at 14:53

## 2023-03-03 RX ADMIN — NEBIVOLOL 20 MG: 20 TABLET ORAL at 09:52

## 2023-03-03 RX ADMIN — IPRATROPIUM BROMIDE AND ALBUTEROL SULFATE 3 ML: 2.5; .5 SOLUTION RESPIRATORY (INHALATION) at 20:07

## 2023-03-03 RX ADMIN — IPRATROPIUM BROMIDE AND ALBUTEROL SULFATE 3 ML: 2.5; .5 SOLUTION RESPIRATORY (INHALATION) at 17:03

## 2023-03-03 RX ADMIN — Medication 10 ML: at 22:06

## 2023-03-03 RX ADMIN — OXYBUTYNIN CHLORIDE 10 MG: 5 TABLET, EXTENDED RELEASE ORAL at 09:45

## 2023-03-03 RX ADMIN — IPRATROPIUM BROMIDE AND ALBUTEROL SULFATE 3 ML: 2.5; .5 SOLUTION RESPIRATORY (INHALATION) at 08:13

## 2023-03-03 RX ADMIN — TOBRAMYCIN AND DEXAMETHASONE 1 DROP: 3; 1 SUSPENSION/ DROPS OPHTHALMIC at 09:46

## 2023-03-03 RX ADMIN — DULOXETINE HYDROCHLORIDE 30 MG: 30 CAPSULE, DELAYED RELEASE ORAL at 09:45

## 2023-03-03 RX ADMIN — TOBRAMYCIN AND DEXAMETHASONE 1 DROP: 3; 1 SUSPENSION/ DROPS OPHTHALMIC at 17:44

## 2023-03-03 RX ADMIN — PRAVASTATIN SODIUM 20 MG: 20 TABLET ORAL at 17:44

## 2023-03-03 RX ADMIN — BUDESONIDE AND FORMOTEROL FUMARATE DIHYDRATE 1 PUFF: 160; 4.5 AEROSOL RESPIRATORY (INHALATION) at 08:13

## 2023-03-03 RX ADMIN — TOBRAMYCIN AND DEXAMETHASONE 1 DROP: 3; 1 SUSPENSION/ DROPS OPHTHALMIC at 22:11

## 2023-03-03 RX ADMIN — AMLODIPINE BESYLATE 10 MG: 10 TABLET ORAL at 09:45

## 2023-03-03 RX ADMIN — INSULIN DETEMIR 10 UNITS: 100 INJECTION, SOLUTION SUBCUTANEOUS at 22:11

## 2023-03-03 RX ADMIN — SODIUM CHLORIDE 2 G: 900 INJECTION INTRAVENOUS at 04:35

## 2023-03-03 RX ADMIN — ASPIRIN 81 MG: 81 TABLET, COATED ORAL at 09:45

## 2023-03-03 RX ADMIN — INSULIN LISPRO 4 UNITS: 100 INJECTION, SOLUTION INTRAVENOUS; SUBCUTANEOUS at 12:35

## 2023-03-03 RX ADMIN — ENOXAPARIN SODIUM 40 MG: 40 INJECTION SUBCUTANEOUS at 12:36

## 2023-03-03 NOTE — PLAN OF CARE
Goal Outcome Evaluation:  Plan of Care Reviewed With: patient           Outcome Evaluation: Pt arrived from ED, VSS on 3L NC, SOA with any activity, CT completed, complaints of leg cramps, fluids and ABX infusing, A&O x4, purewick in use per pt request, pt sleeping for short periods through the night, fall percautions in place

## 2023-03-03 NOTE — H&P
"    [facility] Medicine Services  HISTORY AND PHYSICAL    Patient Name: Marilyn Kunz  : 1945  MRN: [id number]  Primary Care Physician: Peter Baca MD  Date of admission: 3/2/2023      Subjective   Subjective     Chief Complaint:  soa    HPI:  Marilyn Kunz is a 77 y.o. female with h/o T2DM with neuropathy, hypothyroidism, remote small CVA with no deficits, fact V Leiden deficiency on ASA, CKD 3, HTN, HL, neuromuscular changes (poss demyelinating process nonspecific followed by Dr. Hernandez with [facility] neurology), psoriatic arthritis presented to ER with SOA and exhaustion.  Note that patient was just admitted to [facility] from 23 through 2023 for similar (I.e VIDAL and extreme fatigue.  She had an echo that showed normal EF, mild PHTN; CTA was negative for PE.  Follows with Dr. Haley in his office last seen on 23 and felt was doing better at that time, pulmonary feels that soa more related to her chronic fatigue and underlying AI/demyelinating process and was treated with symbicort.  It was noted at discharge here that patient's oxygen sats were 97% on RA.  Patient states that she didn't do well at home without O2, states that she was \"struggling\" just to get up to go to the bathroom.  She states that would only be able to get up for a few minutes and then would have to lay back down because just felt exhausted.  She also states that her at home pulse ox showed 82-83% on RA.  Note that her sodium was found to be 118 in the ER.  She states that she continues to have diarrhea \"flecks of stool\" every time she urinates.  States that she was really concerned with her breathing since she lives by herself.       Review of Systems   Gen- No fevers, chills  CV- No chest pain, palpitations  Resp- +cough, +dyspnea  GI- No N/V/D, abd pain        Personal History     Past Medical History:   Diagnosis Date   • Abnormal ECG Check FPA Date or [facility] Records    " Brought to  from [facility]   • Acute sinusitis 2/6/2023   • Anemia    • Asthma    • CAP (community acquired pneumonia) 2/6/2023   • Chronic kidney disease     pt told it was dx from Dr Baca and to not eat much protein   • Congenital heart disease 1950's Patent 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    Surgery [facility] 1950s close   • CTS (carpal tunnel syndrome)     Psoriatic arthritis hands could be   • Disease of thyroid gland    • Factor 5 Leiden mutation, heterozygous ([facility]) 2012   • Head injury    • Hyperlipidemia    • Hypertension    • Movement disorder 10/2021    knee replacement left knww   • Murmur, cardiac    • Neuropathy in diabetes ([facility])     redness swelling legs   • Peripheral neuropathy 05/03/2022    hands, arms. shoulders, back   • Pill esophagitis 2/6/2023   • Psoriatic arthritis ([facility])     hands   • Sleep apnea Always    diabetic do not sleep well up and down   • Stroke ([facility]) had one don't know when    showed on MRI Brain   • Type 1 diabetes ([facility])              Past Surgical History:   Procedure Laterality Date   • CARDIAC CATHETERIZATION  1952 2 of them    Patent Dictus operation   • CATARACT EXTRACTION Bilateral    • ENDOSCOPY N/A 10/20/2020    Procedure: ESOPHAGOGASTRODUODENOSCOPY;  Surgeon: Brunner, Mark I, MD;  Location: [facility] ENDOSCOPY;  Service: Gastroenterology;  Laterality: N/A;   • HAND SURGERY Left 1987    no hardware   • PATENT DUCTUS ARTERIOUS LIGATION         Family History: family history includes Cancer in her father, mother, and sister; Colon cancer in her sister; Diabetes in her sister; Lung cancer in her father; Pancreatic cancer in her mother; Stroke in her mother.     Social History:  reports that she has never smoked. She has never used smokeless tobacco. She reports that she does not drink alcohol and does not use drugs.  Social History     Social History Narrative    Independent with adl's.  Single.  Never .  No children.  Lives alone.  Niece-in-law assist with care  as needed.   No home oxygen, HH or DME used currently        Medications:  Available home medication information reviewed.  Medications Prior to Admission   Medication Sig Dispense Refill Last Dose   • albuterol sulfate  (90 Base) MCG/ACT inhaler Inhale 2 puffs.   3/2/2023   • amLODIPine (NORVASC) 10 MG tablet Take 1 tablet by mouth Daily. 90 tablet 3 3/2/2023   • aspirin 81 MG EC tablet Take 1 tablet by mouth Daily. 30 tablet 0 3/1/2023   • azaTHIOprine (IMURAN) 50 MG tablet TAKE 2 TABLETS TWICE DAILY 120 tablet 5 3/2/2023   • azelastine (ASTELIN) 0.1 % nasal spray 1 spray into the nostril(s) as directed by provider Daily As Needed.   3/2/2023   • budesonide-formoterol (SYMBICORT) 160-4.5 MCG/ACT inhaler Inhale 1 puff 2 (Two) Times a Day.   3/2/2023   • cloNIDine (CATAPRES) 0.1 MG tablet Take 1 tablet by mouth 3 (Three) Times a Day As Needed for High Blood Pressure (systolic BP greater than 180). 90 tablet 3 Past Month   • DULoxetine (Cymbalta) 30 MG capsule Take 1 capsule by mouth Daily. 90 capsule 5 3/2/2023   • fluticasone (FLONASE) 50 MCG/ACT nasal spray 2 sprays by Each Nare route Daily. Shake well before using.   3/2/2023   • insulin aspart (novoLOG FLEXPEN) 100 UNIT/ML solution pen-injector sc pen Inject  under the skin into the appropriate area as directed 3 (Three) Times a Day With Meals. Sliding scale   3/2/2023   • irbesartan (Avapro) 300 MG tablet Take 1 tablet by mouth Every Night. 90 tablet 6 3/1/2023   • levocetirizine (XYZAL) 5 MG tablet Take 1 tablet by mouth Every Evening.   3/1/2023   • montelukast (SINGULAIR) 10 MG tablet Take 1 tablet by mouth Every Night.   3/1/2023   • nebivolol (BYSTOLIC) 20 MG tablet Take 1 tablet by mouth Daily. 90 tablet 6 3/2/2023   • oxybutynin XL (DITROPAN-XL) 10 MG 24 hr tablet Take 1 tablet by mouth Daily.   3/2/2023   • pravastatin (Pravachol) 20 MG tablet Take 1 tablet by mouth Every Evening. 90 tablet 3 3/1/2023   • Secukinumab (Cosentyx) 150 MG/ML solution  prefilled syringe Inject 1 mL under the skin into the appropriate area as directed Every 28 (Twenty-Eight) Days.   Past Month   • terazosin (HYTRIN) 2 MG capsule Take 1 capsule by mouth Every Night. 30 capsule 5 Past Month   • Tresiba FlexTouch 100 UNIT/ML solution pen-injector injection Inject 16-20 Units under the skin into the appropriate area as directed Every Night.   3/1/2023   • ammonium lactate (AMLACTIN) 12 % cream (12 %)   More than a month   • Continuous Blood Gluc  (FreeStyle Marie 2 Eustace) device FreeStyle Marie 2 Eustace   change q 14 days      • Droplet Pen Needles 32G X 4 MM misc       • FreeStyle Precision Ant Test test strip       • [] levoFLOXacin (Levaquin) 500 MG tablet Take 1 tablet by mouth Daily for 3 doses. 3 tablet 0    • levothyroxine (SYNTHROID, LEVOTHROID) 75 MCG tablet Take 75 mcg by mouth Daily.      • Spacer/Aero-Holding Chambers (OptiChamber Paula-Lg Mask) device USE AS DIRECTED WITH MULTI DOSE INHALER      • tobramycin-dexamethasone (TOBRADEX) 0.3-0.1 % ophthalmic suspension INSTILL 1 DROP IN LEFT EYE 4 TIMES DAILY      • triamcinolone (KENALOG) 0.1 % cream APPLY A THIN LAYER TO THE AFFECTED AREA TWICE DAILY FOR 1 WEEK THEN ONCE DAILY FOR 1 WEEK   More than a month       Allergies   Allergen Reactions   • Atorvastatin Other (See Comments)   • Colesevelam Other (See Comments)   • Cephalexin Rash     Tolerated Ceftriaxone   • Hygroton [Chlorthalidone] Rash     Facial rash   • Penicillins Nausea And Vomiting and Rash       Objective   Objective     Vital Signs:   Temp:  [97.7 °F (36.5 °C)-97.9 °F (36.6 °C)] 97.7 °F (36.5 °C)  Heart Rate:  [62-79] 69  Resp:  [18] 18  BP: (142-173)/(63-95) 159/71  Flow (L/min):  [2-3] 3       Physical Exam   Constitutional: Awake, alert  Eyes: PERRLA, sclerae anicteric, no conjunctival injection  HENT: NCAT, mucous membranes moist  Neck: Supple, no thyromegaly, no lymphadenopathy, trachea midline  Respiratory: Clear to auscultation  bilaterally, nonlabored respirations on 2LNC  Cardiovascular: RRR, no murmurs, rubs, or gallops, palpable pedal pulses bilaterally  Gastrointestinal: Positive bowel sounds, soft, nontender, nondistended  Musculoskeletal: 1-2+ pitting edema bilateral LE's  Psychiatric: Appropriate affect, cooperative  Neurologic: Oriented x 3, strength symmetric in all extremities, Cranial Nerves grossly intact to confrontation, speech clear  Skin: No rashes    Result Review:  I have personally reviewed the results from the time of this admission to 3/2/2023 20:25 EST and agree with these findings:  [x]  Laboratory list / accordion  [x]  Microbiology  [x]  Radiology  [x]  EKG/Telemetry   []  Cardiology/Vascular   []  Pathology  []  Old records  []  Other:  Most notable findings include:         LAB RESULTS:      Lab 03/02/23  1539 02/26/23  0532 02/25/23  0419 02/24/23  1050   WBC 9.27 8.71 10.28 9.35   HEMOGLOBIN 10.9* 10.1* 10.5* 11.5*   HEMATOCRIT 32.6* 30.7* 31.6* 34.8   PLATELETS 301 200 225 244   NEUTROS ABS 7.86*  --  8.39* 7.80*   IMMATURE GRANS (ABS) 0.06*  --  0.04 0.04   LYMPHS ABS 0.78  --  0.98 0.78   MONOS ABS 0.56  --  0.80 0.67   EOS ABS 0.01  --  0.04 0.04   MCV 88.3 93.6 90.8 90.9   PROCALCITONIN 0.09  --   --   --    LACTATE 1.6  --   --   --          Lab 03/02/23  1539 02/26/23  0532 02/25/23  0419 02/24/23  1050   SODIUM 118* 132* 131* 128*   POTASSIUM 4.4 3.8 4.2 4.0   CHLORIDE 81* 97* 95* 90*   CO2 23.0 25.0 26.0 24.0   ANION GAP 14.0 10.0 10.0 14.0   BUN 19 14 13 19   CREATININE 0.88 0.79 0.79 1.08*   EGFR 67.8 77.2 77.2 53.0*   GLUCOSE 252* 135* 113* 305*   CALCIUM 9.4 8.8 8.9 9.5   MAGNESIUM  --   --  1.7 1.7   PHOSPHORUS  --   --  3.7  --    HEMOGLOBIN A1C  --   --  7.60*  --    TSH  --   --   --  2.770         Lab 03/02/23  1539 02/24/23  1050   TOTAL PROTEIN 7.6 7.6   ALBUMIN 4.5 4.5   GLOBULIN 3.1 3.1   ALT (SGPT) 16 11   AST (SGOT) 24 14   BILIRUBIN 1.5* 1.0   ALK PHOS 66 70         Lab 03/02/23  1531  02/25/23  0419 02/24/23 2054 02/24/23  1926 02/24/23  1050   PROBNP 6,798.0*  --   --   --  4,022.0*   HSTROP T 19* 14* 18* 14* 14*                 UA    Urinalysis 5/4/22 2/24/23 2/24/23     1122 1122   Squamous Epithelial Cells, UA   7-12 (A)   Specific [place], UA 1.024 1.016    Ketones, UA Negative Trace (A)    Blood, UA Negative Negative    Leukocytes, UA Negative Large (3+) (A)    Nitrite, UA Negative Negative    RBC, UA   3-6 (A)   WBC, UA   Too Numerous to Count (A)   Bacteria, UA   None Seen   (A) Abnormal value              Microbiology Results (last 10 days)     Procedure Component Value - Date/Time    COVID PRE-OP / PRE-PROCEDURE SCREENING ORDER (NO ISOLATION) - Swab, Nasopharynx [[id number]]  (Normal) Collected: 03/02/23 1755    Lab Status: Final result Specimen: Swab from Nasopharynx Updated: 03/02/23 1829    Narrative:      The following orders were created for panel order COVID PRE-OP / PRE-PROCEDURE SCREENING ORDER (NO ISOLATION) - Swab, Nasopharynx.  Procedure                               Abnormality         Status                     ---------                               -----------         ------                     COVID-19 and FLU A/B PCR...[[id number]]  Normal              Final result                 Please view results for these tests on the individual orders.    COVID-19 and FLU A/B PCR - Swab, Nasopharynx [[id number]]  (Normal) Collected: 03/02/23 1755    Lab Status: Final result Specimen: Swab from Nasopharynx Updated: 03/02/23 1829     COVID19 Not Detected     Influenza A PCR Not Detected     Influenza B PCR Not Detected    Narrative:      Fact sheet for providers: https://www.fda.gov/media/779801/download    Fact sheet for patients: https://www.fda.gov/media/649942/download    Test performed by PCR.    Respiratory Panel PCR w/COVID-19(SARS-CoV-2) SAMY/DOT/WENDY/PAD/COR/MAD/WILLEM In-House, NP Swab in UTM/VTM, 3-4 HR TAT - Swab, Nasopharynx [[id number]]  (Normal) Collected: 02/24/23 5284     Lab Status: Final result Specimen: Swab from Nasopharynx Updated: 02/24/23 1937     ADENOVIRUS, PCR Not Detected     Coronavirus 229E Not Detected     Coronavirus HKU1 Not Detected     Coronavirus NL63 Not Detected     Coronavirus OC43 Not Detected     COVID19 Not Detected     Human Metapneumovirus Not Detected     Human Rhinovirus/Enterovirus Not Detected     Influenza A PCR Not Detected     Influenza B PCR Not Detected     Parainfluenza Virus 1 Not Detected     Parainfluenza Virus 2 Not Detected     Parainfluenza Virus 3 Not Detected     Parainfluenza Virus 4 Not Detected     RSV, PCR Not Detected     Bordetella pertussis pcr Not Detected     Bordetella parapertussis PCR Not Detected     Chlamydophila pneumoniae PCR Not Detected     Mycoplasma pneumo by PCR Not Detected    Narrative:      In the setting of a positive respiratory panel with a viral infection PLUS a negative procalcitonin without other underlying concern for bacterial infection, consider observing off antibiotics or discontinuation of antibiotics and continue supportive care. If the respiratory panel is positive for atypical bacterial infection (Bordetella pertussis, Chlamydophila pneumoniae, or Mycoplasma pneumoniae), consider antibiotic de-escalation to target atypical bacterial infection.    COVID PRE-OP / PRE-PROCEDURE SCREENING ORDER (NO ISOLATION) - Swab, Nasopharynx [[id number]]  (Normal) Collected: 02/24/23 1122    Lab Status: Final result Specimen: Swab from Nasopharynx Updated: 02/24/23 1220    Narrative:      The following orders were created for panel order COVID PRE-OP / PRE-PROCEDURE SCREENING ORDER (NO ISOLATION) - Swab, Nasopharynx.  Procedure                               Abnormality         Status                     ---------                               -----------         ------                     COVID-19, FLU A/B, RSV P...[[id number]]  Normal              Final result                 Please view results for these tests on the  individual orders.    COVID-19, FLU A/B, RSV PCR - Swab, Nasopharynx [[id number]]  (Normal) Collected: 02/24/23 1122    Lab Status: Final result Specimen: Swab from Nasopharynx Updated: 02/24/23 1220     COVID19 Not Detected     Influenza A PCR Not Detected     Influenza B PCR Not Detected     RSV, PCR Not Detected    Narrative:      Fact sheet for providers: https://www.fda.gov/media/151146/download    Fact sheet for patients: https://www.fda.gov/media/187203/download    Test performed by PCR.    Urine Culture - Urine, Urine, Clean Catch [[id number]] Collected: 02/24/23 1122    Lab Status: Final result Specimen: Urine, Clean Catch Updated: 02/25/23 0934     Urine Culture 25,000 CFU/mL Mixed Radha Isolated    Narrative:      Specimen contains mixed organisms of questionable pathogenicity suggestive of contamination. If symptoms persist, suggest recollection.  Colonization of the urinary tract without infection is common. Treatment is discouraged unless the patient is symptomatic, pregnant, or undergoing an invasive urologic procedure.          XR Chest 1 View    Result Date: 3/2/2023  XR CHEST 1 VW Date of Exam: 3/2/2023 3:24 PM EST Indication: SOA triage protocol. Comparison: 2/24/2023 Findings: Heart shadow is normal in size. There is increased perihilar interstitial disease, suggesting perihilar edema. In addition, there is also focal dense airspace opacity of the right lung base which may represent atelectasis or pneumonia. There appears to be a small new right pleural effusion. There is minimal if any right effusion. No pneumothorax is seen. Bony structures appear to be intact.     Impression: Impression: 1. Perihilar disease suggesting mild pulmonary vascular congestion. Small left pleural effusion. 2. New focal disease in the medial right lung base, potentially pneumonia. Electronically Signed: Angus Scott  3/2/2023 3:52 PM EST  Workstation ID: [id number]      Results for orders placed during the hospital encounter  of 02/24/23    Adult Transthoracic Echo Complete W/ Cont if Necessary Per Protocol    Interpretation Summary  •  Left ventricular ejection fraction appears to be 61 - 65%.  •  Estimated right ventricular systolic pressure from tricuspid regurgitation is mildly elevated (35-45 mmHg).      Assessment & Plan   Assessment & Plan     Active Hospital Problems    Diagnosis  POA   • Acute respiratory failure with hypoxia (HCC) [J96.01]  Yes     Marilyn Kunz is a 77 y.o. female with h/o T2DM with neuropathy, hypothyroidism, remote small CVA with no deficits, fact V Leiden deficiency on ASA, CKD 3, HTN, HL, neuromuscular changes (poss demyelinating process nonspecific followed by Dr. Hernandez with [facility] neurology), psoriatic arthritis presented to ER with SOA and exhaustion.     Hypoxia, VIDAL  Possible RML pna  --was just admitted for similar 2/24/23 to 2/26/23   --recent echo showed normal EF, mild pHTN  --CTA chest recent admit was negative for PE.  Repeat CT ordered.  CXR tonight showed perihilar disease suggesting mild pulm vascular congestion/small left pleural effusion, also ?RML pna  --?concern for pna.  Allergies listed to both pcn, and cephalosporins.  Note normal WBC and procal so not convinced that pna but will cover for now with aztreonam and levaquin  --note COVID, respiratory PCR negative recently.  Repeat COVID test pending  --attempted PFT's back in 2022 noted that patient couldn't follow directions for accurate test  --duonebs  --encourage good pulmonary toilet with flutter valve and IS  --consider pulmonary consult pending CT chest results as patient just discharged for similar.  Follows with Dr. Haley outpatient  --consult SLP with possible RML pna      Hyponatremia  Diarrhea  Poor PO intake  --much worse than on discharge a few days ago  --ER gave NS at 100/hr, will check stat sodium now and decrease NS rate to 25/hr for now.   Follow serial sodiums  --check GI PCR  --chlorthalidone stopped recent  admit    HTN  HL  Factor V Leiden Def  Hx PDA s/p Repair  --follows with Dr. Maya with cardiology  --continue ASA, home BP meds    Neurologic changes/disorder  Nonspecific, Possible Demyelinating Process  Hx CVA  --recently started on cymbalta, continue for now unless sodium doesn't correct with IVF then will need to consider tapering off  --hold home imuran, cosentyx for now    Hx Psoriatic Arthritis    Hypothyroidism  --continue home levothyroxine    T2DM  --HbA1C = 7.6  --hold home meds/insulin, start levemir 10units qhs and cover with low dose SSI          DVT prophylaxis:  lovenox      CODE STATUS: Full Code  Code Status and Medical Interventions:   Ordered at: 03/02/23 1920     Level Of Support Discussed With:    Patient     Code Status (Patient has no pulse and is not breathing):    CPR (Attempt to Resuscitate)     Medical Interventions (Patient has pulse or is breathing):    Full Support     Release to patient:    Routine Release       Expected Discharge 3/5/2023       Samuel Lin MD  03/02/23

## 2023-03-03 NOTE — CONSULTS
Referring Provider: Dr. Sofiya Moy  Reason for Consultation: Severe hyponatremia    Subjective     Chief complaint shortness of breath, severe hyponatremia serum sodium 118    History of present illness:    77-year-old female who was recently discharged on 2/26/2023 during that admission patient had a  Sodium 128 which improved to 132 at the time of discharge patient was taken off chlorthalidone.  Patient presented on 3/2/2023 with diarrhea, labs showed sodium 118, patient received some IV fluid and dropped down to 117, serum sodium 122 at this time.  Creatinine 0.95, BUN 14, glucose 239, BNP 5883.  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  Patient presented with shortness of breath, work-up included echocardiogram with normal ejection fraction.   Serum sodium 118 on admission, on previous admission patient sodium range from 128-132 on discharge on February 26, 2023, patient has diarrhea prior to admission.  Since admission started on IV normal saline 50 cc an hour sodium is 122 at this time.  Magnesium 1.7 phosphorus 3.7, hemoglobin A1c 7.6, last TSH 2.77 Free T4=1.72.   Patient had some nausea earlier, denies any vomiting, headache, blurring of vision, dysuria, hematuria, epistaxis, hemoptysis, hematemesis, diarrhea at this time.  History  Past Medical History:   Diagnosis Date   • Abnormal ECG Check [facility] Date or [facility] Records    Brought to  from [facility]   • Acute sinusitis 2/6/2023   • Anemia    • Asthma    • CAP (community acquired pneumonia) 2/6/2023   • Chronic kidney disease     pt told it was dx from Dr Baca and to not eat much protein   • Congenital heart disease 1950's Patent 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    Surgery [facility] 1950s close   • CTS (carpal tunnel syndrome)     Psoriatic arthritis hands could be    • Disease of thyroid gland    • Factor 5 Leiden mutation, heterozygous ([facility]) 2012   • Head injury    • Hyperlipidemia    • Hypertension    • Movement disorder 10/2021    knee replacement left knww   • Murmur, cardiac    • Neuropathy in diabetes ([facility])     redness swelling legs   • Peripheral neuropathy 05/03/2022    hands, arms. shoulders, back   • Pill esophagitis 2/6/2023   • Psoriatic arthritis (HCC)     hands   • Sleep apnea Always    diabetic do not sleep well up and down   • Stroke ([facility]) had one don't know when    showed on MRI Brain   • Type 1 diabetes ([facility])    ,   Past Surgical History:   Procedure Laterality Date   • CARDIAC CATHETERIZATION  1952 2 of them    Patent Dictus operation   • CATARACT EXTRACTION Bilateral    • ENDOSCOPY N/A 10/20/2020    Procedure: ESOPHAGOGASTRODUODENOSCOPY;  Surgeon: Brunner, Mark I, MD;  Location: [facility] ENDOSCOPY;  Service: Gastroenterology;  Laterality: N/A;   • HAND SURGERY Left 1987    no hardware   • PATENT DUCTUS ARTERIOUS LIGATION     ,   Family History   Problem Relation Age of Onset   • Cancer Mother    • Pancreatic cancer Mother    • Stroke Mother         Mini strokes   • Cancer Father    • Lung cancer Father    • Cancer Sister    • Colon cancer Sister    • Diabetes Sister    • Breast cancer Neg Hx    • Ovarian cancer Neg Hx    ,   Social History     Socioeconomic History   • Marital status: Single   Tobacco Use   • Smoking status: Never   • Smokeless tobacco: Never   Vaping Use   • Vaping Use: Never used   Substance and Sexual Activity   • Alcohol use: No   • Drug use: No   • Sexual activity: Not Currently     Partners: Male     Birth control/protection: Abstinence     E-cigarette/Vaping   • E-cigarette/Vaping Use Never User      E-cigarette/Vaping Substances     E-cigarette/Vaping Devices       ,   Medications Prior to Admission   Medication Sig Dispense Refill Last Dose   • albuterol sulfate  (90 Base) MCG/ACT inhaler Inhale 2 puffs.   3/2/2023   •  amLODIPine (NORVASC) 10 MG tablet Take 1 tablet by mouth Daily. 90 tablet 3 3/2/2023   • aspirin 81 MG EC tablet Take 1 tablet by mouth Daily. 30 tablet 0 3/1/2023   • azaTHIOprine (IMURAN) 50 MG tablet TAKE 2 TABLETS TWICE DAILY 120 tablet 5 3/2/2023   • azelastine (ASTELIN) 0.1 % nasal spray 1 spray into the nostril(s) as directed by provider Daily As Needed.   3/2/2023   • budesonide-formoterol (SYMBICORT) 160-4.5 MCG/ACT inhaler Inhale 1 puff 2 (Two) Times a Day.   3/2/2023   • cloNIDine (CATAPRES) 0.1 MG tablet Take 1 tablet by mouth 3 (Three) Times a Day As Needed for High Blood Pressure (systolic BP greater than 180). 90 tablet 3 Past Month   • DULoxetine (Cymbalta) 30 MG capsule Take 1 capsule by mouth Daily. 90 capsule 5 3/2/2023   • fluticasone (FLONASE) 50 MCG/ACT nasal spray 2 sprays by Each Nare route Daily. Shake well before using.   3/2/2023   • insulin aspart (novoLOG FLEXPEN) 100 UNIT/ML solution pen-injector sc pen Inject  under the skin into the appropriate area as directed 3 (Three) Times a Day With Meals. Sliding scale   3/2/2023   • irbesartan (Avapro) 300 MG tablet Take 1 tablet by mouth Every Night. 90 tablet 6 3/1/2023   • levocetirizine (XYZAL) 5 MG tablet Take 1 tablet by mouth Every Evening.   3/1/2023   • montelukast (SINGULAIR) 10 MG tablet Take 1 tablet by mouth Every Night.   3/1/2023   • nebivolol (BYSTOLIC) 20 MG tablet Take 1 tablet by mouth Daily. 90 tablet 6 3/2/2023   • oxybutynin XL (DITROPAN-XL) 10 MG 24 hr tablet Take 1 tablet by mouth Daily.   3/2/2023   • pravastatin (Pravachol) 20 MG tablet Take 1 tablet by mouth Every Evening. 90 tablet 3 3/1/2023   • Secukinumab (Cosentyx) 150 MG/ML solution prefilled syringe Inject 1 mL under the skin into the appropriate area as directed Every 28 (Twenty-Eight) Days.   Past Month   • terazosin (HYTRIN) 2 MG capsule Take 1 capsule by mouth Every Night. 30 capsule 5 Past Month   • Tresiba FlexTouch 100 UNIT/ML solution pen-injector  injection Inject 16-20 Units under the skin into the appropriate area as directed Every Night.   3/1/2023   • ammonium lactate (AMLACTIN) 12 % cream (12 %)   More than a month   • Continuous Blood Gluc  (FreeStyle Marie 2 Imbler) device FreeStyle Marie 2 Imbler   change q 14 days      • Droplet Pen Needles 32G X 4 MM misc       • FreeStyle Precision Ant Test test strip       • [] levoFLOXacin (Levaquin) 500 MG tablet Take 1 tablet by mouth Daily for 3 doses. 3 tablet 0    • levothyroxine (SYNTHROID, LEVOTHROID) 75 MCG tablet Take 75 mcg by mouth Daily.      • Spacer/Aero-Holding Chambers (OptiChamber Paula-Lg Mask) device USE AS DIRECTED WITH MULTI DOSE INHALER      • tobramycin-dexamethasone (TOBRADEX) 0.3-0.1 % ophthalmic suspension INSTILL 1 DROP IN LEFT EYE 4 TIMES DAILY      • triamcinolone (KENALOG) 0.1 % cream APPLY A THIN LAYER TO THE AFFECTED AREA TWICE DAILY FOR 1 WEEK THEN ONCE DAILY FOR 1 WEEK   More than a month   , Scheduled Meds:  amLODIPine, 10 mg, Oral, Daily  aspirin, 81 mg, Oral, Daily  azithromycin, 500 mg, Intravenous, Q24H  budesonide-formoterol, 1 puff, Inhalation, BID - RT  cefTRIAXone, 2 g, Intravenous, Q24H  cetirizine, 5 mg, Oral, Daily  DULoxetine, 30 mg, Oral, Daily  enoxaparin, 40 mg, Subcutaneous, Q24H  fluticasone, 2 spray, Each Nare, Daily  insulin detemir, 10 Units, Subcutaneous, Nightly  insulin lispro, 0-7 Units, Subcutaneous, TID AC  ipratropium-albuterol, 3 mL, Nebulization, 4x Daily - RT  levothyroxine, 75 mcg, Oral, Q AM  montelukast, 10 mg, Oral, Nightly  nebivolol, 20 mg, Oral, Daily  oxybutynin XL, 10 mg, Oral, Daily  pravastatin, 20 mg, Oral, Q PM  terazosin, 2 mg, Oral, Nightly  tobramycin-dexamethasone, 1 drop, Left Eye, Q4H While Awake    , Continuous Infusions:  sodium chloride, 50 mL/hr, Last Rate: 50 mL/hr (23 1237)    , PRN Meds:  •  albuterol sulfate HFA  •  dextrose  •  dextrose  •  glucagon (human recombinant)  •  sodium chloride and  Allergies:  Atorvastatin, Colesevelam, Cephalexin, Hygroton [chlorthalidone], and Penicillins    Review of Systems  Pertinent items are noted in HPI, all other systems reviewed and negative    Objective     Vital Signs  Temp:  [97.6 °F (36.4 °C)-98.2 °F (36.8 °C)] 98.2 °F (36.8 °C)  Heart Rate:  [62-79] 70  Resp:  [18-22] 18  BP: (126-173)/(55-95) 126/65    No intake/output data recorded.  I/O last 3 completed shifts:  In: 200 [P.O.:200]  Out: -     Physical Exam:  General appearance:  female laying comfortable in bed no obvious distress.  HEENT: Atraumatic normocephalic head, eyes pupil reactive, extraocular muscle intact, nose no bleed, oropharynx clear, neck is supple, no JVD, no lymph node enlargement, trachea midline.  Lungs: Clear to auscultation, equal chest movement, no crepitation.  Heart: Normal S1, S2, no gallop, murmur, RRR.  Abdomen: Soft, nontender, positive bowel sounds, no organomegaly.  Extremities: Trace edema, no cyanosis, no joint swelling.  Neuro: Alert, oriented x4, no focal deficit.  Psych: Mood and affect are normal and appropriate.  Skin: Skin is warm and dry.  : No suprapubic fullness or tenderness, no Conte catheter.  Results Review:   I reviewed the patient's new clinical results.    WBC WBC   Date Value Ref Range Status   03/03/2023 8.43 3.40 - 10.80 10*3/mm3 Final   03/02/2023 9.27 3.40 - 10.80 10*3/mm3 Final      HGB Hemoglobin   Date Value Ref Range Status   03/03/2023 9.4 (L) 12.0 - 15.9 g/dL Final   03/02/2023 10.9 (L) 12.0 - 15.9 g/dL Final      HCT Hematocrit   Date Value Ref Range Status   03/03/2023 27.7 (L) 34.0 - 46.6 % Final   03/02/2023 32.6 (L) 34.0 - 46.6 % Final      Platlets No results found for: LABPLAT   MCV MCV   Date Value Ref Range Status   03/03/2023 88.5 79.0 - 97.0 fL Final   03/02/2023 88.3 79.0 - 97.0 fL Final          Sodium Sodium   Date Value Ref Range Status   03/03/2023 122 (L) 136 - 145 mmol/L Final   03/03/2023 119 (C) 136 - 145 mmol/L Final    03/02/2023 117 (C) 136 - 145 mmol/L Final   03/02/2023 118 (C) 136 - 145 mmol/L Final   03/02/2023 118 (C) 136 - 145 mmol/L Final      Potassium Potassium   Date Value Ref Range Status   03/03/2023 4.4 3.5 - 5.2 mmol/L Final   03/03/2023 3.9 3.5 - 5.2 mmol/L Final   03/02/2023 4.5 3.5 - 5.2 mmol/L Final   03/02/2023 4.4 3.5 - 5.2 mmol/L Final     Comment:     Slight hemolysis detected by analyzer. Results may be affected.      Chloride Chloride   Date Value Ref Range Status   03/03/2023 87 (L) 98 - 107 mmol/L Final   03/03/2023 85 (L) 98 - 107 mmol/L Final   03/02/2023 82 (L) 98 - 107 mmol/L Final   03/02/2023 81 (L) 98 - 107 mmol/L Final      CO2 CO2   Date Value Ref Range Status   03/03/2023 23.0 22.0 - 29.0 mmol/L Final   03/03/2023 23.0 22.0 - 29.0 mmol/L Final   03/02/2023 23.0 22.0 - 29.0 mmol/L Final   03/02/2023 23.0 22.0 - 29.0 mmol/L Final      BUN BUN   Date Value Ref Range Status   03/03/2023 14 8 - 23 mg/dL Final   03/03/2023 16 8 - 23 mg/dL Final   03/02/2023 19 8 - 23 mg/dL Final   03/02/2023 19 8 - 23 mg/dL Final      Creatinine Creatinine   Date Value Ref Range Status   03/03/2023 0.95 0.57 - 1.00 mg/dL Final   03/03/2023 0.90 0.57 - 1.00 mg/dL Final   03/02/2023 0.92 0.57 - 1.00 mg/dL Final   03/02/2023 0.88 0.57 - 1.00 mg/dL Final      Calcium Calcium   Date Value Ref Range Status   03/03/2023 8.7 8.6 - 10.5 mg/dL Final   03/03/2023 8.4 (L) 8.6 - 10.5 mg/dL Final   03/02/2023 8.8 8.6 - 10.5 mg/dL Final   03/02/2023 9.4 8.6 - 10.5 mg/dL Final      PO4 No results found for: CAPO4   Albumin Albumin   Date Value Ref Range Status   03/02/2023 4.5 3.5 - 5.2 g/dL Final      Magnesium Magnesium   Date Value Ref Range Status   03/03/2023 1.5 (L) 1.6 - 2.4 mg/dL Final      Uric Acid No results found for: URICACID         amLODIPine, 10 mg, Oral, Daily  aspirin, 81 mg, Oral, Daily  azithromycin, 500 mg, Intravenous, Q24H  budesonide-formoterol, 1 puff, Inhalation, BID - RT  cefTRIAXone, 2 g, Intravenous,  Q24H  cetirizine, 5 mg, Oral, Daily  DULoxetine, 30 mg, Oral, Daily  enoxaparin, 40 mg, Subcutaneous, Q24H  fluticasone, 2 spray, Each Nare, Daily  insulin detemir, 10 Units, Subcutaneous, Nightly  insulin lispro, 0-7 Units, Subcutaneous, TID AC  ipratropium-albuterol, 3 mL, Nebulization, 4x Daily - RT  levothyroxine, 75 mcg, Oral, Q AM  montelukast, 10 mg, Oral, Nightly  nebivolol, 20 mg, Oral, Daily  oxybutynin XL, 10 mg, Oral, Daily  pravastatin, 20 mg, Oral, Q PM  terazosin, 2 mg, Oral, Nightly  tobramycin-dexamethasone, 1 drop, Left Eye, Q4H While Awake      sodium chloride, 50 mL/hr, Last Rate: 50 mL/hr (03/03/23 1237)        Assessment & Plan       Acute respiratory failure with hypoxia (HCC)    1.  Severe hyponatremia: Serum sodium 118 on admission, on previous admission patient sodium range from 128-132 on discharge on February 26, 2023, patient has diarrhea prior to admission.  Since admission started on IV normal saline 50 cc an hour sodium is 122 at this time.  Magnesium 1.7 phosphorus 3.7, hemoglobin A1c 7.6, last TSH 2.77 Free T4=1.72.  Prior to this admission patient has some diarrhea.  She denied using chlorthalidone since going home.    Plan:  Check urine labs to include urine sodium, urine creatinine, urine osmolality, serum osmolality, lipid profile.  Fluid restriction 1000 mL/day.  Check sodium level every 6 hours.  Monitor electrolytes closely.  High risk patient.  She is awake alert oriented no headache blurring of vision no other neurological symptoms.  Serum sodium is started to come up.  Will monitor, no need for 3% saline at this time.  I discussed the patients findings and my recommendations with patient and nursing staff    Rohith Enriquez MD  03/03/23  @NOW

## 2023-03-03 NOTE — OUTREACH NOTE
Medical Week 2 Survey    Flowsheet Row Responses   [facility] patient discharged from? [facility]   Does the patient have one of the following disease processes/diagnoses(primary or secondary)? Other   Week 2 attempt successful? No   Unsuccessful attempts Attempt 1   Revoke Readmitted          LYSSA RICHMOND - Registered Nurse

## 2023-03-03 NOTE — CASE MANAGEMENT/SOCIAL WORK
Discharge Planning Assessment  [facility]     Patient Name: Marilyn Kunz  MRN: [id number]  Today's Date: 3/3/2023    Admit Date: 3/2/2023    Plan: initial   Discharge Needs Assessment     Row Name 03/03/23 1239       Living Environment    People in Home alone    Current Living Arrangements home    Primary Care Provided by self    Provides Primary Care For no one, unable/limited ability to care for self    Family Caregiver if Needed sibling(s)    Quality of Family Relationships unable to assess    Able to Return to Prior Arrangements yes       Transition Planning    Patient/Family Anticipates Transition to home       Discharge Needs Assessment    Equipment Currently Used at Home pulse ox;walker, standard;cane, straight    Outpatient/Agency/Support Group Needs homecare agency    Discharge Facility/Level of Care Needs home with home health;nursing facility, skilled;acute rehab    Current Discharge Risk chronically ill;lives alone               Discharge Plan     Row Name 03/03/23 1240       Plan    Plan initial    Patient/Family in Agreement with Plan yes    Plan Comments Ms. Kunz resides alone in [facility].  She is independent and uses a walker and cane for mobility assistance.  She is not current with home health or PT.  She has prescription coverage and gets scripts filled at [facility].   Plan is home at discharge.    Final Discharge Disposition Code 30 - still a patient              Continued Care and Services - Admitted Since 3/2/2023    Coordination has not been started for this encounter.          Demographic Summary     Row Name 03/03/23 1237       General Information    Reason for Consult discharge planning    Preferred Language English    General Information Comments Peter Baca - PCP               Functional Status     Row Name 03/03/23 1238       Functional Status    Usual Activity Tolerance moderate    Current Activity Tolerance moderate       Functional Status, IADL     Medications independent    Meal Preparation independent    Housekeeping independent    Laundry independent    Shopping independent               Psychosocial    No documentation.                Abuse/Neglect    No documentation.                Legal    No documentation.                Substance Abuse    No documentation.                Patient Forms    No documentation.                   Ketty Wahl RN

## 2023-03-03 NOTE — PROGRESS NOTES
[facility] Medicine Services  PROGRESS NOTE    Patient Name: Marilyn Kunz  : 1945  MRN: [id number]    Date of Admission: 3/2/2023  Primary Care Physician: Peter Baca MD    Subjective   Subjective     CC:  F/u shortness of air     HPI:  Pt reports that she is not currently short of breath but it is worse with ambulation     ROS:      Objective   Objective     Vital Signs:   Temp:  [97.6 °F (36.4 °C)-98.1 °F (36.7 °C)] 98.1 °F (36.7 °C)  Heart Rate:  [62-79] 65  Resp:  [18-22] 18  BP: (127-173)/(55-95) 130/62  Flow (L/min):  [2-3] 3     Physical Exam:      Results Reviewed:  LAB RESULTS:      Lab 23  0615 23  0532 23  1050   WBC 8.43 9.27 8.71 10.28 9.35   HEMOGLOBIN 9.4* 10.9* 10.1* 10.5* 11.5*   HEMATOCRIT 27.7* 32.6* 30.7* 31.6* 34.8   PLATELETS 267 301 200 225 244   NEUTROS ABS  --  7.86*  --  8.39* 7.80*   IMMATURE GRANS (ABS)  --  0.06*  --  0.04 0.04   LYMPHS ABS  --  0.78  --  0.98 0.78   MONOS ABS  --  0.56  --  0.80 0.67   EOS ABS  --  0.01  --  0.04 0.04   MCV 88.5 88.3 93.6 90.8 90.9   PROCALCITONIN  --  0.09  --   --   --    LACTATE  --  1.6  --   --   --          Lab 23  0616 2315 23  0532 239 23  1050   SODIUM  --  119* 117* 118* 118* 132* 131* 128*   POTASSIUM  --  3.9 4.5  --  4.4 3.8 4.2 4.0   CHLORIDE  --  85* 82*  --  81* 97* 95* 90*   CO2  --  23.0 23.0  --  23.0 25.0 26.0 24.0   ANION GAP  --  11.0 12.0  --  14.0 10.0 10.0 14.0   BUN  --  16 19  --  19 14 13 19   CREATININE  --  0.90 0.92  --  0.88 0.79 0.79 1.08*   EGFR  --  66.0 64.3  --  67.8 77.2 77.2 53.0*   GLUCOSE  --  120* 199*  --  252* 135* 113* 305*   CALCIUM  --  8.4* 8.8  --  9.4 8.8 8.9 9.5   IONIZED CALCIUM 1.20  --   --   --   --   --   --   --    MAGNESIUM  --  1.5*  --   --   --   --  1.7 1.7   PHOSPHORUS  --  3.2  --   --   --   --  3.7  --     HEMOGLOBIN A1C  --   --   --   --   --   --  7.60*  --    TSH  --   --   --   --   --   --   --  2.770         Lab 03/02/23  1539 02/24/23  1050   TOTAL PROTEIN 7.6 7.6   ALBUMIN 4.5 4.5   GLOBULIN 3.1 3.1   ALT (SGPT) 16 11   AST (SGOT) 24 14   BILIRUBIN 1.5* 1.0   ALK PHOS 66 70         Lab 03/02/23  1539 02/25/23  0419 02/24/23 2054 02/24/23  1926 02/24/23  1050   PROBNP 6,798.0*  --   --   --  4,022.0*   HSTROP T 19* 14* 18* 14* 14*                 Brief Urine Lab Results  (Last result in the past 365 days)      Color   Clarity   Blood   Leuk Est   Nitrite   Protein   CREAT   Urine HCG        02/24/23 1122 Dark Yellow   Cloudy   Negative   Large (3+)   Negative   100 mg/dL (2+)                 Microbiology Results Abnormal     Procedure Component Value - Date/Time    COVID PRE-OP / PRE-PROCEDURE SCREENING ORDER (NO ISOLATION) - Swab, Nasopharynx [[id number]]  (Normal) Collected: 03/02/23 1755    Lab Status: Final result Specimen: Swab from Nasopharynx Updated: 03/02/23 1829    Narrative:      The following orders were created for panel order COVID PRE-OP / PRE-PROCEDURE SCREENING ORDER (NO ISOLATION) - Swab, Nasopharynx.  Procedure                               Abnormality         Status                     ---------                               -----------         ------                     COVID-19 and FLU A/B PCR...[[id number]]  Normal              Final result                 Please view results for these tests on the individual orders.    COVID-19 and FLU A/B PCR - Swab, Nasopharynx [[id number]]  (Normal) Collected: 03/02/23 1755    Lab Status: Final result Specimen: Swab from Nasopharynx Updated: 03/02/23 1829     COVID19 Not Detected     Influenza A PCR Not Detected     Influenza B PCR Not Detected    Narrative:      Fact sheet for providers: https://www.fda.gov/media/520864/download    Fact sheet for patients: https://www.fda.gov/media/833944/download    Test performed by PCR.          XR Chest 1  View    Result Date: 3/2/2023  XR CHEST 1 VW Date of Exam: 3/2/2023 3:24 PM EST Indication: SOA triage protocol. Comparison: 2/24/2023 Findings: Heart shadow is normal in size. There is increased perihilar interstitial disease, suggesting perihilar edema. In addition, there is also focal dense airspace opacity of the right lung base which may represent atelectasis or pneumonia. There appears to be a small new right pleural effusion. There is minimal if any right effusion. No pneumothorax is seen. Bony structures appear to be intact.     Impression: Impression: 1. Perihilar disease suggesting mild pulmonary vascular congestion. Small left pleural effusion. 2. New focal disease in the medial right lung base, potentially pneumonia. Electronically Signed: Angus Scott  3/2/2023 3:52 PM EST  Workstation ID: [id number]    CT Angiogram Chest    Result Date: 3/2/2023  CT ANGIOGRAM CHEST Date of Exam: 3/2/2023 9:45 PM EST Indication: PE. pneumonia?. Comparison: 2/25/2023 Technique: CTA of the chest was performed after the uneventful intravenous administration of 79 mL Isovue-370. Reconstructed coronal and sagittal images were also obtained. In addition, a 3-D volume rendered image was created for interpretation. Automated exposure control and iterative reconstruction methods were used.  FINDINGS: [ ] Pulmonary Arteries: Motion Limited imaging demonstrates no evidence of pulmonary embolus through the proximal lobar level or suspected into the segmental level. The main pulmonary arteries are normal in caliber. Mediastinum: Heart size is stable. No suspicious pericardial effusion noted. Aorta and origin of great vessels grossly unremarkable. Mild hilar adenopathy and mediastinal adenopathy are likely reactive. The esophagus is stable Lungs/pleura: Moderate right-sided pleural effusion is slightly increased in comparison. Small left-sided pleural effusion is similar to slightly increased in comparison there is associated dependent  opacity likely atelectasis. Pneumonia is not excluded. Bandlike opacity, groundglass opacity and patchy opacity within the lungs bilaterally with a right basilar and left-sided predominance is slightly increased in the comparison. Central airways appear grossly patent Upper Abdomen: Limited images of the upper abdomen are unremarkable. Soft tissues/Bones: No acute bone or soft tissue abnormality.     Impression: 1. No evidence of a central or foraminal is a motion Limited imaging. More peripheral imaging is limited. 2. Interval increase in bilateral pleural effusions and dependent opacities 3. Increase in patchy groundglass opacities which could represent atelectasis, pulmonary edema or pneumonia in the correct clinical setting Electronically Signed: Fabian Fernandez  3/2/2023 10:24 PM EST  Workstation ID: [id number]      Results for orders placed during the hospital encounter of 02/24/23    Adult Transthoracic Echo Complete W/ Cont if Necessary Per Protocol    Interpretation Summary  •  Left ventricular ejection fraction appears to be 61 - 65%.  •  Estimated right ventricular systolic pressure from tricuspid regurgitation is mildly elevated (35-45 mmHg).      Current medications:  Scheduled Meds:amLODIPine, 10 mg, Oral, Daily  aspirin, 81 mg, Oral, Daily  aztreonam, 2 g, Intravenous, Q8H  budesonide-formoterol, 1 puff, Inhalation, BID - RT  cetirizine, 5 mg, Oral, Daily  DULoxetine, 30 mg, Oral, Daily  fluticasone, 2 spray, Each Nare, Daily  insulin detemir, 10 Units, Subcutaneous, Nightly  insulin lispro, 0-7 Units, Subcutaneous, TID AC  ipratropium-albuterol, 3 mL, Nebulization, 4x Daily - RT  levoFLOXacin, 750 mg, Intravenous, Q48H  levothyroxine, 75 mcg, Oral, Q AM  montelukast, 10 mg, Oral, Nightly  nebivolol, 20 mg, Oral, Daily  oxybutynin XL, 10 mg, Oral, Daily  pravastatin, 20 mg, Oral, Q PM  terazosin, 2 mg, Oral, Nightly  tobramycin-dexamethasone, 1 drop, Left Eye, Q4H While Awake      Continuous  Infusions:sodium chloride, 50 mL/hr, Last Rate: 50 mL/hr (03/03/23 0739)      PRN Meds:.•  albuterol sulfate HFA  •  dextrose  •  dextrose  •  glucagon (human recombinant)  •  sodium chloride    Assessment & Plan   Assessment & Plan     Active Hospital Problems    Diagnosis  POA   • Acute respiratory failure with hypoxia (HCC) [J96.01]  Yes      Resolved Hospital Problems   No resolved problems to display.        Brief Hospital Course to date:  Marilyn Kunz is a 77 y.o. female with h/o T2DM with neuropathy, hypothyroidism, remote small CVA with no deficits, fact V Leiden deficiency on ASA, CKD 3, HTN, HL, neuromuscular changes (poss demyelinating process nonspecific followed by Dr. Hernandez with [facility] neurology), psoriatic arthritis presented to ER with SOA and exhaustion.      Hypoxia, VIDAL  Possible RML pna  --was just admitted for similar 2/24/23 to 2/26/23   --recent echo showed normal EF, mild pHTN  --CTA chest recent admit was negative for PE.    --  CXR tonight showed perihilar disease suggesting mild pulm vascular congestion/small left pleural effusion  --CTA motion limited, increase in plueral effusions, atelectasis vs edema vs pneumonia   -- normal WBC and procal so not convinced that pna but will cover for now with aztreonam and levaquin  --note COVID, respiratory PCR negative recently.  Repeat COVID test negative   --attempted PFT's back in 2022 noted that patient couldn't follow directions for accurate test  --duonebs  --encourage good pulmonary toilet with flutter valve and IS  -most likely needs diuresis, however hyponatremic           Hyponatremia  Diarrhea  Poor PO intake  --much worse than on discharge a few days ago  --ER gave NS at 100/hr, will check stat sodium now and decrease NS rate to 25/hr for now.   Follow serial sodiums  --check GI PCR  --chlorthalidone stopped recent admit     HTN  HL  Factor V Leiden Def  Hx PDA s/p Repair  --follows with Dr. Maya with cardiology  --continue  ASA, home BP meds     Neurologic changes/disorder  Nonspecific, Possible Demyelinating Process  Hx CVA  --recently started on cymbalta,-consider tapering off   --hold home imuran, cosentyx for now     Hx Psoriatic Arthritis     Hypothyroidism  --continue home levothyroxine     T2DM  --HbA1C = 7.6  -- levemir 10units qhs and cover with low dose SSI               Expected Discharge Location and Transportation: home  Expected Discharge        DVT prophylaxis:  No DVT prophylaxis order currently exists.          CODE STATUS:   Code Status and Medical Interventions:   Ordered at: 03/02/23 1920     Level Of Support Discussed With:    Patient     Code Status (Patient has no pulse and is not breathing):    CPR (Attempt to Resuscitate)     Medical Interventions (Patient has pulse or is breathing):    Full Support     Release to patient:    Routine Release       Sofiya Moy,   03/03/23

## 2023-03-03 NOTE — PLAN OF CARE
Goal Outcome Evaluation:              Outcome Evaluation: Transfer from 2G, VSS, Titrated O2 from 3L to 2LNC, Sinus Aubrey to NSR on tele-monitor. No c/o SOA a this time. Clean catch urine collected and sent to lab. Will cont. POC

## 2023-03-03 NOTE — SIGNIFICANT NOTE
03/03/23    Patient admitted with sodium of 118 that decreased to 117 with normal saline at a rate of 25. On labs collected at 23:59.  Normal Saline increased to 50 mL/hour.  Repeat labs requested to be collected at 4 AM.  Results currently not available as labs were not collected until 6:15 AM.     Electronically signed by Kylie Adames DO, 03/03/23, 7:17 AM EST.

## 2023-03-04 PROBLEM — R06.00 DYSPNEA: Status: ACTIVE | Noted: 2022-10-28

## 2023-03-04 PROBLEM — E10.9 TYPE 1 DIABETES (HCC): Status: ACTIVE | Noted: 2023-02-06

## 2023-03-04 PROBLEM — E87.1 HYPONATREMIA: Status: ACTIVE | Noted: 2023-03-04

## 2023-03-04 LAB
CHOLEST SERPL-MCNC: 158 MG/DL (ref 0–200)
GLUCOSE BLDC GLUCOMTR-MCNC: 102 MG/DL (ref 70–130)
GLUCOSE BLDC GLUCOMTR-MCNC: 77 MG/DL (ref 70–130)
GLUCOSE BLDC GLUCOMTR-MCNC: 92 MG/DL (ref 70–130)
HDLC SERPL-MCNC: 64 MG/DL (ref 40–60)
LDLC SERPL CALC-MCNC: 82 MG/DL (ref 0–100)
LDLC/HDLC SERPL: 1.29 {RATIO}
SODIUM SERPL-SCNC: 125 MMOL/L (ref 136–145)
SODIUM SERPL-SCNC: 126 MMOL/L (ref 136–145)
SODIUM SERPL-SCNC: 127 MMOL/L (ref 136–145)
SODIUM SERPL-SCNC: 127 MMOL/L (ref 136–145)
TRIGL SERPL-MCNC: 58 MG/DL (ref 0–150)
VLDLC SERPL-MCNC: 12 MG/DL (ref 5–40)

## 2023-03-04 PROCEDURE — 63710000001 INSULIN LISPRO (HUMAN) PER 5 UNITS: Performed by: INTERNAL MEDICINE

## 2023-03-04 PROCEDURE — 25010000002 AZITHROMYCIN PER 500 MG: Performed by: FAMILY MEDICINE

## 2023-03-04 PROCEDURE — 82962 GLUCOSE BLOOD TEST: CPT

## 2023-03-04 PROCEDURE — 94761 N-INVAS EAR/PLS OXIMETRY MLT: CPT

## 2023-03-04 PROCEDURE — 94799 UNLISTED PULMONARY SVC/PX: CPT

## 2023-03-04 PROCEDURE — 25010000002 CEFTRIAXONE PER 250 MG: Performed by: FAMILY MEDICINE

## 2023-03-04 PROCEDURE — 84295 ASSAY OF SERUM SODIUM: CPT | Performed by: INTERNAL MEDICINE

## 2023-03-04 PROCEDURE — 99233 SBSQ HOSP IP/OBS HIGH 50: CPT | Performed by: INTERNAL MEDICINE

## 2023-03-04 PROCEDURE — 80061 LIPID PANEL: CPT | Performed by: INTERNAL MEDICINE

## 2023-03-04 PROCEDURE — 63710000001 INSULIN DETEMIR PER 5 UNITS: Performed by: INTERNAL MEDICINE

## 2023-03-04 PROCEDURE — 25010000002 ENOXAPARIN PER 10 MG: Performed by: NURSE PRACTITIONER

## 2023-03-04 RX ORDER — LIDOCAINE 50 MG/G
2 PATCH TOPICAL
Status: DISCONTINUED | OUTPATIENT
Start: 2023-03-04 | End: 2023-03-09 | Stop reason: HOSPADM

## 2023-03-04 RX ORDER — INSULIN LISPRO 100 [IU]/ML
2 INJECTION, SOLUTION INTRAVENOUS; SUBCUTANEOUS
Status: DISCONTINUED | OUTPATIENT
Start: 2023-03-04 | End: 2023-03-05

## 2023-03-04 RX ORDER — ACETAMINOPHEN 325 MG/1
650 TABLET ORAL EVERY 6 HOURS PRN
Status: DISCONTINUED | OUTPATIENT
Start: 2023-03-04 | End: 2023-03-09 | Stop reason: HOSPADM

## 2023-03-04 RX ADMIN — ACETAMINOPHEN 325MG 650 MG: 325 TABLET ORAL at 14:14

## 2023-03-04 RX ADMIN — ASPIRIN 81 MG: 81 TABLET, COATED ORAL at 08:09

## 2023-03-04 RX ADMIN — TOBRAMYCIN AND DEXAMETHASONE 1 DROP: 3; 1 SUSPENSION/ DROPS OPHTHALMIC at 13:32

## 2023-03-04 RX ADMIN — DULOXETINE HYDROCHLORIDE 30 MG: 30 CAPSULE, DELAYED RELEASE ORAL at 08:09

## 2023-03-04 RX ADMIN — PRAVASTATIN SODIUM 20 MG: 20 TABLET ORAL at 17:40

## 2023-03-04 RX ADMIN — BUDESONIDE AND FORMOTEROL FUMARATE DIHYDRATE 1 PUFF: 160; 4.5 AEROSOL RESPIRATORY (INHALATION) at 07:36

## 2023-03-04 RX ADMIN — IPRATROPIUM BROMIDE AND ALBUTEROL SULFATE 3 ML: 2.5; .5 SOLUTION RESPIRATORY (INHALATION) at 07:36

## 2023-03-04 RX ADMIN — TOBRAMYCIN AND DEXAMETHASONE 1 DROP: 3; 1 SUSPENSION/ DROPS OPHTHALMIC at 05:48

## 2023-03-04 RX ADMIN — TOBRAMYCIN AND DEXAMETHASONE 1 DROP: 3; 1 SUSPENSION/ DROPS OPHTHALMIC at 21:15

## 2023-03-04 RX ADMIN — AZITHROMYCIN 500 MG: 500 INJECTION, POWDER, LYOPHILIZED, FOR SOLUTION INTRAVENOUS at 12:19

## 2023-03-04 RX ADMIN — BUDESONIDE AND FORMOTEROL FUMARATE DIHYDRATE 1 PUFF: 160; 4.5 AEROSOL RESPIRATORY (INHALATION) at 19:40

## 2023-03-04 RX ADMIN — CETIRIZINE HYDROCHLORIDE 5 MG: 10 TABLET, FILM COATED ORAL at 08:09

## 2023-03-04 RX ADMIN — IPRATROPIUM BROMIDE AND ALBUTEROL SULFATE 3 ML: 2.5; .5 SOLUTION RESPIRATORY (INHALATION) at 12:46

## 2023-03-04 RX ADMIN — AMLODIPINE BESYLATE 10 MG: 10 TABLET ORAL at 08:09

## 2023-03-04 RX ADMIN — IPRATROPIUM BROMIDE AND ALBUTEROL SULFATE 3 ML: 2.5; .5 SOLUTION RESPIRATORY (INHALATION) at 19:40

## 2023-03-04 RX ADMIN — ACETAMINOPHEN 325MG 650 MG: 325 TABLET ORAL at 22:25

## 2023-03-04 RX ADMIN — MONTELUKAST 10 MG: 10 TABLET, FILM COATED ORAL at 21:15

## 2023-03-04 RX ADMIN — TOBRAMYCIN AND DEXAMETHASONE 1 DROP: 3; 1 SUSPENSION/ DROPS OPHTHALMIC at 10:19

## 2023-03-04 RX ADMIN — NEBIVOLOL 20 MG: 20 TABLET ORAL at 08:08

## 2023-03-04 RX ADMIN — TERAZOSIN HYDROCHLORIDE 2 MG: 2 CAPSULE ORAL at 21:15

## 2023-03-04 RX ADMIN — LIDOCAINE 2 PATCH: 700 PATCH TOPICAL at 15:00

## 2023-03-04 RX ADMIN — ENOXAPARIN SODIUM 40 MG: 40 INJECTION SUBCUTANEOUS at 12:09

## 2023-03-04 RX ADMIN — IPRATROPIUM BROMIDE AND ALBUTEROL SULFATE 3 ML: 2.5; .5 SOLUTION RESPIRATORY (INHALATION) at 16:29

## 2023-03-04 RX ADMIN — OXYBUTYNIN CHLORIDE 10 MG: 5 TABLET, EXTENDED RELEASE ORAL at 08:09

## 2023-03-04 RX ADMIN — CEFTRIAXONE 2 G: 2 INJECTION, POWDER, FOR SOLUTION INTRAMUSCULAR; INTRAVENOUS at 14:59

## 2023-03-04 RX ADMIN — INSULIN LISPRO 2 UNITS: 100 INJECTION, SOLUTION INTRAVENOUS; SUBCUTANEOUS at 12:18

## 2023-03-04 RX ADMIN — TOBRAMYCIN AND DEXAMETHASONE 1 DROP: 3; 1 SUSPENSION/ DROPS OPHTHALMIC at 17:40

## 2023-03-04 RX ADMIN — ALBUTEROL SULFATE 2 PUFF: 90 AEROSOL, METERED RESPIRATORY (INHALATION) at 03:56

## 2023-03-04 RX ADMIN — FLUTICASONE PROPIONATE 2 SPRAY: 50 SPRAY, METERED NASAL at 08:16

## 2023-03-04 RX ADMIN — LEVOTHYROXINE SODIUM 75 MCG: 0.07 TABLET ORAL at 05:48

## 2023-03-04 RX ADMIN — INSULIN DETEMIR 5 UNITS: 100 INJECTION, SOLUTION SUBCUTANEOUS at 21:15

## 2023-03-04 NOTE — PLAN OF CARE
Goal Outcome Evaluation:           Progress: no change  Outcome Evaluation: VSS, A&Ox4, remains on 2LNC.  No acute events this shift so far.  Will continue to monitor patient and continue with GOC

## 2023-03-04 NOTE — PROGRESS NOTES
[facility] Medicine Services  PROGRESS NOTE    Patient Name: Marilyn Kunz  : 1945  MRN: [id number]    Date of Admission: 3/2/2023  Primary Care Physician: Peter Baca MD    Subjective   Subjective     CC:  dyspnea    HPI:  Persistent dyspnea - worse w lying flat at night which she hasn't done in weeks  Fluid restricting herself with <1 liter fluid intake yesterday  Minimal, not productive cough    ROS:  Gen- No fevers, chills  CV- No chest pain, palpitations  GI- No N/V/D, abd pain    Objective   Objective     Vital Signs:   Temp:  [97.2 °F (36.2 °C)-98.2 °F (36.8 °C)] 97.2 °F (36.2 °C)  Heart Rate:  [57-86] 62  Resp:  [16-20] 16  BP: (112-140)/(57-98) 140/66  Flow (L/min):  [2-3] 2     Physical Exam:  Constitutional: No acute distress, awake, alert  HENT: NCAT, mucous membranes moist  Respiratory: Clear to auscultation except bibasilar where lung sounds are decreased, respiratory effort normal satting 88-90% on room air on my exam  Cardiovascular: RRR, no murmurs, rubs, or gallops  Gastrointestinal: Soft, nontender, nondistended  Musculoskeletal: Muscle tone within normal limits, no joint effusions appreciated  Psychiatric: Appropriate affect, cooperative  Neurologic: Alert and oriented, facial movements symmetric and spontaneous movement of all 4 extremities grossly equal bilaterally, speech clear  Skin: No rashes    Results Reviewed:  LAB RESULTS:      Lab 23  0615 23  1539 23  0532   WBC 8.43 9.27 8.71   HEMOGLOBIN 9.4* 10.9* 10.1*   HEMATOCRIT 27.7* 32.6* 30.7*   PLATELETS 267 301 200   NEUTROS ABS  --  7.86*  --    IMMATURE GRANS (ABS)  --  0.06*  --    LYMPHS ABS  --  0.78  --    MONOS ABS  --  0.56  --    EOS ABS  --  0.01  --    MCV 88.5 88.3 93.6   PROCALCITONIN  --  0.09  --    LACTATE  --  1.6  --          Lab 23  0548 23  0020 23  1143 23  0616 23  0615 23  2359 23  2122 23  1539  02/26/23  0532   SODIUM 126* 125* 122*  --  119* 117*   < > 118* 132*   POTASSIUM  --   --  4.4  --  3.9 4.5  --  4.4 3.8   CHLORIDE  --   --  87*  --  85* 82*  --  81* 97*   CO2  --   --  23.0  --  23.0 23.0  --  23.0 25.0   ANION GAP  --   --  12.0  --  11.0 12.0  --  14.0 10.0   BUN  --   --  14  --  16 19  --  19 14   CREATININE  --   --  0.95  --  0.90 0.92  --  0.88 0.79   EGFR  --   --  61.8  --  66.0 64.3  --  67.8 77.2   GLUCOSE  --   --  239*  --  120* 199*  --  252* 135*   CALCIUM  --   --  8.7  --  8.4* 8.8  --  9.4 8.8   IONIZED CALCIUM  --   --   --  1.20  --   --   --   --   --    MAGNESIUM  --   --   --   --  1.5*  --   --   --   --    PHOSPHORUS  --   --   --   --  3.2  --   --   --   --     < > = values in this interval not displayed.         Lab 03/02/23  1539   TOTAL PROTEIN 7.6   ALBUMIN 4.5   GLOBULIN 3.1   ALT (SGPT) 16   AST (SGOT) 24   BILIRUBIN 1.5*   ALK PHOS 66         Lab 03/03/23  0615 03/02/23  1539   PROBNP 5,883.0* 6,798.0*   HSTROP T  --  19*         Lab 03/04/23  0548   CHOLESTEROL 158   LDL CHOL 82   HDL CHOL 64*   TRIGLYCERIDES 58             Brief Urine Lab Results  (Last result in the past 365 days)      Color   Clarity   Blood   Leuk Est   Nitrite   Protein   CREAT   Urine HCG        03/03/23 1457             56.4               Microbiology Results Abnormal     Procedure Component Value - Date/Time    Blood Culture - Blood, Wrist, Left [[id number]]  (Normal) Collected: 03/02/23 2121    Lab Status: Preliminary result Specimen: Blood from Wrist, Left Updated: 03/03/23 2146     Blood Culture No growth at 24 hours    Blood Culture - Blood, Arm, Left [[id number]]  (Normal) Collected: 03/02/23 2121    Lab Status: Preliminary result Specimen: Blood from Arm, Left Updated: 03/03/23 2146     Blood Culture No growth at 24 hours    COVID PRE-OP / PRE-PROCEDURE SCREENING ORDER (NO ISOLATION) - Swab, Nasopharynx [[id number]]  (Normal) Collected: 03/02/23 1755    Lab Status: Final result  Specimen: Swab from Nasopharynx Updated: 03/02/23 1829    Narrative:      The following orders were created for panel order COVID PRE-OP / PRE-PROCEDURE SCREENING ORDER (NO ISOLATION) - Swab, Nasopharynx.  Procedure                               Abnormality         Status                     ---------                               -----------         ------                     COVID-19 and FLU A/B PCR...[[id number]]  Normal              Final result                 Please view results for these tests on the individual orders.    COVID-19 and FLU A/B PCR - Swab, Nasopharynx [[id number]]  (Normal) Collected: 03/02/23 1755    Lab Status: Final result Specimen: Swab from Nasopharynx Updated: 03/02/23 1829     COVID19 Not Detected     Influenza A PCR Not Detected     Influenza B PCR Not Detected    Narrative:      Fact sheet for providers: https://www.fda.gov/media/995169/download    Fact sheet for patients: https://www.fda.gov/media/665126/download    Test performed by PCR.          XR Chest 1 View    Result Date: 3/2/2023  XR CHEST 1 VW Date of Exam: 3/2/2023 3:24 PM EST Indication: SOA triage protocol. Comparison: 2/24/2023 Findings: Heart shadow is normal in size. There is increased perihilar interstitial disease, suggesting perihilar edema. In addition, there is also focal dense airspace opacity of the right lung base which may represent atelectasis or pneumonia. There appears to be a small new right pleural effusion. There is minimal if any right effusion. No pneumothorax is seen. Bony structures appear to be intact.     Impression: Impression: 1. Perihilar disease suggesting mild pulmonary vascular congestion. Small left pleural effusion. 2. New focal disease in the medial right lung base, potentially pneumonia. Electronically Signed: Angus Scott  3/2/2023 3:52 PM EST  Workstation ID: [id number]    CT Angiogram Chest    Result Date: 3/2/2023  CT ANGIOGRAM CHEST Date of Exam: 3/2/2023 9:45 PM EST Indication: PE.  pneumonia?. Comparison: 2/25/2023 Technique: CTA of the chest was performed after the uneventful intravenous administration of 79 mL Isovue-370. Reconstructed coronal and sagittal images were also obtained. In addition, a 3-D volume rendered image was created for interpretation. Automated exposure control and iterative reconstruction methods were used.  FINDINGS: [ ] Pulmonary Arteries: Motion Limited imaging demonstrates no evidence of pulmonary embolus through the proximal lobar level or suspected into the segmental level. The main pulmonary arteries are normal in caliber. Mediastinum: Heart size is stable. No suspicious pericardial effusion noted. Aorta and origin of great vessels grossly unremarkable. Mild hilar adenopathy and mediastinal adenopathy are likely reactive. The esophagus is stable Lungs/pleura: Moderate right-sided pleural effusion is slightly increased in comparison. Small left-sided pleural effusion is similar to slightly increased in comparison there is associated dependent opacity likely atelectasis. Pneumonia is not excluded. Bandlike opacity, groundglass opacity and patchy opacity within the lungs bilaterally with a right basilar and left-sided predominance is slightly increased in the comparison. Central airways appear grossly patent Upper Abdomen: Limited images of the upper abdomen are unremarkable. Soft tissues/Bones: No acute bone or soft tissue abnormality.     Impression: 1. No evidence of a central or foraminal is a motion Limited imaging. More peripheral imaging is limited. 2. Interval increase in bilateral pleural effusions and dependent opacities 3. Increase in patchy groundglass opacities which could represent atelectasis, pulmonary edema or pneumonia in the correct clinical setting Electronically Signed: Fabian Fernandez  3/2/2023 10:24 PM EST  Workstation ID: [id number]      Results for orders placed during the hospital encounter of 02/24/23    Adult Transthoracic Echo Complete W/  Cont if Necessary Per Protocol    Interpretation Summary  •  Left ventricular ejection fraction appears to be 61 - 65%.  •  Estimated right ventricular systolic pressure from tricuspid regurgitation is mildly elevated (35-45 mmHg).      Current medications:  Scheduled Meds:amLODIPine, 10 mg, Oral, Daily  aspirin, 81 mg, Oral, Daily  azithromycin, 500 mg, Intravenous, Q24H  budesonide-formoterol, 1 puff, Inhalation, BID - RT  cefTRIAXone, 2 g, Intravenous, Q24H  cetirizine, 5 mg, Oral, Daily  DULoxetine, 30 mg, Oral, Daily  enoxaparin, 40 mg, Subcutaneous, Q24H  fluticasone, 2 spray, Each Nare, Daily  insulin detemir, 5 Units, Subcutaneous, Nightly  insulin lispro, 0-7 Units, Subcutaneous, TID AC  ipratropium-albuterol, 3 mL, Nebulization, 4x Daily - RT  levothyroxine, 75 mcg, Oral, Q AM  montelukast, 10 mg, Oral, Nightly  nebivolol, 20 mg, Oral, Daily  oxybutynin XL, 10 mg, Oral, Daily  pravastatin, 20 mg, Oral, Q PM  terazosin, 2 mg, Oral, Nightly  tobramycin-dexamethasone, 1 drop, Left Eye, Q4H While Awake      Continuous Infusions:   PRN Meds:.•  albuterol sulfate HFA  •  dextrose  •  dextrose  •  glucagon (human recombinant)  •  sodium chloride    Assessment & Plan   Assessment & Plan     Active Hospital Problems    Diagnosis  POA   • Hyponatremia [E87.1]  Yes   • Acute respiratory failure with hypoxia (HCC) [J96.01]  Yes   • History of neuromuscular disorder [Z86.69]  Not Applicable   • Type 2 diabetes mellitus with stage 3 chronic kidney disease, with long-term current use of insulin (HCC) [E11.22, N18.30, Z79.4]  Not Applicable   • Hypothyroidism [E03.9]  Yes      Resolved Hospital Problems   No resolved problems to display.        Brief Hospital Course to date:  Marilyn Kunz is a 77 y.o. female w heterozygous factor V leiden, type ONE diabetes, recent admission 2/24-2/26 w hyponatremia attributed to dehydration + dyspnea on exertion.     Severe hyponatremia  -Na 132 on discharge 2/26 --> 118 on  3/2  -improving at appropriate rate w fluid restriction this admission  -d/w nephrology 3/4: d/c IVF, continue fluid restriction and q6 monitoring  -challenging to assess if cause hypovolemic/hypervolemic, features of each in history, I&O limited, but given orthopnea + improvement, fluid restrict for now and monitor closely for need for change in course  -possible med confusion as well: chlorthalidone + HCTZ supplies in recent past at home. Will need careful med rec at discharge    Possible CAP  -favor mild pulm edema in setting of elevated proBNP + hyponatremia but fair to complete coverage: rocephin + azithromycin    Type ONE diabetes, well controlled  - decrease basal insulin today given AM glc 70  - add meal-time insulin w SSI    HTN - hytrin, amlodipine, nebivolol. irbeartan and PRN clonidine on med list as well, hold for now. More lenient goals for this older patient who lives home alone, SBP <170    Hypothyroidism, controlled - levothyroxine  Demyelinating disorder - follows outpatient w loli Hernandez. Pharmacy consult for help restarting azathioprine/cosentyx as appropriate    Problems  Severe exacerbation of chronic illness: acute on chronic hyponatremia    Data   Category 1   Prior external notes reviewed: reviewed Dr Maya last clinic note for HTN management plan as summarized above  Test results reviewed: CBC, q6 Na  Tests ordered: q6Na, repeat BMP in AM  Category 2  Tests independently interpreted: I performed an independent review of CXR and my findings are as follows: 3/2 CXR - most c/w mild pulm edema w right lower lobe worse than other areas on review    Category 3  Case discussed with external physician or APC: d/w nephrologist consultant 3/4    Expected Discharge Location and Transportation: PT/OT to [facility]  Expected Discharge 3/7 (Discharge date is tentative pending patient's medical condition and is subject to change)       DVT prophylaxis:  Medical DVT prophylaxis orders are present.      AM-PAC 6 Clicks Score (PT): 19 (03/03/23 0800)    CODE STATUS:   Code Status and Medical Interventions:   Ordered at: 03/02/23 1920     Level Of Support Discussed With:    Patient     Code Status (Patient has no pulse and is not breathing):    CPR (Attempt to Resuscitate)     Medical Interventions (Patient has pulse or is breathing):    Full Support     Release to patient:    Routine Release       Princess Jones MD  03/04/23

## 2023-03-04 NOTE — PROGRESS NOTES
"   LOS: 1 day    Patient Care Team:  Peter Baca MD as PCP - General (Family Medicine)  Eun Eagle MD as Consulting Physician (Rheumatology)  Yao Maya MD as Consulting Physician (Cardiology)  Anusha Evans MD as Cardiologist (Cardiology)  Lucas Haley DO as Consulting Physician (Pulmonary Disease)    Chief Complaint: Hyponatremia  Serum sodium 118, which went down to 117 after admission, she has a previous history of sodium 128-132 on last admission.  Patient had some diarrhea prior to admission.  Subjective     Interval History:   Improving serum sodium level from 117 up to 126 at this time.        Review of Systems:   Patient complains of nausea ongoing for 2 weeks, no diarrhea.  Patient denies shortness of breath, chest pain, dysuria, hematuria, nausea, vomiting.  All other systems negative    Objective     Vital Sign Min/Max for last 24 hours  Temp  Min: 97.2 °F (36.2 °C)  Max: 98.2 °F (36.8 °C)   BP  Min: 112/57  Max: 140/66   Pulse  Min: 57  Max: 86   Resp  Min: 16  Max: 20   SpO2  Min: 88 %  Max: 95 %   Flow (L/min)  Min: 2  Max: 3   No data recorded     Flowsheet Rows    Flowsheet Row First Filed Value   Admission Height 149.9 cm (59\") Documented at 03/02/2023 1500   Admission Weight 59 kg (130 lb) Documented at 03/02/2023 1500          I/O this shift:  In: 237 [P.O.:237]  Out: -   I/O last 3 completed shifts:  In: 200 [P.O.:200]  Out: 600 [Urine:600]    Physical Exam:  General Appearance: Alert, oriented, no obvious distress.\  Neck: No JVD.  Eyes: PER, EOMI.  Neck: Supple no JVD.  Lungs: Clear auscultation, no rales rhonchi's, equal chest movement, nonlabored.  Heart: No gallop, murmur, rub, RRR.  Abdomen: Soft, nontender, positive bowel sounds, no organomegaly.  Extremities: Face edema, no cyanosis.  Neuro: No focal deficit, moving all extremities, alert oriented X 3  Psych: Mood and affect are normal appropriate   no suprapubic fullness or " tenderness, no Conte catheter    WBC WBC   Date Value Ref Range Status   03/03/2023 8.43 3.40 - 10.80 10*3/mm3 Final   03/02/2023 9.27 3.40 - 10.80 10*3/mm3 Final      HGB Hemoglobin   Date Value Ref Range Status   03/03/2023 9.4 (L) 12.0 - 15.9 g/dL Final   03/02/2023 10.9 (L) 12.0 - 15.9 g/dL Final      HCT Hematocrit   Date Value Ref Range Status   03/03/2023 27.7 (L) 34.0 - 46.6 % Final   03/02/2023 32.6 (L) 34.0 - 46.6 % Final      Platlets No results found for: LABPLAT   MCV MCV   Date Value Ref Range Status   03/03/2023 88.5 79.0 - 97.0 fL Final   03/02/2023 88.3 79.0 - 97.0 fL Final          Sodium Sodium   Date Value Ref Range Status   03/04/2023 126 (L) 136 - 145 mmol/L Final   03/04/2023 125 (L) 136 - 145 mmol/L Final   03/03/2023 122 (L) 136 - 145 mmol/L Final   03/03/2023 119 (C) 136 - 145 mmol/L Final   03/02/2023 117 (C) 136 - 145 mmol/L Final   03/02/2023 118 (C) 136 - 145 mmol/L Final   03/02/2023 118 (C) 136 - 145 mmol/L Final      Potassium Potassium   Date Value Ref Range Status   03/03/2023 4.4 3.5 - 5.2 mmol/L Final   03/03/2023 3.9 3.5 - 5.2 mmol/L Final   03/02/2023 4.5 3.5 - 5.2 mmol/L Final   03/02/2023 4.4 3.5 - 5.2 mmol/L Final     Comment:     Slight hemolysis detected by analyzer. Results may be affected.      Chloride Chloride   Date Value Ref Range Status   03/03/2023 87 (L) 98 - 107 mmol/L Final   03/03/2023 85 (L) 98 - 107 mmol/L Final   03/02/2023 82 (L) 98 - 107 mmol/L Final   03/02/2023 81 (L) 98 - 107 mmol/L Final      CO2 CO2   Date Value Ref Range Status   03/03/2023 23.0 22.0 - 29.0 mmol/L Final   03/03/2023 23.0 22.0 - 29.0 mmol/L Final   03/02/2023 23.0 22.0 - 29.0 mmol/L Final   03/02/2023 23.0 22.0 - 29.0 mmol/L Final      BUN BUN   Date Value Ref Range Status   03/03/2023 14 8 - 23 mg/dL Final   03/03/2023 16 8 - 23 mg/dL Final   03/02/2023 19 8 - 23 mg/dL Final   03/02/2023 19 8 - 23 mg/dL Final      Creatinine Creatinine   Date Value Ref Range Status   03/03/2023 0.95  0.57 - 1.00 mg/dL Final   03/03/2023 0.90 0.57 - 1.00 mg/dL Final   03/02/2023 0.92 0.57 - 1.00 mg/dL Final   03/02/2023 0.88 0.57 - 1.00 mg/dL Final      Calcium Calcium   Date Value Ref Range Status   03/03/2023 8.7 8.6 - 10.5 mg/dL Final   03/03/2023 8.4 (L) 8.6 - 10.5 mg/dL Final   03/02/2023 8.8 8.6 - 10.5 mg/dL Final   03/02/2023 9.4 8.6 - 10.5 mg/dL Final      PO4 No results found for: CAPO4   Albumin Albumin   Date Value Ref Range Status   03/02/2023 4.5 3.5 - 5.2 g/dL Final      Magnesium Magnesium   Date Value Ref Range Status   03/03/2023 1.5 (L) 1.6 - 2.4 mg/dL Final      Uric Acid No results found for: URICACID        Results Review:     I reviewed the patient's new clinical results.    amLODIPine, 10 mg, Oral, Daily  aspirin, 81 mg, Oral, Daily  azithromycin, 500 mg, Intravenous, Q24H  budesonide-formoterol, 1 puff, Inhalation, BID - RT  cefTRIAXone, 2 g, Intravenous, Q24H  cetirizine, 5 mg, Oral, Daily  DULoxetine, 30 mg, Oral, Daily  enoxaparin, 40 mg, Subcutaneous, Q24H  fluticasone, 2 spray, Each Nare, Daily  insulin detemir, 5 Units, Subcutaneous, Nightly  insulin lispro, 0-7 Units, Subcutaneous, TID AC  Insulin Lispro, 2 Units, Subcutaneous, TID With Meals  ipratropium-albuterol, 3 mL, Nebulization, 4x Daily - RT  levothyroxine, 75 mcg, Oral, Q AM  montelukast, 10 mg, Oral, Nightly  nebivolol, 20 mg, Oral, Daily  oxybutynin XL, 10 mg, Oral, Daily  pravastatin, 20 mg, Oral, Q PM  terazosin, 2 mg, Oral, Nightly  tobramycin-dexamethasone, 1 drop, Left Eye, Q4H While Awake           Medication Review: Reviewed    Assessment & Plan       Hypothyroidism    Dyspnea    Type 1 diabetes (HCC)    History of neuromuscular disorder    Acute respiratory failure with hypoxia (HCC)    Hyponatremia    1.  Severe hyponatremia: Serum sodium 118 on admission, on previous admission patient sodium range from 128-132 on discharge on February 26, 2023, patient has diarrhea prior to admission.  Patient also had nausea  for the couple of weeks which can increase the ADH secretion with a still good response for the tubular level to the ADH.     Plan:  Serum sodium appropriately going up 126 at this time from yesterday 117.  Liver enzymes within normal limits, serum osmolarity 263, urine creatinine 56.4, urine sodium 41, urine protein 5.6, urine osmolality 351, serum creatinine 0.95.  Fluid restriction 1000 mL/day.  Check sodium level every 6 hours.  Monitor electrolytes closely.  This patient with multiple medical problems.  She is awake alert oriented x4, no headache blurring of vision no other neurological symptoms.  Serum sodium is started to come up.  Will monitor, no need for 3% saline at this time.  I discussed the patients findings and my recommendations with patient and Dr. Princess Enriquez MD  03/04/23  10:55 EST

## 2023-03-04 NOTE — PLAN OF CARE
Goal Outcome Evaluation:  Plan of Care Reviewed With: patient           Outcome Evaluation: No acute events overnight. Remains on 2LNC. No resp distress noted. SR on tele. Slept on and off. VSS. Will cont with POC

## 2023-03-05 LAB
ANION GAP SERPL CALCULATED.3IONS-SCNC: 11 MMOL/L (ref 5–15)
BUN SERPL-MCNC: 14 MG/DL (ref 8–23)
BUN/CREAT SERPL: 20.9 (ref 7–25)
CALCIUM SPEC-SCNC: 8.3 MG/DL (ref 8.6–10.5)
CHLORIDE SERPL-SCNC: 94 MMOL/L (ref 98–107)
CO2 SERPL-SCNC: 23 MMOL/L (ref 22–29)
CREAT SERPL-MCNC: 0.67 MG/DL (ref 0.57–1)
EGFRCR SERPLBLD CKD-EPI 2021: 90.2 ML/MIN/1.73
GLUCOSE BLDC GLUCOMTR-MCNC: 107 MG/DL (ref 70–130)
GLUCOSE BLDC GLUCOMTR-MCNC: 161 MG/DL (ref 70–130)
GLUCOSE BLDC GLUCOMTR-MCNC: 206 MG/DL (ref 70–130)
GLUCOSE BLDC GLUCOMTR-MCNC: 249 MG/DL (ref 70–130)
GLUCOSE BLDC GLUCOMTR-MCNC: 252 MG/DL (ref 70–130)
GLUCOSE BLDC GLUCOMTR-MCNC: 52 MG/DL (ref 70–130)
GLUCOSE SERPL-MCNC: 101 MG/DL (ref 65–99)
MAGNESIUM SERPL-MCNC: 1.8 MG/DL (ref 1.6–2.4)
POTASSIUM SERPL-SCNC: 4.2 MMOL/L (ref 3.5–5.2)
SODIUM SERPL-SCNC: 128 MMOL/L (ref 136–145)

## 2023-03-05 PROCEDURE — 94799 UNLISTED PULMONARY SVC/PX: CPT

## 2023-03-05 PROCEDURE — 84295 ASSAY OF SERUM SODIUM: CPT | Performed by: INTERNAL MEDICINE

## 2023-03-05 PROCEDURE — 99232 SBSQ HOSP IP/OBS MODERATE 35: CPT | Performed by: INTERNAL MEDICINE

## 2023-03-05 PROCEDURE — 25010000002 CEFTRIAXONE PER 250 MG: Performed by: FAMILY MEDICINE

## 2023-03-05 PROCEDURE — 63710000001 INSULIN LISPRO (HUMAN) PER 5 UNITS: Performed by: INTERNAL MEDICINE

## 2023-03-05 PROCEDURE — 94664 DEMO&/EVAL PT USE INHALER: CPT

## 2023-03-05 PROCEDURE — 83735 ASSAY OF MAGNESIUM: CPT

## 2023-03-05 PROCEDURE — 25010000002 AZITHROMYCIN PER 500 MG: Performed by: FAMILY MEDICINE

## 2023-03-05 PROCEDURE — 82962 GLUCOSE BLOOD TEST: CPT

## 2023-03-05 PROCEDURE — 80048 BASIC METABOLIC PNL TOTAL CA: CPT | Performed by: INTERNAL MEDICINE

## 2023-03-05 PROCEDURE — 25010000002 ENOXAPARIN PER 10 MG: Performed by: NURSE PRACTITIONER

## 2023-03-05 PROCEDURE — 63710000001 AZATHIOPRINE PER 50 MG: Performed by: INTERNAL MEDICINE

## 2023-03-05 RX ORDER — INSULIN LISPRO 100 [IU]/ML
3 INJECTION, SOLUTION INTRAVENOUS; SUBCUTANEOUS
Status: DISCONTINUED | OUTPATIENT
Start: 2023-03-05 | End: 2023-03-06

## 2023-03-05 RX ORDER — AZATHIOPRINE 50 MG/1
100 TABLET ORAL 2 TIMES DAILY
Status: DISCONTINUED | OUTPATIENT
Start: 2023-03-05 | End: 2023-03-09 | Stop reason: HOSPADM

## 2023-03-05 RX ORDER — POLYETHYLENE GLYCOL 3350 17 G/17G
17 POWDER, FOR SOLUTION ORAL DAILY
Status: DISCONTINUED | OUTPATIENT
Start: 2023-03-05 | End: 2023-03-09 | Stop reason: HOSPADM

## 2023-03-05 RX ADMIN — LEVOTHYROXINE SODIUM 75 MCG: 0.07 TABLET ORAL at 06:43

## 2023-03-05 RX ADMIN — INSULIN LISPRO 2 UNITS: 100 INJECTION, SOLUTION INTRAVENOUS; SUBCUTANEOUS at 09:30

## 2023-03-05 RX ADMIN — IPRATROPIUM BROMIDE AND ALBUTEROL SULFATE 3 ML: 2.5; .5 SOLUTION RESPIRATORY (INHALATION) at 13:38

## 2023-03-05 RX ADMIN — Medication 10 ML: at 22:25

## 2023-03-05 RX ADMIN — AZITHROMYCIN 500 MG: 500 INJECTION, POWDER, LYOPHILIZED, FOR SOLUTION INTRAVENOUS at 14:16

## 2023-03-05 RX ADMIN — IPRATROPIUM BROMIDE AND ALBUTEROL SULFATE 3 ML: 2.5; .5 SOLUTION RESPIRATORY (INHALATION) at 08:09

## 2023-03-05 RX ADMIN — TOBRAMYCIN AND DEXAMETHASONE 1 DROP: 3; 1 SUSPENSION/ DROPS OPHTHALMIC at 14:20

## 2023-03-05 RX ADMIN — ENOXAPARIN SODIUM 40 MG: 40 INJECTION SUBCUTANEOUS at 12:48

## 2023-03-05 RX ADMIN — INSULIN LISPRO 2 UNITS: 100 INJECTION, SOLUTION INTRAVENOUS; SUBCUTANEOUS at 12:49

## 2023-03-05 RX ADMIN — IPRATROPIUM BROMIDE AND ALBUTEROL SULFATE 3 ML: 2.5; .5 SOLUTION RESPIRATORY (INHALATION) at 19:46

## 2023-03-05 RX ADMIN — BUDESONIDE AND FORMOTEROL FUMARATE DIHYDRATE 1 PUFF: 160; 4.5 AEROSOL RESPIRATORY (INHALATION) at 19:46

## 2023-03-05 RX ADMIN — TOBRAMYCIN AND DEXAMETHASONE 1 DROP: 3; 1 SUSPENSION/ DROPS OPHTHALMIC at 09:48

## 2023-03-05 RX ADMIN — AZATHIOPRINE 100 MG: 50 TABLET ORAL at 22:24

## 2023-03-05 RX ADMIN — INSULIN LISPRO 3 UNITS: 100 INJECTION, SOLUTION INTRAVENOUS; SUBCUTANEOUS at 18:08

## 2023-03-05 RX ADMIN — OXYBUTYNIN CHLORIDE 10 MG: 5 TABLET, EXTENDED RELEASE ORAL at 09:29

## 2023-03-05 RX ADMIN — TERAZOSIN HYDROCHLORIDE 2 MG: 2 CAPSULE ORAL at 22:25

## 2023-03-05 RX ADMIN — TOBRAMYCIN AND DEXAMETHASONE 1 DROP: 3; 1 SUSPENSION/ DROPS OPHTHALMIC at 06:43

## 2023-03-05 RX ADMIN — NEBIVOLOL 20 MG: 20 TABLET ORAL at 09:30

## 2023-03-05 RX ADMIN — IPRATROPIUM BROMIDE AND ALBUTEROL SULFATE 3 ML: 2.5; .5 SOLUTION RESPIRATORY (INHALATION) at 15:58

## 2023-03-05 RX ADMIN — DEXTROSE MONOHYDRATE 12.5 G: 25 INJECTION, SOLUTION INTRAVENOUS at 22:00

## 2023-03-05 RX ADMIN — TOBRAMYCIN AND DEXAMETHASONE 1 DROP: 3; 1 SUSPENSION/ DROPS OPHTHALMIC at 18:08

## 2023-03-05 RX ADMIN — FLUTICASONE PROPIONATE 2 SPRAY: 50 SPRAY, METERED NASAL at 09:32

## 2023-03-05 RX ADMIN — BUDESONIDE AND FORMOTEROL FUMARATE DIHYDRATE 1 PUFF: 160; 4.5 AEROSOL RESPIRATORY (INHALATION) at 08:12

## 2023-03-05 RX ADMIN — CETIRIZINE HYDROCHLORIDE 5 MG: 10 TABLET, FILM COATED ORAL at 09:29

## 2023-03-05 RX ADMIN — LIDOCAINE 2 PATCH: 700 PATCH TOPICAL at 09:31

## 2023-03-05 RX ADMIN — TOBRAMYCIN AND DEXAMETHASONE 1 DROP: 3; 1 SUSPENSION/ DROPS OPHTHALMIC at 22:25

## 2023-03-05 RX ADMIN — AMLODIPINE BESYLATE 10 MG: 10 TABLET ORAL at 09:29

## 2023-03-05 RX ADMIN — POLYETHYLENE GLYCOL 3350 17 G: 17 POWDER, FOR SOLUTION ORAL at 12:50

## 2023-03-05 RX ADMIN — ASPIRIN 81 MG: 81 TABLET, COATED ORAL at 09:29

## 2023-03-05 RX ADMIN — INSULIN LISPRO 4 UNITS: 100 INJECTION, SOLUTION INTRAVENOUS; SUBCUTANEOUS at 12:49

## 2023-03-05 RX ADMIN — DULOXETINE HYDROCHLORIDE 30 MG: 30 CAPSULE, DELAYED RELEASE ORAL at 09:29

## 2023-03-05 RX ADMIN — MONTELUKAST 10 MG: 10 TABLET, FILM COATED ORAL at 22:25

## 2023-03-05 RX ADMIN — CEFTRIAXONE 2 G: 2 INJECTION, POWDER, FOR SOLUTION INTRAMUSCULAR; INTRAVENOUS at 15:37

## 2023-03-05 RX ADMIN — PRAVASTATIN SODIUM 20 MG: 20 TABLET ORAL at 18:08

## 2023-03-05 NOTE — PLAN OF CARE
Goal Outcome Evaluation:   Pt rested well through the night. Complained of pain in beginning of shift in left ribcage, PRN tylenol given, reported relief. Re educated on Incentive spirometer, encouraged frequent use. Continues with Q6 sodium checks, last value 128.

## 2023-03-05 NOTE — PROGRESS NOTES
[facility] Medicine Services  PROGRESS NOTE    Patient Name: Marilyn Kunz  : 1945  MRN: [id number]    Date of Admission: 3/2/2023  Primary Care Physician: Peter Baca MD    Subjective   Subjective     CC:  dyspnea    HPI:  Prominent fatigue - otherwise feels well  Fatigue x months    ROS:  Gen- No fevers, chills  CV- No chest pain, palpitations    Objective   Objective     Vital Signs:   Temp:  [97 °F (36.1 °C)-97.9 °F (36.6 °C)] 97 °F (36.1 °C)  Heart Rate:  [54-68] 62  Resp:  [16-18] 18  BP: (106-145)/(58-82) 145/60  Flow (L/min):  [1-2] 1     Physical Exam:  Constitutional: No acute distress, awake, alert  HENT: NCAT, mucous membranes moist  Respiratory: Clear to auscultation w sats 92-94% on room air on mye xam  Cardiovascular: RRR, no murmurs, rubs, or gallops  Gastrointestinal: Soft, nontender, nondistended  Musculoskeletal: Muscle tone within normal limits, no joint effusions appreciated  Psychiatric: Appropriate affect, cooperative  Neurologic: Alert and oriented, facial movements symmetric and spontaneous movement of all 4 extremities grossly equal bilaterally, speech clear  Skin: No rashes    Results Reviewed:  LAB RESULTS:      Lab 23  0615 23  1539   WBC 8.43 9.27   HEMOGLOBIN 9.4* 10.9*   HEMATOCRIT 27.7* 32.6*   PLATELETS 267 301   NEUTROS ABS  --  7.86*   IMMATURE GRANS (ABS)  --  0.06*   LYMPHS ABS  --  0.78   MONOS ABS  --  0.56   EOS ABS  --  0.01   MCV 88.5 88.3   PROCALCITONIN  --  0.09   LACTATE  --  1.6         Lab 23  0733 23  0022 23  1749 23  1320 23  0548 23  0020 23  1143 23  0616 23  0615 23  2359 23  2122 23  1539   SODIUM 128* 128* 127* 127* 126*   < > 122*  --  119* 117*   < > 118*   POTASSIUM 4.2  --   --   --   --   --  4.4  --  3.9 4.5  --  4.4   CHLORIDE 94*  --   --   --   --   --  87*  --  85* 82*  --  81*   CO2 23.0  --   --   --   --   --   23.0  --  23.0 23.0  --  23.0   ANION GAP 11.0  --   --   --   --   --  12.0  --  11.0 12.0  --  14.0   BUN 14  --   --   --   --   --  14  --  16 19  --  19   CREATININE 0.67  --   --   --   --   --  0.95  --  0.90 0.92  --  0.88   EGFR 90.2  --   --   --   --   --  61.8  --  66.0 64.3  --  67.8   GLUCOSE 101*  --   --   --   --   --  239*  --  120* 199*  --  252*   CALCIUM 8.3*  --   --   --   --   --  8.7  --  8.4* 8.8  --  9.4   IONIZED CALCIUM  --   --   --   --   --   --   --  1.20  --   --   --   --    MAGNESIUM 1.8  --   --   --   --   --   --   --  1.5*  --   --   --    PHOSPHORUS  --   --   --   --   --   --   --   --  3.2  --   --   --     < > = values in this interval not displayed.         Lab 03/02/23  1539   TOTAL PROTEIN 7.6   ALBUMIN 4.5   GLOBULIN 3.1   ALT (SGPT) 16   AST (SGOT) 24   BILIRUBIN 1.5*   ALK PHOS 66         Lab 03/03/23  0615 03/02/23  1539   PROBNP 5,883.0* 6,798.0*   HSTROP T  --  19*         Lab 03/04/23  0548   CHOLESTEROL 158   LDL CHOL 82   HDL CHOL 64*   TRIGLYCERIDES 58             Brief Urine Lab Results  (Last result in the past 365 days)      Color   Clarity   Blood   Leuk Est   Nitrite   Protein   CREAT   Urine HCG        03/03/23 1457             56.4               Microbiology Results Abnormal     Procedure Component Value - Date/Time    Blood Culture - Blood, Wrist, Left [[id number]]  (Normal) Collected: 03/02/23 2121    Lab Status: Preliminary result Specimen: Blood from Wrist, Left Updated: 03/05/23 0744     Blood Culture No growth at 2 days    Blood Culture - Blood, Arm, Left [[id number]]  (Normal) Collected: 03/02/23 2121    Lab Status: Preliminary result Specimen: Blood from Arm, Left Updated: 03/04/23 2146     Blood Culture No growth at 2 days    COVID PRE-OP / PRE-PROCEDURE SCREENING ORDER (NO ISOLATION) - Swab, Nasopharynx [[id number]]  (Normal) Collected: 03/02/23 8250    Lab Status: Final result Specimen: Swab from Nasopharynx Updated: 03/02/23 4810    Narrative:       The following orders were created for panel order COVID PRE-OP / PRE-PROCEDURE SCREENING ORDER (NO ISOLATION) - Swab, Nasopharynx.  Procedure                               Abnormality         Status                     ---------                               -----------         ------                     COVID-19 and FLU A/B PCR...[[id number]]  Normal              Final result                 Please view results for these tests on the individual orders.    COVID-19 and FLU A/B PCR - Swab, Nasopharynx [[id number]]  (Normal) Collected: 03/02/23 1755    Lab Status: Final result Specimen: Swab from Nasopharynx Updated: 03/02/23 1823     COVID19 Not Detected     Influenza A PCR Not Detected     Influenza B PCR Not Detected    Narrative:      Fact sheet for providers: https://www.fda.gov/media/012994/download    Fact sheet for patients: https://www.fda.gov/media/287368/download    Test performed by PCR.          No radiology results from the last 24 hrs    Results for orders placed during the hospital encounter of 02/24/23    Adult Transthoracic Echo Complete W/ Cont if Necessary Per Protocol    Interpretation Summary  •  Left ventricular ejection fraction appears to be 61 - 65%.  •  Estimated right ventricular systolic pressure from tricuspid regurgitation is mildly elevated (35-45 mmHg).      Current medications:  Scheduled Meds:amLODIPine, 10 mg, Oral, Daily  aspirin, 81 mg, Oral, Daily  azithromycin, 500 mg, Intravenous, Q24H  budesonide-formoterol, 1 puff, Inhalation, BID - RT  cefTRIAXone, 2 g, Intravenous, Q24H  cetirizine, 5 mg, Oral, Daily  enoxaparin, 40 mg, Subcutaneous, Q24H  fluticasone, 2 spray, Each Nare, Daily  insulin detemir, 5 Units, Subcutaneous, Nightly  insulin lispro, 0-7 Units, Subcutaneous, TID AC  Insulin Lispro, 2 Units, Subcutaneous, TID With Meals  ipratropium-albuterol, 3 mL, Nebulization, 4x Daily - RT  levothyroxine, 75 mcg, Oral, Q AM  lidocaine, 2 patch, Transdermal, Q24H  montelukast,  10 mg, Oral, Nightly  nebivolol, 20 mg, Oral, Daily  oxybutynin XL, 10 mg, Oral, Daily  polyethylene glycol, 17 g, Oral, Daily  pravastatin, 20 mg, Oral, Q PM  terazosin, 2 mg, Oral, Nightly  tobramycin-dexamethasone, 1 drop, Left Eye, Q4H While Awake      Continuous Infusions:   PRN Meds:.•  acetaminophen  •  albuterol sulfate HFA  •  dextrose  •  dextrose  •  glucagon (human recombinant)  •  sodium chloride    Assessment & Plan   Assessment & Plan     Active Hospital Problems    Diagnosis  POA   • Hyponatremia [E87.1]  Yes   • Acute respiratory failure with hypoxia (HCC) [J96.01]  Yes   • History of neuromuscular disorder [Z86.69]  Not Applicable   • Type 1 diabetes (HCC) [E10.9]  Yes   • Dyspnea [R06.00]  Yes   • Hypothyroidism [E03.9]  Yes      Resolved Hospital Problems   No resolved problems to display.        Brief Hospital Course to date:  Marilyn Kunz is a 77 y.o. female w heterozygous factor V leiden, type ONE diabetes, recent admission 2/24-2/26 w hyponatremia attributed to dehydration + dyspnea on exertion.     Severe hyponatremia 2/2 SIADH + hypervolemia - improved  -improving w fluid restriction  -decrease Na checks to q12 given improvement  -cymbalta d/scarlett by nephrology    Possible CAP  -favor mild pulm edema in setting of elevated proBNP + hyponatremia but fair to complete coverage: rocephin + azithromycin    Type ONE diabetes, well controlled  - diagnosed in 50's but confirmed by testing as DMI  - basal/bolus, titration as needed    HTN - hytrin, amlodipine, nebivolol. irbeartan and PRN clonidine on med list as well, hold for now. More lenient goals for this older patient who lives home alone, SBP <170    Hypothyroidism, controlled - levothyroxine  Demyelinating disorder - follows outpatient [facility]. Hold cymbalta per nephrology above but may need restarted. Pharmacy consult for help restarting azathioprine/cosentyx as appropriate    Expected Discharge Location and Transportation: PT/OT to  eval  Expected Discharge 3/7 (Discharge date is tentative pending patient's medical condition and is subject to change)       DVT prophylaxis:  Medical DVT prophylaxis orders are present.     AM-PAC 6 Clicks Score (PT): 19 (03/04/23 2200)    CODE STATUS:   Code Status and Medical Interventions:   Ordered at: 03/02/23 1920     Level Of Support Discussed With:    Patient     Code Status (Patient has no pulse and is not breathing):    CPR (Attempt to Resuscitate)     Medical Interventions (Patient has pulse or is breathing):    Full Support     Release to patient:    Routine Release       Princess Jones MD  03/05/23

## 2023-03-05 NOTE — PROGRESS NOTES
"   LOS: 2 days    Patient Care Team:  Peter Baca MD as PCP - General (Family Medicine)  Eun Eagle MD as Consulting Physician (Rheumatology)  Yao Maya MD as Consulting Physician (Cardiology)  Anusha Evans MD as Cardiologist (Cardiology)  Lucas Haley DO as Consulting Physician (Pulmonary Disease)    Chief Complaint: Hyponatremia  Serum sodium 118, which went down to 117 after admission, she has a previous history of sodium 128-132 on last admission.  Patient had some diarrhea prior to admission.  Subjective     Interval History:   Improving serum sodium level 128 from 117 during this admission      Review of Systems:   Denies any nausea, vomiting, headache, blurring of vision.    Objective     Vital Sign Min/Max for last 24 hours  Temp  Min: 97 °F (36.1 °C)  Max: 97.9 °F (36.6 °C)   BP  Min: 106/58  Max: 145/60   Pulse  Min: 54  Max: 68   Resp  Min: 16  Max: 18   SpO2  Min: 88 %  Max: 99 %   Flow (L/min)  Min: 1  Max: 2   No data recorded     Flowsheet Rows    Flowsheet Row First Filed Value   Admission Height 149.9 cm (59\") Documented at 03/02/2023 1500   Admission Weight 59 kg (130 lb) Documented at 03/02/2023 1500          I/O this shift:  In: 200 [P.O.:200]  Out: 300 [Urine:300]  I/O last 3 completed shifts:  In: 762 [P.O.:762]  Out: 900 [Urine:900]    Physical Exam:  General Appearance: Alert, oriented, no obvious distress  Neck: No JVD.  Eyes: PER, EOMI.  Neck: Supple no JVD.  Lungs: Clear auscultation, no rales rhonchi's, equal chest movement, nonlabored.  Heart: No gallop, murmur, rub, RRR.  Abdomen: Soft, nontender, positive bowel sounds, no organomegaly.  Extremities: Face edema, no cyanosis.  Neuro: No focal deficit, moving all extremities, alert oriented X 3  Psych: Mood and affect are normal appropriate   no suprapubic fullness or tenderness, no Conte catheter    WBC WBC   Date Value Ref Range Status   03/03/2023 8.43 3.40 - 10.80 " 10*3/mm3 Final   03/02/2023 9.27 3.40 - 10.80 10*3/mm3 Final      HGB Hemoglobin   Date Value Ref Range Status   03/03/2023 9.4 (L) 12.0 - 15.9 g/dL Final   03/02/2023 10.9 (L) 12.0 - 15.9 g/dL Final      HCT Hematocrit   Date Value Ref Range Status   03/03/2023 27.7 (L) 34.0 - 46.6 % Final   03/02/2023 32.6 (L) 34.0 - 46.6 % Final      Platlets No results found for: LABPLAT   MCV MCV   Date Value Ref Range Status   03/03/2023 88.5 79.0 - 97.0 fL Final   03/02/2023 88.3 79.0 - 97.0 fL Final          Sodium Sodium   Date Value Ref Range Status   03/05/2023 128 (L) 136 - 145 mmol/L Final   03/05/2023 128 (L) 136 - 145 mmol/L Final   03/04/2023 127 (L) 136 - 145 mmol/L Final   03/04/2023 127 (L) 136 - 145 mmol/L Final   03/04/2023 126 (L) 136 - 145 mmol/L Final   03/04/2023 125 (L) 136 - 145 mmol/L Final   03/03/2023 122 (L) 136 - 145 mmol/L Final   03/03/2023 119 (C) 136 - 145 mmol/L Final   03/02/2023 117 (C) 136 - 145 mmol/L Final   03/02/2023 118 (C) 136 - 145 mmol/L Final   03/02/2023 118 (C) 136 - 145 mmol/L Final      Potassium Potassium   Date Value Ref Range Status   03/05/2023 4.2 3.5 - 5.2 mmol/L Final   03/03/2023 4.4 3.5 - 5.2 mmol/L Final   03/03/2023 3.9 3.5 - 5.2 mmol/L Final   03/02/2023 4.5 3.5 - 5.2 mmol/L Final   03/02/2023 4.4 3.5 - 5.2 mmol/L Final     Comment:     Slight hemolysis detected by analyzer. Results may be affected.      Chloride Chloride   Date Value Ref Range Status   03/05/2023 94 (L) 98 - 107 mmol/L Final   03/03/2023 87 (L) 98 - 107 mmol/L Final   03/03/2023 85 (L) 98 - 107 mmol/L Final   03/02/2023 82 (L) 98 - 107 mmol/L Final   03/02/2023 81 (L) 98 - 107 mmol/L Final      CO2 CO2   Date Value Ref Range Status   03/05/2023 23.0 22.0 - 29.0 mmol/L Final   03/03/2023 23.0 22.0 - 29.0 mmol/L Final   03/03/2023 23.0 22.0 - 29.0 mmol/L Final   03/02/2023 23.0 22.0 - 29.0 mmol/L Final   03/02/2023 23.0 22.0 - 29.0 mmol/L Final      BUN BUN   Date Value Ref Range Status   03/05/2023 14  8 - 23 mg/dL Final   03/03/2023 14 8 - 23 mg/dL Final   03/03/2023 16 8 - 23 mg/dL Final   03/02/2023 19 8 - 23 mg/dL Final   03/02/2023 19 8 - 23 mg/dL Final      Creatinine Creatinine   Date Value Ref Range Status   03/05/2023 0.67 0.57 - 1.00 mg/dL Final   03/03/2023 0.95 0.57 - 1.00 mg/dL Final   03/03/2023 0.90 0.57 - 1.00 mg/dL Final   03/02/2023 0.92 0.57 - 1.00 mg/dL Final   03/02/2023 0.88 0.57 - 1.00 mg/dL Final      Calcium Calcium   Date Value Ref Range Status   03/05/2023 8.3 (L) 8.6 - 10.5 mg/dL Final   03/03/2023 8.7 8.6 - 10.5 mg/dL Final   03/03/2023 8.4 (L) 8.6 - 10.5 mg/dL Final   03/02/2023 8.8 8.6 - 10.5 mg/dL Final   03/02/2023 9.4 8.6 - 10.5 mg/dL Final      PO4 No results found for: CAPO4   Albumin Albumin   Date Value Ref Range Status   03/02/2023 4.5 3.5 - 5.2 g/dL Final      Magnesium Magnesium   Date Value Ref Range Status   03/05/2023 1.8 1.6 - 2.4 mg/dL Final   03/03/2023 1.5 (L) 1.6 - 2.4 mg/dL Final      Uric Acid No results found for: URICACID        Results Review:     I reviewed the patient's new clinical results.    amLODIPine, 10 mg, Oral, Daily  aspirin, 81 mg, Oral, Daily  azithromycin, 500 mg, Intravenous, Q24H  budesonide-formoterol, 1 puff, Inhalation, BID - RT  cefTRIAXone, 2 g, Intravenous, Q24H  cetirizine, 5 mg, Oral, Daily  DULoxetine, 30 mg, Oral, Daily  enoxaparin, 40 mg, Subcutaneous, Q24H  fluticasone, 2 spray, Each Nare, Daily  insulin detemir, 5 Units, Subcutaneous, Nightly  insulin lispro, 0-7 Units, Subcutaneous, TID AC  Insulin Lispro, 2 Units, Subcutaneous, TID With Meals  ipratropium-albuterol, 3 mL, Nebulization, 4x Daily - RT  levothyroxine, 75 mcg, Oral, Q AM  lidocaine, 2 patch, Transdermal, Q24H  montelukast, 10 mg, Oral, Nightly  nebivolol, 20 mg, Oral, Daily  oxybutynin XL, 10 mg, Oral, Daily  polyethylene glycol, 17 g, Oral, Daily  pravastatin, 20 mg, Oral, Q PM  terazosin, 2 mg, Oral, Nightly  tobramycin-dexamethasone, 1 drop, Left Eye, Q4H While  Awake           Medication Review: Reviewed    Assessment & Plan       Hypothyroidism    Dyspnea    Type 1 diabetes (HCC)    History of neuromuscular disorder    Acute respiratory failure with hypoxia (HCC)    Hyponatremia    1.  Severe hyponatremia: Serum sodium 118 on admission, on previous admission patient sodium range from 128-132 on discharge on February 26, 2023, patient has diarrhea prior to admission.  Patient also had nausea for the couple of weeks which can increase the ADH secretion with a still good response for the tubular level to the ADH.Liver enzymes within normal limits, serum osmolarity 263, urine creatinine 56.4, urine sodium 41, urine protein 5.6, urine osmolality 351, serum creatinine 0.95.     Plan:  Serum sodium appropriately going up 128 at this time from yesterday 126 and prior to that 117  Fluid restriction 1000 mL/day.  Check sodium level every 8 hours  We will hold Cymbalta due to hyponatremia  Monitor electrolytes closely.  This patient with multiple medical problems.  She is awake alert oriented x4, no headache blurring of vision no other neurological symptoms.  Serum sodium is started to come up.  Will monitor, no need for 3% saline at this time.  I discussed the patients findings and my recommendations with patient and Dr. Princess Enriquez MD  03/05/23  13:04 EST

## 2023-03-06 LAB
ANION GAP SERPL CALCULATED.3IONS-SCNC: 12 MMOL/L (ref 5–15)
BUN SERPL-MCNC: 17 MG/DL (ref 8–23)
BUN/CREAT SERPL: 21 (ref 7–25)
CALCIUM SPEC-SCNC: 8.4 MG/DL (ref 8.6–10.5)
CHLORIDE SERPL-SCNC: 92 MMOL/L (ref 98–107)
CO2 SERPL-SCNC: 21 MMOL/L (ref 22–29)
CREAT SERPL-MCNC: 0.81 MG/DL (ref 0.57–1)
DEPRECATED RDW RBC AUTO: 47 FL (ref 37–54)
EGFRCR SERPLBLD CKD-EPI 2021: 74.9 ML/MIN/1.73
ERYTHROCYTE [DISTWIDTH] IN BLOOD BY AUTOMATED COUNT: 13.9 % (ref 12.3–15.4)
GLUCOSE BLDC GLUCOMTR-MCNC: 106 MG/DL (ref 70–130)
GLUCOSE BLDC GLUCOMTR-MCNC: 224 MG/DL (ref 70–130)
GLUCOSE BLDC GLUCOMTR-MCNC: 280 MG/DL (ref 70–130)
GLUCOSE BLDC GLUCOMTR-MCNC: 352 MG/DL (ref 70–130)
GLUCOSE SERPL-MCNC: 363 MG/DL (ref 65–99)
HCT VFR BLD AUTO: 29.7 % (ref 34–46.6)
HGB BLD-MCNC: 9.8 G/DL (ref 12–15.9)
MCH RBC QN AUTO: 30.1 PG (ref 26.6–33)
MCHC RBC AUTO-ENTMCNC: 33 G/DL (ref 31.5–35.7)
MCV RBC AUTO: 91.1 FL (ref 79–97)
PLATELET # BLD AUTO: 277 10*3/MM3 (ref 140–450)
PMV BLD AUTO: 10.6 FL (ref 6–12)
POTASSIUM SERPL-SCNC: 5 MMOL/L (ref 3.5–5.2)
RBC # BLD AUTO: 3.26 10*6/MM3 (ref 3.77–5.28)
SODIUM SERPL-SCNC: 124 MMOL/L (ref 136–145)
SODIUM SERPL-SCNC: 125 MMOL/L (ref 136–145)
SODIUM SERPL-SCNC: 129 MMOL/L (ref 136–145)
WBC NRBC COR # BLD: 9.12 10*3/MM3 (ref 3.4–10.8)

## 2023-03-06 PROCEDURE — 63710000001 INSULIN LISPRO (HUMAN) PER 5 UNITS: Performed by: INTERNAL MEDICINE

## 2023-03-06 PROCEDURE — 97535 SELF CARE MNGMENT TRAINING: CPT

## 2023-03-06 PROCEDURE — 63710000001 AZATHIOPRINE PER 50 MG: Performed by: INTERNAL MEDICINE

## 2023-03-06 PROCEDURE — 82962 GLUCOSE BLOOD TEST: CPT

## 2023-03-06 PROCEDURE — 97165 OT EVAL LOW COMPLEX 30 MIN: CPT

## 2023-03-06 PROCEDURE — 85027 COMPLETE CBC AUTOMATED: CPT | Performed by: INTERNAL MEDICINE

## 2023-03-06 PROCEDURE — 80048 BASIC METABOLIC PNL TOTAL CA: CPT | Performed by: INTERNAL MEDICINE

## 2023-03-06 PROCEDURE — 94799 UNLISTED PULMONARY SVC/PX: CPT

## 2023-03-06 PROCEDURE — 25010000002 CEFTRIAXONE PER 250 MG: Performed by: FAMILY MEDICINE

## 2023-03-06 PROCEDURE — 97161 PT EVAL LOW COMPLEX 20 MIN: CPT

## 2023-03-06 PROCEDURE — 63710000001 INSULIN DETEMIR PER 5 UNITS: Performed by: INTERNAL MEDICINE

## 2023-03-06 PROCEDURE — 99232 SBSQ HOSP IP/OBS MODERATE 35: CPT | Performed by: INTERNAL MEDICINE

## 2023-03-06 PROCEDURE — 25010000002 ENOXAPARIN PER 10 MG: Performed by: NURSE PRACTITIONER

## 2023-03-06 PROCEDURE — 84295 ASSAY OF SERUM SODIUM: CPT | Performed by: INTERNAL MEDICINE

## 2023-03-06 PROCEDURE — 94664 DEMO&/EVAL PT USE INHALER: CPT

## 2023-03-06 RX ADMIN — AZATHIOPRINE 100 MG: 50 TABLET ORAL at 21:40

## 2023-03-06 RX ADMIN — CEFTRIAXONE 2 G: 2 INJECTION, POWDER, FOR SOLUTION INTRAMUSCULAR; INTRAVENOUS at 15:50

## 2023-03-06 RX ADMIN — BUDESONIDE AND FORMOTEROL FUMARATE DIHYDRATE 1 PUFF: 160; 4.5 AEROSOL RESPIRATORY (INHALATION) at 22:41

## 2023-03-06 RX ADMIN — TERAZOSIN HYDROCHLORIDE 2 MG: 2 CAPSULE ORAL at 21:38

## 2023-03-06 RX ADMIN — IPRATROPIUM BROMIDE AND ALBUTEROL SULFATE 3 ML: 2.5; .5 SOLUTION RESPIRATORY (INHALATION) at 22:41

## 2023-03-06 RX ADMIN — TOBRAMYCIN AND DEXAMETHASONE 1 DROP: 3; 1 SUSPENSION/ DROPS OPHTHALMIC at 12:02

## 2023-03-06 RX ADMIN — BUDESONIDE AND FORMOTEROL FUMARATE DIHYDRATE 1 PUFF: 160; 4.5 AEROSOL RESPIRATORY (INHALATION) at 08:16

## 2023-03-06 RX ADMIN — TOBRAMYCIN AND DEXAMETHASONE 1 DROP: 3; 1 SUSPENSION/ DROPS OPHTHALMIC at 15:49

## 2023-03-06 RX ADMIN — ASPIRIN 81 MG: 81 TABLET, COATED ORAL at 08:31

## 2023-03-06 RX ADMIN — INSULIN DETEMIR 3 UNITS: 100 INJECTION, SOLUTION SUBCUTANEOUS at 21:38

## 2023-03-06 RX ADMIN — AZATHIOPRINE 100 MG: 50 TABLET ORAL at 08:38

## 2023-03-06 RX ADMIN — IPRATROPIUM BROMIDE AND ALBUTEROL SULFATE 3 ML: 2.5; .5 SOLUTION RESPIRATORY (INHALATION) at 08:13

## 2023-03-06 RX ADMIN — TOBRAMYCIN AND DEXAMETHASONE 1 DROP: 3; 1 SUSPENSION/ DROPS OPHTHALMIC at 21:38

## 2023-03-06 RX ADMIN — FLUTICASONE PROPIONATE 2 SPRAY: 50 SPRAY, METERED NASAL at 08:39

## 2023-03-06 RX ADMIN — TOBRAMYCIN AND DEXAMETHASONE 1 DROP: 3; 1 SUSPENSION/ DROPS OPHTHALMIC at 17:39

## 2023-03-06 RX ADMIN — Medication 10 ML: at 21:38

## 2023-03-06 RX ADMIN — CETIRIZINE HYDROCHLORIDE 5 MG: 10 TABLET, FILM COATED ORAL at 08:31

## 2023-03-06 RX ADMIN — OXYBUTYNIN CHLORIDE 10 MG: 5 TABLET, EXTENDED RELEASE ORAL at 08:32

## 2023-03-06 RX ADMIN — INSULIN LISPRO 6 UNITS: 100 INJECTION, SOLUTION INTRAVENOUS; SUBCUTANEOUS at 08:34

## 2023-03-06 RX ADMIN — IPRATROPIUM BROMIDE AND ALBUTEROL SULFATE 3 ML: 2.5; .5 SOLUTION RESPIRATORY (INHALATION) at 15:37

## 2023-03-06 RX ADMIN — LIDOCAINE 2 PATCH: 700 PATCH TOPICAL at 08:35

## 2023-03-06 RX ADMIN — LEVOTHYROXINE SODIUM 75 MCG: 0.07 TABLET ORAL at 05:50

## 2023-03-06 RX ADMIN — TOBRAMYCIN AND DEXAMETHASONE 1 DROP: 3; 1 SUSPENSION/ DROPS OPHTHALMIC at 05:50

## 2023-03-06 RX ADMIN — IPRATROPIUM BROMIDE AND ALBUTEROL SULFATE 3 ML: 2.5; .5 SOLUTION RESPIRATORY (INHALATION) at 11:52

## 2023-03-06 RX ADMIN — INSULIN LISPRO 4 UNITS: 100 INJECTION, SOLUTION INTRAVENOUS; SUBCUTANEOUS at 11:52

## 2023-03-06 RX ADMIN — NEBIVOLOL 20 MG: 20 TABLET ORAL at 08:31

## 2023-03-06 RX ADMIN — INSULIN DETEMIR 5 UNITS: 100 INJECTION, SOLUTION SUBCUTANEOUS at 11:52

## 2023-03-06 RX ADMIN — INSULIN LISPRO 3 UNITS: 100 INJECTION, SOLUTION INTRAVENOUS; SUBCUTANEOUS at 08:34

## 2023-03-06 RX ADMIN — AMLODIPINE BESYLATE 10 MG: 10 TABLET ORAL at 08:31

## 2023-03-06 RX ADMIN — POLYETHYLENE GLYCOL 3350 17 G: 17 POWDER, FOR SOLUTION ORAL at 08:33

## 2023-03-06 RX ADMIN — PRAVASTATIN SODIUM 20 MG: 20 TABLET ORAL at 17:39

## 2023-03-06 RX ADMIN — ENOXAPARIN SODIUM 40 MG: 40 INJECTION SUBCUTANEOUS at 11:53

## 2023-03-06 RX ADMIN — MONTELUKAST 10 MG: 10 TABLET, FILM COATED ORAL at 21:38

## 2023-03-06 NOTE — PLAN OF CARE
Goal Outcome Evaluation:  Plan of Care Reviewed With: patient        Progress: no change  Outcome Evaluation: Patient presents with weakness, decreased activity tolerance, and balance impairments affecting independence with mobility. She required Min A for ambulating with HHA, demonstrating some unsteadiness and limited by weakness and fatigue. Skilled IP PT services warranted to address deficits. Recommend IRF at discharge.

## 2023-03-06 NOTE — THERAPY EVALUATION
Patient Name: Marilyn Kunz  : 1945    MRN: [id number]                              Today's Date: 3/6/2023       Admit Date: 3/2/2023    Visit Dx:     ICD-10-CM ICD-9-CM   1. Acute respiratory failure with hypoxia ([facility])  J96.01 518.81   2. Hyponatremia  E87.1 276.1     Patient Active Problem List   Diagnosis   • Chest pain   • Essential hypertension   • HLD (hyperlipidemia)   • Odynophagia   • Factor V Leiden ([facility])   • Weakness of right upper extremity   • Psoriatic arthritis ([facility])   • At risk for venous thromboembolism (VTE)   • Body mass index (BMI) of 27.0-27.9 in adult   • Cervical spondylitis with radiculitis ([facility])   • Degenerative arthritis of toe joint, right   • Diabetic nephropathy associated with type 2 diabetes mellitus (HCC)   • Disorder of bone and cartilage   • Hospital discharge follow-up   • Hypothyroidism   • Impairment of balance   • Muscle spasm   • Overactive bladder   • Pain in right hand   • Routine history and physical examination of adult   • Seborrheic dermatitis   • Spondylolisthesis, grade 2   • Total knee replacement status, left   • Vasculitis ([facility])   • CKD (chronic kidney disease) stage 3, GFR 30-59 ml/min ([facility])   • Hypercholesterolemia   • Hyperlipidemia   • PDA (patent ductus arteriosus)   • Demyelinating changes in brain ([facility])   • Need for vaccination against Streptococcus pneumoniae   • Dyspnea   • Screening for depression   • Acute sinusitis   • Allergies   • Anterior ST segment depression   • Arthralgia of knee   • Constipation   • Diverticulitis   • Encounter for pre-operative examination   • Hypertensive emergency   • Low back pain   • Need for immunization against influenza   • Other problems related to lifestyle   • Pain, joint, shoulder, left   • Pill esophagitis   • Vitamin D deficiency   • Yeast vaginitis   • Body mass index (BMI) of 23.0-23.9 in adult   • Abdominal pain in female   • Atypical chest pain   • Demyelinating disease ([facility])   • Diabetic autonomic  neuropathy associated with type 2 diabetes mellitus (HCC)   • Benign essential hypertension   • Long term current use of insulin (HCC)   • Long-term insulin use (HCC)   • Need for prophylactic vaccination against Streptococcus pneumoniae (pneumococcus) and influenza   • Psoriasis with arthropathy (HCC)   • Breast cancer screening   • Encounter for general adult medical examination without abnormal findings   • Spondylolisthesis, grade 2   • Type 2 diabetes mellitus with stage 3 chronic kidney disease, with long-term current use of insulin (HCC)   • Type 1 diabetes (HCC)   • History of stroke   • Fatigue   • History of neuromuscular disorder   • Acute respiratory failure with hypoxia (HCC)   • Hyponatremia     Past Medical History:   Diagnosis Date   • Abnormal ECG Check FPA Date or [facility] Records    [facility]   • Acute sinusitis 2/6/2023   • Anemia    • Asthma    • CAP (community acquired pneumonia) 2/6/2023   • Chronic kidney disease     pt told it was dx from Dr Baca and to not eat much protein   • Congenital heart disease 1950's Patent 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    Surgery [facility] 1950s close   • CTS (carpal tunnel syndrome)     Psoriatic arthritis hands could be   • Disease of thyroid gland    • Factor 5 Leiden mutation, heterozygous ([facility]) 2012   • Head injury    • Hyperlipidemia    • Hypertension    • Movement disorder 10/2021    knee replacement left knww   • Murmur, cardiac    • Neuropathy in diabetes ([facility])     redness swelling legs   • Peripheral neuropathy 05/03/2022    hands, arms. shoulders, back   • Pill esophagitis 2/6/2023   • Psoriatic arthritis (HCC)     hands   • Sleep apnea Always    diabetic do not sleep well up and down   • Stroke ([facility]) had one don't know when    showed on MRI Brain   • Type 1 diabetes (HCC)      Past Surgical History:   Procedure Laterality Date   • CARDIAC CATHETERIZATION  1952 2 of them    Patent Dictus operation   • CATARACT  EXTRACTION Bilateral    • ENDOSCOPY N/A 10/20/2020    Procedure: ESOPHAGOGASTRODUODENOSCOPY;  Surgeon: Brunner, Mark I, MD;  [facility];  Service: Gastroenterology;  Laterality: N/A;   • HAND SURGERY Left 1987    no hardware   • PATENT DUCTUS ARTERIOUS LIGATION        General Information     Row Name 03/06/23 0837          Physical Therapy Time and Intention    Document Type evaluation  -NS     Mode of Treatment physical therapy  -NS     Row Name 03/06/23 08          General Information    Patient Profile Reviewed yes  -NS     Prior Level of Function independent:;all household mobility;transfer;gait;bed mobility;ADL's;using stairs;community mobility  Pt uses a quad cane prn, recent functional decline reporting needing to rest after ambulating short distances around her home.  -NS     Existing Precautions/Restrictions fall;oxygen therapy device and L/min  -NS     Barriers to Rehab none identified  -NS     Row Name 03/06/23 08          Living Environment    People in Home alone  -NS     Row Name 03/06/23 [facility]          Home Main Entrance    Number of Stairs, Main Entrance one  -NS     Stair Railings, Main Entrance none  -NS     Row Name 03/06/23 08          Stairs Within Home, Primary    Number of Stairs, Within Home, Primary none  -NS     Row Name 03/06/23 08          Cognition    Orientation Status (Cognition) oriented x 3  -NS     Row Name 03/06/23 08          Safety Issues, Functional Mobility    Safety Issues Affecting Function (Mobility) insight into deficits/self-awareness;safety precaution awareness;safety precautions follow-through/compliance;sequencing abilities;judgment  -NS     Impairments Affecting Function (Mobility) balance;coordination;endurance/activity tolerance;strength;shortness of breath  -NS           User Key  (r) = Recorded By, (t) = Taken By, (c) = Cosigned By    Initials Name Provider Type    NS Alisha Kennedy PT Physical Therapist               Mobility     Row Name  03/06/23 08          Bed Mobility    Bed Mobility supine-sit  -NS     Supine-Sit [place] (Bed Mobility) supervision  -NS     Row Name 03/06/23 0837          Sit-Stand Transfer    Sit-Stand [place] (Transfers) contact guard  -NS     Row Name 03/06/23 08          Gait/Stairs (Locomotion)    [place] Level (Gait) minimum assist (75% patient effort)  -NS     Assistive Device (Gait) other (see comments)  HHA  -NS     Distance in Feet (Gait) 25+15  -NS     Deviations/Abnormal Patterns (Gait) base of support, narrow;sam decreased;gait speed decreased;stride length decreased  -NS     Bilateral Gait Deviations heel strike decreased  -NS     Comment, (Gait/Stairs) Patient ambulated a lap in her room, then to the restroom demonstrating narrow KEVIN and some unsteadiness. Pt declined use of quad cane but would benefit from using.  -NS           User Key  (r) = Recorded By, (t) = Taken By, (c) = Cosigned By    Initials Name Provider Type    Alisha Yates PT Physical Therapist               Obj/Interventions     Row Name 03/06/23 08          Range of Motion Comprehensive    General Range of Motion bilateral lower extremity ROM WFL  -NS     Row Name 03/06/23 08          Strength Comprehensive (MMT)    General Manual Muscle Testing (MMT) Assessment lower extremity strength deficits identified  -NS     Comment, General Manual Muscle Testing (MMT) Assessment BLEs: grossly 4/5  -NS     Row Name 03/06/23 08          Balance    Balance Assessment sitting static balance;sitting dynamic balance;standing static balance;standing dynamic balance  -NS     Static Sitting Balance standby assist  -NS     Dynamic Sitting Balance standby assist  -NS     Position, Sitting Balance unsupported;sitting edge of bed  -NS     Static Standing Balance contact guard  -NS     Dynamic Standing Balance minimal assist  -NS     Position/Device Used, Standing Balance supported  -NS     Row Name 03/06/23 0837          Sensory  Assessment (Somatosensory)    Sensory Assessment (Somatosensory) LE sensation intact  -NS           User Key  (r) = Recorded By, (t) = Taken By, (c) = Cosigned By    Initials Name Provider Type    Alisha Yates PT Physical Therapist               Goals/Plan     [facility] Name 03/06/23 0837          Bed Mobility Goal 1 (PT)    Activity/Assistive Device (Bed Mobility Goal 1, PT) sit to supine/supine to sit  -NS     [place] Level/Cues Needed (Bed Mobility Goal 1, PT) independent  -NS     Time Frame (Bed Mobility Goal 1, PT) long term goal (LTG);2 weeks  -NS     [facility] Name 03/06/23 0837          Transfer Goal 1 (PT)    Activity/Assistive Device (Transfer Goal 1, PT) sit-to-stand/stand-to-sit;bed-to-chair/chair-to-bed  -NS     [place] Level/Cues Needed (Transfer Goal 1, PT) modified independence  -NS     Time Frame (Transfer Goal 1, PT) long term goal (LTG);2 weeks  -NS     [facility] Name 03/06/23 0837          Gait Training Goal 1 (PT)    Activity/Assistive Device (Gait Training Goal 1, PT) gait (walking locomotion);assistive device use  -NS     [place] Level (Gait Training Goal 1, PT) standby assist  -NS     Distance (Gait Training Goal 1, PT) 150  -NS     Time Frame (Gait Training Goal 1, PT) long term goal (LTG);2 weeks  -NS     [facility] Name 03/06/23 0837          Therapy Assessment/Plan (PT)    Planned Therapy Interventions (PT) balance training;bed mobility training;gait training;home exercise program;neuromuscular re-education;patient/family education;strengthening;transfer training  -NS           User Key  (r) = Recorded By, (t) = Taken By, (c) = Cosigned By    Initials Name Provider Type    Alisha Yates PT Physical Therapist               Clinical Impression     Row Name 03/06/23 0837          Pain    Pretreatment Pain Rating 0/10 - no pain  -NS     Posttreatment Pain Rating 0/10 - no pain  -NS     Row Name 03/06/23 0837          Plan of Care Review    Plan of Care Reviewed With patient  -NS     Progress  no change  -NS     Outcome Evaluation Patient presents with weakness, decreased activity tolerance, and balance impairments affecting independence with mobility. She required Min A for ambulating with HHA, demonstrating some unsteadiness and limited by weakness and fatigue. Skilled IP PT services warranted to address deficits. Recommend IRF at discharge.  -NS     Row Name 03/06/23 0837          Therapy Assessment/Plan (PT)    Patient/Family Therapy Goals Statement (PT) to return to PLOF  -NS     Rehab Potential (PT) good, to achieve stated therapy goals  -NS     Criteria for Skilled Interventions Met (PT) yes;meets criteria;skilled treatment is necessary  -NS     Therapy Frequency (PT) daily  -NS     Predicted Duration of Therapy Intervention (PT) 2 weeks  -NS     Row Name 03/06/23 0837          Vital Signs    Pre Systolic BP Rehab 136  -NS     Pre Treatment Diastolic BP 65  -NS     Pretreatment Heart Rate (beats/min) 64  -NS     Posttreatment Heart Rate (beats/min) 63  -NS     Pre SpO2 (%) 94  -NS     O2 Delivery Pre Treatment nasal cannula  -NS     Post SpO2 (%) 93  -NS     O2 Delivery Post Treatment nasal cannula  -NS     Pre Patient Position Supine  -NS     Intra Patient Position Standing  -NS     Post Patient Position Sitting  -NS     Row Name 03/06/23 0837          Positioning and Restraints    Pre-Treatment Position in bed  -NS     Post Treatment Position bathroom  -NS     Bathroom notified nsg;sitting;with OT  -NS           User Key  (r) = Recorded By, (t) = Taken By, (c) = Cosigned By    Initials Name Provider Type    Alisha Yates, PT Physical Therapist               Outcome Measures     Row Name 03/06/23 0837 03/05/23 8399       How much help from another person do you currently need...    Turning from your back to your side while in flat bed without using bedrails? 3  -NS 4  -BH    Moving from lying on back to sitting on the side of a flat bed without bedrails? 3  -NS 4  -BH    Moving to and from a  bed to a chair (including a wheelchair)? 3  -NS 3  -    Standing up from a chair using your arms (e.g., wheelchair, bedside chair)? 3  -NS 3  -BH    Climbing 3-5 steps with a railing? 2  -NS 2  -BH    To walk in hospital room? 3  -NS 3  -BH    AM-PAC 6 Clicks Score (PT) 17  -NS 19  -    Highest level of mobility 5 --> Static standing  -NS 6 --> Walked 10 steps or more  -    Row Name 03/06/23 0837          Functional Assessment    Outcome Measure Options AM-PAC 6 Clicks Basic Mobility (PT)  -NS           User Key  (r) = Recorded By, (t) = Taken By, (c) = Cosigned By    Initials Name Provider Type    Alisha Yates, SAFIA Physical Therapist     Christine Jean, RN Registered Nurse                             Physical Therapy Education     Title: PT OT SLP Therapies (In Progress)     Topic: Physical Therapy (In Progress)     Point: Mobility training (Done)     Learning Progress Summary           Patient Acceptance, E, VU,NR by NS at 3/6/2023 1011                   Point: Home exercise program (Not Started)     Learner Progress:  Not documented in this visit.          Point: Body mechanics (Done)     Learning Progress Summary           Patient Acceptance, E, VU,NR by NS at 3/6/2023 1011                   Point: Precautions (Done)     Learning Progress Summary           Patient Acceptance, E, VU,NR by NS at 3/6/2023 1011                               User Key     Initials Effective Dates Name Provider Type [facility] 06/16/21 -  Alisha Kennedy, PT Physical Therapist PT              PT Recommendation and Plan  Planned Therapy Interventions (PT): balance training, bed mobility training, gait training, home exercise program, neuromuscular re-education, patient/family education, strengthening, transfer training  Plan of Care Reviewed With: patient  Progress: no change  Outcome Evaluation: Patient presents with weakness, decreased activity tolerance, and balance impairments affecting independence with mobility.  She required Min A for ambulating with HHA, demonstrating some unsteadiness and limited by weakness and fatigue. Skilled IP PT services warranted to address deficits. Recommend IRF at discharge.     Time Calculation:    PT Charges     Row Name 03/06/23 0837             Time Calculation    Start Time 0837  -NS      PT Received On 03/06/23  -NS      PT Goal Re-Cert Due Date 03/16/23  -NS         Untimed Charges    PT Eval/Re-eval Minutes 51  -NS         Total Minutes    Untimed Charges Total Minutes 51  -NS       Total Minutes 51  -NS            User Key  (r) = Recorded By, (t) = Taken By, (c) = Cosigned By    Initials Name Provider Type    Alisha Yates, PT Physical Therapist              Therapy Charges for Today     Code Description Service Date Service Provider Modifiers Qty    [id number] HC PT EVAL LOW COMPLEXITY 4 3/6/2023 Alisha Kennedy, PT GP 1          PT G-Codes  Outcome Measure Options: AM-PAC 6 Clicks Basic Mobility (PT)  AM-PAC 6 Clicks Score (PT): 17  PT Discharge Summary  Anticipated Discharge Disposition (PT): inpatient rehabilitation facility    Alisha Kennedy PT  3/6/2023

## 2023-03-06 NOTE — PLAN OF CARE
Goal Outcome Evaluation:   BG dropped to 52.  1/2 amp of D50 and a snack.  Levemir was held.  Aubrey throughout the night.  2L NC.

## 2023-03-06 NOTE — CASE MANAGEMENT/SOCIAL WORK
Continued Stay Note   [facility]     Patient Name: Marilyn Kunz  MRN: [id number]  Today's Date: 3/6/2023    Admit Date: 3/2/2023    Plan: Rehab   Discharge Plan     Row Name 03/06/23 1109       Plan    Plan Rehab    Patient/Family in Agreement with Plan yes    Plan Comments Spoke with patient at bedside. We discussed PT and OT recommendation for rehab. Patient is agreeable to rehab. Patient would like referral to [facility]. List given of SNF for more referral options. No other discharge needs. CM will follow.    Final Discharge Disposition Code 03 - skilled nursing facility (SNF)               Discharge Codes    No documentation.                     Savannah Sharma RN

## 2023-03-06 NOTE — PROGRESS NOTES
"   LOS: 3 days    Patient Care Team:  Peter Baca MD as PCP - General (Family Medicine)  Eun Eagle MD as Consulting Physician (Rheumatology)  Yao Maya MD as Consulting Physician (Cardiology)  Anusha Evans MD as Cardiologist (Cardiology)  Lucas Haley DO as Consulting Physician (Pulmonary Disease)    Chief Complaint: Hyponatremia  Serum sodium 118, which went down to 117 after admission, she has a previous history of sodium 128-132 on last admission.  Patient had some diarrhea prior to admission.  Subjective     Interval History:   Improving serum sodium level 124 from 128, admitted sodium 117 during this admission      Review of Systems:   Denies any nausea, vomiting, headache, blurring of vision.  Denies any diarrhea frequency urgency.    Objective     Vital Sign Min/Max for last 24 hours  Temp  Min: 96.6 °F (35.9 °C)  Max: 98 °F (36.7 °C)   BP  Min: 116/62  Max: 142/61   Pulse  Min: 56  Max: 66   Resp  Min: 16  Max: 18   SpO2  Min: 90 %  Max: 97 %   Flow (L/min)  Min: 1  Max: 2   No data recorded     Flowsheet Rows    Flowsheet Row First Filed Value   Admission Height 149.9 cm (59\") Documented at 03/02/2023 1500   Admission Weight 59 kg (130 lb) Documented at 03/02/2023 1500          I/O this shift:  In: 200 [P.O.:200]  Out: 650 [Urine:650]  I/O last 3 completed shifts:  In: 320 [P.O.:320]  Out: 1100 [Urine:1100]    Physical Exam:  General Appearance: Awake, alert oriented x4 no obvious distress  Neck: No JVD.  Eyes: PER, EOMI.  Neck: Supple no JVD.  Lungs: Clear auscultation, no rales rhonchi's, equal chest movement, nonlabored.  Heart: No gallop, murmur, rub, RRR.  Abdomen: Soft, nontender, positive bowel sounds, no organomegaly.  Extremities: Face edema, no cyanosis.  Neuro: No focal deficit, moving all extremities, alert oriented X 3  Psych: Mood and affect are normal appropriate   no suprapubic fullness or tenderness, no Conte catheter    WBC " WBC   Date Value Ref Range Status   03/06/2023 9.12 3.40 - 10.80 10*3/mm3 Final      HGB Hemoglobin   Date Value Ref Range Status   03/06/2023 9.8 (L) 12.0 - 15.9 g/dL Final      HCT Hematocrit   Date Value Ref Range Status   03/06/2023 29.7 (L) 34.0 - 46.6 % Final      Platlets No results found for: LABPLAT   MCV MCV   Date Value Ref Range Status   03/06/2023 91.1 79.0 - 97.0 fL Final          Sodium Sodium   Date Value Ref Range Status   03/06/2023 124 (L) 136 - 145 mmol/L Final   03/06/2023 125 (L) 136 - 145 mmol/L Final   03/05/2023 128 (L) 136 - 145 mmol/L Final   03/05/2023 128 (L) 136 - 145 mmol/L Final   03/05/2023 128 (L) 136 - 145 mmol/L Final   03/04/2023 127 (L) 136 - 145 mmol/L Final   03/04/2023 127 (L) 136 - 145 mmol/L Final   03/04/2023 126 (L) 136 - 145 mmol/L Final   03/04/2023 125 (L) 136 - 145 mmol/L Final      Potassium Potassium   Date Value Ref Range Status   03/06/2023 5.0 3.5 - 5.2 mmol/L Final   03/05/2023 4.2 3.5 - 5.2 mmol/L Final      Chloride Chloride   Date Value Ref Range Status   03/06/2023 92 (L) 98 - 107 mmol/L Final   03/05/2023 94 (L) 98 - 107 mmol/L Final      CO2 CO2   Date Value Ref Range Status   03/06/2023 21.0 (L) 22.0 - 29.0 mmol/L Final   03/05/2023 23.0 22.0 - 29.0 mmol/L Final      BUN BUN   Date Value Ref Range Status   03/06/2023 17 8 - 23 mg/dL Final   03/05/2023 14 8 - 23 mg/dL Final      Creatinine Creatinine   Date Value Ref Range Status   03/06/2023 0.81 0.57 - 1.00 mg/dL Final   03/05/2023 0.67 0.57 - 1.00 mg/dL Final      Calcium Calcium   Date Value Ref Range Status   03/06/2023 8.4 (L) 8.6 - 10.5 mg/dL Final   03/05/2023 8.3 (L) 8.6 - 10.5 mg/dL Final      PO4 No results found for: CAPO4   Albumin No results found for: ALBUMIN   Magnesium Magnesium   Date Value Ref Range Status   03/05/2023 1.8 1.6 - 2.4 mg/dL Final      Uric Acid No results found for: URICACID        Results Review:     I reviewed the patient's new clinical results.    amLODIPine, 10 mg,  Oral, Daily  aspirin, 81 mg, Oral, Daily  azaTHIOprine, 100 mg, Oral, BID  budesonide-formoterol, 1 puff, Inhalation, BID - RT  cefTRIAXone, 2 g, Intravenous, Q24H  cetirizine, 5 mg, Oral, Daily  enoxaparin, 40 mg, Subcutaneous, Q24H  fluticasone, 2 spray, Each Nare, Daily  insulin detemir, 3 Units, Subcutaneous, Nightly  insulin lispro, 0-7 Units, Subcutaneous, TID AC  ipratropium-albuterol, 3 mL, Nebulization, 4x Daily - RT  levothyroxine, 75 mcg, Oral, Q AM  lidocaine, 2 patch, Transdermal, Q24H  montelukast, 10 mg, Oral, Nightly  nebivolol, 20 mg, Oral, Daily  oxybutynin XL, 10 mg, Oral, Daily  polyethylene glycol, 17 g, Oral, Daily  pravastatin, 20 mg, Oral, Q PM  terazosin, 2 mg, Oral, Nightly  tobramycin-dexamethasone, 1 drop, Left Eye, Q4H While Awake           Medication Review: Reviewed    Assessment & Plan       Hypothyroidism    Dyspnea    Type 1 diabetes (HCC)    History of neuromuscular disorder    Acute respiratory failure with hypoxia (HCC)    Hyponatremia    1.  Severe hyponatremia: Serum sodium 118 on admission, on previous admission patient sodium range from 128-132 on discharge on February 26, 2023, patient has diarrhea prior to admission.  Patient also had nausea for the couple of weeks which can increase the ADH secretion with a still good response for the tubular level to the ADH.Liver enzymes within normal limits, serum osmolarity 263, urine creatinine 56.4, urine sodium 41, urine protein 5.6, urine osmolality 351, serum creatinine 0.95.     Plan:  Serum sodium dropped to 124 from 128 yesterday, admit sodium 117  Fluid restriction 1000 mL/day.  Check sodium level every 8 hours  We will hold Cymbalta due to hyponatremia 3/6/2023  Monitor electrolytes closely.  Patient with multiple medical problems  We will monitor sodium, no need for 3% saline at this time.  I discussed the patients findings and my recommendations with patient and Dr. Princess Jones yesterday    Rohith Enriquez,  MD  03/06/23  15:05 EST

## 2023-03-06 NOTE — PROGRESS NOTES
[facility] Medicine Services  PROGRESS NOTE    Patient Name: Marilyn Kunz  : 1945  MRN: [id number]    Date of Admission: 3/2/2023  Primary Care Physician: Peter Baca MD    Subjective   Subjective     CC:  dyspnea    HPI:  Episode of shakiness yesterday after dinner w Glc=50's  Otherwise feels OK - some subjective dyspnea  Open to rehab placement    ROS:  Gen- No fevers, chills  CV- No chest pain, palpitations    Objective   Objective     Vital Signs:   Temp:  [96.6 °F (35.9 °C)-98 °F (36.7 °C)] 97.2 °F (36.2 °C)  Heart Rate:  [56-66] 58  Resp:  [16-18] 18  BP: (116-142)/(56-75) 134/75  Flow (L/min):  [1-2] 2     Physical Exam:  Constitutional: No acute distress, awake, alert  HENT: NCAT, mucous membranes moist  Respiratory: Clear to auscultation w sats mid 90's on 1 liters n/c on my exam  Cardiovascular: RRR, no murmurs, rubs, or gallops  Gastrointestinal: Soft, nontender, nondistended  Musculoskeletal: Muscle tone decreased for age, no joint effusions appreciated  Psychiatric: Appropriate affect, cooperative  Neurologic: Alert and oriented, facial movements symmetric and spontaneous movement of all 4 extremities grossly equal bilaterally, speech clear  Skin: No rashes    Results Reviewed:  LAB RESULTS:      Lab 23  0800 23  0615 23  1539   WBC 9.12 8.43 9.27   HEMOGLOBIN 9.8* 9.4* 10.9*   HEMATOCRIT 29.7* 27.7* 32.6*   PLATELETS 277 267 301   NEUTROS ABS  --   --  7.86*   IMMATURE GRANS (ABS)  --   --  0.06*   LYMPHS ABS  --   --  0.78   MONOS ABS  --   --  0.56   EOS ABS  --   --  0.01   MCV 91.1 88.5 88.3   PROCALCITONIN  --   --  0.09   LACTATE  --   --  1.6         Lab 23  0800 23  0733 23  0022 23  1749 23  0020 23  1143 23  0616 23  0615 23  2359   SODIUM 125* 128* 128* 128* 127*   < > 122*  --  119* 117*   POTASSIUM 5.0  --  4.2  --   --   --  4.4  --  3.9 4.5    CHLORIDE 92*  --  94*  --   --   --  87*  --  85* 82*   CO2 21.0*  --  23.0  --   --   --  23.0  --  23.0 23.0   ANION GAP 12.0  --  11.0  --   --   --  12.0  --  11.0 12.0   BUN 17  --  14  --   --   --  14  --  16 19   CREATININE 0.81  --  0.67  --   --   --  0.95  --  0.90 0.92   EGFR 74.9  --  90.2  --   --   --  61.8  --  66.0 64.3   GLUCOSE 363*  --  101*  --   --   --  239*  --  120* 199*   CALCIUM 8.4*  --  8.3*  --   --   --  8.7  --  8.4* 8.8   IONIZED CALCIUM  --   --   --   --   --   --   --  1.20  --   --    MAGNESIUM  --   --  1.8  --   --   --   --   --  1.5*  --    PHOSPHORUS  --   --   --   --   --   --   --   --  3.2  --     < > = values in this interval not displayed.         Lab 03/02/23  1539   TOTAL PROTEIN 7.6   ALBUMIN 4.5   GLOBULIN 3.1   ALT (SGPT) 16   AST (SGOT) 24   BILIRUBIN 1.5*   ALK PHOS 66         Lab 03/03/23  0615 03/02/23  1539   PROBNP 5,883.0* 6,798.0*   HSTROP T  --  19*         Lab 03/04/23  0548   CHOLESTEROL 158   LDL CHOL 82   HDL CHOL 64*   TRIGLYCERIDES 58             Brief Urine Lab Results  (Last result in the past 365 days)      Color   Clarity   Blood   Leuk Est   Nitrite   Protein   CREAT   Urine HCG        03/03/23 1457             56.4               Microbiology Results Abnormal     Procedure Component Value - Date/Time    Blood Culture - Blood, Wrist, Left [[id number]]  (Normal) Collected: 03/02/23 2121    Lab Status: Preliminary result Specimen: Blood from Wrist, Left Updated: 03/05/23 2146     Blood Culture No growth at 3 days    Blood Culture - Blood, Arm, Left [[id number]]  (Normal) Collected: 03/02/23 2121    Lab Status: Preliminary result Specimen: Blood from Arm, Left Updated: 03/05/23 2146     Blood Culture No growth at 3 days    COVID PRE-OP / PRE-PROCEDURE SCREENING ORDER (NO ISOLATION) - Swab, Nasopharynx [[id number]]  (Normal) Collected: 03/02/23 1753    Lab Status: Final result Specimen: Swab from Nasopharynx Updated: 03/02/23 5466    Narrative:       The following orders were created for panel order COVID PRE-OP / PRE-PROCEDURE SCREENING ORDER (NO ISOLATION) - Swab, Nasopharynx.  Procedure                               Abnormality         Status                     ---------                               -----------         ------                     COVID-19 and FLU A/B PCR...[[id number]]  Normal              Final result                 Please view results for these tests on the individual orders.    COVID-19 and FLU A/B PCR - Swab, Nasopharynx [[id number]]  (Normal) Collected: 03/02/23 1755    Lab Status: Final result Specimen: Swab from Nasopharynx Updated: 03/02/23 1824     COVID19 Not Detected     Influenza A PCR Not Detected     Influenza B PCR Not Detected    Narrative:      Fact sheet for providers: https://www.fda.gov/media/120533/download    Fact sheet for patients: https://www.fda.gov/media/378428/download    Test performed by PCR.          No radiology results from the last 24 hrs    Results for orders placed during the hospital encounter of 02/24/23    Adult Transthoracic Echo Complete W/ Cont if Necessary Per Protocol    Interpretation Summary  •  Left ventricular ejection fraction appears to be 61 - 65%.  •  Estimated right ventricular systolic pressure from tricuspid regurgitation is mildly elevated (35-45 mmHg).      Current medications:  Scheduled Meds:amLODIPine, 10 mg, Oral, Daily  aspirin, 81 mg, Oral, Daily  azaTHIOprine, 100 mg, Oral, BID  budesonide-formoterol, 1 puff, Inhalation, BID - RT  cefTRIAXone, 2 g, Intravenous, Q24H  cetirizine, 5 mg, Oral, Daily  enoxaparin, 40 mg, Subcutaneous, Q24H  fluticasone, 2 spray, Each Nare, Daily  insulin lispro, 0-7 Units, Subcutaneous, TID AC  ipratropium-albuterol, 3 mL, Nebulization, 4x Daily - RT  levothyroxine, 75 mcg, Oral, Q AM  lidocaine, 2 patch, Transdermal, Q24H  montelukast, 10 mg, Oral, Nightly  nebivolol, 20 mg, Oral, Daily  oxybutynin XL, 10 mg, Oral, Daily  polyethylene glycol, 17 g,  Oral, Daily  pravastatin, 20 mg, Oral, Q PM  terazosin, 2 mg, Oral, Nightly  tobramycin-dexamethasone, 1 drop, Left Eye, Q4H While Awake      Continuous Infusions:   PRN Meds:.•  acetaminophen  •  albuterol sulfate HFA  •  dextrose  •  dextrose  •  glucagon (human recombinant)  •  sodium chloride    Assessment & Plan   Assessment & Plan     Active Hospital Problems    Diagnosis  POA   • Hyponatremia [E87.1]  Yes   • Acute respiratory failure with hypoxia (HCC) [J96.01]  Yes   • History of neuromuscular disorder [Z86.69]  Not Applicable   • Type 1 diabetes (HCC) [E10.9]  Yes   • Dyspnea [R06.00]  Yes   • Hypothyroidism [E03.9]  Yes      Resolved Hospital Problems   No resolved problems to display.        Brief Hospital Course to date:  Marilyn Kunz is a 77 y.o. female w heterozygous factor V leiden, type ONE diabetes, recent admission 2/24-2/26 w hyponatremia attributed to dehydration + dyspnea on exertion.     Severe hyponatremia 2/2 SIADH + hypervolemia - improved  -improving w fluid restriction (decreased today but in setting of hyperglycemia  -Na q6  -cymbalta d/scarlett by nephrology    Possible CAP  -favor mild pulm edema in setting of elevated proBNP + hyponatremia but fair to complete coverage: rocephin + azithromycin    Type ONE diabetes c/b hypoglycemia while here  -diagnosed in 50's but confirmed by testing as DMI  -held basal overnight 3/6 after Glc=50 post-dinner, now hyperglycemic  -give detemir 5 units now, 3 units tonight, SSI only w meals now  -home regimen: 19 units basal + 5 units (plus/minus) w meals  -given type 1 diabetes, can decrease basal dose but please do not hold if poss    HTN - hytrin, amlodipine, nebivolol. irbeartan and PRN clonidine on med list as well, hold for now. More lenient goals for this older patient who lives home alone, SBP <170    Hypothyroidism, controlled - levothyroxine  Demyelinating disorder - follows outpatient [facility]. Hold cymbalta per nephrology above but may  need restarted. Pharmacy consult for help restarting azathioprine/cosentyx as appropriate    Expected Discharge Location and Transportation: [facility]  Expected Discharge 3/8 (Discharge date is tentative pending patient's medical condition and is subject to change)       DVT prophylaxis:  Medical DVT prophylaxis orders are present.     AM-PAC 6 Clicks Score (PT): 17 (03/06/23 0888)    CODE STATUS:   Code Status and Medical Interventions:   Ordered at: 03/02/23 1920     Level Of Support Discussed With:    Patient     Code Status (Patient has no pulse and is not breathing):    CPR (Attempt to Resuscitate)     Medical Interventions (Patient has pulse or is breathing):    Full Support     Release to patient:    Routine Release       Princess Jones MD  03/06/23

## 2023-03-06 NOTE — PLAN OF CARE
Goal Outcome Evaluation:           Progress: improving  Outcome Evaluation: Pt on 1.5L NC. NSR to naima on monitor today. Up in chair most of the day. Insulin adjusted today. Pt remains on fluid restriction. No complaints from pt today. Will continue poc.     Last Na 129

## 2023-03-06 NOTE — THERAPY EVALUATION
Patient Name: Marilyn Kunz  : 1945    MRN: [id number]                              Today's Date: 3/6/2023       Admit Date: 3/2/2023    Visit Dx:     ICD-10-CM ICD-9-CM   1. Acute respiratory failure with hypoxia ([facility])  J96.01 518.81   2. Hyponatremia  E87.1 276.1     Patient Active Problem List   Diagnosis   • Chest pain   • Essential hypertension   • HLD (hyperlipidemia)   • Odynophagia   • Factor V Leiden ([facility])   • Weakness of right upper extremity   • Psoriatic arthritis ([facility])   • At risk for venous thromboembolism (VTE)   • Body mass index (BMI) of 27.0-27.9 in adult   • Cervical spondylitis with radiculitis ([facility])   • Degenerative arthritis of toe joint, right   • Diabetic nephropathy associated with type 2 diabetes mellitus (HCC)   • Disorder of bone and cartilage   • Hospital discharge follow-up   • Hypothyroidism   • Impairment of balance   • Muscle spasm   • Overactive bladder   • Pain in right hand   • Routine history and physical examination of adult   • Seborrheic dermatitis   • Spondylolisthesis, grade 2   • Total knee replacement status, left   • Vasculitis ([facility])   • CKD (chronic kidney disease) stage 3, GFR 30-59 ml/min ([facility])   • Hypercholesterolemia   • Hyperlipidemia   • PDA (patent ductus arteriosus)   • Demyelinating changes in brain ([facility])   • Need for vaccination against Streptococcus pneumoniae   • Dyspnea   • Screening for depression   • Acute sinusitis   • Allergies   • Anterior ST segment depression   • Arthralgia of knee   • Constipation   • Diverticulitis   • Encounter for pre-operative examination   • Hypertensive emergency   • Low back pain   • Need for immunization against influenza   • Other problems related to lifestyle   • Pain, joint, shoulder, left   • Pill esophagitis   • Vitamin D deficiency   • Yeast vaginitis   • Body mass index (BMI) of 23.0-23.9 in adult   • Abdominal pain in female   • Atypical chest pain   • Demyelinating disease ([facility])   • Diabetic autonomic  neuropathy associated with type 2 diabetes mellitus (HCC)   • Benign essential hypertension   • Long term current use of insulin (HCC)   • Long-term insulin use (HCC)   • Need for prophylactic vaccination against Streptococcus pneumoniae (pneumococcus) and influenza   • Psoriasis with arthropathy (HCC)   • Breast cancer screening   • Encounter for general adult medical examination without abnormal findings   • Spondylolisthesis, grade 2   • Type 2 diabetes mellitus with stage 3 chronic kidney disease, with long-term current use of insulin (HCC)   • Type 1 diabetes (HCC)   • History of stroke   • Fatigue   • History of neuromuscular disorder   • Acute respiratory failure with hypoxia (HCC)   • Hyponatremia     Past Medical History:   Diagnosis Date   • Abnormal ECG Check FPA Date or [facility]    [facility]   • Acute sinusitis 2/6/2023   • Anemia    • Asthma    • CAP (community acquired pneumonia) 2/6/2023   • Chronic kidney disease     pt told it was dx from Dr Baca and to not eat much protein   • Congenital heart disease 1950's Patent 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    Surgery [facility] 1950s close   • CTS (carpal tunnel syndrome)     Psoriatic arthritis hands could be   • Disease of thyroid gland    • Factor 5 Leiden mutation, heterozygous ([facility]) 2012   • Head injury    • Hyperlipidemia    • Hypertension    • Movement disorder 10/2021    knee replacement left knww   • Murmur, cardiac    • Neuropathy in diabetes ([facility])     redness swelling legs   • Peripheral neuropathy 05/03/2022    hands, arms. shoulders, back   • Pill esophagitis 2/6/2023   • Psoriatic arthritis (HCC)     hands   • Sleep apnea Always    diabetic do not sleep well up and down   • Stroke ([facility]) had one don't know when    showed on MRI Brain   • Type 1 diabetes (HCC)      Past Surgical History:   Procedure Laterality Date   • CARDIAC CATHETERIZATION  1952 2 of them    Patent Dictus operation   • CATARACT  EXTRACTION Bilateral    • ENDOSCOPY N/A 10/20/2020    Procedure: ESOPHAGOGASTRODUODENOSCOPY;  Surgeon: Brunner, Mark I, MD;  Location: [facility] ENDOSCOPY;  Service: Gastroenterology;  Laterality: N/A;   • HAND SURGERY Left 1987    no hardware   • PATENT DUCTUS ARTERIOUS LIGATION        General Information     Row Name 03/06/23 0903          OT Time and Intention    Document Type evaluation  -     Mode of Treatment occupational therapy  -     Row Name 03/06/23 0903          General Information    Patient Profile Reviewed yes  -YELENA     Prior Level of Function independent:;ADL's;bed mobility;transfer;all household mobility;community mobility;home management;driving;shopping;using stairs  ambulates using SPC as needed  -YELENA     Existing Precautions/Restrictions fall;oxygen therapy device and L/min  -     Barriers to Rehab none identified  -     Row Name 03/06/23 0903          Occupational Profile    Environmental Supports and Barriers (Occupational Profile) Lives alone; supportive brother near by; has walk-in shower with built-in seat, quad cane, FWW, pulse ox at home.  -YELENA     Row Name 03/06/23 0903          Living Environment    People in Home alone  -     Row Name 03/06/23 0903          Home Main Entrance    Number of Stairs, Main Entrance one  -YELENA     Stair Railings, Main Entrance none  -YELENA     Row Name 03/06/23 0903          Stairs Within Home, Primary    Number of Stairs, Within Home, Primary none  -YELENA     Row Name 03/06/23 0903          Cognition    Orientation Status (Cognition) oriented x 3  -YELENA     Row Name 03/06/23 0903          Safety Issues, Functional Mobility    Safety Issues Affecting Function (Mobility) awareness of need for assistance;insight into deficits/self-awareness;judgment;problem-solving;safety precaution awareness;safety precautions follow-through/compliance;sequencing abilities  -YELENA     Impairments Affecting Function (Mobility) balance;endurance/activity tolerance;postural/trunk  control;shortness of breath;strength  -           User Key  (r) = Recorded By, (t) = Taken By, (c) = Cosigned By    Initials Name Provider Type    Lorri Lowe OT Occupational Therapist                 Mobility/ADL's     Row Name 03/06/23 1004          Bed Mobility    Comment, (Bed Mobility) Pt presents seated EOB  -YELENA     Row Name 03/06/23 1004          Transfers    Transfers sit-stand transfer;toilet transfer  -YELENA     Comment, (Transfers) initial unsteadiness upon standing from EOB  -YELENA     Row Name 03/06/23 1004          Sit-Stand Transfer    Sit-Stand [place] (Transfers) minimum assist (75% patient effort);verbal cues  -     Row Name 03/06/23 1004          Toilet Transfer    Type (Toilet Transfer) stand-sit;sit-stand;stand pivot/stand step  -YELENA     [place] Level (Toilet Transfer) minimum assist (75% patient effort);verbal cues  -     Assistive Device (Toilet Transfer) commode;grab bars/safety frame  -YELENA     Comment, (Toilet Transfer) assist for HP  -YELENA     Row Name 03/06/23 1004          Functional Mobility    Functional Mobility- Ind. Level minimum assist (75% patient effort)  -     Functional Mobility-Distance (Feet) 20  -YELENA     Functional Mobility- Safety Issues step length decreased;supplemental O2  -YELENA     Functional Mobility- Comment Pt ambulated from bathroom back to chair with HH assist, assist to manage O2 tubing  -     Row Name 03/06/23 1004          Activities of Daily Living    BADL Assessment/Intervention grooming;toileting;feeding  -     Row Name 03/06/23 1004          Grooming Assessment/Training    [place] Level (Grooming) wash face, hands;hair care, combing/brushing;oral care regimen;minimum assist (75% patient effort)  -     Position (Grooming) sink side;unsupported standing;supported sitting  -YELENA     Comment, (Grooming) completed face/hand washing standing sink side with SUP; hair brushing and oral care completed seated in chair with HAINES  -     Row Name  03/06/23 1004          Toileting Assessment/Training    [place] Level (Toileting) adjust/manage clothing;perform perineal hygiene;minimum assist (75% patient effort)  -YELENA     Assistive Devices (Toileting) commode;grab bar/safety frame  -YELENA     Position (Toileting) unsupported sitting  -YELENA     Comment, (Toileting) assist for clothing management; pt able to perform hygiene following BM seated with HAINES assist  -YELENA     Row Name 03/06/23 1004          Self-Feeding Assessment/Training    [place] Level (Feeding) prepare tray/open items;set up  -YELENA     Position (Self-Feeding) supported sitting  -YELENA     Comment, (Feeding) assist to open milk carton  -YELENA           User Key  (r) = Recorded By, (t) = Taken By, (c) = Cosigned By    Initials Name Provider Type    Lorri Lowe OT Occupational Therapist               Obj/Interventions     Row Name 03/06/23 1008          Sensory Assessment (Somatosensory)    Sensory Assessment (Somatosensory) UE sensation intact  -     Row Name 03/06/23 1008          Vision Assessment/Intervention    Visual Impairment/Limitations WFL;corrective lenses full-time  -     Row Name 03/06/23 1008          Range of Motion Comprehensive    General Range of Motion bilateral upper extremity ROM WFL  -YELENA     Row Name 03/06/23 1008          Strength Comprehensive (MMT)    Comment, General Manual Muscle Testing (MMT) Assessment BUE's grossly 4-/5  -YELENA     Row Name 03/06/23 1008          Balance    Balance Assessment sitting static balance;sitting dynamic balance;standing dynamic balance;standing static balance  -YELENA     Static Sitting Balance standby assist  -YELENA     Dynamic Sitting Balance supervision  -YELENA     Position, Sitting Balance unsupported;other (see comments)  sitting on commode  -YELENA     Static Standing Balance contact guard  -YELENA     Dynamic Standing Balance minimal assist  -YELENA     Position/Device Used, Standing Balance supported  -YELENA     Balance Interventions standing;occupation  based/functional task  -YELENA     Comment, Balance Pt performed face/hand washing standing at sink with SUP.  -YELENA           User Key  (r) = Recorded By, (t) = Taken By, (c) = Cosigned By    Initials Name Provider Type    Lorri Lowe, OT Occupational Therapist               Goals/Plan     Row Name 03/06/23 1016          Transfer Goal 1 (OT)    Activity/Assistive Device (Transfer Goal 1, OT) sit-to-stand/stand-to-sit;toilet;walker, rolling  -YELENA     [place] Level/Cues Needed (Transfer Goal 1, OT) supervision required  -YELENA     Time Frame (Transfer Goal 1, OT) long term goal (LTG);by discharge  -YELENA     Progress/Outcome (Transfer Goal 1, OT) new goal  -YELENA     Row Name 03/06/23 1016          Toileting Goal 1 (OT)    Activity/Device (Toileting Goal 1, OT) adjust/manage clothing;perform perineal hygiene;commode;grab bar/safety frame  -YELENA     [place] Level/Cues Needed (Toileting Goal 1, OT) supervision required  -YELENA     Time Frame (Toileting Goal 1, OT) long term goal (LTG);by discharge  -YELENA     Progress/Outcome (Toileting Goal 1, OT) new goal  -YELENA     Row Name 03/06/23 1016          Grooming Goal 1 (OT)    Activity/Device (Grooming Goal 1, OT) hair care;oral care;wash face, hands  -YELENA     [place] (Grooming Goal 1, OT) supervision required  -YELENA     Time Frame (Grooming Goal 1, OT) long term goal (LTG);by discharge  -YELENA     Strategies/Barriers (Grooming Goal 1, OT) standing sink side, maintaining O2 sat. >89%  -YELENA     Progress/Outcome (Grooming Goal 1, OT) new goal  -YELENA     Row Name 03/06/23 1016          Therapy Assessment/Plan (OT)    Planned Therapy Interventions (OT) activity tolerance training;BADL retraining;functional balance retraining;occupation/activity based interventions;patient/caregiver education/training;ROM/therapeutic exercise;strengthening exercise;transfer/mobility retraining  -YELENA           User Key  (r) = Recorded By, (t) = Taken By, (c) = Cosigned By    Initials Name Provider Type    YELENA  Lorri Noonan, OT Occupational Therapist               Clinical Impression     [facility] Name 03/06/23 1011          Pain Assessment    Pretreatment Pain Rating 0/10 - no pain  -YELENA     Posttreatment Pain Rating 0/10 - no pain  -[facility] Name 03/06/23 1011          Plan of Care Review    Plan of Care Reviewed With patient  -YELENA     Outcome Evaluation OT eval completed. Pt presents below baseline for ADL performance limited by dyspnea, generalized weakness and balance deficits. Pt Bin for transfer on/off commode, Bin for toileting tasks, HAINES for oral care and hair brushing seated in chair. IP OT to follow to progress self-care and safety. Recommend IRF at discharge.  -[facility] Name 03/06/23 1011          Therapy Assessment/Plan (OT)    Rehab Potential (OT) good, to achieve stated therapy goals  -     Criteria for Skilled Therapeutic Interventions Met (OT) yes;meets criteria;skilled treatment is necessary  -     Therapy Frequency (OT) daily  -[facility] Name 03/06/23 1011          Therapy Plan Review/Discharge Plan (OT)    Anticipated Discharge Disposition (OT) inpatient rehabilitation facility  -[facility] Name 03/06/23 1011          Vital Signs    Pre Systolic BP Rehab 136  -YELENA     Pre Treatment Diastolic BP 65  -YELENA     Pretreatment Heart Rate (beats/min) 66  -YELENA     Pre SpO2 (%) 94  -YELENA     O2 Delivery Pre Treatment nasal cannula  -YELENA     Intra SpO2 (%) 88  -YELENA     O2 Delivery Intra Treatment nasal cannula  -YELENA     Post SpO2 (%) 95  -YELENA     O2 Delivery Post Treatment nasal cannula  -YELENA     Pre Patient Position Supine  -YELENA     Intra Patient Position Standing  -YELENA     Post Patient Position Sitting  -[facility] Name 03/06/23 1011          Positioning and Restraints    Pre-Treatment Position in bed  -YELENA     Post Treatment Position chair  -YELENA     In Chair notified nsg;reclined;call light within reach;encouraged to call for assist;waffle cushion;legs elevated  RN notified that there is no chair alarm in room  -YELENA            User Key  (r) = Recorded By, (t) = Taken By, (c) = Cosigned By    Initials Name Provider Type    Lorri Lowe OT Occupational Therapist               Outcome Measures     Row Name 03/06/23 1018          How much help from another is currently needed...    Putting on and taking off regular lower body clothing? 2  -YELENA     Bathing (including washing, rinsing, and drying) 2  -YELENA     Toileting (which includes using toilet bed pan or urinal) 3  -YELENA     Putting on and taking off regular upper body clothing 3  -YELENA     Taking care of personal grooming (such as brushing teeth) 3  -YELENA     Eating meals 3  -YELENA     AM-PAC 6 Clicks Score (OT) 16  -YELENA     Row Name 03/06/23 0837          How much help from another person do you currently need...    Turning from your back to your side while in flat bed without using bedrails? 3  -NS     Moving from lying on back to sitting on the side of a flat bed without bedrails? 3  -NS     Moving to and from a bed to a chair (including a wheelchair)? 3  -NS     Standing up from a chair using your arms (e.g., wheelchair, bedside chair)? 3  -NS     Climbing 3-5 steps with a railing? 2  -NS     To walk in hospital room? 3  -NS     AM-PAC 6 Clicks Score (PT) 17  -NS     Highest level of mobility 5 --> Static standing  -NS     Row Name 03/06/23 1018 03/06/23 0837       Functional Assessment    Outcome Measure Options AM-PAC 6 Clicks Daily Activity (OT)  -YELENA AM-PAC 6 Clicks Basic Mobility (PT)  -NS          User Key  (r) = Recorded By, (t) = Taken By, (c) = Cosigned By    Initials Name Provider Type    Alisha Yates PT Physical Therapist    Lorri Lowe OT Occupational Therapist                Occupational Therapy Education     Title: PT OT SLP Therapies (In Progress)     Topic: Occupational Therapy (In Progress)     Point: ADL training (Done)     Description:   Instruct learner(s) on proper safety adaptation and remediation techniques during self care or transfers.   Instruct in  proper use of assistive devices.              Learning Progress Summary           Patient Acceptance, E, VU by YELENA at 3/6/2023 1019    Comment: OT POC; incentive spirometer use; discharge planning                   Point: Home exercise program (Not Started)     Description:   Instruct learner(s) on appropriate technique for monitoring, assisting and/or progressing therapeutic exercises/activities.              Learner Progress:  Not documented in this visit.          Point: Precautions (Done)     Description:   Instruct learner(s) on prescribed precautions during self-care and functional transfers.              Learning Progress Summary           Patient Acceptance, E, VU by YELENA at 3/6/2023 1019    Comment: OT POC; incentive spirometer use; discharge planning                   Point: Body mechanics (Done)     Description:   Instruct learner(s) on proper positioning and spine alignment during self-care, functional mobility activities and/or exercises.              Learning Progress Summary           Patient Acceptance, E, VU by YELENA at 3/6/2023 1019    Comment: OT POC; incentive spirometer use; discharge planning                               User Key     Initials Effective Dates Name Provider Type Discipline     06/16/21 -  Lorri Noonan OT Occupational Therapist OT              OT Recommendation and Plan  Planned Therapy Interventions (OT): activity tolerance training, BADL retraining, functional balance retraining, occupation/activity based interventions, patient/caregiver education/training, ROM/therapeutic exercise, strengthening exercise, transfer/mobility retraining  Therapy Frequency (OT): daily  Plan of Care Review  Plan of Care Reviewed With: patient  Outcome Evaluation: OT eval completed. Pt presents below baseline for ADL performance limited by dyspnea, generalized weakness and balance deficits. Pt Bin for transfer on/off commode, Bin for toileting tasks, HAINES for oral care and hair brushing seated in  chair. IP OT to follow to progress self-care and safety. Recommend IRF at discharge.     Time Calculation:    Time Calculation- OT     Row Name 03/06/23 0840             Time Calculation- OT    OT Start Time 0840  -YELENA      OT Received On 03/06/23  -YELENA      OT Goal Re-Cert Due Date 03/16/23  -YELENA         Timed Charges    78496 - OT Self Care/Mgmt Minutes 12  -YELENA         Untimed Charges    OT Eval/Re-eval Minutes 46  -YELENA         Total Minutes    Timed Charges Total Minutes 12  -YELENA      Untimed Charges Total Minutes 46  -YELENA       Total Minutes 58  -YELENA            User Key  (r) = Recorded By, (t) = Taken By, (c) = Cosigned By    Initials Name Provider Type    Lorri Lowe OT Occupational Therapist              Therapy Charges for Today     Code Description Service Date Service Provider Modifiers Qty    83295402234 HC OT SELF CARE/MGMT/TRAIN EA 15 MIN 3/6/2023 Lorri Noonan OT GO 1    57423542839 HC OT EVAL LOW COMPLEXITY 4 3/6/2023 Lorri Noonan OT GO 1               Lorri Noonan OT  3/6/2023

## 2023-03-06 NOTE — PLAN OF CARE
Goal Outcome Evaluation:  Plan of Care Reviewed With: patient           Outcome Evaluation: OT eval completed. Pt presents below baseline for ADL performance limited by dyspnea, generalized weakness and balance deficits. Pt Bin for transfer on/off commode, Bin for toileting tasks, HAINES for oral care and hair brushing seated in chair. IP OT to follow to progress self-care and safety. Recommend IRF at discharge.

## 2023-03-07 LAB
ANION GAP SERPL CALCULATED.3IONS-SCNC: 12 MMOL/L (ref 5–15)
BACTERIA SPEC AEROBE CULT: NORMAL
BACTERIA SPEC AEROBE CULT: NORMAL
BUN SERPL-MCNC: 14 MG/DL (ref 8–23)
BUN/CREAT SERPL: 20.9 (ref 7–25)
CALCIUM SPEC-SCNC: 8.3 MG/DL (ref 8.6–10.5)
CHLORIDE SERPL-SCNC: 94 MMOL/L (ref 98–107)
CO2 SERPL-SCNC: 21 MMOL/L (ref 22–29)
CREAT SERPL-MCNC: 0.67 MG/DL (ref 0.57–1)
EGFRCR SERPLBLD CKD-EPI 2021: 90.2 ML/MIN/1.73
GLUCOSE BLDC GLUCOMTR-MCNC: 244 MG/DL (ref 70–130)
GLUCOSE BLDC GLUCOMTR-MCNC: 275 MG/DL (ref 70–130)
GLUCOSE BLDC GLUCOMTR-MCNC: 300 MG/DL (ref 70–130)
GLUCOSE BLDC GLUCOMTR-MCNC: 361 MG/DL (ref 70–130)
GLUCOSE SERPL-MCNC: 183 MG/DL (ref 65–99)
POTASSIUM SERPL-SCNC: 4.8 MMOL/L (ref 3.5–5.2)
SODIUM SERPL-SCNC: 127 MMOL/L (ref 136–145)
SODIUM SERPL-SCNC: 128 MMOL/L (ref 136–145)
SODIUM SERPL-SCNC: 129 MMOL/L (ref 136–145)

## 2023-03-07 PROCEDURE — 82962 GLUCOSE BLOOD TEST: CPT

## 2023-03-07 PROCEDURE — 63710000001 INSULIN DETEMIR PER 5 UNITS: Performed by: INTERNAL MEDICINE

## 2023-03-07 PROCEDURE — 80048 BASIC METABOLIC PNL TOTAL CA: CPT | Performed by: INTERNAL MEDICINE

## 2023-03-07 PROCEDURE — 94664 DEMO&/EVAL PT USE INHALER: CPT

## 2023-03-07 PROCEDURE — 63710000001 AZATHIOPRINE PER 50 MG: Performed by: INTERNAL MEDICINE

## 2023-03-07 PROCEDURE — 94799 UNLISTED PULMONARY SVC/PX: CPT

## 2023-03-07 PROCEDURE — 99232 SBSQ HOSP IP/OBS MODERATE 35: CPT | Performed by: INTERNAL MEDICINE

## 2023-03-07 PROCEDURE — 97110 THERAPEUTIC EXERCISES: CPT

## 2023-03-07 PROCEDURE — 63710000001 INSULIN LISPRO (HUMAN) PER 5 UNITS: Performed by: INTERNAL MEDICINE

## 2023-03-07 PROCEDURE — 25010000002 ENOXAPARIN PER 10 MG: Performed by: NURSE PRACTITIONER

## 2023-03-07 PROCEDURE — 84295 ASSAY OF SERUM SODIUM: CPT | Performed by: INTERNAL MEDICINE

## 2023-03-07 PROCEDURE — 25010000002 CEFTRIAXONE PER 250 MG: Performed by: FAMILY MEDICINE

## 2023-03-07 PROCEDURE — 97116 GAIT TRAINING THERAPY: CPT

## 2023-03-07 RX ORDER — SODIUM CHLORIDE 1000 MG
2 TABLET, SOLUBLE MISCELLANEOUS 2 TIMES DAILY WITH MEALS
Status: DISCONTINUED | OUTPATIENT
Start: 2023-03-07 | End: 2023-03-09 | Stop reason: HOSPADM

## 2023-03-07 RX ADMIN — NEBIVOLOL 20 MG: 20 TABLET ORAL at 09:00

## 2023-03-07 RX ADMIN — INSULIN LISPRO 6 UNITS: 100 INJECTION, SOLUTION INTRAVENOUS; SUBCUTANEOUS at 11:51

## 2023-03-07 RX ADMIN — BUDESONIDE AND FORMOTEROL FUMARATE DIHYDRATE 1 PUFF: 160; 4.5 AEROSOL RESPIRATORY (INHALATION) at 08:16

## 2023-03-07 RX ADMIN — FLUTICASONE PROPIONATE 2 SPRAY: 50 SPRAY, METERED NASAL at 09:05

## 2023-03-07 RX ADMIN — INSULIN LISPRO 3 UNITS: 100 INJECTION, SOLUTION INTRAVENOUS; SUBCUTANEOUS at 09:01

## 2023-03-07 RX ADMIN — AMLODIPINE BESYLATE 10 MG: 10 TABLET ORAL at 09:00

## 2023-03-07 RX ADMIN — TERAZOSIN HYDROCHLORIDE 2 MG: 2 CAPSULE ORAL at 22:13

## 2023-03-07 RX ADMIN — TOBRAMYCIN AND DEXAMETHASONE 1 DROP: 3; 1 SUSPENSION/ DROPS OPHTHALMIC at 15:16

## 2023-03-07 RX ADMIN — IPRATROPIUM BROMIDE AND ALBUTEROL SULFATE 3 ML: 2.5; .5 SOLUTION RESPIRATORY (INHALATION) at 19:55

## 2023-03-07 RX ADMIN — TOBRAMYCIN AND DEXAMETHASONE 1 DROP: 3; 1 SUSPENSION/ DROPS OPHTHALMIC at 22:13

## 2023-03-07 RX ADMIN — LIDOCAINE 2 PATCH: 700 PATCH TOPICAL at 10:18

## 2023-03-07 RX ADMIN — MONTELUKAST 10 MG: 10 TABLET, FILM COATED ORAL at 22:13

## 2023-03-07 RX ADMIN — INSULIN LISPRO 4 UNITS: 100 INJECTION, SOLUTION INTRAVENOUS; SUBCUTANEOUS at 17:43

## 2023-03-07 RX ADMIN — IPRATROPIUM BROMIDE AND ALBUTEROL SULFATE 3 ML: 2.5; .5 SOLUTION RESPIRATORY (INHALATION) at 08:15

## 2023-03-07 RX ADMIN — TOBRAMYCIN AND DEXAMETHASONE 1 DROP: 3; 1 SUSPENSION/ DROPS OPHTHALMIC at 09:05

## 2023-03-07 RX ADMIN — PRAVASTATIN SODIUM 20 MG: 20 TABLET ORAL at 17:43

## 2023-03-07 RX ADMIN — INSULIN DETEMIR 3 UNITS: 100 INJECTION, SOLUTION SUBCUTANEOUS at 22:12

## 2023-03-07 RX ADMIN — OXYBUTYNIN CHLORIDE 10 MG: 5 TABLET, EXTENDED RELEASE ORAL at 09:00

## 2023-03-07 RX ADMIN — CETIRIZINE HYDROCHLORIDE 5 MG: 10 TABLET, FILM COATED ORAL at 09:00

## 2023-03-07 RX ADMIN — BUDESONIDE AND FORMOTEROL FUMARATE DIHYDRATE 1 PUFF: 160; 4.5 AEROSOL RESPIRATORY (INHALATION) at 19:55

## 2023-03-07 RX ADMIN — IPRATROPIUM BROMIDE AND ALBUTEROL SULFATE 3 ML: 2.5; .5 SOLUTION RESPIRATORY (INHALATION) at 15:49

## 2023-03-07 RX ADMIN — TOBRAMYCIN AND DEXAMETHASONE 1 DROP: 3; 1 SUSPENSION/ DROPS OPHTHALMIC at 17:47

## 2023-03-07 RX ADMIN — INSULIN DETEMIR 3 UNITS: 100 INJECTION, SOLUTION SUBCUTANEOUS at 11:51

## 2023-03-07 RX ADMIN — AZATHIOPRINE 100 MG: 50 TABLET ORAL at 22:13

## 2023-03-07 RX ADMIN — CEFTRIAXONE 2 G: 2 INJECTION, POWDER, FOR SOLUTION INTRAMUSCULAR; INTRAVENOUS at 15:16

## 2023-03-07 RX ADMIN — Medication 10 ML: at 22:13

## 2023-03-07 RX ADMIN — ENOXAPARIN SODIUM 40 MG: 40 INJECTION SUBCUTANEOUS at 11:51

## 2023-03-07 RX ADMIN — AZATHIOPRINE 100 MG: 50 TABLET ORAL at 09:05

## 2023-03-07 RX ADMIN — ASPIRIN 81 MG: 81 TABLET, COATED ORAL at 09:00

## 2023-03-07 RX ADMIN — LEVOTHYROXINE SODIUM 75 MCG: 0.07 TABLET ORAL at 05:36

## 2023-03-07 RX ADMIN — IPRATROPIUM BROMIDE AND ALBUTEROL SULFATE 3 ML: 2.5; .5 SOLUTION RESPIRATORY (INHALATION) at 12:57

## 2023-03-07 RX ADMIN — SODIUM CHLORIDE 2 G: 1 TABLET ORAL at 17:43

## 2023-03-07 RX ADMIN — TOBRAMYCIN AND DEXAMETHASONE 1 DROP: 3; 1 SUSPENSION/ DROPS OPHTHALMIC at 05:36

## 2023-03-07 NOTE — PROGRESS NOTES
"    [facility] Medicine Services  PROGRESS NOTE    Patient Name: Marilyn Kunz  : 1945  MRN: [id number]    Date of Admission: 3/2/2023  Primary Care Physician: Peter Baca MD    Subjective   Subjective     CC:  dyspnea    HPI:  Feeling better today  Reports frailty of her diabetes as baseline but her endocrinologist would \"rather have her low than high\"  More comfortable on 1 liter n/c but sats ok - dyspnea is chronic along w weakness    ROS:  Gen- No fevers, chills  CV- No chest pain, palpitations    Objective   Objective     Vital Signs:   Temp:  [97 °F (36.1 °C)-97.8 °F (36.6 °C)] 97 °F (36.1 °C)  Heart Rate:  [56-68] 66  Resp:  [16-18] 18  BP: (127-153)/(48-95) 153/63  Flow (L/min):  [1.5-2] 1.5     Physical Exam:  Constitutional: No acute distress, awake, alert  HENT: NCAT, mucous membranes moist  Respiratory: Clear to auscultation w sats low 90's on room air on my exam  Cardiovascular: RRR, no murmurs, rubs, or gallops  Gastrointestinal: Soft, nontender, nondistended  Musculoskeletal: Muscle tone decreased for age, no joint effusions appreciated  Psychiatric: Appropriate affect, cooperative  Neurologic: Alert and oriented, facial movements symmetric and spontaneous movement of all 4 extremities grossly equal bilaterally, speech clear  Skin: No rashes    Results Reviewed:  LAB RESULTS:      Lab 23  0800 23  0615 23  1539   WBC 9.12 8.43 9.27   HEMOGLOBIN 9.8* 9.4* 10.9*   HEMATOCRIT 29.7* 27.7* 32.6*   PLATELETS 277 267 301   NEUTROS ABS  --   --  7.86*   IMMATURE GRANS (ABS)  --   --  0.06*   LYMPHS ABS  --   --  0.78   MONOS ABS  --   --  0.56   EOS ABS  --   --  0.01   MCV 91.1 88.5 88.3   PROCALCITONIN  --   --  0.09   LACTATE  --   --  1.6         Lab 23  0856 23  0525 23  0055 23  1749 23  1245 23  0800 23  2008 23  0733 23  0020 23  1143 23  0616 23  0615   SODIUM 127* " 127*  127* 128* 129* 124* 125*   < > 128*   < > 122*  --  119*   POTASSIUM  --  4.8  --   --   --  5.0  --  4.2  --  4.4  --  3.9   CHLORIDE  --  94*  --   --   --  92*  --  94*  --  87*  --  85*   CO2  --  21.0*  --   --   --  21.0*  --  23.0  --  23.0  --  23.0   ANION GAP  --  12.0  --   --   --  12.0  --  11.0  --  12.0  --  11.0   BUN  --  14  --   --   --  17  --  14  --  14  --  16   CREATININE  --  0.67  --   --   --  0.81  --  0.67  --  0.95  --  0.90   EGFR  --  90.2  --   --   --  74.9  --  90.2  --  61.8  --  66.0   GLUCOSE  --  183*  --   --   --  363*  --  101*  --  239*  --  120*   CALCIUM  --  8.3*  --   --   --  8.4*  --  8.3*  --  8.7  --  8.4*   IONIZED CALCIUM  --   --   --   --   --   --   --   --   --   --  1.20  --    MAGNESIUM  --   --   --   --   --   --   --  1.8  --   --   --  1.5*   PHOSPHORUS  --   --   --   --   --   --   --   --   --   --   --  3.2    < > = values in this interval not displayed.         Lab 03/02/23  1539   TOTAL PROTEIN 7.6   ALBUMIN 4.5   GLOBULIN 3.1   ALT (SGPT) 16   AST (SGOT) 24   BILIRUBIN 1.5*   ALK PHOS 66         Lab 03/03/23  0615 03/02/23  1539   PROBNP 5,883.0* 6,798.0*   HSTROP T  --  19*         Lab 03/04/23  0548   CHOLESTEROL 158   LDL CHOL 82   HDL CHOL 64*   TRIGLYCERIDES 58             Brief Urine Lab Results  (Last result in the past 365 days)      Color   Clarity   Blood   Leuk Est   Nitrite   Protein   CREAT   Urine HCG        03/03/23 1457             56.4               Microbiology Results Abnormal     Procedure Component Value - Date/Time    Blood Culture - Blood, Wrist, Left [[id number]]  (Normal) Collected: 03/02/23 2121    Lab Status: Preliminary result Specimen: Blood from Wrist, Left Updated: 03/06/23 2145     Blood Culture No growth at 4 days    Blood Culture - Blood, Arm, Left [[id number]]  (Normal) Collected: 03/02/23 2121    Lab Status: Preliminary result Specimen: Blood from Arm, Left Updated: 03/06/23 2145     Blood Culture No growth  at 4 days    COVID PRE-OP / PRE-PROCEDURE SCREENING ORDER (NO ISOLATION) - Swab, Nasopharynx [[id number]]  (Normal) Collected: 03/02/23 1755    Lab Status: Final result Specimen: Swab from Nasopharynx Updated: 03/02/23 1829    Narrative:      The following orders were created for panel order COVID PRE-OP / PRE-PROCEDURE SCREENING ORDER (NO ISOLATION) - Swab, Nasopharynx.  Procedure                               Abnormality         Status                     ---------                               -----------         ------                     COVID-19 and FLU A/B PCR...[[id number]]  Normal              Final result                 Please view results for these tests on the individual orders.    COVID-19 and FLU A/B PCR - Swab, Nasopharynx [[id number]]  (Normal) Collected: 03/02/23 1755    Lab Status: Final result Specimen: Swab from Nasopharynx Updated: 03/02/23 1829     COVID19 Not Detected     Influenza A PCR Not Detected     Influenza B PCR Not Detected    Narrative:      Fact sheet for providers: https://www.fda.gov/media/827505/download    Fact sheet for patients: https://www.fda.gov/media/818847/download    Test performed by PCR.          No radiology results from the last 24 hrs    Results for orders placed during the hospital encounter of 02/24/23    Adult Transthoracic Echo Complete W/ Cont if Necessary Per Protocol    Interpretation Summary  •  Left ventricular ejection fraction appears to be 61 - 65%.  •  Estimated right ventricular systolic pressure from tricuspid regurgitation is mildly elevated (35-45 mmHg).      Current medications:  Scheduled Meds:amLODIPine, 10 mg, Oral, Daily  aspirin, 81 mg, Oral, Daily  azaTHIOprine, 100 mg, Oral, BID  budesonide-formoterol, 1 puff, Inhalation, BID - RT  cefTRIAXone, 2 g, Intravenous, Q24H  cetirizine, 5 mg, Oral, Daily  enoxaparin, 40 mg, Subcutaneous, Q24H  fluticasone, 2 spray, Each Nare, Daily  insulin detemir, 3 Units, Subcutaneous, BID  insulin lispro, 0-7  Units, Subcutaneous, TID AC  ipratropium-albuterol, 3 mL, Nebulization, 4x Daily - RT  levothyroxine, 75 mcg, Oral, Q AM  lidocaine, 2 patch, Transdermal, Q24H  montelukast, 10 mg, Oral, Nightly  nebivolol, 20 mg, Oral, Daily  oxybutynin XL, 10 mg, Oral, Daily  polyethylene glycol, 17 g, Oral, Daily  pravastatin, 20 mg, Oral, Q PM  terazosin, 2 mg, Oral, Nightly  tobramycin-dexamethasone, 1 drop, Left Eye, Q4H While Awake      Continuous Infusions:   PRN Meds:.•  acetaminophen  •  albuterol sulfate HFA  •  dextrose  •  dextrose  •  glucagon (human recombinant)  •  sodium chloride    Assessment & Plan   Assessment & Plan     Active Hospital Problems    Diagnosis  POA   • Hyponatremia [E87.1]  Yes   • Acute respiratory failure with hypoxia (HCC) [J96.01]  Yes   • History of neuromuscular disorder [Z86.69]  Not Applicable   • Type 1 diabetes (HCC) [E10.9]  Yes   • Dyspnea [R06.00]  Yes   • Hypothyroidism [E03.9]  Yes      Resolved Hospital Problems   No resolved problems to display.        Brief Hospital Course to date:  Marilyn Kunz is a 77 y.o. female w heterozygous factor V leiden, type ONE diabetes, recent admission 2/24-2/26 w hyponatremia attributed to dehydration + dyspnea on exertion.     Severe hyponatremia 2/2 SIADH + hypervolemia - improved  -improved w fluid restriction, patient baseline ~130  -Na q12  -cymbalta d/scarlett by nephrology    Possible CAP  -favor mild pulm edema in setting of elevated proBNP + hyponatremia   -completed 5 days ceftriaxone + doxy on 3/7    Type ONE diabetes c/b hypoglycemia while here  -diagnosed in 50's but confirmed by testing as DMI  -give detemir 3 units BID + SSI w meals (patient very frail w hypoglycemia)  -home regimen: 19 units basal + 5 units (plus/minus) w meals  -given type 1 diabetes, can decrease basal dose but please do not hold if poss    HTN - hytrin, amlodipine, nebivolol. irbeartan and PRN clonidine on med list as well, hold for now. More lenient goals for  this older patient who lives home alone, SBP <170    Chronic dyspnea - saw Dr Haley, appears to be mostly driven by fatigue, albuterol PRN    Hypothyroidism, controlled - levothyroxine  Demyelinating disorder - follows outpatient [facility]. Hold cymbalta per nephrology above but may need restarted. Pharmacy consulted: azathioprine restarted    Expected Discharge Location and Transportation: IRF  Expected Discharge 3/8 will likely be medically ready (if ok by nephrology), awaiting rehab bed now  Expected Discharge Date and Time     Expected Discharge Date Expected Discharge Time    Mar 8, 2023            DVT prophylaxis:  Medical DVT prophylaxis orders are present.     AM-PAC 6 Clicks Score (PT): 17 (03/06/23 2131)    CODE STATUS:   Code Status and Medical Interventions:   Ordered at: 03/02/23 1920     Level Of Support Discussed With:    Patient     Code Status (Patient has no pulse and is not breathing):    CPR (Attempt to Resuscitate)     Medical Interventions (Patient has pulse or is breathing):    Full Support     Release to patient:    Routine Release       Princess Jones MD  03/07/23

## 2023-03-07 NOTE — THERAPY TREATMENT NOTE
Patient Name: Marilyn Kunz  : 1945    MRN: [id number]                              Today's Date: 3/7/2023       Admit Date: 3/2/2023    Visit Dx:     ICD-10-CM ICD-9-CM   1. Acute respiratory failure with hypoxia ([facility])  J96.01 518.81   2. Hyponatremia  E87.1 276.1     Patient Active Problem List   Diagnosis   • Chest pain   • Essential hypertension   • HLD (hyperlipidemia)   • Odynophagia   • Factor V Leiden ([facility])   • Weakness of right upper extremity   • Psoriatic arthritis ([facility])   • At risk for venous thromboembolism (VTE)   • Body mass index (BMI) of 27.0-27.9 in adult   • Cervical spondylitis with radiculitis ([facility])   • Degenerative arthritis of toe joint, right   • Diabetic nephropathy associated with type 2 diabetes mellitus (HCC)   • Disorder of bone and cartilage   • Hospital discharge follow-up   • Hypothyroidism   • Impairment of balance   • Muscle spasm   • Overactive bladder   • Pain in right hand   • Routine history and physical examination of adult   • Seborrheic dermatitis   • Spondylolisthesis, grade 2   • Total knee replacement status, left   • Vasculitis ([facility])   • CKD (chronic kidney disease) stage 3, GFR 30-59 ml/min ([facility])   • Hypercholesterolemia   • Hyperlipidemia   • PDA (patent ductus arteriosus)   • Demyelinating changes in brain ([facility])   • Need for vaccination against Streptococcus pneumoniae   • Dyspnea   • Screening for depression   • Acute sinusitis   • Allergies   • Anterior ST segment depression   • Arthralgia of knee   • Constipation   • Diverticulitis   • Encounter for pre-operative examination   • Hypertensive emergency   • Low back pain   • Need for immunization against influenza   • Other problems related to lifestyle   • Pain, joint, shoulder, left   • Pill esophagitis   • Vitamin D deficiency   • Yeast vaginitis   • Body mass index (BMI) of 23.0-23.9 in adult   • Abdominal pain in female   • Atypical chest pain   • Demyelinating disease ([facility])   • Diabetic autonomic  neuropathy associated with type 2 diabetes mellitus (HCC)   • Benign essential hypertension   • Long term current use of insulin (HCC)   • Long-term insulin use (HCC)   • Need for prophylactic vaccination against Streptococcus pneumoniae (pneumococcus) and influenza   • Psoriasis with arthropathy (HCC)   • Breast cancer screening   • Encounter for general adult medical examination without abnormal findings   • Spondylolisthesis, grade 2   • Type 2 diabetes mellitus with stage 3 chronic kidney disease, with long-term current use of insulin (HCC)   • Type 1 diabetes (HCC)   • History of stroke   • Fatigue   • History of neuromuscular disorder   • Acute respiratory failure with hypoxia (HCC)   • Hyponatremia     Past Medical History:   Diagnosis Date   • Abnormal ECG Check FPA Date or [facility]    [facility]   • Acute sinusitis 2/6/2023   • Anemia    • Asthma    • CAP (community acquired pneumonia) 2/6/2023   • Chronic kidney disease     pt told it was dx from Dr Baca and to not eat much protein   • Congenital heart disease 1950's Patent 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    [facility] close   • CTS (carpal tunnel syndrome)     Psoriatic arthritis hands could be   • Disease of thyroid gland    • Factor 5 Leiden mutation, heterozygous ([facility]) 2012   • Head injury    • Hyperlipidemia    • Hypertension    • Movement disorder 10/2021    knee replacement left knww   • Murmur, cardiac    • Neuropathy in diabetes ([facility])     redness swelling legs   • Peripheral neuropathy 05/03/2022    hands, arms. shoulders, back   • Pill esophagitis 2/6/2023   • Psoriatic arthritis (HCC)     hands   • Sleep apnea Always    diabetic do not sleep well up and down   • Stroke ([facility]) had one don't know when    showed on MRI Brain   • Type 1 diabetes (HCC)      Past Surgical History:   Procedure Laterality Date   • CARDIAC CATHETERIZATION  1952 2 of them    Patent Dictus operation   • CATARACT  EXTRACTION Bilateral    • ENDOSCOPY N/A 10/20/2020    Procedure: ESOPHAGOGASTRODUODENOSCOPY;  Surgeon: Brunner, Mark I, MD;  Location: [facility] ENDOSCOPY;  Service: Gastroenterology;  Laterality: N/A;   • HAND SURGERY Left 1987    no hardware   • PATENT DUCTUS ARTERIOUS LIGATION        General Information     Row Name 03/07/23 1526          Physical Therapy Time and Intention    Document Type therapy note (daily note)  -NS     Mode of Treatment individual therapy;physical therapy  -NS     Row Name 03/07/23 1526          General Information    Patient Profile Reviewed yes  -NS     Existing Precautions/Restrictions fall;oxygen therapy device and L/min  -NS     Row Name 03/07/23 1526          Cognition    Orientation Status (Cognition) oriented x 3  -NS     Row Name 03/07/23 1526          Safety Issues, Functional Mobility    Safety Issues Affecting Function (Mobility) safety precaution awareness;safety precautions follow-through/compliance;sequencing abilities  -NS     Impairments Affecting Function (Mobility) balance;endurance/activity tolerance;motor planning;strength  -NS           User Key  (r) = Recorded By, (t) = Taken By, (c) = Cosigned By    Initials Name Provider Type    NS Alisha Kennedy PT Physical Therapist               Mobility     Row Name 03/07/23 1526          Bed Mobility    Bed Mobility supine-sit  -NS     Supine-Sit [place] (Bed Mobility) supervision  -NS     Assistive Device (Bed Mobility) bed rails;head of bed elevated  -NS     [facility] Name 03/07/23 1526          Transfers    Comment, (Transfers) Cues for hand placement during standing and sitting, as well as slow descent for safety.  -NS     Row Name 03/07/23 1526          Sit-Stand Transfer    Sit-Stand [place] (Transfers) contact guard;verbal cues  -NS     [facility] Name 03/07/23 1526          Gait/Stairs (Locomotion)    [place] Level (Gait) contact guard;verbal cues  -NS     Assistive Device (Gait) walker, front-wheeled  -NS     Distance  in Feet (Gait) 30x3  -NS     Deviations/Abnormal Patterns (Gait) base of support, narrow;sam decreased;gait speed decreased;stride length decreased  -NS     Bilateral Gait Deviations heel strike decreased  -NS     Comment, (Gait/Stairs) Patient demonstrated improved balance with use of RW. She continues to require standing rest breaks due to fatigue. Anticipate that pt could progress to quad cane next session. SpO2 88% briefly on 2L O2 following gait, recovering to 90% within seconds.  -NS           User Key  (r) = Recorded By, (t) = Taken By, (c) = Cosigned By    Initials Name Provider Type    Alisha Yates, PT Physical Therapist               Obj/Interventions     Row Name 03/07/23 1526          Motor Skills    Therapeutic Exercise hip;knee;ankle  -NS     Row Name 03/07/23 1526          Hip (Therapeutic Exercise)    Hip (Therapeutic Exercise) strengthening exercise  -NS     Hip Strengthening (Therapeutic Exercise) bilateral;flexion;aBduction;aDduction;marching while standing;standing;10 repetitions  -NS     Row Name 03/07/23 1526          Ankle (Therapeutic Exercise)    Ankle (Therapeutic Exercise) strengthening exercise  -NS     Ankle Strengthening (Therapeutic Exercise) bilateral;dorsiflexion;plantarflexion;standing;10 repetitions  -NS     Row Name 03/07/23 1526          Balance    Balance Assessment sitting static balance;sitting dynamic balance;standing dynamic balance;standing static balance  -NS     Static Sitting Balance standby assist  -NS     Dynamic Sitting Balance standby assist  -NS     Position, Sitting Balance unsupported;sitting edge of bed  -NS     Static Standing Balance contact guard  -NS     Dynamic Standing Balance contact guard  -NS     Position/Device Used, Standing Balance supported;walker, front-wheeled  -NS     Comment, Balance Pt completed standing ther ex to facilitate strengthening and challenge balance. Pt required BUE support via walker for balance, pt with inadequate balance  to only have single UE support.  -NS           User Key  (r) = Recorded By, (t) = Taken By, (c) = Cosigned By    Initials Name Provider Type    Alisha Yates PT Physical Therapist               Goals/Plan    No documentation.                Clinical Impression     Row Name 03/07/23 1526          Pain    Pretreatment Pain Rating 0/10 - no pain  -NS     Posttreatment Pain Rating 0/10 - no pain  -NS     Row Name 03/07/23 1526          Plan of Care Review    Plan of Care Reviewed With patient  -NS     Progress improving  -NS     Outcome Evaluation Patient progressed with PT, demonstrating improvement in balance with RW use but continuing to be limited by fatigue and balance deficits. Anticipate that pt will be appropriate to progress to quad cane next session. Continue skilled IP PT services to support return to independence. Recommend IRF at d/c.  -NS     Row Name 03/07/23 1526          Vital Signs    Pretreatment Heart Rate (beats/min) 66  -NS     Posttreatment Heart Rate (beats/min) 71  -NS     Pre SpO2 (%) 91  -NS     O2 Delivery Pre Treatment room air  -NS     Intra SpO2 (%) 88  -NS     O2 Delivery Intra Treatment nasal cannula  2L  -NS     Post SpO2 (%) 93  -NS     O2 Delivery Post Treatment nasal cannula  2L  -NS     Pre Patient Position Supine  -NS     Intra Patient Position Standing  -NS     Post Patient Position Sitting  -NS     Row Name 03/07/23 1526          Positioning and Restraints    Pre-Treatment Position in bed  -NS     Post Treatment Position chair  -NS     In Chair notified nsg;reclined;call light within reach;encouraged to call for assist;exit alarm on;legs elevated;waffle cushion;heels elevated;with other staff  -NS           User Key  (r) = Recorded By, (t) = Taken By, (c) = Cosigned By    Initials Name Provider Type    Alisha Yates PT Physical Therapist               Outcome Measures     Row Name 03/07/23 1526          How much help from another person do you currently need...    Turning  from your back to your side while in flat bed without using bedrails? 4  -NS     Moving from lying on back to sitting on the side of a flat bed without bedrails? 3  -NS     Moving to and from a bed to a chair (including a wheelchair)? 3  -NS     Standing up from a chair using your arms (e.g., wheelchair, bedside chair)? 3  -NS     Climbing 3-5 steps with a railing? 2  -NS     To walk in hospital room? 3  -NS     AM-PAC 6 Clicks Score (PT) 18  -NS     Highest level of mobility 6 --> Walked 10 steps or more  -NS     Row Name 03/07/23 1526          Functional Assessment    Outcome Measure Options AM-PAC 6 Clicks Basic Mobility (PT)  -NS           User Key  (r) = Recorded By, (t) = Taken By, (c) = Cosigned By    Initials Name Provider Type    Alisha Yates, SAFIA Physical Therapist                             Physical Therapy Education     Title: PT OT SLP Therapies (In Progress)     Topic: Physical Therapy (Done)     Point: Mobility training (Done)     Learning Progress Summary           Patient Acceptance, E, VU,NR by NS at 3/7/2023 1605    Acceptance, E, VU,NR by NS at 3/6/2023 1011                   Point: Home exercise program (Done)     Learning Progress Summary           Patient Acceptance, E, VU,NR by NS at 3/7/2023 1605                   Point: Body mechanics (Done)     Learning Progress Summary           Patient Acceptance, E, VU,NR by NS at 3/7/2023 1605    Acceptance, E, VU,NR by NS at 3/6/2023 1011                   Point: Precautions (Done)     Learning Progress Summary           Patient Acceptance, E, VU,NR by NS at 3/7/2023 1605    Acceptance, E, VU,NR by NS at 3/6/2023 1011                               User Key     Initials Effective Dates Name Provider Type Discipline    NS 06/16/21 -  Alisha Kennedy, SAFIA Physical Therapist PT              PT Recommendation and Plan  Planned Therapy Interventions (PT): balance training, bed mobility training, gait training, home exercise program, neuromuscular  re-education, patient/family education, strengthening, transfer training  Plan of Care Reviewed With: patient  Progress: improving  Outcome Evaluation: Patient progressed with PT, demonstrating improvement in balance with RW use but continuing to be limited by fatigue and balance deficits. Anticipate that pt will be appropriate to progress to quad cane next session. Continue skilled IP PT services to support return to independence. Recommend IRF at d/c.     Time Calculation:    PT Charges     Row Name 03/07/23 1526             Time Calculation    Start Time 1526  -NS      PT Received On 03/07/23  -NS      PT Goal Re-Cert Due Date 03/16/23  -NS         Timed Charges    92999 - PT Therapeutic Exercise Minutes 12  -NS      96233 - Gait Training Minutes  17  -NS         Total Minutes    Timed Charges Total Minutes 29  -NS       Total Minutes 29  -NS            User Key  (r) = Recorded By, (t) = Taken By, (c) = Cosigned By    Initials Name Provider Type    Alisha Yates PT Physical Therapist              Therapy Charges for Today     Code Description Service Date Service Provider Modifiers Qty    61875267347 HC PT EVAL LOW COMPLEXITY 4 3/6/2023 Alisha Kennedy, PT GP 1    31376083754 HC GAIT TRAINING EA 15 MIN 3/7/2023 Alisha Kennedy, PT GP 1    36504840156 HC PT THER PROC EA 15 MIN 3/7/2023 Alisha Kennedy, PT GP 1          PT G-Codes  Outcome Measure Options: AM-PAC 6 Clicks Basic Mobility (PT)  AM-PAC 6 Clicks Score (PT): 18  AM-PAC 6 Clicks Score (OT): 16  PT Discharge Summary  Anticipated Discharge Disposition (PT): inpatient rehabilitation facility    Alisha Kennedy PT  3/7/2023

## 2023-03-07 NOTE — CASE MANAGEMENT/SOCIAL WORK
Discharge Planning Assessment  [facility]     Patient Name: Marilyn Kunz  MRN: [id number]  Today's Date: 3/7/2023    Admit Date: 3/2/2023    Plan: Rehab   Discharge Needs Assessment    No documentation.                Discharge Plan     Row Name 03/07/23 1538       Plan    Plan Rehab    Patient/Family in Agreement with Plan yes    Plan Comments Spoke with patient at bedside. We discussed rehab option. Made referrals to [facility], [place], and [facility]. Patient is currently on RA. CM will for discharge needs.    Final Discharge Disposition Code 03 - skilled nursing facility (SNF)              Continued Care and Services - Admitted Since 3/2/2023     Destination     Service Provider Request Status Selected Services Address Phone Fax Patient Preferred    [facility] Pending - No Request Sent N/A [address] [facility] [phone] --    [facility] Pending - No Request Sent N/A [address] [facility] [address] [phone] --    [facility] Pending - No Request Sent N/A [address] [facility] [address] [phone] -- --              Expected Discharge Date and Time     Expected Discharge Date Expected Discharge Time    Mar 8, 2023          Demographic Summary    No documentation.                Functional Status    No documentation.                Psychosocial    No documentation.                Abuse/Neglect    No documentation.                Legal    No documentation.                Substance Abuse    No documentation.                Patient Forms    No documentation.                   Savannah Sharma RN

## 2023-03-07 NOTE — PLAN OF CARE
Goal Outcome Evaluation:           Progress: improving  VSS. Pt has tolerated decrease to 1L well. No complaints of pain throughout the day. Will continue current POC.

## 2023-03-07 NOTE — PROGRESS NOTES
"   LOS: 4 days    Patient Care Team:  Peter Baca MD as PCP - General (Family Medicine)  Eun Eagle MD as Consulting Physician (Rheumatology)  Yao Maya MD as Consulting Physician (Cardiology)  Anusha Evans MD as Cardiologist (Cardiology)  Lucas Haley DO as Consulting Physician (Pulmonary Disease)    Chief Complaint: Hyponatremia  HPI: Serum sodium 118, which went down to 117 after admission, she has a previous history of sodium 128-132 on last admission.  Patient had some diarrhea prior to admission.  Subjective     Interval History:   Improving serum sodium level 124 from 127, admitted sodium 117 during this admission      Review of Systems:   Denies any nausea, vomiting, headache, blurring of vision.  Denies any diarrhea frequency urgency.    Objective     Vital Sign Min/Max for last 24 hours  Temp  Min: 97 °F (36.1 °C)  Max: 97.8 °F (36.6 °C)   BP  Min: 127/48  Max: 153/63   Pulse  Min: 56  Max: 68   Resp  Min: 16  Max: 18   SpO2  Min: 92 %  Max: 95 %   Flow (L/min)  Min: 0.5  Max: 1.5   No data recorded     Flowsheet Rows    Flowsheet Row First Filed Value   Admission Height 149.9 cm (59\") Documented at 03/02/2023 1500   Admission Weight 59 kg (130 lb) Documented at 03/02/2023 1500          I/O this shift:  In: -   Out: 900 [Urine:900]  I/O last 3 completed shifts:  In: 895 [P.O.:895]  Out: 2100 [Urine:2100]    Physical Exam:  General Appearance: Awake, alert oriented x4 no obvious distress  Neck: No JVD.  Eyes: PER, EOMI.  Neck: Supple no JVD.  Lungs: Clear auscultation, no rales rhonchi's, equal chest movement, nonlabored.  Heart: No gallop, murmur, rub, RRR.  Abdomen: Soft, nontender, positive bowel sounds, no organomegaly.  Extremities: Face edema, no cyanosis.  Neuro: No focal deficit, moving all extremities, alert oriented X 3  Psych: Mood and affect are normal appropriate   no suprapubic fullness or tenderness, no Conte catheter    WBC WBC "   Date Value Ref Range Status   03/06/2023 9.12 3.40 - 10.80 10*3/mm3 Final      HGB Hemoglobin   Date Value Ref Range Status   03/06/2023 9.8 (L) 12.0 - 15.9 g/dL Final      HCT Hematocrit   Date Value Ref Range Status   03/06/2023 29.7 (L) 34.0 - 46.6 % Final      Platlets No results found for: LABPLAT   MCV MCV   Date Value Ref Range Status   03/06/2023 91.1 79.0 - 97.0 fL Final          Sodium Sodium   Date Value Ref Range Status   03/07/2023 127 (L) 136 - 145 mmol/L Final   03/07/2023 127 (L) 136 - 145 mmol/L Final   03/07/2023 127 (L) 136 - 145 mmol/L Final   03/07/2023 128 (L) 136 - 145 mmol/L Final   03/06/2023 129 (L) 136 - 145 mmol/L Final   03/06/2023 124 (L) 136 - 145 mmol/L Final   03/06/2023 125 (L) 136 - 145 mmol/L Final   03/05/2023 128 (L) 136 - 145 mmol/L Final   03/05/2023 128 (L) 136 - 145 mmol/L Final   03/05/2023 128 (L) 136 - 145 mmol/L Final   03/04/2023 127 (L) 136 - 145 mmol/L Final      Potassium Potassium   Date Value Ref Range Status   03/07/2023 4.8 3.5 - 5.2 mmol/L Final     Comment:     Slight hemolysis detected by analyzer. Results may be affected.   03/06/2023 5.0 3.5 - 5.2 mmol/L Final   03/05/2023 4.2 3.5 - 5.2 mmol/L Final      Chloride Chloride   Date Value Ref Range Status   03/07/2023 94 (L) 98 - 107 mmol/L Final   03/06/2023 92 (L) 98 - 107 mmol/L Final   03/05/2023 94 (L) 98 - 107 mmol/L Final      CO2 CO2   Date Value Ref Range Status   03/07/2023 21.0 (L) 22.0 - 29.0 mmol/L Final   03/06/2023 21.0 (L) 22.0 - 29.0 mmol/L Final   03/05/2023 23.0 22.0 - 29.0 mmol/L Final      BUN BUN   Date Value Ref Range Status   03/07/2023 14 8 - 23 mg/dL Final   03/06/2023 17 8 - 23 mg/dL Final   03/05/2023 14 8 - 23 mg/dL Final      Creatinine Creatinine   Date Value Ref Range Status   03/07/2023 0.67 0.57 - 1.00 mg/dL Final   03/06/2023 0.81 0.57 - 1.00 mg/dL Final   03/05/2023 0.67 0.57 - 1.00 mg/dL Final      Calcium Calcium   Date Value Ref Range Status   03/07/2023 8.3 (L) 8.6 -  10.5 mg/dL Final   03/06/2023 8.4 (L) 8.6 - 10.5 mg/dL Final   03/05/2023 8.3 (L) 8.6 - 10.5 mg/dL Final      PO4 No results found for: CAPO4   Albumin No results found for: ALBUMIN   Magnesium Magnesium   Date Value Ref Range Status   03/05/2023 1.8 1.6 - 2.4 mg/dL Final      Uric Acid No results found for: URICACID        Results Review:     I reviewed the patient's new clinical results.    amLODIPine, 10 mg, Oral, Daily  aspirin, 81 mg, Oral, Daily  azaTHIOprine, 100 mg, Oral, BID  budesonide-formoterol, 1 puff, Inhalation, BID - RT  cefTRIAXone, 2 g, Intravenous, Q24H  cetirizine, 5 mg, Oral, Daily  enoxaparin, 40 mg, Subcutaneous, Q24H  fluticasone, 2 spray, Each Nare, Daily  insulin detemir, 3 Units, Subcutaneous, BID  insulin lispro, 0-7 Units, Subcutaneous, TID AC  ipratropium-albuterol, 3 mL, Nebulization, 4x Daily - RT  levothyroxine, 75 mcg, Oral, Q AM  lidocaine, 2 patch, Transdermal, Q24H  montelukast, 10 mg, Oral, Nightly  nebivolol, 20 mg, Oral, Daily  oxybutynin XL, 10 mg, Oral, Daily  polyethylene glycol, 17 g, Oral, Daily  pravastatin, 20 mg, Oral, Q PM  terazosin, 2 mg, Oral, Nightly  tobramycin-dexamethasone, 1 drop, Left Eye, Q4H While Awake           Medication Review: Reviewed    Assessment & Plan       Hypothyroidism    Dyspnea    Type 1 diabetes (HCC)    History of neuromuscular disorder    Acute respiratory failure with hypoxia (HCC)    Hyponatremia    1.  Severe hyponatremia: Serum sodium 118 on admission, on previous admission patient sodium range from 128-132 on discharge on February 26, 2023, patient has diarrhea prior to admission.  Patient also had nausea for the couple of weeks which can increase the ADH secretion with a still good response for the tubular level to the ADH.Liver enzymes within normal limits, serum osmolarity 263, urine creatinine 56.4, urine sodium 41, urine protein 5.6, urine osmolality 351, serum creatinine 0.95.     Plan:  Serum sodium 127 stable  Fluid  restriction 1000 mL/day.  Check sodium level every 8 hours  Hold Cymbalta due to hyponatremia 3/6/2023  Monitor electrolytes closely.  Patient with multiple medical problems  We will monitor sodium, no need for 3% saline at this time.  I discussed the patients findings and my recommendations with patient and Dr. Princess Jones yesterday  Will add salt tablets 2 g twice daily at this time.  Rohith Enriquez MD  03/07/23  13:49 EST

## 2023-03-07 NOTE — PLAN OF CARE
Goal Outcome Evaluation:  Plan of Care Reviewed With: patient        Progress: improving  Outcome Evaluation: Patient progressed with PT, demonstrating improvement in balance with RW use but continuing to be limited by fatigue and balance deficits. Anticipate that pt will be appropriate to progress to quad cane next session. Continue skilled IP PT services to support return to independence. Recommend IRF at d/c.

## 2023-03-08 LAB
ALBUMIN SERPL-MCNC: 3.5 G/DL (ref 3.5–5.2)
ANION GAP SERPL CALCULATED.3IONS-SCNC: 9 MMOL/L (ref 5–15)
BUN SERPL-MCNC: 9 MG/DL (ref 8–23)
BUN/CREAT SERPL: 12.7 (ref 7–25)
CALCIUM SPEC-SCNC: 8.5 MG/DL (ref 8.6–10.5)
CHLORIDE SERPL-SCNC: 95 MMOL/L (ref 98–107)
CO2 SERPL-SCNC: 26 MMOL/L (ref 22–29)
CREAT SERPL-MCNC: 0.71 MG/DL (ref 0.57–1)
EGFRCR SERPLBLD CKD-EPI 2021: 87.7 ML/MIN/1.73
GLUCOSE BLDC GLUCOMTR-MCNC: 203 MG/DL (ref 70–130)
GLUCOSE BLDC GLUCOMTR-MCNC: 210 MG/DL (ref 70–130)
GLUCOSE BLDC GLUCOMTR-MCNC: 328 MG/DL (ref 70–130)
GLUCOSE BLDC GLUCOMTR-MCNC: 438 MG/DL (ref 70–130)
GLUCOSE BLDC GLUCOMTR-MCNC: 486 MG/DL (ref 70–130)
GLUCOSE SERPL-MCNC: 328 MG/DL (ref 65–99)
PHOSPHATE SERPL-MCNC: 2.9 MG/DL (ref 2.5–4.5)
POTASSIUM SERPL-SCNC: 4.7 MMOL/L (ref 3.5–5.2)
SODIUM SERPL-SCNC: 130 MMOL/L (ref 136–145)

## 2023-03-08 PROCEDURE — 63710000001 INSULIN REGULAR HUMAN PER 5 UNITS: Performed by: INTERNAL MEDICINE

## 2023-03-08 PROCEDURE — 63710000001 AZATHIOPRINE PER 50 MG: Performed by: INTERNAL MEDICINE

## 2023-03-08 PROCEDURE — 94799 UNLISTED PULMONARY SVC/PX: CPT

## 2023-03-08 PROCEDURE — 97535 SELF CARE MNGMENT TRAINING: CPT

## 2023-03-08 PROCEDURE — 25010000002 ENOXAPARIN PER 10 MG: Performed by: NURSE PRACTITIONER

## 2023-03-08 PROCEDURE — 97530 THERAPEUTIC ACTIVITIES: CPT

## 2023-03-08 PROCEDURE — 63710000001 INSULIN DETEMIR PER 5 UNITS: Performed by: INTERNAL MEDICINE

## 2023-03-08 PROCEDURE — 63710000001 INSULIN LISPRO (HUMAN) PER 5 UNITS: Performed by: INTERNAL MEDICINE

## 2023-03-08 PROCEDURE — 97110 THERAPEUTIC EXERCISES: CPT

## 2023-03-08 PROCEDURE — 82962 GLUCOSE BLOOD TEST: CPT

## 2023-03-08 PROCEDURE — 99232 SBSQ HOSP IP/OBS MODERATE 35: CPT | Performed by: INTERNAL MEDICINE

## 2023-03-08 PROCEDURE — 80069 RENAL FUNCTION PANEL: CPT | Performed by: INTERNAL MEDICINE

## 2023-03-08 PROCEDURE — 97116 GAIT TRAINING THERAPY: CPT

## 2023-03-08 PROCEDURE — 94761 N-INVAS EAR/PLS OXIMETRY MLT: CPT

## 2023-03-08 RX ADMIN — INSULIN DETEMIR 10 UNITS: 100 INJECTION, SOLUTION SUBCUTANEOUS at 20:17

## 2023-03-08 RX ADMIN — INSULIN DETEMIR 3 UNITS: 100 INJECTION, SOLUTION SUBCUTANEOUS at 09:51

## 2023-03-08 RX ADMIN — IPRATROPIUM BROMIDE AND ALBUTEROL SULFATE 3 ML: 2.5; .5 SOLUTION RESPIRATORY (INHALATION) at 20:46

## 2023-03-08 RX ADMIN — PRAVASTATIN SODIUM 20 MG: 20 TABLET ORAL at 17:33

## 2023-03-08 RX ADMIN — TOBRAMYCIN AND DEXAMETHASONE 1 DROP: 3; 1 SUSPENSION/ DROPS OPHTHALMIC at 17:33

## 2023-03-08 RX ADMIN — INSULIN HUMAN 10 UNITS: 100 INJECTION, SOLUTION PARENTERAL at 12:01

## 2023-03-08 RX ADMIN — INSULIN LISPRO 5 UNITS: 100 INJECTION, SOLUTION INTRAVENOUS; SUBCUTANEOUS at 09:50

## 2023-03-08 RX ADMIN — IPRATROPIUM BROMIDE AND ALBUTEROL SULFATE 3 ML: 2.5; .5 SOLUTION RESPIRATORY (INHALATION) at 17:30

## 2023-03-08 RX ADMIN — ENOXAPARIN SODIUM 40 MG: 40 INJECTION SUBCUTANEOUS at 12:01

## 2023-03-08 RX ADMIN — LEVOTHYROXINE SODIUM 75 MCG: 0.07 TABLET ORAL at 05:29

## 2023-03-08 RX ADMIN — INSULIN LISPRO 3 UNITS: 100 INJECTION, SOLUTION INTRAVENOUS; SUBCUTANEOUS at 17:33

## 2023-03-08 RX ADMIN — AMLODIPINE BESYLATE 10 MG: 10 TABLET ORAL at 09:49

## 2023-03-08 RX ADMIN — FLUTICASONE PROPIONATE 2 SPRAY: 50 SPRAY, METERED NASAL at 09:51

## 2023-03-08 RX ADMIN — NEBIVOLOL 20 MG: 20 TABLET ORAL at 09:51

## 2023-03-08 RX ADMIN — ASPIRIN 81 MG: 81 TABLET, COATED ORAL at 09:49

## 2023-03-08 RX ADMIN — LIDOCAINE 2 PATCH: 700 PATCH TOPICAL at 10:21

## 2023-03-08 RX ADMIN — INSULIN LISPRO 7 UNITS: 100 INJECTION, SOLUTION INTRAVENOUS; SUBCUTANEOUS at 12:01

## 2023-03-08 RX ADMIN — Medication 10 ML: at 09:51

## 2023-03-08 RX ADMIN — TOBRAMYCIN AND DEXAMETHASONE 1 DROP: 3; 1 SUSPENSION/ DROPS OPHTHALMIC at 14:38

## 2023-03-08 RX ADMIN — TOBRAMYCIN AND DEXAMETHASONE 1 DROP: 3; 1 SUSPENSION/ DROPS OPHTHALMIC at 09:49

## 2023-03-08 RX ADMIN — SODIUM CHLORIDE 2 G: 1 TABLET ORAL at 17:33

## 2023-03-08 RX ADMIN — AZATHIOPRINE 100 MG: 50 TABLET ORAL at 09:50

## 2023-03-08 RX ADMIN — IPRATROPIUM BROMIDE AND ALBUTEROL SULFATE 3 ML: 2.5; .5 SOLUTION RESPIRATORY (INHALATION) at 12:15

## 2023-03-08 RX ADMIN — BUDESONIDE AND FORMOTEROL FUMARATE DIHYDRATE 1 PUFF: 160; 4.5 AEROSOL RESPIRATORY (INHALATION) at 12:15

## 2023-03-08 RX ADMIN — TERAZOSIN HYDROCHLORIDE 2 MG: 2 CAPSULE ORAL at 20:16

## 2023-03-08 RX ADMIN — Medication 10 ML: at 20:16

## 2023-03-08 RX ADMIN — TOBRAMYCIN AND DEXAMETHASONE 1 DROP: 3; 1 SUSPENSION/ DROPS OPHTHALMIC at 05:29

## 2023-03-08 RX ADMIN — TOBRAMYCIN AND DEXAMETHASONE 1 DROP: 3; 1 SUSPENSION/ DROPS OPHTHALMIC at 20:16

## 2023-03-08 RX ADMIN — OXYBUTYNIN CHLORIDE 10 MG: 5 TABLET, EXTENDED RELEASE ORAL at 09:51

## 2023-03-08 RX ADMIN — MONTELUKAST 10 MG: 10 TABLET, FILM COATED ORAL at 20:16

## 2023-03-08 RX ADMIN — CETIRIZINE HYDROCHLORIDE 5 MG: 10 TABLET, FILM COATED ORAL at 09:49

## 2023-03-08 RX ADMIN — BUDESONIDE AND FORMOTEROL FUMARATE DIHYDRATE 1 PUFF: 160; 4.5 AEROSOL RESPIRATORY (INHALATION) at 20:46

## 2023-03-08 RX ADMIN — AZATHIOPRINE 100 MG: 50 TABLET ORAL at 20:16

## 2023-03-08 NOTE — THERAPY TREATMENT NOTE
Patient Name: Marilyn Kunz  : 1945    MRN: [id number]                              Today's Date: 3/8/2023       Admit Date: 3/2/2023    Visit Dx:     ICD-10-CM ICD-9-CM   1. Acute respiratory failure with hypoxia ([facility])  J96.01 518.81   2. Hyponatremia  E87.1 276.1     Patient Active Problem List   Diagnosis   • Chest pain   • Essential hypertension   • HLD (hyperlipidemia)   • Odynophagia   • Factor V Leiden ([facility])   • Weakness of right upper extremity   • Psoriatic arthritis ([facility])   • At risk for venous thromboembolism (VTE)   • Body mass index (BMI) of 27.0-27.9 in adult   • Cervical spondylitis with radiculitis ([facility])   • Degenerative arthritis of toe joint, right   • Diabetic nephropathy associated with type 2 diabetes mellitus (HCC)   • Disorder of bone and cartilage   • Hospital discharge follow-up   • Hypothyroidism   • Impairment of balance   • Muscle spasm   • Overactive bladder   • Pain in right hand   • Routine history and physical examination of adult   • Seborrheic dermatitis   • Spondylolisthesis, grade 2   • Total knee replacement status, left   • Vasculitis ([facility])   • CKD (chronic kidney disease) stage 3, GFR 30-59 ml/min ([facility])   • Hypercholesterolemia   • Hyperlipidemia   • PDA (patent ductus arteriosus)   • Demyelinating changes in brain ([facility])   • Need for vaccination against Streptococcus pneumoniae   • Dyspnea   • Screening for depression   • Acute sinusitis   • Allergies   • Anterior ST segment depression   • Arthralgia of knee   • Constipation   • Diverticulitis   • Encounter for pre-operative examination   • Hypertensive emergency   • Low back pain   • Need for immunization against influenza   • Other problems related to lifestyle   • Pain, joint, shoulder, left   • Pill esophagitis   • Vitamin D deficiency   • Yeast vaginitis   • Body mass index (BMI) of 23.0-23.9 in adult   • Abdominal pain in female   • Atypical chest pain   • Demyelinating disease ([facility])   • Diabetic autonomic  neuropathy associated with type 2 diabetes mellitus (HCC)   • Benign essential hypertension   • Long term current use of insulin (HCC)   • Long-term insulin use (HCC)   • Need for prophylactic vaccination against Streptococcus pneumoniae (pneumococcus) and influenza   • Psoriasis with arthropathy (HCC)   • Breast cancer screening   • Encounter for general adult medical examination without abnormal findings   • Spondylolisthesis, grade 2   • Type 2 diabetes mellitus with stage 3 chronic kidney disease, with long-term current use of insulin (HCC)   • Type 1 diabetes (HCC)   • History of stroke   • Fatigue   • History of neuromuscular disorder   • Acute respiratory failure with hypoxia (HCC)   • Hyponatremia     Past Medical History:   Diagnosis Date   • Abnormal ECG Check FPA Date or [facility]    [facility]   • Acute sinusitis 2/6/2023   • Anemia    • Asthma    • CAP (community acquired pneumonia) 2/6/2023   • Chronic kidney disease     pt told it was dx from Dr Baca and to not eat much protein   • Congenital heart disease 1950's Patent 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    Surgery [facility] 1950s close   • CTS (carpal tunnel syndrome)     Psoriatic arthritis hands could be   • Disease of thyroid gland    • Factor 5 Leiden mutation, heterozygous ([facility]) 2012   • Head injury    • Hyperlipidemia    • Hypertension    • Movement disorder 10/2021    knee replacement left knww   • Murmur, cardiac    • Neuropathy in diabetes ([facility])     redness swelling legs   • Peripheral neuropathy 05/03/2022    hands, arms. shoulders, back   • Pill esophagitis 2/6/2023   • Psoriatic arthritis (HCC)     hands   • Sleep apnea Always    diabetic do not sleep well up and down   • Stroke ([facility]) had one don't know when    showed on MRI Brain   • Type 1 diabetes (HCC)      Past Surgical History:   Procedure Laterality Date   • CARDIAC CATHETERIZATION  1952 2 of them    Patent Dictus operation   • CATARACT  EXTRACTION Bilateral    • ENDOSCOPY N/A 10/20/2020    Procedure: ESOPHAGOGASTRODUODENOSCOPY;  Surgeon: Brunner, Mark I, MD;  Location: [facility] ENDOSCOPY;  Service: Gastroenterology;  Laterality: N/A;   • HAND SURGERY Left 1987    no hardware   • PATENT DUCTUS ARTERIOUS LIGATION        General Information     Row Name 03/08/23 1402          Physical Therapy Time and Intention    Document Type therapy note (daily note)  -     Mode of Treatment physical therapy;individual therapy  -     Row Name 03/08/23 1402          General Information    Existing Precautions/Restrictions fall;oxygen therapy device and L/min  -     Barriers to Rehab none identified  -     Row Name 03/08/23 1402          Cognition    Orientation Status (Cognition) oriented x 3;verbal cues/prompts needed for orientation  -[facility] Name 03/08/23 1402          Safety Issues, Functional Mobility    Safety Issues Affecting Function (Mobility) safety precaution awareness;safety precautions follow-through/compliance  -     Impairments Affecting Function (Mobility) balance;endurance/activity tolerance;strength  -           User Key  (r) = Recorded By, (t) = Taken By, (c) = Cosigned By    Initials Name Provider Type     Carlos Cote, PT Physical Therapist               Mobility     Row Name 03/08/23 1402          Bed Mobility    Comment, (Bed Mobility) Pt received and left [facility].  -     Row Name 03/08/23 1402          Sit-Stand Transfer    Sit-Stand Hayes (Transfers) contact guard;verbal cues  -     Assistive Device (Sit-Stand Transfers) walker, front-wheeled  -     Comment, (Sit-Stand Transfer) Cues required to remain within RW throughout transfers, particularly during return to sitting.  -     Row Name 03/08/23 1402          Gait/Stairs (Locomotion)    Hayes Level (Gait) contact guard;verbal cues  -     Assistive Device (Gait) walker, front-wheeled  -     Distance in Feet (Gait) 60 + 60 + 30  -      Deviations/Abnormal Patterns (Gait) base of support, narrow;sam decreased;gait speed decreased;stride length decreased  -     Bilateral Gait Deviations heel strike decreased  -     Comment, (Gait/Stairs) Pt demonstrated step through gait pattern with decreased gait speed and stride length. Pt required 2 brief standing rest breaks d/t fatigue although SpO2 remained >95% throughout on 2L O2. Distance limited by mild fatigue.  -           User Key  (r) = Recorded By, (t) = Taken By, (c) = Cosigned By    Initials Name Provider Type     Carlos Cote, PT Physical Therapist               Obj/Interventions     [facility] Name 03/08/23 1406          Motor Skills    Therapeutic Exercise hip;knee;ankle  -[facility] Name 03/08/23 1406          Hip (Therapeutic Exercise)    Hip Strengthening (Therapeutic Exercise) bilateral;flexion;extension;heel slides;marching while seated;mini squats;sitting;standing;10 repetitions  -[facility] Name 03/08/23 1406          Knee (Therapeutic Exercise)    Knee (Therapeutic Exercise) AROM (active range of motion)  -     Knee AROM (Therapeutic Exercise) bilateral;LAQ (long arc quad);SLR (straight leg raise);sitting;10 repetitions  -[facility] Name 03/08/23 1406          Ankle (Therapeutic Exercise)    Ankle (Therapeutic Exercise) AROM (active range of motion)  -     Ankle Strengthening (Therapeutic Exercise) bilateral;dorsiflexion;plantarflexion;sitting;10 repetitions;standing  -[facility] Name 03/08/23 1406          Balance    Balance Assessment sitting static balance;sitting dynamic balance;standing static balance;standing dynamic balance  -     Static Sitting Balance independent  -     Dynamic Sitting Balance independent  -     Position, Sitting Balance unsupported;sitting in chair  -     Static Standing Balance standby assist  -     Dynamic Standing Balance contact guard  -     Position/Device Used, Standing Balance supported;walker, front-wheeled  -     Comment,  Balance Pt with minimal instability noted throughout OOB mobility with RW for support.  -           User Key  (r) = Recorded By, (t) = Taken By, (c) = Cosigned By    Initials Name Provider Type     Carlos Cote, PT Physical Therapist               Goals/Plan    No documentation.                Clinical Impression     Row Name 03/08/23 1407          Pain    Pretreatment Pain Rating 0/10 - no pain  -     Posttreatment Pain Rating 0/10 - no pain  -     Row Name 03/08/23 1407          Plan of Care Review    Plan of Care Reviewed With patient  -     Progress improving  -     Outcome Evaluation Pt is very pleasant and motivated to participate in therapy session. Pt demonstarting good improvements in functional activity tolerance and safety with session, progressing ambulation distance to 60ft + 60ft + 30ft with RW and CGA requiring 2 brief standing rest breaks d/t mild fatigue/SOA, SpO2 remained >95% on 2L O2 throughout. PT continuing to recommend IPR at DC as pt with no assist at home and continues with functional deficits impairing pt's safety.  -     Row Name 03/08/23 1407          Vital Signs    Pre Systolic BP Rehab 146  -     Pre Treatment Diastolic BP 61  -LH     Posttreatment Heart Rate (beats/min) 71  -LH     Intratreatment Resp Rate (breaths/min) 69  -LH     Pre SpO2 (%) 97  -     O2 Delivery Pre Treatment nasal cannula  -     Intra SpO2 (%) 95  -     O2 Delivery Intra Treatment nasal cannula  -     Post SpO2 (%) 97  -LH     O2 Delivery Post Treatment nasal cannula  -     Pre Patient Position Sitting  -     Intra Patient Position Standing  -     Post Patient Position Sitting  -     Row Name 03/08/23 1407          Positioning and Restraints    Pre-Treatment Position sitting in chair/recliner  -     Post Treatment Position chair  -     In Chair reclined;call light within reach;encouraged to call for assist;exit alarm on;waffle cushion;legs elevated;heels elevated  -            User Key  (r) = Recorded By, (t) = Taken By, (c) = Cosigned By    Initials Name Provider Type     Carlos Cote, PT Physical Therapist               Outcome Measures     Row Name 03/08/23 1410          How much help from another person do you currently need...    Turning from your back to your side while in flat bed without using bedrails? 4  -LH     Moving from lying on back to sitting on the side of a flat bed without bedrails? 4  -LH     Moving to and from a bed to a chair (including a wheelchair)? 3  -LH     Standing up from a chair using your arms (e.g., wheelchair, bedside chair)? 3  -LH     Climbing 3-5 steps with a railing? 2  -LH     To walk in hospital room? 3  -     AM-PAC 6 Clicks Score (PT) 19  -LH     Highest level of mobility 6 --> Walked 10 steps or more  -     Row Name 03/08/23 1410 03/08/23 0938       Functional Assessment    Outcome Measure Options AM-PAC 6 Clicks Basic Mobility (PT)  - AM-PAC 6 Clicks Daily Activity (OT)  -          User Key  (r) = Recorded By, (t) = Taken By, (c) = Cosigned By    Initials Name Provider Type    Rosalba Matson, OT Occupational Therapist     Carlos Cote, PT Physical Therapist                             Physical Therapy Education     Title: PT OT SLP Therapies (In Progress)     Topic: Physical Therapy (Done)     Point: Mobility training (Done)     Learning Progress Summary           Patient Acceptance, E, VU,NR by  at 3/8/2023 1410    Acceptance, E, VU,NR by NS at 3/7/2023 1605    Acceptance, E, VU,NR by NS at 3/6/2023 1011                   Point: Home exercise program (Done)     Learning Progress Summary           Patient Acceptance, E, VU,NR by  at 3/8/2023 1410    Acceptance, E, VU,NR by NS at 3/7/2023 1605                   Point: Body mechanics (Done)     Learning Progress Summary           Patient Acceptance, E, VU,NR by  at 3/8/2023 1410    Acceptance, E, VU,NR by NS at 3/7/2023 1605    Acceptance, E, VU,NR by NS at 3/6/2023  1011                   Point: Precautions (Done)     Learning Progress Summary           Patient Acceptance, E, VU,NR by  at 3/8/2023 1410    Acceptance, E, VU,NR by NS at 3/7/2023 1605    Acceptance, E, VU,NR by NS at 3/6/2023 1011                               User Key     Initials Effective Dates Name Provider Type Discipline    NS 06/16/21 -  Alisha Kennedy, PT Physical Therapist PT     09/21/21 -  Carlos Cote, PT Physical Therapist PT              PT Recommendation and Plan     Plan of Care Reviewed With: patient  Progress: improving  Outcome Evaluation: Pt is very pleasant and motivated to participate in therapy session. Pt demonstarting good improvements in functional activity tolerance and safety with session, progressing ambulation distance to 60ft + 60ft + 30ft with RW and CGA requiring 2 brief standing rest breaks d/t mild fatigue/SOA, SpO2 remained >95% on 2L O2 throughout. PT continuing to recommend IPR at DC as pt with no assist at home and continues with functional deficits impairing pt's safety.     Time Calculation:    PT Charges     Row Name 03/08/23 1411             Time Calculation    Start Time 1308  -      PT Received On 03/08/23  -      PT Goal Re-Cert Due Date 03/16/23  -         Timed Charges    60891 - PT Therapeutic Exercise Minutes 15  -      44977 - Gait Training Minutes  15  -      25350 - PT Therapeutic Activity Minutes 22  -         Total Minutes    Timed Charges Total Minutes 52  -       Total Minutes 52  -            User Key  (r) = Recorded By, (t) = Taken By, (c) = Cosigned By    Initials Name Provider Type     Carlos Cote, PT Physical Therapist              Therapy Charges for Today     Code Description Service Date Service Provider Modifiers Qty    63598898819 HC PT THER PROC EA 15 MIN 3/8/2023 Carlos Cote, PT GP 1    50376921688 HC GAIT TRAINING EA 15 MIN 3/8/2023 Carlos Cote, PT GP 1    41720879090 HC PT THERAPEUTIC ACT EA 15 MIN  3/8/2023 Carlos Cote, PT GP 1          PT G-Codes  Outcome Measure Options: AM-PAC 6 Clicks Basic Mobility (PT)  AM-PAC 6 Clicks Score (PT): 19  AM-PAC 6 Clicks Score (OT): 18  PT Discharge Summary  Anticipated Discharge Disposition (PT): inpatient rehabilitation facility    Carlos Cote, PT  3/8/2023

## 2023-03-08 NOTE — PLAN OF CARE
Goal Outcome Evaluation:  Plan of Care Reviewed With: patient        Progress: improving  Outcome Evaluation: Pt is very pleasant and motivated to participate in therapy session. Pt demonstarting good improvements in functional activity tolerance and safety with session, progressing ambulation distance to 60ft + 60ft + 30ft with RW and CGA requiring 2 brief standing rest breaks d/t mild fatigue/SOA, SpO2 remained >95% on 2L O2 throughout. PT continuing to recommend IPR at DC as pt with no assist at home and continues with functional deficits impairing pt's safety.

## 2023-03-08 NOTE — PROGRESS NOTES
[facility] Medicine Services  PROGRESS NOTE    Patient Name: Marilyn Kunz  : 1945  MRN: [id number]    Date of Admission: 3/2/2023  Primary Care Physician: Peter Baca MD    Subjective   Subjective     CC:  F/u FTT, weakness    HPI:  Patient resting in bedside chair. Feeling better. Goal to get up and move around more today.    ROS:  Gen- No fevers, chills  CV- No chest pain, palpitations  Resp- No cough, dyspnea  GI- No N/V/D, abd pain     Objective   Objective     Vital Signs:   Temp:  [96.7 °F (35.9 °C)-98.2 °F (36.8 °C)] 97.7 °F (36.5 °C)  Heart Rate:  [66-79] 66  Resp:  [16-20] 16  BP: (142-156)/() 153/68  Flow (L/min):  [0.5-2] 1     Physical Exam:  Constitutional: No acute distress, awake, alert  HENT: NCAT, mucous membranes moist  Respiratory: Clear to auscultation bilaterally, respiratory effort normal   Cardiovascular: RRR, no murmurs, rubs, or gallops  Gastrointestinal: soft, nontender, nondistended  Musculoskeletal: No bilateral ankle edema  Psychiatric: Appropriate affect, cooperative  Neurologic: Oriented x 3, speech clear, generalized weakness  Skin: No rashes      Results Reviewed:  LAB RESULTS:      Lab 23  0800 23  0615 23  1539   WBC 9.12 8.43 9.27   HEMOGLOBIN 9.8* 9.4* 10.9*   HEMATOCRIT 29.7* 27.7* 32.6*   PLATELETS 277 267 301   NEUTROS ABS  --   --  7.86*   IMMATURE GRANS (ABS)  --   --  0.06*   LYMPHS ABS  --   --  0.78   MONOS ABS  --   --  0.56   EOS ABS  --   --  0.01   MCV 91.1 88.5 88.3   PROCALCITONIN  --   --  0.09   LACTATE  --   --  1.6         Lab 23  0805 23  2100 23  0856 23  0525 23  0055 23  1245 23  0800 23  2008 23  0733 23  0020 23  1143 23  0616 23  0615   SODIUM 130* 129* 127* 127*  127* 128*   < > 125*   < > 128*   < > 122*  --  119*   POTASSIUM 4.7  --   --  4.8  --   --  5.0  --  4.2  --  4.4  --  3.9   CHLORIDE 95*   --   --  94*  --   --  92*  --  94*  --  87*  --  85*   CO2 26.0  --   --  21.0*  --   --  21.0*  --  23.0  --  23.0  --  23.0   ANION GAP 9.0  --   --  12.0  --   --  12.0  --  11.0  --  12.0  --  11.0   BUN 9  --   --  14  --   --  17  --  14  --  14  --  16   CREATININE 0.71  --   --  0.67  --   --  0.81  --  0.67  --  0.95  --  0.90   EGFR 87.7  --   --  90.2  --   --  74.9  --  90.2  --  61.8  --  66.0   GLUCOSE 328*  --   --  183*  --   --  363*  --  101*  --  239*  --  120*   CALCIUM 8.5*  --   --  8.3*  --   --  8.4*  --  8.3*  --  8.7  --  8.4*   IONIZED CALCIUM  --   --   --   --   --   --   --   --   --   --   --  1.20  --    MAGNESIUM  --   --   --   --   --   --   --   --  1.8  --   --   --  1.5*   PHOSPHORUS 2.9  --   --   --   --   --   --   --   --   --   --   --  3.2    < > = values in this interval not displayed.         Lab 03/08/23  0805 03/02/23  1539   TOTAL PROTEIN  --  7.6   ALBUMIN 3.5 4.5   GLOBULIN  --  3.1   ALT (SGPT)  --  16   AST (SGOT)  --  24   BILIRUBIN  --  1.5*   ALK PHOS  --  66         Lab 03/03/23  0615 03/02/23  1539   PROBNP 5,883.0* 6,798.0*   HSTROP T  --  19*         Lab 03/04/23  0548   CHOLESTEROL 158   LDL CHOL 82   HDL CHOL 64*   TRIGLYCERIDES 58             Brief Urine Lab Results  (Last result in the past 365 days)      Color   Clarity   Blood   Leuk Est   Nitrite   Protein   CREAT   Urine HCG        03/03/23 1457             56.4               Microbiology Results Abnormal     Procedure Component Value - Date/Time    Blood Culture - Blood, Arm, Left [[id number]]  (Normal) Collected: 03/02/23 2121    Lab Status: Final result Specimen: Blood from Arm, Left Updated: 03/07/23 2145     Blood Culture No growth at 5 days    Blood Culture - Blood, Wrist, Left [[id number]]  (Normal) Collected: 03/02/23 2121    Lab Status: Final result Specimen: Blood from Wrist, Left Updated: 03/07/23 2145     Blood Culture No growth at 5 days    COVID PRE-OP / PRE-PROCEDURE SCREENING ORDER  (NO ISOLATION) - Swab, Nasopharynx [[id number]]  (Normal) Collected: 03/02/23 1755    Lab Status: Final result Specimen: Swab from Nasopharynx Updated: 03/02/23 1829    Narrative:      The following orders were created for panel order COVID PRE-OP / PRE-PROCEDURE SCREENING ORDER (NO ISOLATION) - Swab, Nasopharynx.  Procedure                               Abnormality         Status                     ---------                               -----------         ------                     COVID-19 and FLU A/B PCR...[[id number]]  Normal              Final result                 Please view results for these tests on the individual orders.    COVID-19 and FLU A/B PCR - Swab, Nasopharynx [[id number]]  (Normal) Collected: 03/02/23 1755    Lab Status: Final result Specimen: Swab from Nasopharynx Updated: 03/02/23 1829     COVID19 Not Detected     Influenza A PCR Not Detected     Influenza B PCR Not Detected    Narrative:      Fact sheet for providers: https://www.fda.gov/media/636388/download    Fact sheet for patients: https://www.fda.gov/media/539409/download    Test performed by PCR.          No radiology results from the last 24 hrs    Results for orders placed during the hospital encounter of 02/24/23    Adult Transthoracic Echo Complete W/ Cont if Necessary Per Protocol    Interpretation Summary  •  Left ventricular ejection fraction appears to be 61 - 65%.  •  Estimated right ventricular systolic pressure from tricuspid regurgitation is mildly elevated (35-45 mmHg).      Current medications:  Scheduled Meds:amLODIPine, 10 mg, Oral, Daily  aspirin, 81 mg, Oral, Daily  azaTHIOprine, 100 mg, Oral, BID  budesonide-formoterol, 1 puff, Inhalation, BID - RT  cetirizine, 5 mg, Oral, Daily  enoxaparin, 40 mg, Subcutaneous, Q24H  fluticasone, 2 spray, Each Nare, Daily  insulin detemir, 3 Units, Subcutaneous, BID  insulin lispro, 0-7 Units, Subcutaneous, TID AC  ipratropium-albuterol, 3 mL, Nebulization, 4x Daily -  RT  levothyroxine, 75 mcg, Oral, Q AM  lidocaine, 2 patch, Transdermal, Q24H  montelukast, 10 mg, Oral, Nightly  nebivolol, 20 mg, Oral, Daily  oxybutynin XL, 10 mg, Oral, Daily  polyethylene glycol, 17 g, Oral, Daily  pravastatin, 20 mg, Oral, Q PM  sodium chloride, 2 g, Oral, BID With Meals  terazosin, 2 mg, Oral, Nightly  tobramycin-dexamethasone, 1 drop, Left Eye, Q4H While Awake      Continuous Infusions:   PRN Meds:.•  acetaminophen  •  albuterol sulfate HFA  •  dextrose  •  dextrose  •  glucagon (human recombinant)  •  sodium chloride    Assessment & Plan   Assessment & Plan     Active Hospital Problems    Diagnosis  POA   • Hyponatremia [E87.1]  Yes   • Acute respiratory failure with hypoxia (HCC) [J96.01]  Yes   • History of neuromuscular disorder [Z86.69]  Not Applicable   • Type 1 diabetes (HCC) [E10.9]  Yes   • Dyspnea [R06.00]  Yes   • Hypothyroidism [E03.9]  Yes      Resolved Hospital Problems   No resolved problems to display.        Brief Hospital Course to date:  Marilyn Kunz is a 77 y.o. female  w heterozygous factor V leiden, type ONE diabetes, recent admission 2/24-2/26 w hyponatremia attributed to dehydration + dyspnea on exertion.      Severe hyponatremia 2/2 SIADH + hypervolemia - improved  -improved w fluid restriction, patient baseline ~130 (117 on admission)  -cymbalta d/scarlett by nephrology  -continue to monitor     Possible CAP  -favor mild pulm edema in setting of elevated proBNP + hyponatremia   -completed 5 days ceftriaxone + doxy on 3/7     Type 1 DM  -diagnosed in 50's but confirmed by testing as DMI  -patient reports increased intake today (previously decreased due to pain from biting her tongue) blood sugar now uncontrolled >300, increase Levemir to 10 units BID + SSI w meals   -home regimen: 19 units basal + 5 units (plus/minus) w meals      HTN - hytrin, amlodipine, nebivolol. irbeartan and PRN clonidine on med list as well, hold for now. More lenient goals for this older  patient who lives home alone, SBP <170     Chronic dyspnea - saw Dr Haley, appears to be mostly driven by fatigue, albuterol PRN     Hypothyroidism, controlled - levothyroxine  Demyelinating disorder - follows outpatient [facility]. Hold cymbalta per nephrology above but may need restarted. Pharmacy consulted: azathioprine restarted    Expected Discharge Location and Transportation: Rehab Pending  Expected Discharge   Expected Discharge Date and Time     Expected Discharge Date Expected Discharge Time    Mar 8, 2023            DVT prophylaxis:  Medical DVT prophylaxis orders are present.     AM-PAC 6 Clicks Score (PT): 18 (03/07/23 2208)    CODE STATUS:   Code Status and Medical Interventions:   Ordered at: 03/02/23 1920     Level Of Support Discussed With:    Patient     Code Status (Patient has no pulse and is not breathing):    CPR (Attempt to Resuscitate)     Medical Interventions (Patient has pulse or is breathing):    Full Support     Release to patient:    Routine Release       Neha Almodovar, DO  03/08/23

## 2023-03-08 NOTE — PLAN OF CARE
Goal Outcome Evaluation:   Pt remained on 1 L O2.  Stayed around 91%.  No complaints of pain.  SR on monitor.

## 2023-03-08 NOTE — PROGRESS NOTES
"   LOS: 5 days    Patient Care Team:  Peter Baca MD as PCP - General (Family Medicine)  Eun Eagle MD as Consulting Physician (Rheumatology)  Yao Maya MD as Consulting Physician (Cardiology)  Anusha Evans MD as Cardiologist (Cardiology)  Lucas Haley DO as Consulting Physician (Pulmonary Disease)    Chief Complaint: Hyponatremia  HPI: Serum sodium 118, which went down to 117 after admission, she has a previous history of sodium 128-132 on last admission.  Patient had some diarrhea prior to admission.  Subjective     Interval History:   Na 130 this morning       Review of Systems:   Denies any nausea, vomiting, headache, blurring of vision.  Denies any diarrhea frequency urgency.    Objective     Vital Sign Min/Max for last 24 hours  Temp  Min: 96.7 °F (35.9 °C)  Max: 98.2 °F (36.8 °C)   BP  Min: 142/61  Max: 156/64   Pulse  Min: 66  Max: 79   Resp  Min: 16  Max: 20   SpO2  Min: 94 %  Max: 97 %   Flow (L/min)  Min: 1  Max: 2   No data recorded     Flowsheet Rows    Flowsheet Row First Filed Value   Admission Height 149.9 cm (59\") Documented at 03/02/2023 1500   Admission Weight 59 kg (130 lb) Documented at 03/02/2023 1500          No intake/output data recorded.  I/O last 3 completed shifts:  In: 533 [P.O.:430; IV Piggyback:103]  Out: 1800 [Urine:1800]    Physical Exam:  General Appearance: Awake, alert oriented x4 no obvious distress  Neck: No JVD.  Eyes: PER, EOMI.  Neck: Supple no JVD.  Lungs: Clear auscultation, no rales rhonchi's, equal chest movement, nonlabored.  Heart: No gallop, murmur, rub, RRR.  Abdomen: Soft, nontender, positive bowel sounds, no organomegaly.  Extremities: Face edema, no cyanosis.  Neuro: No focal deficit, moving all extremities, alert oriented X 3  Psych: Mood and affect are normal appropriate   no suprapubic fullness or tenderness, no Conte catheter    WBC WBC   Date Value Ref Range Status   03/06/2023 9.12 3.40 - 10.80 " 10*3/mm3 Final      HGB Hemoglobin   Date Value Ref Range Status   03/06/2023 9.8 (L) 12.0 - 15.9 g/dL Final      HCT Hematocrit   Date Value Ref Range Status   03/06/2023 29.7 (L) 34.0 - 46.6 % Final      Platlets No results found for: LABPLAT   MCV MCV   Date Value Ref Range Status   03/06/2023 91.1 79.0 - 97.0 fL Final          Sodium Sodium   Date Value Ref Range Status   03/08/2023 130 (L) 136 - 145 mmol/L Final   03/07/2023 129 (L) 136 - 145 mmol/L Final   03/07/2023 127 (L) 136 - 145 mmol/L Final   03/07/2023 127 (L) 136 - 145 mmol/L Final   03/07/2023 127 (L) 136 - 145 mmol/L Final   03/07/2023 128 (L) 136 - 145 mmol/L Final   03/06/2023 129 (L) 136 - 145 mmol/L Final   03/06/2023 124 (L) 136 - 145 mmol/L Final   03/06/2023 125 (L) 136 - 145 mmol/L Final   03/05/2023 128 (L) 136 - 145 mmol/L Final      Potassium Potassium   Date Value Ref Range Status   03/08/2023 4.7 3.5 - 5.2 mmol/L Final   03/07/2023 4.8 3.5 - 5.2 mmol/L Final     Comment:     Slight hemolysis detected by analyzer. Results may be affected.   03/06/2023 5.0 3.5 - 5.2 mmol/L Final      Chloride Chloride   Date Value Ref Range Status   03/08/2023 95 (L) 98 - 107 mmol/L Final   03/07/2023 94 (L) 98 - 107 mmol/L Final   03/06/2023 92 (L) 98 - 107 mmol/L Final      CO2 CO2   Date Value Ref Range Status   03/08/2023 26.0 22.0 - 29.0 mmol/L Final   03/07/2023 21.0 (L) 22.0 - 29.0 mmol/L Final   03/06/2023 21.0 (L) 22.0 - 29.0 mmol/L Final      BUN BUN   Date Value Ref Range Status   03/08/2023 9 8 - 23 mg/dL Final   03/07/2023 14 8 - 23 mg/dL Final   03/06/2023 17 8 - 23 mg/dL Final      Creatinine Creatinine   Date Value Ref Range Status   03/08/2023 0.71 0.57 - 1.00 mg/dL Final   03/07/2023 0.67 0.57 - 1.00 mg/dL Final   03/06/2023 0.81 0.57 - 1.00 mg/dL Final      Calcium Calcium   Date Value Ref Range Status   03/08/2023 8.5 (L) 8.6 - 10.5 mg/dL Final   03/07/2023 8.3 (L) 8.6 - 10.5 mg/dL Final   03/06/2023 8.4 (L) 8.6 - 10.5 mg/dL Final       PO4 No results found for: CAPO4   Albumin Albumin   Date Value Ref Range Status   03/08/2023 3.5 3.5 - 5.2 g/dL Final      Magnesium No results found for: MG   Uric Acid No results found for: URICACID        Results Review:     I reviewed the patient's new clinical results.    amLODIPine, 10 mg, Oral, Daily  aspirin, 81 mg, Oral, Daily  azaTHIOprine, 100 mg, Oral, BID  budesonide-formoterol, 1 puff, Inhalation, BID - RT  cetirizine, 5 mg, Oral, Daily  enoxaparin, 40 mg, Subcutaneous, Q24H  fluticasone, 2 spray, Each Nare, Daily  insulin detemir, 10 Units, Subcutaneous, BID  insulin lispro, 0-7 Units, Subcutaneous, TID AC  ipratropium-albuterol, 3 mL, Nebulization, 4x Daily - RT  levothyroxine, 75 mcg, Oral, Q AM  lidocaine, 2 patch, Transdermal, Q24H  montelukast, 10 mg, Oral, Nightly  nebivolol, 20 mg, Oral, Daily  oxybutynin XL, 10 mg, Oral, Daily  polyethylene glycol, 17 g, Oral, Daily  pravastatin, 20 mg, Oral, Q PM  sodium chloride, 2 g, Oral, BID With Meals  terazosin, 2 mg, Oral, Nightly  tobramycin-dexamethasone, 1 drop, Left Eye, Q4H While Awake           Medication Review: Reviewed    Assessment & Plan       Hypothyroidism    Dyspnea    Type 1 diabetes (HCC)    History of neuromuscular disorder    Acute respiratory failure with hypoxia (HCC)    Hyponatremia    1.  Severe hyponatremia: Serum sodium 118 on admission, on previous admission patient sodium range from 128-132 on discharge on February 26, 2023, patient has diarrhea prior to admission.  Patient also had nausea for the couple of weeks which can increase the ADH secretion s, serum osmolarity 263, urine creatinine 56.4, urine sodium 41, urine protein 5.6, urine osmolality 351, serum creatinine 0.95.     Plan:  Continue  salt tablets 2 g twice daily at this time. D/C serial Na monitoring. Continue to hold Cymbalta if in future she needs to go back on this medication will have to consider tolvaptan to keep Na stable. She will need f/u in renal clinic  after discharge     Markus Shipley MD  03/08/23  13:24 EST

## 2023-03-08 NOTE — CASE MANAGEMENT/SOCIAL WORK
Continued Stay Note  [facility]     Patient Name: Marilyn Kunz  MRN: [id number]  Today's Date: 3/8/2023    Admit Date: 3/2/2023    Plan: Rehab   Discharge Plan     Row Name 03/08/23 1213       Plan    Plan Rehab    Patient/Family in Agreement with Plan yes    Plan Comments Spoke with the patient today. Patient insurance requested a [facility] for [place]. CM notified Physician Advisor of request. Per Physician Advisor Patient is walking a house hold distance, with most recent therapy notes and doesn't qualify her for SNF. Recommends home with HH.  CM discussed this with the patient. The patient feels that she can't go home and care for herself with how weak she is. Patient would like to do a patient appeal. CM will request patient appeal number and will get AOR paper filled out. CM follow for discharge needs.    Final Discharge Disposition Code 03 - skilled nursing facility (SNF)               Discharge Codes    No documentation.               Expected Discharge Date and Time     Expected Discharge Date Expected Discharge Time    Mar 8, 2023             Savannah Sharma RN

## 2023-03-08 NOTE — PLAN OF CARE
Pt vss, on 1L NC, AxO, NSR on monitor. Pt up in chair most of shift. Good UOP. Pt has no complaints at this time.

## 2023-03-08 NOTE — THERAPY TREATMENT NOTE
Patient Name: Marilyn Kunz  : 1945    MRN: [id number]                              Today's Date: 3/8/2023       Admit Date: 3/2/2023    Visit Dx:     ICD-10-CM ICD-9-CM   1. Acute respiratory failure with hypoxia ([facility])  J96.01 518.81   2. Hyponatremia  E87.1 276.1     Patient Active Problem List   Diagnosis   • Chest pain   • Essential hypertension   • HLD (hyperlipidemia)   • Odynophagia   • Factor V Leiden ([facility])   • Weakness of right upper extremity   • Psoriatic arthritis ([facility])   • At risk for venous thromboembolism (VTE)   • Body mass index (BMI) of 27.0-27.9 in adult   • Cervical spondylitis with radiculitis ([facility])   • Degenerative arthritis of toe joint, right   • Diabetic nephropathy associated with type 2 diabetes mellitus (HCC)   • Disorder of bone and cartilage   • Hospital discharge follow-up   • Hypothyroidism   • Impairment of balance   • Muscle spasm   • Overactive bladder   • Pain in right hand   • Routine history and physical examination of adult   • Seborrheic dermatitis   • Spondylolisthesis, grade 2   • Total knee replacement status, left   • Vasculitis ([facility])   • CKD (chronic kidney disease) stage 3, GFR 30-59 ml/min ([facility])   • Hypercholesterolemia   • Hyperlipidemia   • PDA (patent ductus arteriosus)   • Demyelinating changes in brain ([facility])   • Need for vaccination against Streptococcus pneumoniae   • Dyspnea   • Screening for depression   • Acute sinusitis   • Allergies   • Anterior ST segment depression   • Arthralgia of knee   • Constipation   • Diverticulitis   • Encounter for pre-operative examination   • Hypertensive emergency   • Low back pain   • Need for immunization against influenza   • Other problems related to lifestyle   • Pain, joint, shoulder, left   • Pill esophagitis   • Vitamin D deficiency   • Yeast vaginitis   • Body mass index (BMI) of 23.0-23.9 in adult   • Abdominal pain in female   • Atypical chest pain   • Demyelinating disease ([facility])   • Diabetic autonomic  neuropathy associated with type 2 diabetes mellitus (HCC)   • Benign essential hypertension   • Long term current use of insulin (HCC)   • Long-term insulin use (HCC)   • Need for prophylactic vaccination against Streptococcus pneumoniae (pneumococcus) and influenza   • Psoriasis with arthropathy (HCC)   • Breast cancer screening   • Encounter for general adult medical examination without abnormal findings   • Spondylolisthesis, grade 2   • Type 2 diabetes mellitus with stage 3 chronic kidney disease, with long-term current use of insulin (HCC)   • Type 1 diabetes (HCC)   • History of stroke   • Fatigue   • History of neuromuscular disorder   • Acute respiratory failure with hypoxia (HCC)   • Hyponatremia     Past Medical History:   Diagnosis Date   • Abnormal ECG Check FPA Date or [facility]    [facility]   • Acute sinusitis 2/6/2023   • Anemia    • Asthma    • CAP (community acquired pneumonia) 2/6/2023   • Chronic kidney disease     pt told it was dx from Dr Baca and to not eat much protein   • Congenital heart disease 1950's Patent 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    Surgery [facility] 1950s close   • CTS (carpal tunnel syndrome)     Psoriatic arthritis hands could be   • Disease of thyroid gland    • Factor 5 Leiden mutation, heterozygous ([facility]) 2012   • Head injury    • Hyperlipidemia    • Hypertension    • Movement disorder 10/2021    knee replacement left knww   • Murmur, cardiac    • Neuropathy in diabetes ([facility])     redness swelling legs   • Peripheral neuropathy 05/03/2022    hands, arms. shoulders, back   • Pill esophagitis 2/6/2023   • Psoriatic arthritis (HCC)     hands   • Sleep apnea Always    diabetic do not sleep well up and down   • Stroke ([facility]) had one don't know when    showed on MRI Brain   • Type 1 diabetes (HCC)      Past Surgical History:   Procedure Laterality Date   • CARDIAC CATHETERIZATION  1952 2 of them    Patent Dictus operation   • CATARACT  EXTRACTION Bilateral    • ENDOSCOPY N/A 10/20/2020    Procedure: ESOPHAGOGASTRODUODENOSCOPY;  Surgeon: Brunner, Mark I, MD;  Location: [facility] ENDOSCOPY;  Service: Gastroenterology;  Laterality: N/A;   • HAND SURGERY Left 1987    no hardware   • PATENT DUCTUS ARTERIOUS LIGATION        General Information     Row Name 03/08/23 0924          OT Time and Intention    Document Type therapy note (daily note)  -YELENA     Mode of Treatment individual therapy;occupational therapy  -     Row Name 03/08/23 0924          General Information    Patient Profile Reviewed yes  -YELENA     Existing Precautions/Restrictions fall;oxygen therapy device and L/min  -YELENA     Barriers to Rehab none identified  -     Row Name 03/08/23 0924          Cognition    Orientation Status (Cognition) oriented x 3;verbal cues/prompts needed for orientation  cues to help ID month  -     Row Name 03/08/23 0924          Safety Issues, Functional Mobility    Safety Issues Affecting Function (Mobility) safety precaution awareness;safety precautions follow-through/compliance  -     Impairments Affecting Function (Mobility) balance;endurance/activity tolerance;strength  -           User Key  (r) = Recorded By, (t) = Taken By, (c) = Cosigned By    Initials Name Provider Type    YELENA Rosalba Hernandez, OT Occupational Therapist                 Mobility/ADL's     Row Name 03/08/23 0925          Bed Mobility    Supine-Sit [place] (Bed Mobility) supervision  -     Assistive Device (Bed Mobility) bed rails;draw sheet  -     Row Name 03/08/23 0925          Transfers    Transfers sit-stand transfer;stand-sit transfer;toilet transfer  -     Comment, (Transfers) pt. wanting to pull up or hold to walker with each transfer and needing cues for safer hand placement  -     Row Name 03/08/23 0925          Sit-Stand Transfer    Sit-Stand [place] (Transfers) contact guard;verbal cues  -     Assistive Device (Sit-Stand Transfers) walker, front-wheeled  -YELENA      Row Name 03/08/23 0925          Stand-Sit Transfer    Stand-Sit [place] (Transfers) contact guard;verbal cues  -YELENA     Row Name 03/08/23 0925          Toilet Transfer    Type (Toilet Transfer) sit-stand;stand-sit  -YELENA     [place] Level (Toilet Transfer) contact guard  -YELENA     Assistive Device (Toilet Transfer) commode;grab bars/safety frame  -YELENA     Comment, (Toilet Transfer) cues to use grab bar over pulling on walker  -YELENA     Row Name 03/08/23 0925          Functional Mobility    Functional Mobility- Ind. Level contact guard assist  -YELENA     Functional Mobility-Distance (Feet) --  8 + 3 + 12  -YELENA     Functional Mobility- Safety Issues step length decreased;supplemental O2  -YELENA     Row Name 03/08/23 0925          Activities of Daily Living    BADL Assessment/Intervention toileting;grooming;upper body dressing;lower body dressing  -YELENA     Row Name 03/08/23 0925          Grooming Assessment/Training    [place] Level (Grooming) hair care, combing/brushing;oral care regimen;wash face, hands;contact guard assist  -YELENA     Position (Grooming) sink side;unsupported standing  -YELENA     Comment, (Grooming) pt. able to stand sink side for each task, CGA progressing to SBA  -YELENA     Row Name 03/08/23 0925          Toileting Assessment/Training    [place] Level (Toileting) adjust/manage clothing;minimum assist (75% patient effort);perform perineal hygiene;independent  -YELENA     Position (Toileting) unsupported sitting  -YELENA     Row Name 03/08/23 0925          Self-Feeding Assessment/Training    [place] Level (Feeding) liquids to mouth;scoop food and bring to mouth;independent  -YELENA     Position (Self-Feeding) supported sitting  -YELENA     Row Name 03/08/23 0925          Upper Body Dressing Assessment/Training    [place] Level (Upper Body Dressing) doff;don;pajama/robe;minimum assist (75% patient effort)  -YELENA     Position (Upper Body Dressing) unsupported sitting  -YELENA     Comment, (Upper Body Dressing) jamie  min A also  -YELENA     Row Name 03/08/23 0925          Lower Body Dressing Assessment/Training    Position (Lower Body Dressing) edge of bed sitting  -YELENA     Comment, (Lower Body Dressing) pt. able to cross up LE's and pull socks up that were half off feet  -YELENA           User Key  (r) = Recorded By, (t) = Taken By, (c) = Cosigned By    Initials Name Provider Type    Rosalba Matson OT Occupational Therapist               Obj/Interventions     Row Name 03/08/23 0932          Motor Skills    Motor Skills functional endurance  -YELENA     Functional Endurance 02 saturation on 02 NC mid to upper 90's  -YELENA     Row Name 03/08/23 0932          Balance    Static Sitting Balance independent  -YELENA     Dynamic Sitting Balance supervision  up to I  -YELENA     Position, Sitting Balance unsupported;other (see comments)  toileting  -YELENA     Static Standing Balance standby assist  -YELENA     Dynamic Standing Balance contact guard  -YELENA     Position/Device Used, Standing Balance supported;unsupported;walker, rolling  -YELENA     Balance Interventions sit to stand;occupation based/functional task  -YELENA     Comment, Balance LBD, toileting, UBD unsupported sit, grooming sinkside standing  -YELENA           User Key  (r) = Recorded By, (t) = Taken By, (c) = Cosigned By    Initials Name Provider Type    Rosalba Matson OT Occupational Therapist               Goals/Plan     Row Name 03/08/23 0937          Transfer Goal 1 (OT)    Progress/Outcome (Transfer Goal 1, OT) good progress toward goal  -YELENA     Row Name 03/08/23 0937          Toileting Goal 1 (OT)    Progress/Outcome (Toileting Goal 1, OT) good progress toward goal  -YELENA     Row Name 03/08/23 0937          Grooming Goal 1 (OT)    Progress/Outcome (Grooming Goal 1, OT) good progress toward goal  -YELENA           User Key  (r) = Recorded By, (t) = Taken By, (c) = Cosigned By    Initials Name Provider Type    Rosalba Matson OT Occupational Therapist               Clinical Impression     Row Name  03/08/23 0934          Pain Assessment    Pretreatment Pain Rating 0/10 - no pain  -YELENA     Posttreatment Pain Rating 0/10 - no pain  -YELENA     Row Name 03/08/23 0934          Plan of Care Review    Plan of Care Reviewed With patient  -YELENA     Progress improving  -     Outcome Evaluation Pt. demonstrated improved sitting and standing balance today with BADL task completion, but continues to need cues for hand placement for transfer safety.  Pt. with improved endurance being able to perform all grooming tasks sinkside standing. Good progress to all goals.  Pt. continues to demonstrated endurance, strength and balance below baseline level with impact on prior ADL independence level warranting skilled OT services.  -YELENA     Row Name 03/08/23 0934          Therapy Assessment/Plan (OT)    Therapy Frequency (OT) daily  -YELENA     Row Name 03/08/23 0934          Therapy Plan Review/Discharge Plan (OT)    Anticipated Discharge Disposition (OT) inpatient rehabilitation facility  -     Row Name 03/08/23 0934          Vital Signs    Pre Systolic BP Rehab 147  -YELENA     Pre Treatment Diastolic BP 61  -YELENA     Post Systolic BP Rehab 153  -YELENA     Post Treatment Diastolic BP 68  -YELENA     Pretreatment Heart Rate (beats/min) 76  -YELENA     Posttreatment Heart Rate (beats/min) 70  -YELENA     Pre SpO2 (%) 95  -YELENA     O2 Delivery Pre Treatment nasal cannula  -YELENA     O2 Delivery Intra Treatment nasal cannula  -YELENA     Post SpO2 (%) 96  -YELENA     O2 Delivery Post Treatment nasal cannula  -YELENA     Pre Patient Position Supine  -YELENA     Intra Patient Position Standing  -YELENA     Post Patient Position Sitting  -YELENA     Row Name 03/08/23 0934          Positioning and Restraints    Pre-Treatment Position in bed  -YELENA     Post Treatment Position chair  -YELENA     In Chair notified nsg;reclined;call light within reach;encouraged to call for assist;exit alarm on;waffle cushion;legs elevated;heels elevated  -           User Key  (r) = Recorded By, (t) = Taken By, (c) =  Cosigned By    Initials Name Provider Type    Rosalba Matson, OT Occupational Therapist               Outcome Measures     Row Name 03/08/23 0938          How much help from another is currently needed...    Putting on and taking off regular lower body clothing? 3  -YELENA     Bathing (including washing, rinsing, and drying) 3  -YELENA     Toileting (which includes using toilet bed pan or urinal) 3  -YELENA     Putting on and taking off regular upper body clothing 3  -YELENA     Taking care of personal grooming (such as brushing teeth) 3  SBA  -YELENA     Eating meals 3  some setup assist needed  -YELENA     AM-PAC 6 Clicks Score (OT) 18  -YELENA     Row Name 03/07/23 2208          How much help from another person do you currently need...    Turning from your back to your side while in flat bed without using bedrails? 4  -BH     Moving from lying on back to sitting on the side of a flat bed without bedrails? 3  -BH     Moving to and from a bed to a chair (including a wheelchair)? 3  -BH     Standing up from a chair using your arms (e.g., wheelchair, bedside chair)? 3  -BH     Climbing 3-5 steps with a railing? 2  -BH     To walk in hospital room? 3  -BH     AM-PAC 6 Clicks Score (PT) 18  -BH     Highest level of mobility 6 --> Walked 10 steps or more  -     Row Name 03/08/23 0938          Functional Assessment    Outcome Measure Options AM-PAC 6 Clicks Daily Activity (OT)  -YELENA           User Key  (r) = Recorded By, (t) = Taken By, (c) = Cosigned By    Initials Name Provider Type    Rosalba Matson, FRANCO Occupational Therapist    Christine Steven, RN Registered Nurse                Occupational Therapy Education     Title: PT OT SLP Therapies (In Progress)     Topic: Occupational Therapy (In Progress)     Point: ADL training (Done)     Description:   Instruct learner(s) on proper safety adaptation and remediation techniques during self care or transfers.   Instruct in proper use of assistive devices.              Learning Progress  Summary           Patient Acceptance, E, VU,NR by YELENA at 3/8/2023 0938    Comment: progress with balance and endurance, transfer safety, goal progression    Acceptance, E, VU by 1 at 3/6/2023 1019    Comment: OT POC; incentive spirometer use; discharge planning                   Point: Home exercise program (Not Started)     Description:   Instruct learner(s) on appropriate technique for monitoring, assisting and/or progressing therapeutic exercises/activities.              Learner Progress:  Not documented in this visit.          Point: Precautions (Done)     Description:   Instruct learner(s) on prescribed precautions during self-care and functional transfers.              Learning Progress Summary           Patient Acceptance, E, VU,NR by YELENA at 3/8/2023 0938    Comment: progress with balance and endurance, transfer safety, goal progression    Acceptance, E, VU by [facility] at 3/6/2023 1019    Comment: OT POC; incentive spirometer use; discharge planning                   Point: Body mechanics (Done)     Description:   Instruct learner(s) on proper positioning and spine alignment during self-care, functional mobility activities and/or exercises.              Learning Progress Summary           Patient Acceptance, E, VU by [facility] at 3/6/2023 1019    Comment: OT POC; incentive spirometer use; discharge planning                               User Key     Initials Effective Dates Name Provider Type Discipline     02/03/23 -  Rosalba Hernandez, OT Occupational Therapist OT    [facility] 06/16/21 -  Lorri Noonan OT Occupational Therapist OT              OT Recommendation and Plan  Therapy Frequency (OT): daily  Plan of Care Review  Plan of Care Reviewed With: patient  Progress: improving  Outcome Evaluation: Pt. demonstrated improved sitting and standing balance today with BADL task completion, but continues to need cues for hand placement for transfer safety.  Pt. with improved endurance being able to perform all grooming tasks  sinkside standing. Good progress to all goals.  Pt. continues to demonstrated endurance, strength and balance below baseline level with impact on prior ADL independence level warranting skilled OT services.     Time Calculation:    Time Calculation- OT     Row Name 03/08/23 0939             Time Calculation- OT    OT Start Time 0819  -YELENA      OT Received On 03/08/23  -YELENA      OT Goal Re-Cert Due Date 03/16/23  -YELENA         Timed Charges    64721 - OT Therapeutic Activity Minutes 8  -YELENA      83144 - OT Self Care/Mgmt Minutes 26  -YELENA         Total Minutes    Timed Charges Total Minutes 34  -YELENA       Total Minutes 34  -YELENA            User Key  (r) = Recorded By, (t) = Taken By, (c) = Cosigned By    Initials Name Provider Type    Rosalba Matson, OT Occupational Therapist              Therapy Charges for Today     Code Description Service Date Service Provider Modifiers Qty    [id number] HC OT SELF CARE/MGMT/TRAIN EA 15 MIN 3/8/2023 Rosalba Hernandez OT GO 2               Rosalba Hernandez OT  3/8/2023

## 2023-03-09 VITALS
HEART RATE: 69 BPM | TEMPERATURE: 97.2 F | DIASTOLIC BLOOD PRESSURE: 65 MMHG | HEIGHT: 59 IN | BODY MASS INDEX: 28.2 KG/M2 | WEIGHT: 139.9 LBS | OXYGEN SATURATION: 95 % | RESPIRATION RATE: 16 BRPM | SYSTOLIC BLOOD PRESSURE: 131 MMHG

## 2023-03-09 LAB
GLUCOSE BLDC GLUCOMTR-MCNC: 214 MG/DL (ref 70–130)
GLUCOSE BLDC GLUCOMTR-MCNC: 352 MG/DL (ref 70–130)

## 2023-03-09 PROCEDURE — 94664 DEMO&/EVAL PT USE INHALER: CPT

## 2023-03-09 PROCEDURE — 99239 HOSP IP/OBS DSCHRG MGMT >30: CPT | Performed by: INTERNAL MEDICINE

## 2023-03-09 PROCEDURE — 97530 THERAPEUTIC ACTIVITIES: CPT

## 2023-03-09 PROCEDURE — 94799 UNLISTED PULMONARY SVC/PX: CPT

## 2023-03-09 PROCEDURE — 63710000001 AZATHIOPRINE PER 50 MG: Performed by: INTERNAL MEDICINE

## 2023-03-09 PROCEDURE — 94761 N-INVAS EAR/PLS OXIMETRY MLT: CPT

## 2023-03-09 PROCEDURE — 82962 GLUCOSE BLOOD TEST: CPT

## 2023-03-09 PROCEDURE — 97116 GAIT TRAINING THERAPY: CPT

## 2023-03-09 PROCEDURE — 63710000001 INSULIN LISPRO (HUMAN) PER 5 UNITS: Performed by: INTERNAL MEDICINE

## 2023-03-09 PROCEDURE — 97110 THERAPEUTIC EXERCISES: CPT

## 2023-03-09 PROCEDURE — 63710000001 INSULIN DETEMIR PER 5 UNITS: Performed by: INTERNAL MEDICINE

## 2023-03-09 RX ORDER — INSULIN DEGLUDEC INJECTION 100 U/ML
INJECTION, SOLUTION SUBCUTANEOUS
Start: 2023-03-09

## 2023-03-09 RX ORDER — SODIUM CHLORIDE 1000 MG
2 TABLET, SOLUBLE MISCELLANEOUS 2 TIMES DAILY WITH MEALS
Start: 2023-03-09

## 2023-03-09 RX ADMIN — INSULIN DETEMIR 10 UNITS: 100 INJECTION, SOLUTION SUBCUTANEOUS at 08:59

## 2023-03-09 RX ADMIN — OXYBUTYNIN CHLORIDE 10 MG: 5 TABLET, EXTENDED RELEASE ORAL at 08:59

## 2023-03-09 RX ADMIN — INSULIN LISPRO 6 UNITS: 100 INJECTION, SOLUTION INTRAVENOUS; SUBCUTANEOUS at 12:05

## 2023-03-09 RX ADMIN — SODIUM CHLORIDE 2 G: 1 TABLET ORAL at 09:04

## 2023-03-09 RX ADMIN — FLUTICASONE PROPIONATE 2 SPRAY: 50 SPRAY, METERED NASAL at 09:00

## 2023-03-09 RX ADMIN — INSULIN LISPRO 3 UNITS: 100 INJECTION, SOLUTION INTRAVENOUS; SUBCUTANEOUS at 08:59

## 2023-03-09 RX ADMIN — LEVOTHYROXINE SODIUM 75 MCG: 0.07 TABLET ORAL at 06:12

## 2023-03-09 RX ADMIN — POLYETHYLENE GLYCOL 3350 17 G: 17 POWDER, FOR SOLUTION ORAL at 09:06

## 2023-03-09 RX ADMIN — BUDESONIDE AND FORMOTEROL FUMARATE DIHYDRATE 1 PUFF: 160; 4.5 AEROSOL RESPIRATORY (INHALATION) at 08:33

## 2023-03-09 RX ADMIN — CETIRIZINE HYDROCHLORIDE 5 MG: 10 TABLET, FILM COATED ORAL at 08:58

## 2023-03-09 RX ADMIN — ASPIRIN 81 MG: 81 TABLET, COATED ORAL at 08:58

## 2023-03-09 RX ADMIN — IPRATROPIUM BROMIDE AND ALBUTEROL SULFATE 3 ML: 2.5; .5 SOLUTION RESPIRATORY (INHALATION) at 08:33

## 2023-03-09 RX ADMIN — TOBRAMYCIN AND DEXAMETHASONE 1 DROP: 3; 1 SUSPENSION/ DROPS OPHTHALMIC at 06:13

## 2023-03-09 RX ADMIN — NEBIVOLOL 20 MG: 20 TABLET ORAL at 08:58

## 2023-03-09 RX ADMIN — AMLODIPINE BESYLATE 10 MG: 10 TABLET ORAL at 08:59

## 2023-03-09 RX ADMIN — AZATHIOPRINE 100 MG: 50 TABLET ORAL at 09:05

## 2023-03-09 NOTE — PROGRESS NOTES
"   LOS: 6 days    Patient Care Team:  Peter Baca MD as PCP - General (Family Medicine)  Eun Eagle MD as Consulting Physician (Rheumatology)  Yao Maya MD as Consulting Physician (Cardiology)  Anusha Evans MD as Cardiologist (Cardiology)  Lucas Haley DO as Consulting Physician (Pulmonary Disease)    Chief Complaint: Hyponatremia  HPI: Serum sodium 118, which went down to 117 after admission, she has a previous history of sodium 128-132 on last admission.  Patient had some diarrhea prior to admission.  Subjective     Interval History:   Na 130 as of yesterday. No new labs.     Review of Systems:   Denies any nausea, vomiting, headache, blurring of vision.  Denies any diarrhea frequency urgency.    Objective     Vital Sign Min/Max for last 24 hours  Temp  Min: 97.2 °F (36.2 °C)  Max: 98.1 °F (36.7 °C)   BP  Min: 127/56  Max: 156/61   Pulse  Min: 62  Max: 80   Resp  Min: 14  Max: 16   SpO2  Min: 93 %  Max: 98 %   Flow (L/min)  Min: 1  Max: 1   No data recorded     Flowsheet Rows    Flowsheet Row First Filed Value   Admission Height 149.9 cm (59\") Documented at 03/02/2023 1500   Admission Weight 59 kg (130 lb) Documented at 03/02/2023 1500          No intake/output data recorded.  I/O last 3 completed shifts:  In: 705 [P.O.:705]  Out: 1850 [Urine:1850]    Physical Exam:  General Appearance: Awake, alert oriented x4 no obvious distress  Neck: No JVD.  Eyes: PER, EOMI.  Neck: Supple no JVD.  Lungs: Clear auscultation, no rales rhonchi's, equal chest movement, nonlabored.  Heart: No gallop, murmur, rub, RRR.  Abdomen: Soft, nontender, positive bowel sounds, no organomegaly.  Extremities: Face edema, no cyanosis.  Neuro: No focal deficit, moving all extremities, alert oriented X 3  Psych: Mood and affect are normal appropriate   no suprapubic fullness or tenderness, no Conte catheter    WBC No results found for: WBC   HGB No results found for: HGB   HCT No " results found for: HCT   Platlets No results found for: LABPLAT   MCV No results found for: MCV       Sodium Sodium   Date Value Ref Range Status   03/08/2023 130 (L) 136 - 145 mmol/L Final   03/07/2023 129 (L) 136 - 145 mmol/L Final   03/07/2023 127 (L) 136 - 145 mmol/L Final   03/07/2023 127 (L) 136 - 145 mmol/L Final   03/07/2023 127 (L) 136 - 145 mmol/L Final   03/07/2023 128 (L) 136 - 145 mmol/L Final   03/06/2023 129 (L) 136 - 145 mmol/L Final      Potassium Potassium   Date Value Ref Range Status   03/08/2023 4.7 3.5 - 5.2 mmol/L Final   03/07/2023 4.8 3.5 - 5.2 mmol/L Final     Comment:     Slight hemolysis detected by analyzer. Results may be affected.      Chloride Chloride   Date Value Ref Range Status   03/08/2023 95 (L) 98 - 107 mmol/L Final   03/07/2023 94 (L) 98 - 107 mmol/L Final      CO2 CO2   Date Value Ref Range Status   03/08/2023 26.0 22.0 - 29.0 mmol/L Final   03/07/2023 21.0 (L) 22.0 - 29.0 mmol/L Final      BUN BUN   Date Value Ref Range Status   03/08/2023 9 8 - 23 mg/dL Final   03/07/2023 14 8 - 23 mg/dL Final      Creatinine Creatinine   Date Value Ref Range Status   03/08/2023 0.71 0.57 - 1.00 mg/dL Final   03/07/2023 0.67 0.57 - 1.00 mg/dL Final      Calcium Calcium   Date Value Ref Range Status   03/08/2023 8.5 (L) 8.6 - 10.5 mg/dL Final   03/07/2023 8.3 (L) 8.6 - 10.5 mg/dL Final      PO4 No results found for: CAPO4   Albumin Albumin   Date Value Ref Range Status   03/08/2023 3.5 3.5 - 5.2 g/dL Final      Magnesium No results found for: MG   Uric Acid No results found for: URICACID        Results Review:     I reviewed the patient's new clinical results.    amLODIPine, 10 mg, Oral, Daily  aspirin, 81 mg, Oral, Daily  azaTHIOprine, 100 mg, Oral, BID  budesonide-formoterol, 1 puff, Inhalation, BID - RT  cetirizine, 5 mg, Oral, Daily  enoxaparin, 40 mg, Subcutaneous, Q24H  fluticasone, 2 spray, Each Nare, Daily  insulin detemir, 10 Units, Subcutaneous, BID  insulin lispro, 0-7 Units,  Subcutaneous, TID AC  ipratropium-albuterol, 3 mL, Nebulization, 4x Daily - RT  levothyroxine, 75 mcg, Oral, Q AM  lidocaine, 2 patch, Transdermal, Q24H  montelukast, 10 mg, Oral, Nightly  nebivolol, 20 mg, Oral, Daily  oxybutynin XL, 10 mg, Oral, Daily  polyethylene glycol, 17 g, Oral, Daily  pravastatin, 20 mg, Oral, Q PM  sodium chloride, 2 g, Oral, BID With Meals  terazosin, 2 mg, Oral, Nightly           Medication Review: Reviewed    Assessment & Plan       Hypothyroidism    Dyspnea    Type 1 diabetes (HCC)    History of neuromuscular disorder    Acute respiratory failure with hypoxia ([facility])    Hyponatremia    1.  Severe hyponatremia: Serum sodium 118 on admission, on previous admission patient sodium range from 128-132 on discharge on February 26, 2023, patient has diarrhea prior to admission.  Patient also had nausea for the couple of weeks which can increase the ADH secretion s, serum osmolarity 263, urine creatinine 56.4, urine sodium 41, urine protein 5.6, urine osmolality 351, serum creatinine 0.95.     Plan:  Continue  salt tablets 2 g twice daily at this time. D/C serial Na monitoring. Continue to hold Cymbalta if in future she needs to go back on this medication will have to consider tolvaptan to keep Na stable. She will need f/u in renal clinic after discharge.     Markus Shipley MD  03/09/23  14:17 EST

## 2023-03-09 NOTE — PLAN OF CARE
Goal Outcome Evaluation:  Plan of Care Reviewed With: patient        Progress: improving  Outcome Evaluation: No acute events overnight. Remains 1LNC. SR/SB on tele. VSS. Cont current POC

## 2023-03-09 NOTE — PLAN OF CARE
Goal Outcome Evaluation:  Plan of Care Reviewed With: patient        Progress: improving  Outcome Evaluation: Pt continuing to work towards improving stamina, strength.  Pt able to ambulate 60', seated rest break, then 70' with CGA, FWW on 2L O2 with cues for breathing, posture, limited by stamina

## 2023-03-09 NOTE — DISCHARGE PLACEMENT REQUEST
"Marilyn Amezcua (77 y.o. Female)     Date of Birth   1945    Social Security Number       Address   [address] [place] [id number]    Home Phone   [phone]    MRN   [id number]       Mor[place]   Protestant    Marital Status   Single                            Admission Date   3/2/23    Admission Type   Emergency    Admitting Provider   Neha Almodovar DO    Attending Provider   Neha Almodovar DO    Department, Room/Bed   [facility] 6B, N644/1       Discharge Date       Discharge Disposition   Home or Self Care    Discharge Destination                               Attending Provider: Neha Almodovar DO    Allergies: Atorvastatin, Colesevelam, Cephalexin, Hygroton [Chlorthalidone], Penicillins    Isolation: None   Infection: None   Code Status: CPR    Ht: 149.9 cm (59\")   Wt: 63.5 kg (139 lb 14.4 oz)    Admission Cmt: None   Principal Problem: None                Active Insurance as of 3/2/2023     Primary Coverage     Payor Plan Insurance Group Employer/Plan Group    HUMANA MEDICARE REPLACEMENT HUMANA MEDICARE REPLACEMENT [id number]     Payor Plan Address Payor Plan Phone Number Payor Plan Fax Number Effective Dates    [address] [address] [phone]  2023 - None Entered    [facility] [address]       Subscriber Name Subscriber Birth Date Member ID       MARILYN AMEZCUA 1945 [id number]                 Emergency Contacts      (Rel.) Home Phone Work Phone Mobile Phone    Matthew Amezcua (Brother) -- -- [phone]    JOSUE BOND (Relative) [phone]                 Discharge Summary      Neha Almodovar DO at 23 1138              [facility] Medicine Services  DISCHARGE SUMMARY    Patient Name: Marilyn Amezcua  : 1945  MRN: [id number]    Date of Admission: 3/2/2023  3:36 PM  Date of Discharge:  3/9/2023  Primary Care Physician: Peter Baca MD    Consults     Date and " Time Order Name Status Description    3/3/2023  8:40 AM Inpatient Nephrology Consult Completed           Hospital Course     Presenting Problem:   Acute respiratory failure with hypoxia (HCC) [J96.01]    Active Hospital Problems    Diagnosis  POA   • Hyponatremia [E87.1]  Yes   • Acute respiratory failure with hypoxia (HCC) [J96.01]  Yes   • History of neuromuscular disorder [Z86.69]  Not Applicable   • Type 1 diabetes (HCC) [E10.9]  Yes   • Dyspnea [R06.00]  Yes   • Hypothyroidism [E03.9]  Yes      Resolved Hospital Problems   No resolved problems to display.        Hospital Course:  Marilyn Kunz is a 77 y.o. female  with PMHX significant for heterozygous factor V leiden, type ONE diabetes, recent admission 2/24-2/26 w hyponatremia attributed to dehydration + dyspnea on exertion.      Severe hyponatremia 2/2 SIADH + hypervolemia - improved  -improved w fluid restriction, patient baseline ~130 (117 on admission)  -cymbalta d/scarlett by nephrology, needs to stay off this medication, if in the future this medication needs to be restarted will have to consider tolvaptan to keep sodium stable  -continue salt tablets 2g BID  -continue to monitor periodically  -nephrology f/u outpatient     Possible CAP  -favor mild pulm edema in setting of elevated proBNP + hyponatremia   -completed 5 days ceftriaxone + doxy on 3/7/23     Type 1 DM  -diagnosed in 50's but confirmed by testing as DMI  -continue home basal/bolus regimen at discharge    HTN - hytrin, amlodipine, nebivolol. irbesartan and PRN clonidine     Chronic dyspnea - saw Dr Haley, appears to be mostly driven by fatigue, albuterol PRN     Hypothyroidism, controlled - levothyroxine  Demyelinating disorder - follows outpatient [facility]. Recommend discontinuing cymbalta. continue azathioprine    To Rehab today    Discharge Follow Up Recommendations for outpatient labs/diagnostics:  - f/u with [facility] in 1 month  - f/u with PCP in 1-2 weeks with BMP    Day of Discharge      HPI:  Patient sitting up in bed. No issues overnight, feeling stronger.     ROS:  Gen- No fevers, chills  CV- No chest pain, palpitations  Resp- No cough, dyspnea  GI- No N/V/D, abd pain    Vital Signs:   Temp:  [97.2 °F (36.2 °C)-98.1 °F (36.7 °C)] 97.2 °F (36.2 °C)  Heart Rate:  [62-80] 69  Resp:  [14-18] 16  BP: (127-156)/(56-85) 131/65  Flow (L/min):  [1] 1      Physical Exam:  Constitutional: No acute distress, awake, alert  HENT: NCAT, mucous membranes moist  Respiratory: Clear to auscultation bilaterally, respiratory effort normal   Cardiovascular: RRR, no murmurs, rubs, or gallops  Gastrointestinal: soft, nontender, nondistended  Musculoskeletal: No bilateral ankle edema  Psychiatric: Appropriate affect, cooperative  Neurologic: Oriented x 3, speech clear, generalized weakness  Skin: No rashes       Pertinent  and/or Most Recent Results     LAB RESULTS:      Lab 03/06/23  0800 03/03/23  0615 03/02/23  1539   WBC 9.12 8.43 9.27   HEMOGLOBIN 9.8* 9.4* 10.9*   HEMATOCRIT 29.7* 27.7* 32.6*   PLATELETS 277 267 301   NEUTROS ABS  --   --  7.86*   IMMATURE GRANS (ABS)  --   --  0.06*   LYMPHS ABS  --   --  0.78   MONOS ABS  --   --  0.56   EOS ABS  --   --  0.01   MCV 91.1 88.5 88.3   PROCALCITONIN  --   --  0.09   LACTATE  --   --  1.6         Lab 03/08/23  0805 03/07/23  2100 03/07/23  0856 03/07/23  0525 03/07/23  0055 03/06/23  1245 03/06/23  0800 03/05/23  2008 03/05/23  0733 03/04/23  0020 03/03/23  1143 03/03/23  0616 03/03/23  0615   SODIUM 130* 129* 127* 127*  127* 128*   < > 125*   < > 128*   < > 122*  --  119*   POTASSIUM 4.7  --   --  4.8  --   --  5.0  --  4.2  --  4.4  --  3.9   CHLORIDE 95*  --   --  94*  --   --  92*  --  94*  --  87*  --  85*   CO2 26.0  --   --  21.0*  --   --  21.0*  --  23.0  --  23.0  --  23.0   ANION GAP 9.0  --   --  12.0  --   --  12.0  --  11.0  --  12.0  --  11.0   BUN 9  --   --  14  --   --  17  --  14  --  14  --  16   CREATININE 0.71  --   --  0.67  --   --  0.81   --  0.67  --  0.95  --  0.90   EGFR 87.7  --   --  90.2  --   --  74.9  --  90.2  --  61.8  --  66.0   GLUCOSE 328*  --   --  183*  --   --  363*  --  101*  --  239*  --  120*   CALCIUM 8.5*  --   --  8.3*  --   --  8.4*  --  8.3*  --  8.7  --  8.4*   IONIZED CALCIUM  --   --   --   --   --   --   --   --   --   --   --  1.20  --    MAGNESIUM  --   --   --   --   --   --   --   --  1.8  --   --   --  1.5*   PHOSPHORUS 2.9  --   --   --   --   --   --   --   --   --   --   --  3.2    < > = values in this interval not displayed.         Lab 03/08/23  0805 03/02/23  1539   TOTAL PROTEIN  --  7.6   ALBUMIN 3.5 4.5   GLOBULIN  --  3.1   ALT (SGPT)  --  16   AST (SGOT)  --  24   BILIRUBIN  --  1.5*   ALK PHOS  --  66         Lab 03/03/23  0615 03/02/23  1539   PROBNP 5,883.0* 6,798.0*   HSTROP T  --  19*         Lab 03/04/23  0548   CHOLESTEROL 158   LDL CHOL 82   HDL CHOL 64*   TRIGLYCERIDES 58             Brief Urine Lab Results  (Last result in the past 365 days)      Color   Clarity   Blood   Leuk Est   Nitrite   Protein   CREAT   Urine HCG        03/03/23 1457             56.4             Microbiology Results (last 10 days)     Procedure Component Value - Date/Time    Blood Culture - Blood, Wrist, Left [[id number]]  (Normal) Collected: 03/02/23 2121    Lab Status: Final result Specimen: Blood from Wrist, Left Updated: 03/07/23 2145     Blood Culture No growth at 5 days    Blood Culture - Blood, Arm, Left [[id number]]  (Normal) Collected: 03/02/23 2121    Lab Status: Final result Specimen: Blood from Arm, Left Updated: 03/07/23 2145     Blood Culture No growth at 5 days    COVID PRE-OP / PRE-PROCEDURE SCREENING ORDER (NO ISOLATION) - Swab, Nasopharynx [[id number]]  (Normal) Collected: 03/02/23 1755    Lab Status: Final result Specimen: Swab from Nasopharynx Updated: 03/02/23 1274    Narrative:      The following orders were created for panel order COVID PRE-OP / PRE-PROCEDURE SCREENING ORDER (NO ISOLATION) - Swab,  Nasopharynx.  Procedure                               Abnormality         Status                     ---------                               -----------         ------                     COVID-19 and FLU A/B PCR...[[id number]]  Normal              Final result                 Please view results for these tests on the individual orders.    COVID-19 and FLU A/B PCR - Swab, Nasopharynx [[id number]]  (Normal) Collected: 03/02/23 1755    Lab Status: Final result Specimen: Swab from Nasopharynx Updated: 03/02/23 1829     COVID19 Not Detected     Influenza A PCR Not Detected     Influenza B PCR Not Detected    Narrative:      Fact sheet for providers: https://www.fda.gov/media/670150/download    Fact sheet for patients: https://www.fda.gov/media/614209/download    Test performed by PCR.          XR Chest 1 View    Result Date: 3/2/2023  XR CHEST 1 VW Date of Exam: 3/2/2023 3:24 PM EST Indication: SOA triage protocol. Comparison: 2/24/2023 Findings: Heart shadow is normal in size. There is increased perihilar interstitial disease, suggesting perihilar edema. In addition, there is also focal dense airspace opacity of the right lung base which may represent atelectasis or pneumonia. There appears to be a small new right pleural effusion. There is minimal if any right effusion. No pneumothorax is seen. Bony structures appear to be intact.     Impression: 1. Perihilar disease suggesting mild pulmonary vascular congestion. Small left pleural effusion. 2. New focal disease in the medial right lung base, potentially pneumonia. Electronically Signed: Angus Scott  3/2/2023 3:52 PM EST  Workstation ID: [id number]    CT Angiogram Chest    Result Date: 3/2/2023  CT ANGIOGRAM CHEST Date of Exam: 3/2/2023 9:45 PM EST Indication: PE. pneumonia?. Comparison: 2/25/2023 Technique: CTA of the chest was performed after the uneventful intravenous administration of 79 mL Isovue-370. Reconstructed coronal and sagittal images were also obtained. In  addition, a 3-D volume rendered image was created for interpretation. Automated exposure control and iterative reconstruction methods were used.  FINDINGS: [ ] Pulmonary Arteries: Motion Limited imaging demonstrates no evidence of pulmonary embolus through the proximal lobar level or suspected into the segmental level. The main pulmonary arteries are normal in caliber. Mediastinum: Heart size is stable. No suspicious pericardial effusion noted. Aorta and origin of great vessels grossly unremarkable. Mild hilar adenopathy and mediastinal adenopathy are likely reactive. The esophagus is stable Lungs/pleura: Moderate right-sided pleural effusion is slightly increased in comparison. Small left-sided pleural effusion is similar to slightly increased in comparison there is associated dependent opacity likely atelectasis. Pneumonia is not excluded. Bandlike opacity, groundglass opacity and patchy opacity within the lungs bilaterally with a right basilar and left-sided predominance is slightly increased in the comparison. Central airways appear grossly patent Upper Abdomen: Limited images of the upper abdomen are unremarkable. Soft tissues/Bones: No acute bone or soft tissue abnormality.     1. No evidence of a central or foraminal is a motion Limited imaging. More peripheral imaging is limited. 2. Interval increase in bilateral pleural effusions and dependent opacities 3. Increase in patchy groundglass opacities which could represent atelectasis, pulmonary edema or pneumonia in the correct clinical setting Electronically Signed: Fabian Fernandez  3/2/2023 10:24 PM EST  Workstation ID: [id number]      Results for orders placed during the hospital encounter of 08/03/22    Duplex Renal Artery - Bilateral Complete CAR    Interpretation Summary  · No hemodynamically significant renal artery stenosis bilaterally.  · Resistive indices within normal limits.  · Renal veins patent      Results for orders placed during the hospital  encounter of 08/03/22    Duplex Renal Artery - Bilateral Complete CAR    Interpretation Summary  · No hemodynamically significant renal artery stenosis bilaterally.  · Resistive indices within normal limits.  · Renal veins patent      Results for orders placed during the hospital encounter of 02/24/23    Adult Transthoracic Echo Complete W/ Cont if Necessary Per Protocol    Interpretation Summary  •  Left ventricular ejection fraction appears to be 61 - 65%.  •  Estimated right ventricular systolic pressure from tricuspid regurgitation is mildly elevated (35-45 mmHg).      Plan for Follow-up of Pending Labs/Results:     Discharge Details        Discharge Medications      New Medications      Instructions Start Date   sodium chloride 1 g tablet   2 g, Oral, 2 Times Daily With Meals         Continue These Medications      Instructions Start Date   albuterol sulfate  (90 Base) MCG/ACT inhaler  Commonly known as: PROVENTIL HFA;VENTOLIN HFA;PROAIR HFA   2 puffs, Inhalation      amLODIPine 10 MG tablet  Commonly known as: NORVASC   10 mg, Oral, Daily      ammonium lactate 12 % cream  Commonly known as: AMLACTIN   (12 %)      aspirin 81 MG EC tablet   81 mg, Oral, Daily      azaTHIOprine 50 MG tablet  Commonly known as: IMURAN   TAKE 2 TABLETS TWICE DAILY      azelastine 0.1 % nasal spray  Commonly known as: ASTELIN   1 spray, Nasal, Daily PRN      budesonide-formoterol 160-4.5 MCG/ACT inhaler  Commonly known as: SYMBICORT   1 puff, Inhalation, 2 Times Daily - RT      cloNIDine 0.1 MG tablet  Commonly known as: CATAPRES   0.1 mg, Oral, 3 Times Daily PRN      Cosentyx 150 MG/ML solution prefilled syringe  Generic drug: Secukinumab   150 mg, Subcutaneous, Every 28 Days      Droplet Pen Needles 32G X 4 MM misc  Generic drug: Insulin Pen Needle   No dose, route, or frequency recorded.      fluticasone 50 MCG/ACT nasal spray  Commonly known as: FLONASE   2 sprays, Each Nare, Daily, Shake well before using.        FreeStyle Marie 2 Otisco device   FreeStyle Marie 2 Otisco   change q 14 days      FreeStyle Precision Ant Test test strip  Generic drug: glucose blood   No dose, route, or frequency recorded.      insulin aspart 100 UNIT/ML solution pen-injector sc pen  Commonly known as: novoLOG FLEXPEN   Subcutaneous, 3 Times Daily With Meals, Sliding scale      irbesartan 300 MG tablet  Commonly known as: Avapro   300 mg, Oral, Nightly      levocetirizine 5 MG tablet  Commonly known as: XYZAL   5 mg, Oral, Every Evening      levothyroxine 75 MCG tablet  Commonly known as: SYNTHROID, LEVOTHROID   75 mcg, Oral, Daily      montelukast 10 MG tablet  Commonly known as: SINGULAIR   10 mg, Oral, Nightly      nebivolol 20 MG tablet  Commonly known as: BYSTOLIC   20 mg, Oral, Daily      OptiChamber Paula-Lg Mask device   USE AS DIRECTED WITH MULTI DOSE INHALER      oxybutynin XL 10 MG 24 hr tablet  Commonly known as: DITROPAN-XL   10 mg, Oral, Daily      pravastatin 20 MG tablet  Commonly known as: Pravachol   20 mg, Oral, Every Evening      tobramycin-dexamethasone 0.3-0.1 % ophthalmic suspension  Commonly known as: TOBRADEX   INSTILL 1 DROP IN LEFT EYE 4 TIMES DAILY      Tresiba FlexTouch 100 UNIT/ML solution pen-injector injection  Generic drug: insulin degludec   16-20 Units, Subcutaneous, Nightly      triamcinolone 0.1 % cream  Commonly known as: KENALOG   APPLY A THIN LAYER TO THE AFFECTED AREA TWICE DAILY FOR 1 WEEK THEN ONCE DAILY FOR 1 WEEK         Stop These Medications    DULoxetine 30 MG capsule  Commonly known as: Cymbalta     levoFLOXacin 500 MG tablet  Commonly known as: Levaquin     terazosin 2 MG capsule  Commonly known as: HYTRIN            Allergies   Allergen Reactions   • Atorvastatin Other (See Comments)   • Colesevelam Other (See Comments)   • Cephalexin Rash     Tolerated Ceftriaxone   • Hygroton [Chlorthalidone] Rash     Facial rash   • Penicillins Nausea And Vomiting and Rash     Has tolerated ceftriaxone          Discharge Disposition:  Home or Self Care    Diet:  Hospital:  Diet Order   Procedures   • Diet: Fluid Restriction (240 mL/tray) Diets, Diabetic Diets; Consistent Carbohydrate; Other (Specify mL/day) (1000 ml/day); Texture: Regular Texture (IDDSI 7); Fluid Consistency: Thin (IDDSI 0)       Activity:  - as tolerated    Restrictions or Other Recommendations:  - none       CODE STATUS:    Code Status and Medical Interventions:   Ordered at: 03/02/23 1920     Level Of Support Discussed With:    Patient     Code Status (Patient has no pulse and is not breathing):    CPR (Attempt to Resuscitate)     Medical Interventions (Patient has pulse or is breathing):    Full Support     Release to patient:    Routine Release       Future Appointments   Date Time Provider Department Center   5/9/2023 11:30 AM Bryson Murrell DNP, APRN [facility]   8/8/2023 11:15 AM Lucas Haley DO [facility]   11/7/2023 11:15 AM Yao Maya MD [facility]                 Neha Almodovar DO  03/09/23      Time Spent on Discharge:  I spent  40 minutes on this discharge activity which included: face-to-face encounter with the patient, reviewing the data in the system, coordination of the care with the nursing staff as well as consultants, documentation, and entering orders.            Electronically signed by Neha Almodovar DO at 03/09/23 7948

## 2023-03-09 NOTE — THERAPY TREATMENT NOTE
Patient Name: Marilyn Kunz  : 1945    MRN: [id number]                              Today's Date: 3/9/2023       Admit Date: 3/2/2023    Visit Dx:     ICD-10-CM ICD-9-CM   1. Acute respiratory failure with hypoxia ([facility])  J96.01 518.81   2. Hyponatremia  E87.1 276.1     Patient Active Problem List   Diagnosis   • Chest pain   • Essential hypertension   • HLD (hyperlipidemia)   • Odynophagia   • Factor V Leiden ([facility])   • Weakness of right upper extremity   • Psoriatic arthritis ([facility])   • At risk for venous thromboembolism (VTE)   • Body mass index (BMI) of 27.0-27.9 in adult   • Cervical spondylitis with radiculitis ([facility])   • Degenerative arthritis of toe joint, right   • Diabetic nephropathy associated with type 2 diabetes mellitus (HCC)   • Disorder of bone and cartilage   • Hospital discharge follow-up   • Hypothyroidism   • Impairment of balance   • Muscle spasm   • Overactive bladder   • Pain in right hand   • Routine history and physical examination of adult   • Seborrheic dermatitis   • Spondylolisthesis, grade 2   • Total knee replacement status, left   • Vasculitis ([facility])   • CKD (chronic kidney disease) stage 3, GFR 30-59 ml/min ([facility])   • Hypercholesterolemia   • Hyperlipidemia   • PDA (patent ductus arteriosus)   • Demyelinating changes in brain ([facility])   • Need for vaccination against Streptococcus pneumoniae   • Dyspnea   • Screening for depression   • Acute sinusitis   • Allergies   • Anterior ST segment depression   • Arthralgia of knee   • Constipation   • Diverticulitis   • Encounter for pre-operative examination   • Hypertensive emergency   • Low back pain   • Need for immunization against influenza   • Other problems related to lifestyle   • Pain, joint, shoulder, left   • Pill esophagitis   • Vitamin D deficiency   • Yeast vaginitis   • Body mass index (BMI) of 23.0-23.9 in adult   • Abdominal pain in female   • Atypical chest pain   • Demyelinating disease ([facility])   • Diabetic autonomic  neuropathy associated with type 2 diabetes mellitus (HCC)   • Benign essential hypertension   • Long term current use of insulin (HCC)   • Long-term insulin use (HCC)   • Need for prophylactic vaccination against Streptococcus pneumoniae (pneumococcus) and influenza   • Psoriasis with arthropathy (HCC)   • Breast cancer screening   • Encounter for general adult medical examination without abnormal findings   • Spondylolisthesis, grade 2   • Type 2 diabetes mellitus with stage 3 chronic kidney disease, with long-term current use of insulin (HCC)   • Type 1 diabetes (HCC)   • History of stroke   • Fatigue   • History of neuromuscular disorder   • Acute respiratory failure with hypoxia (HCC)   • Hyponatremia     Past Medical History:   Diagnosis Date   • Abnormal ECG Check FPA Date or [facility]    [facility]   • Acute sinusitis 2/6/2023   • Anemia    • Asthma    • CAP (community acquired pneumonia) 2/6/2023   • Chronic kidney disease     pt told it was dx from Dr Baca and to not eat much protein   • Congenital heart disease 1950's Patent 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    Surgery [facility] 1950s close   • CTS (carpal tunnel syndrome)     Psoriatic arthritis hands could be   • Disease of thyroid gland    • Factor 5 Leiden mutation, heterozygous ([facility]) 2012   • Head injury    • Hyperlipidemia    • Hypertension    • Movement disorder 10/2021    knee replacement left knww   • Murmur, cardiac    • Neuropathy in diabetes ([facility])     redness swelling legs   • Peripheral neuropathy 05/03/2022    hands, arms. shoulders, back   • Pill esophagitis 2/6/2023   • Psoriatic arthritis (HCC)     hands   • Sleep apnea Always    diabetic do not sleep well up and down   • Stroke ([facility]) had one don't know when    showed on MRI Brain   • Type 1 diabetes (HCC)      Past Surgical History:   Procedure Laterality Date   • CARDIAC CATHETERIZATION  1952 2 of them    Patent Dictus operation   • CATARACT  EXTRACTION Bilateral    • ENDOSCOPY N/A 10/20/2020    Procedure: ESOPHAGOGASTRODUODENOSCOPY;  Surgeon: Brunner, Mark I, MD;  Location: [facility] ENDOSCOPY;  Service: Gastroenterology;  Laterality: N/A;   • HAND SURGERY Left 1987    no hardware   • PATENT DUCTUS ARTERIOUS LIGATION        General Information     Row Name 03/09/23 1332          Physical Therapy Time and Intention    Document Type therapy note (daily note)  -KG     Mode of Treatment physical therapy;individual therapy  -KG     Row Name 03/09/23 1332          General Information    Patient Profile Reviewed yes  -KG     Existing Precautions/Restrictions fall;oxygen therapy device and L/min  -KG     Row Name 03/09/23 1332          Cognition    Orientation Status (Cognition) oriented x 3;verbal cues/prompts needed for orientation  -KG     Row Name 03/09/23 1332          Safety Issues, Functional Mobility    Impairments Affecting Function (Mobility) balance;endurance/activity tolerance;strength  -KG           User Key  (r) = Recorded By, (t) = Taken By, (c) = Cosigned By    Initials Name Provider Type    KG Mala Gentile Physical Therapist               Mobility     Row Name 03/09/23 1332          Bed Mobility    Bed Mobility supine-sit  -KG     Supine-Sit [place] (Bed Mobility) supervision  -KG     Assistive Device (Bed Mobility) bed rails;draw sheet  -KG     Row Name 03/09/23 1332          Transfers    Comment, (Transfers) cues HP  -KG     Row Name 03/09/23 1332          Sit-Stand Transfer    Sit-Stand [place] (Transfers) contact guard;verbal cues  -KG     Assistive Device (Sit-Stand Transfers) walker, front-wheeled  -KG     Row Name 03/09/23 1332          Gait/Stairs (Locomotion)    [place] Level (Gait) contact guard;verbal cues  -KG     Assistive Device (Gait) walker, front-wheeled  -KG     Distance in Feet (Gait) 60'+70'  -KG     Deviations/Abnormal Patterns (Gait) base of support, narrow;sam decreased;gait speed decreased;stride  length decreased  -KG     Bilateral Gait Deviations heel strike decreased  -KG     Comment, (Gait/Stairs) Pt able to ambulate 60', seated rest break, then 70' with CGA, FWW on 2L O2 with cues for breathing, posture, limited by stamina  -KG           User Key  (r) = Recorded By, (t) = Taken By, (c) = Cosigned By    Initials Name Provider Type    MIN Mala Gentile Physical Therapist               Obj/Interventions     Row Name 03/09/23 1333          Motor Skills    Therapeutic Exercise other (see comments)  heel slides, ankle pumps, SLR, LAQ x15  -KG     Row Name 03/09/23 1333          Balance    Dynamic Standing Balance contact guard  -KG     Position/Device Used, Standing Balance supported;walker, rolling  -KG     Balance Interventions standing;sit to stand  -KG           User Key  (r) = Recorded By, (t) = Taken By, (c) = Cosigned By    Initials Name Provider Type    MIN Mala Gentile Physical Therapist               Goals/Plan    No documentation.                Clinical Impression     Row Name 03/09/23 1334          Pain    Pretreatment Pain Rating 0/10 - no pain  -KG     Posttreatment Pain Rating 0/10 - no pain  -KG     Row Name 03/09/23 1334          Plan of Care Review    Plan of Care Reviewed With patient  -KG     Progress improving  -KG     Outcome Evaluation Pt continuing to work towards improving stamina, strength.  Pt able to ambulate 60', seated rest break, then 70' with CGA, FWW on 2L O2 with cues for breathing, posture, limited by stamina  -KG     Row Name 03/09/23 1334          Vital Signs    Pre SpO2 (%) 97  -KG     O2 Delivery Pre Treatment nasal cannula  -KG     Intra SpO2 (%) 92  -KG     O2 Delivery Intra Treatment nasal cannula  -KG     Post SpO2 (%) 96  -KG     O2 Delivery Post Treatment nasal cannula  -KG     Row Name 03/09/23 1334          Positioning and Restraints    Pre-Treatment Position in bed  -KG     Post Treatment Position chair  -KG     In Chair notified nsg;call light within  reach;encouraged to call for assist;exit alarm on;legs elevated;waffle cushion  -KG           User Key  (r) = Recorded By, (t) = Taken By, (c) = Cosigned By    Initials Name Provider Type    Mala Coronado Physical Therapist               Outcome Measures     Row Name 03/09/23 1335          How much help from another person do you currently need...    Turning from your back to your side while in flat bed without using bedrails? 4  -KG     Moving from lying on back to sitting on the side of a flat bed without bedrails? 3  -KG     Moving to and from a bed to a chair (including a wheelchair)? 3  -KG     Standing up from a chair using your arms (e.g., wheelchair, bedside chair)? 3  -KG     Climbing 3-5 steps with a railing? 3  -KG     To walk in hospital room? 3  -KG     AM-PAC 6 Clicks Score (PT) 19  -KG     Highest level of mobility 6 --> Walked 10 steps or more  -KG     Row Name 03/09/23 7955          Functional Assessment    Outcome Measure Options AM-PAC 6 Clicks Basic Mobility (PT)  -KG           User Key  (r) = Recorded By, (t) = Taken By, (c) = Cosigned By    Initials Name Provider Type    Mala Coronado Physical Therapist                             Physical Therapy Education     Title: PT OT SLP Therapies (In Progress)     Topic: Physical Therapy (Done)     Point: Mobility training (Done)     Learning Progress Summary           Patient Acceptance, E, VU,NR by  at 3/8/2023 1410    Acceptance, E, VU,NR by NS at 3/7/2023 1605    Acceptance, E, VU,NR by NS at 3/6/2023 1011                   Point: Home exercise program (Done)     Learning Progress Summary           Patient Acceptance, E, VU,NR by  at 3/8/2023 1410    Acceptance, E, VU,NR by NS at 3/7/2023 1605                   Point: Body mechanics (Done)     Learning Progress Summary           Patient Acceptance, E, VU,NR by  at 3/8/2023 1410    Acceptance, E, VU,NR by NS at 3/7/2023 1605    Acceptance, E, VU,NR by NS at 3/6/2023 1011                    Point: Precautions (Done)     Learning Progress Summary           Patient Acceptance, E, VU,NR by  at 3/8/2023 1410    Acceptance, E, VU,NR by NS at 3/7/2023 1605    Acceptance, E, VU,NR by NS at 3/6/2023 1011                               User Key     Initials Effective Dates Name Provider Type Discipline    NS 06/16/21 -  Alisha Kennedy, PT Physical Therapist PT     09/21/21 -  Carlos Cote, PT Physical Therapist PT              PT Recommendation and Plan     Plan of Care Reviewed With: patient  Progress: improving  Outcome Evaluation: Pt continuing to work towards improving stamina, strength.  Pt able to ambulate 60', seated rest break, then 70' with CGA, FWW on 2L O2 with cues for breathing, posture, limited by stamina     Time Calculation:    PT Charges     Row Name 03/09/23 1335             Time Calculation    Start Time 1125  -KG      PT Received On 03/09/23  -KG      PT Goal Re-Cert Due Date 03/16/23  -KG         Time Calculation- PT    Total Timed Code Minutes- PT 38 minute(s)  -KG         Timed Charges    72283 - PT Therapeutic Exercise Minutes 13  -KG      64326 - Gait Training Minutes  15  -KG      82765 - PT Therapeutic Activity Minutes 10  -KG         Total Minutes    Timed Charges Total Minutes 38  -KG       Total Minutes 38  -KG            User Key  (r) = Recorded By, (t) = Taken By, (c) = Cosigned By    Initials Name Provider Type    KG Mala Gentile Physical Therapist              Therapy Charges for Today     Code Description Service Date Service Provider Modifiers Qty    15155995292 HC PT THER PROC EA 15 MIN 3/9/2023 Mala Gentile GP 1    55683215174 HC GAIT TRAINING EA 15 MIN 3/9/2023 Mala Gentile GP 1    89158770872 HC PT THERAPEUTIC ACT EA 15 MIN 3/9/2023 Mala Gentile GP 1          PT G-Codes  Outcome Measure Options: AM-PAC 6 Clicks Basic Mobility (PT)  AM-PAC 6 Clicks Score (PT): 19  AM-PAC 6 Clicks Score (OT): 18       Mala Gentile  3/9/2023

## 2023-03-09 NOTE — CASE MANAGEMENT/SOCIAL WORK
Case Management Discharge Note      Final Note: Spoke with patient today. Spoke with Lashaun Herndon today patient got approved for [facility] today. Transport with [employer] wheelchair at 2:00 pm. Nurse to call report to [phone]. CM will fax discharge summary [phone]. Patient is in agreement with plan.         Selected Continued Care - Admitted Since 3/2/2023     Destination Coordination complete.    Service Provider Selected Services Address Phone Fax Patient Preferred    [facility] [address] [place] [address] [phone] -- --          Durable Medical Equipment    No services have been selected for the patient.              Dialysis/Infusion    No services have been selected for the patient.              Home Medical Care    No services have been selected for the patient.              Therapy    No services have been selected for the patient.              Community Resources    No services have been selected for the patient.              Community & DME    No services have been selected for the patient.                       Final Discharge Disposition Code: 03 - skilled nursing facility (SNF)

## 2023-03-09 NOTE — PROGRESS NOTES
[facility] Medicine Services  PROGRESS NOTE    Patient Name: Marilyn Kunz  : 1945  MRN: [id number]    Date of Admission: 3/2/2023  Primary Care Physician: Peter Baca MD    Subjective   Subjective     CC:  F/u FTT, weakness    HPI:  Patient sitting up in bed. No issues overnight, feeling stronger.    ROS:  Gen- No fevers, chills  CV- No chest pain, palpitations  Resp- No cough, dyspnea  GI- No N/V/D, abd pain    Objective   Objective     Vital Signs:   Temp:  [97.7 °F (36.5 °C)-98.1 °F (36.7 °C)] 97.7 °F (36.5 °C)  Heart Rate:  [62-80] 80  Resp:  [14-18] 16  BP: (127-156)/(56-85) 156/61  Flow (L/min):  [1] 1     Physical Exam:  Constitutional: No acute distress, awake, alert  HENT: NCAT, mucous membranes moist  Respiratory: Clear to auscultation bilaterally, respiratory effort normal   Cardiovascular: RRR, no murmurs, rubs, or gallops  Gastrointestinal: soft, nontender, nondistended  Musculoskeletal: No bilateral ankle edema  Psychiatric: Appropriate affect, cooperative  Neurologic: Oriented x 3, speech clear, generalized weakness  Skin: No rashes    Exam unchanged from 3/8      Results Reviewed:  LAB RESULTS:      Lab 23  0800 23  0615 23  1539   WBC 9.12 8.43 9.27   HEMOGLOBIN 9.8* 9.4* 10.9*   HEMATOCRIT 29.7* 27.7* 32.6*   PLATELETS 277 267 301   NEUTROS ABS  --   --  7.86*   IMMATURE GRANS (ABS)  --   --  0.06*   LYMPHS ABS  --   --  0.78   MONOS ABS  --   --  0.56   EOS ABS  --   --  0.01   MCV 91.1 88.5 88.3   PROCALCITONIN  --   --  0.09   LACTATE  --   --  1.6         Lab 23  0805 23  2100 23  0856 23  0525 23  0055 23  1245 23  0800 23  0733 23  0020 23  1143 23  0616 23  0615   SODIUM 130* 129* 127* 127*  127* 128*   < > 125*   < > 128*   < > 122*  --  119*   POTASSIUM 4.7  --   --  4.8  --   --  5.0  --  4.2  --  4.4  --  3.9   CHLORIDE 95*  --    --  94*  --   --  92*  --  94*  --  87*  --  85*   CO2 26.0  --   --  21.0*  --   --  21.0*  --  23.0  --  23.0  --  23.0   ANION GAP 9.0  --   --  12.0  --   --  12.0  --  11.0  --  12.0  --  11.0   BUN 9  --   --  14  --   --  17  --  14  --  14  --  16   CREATININE 0.71  --   --  0.67  --   --  0.81  --  0.67  --  0.95  --  0.90   EGFR 87.7  --   --  90.2  --   --  74.9  --  90.2  --  61.8  --  66.0   GLUCOSE 328*  --   --  183*  --   --  363*  --  101*  --  239*  --  120*   CALCIUM 8.5*  --   --  8.3*  --   --  8.4*  --  8.3*  --  8.7  --  8.4*   IONIZED CALCIUM  --   --   --   --   --   --   --   --   --   --   --  1.20  --    MAGNESIUM  --   --   --   --   --   --   --   --  1.8  --   --   --  1.5*   PHOSPHORUS 2.9  --   --   --   --   --   --   --   --   --   --   --  3.2    < > = values in this interval not displayed.         Lab 03/08/23  0805 03/02/23  1539   TOTAL PROTEIN  --  7.6   ALBUMIN 3.5 4.5   GLOBULIN  --  3.1   ALT (SGPT)  --  16   AST (SGOT)  --  24   BILIRUBIN  --  1.5*   ALK PHOS  --  66         Lab 03/03/23  0615 03/02/23  1539   PROBNP 5,883.0* 6,798.0*   HSTROP T  --  19*         Lab 03/04/23  0548   CHOLESTEROL 158   LDL CHOL 82   HDL CHOL 64*   TRIGLYCERIDES 58             Brief Urine Lab Results  (Last result in the past 365 days)      Color   Clarity   Blood   Leuk Est   Nitrite   Protein   CREAT   Urine HCG        03/03/23 1457             56.4               Microbiology Results Abnormal     Procedure Component Value - Date/Time    Blood Culture - Blood, Arm, Left [[id number]]  (Normal) Collected: 03/02/23 2121    Lab Status: Final result Specimen: Blood from Arm, Left Updated: 03/07/23 2145     Blood Culture No growth at 5 days    Blood Culture - Blood, Wrist, Left [[id number]]  (Normal) Collected: 03/02/23 2121    Lab Status: Final result Specimen: Blood from Wrist, Left Updated: 03/07/23 2145     Blood Culture No growth at 5 days    COVID PRE-OP / PRE-PROCEDURE SCREENING ORDER (NO  ISOLATION) - Swab, Nasopharynx [[id number]]  (Normal) Collected: 03/02/23 1755    Lab Status: Final result Specimen: Swab from Nasopharynx Updated: 03/02/23 1829    Narrative:      The following orders were created for panel order COVID PRE-OP / PRE-PROCEDURE SCREENING ORDER (NO ISOLATION) - Swab, Nasopharynx.  Procedure                               Abnormality         Status                     ---------                               -----------         ------                     COVID-19 and FLU A/B PCR...[[id number]]  Normal              Final result                 Please view results for these tests on the individual orders.    COVID-19 and FLU A/B PCR - Swab, Nasopharynx [[id number]]  (Normal) Collected: 03/02/23 1755    Lab Status: Final result Specimen: Swab from Nasopharynx Updated: 03/02/23 1829     COVID19 Not Detected     Influenza A PCR Not Detected     Influenza B PCR Not Detected    Narrative:      Fact sheet for providers: https://www.fda.gov/media/302856/download    Fact sheet for patients: https://www.fda.gov/media/819157/download    Test performed by PCR.          No radiology results from the last 24 hrs    Results for orders placed during the hospital encounter of 02/24/23    Adult Transthoracic Echo Complete W/ Cont if Necessary Per Protocol    Interpretation Summary  •  Left ventricular ejection fraction appears to be 61 - 65%.  •  Estimated right ventricular systolic pressure from tricuspid regurgitation is mildly elevated (35-45 mmHg).      Current medications:  Scheduled Meds:amLODIPine, 10 mg, Oral, Daily  aspirin, 81 mg, Oral, Daily  azaTHIOprine, 100 mg, Oral, BID  budesonide-formoterol, 1 puff, Inhalation, BID - RT  cetirizine, 5 mg, Oral, Daily  enoxaparin, 40 mg, Subcutaneous, Q24H  fluticasone, 2 spray, Each Nare, Daily  insulin detemir, 10 Units, Subcutaneous, BID  insulin lispro, 0-7 Units, Subcutaneous, TID AC  ipratropium-albuterol, 3 mL, Nebulization, 4x Daily - RT  levothyroxine,  75 mcg, Oral, Q AM  lidocaine, 2 patch, Transdermal, Q24H  montelukast, 10 mg, Oral, Nightly  nebivolol, 20 mg, Oral, Daily  oxybutynin XL, 10 mg, Oral, Daily  polyethylene glycol, 17 g, Oral, Daily  pravastatin, 20 mg, Oral, Q PM  sodium chloride, 2 g, Oral, BID With Meals  terazosin, 2 mg, Oral, Nightly      Continuous Infusions:   PRN Meds:.•  acetaminophen  •  albuterol sulfate HFA  •  dextrose  •  dextrose  •  glucagon (human recombinant)  •  sodium chloride    Assessment & Plan   Assessment & Plan     Active Hospital Problems    Diagnosis  POA   • Hyponatremia [E87.1]  Yes   • Acute respiratory failure with hypoxia (HCC) [J96.01]  Yes   • History of neuromuscular disorder [Z86.69]  Not Applicable   • Type 1 diabetes (HCC) [E10.9]  Yes   • Dyspnea [R06.00]  Yes   • Hypothyroidism [E03.9]  Yes      Resolved Hospital Problems   No resolved problems to display.        Brief Hospital Course to date:  Marilyn Kunz is a 77 y.o. female  w heterozygous factor V leiden, type ONE diabetes, recent admission 2/24-2/26 w hyponatremia attributed to dehydration + dyspnea on exertion.      Severe hyponatremia 2/2 SIADH + hypervolemia - improved  -improved w fluid restriction, patient baseline ~130 (117 on admission)  -cymbalta d/scarlett by nephrology  -continue salt tablets  -continue to monitor periodically  -nephrology f/u outpatient     Possible CAP  -favor mild pulm edema in setting of elevated proBNP + hyponatremia   -completed 5 days ceftriaxone + doxy on 3/7     Type 1 DM  -diagnosed in 50's but confirmed by testing as DMI  -patient reports increased intake  (previously decreased due to pain from biting her tongue)  increased Levemir to 10 units BID + SSI w meals   -home regimen: 19 units basal + 5 units (plus/minus) w meals      HTN - hytrin, amlodipine, nebivolol. irbeartan and PRN clonidine on med list as well, hold for now. More lenient goals for this older patient who lives home alone, SBP <170     Chronic  dyspnea - saw Dr Haley, appears to be mostly driven by fatigue, albuterol PRN     Hypothyroidism, controlled - levothyroxine  Demyelinating disorder - follows rosita Hernandez. Recommend discontinuing cymbalta. Pharmacy consulted: azathioprine restarted    Expected Discharge Location and Transportation: Rehab Pending  Expected Discharge   Expected Discharge Date and Time     Expected Discharge Date Expected Discharge Time    Mar 8, 2023            DVT prophylaxis:  Medical DVT prophylaxis orders are present.     AM-PAC 6 Clicks Score (PT): 19 (03/08/23 1410)    CODE STATUS:   Code Status and Medical Interventions:   Ordered at: 03/02/23 1920     Level Of Support Discussed With:    Patient     Code Status (Patient has no pulse and is not breathing):    CPR (Attempt to Resuscitate)     Medical Interventions (Patient has pulse or is breathing):    Full Support     Release to patient:    Routine Release       Neha Almodovar,   03/09/23

## 2023-03-09 NOTE — DISCHARGE SUMMARY
[facility] Medicine Services  DISCHARGE SUMMARY    Patient Name: Marilyn Kunz  : 1945  MRN: [id number]    Date of Admission: 3/2/2023  3:36 PM  Date of Discharge:  3/9/2023  Primary Care Physician: Peter Baca MD    Consults     Date and Time Order Name Status Description    3/3/2023  8:40 AM Inpatient Nephrology Consult Completed           Hospital Course     Presenting Problem:   Acute respiratory failure with hypoxia (HCC) [J96.01]    Active Hospital Problems    Diagnosis  POA   • Hyponatremia [E87.1]  Yes   • Acute respiratory failure with hypoxia (HCC) [J96.01]  Yes   • History of neuromuscular disorder [Z86.69]  Not Applicable   • Type 1 diabetes (HCC) [E10.9]  Yes   • Dyspnea [R06.00]  Yes   • Hypothyroidism [E03.9]  Yes      Resolved Hospital Problems   No resolved problems to display.        Hospital Course:  Marilyn Kunz is a 77 y.o. female  with PMHX significant for heterozygous factor V leiden, type ONE diabetes, recent admission - w hyponatremia attributed to dehydration + dyspnea on exertion.      Severe hyponatremia 2/2 SIADH + hypervolemia - improved  -improved w fluid restriction, patient baseline ~130 (117 on admission)  -cymbalta d/scarltet by nephrology, needs to stay off this medication, if in the future this medication needs to be restarted will have to consider tolvaptan to keep sodium stable  -continue salt tablets 2g BID  -continue to monitor periodically  -nephrology f/u outpatient     Possible CAP  -favor mild pulm edema in setting of elevated proBNP + hyponatremia   -completed 5 days ceftriaxone + doxy on 3/7/23     Type 1 DM  -diagnosed in 50's but confirmed by testing as DMI  -continue home basal/bolus regimen at discharge    HTN - hytrin, amlodipine, nebivolol. irbesartan and PRN clonidine     Chronic dyspnea - saw Dr Haley, appears to be mostly driven by fatigue, albuterol PRN     Hypothyroidism, controlled -  levothyroxine  Demyelinating disorder - follows outpatient [facility]. Recommend discontinuing cymbalta. continue azathioprine    To Rehab today    Discharge Follow Up Recommendations for outpatient labs/diagnostics:  - f/u with [facility] in 1 month  - f/u with PCP in 1-2 weeks with BMP    Day of Discharge     HPI:  Patient sitting up in bed. No issues overnight, feeling stronger.     ROS:  Gen- No fevers, chills  CV- No chest pain, palpitations  Resp- No cough, dyspnea  GI- No N/V/D, abd pain    Vital Signs:   Temp:  [97.2 °F (36.2 °C)-98.1 °F (36.7 °C)] 97.2 °F (36.2 °C)  Heart Rate:  [62-80] 69  Resp:  [14-18] 16  BP: (127-156)/(56-85) 131/65  Flow (L/min):  [1] 1      Physical Exam:  Constitutional: No acute distress, awake, alert  HENT: NCAT, mucous membranes moist  Respiratory: Clear to auscultation bilaterally, respiratory effort normal   Cardiovascular: RRR, no murmurs, rubs, or gallops  Gastrointestinal: soft, nontender, nondistended  Musculoskeletal: No bilateral ankle edema  Psychiatric: Appropriate affect, cooperative  Neurologic: Oriented x 3, speech clear, generalized weakness  Skin: No rashes       Pertinent  and/or Most Recent Results     LAB RESULTS:      Lab 03/06/23  0800 03/03/23  0615 03/02/23  1539   WBC 9.12 8.43 9.27   HEMOGLOBIN 9.8* 9.4* 10.9*   HEMATOCRIT 29.7* 27.7* 32.6*   PLATELETS 277 267 301   NEUTROS ABS  --   --  7.86*   IMMATURE GRANS (ABS)  --   --  0.06*   LYMPHS ABS  --   --  0.78   MONOS ABS  --   --  0.56   EOS ABS  --   --  0.01   MCV 91.1 88.5 88.3   PROCALCITONIN  --   --  0.09   LACTATE  --   --  1.6         Lab 03/08/23  0805 03/07/23  2100 03/07/23  0856 03/07/23  0525 03/07/23  0055 03/06/23  1245 03/06/23  0800 03/05/23 2008 03/05/23  0733 03/04/23  0020 03/03/23  1143 03/03/23  0616 03/03/23  0615   SODIUM 130* 129* 127* 127*  127* 128*   < > 125*   < > 128*   < > 122*  --  119*   POTASSIUM 4.7  --   --  4.8  --   --  5.0  --  4.2  --  4.4  --  3.9   CHLORIDE 95*  --   --   94*  --   --  92*  --  94*  --  87*  --  85*   CO2 26.0  --   --  21.0*  --   --  21.0*  --  23.0  --  23.0  --  23.0   ANION GAP 9.0  --   --  12.0  --   --  12.0  --  11.0  --  12.0  --  11.0   BUN 9  --   --  14  --   --  17  --  14  --  14  --  16   CREATININE 0.71  --   --  0.67  --   --  0.81  --  0.67  --  0.95  --  0.90   EGFR 87.7  --   --  90.2  --   --  74.9  --  90.2  --  61.8  --  66.0   GLUCOSE 328*  --   --  183*  --   --  363*  --  101*  --  239*  --  120*   CALCIUM 8.5*  --   --  8.3*  --   --  8.4*  --  8.3*  --  8.7  --  8.4*   IONIZED CALCIUM  --   --   --   --   --   --   --   --   --   --   --  1.20  --    MAGNESIUM  --   --   --   --   --   --   --   --  1.8  --   --   --  1.5*   PHOSPHORUS 2.9  --   --   --   --   --   --   --   --   --   --   --  3.2    < > = values in this interval not displayed.         Lab 03/08/23  0805 03/02/23  1539   TOTAL PROTEIN  --  7.6   ALBUMIN 3.5 4.5   GLOBULIN  --  3.1   ALT (SGPT)  --  16   AST (SGOT)  --  24   BILIRUBIN  --  1.5*   ALK PHOS  --  66         Lab 03/03/23  0615 03/02/23  1539   PROBNP 5,883.0* 6,798.0*   HSTROP T  --  19*         Lab 03/04/23  0548   CHOLESTEROL 158   LDL CHOL 82   HDL CHOL 64*   TRIGLYCERIDES 58             Brief Urine Lab Results  (Last result in the past 365 days)      Color   Clarity   Blood   Leuk Est   Nitrite   Protein   CREAT   Urine HCG        03/03/23 1457             56.4             Microbiology Results (last 10 days)     Procedure Component Value - Date/Time    Blood Culture - Blood, Wrist, Left [[id number]]  (Normal) Collected: 03/02/23 2121    Lab Status: Final result Specimen: Blood from Wrist, Left Updated: 03/07/23 2145     Blood Culture No growth at 5 days    Blood Culture - Blood, Arm, Left [[id number]]  (Normal) Collected: 03/02/23 2121    Lab Status: Final result Specimen: Blood from Arm, Left Updated: 03/07/23 2145     Blood Culture No growth at 5 days    COVID PRE-OP / PRE-PROCEDURE SCREENING ORDER (NO  ISOLATION) - Swab, Nasopharynx [[id number]]  (Normal) Collected: 03/02/23 1755    Lab Status: Final result Specimen: Swab from Nasopharynx Updated: 03/02/23 1829    Narrative:      The following orders were created for panel order COVID PRE-OP / PRE-PROCEDURE SCREENING ORDER (NO ISOLATION) - Swab, Nasopharynx.  Procedure                               Abnormality         Status                     ---------                               -----------         ------                     COVID-19 and FLU A/B PCR...[[id number]]  Normal              Final result                 Please view results for these tests on the individual orders.    COVID-19 and FLU A/B PCR - Swab, Nasopharynx [[id number]]  (Normal) Collected: 03/02/23 1755    Lab Status: Final result Specimen: Swab from Nasopharynx Updated: 03/02/23 1829     COVID19 Not Detected     Influenza A PCR Not Detected     Influenza B PCR Not Detected    Narrative:      Fact sheet for providers: https://www.fda.gov/media/947424/download    Fact sheet for patients: https://www.fda.gov/media/929355/download    Test performed by PCR.          XR Chest 1 View    Result Date: 3/2/2023  XR CHEST 1 VW Date of Exam: 3/2/2023 3:24 PM EST Indication: SOA triage protocol. Comparison: 2/24/2023 Findings: Heart shadow is normal in size. There is increased perihilar interstitial disease, suggesting perihilar edema. In addition, there is also focal dense airspace opacity of the right lung base which may represent atelectasis or pneumonia. There appears to be a small new right pleural effusion. There is minimal if any right effusion. No pneumothorax is seen. Bony structures appear to be intact.     Impression: 1. Perihilar disease suggesting mild pulmonary vascular congestion. Small left pleural effusion. 2. New focal disease in the medial right lung base, potentially pneumonia. Electronically Signed: Angus Scott  3/2/2023 3:52 PM EST  Workstation ID: [id number]    CT Angiogram Chest    Result  Date: 3/2/2023  CT ANGIOGRAM CHEST Date of Exam: 3/2/2023 9:45 PM EST Indication: PE. pneumonia?. Comparison: 2/25/2023 Technique: CTA of the chest was performed after the uneventful intravenous administration of 79 mL Isovue-370. Reconstructed coronal and sagittal images were also obtained. In addition, a 3-D volume rendered image was created for interpretation. Automated exposure control and iterative reconstruction methods were used.  FINDINGS: [ ] Pulmonary Arteries: Motion Limited imaging demonstrates no evidence of pulmonary embolus through the proximal lobar level or suspected into the segmental level. The main pulmonary arteries are normal in caliber. Mediastinum: Heart size is stable. No suspicious pericardial effusion noted. Aorta and origin of great vessels grossly unremarkable. Mild hilar adenopathy and mediastinal adenopathy are likely reactive. The esophagus is stable Lungs/pleura: Moderate right-sided pleural effusion is slightly increased in comparison. Small left-sided pleural effusion is similar to slightly increased in comparison there is associated dependent opacity likely atelectasis. Pneumonia is not excluded. Bandlike opacity, groundglass opacity and patchy opacity within the lungs bilaterally with a right basilar and left-sided predominance is slightly increased in the comparison. Central airways appear grossly patent Upper Abdomen: Limited images of the upper abdomen are unremarkable. Soft tissues/Bones: No acute bone or soft tissue abnormality.     1. No evidence of a central or foraminal is a motion Limited imaging. More peripheral imaging is limited. 2. Interval increase in bilateral pleural effusions and dependent opacities 3. Increase in patchy groundglass opacities which could represent atelectasis, pulmonary edema or pneumonia in the correct clinical setting Electronically Signed: Fabian Fernandez  3/2/2023 10:24 PM EST  Workstation ID: [id number]      Results for orders placed during  the hospital encounter of 08/03/22    Duplex Renal Artery - Bilateral Complete CAR    Interpretation Summary  · No hemodynamically significant renal artery stenosis bilaterally.  · Resistive indices within normal limits.  · Renal veins patent      Results for orders placed during the hospital encounter of 08/03/22    Duplex Renal Artery - Bilateral Complete CAR    Interpretation Summary  · No hemodynamically significant renal artery stenosis bilaterally.  · Resistive indices within normal limits.  · Renal veins patent      Results for orders placed during the hospital encounter of 02/24/23    Adult Transthoracic Echo Complete W/ Cont if Necessary Per Protocol    Interpretation Summary  •  Left ventricular ejection fraction appears to be 61 - 65%.  •  Estimated right ventricular systolic pressure from tricuspid regurgitation is mildly elevated (35-45 mmHg).      Plan for Follow-up of Pending Labs/Results:     Discharge Details        Discharge Medications      New Medications      Instructions Start Date   sodium chloride 1 g tablet   2 g, Oral, 2 Times Daily With Meals         Changes to Medications      Instructions Start Date   Tresiba FlexTouch 100 UNIT/ML solution pen-injector injection  Generic drug: insulin degludec  What changed:   · how much to take  · how to take this  · when to take this  · additional instructions   Inject 19 Units under the skin into the appropriate area as directed Every Night.         Continue These Medications      Instructions Start Date   albuterol sulfate  (90 Base) MCG/ACT inhaler  Commonly known as: PROVENTIL HFA;VENTOLIN HFA;PROAIR HFA   2 puffs, Inhalation      amLODIPine 10 MG tablet  Commonly known as: NORVASC   10 mg, Oral, Daily      ammonium lactate 12 % cream  Commonly known as: AMLACTIN   (12 %)      aspirin 81 MG EC tablet   81 mg, Oral, Daily      azaTHIOprine 50 MG tablet  Commonly known as: IMURAN   TAKE 2 TABLETS TWICE DAILY      azelastine 0.1 % nasal  spray  Commonly known as: ASTELIN   1 spray, Nasal, Daily PRN      budesonide-formoterol 160-4.5 MCG/ACT inhaler  Commonly known as: SYMBICORT   1 puff, Inhalation, 2 Times Daily - RT      cloNIDine 0.1 MG tablet  Commonly known as: CATAPRES   0.1 mg, Oral, 3 Times Daily PRN      Cosentyx 150 MG/ML solution prefilled syringe  Generic drug: Secukinumab   150 mg, Subcutaneous, Every 28 Days      Droplet Pen Needles 32G X 4 MM misc  Generic drug: Insulin Pen Needle   No dose, route, or frequency recorded.      fluticasone 50 MCG/ACT nasal spray  Commonly known as: FLONASE   2 sprays, Each Nare, Daily, Shake well before using.       FreeStyle Marie 2 [place] device   FreeStyle Marie 2 [place]   change q 14 days      FreeStyle Precision Ant Test test strip  Generic drug: glucose blood   No dose, route, or frequency recorded.      insulin aspart 100 UNIT/ML solution pen-injector sc pen  Commonly known as: novoLOG FLEXPEN   Subcutaneous, 3 Times Daily With Meals, Sliding scale      irbesartan 300 MG tablet  Commonly known as: Avapro   300 mg, Oral, Nightly      levocetirizine 5 MG tablet  Commonly known as: XYZAL   5 mg, Oral, Every Evening      levothyroxine 75 MCG tablet  Commonly known as: SYNTHROID, LEVOTHROID   75 mcg, Oral, Daily      montelukast 10 MG tablet  Commonly known as: SINGULAIR   10 mg, Oral, Nightly      nebivolol 20 MG tablet  Commonly known as: BYSTOLIC   20 mg, Oral, Daily      OptiChamber Paula-Lg Mask device   USE AS DIRECTED WITH MULTI DOSE INHALER      oxybutynin XL 10 MG 24 hr tablet  Commonly known as: DITROPAN-XL   10 mg, Oral, Daily      pravastatin 20 MG tablet  Commonly known as: Pravachol   20 mg, Oral, Every Evening      tobramycin-dexamethasone 0.3-0.1 % ophthalmic suspension  Commonly known as: TOBRADEX   INSTILL 1 DROP IN LEFT EYE 4 TIMES DAILY      triamcinolone 0.1 % cream  Commonly known as: KENALOG   APPLY A THIN LAYER TO THE AFFECTED AREA TWICE DAILY FOR 1 WEEK THEN ONCE DAILY FOR 1  WEEK         Stop These Medications    DULoxetine 30 MG capsule  Commonly known as: Cymbalta     levoFLOXacin 500 MG tablet  Commonly known as: Levaquin     terazosin 2 MG capsule  Commonly known as: HYTRIN            Allergies   Allergen Reactions   • Atorvastatin Other (See Comments)   • Colesevelam Other (See Comments)   • Cephalexin Rash     Tolerated Ceftriaxone   • Hygroton [Chlorthalidone] Rash     Facial rash   • Penicillins Nausea And Vomiting and Rash     Has tolerated ceftriaxone         Discharge Disposition:  Home or Self Care    Diet:  Hospital:  Diet Order   Procedures   • Diet: Fluid Restriction (240 mL/tray) Diets, Diabetic Diets; Consistent Carbohydrate; Other (Specify mL/day) (1000 ml/day); Texture: Regular Texture (IDDSI 7); Fluid Consistency: Thin (IDDSI 0)       Activity:  - as tolerated    Restrictions or Other Recommendations:  - none       CODE STATUS:    Code Status and Medical Interventions:   Ordered at: 03/02/23 1920     Level Of Support Discussed With:    Patient     Code Status (Patient has no pulse and is not breathing):    CPR (Attempt to Resuscitate)     Medical Interventions (Patient has pulse or is breathing):    Full Support     Release to patient:    Routine Release       Future Appointments   Date Time Provider Department Center   5/9/2023 11:30 AM Bryson Murrell DNP, APRN [facility]   8/8/2023 11:15 AM Lucas Haley DO [facility]   11/7/2023 11:15 AM Yao Maya MD [facility]                 Neha Almodovar DO  03/09/23      Time Spent on Discharge:  I spent  40 minutes on this discharge activity which included: face-to-face encounter with the patient, reviewing the data in the system, coordination of the care with the nursing staff as well as consultants, documentation, and entering orders.

## 2023-03-09 NOTE — DISCHARGE INSTR - APPOINTMENTS
You have an appointment with Peter Baca MD on March 13, 2023  @ 3:50 PM.   Call them if you have any questions. Phone: [phone]  [address] [id number]     [facility] have an appointment with [facility] on April 4, 2023 @ 2:30 PM.   Call them if you have any questions. Phone: [phone]  [address] [id number]

## 2023-03-12 LAB
QT INTERVAL: 458 MS
QTC INTERVAL: 472 MS

## 2023-04-10 ENCOUNTER — TELEPHONE (OUTPATIENT)
Dept: CARDIOLOGY | Facility: CLINIC | Age: 78
End: 2023-04-10
Payer: MEDICARE

## 2023-04-10 NOTE — TELEPHONE ENCOUNTER
Patient called reporting side effects to two medications. Pt states chlorthalidone was thought to have caused a rash on her face and was discontinued during hospitalization 3/2-3/9. Rash is still present. Pt states that amlodipine in causing BLE edema, left leg more swollen than right. Pt denies redness, warmth, tenderness.  Pt states that amlodipine has caused swelling when she previously took this medication.    BP running 130s-140s/60s70s HR 60's-70s    Pt is seeing nephrologist 4/13/23. Pt is seeing dermatologist next month.     Pt states that she went to rehab after discharged from hospital 3/9 and has developed bronchitis, which is being treated now, on second round of antibiotics. Pt is home from rehab now.    Pt has appt with Dr. Maya 4/27/23, but would like issue with amlodipine addressed sooner.

## 2023-04-10 NOTE — TELEPHONE ENCOUNTER
Lets have the patient stop amlodipine today.  When she goes to see nephrology this week we her blood pressure will be reevaluated.  They may want to adjust her BP medications at that clinic visit so I will not add an additional medication today.  We will wait till she is seen in nephrology clinic.  We can check back in with her at the end of this week.

## 2023-04-18 RX ORDER — HYDRALAZINE HYDROCHLORIDE 25 MG/1
25 TABLET, FILM COATED ORAL 2 TIMES DAILY
Qty: 60 TABLET | Refills: 4 | Status: SHIPPED | OUTPATIENT
Start: 2023-04-18

## 2023-04-18 NOTE — TELEPHONE ENCOUNTER
Pt returned call. Nephrology started bumetanide 0.5 mg daily. Pt is going to start this today.     Chlorthalidone was discontinued due to low sodium. Nephrology states that she can never be on antidepressants due to issues with low sodium.       4/10 165/86  4/11 131/61   4/12 160/71  4/13 154/70   4/14 163/70 145/65  4/15 151/82  4/16 150/69   4/17 149/72  HR 60s    Pt has appt 4/27/23.    Advised pt to start Bumex and continue to monitor BP/HR and bring readings to appointment.    Please advise of any further recommendations at this time.

## 2023-04-18 NOTE — TELEPHONE ENCOUNTER
Would recommend we start hydralazine 25 mg twice daily for further blood pressure control.  I can place the order.

## 2023-04-26 NOTE — PROGRESS NOTES
Cardiology  Follow Up Visit  Marilyn Kunz  1945    VISIT DATE:  04/27/23    PCP:   Peter Baca MD  [address] [place], [place] [id number]          CC:  Follow-up (Essential hypertension)      Problem List:  1.  HTN  2.  Type I DM  3.  HLD  4.  Psoriatic arthritis  5.  Hypothyroidism  6.  DJD  7.  CKD 2  8.  Factor V Leiden  9.  Prior cerebellar infarct  10.  Surgery for patent ductus arteriosus age 11  11.  Demyelinating disease on azathioprine  12.  Severe hyponatremia     Cardiac testing:     Echo May 2022:  • Saline test results are negative.  • Estimated left ventricular EF was in agreement with the calculated left ventricular EF. Left ventricular ejection fraction appears to be greater than 70%. Left ventricular systolic function is hyperdynamic (EF > 70%).  • Left atrial volume is mildly increased.  • Lumason contrast shows normal LV systolic function and wall motion with an ejection fraction of 72%     Venous duplex May 2022: Normal     Stress test August 2022  • Patient denied chest pain with Lexiscan.  • Baseline EKG sinus with nonspecific ST changes. She did have ST depression in the inferior and lateral leads. Maximum of 1 mm.  • Myocardial perfusion imaging indicates a normal myocardial perfusion study with no evidence of ischemia. No TID  • Left ventricular ejection fraction is hyperdynamic (Calculated EF > 70%).  • There is mild coronary artery calcification  • Impressions are consistent with a low risk study.       MRI May 2022  1.  No acute intracranial abnormality. No acute infarct.  2.  Moderate chronic small vessel ischemic changes. Mild volume loss. 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        Renal artery duplex August 2022  • No hemodynamically significant renal artery stenosis bilaterally.  • Resistive indices within normal limits.  • Renal veins patent         History of Present Illness:  Marilyn Kunz  Is a 77 y.o.  female with pertinent cardiac history detailed above.  Patient was admitted in March with hyponatremia.  Cymbalta DC'd and discharged on salt tablets initially.  Hydralazine just recently added for hypertension.  Blood pressures at home still not optimally controlled.  She is taking all medications as prescribed.  She has not needed to take any as needed clonidine since she has not had systolics greater than 180.  Previous work-up for secondary causes has included a renal artery duplex that did not show any hemodynamically significant stenosis.  Has not had other secondary work-up previously.  She is following with nephrology for the hyponatremia.  She is now off the salt tablets.  She states her lower extremity edema is better on Bumex.      Patient Active Problem List    Diagnosis Date Noted   • Finger pain, left 04/27/2023   • Hyponatremia 03/04/2023   • Acute respiratory failure with hypoxia 03/02/2023   • Fatigue 02/24/2023   • History of neuromuscular disorder 02/24/2023     Note Last Updated: 2/24/2023     Data deficit.  Follows with Dr Hernandez. No official dx per pt.  ?? Poss demyelinating dz.       • Acute sinusitis 02/06/2023   • Allergies 02/06/2023   • Anterior ST segment depression 02/06/2023   • Constipation 02/06/2023   • Diverticulitis 02/06/2023   • Encounter for pre-operative examination 02/06/2023   • Hypertensive emergency 02/06/2023   • Need for immunization against influenza 02/06/2023   • Other problems related to lifestyle 02/06/2023   • Pain, joint, shoulder, left 02/06/2023   • Pill esophagitis 02/06/2023     Note Last Updated: 2/6/2023     Clindamycin     • Vitamin D deficiency 02/06/2023   • Body mass index (BMI) of 23.0-23.9 in adult 02/06/2023   • Abdominal pain in female 02/06/2023   • Atypical chest pain 02/06/2023   • Demyelinating disease 02/06/2023     Note Last Updated: 2/6/2023     Dr Hernandez     • Diabetic autonomic neuropathy associated with type 2 diabetes mellitus 02/06/2023    • Benign essential hypertension 02/06/2023     Note Last Updated: 2/6/2023     Increased lisinopril  and amlodipine 7/2021. Monitor at home.  D/C lisinopril, add irbestartan/hctz. 1/2022. Monitor at home.     • Long term current use of insulin 02/06/2023     Note Last Updated: 2/6/2023     Jeanette     • Long-term insulin use 02/06/2023   • Need for prophylactic vaccination against Streptococcus pneumoniae (pneumococcus) and influenza 02/06/2023   • Breast cancer screening 02/06/2023   • Encounter for general adult medical examination without abnormal findings 02/06/2023   • Spondylolisthesis, grade 2 02/06/2023     Note Last Updated: 2/6/2023     Followed by Specialist - stable - follow up as needed. Dr. Rojas, degenerative disc disease     • Type 2 diabetes mellitus with stage 3 chronic kidney disease, with long-term current use of insulin 02/06/2023     Note Last Updated: 2/6/2023     This Chronic Condition affected the medical decision making - stable - follow up as needed. Followed by specialist Dr. Reid [facility]     • Type 1 diabetes 02/06/2023     Note Last Updated: 2/6/2023     This Chronic Condition affected the medical decision making - stable - follow up as needed. Followed by specialist Dr. Reid  10/2018 A1C= 9.5     • History of stroke 02/06/2023     Note Last Updated: 2/6/2023     Cont Asa, statin and control bp     • Demyelinating changes in brain 10/28/2022   • Need for vaccination against Streptococcus pneumoniae 10/28/2022   • Dyspnea 10/28/2022   • Screening for depression 10/28/2022   • PDA (patent ductus arteriosus) 09/12/2022   • Body mass index (BMI) of 27.0-27.9 in adult 06/23/2022   • Cervical spondylitis with radiculitis 06/23/2022   • Degenerative arthritis of toe joint, right 06/23/2022   • Disorder of bone and cartilage 06/23/2022   • Hospital discharge follow-up 06/23/2022   • Impairment of balance 06/23/2022   • Muscle spasm 06/23/2022   • Overactive bladder 06/23/2022   • Pain in  right hand 06/23/2022   • Routine history and physical examination of adult 06/23/2022   • Seborrheic dermatitis 06/23/2022   • Spondylolisthesis, grade 2 06/23/2022   • Total knee replacement status, left 06/23/2022   • Vasculitis 06/23/2022   • Hypercholesterolemia 06/23/2022   • Psoriatic arthritis 05/05/2022   • Weakness of right upper extremity 05/04/2022   • At risk for venous thromboembolism (VTE) 09/10/2021     Note Last Updated: 6/23/2022     Problem added by Discern Expert Rule: [id number]_VTERISKPROB_3     • Factor V Leiden (HCC) 08/16/2021   • Yeast vaginitis 10/26/2020   • Chest pain 10/16/2020   • Essential hypertension 10/16/2020   • HLD (hyperlipidemia) 10/16/2020   • Odynophagia 10/16/2020     Note Last Updated: 10/20/2020     Added automatically from request for surgery [id number]     • Arthralgia of knee 05/31/2019   • Low back pain 10/23/2017   • Diabetic nephropathy associated with type 2 diabetes mellitus 06/16/2016   • CKD (chronic kidney disease) stage 3, GFR 30-59 ml/min 06/16/2016   • Hypothyroidism 09/16/2015   • Hyperlipidemia 09/16/2015   • Psoriasis with arthropathy 01/16/2013     Note Last Updated: 2/6/2023     This Chronic Condition affected the medical decision making - stable - follow up as needed. Followed by specialist [facility]- Dr Eagle         Allergies   Allergen Reactions   • Atorvastatin Other (See Comments)   • Colesevelam Other (See Comments)   • Cephalexin Rash     Tolerated Ceftriaxone   • Hygroton [Chlorthalidone] Rash     Facial rash   • Penicillins Nausea And Vomiting and Rash     Has tolerated ceftriaxone       Social History     Socioeconomic History   • Marital status: Single   Tobacco Use   • Smoking status: Never   • Smokeless tobacco: Never   Vaping Use   • Vaping Use: Never used   Substance and Sexual Activity   • Alcohol use: No   • Drug use: No   • Sexual activity: Not Currently     Partners: Male     Birth control/protection: Abstinence       Family History    Problem Relation Age of Onset   • Cancer Mother    • Pancreatic cancer Mother    • Stroke Mother         Mini strokes   • Cancer Father    • Lung cancer Father    • Cancer Sister    • Colon cancer Sister    • Diabetes Sister    • Breast cancer Neg Hx    • Ovarian cancer Neg Hx        Current Medications:    Current Outpatient Medications:   •  albuterol sulfate  (90 Base) MCG/ACT inhaler, Inhale 2 puffs As Needed., Disp: , Rfl:   •  ammonium lactate (AMLACTIN) 12 % cream, As Needed., Disp: , Rfl:   •  aspirin 81 MG EC tablet, Take 1 tablet by mouth Daily., Disp: 30 tablet, Rfl: 0  •  azaTHIOprine (IMURAN) 50 MG tablet, TAKE 2 TABLETS TWICE DAILY, Disp: 120 tablet, Rfl: 5  •  azelastine (ASTELIN) 0.1 % nasal spray, 1 spray into the nostril(s) as directed by provider Daily As Needed., Disp: , Rfl:   •  budesonide-formoterol (SYMBICORT) 160-4.5 MCG/ACT inhaler, Inhale 1 puff 2 (Two) Times a Day., Disp: , Rfl:   •  bumetanide (BUMEX) 0.5 MG tablet, 1 tablet 2 (Two) Times a Day., Disp: , Rfl:   •  cloNIDine (CATAPRES) 0.1 MG tablet, Take 1 tablet by mouth 3 (Three) Times a Day As Needed for High Blood Pressure (systolic BP greater than 180)., Disp: 90 tablet, Rfl: 3  •  Continuous Blood Gluc  (FreeStyle Marie 2 [place]) device, FreeStyle Marie 2 [place]  change q 14 days, Disp: , Rfl:   •  Droplet Pen Needles 32G X 4 MM misc, , Disp: , Rfl:   •  fluticasone (FLONASE) 50 MCG/ACT nasal spray, 2 sprays by Each Nare route Daily. Shake well before using., Disp: , Rfl:   •  FreeStyle Precision Ant Test test strip, , Disp: , Rfl:   •  gabapentin (NEURONTIN) 300 MG capsule, Take 1 capsule by mouth every night at bedtime., Disp: , Rfl:   •  hydrALAZINE (APRESOLINE) 50 MG tablet, Take 1 tablet by mouth 2 (Two) Times a Day., Disp: 60 tablet, Rfl: 6  •  insulin aspart (novoLOG FLEXPEN) 100 UNIT/ML solution pen-injector sc pen, Inject  under the skin into the appropriate area as directed 3 (Three) Times a Day With  "Meals. Sliding scale, Disp: , Rfl:   •  irbesartan (Avapro) 300 MG tablet, Take 1 tablet by mouth Every Night., Disp: 90 tablet, Rfl: 6  •  levocetirizine (XYZAL) 5 MG tablet, Take 1 tablet by mouth Every Evening., Disp: , Rfl:   •  levothyroxine (SYNTHROID, LEVOTHROID) 75 MCG tablet, Take 1 tablet by mouth Daily., Disp: , Rfl:   •  montelukast (SINGULAIR) 10 MG tablet, Take 1 tablet by mouth Every Night., Disp: , Rfl:   •  nebivolol (BYSTOLIC) 20 MG tablet, Take 1 tablet by mouth Daily., Disp: 90 tablet, Rfl: 6  •  oxybutynin XL (DITROPAN-XL) 10 MG 24 hr tablet, Take 1 tablet by mouth Daily., Disp: , Rfl:   •  pravastatin (Pravachol) 20 MG tablet, Take 1 tablet by mouth Every Evening., Disp: 90 tablet, Rfl: 3  •  Secukinumab (Cosentyx) 150 MG/ML solution prefilled syringe, Inject 1 mL under the skin into the appropriate area as directed Every 28 (Twenty-Eight) Days. 1/2 dose monthly, Disp: , Rfl:   •  Spacer/Aero-Holding Chambers (OptiChamber Paula-Lg Mask) device, USE AS DIRECTED WITH MULTI DOSE INHALER, Disp: , Rfl:   •  terazosin (HYTRIN) 2 MG capsule, Take 1 capsule by mouth Every Night., Disp: , Rfl:   •  tobramycin-dexamethasone (TOBRADEX) 0.3-0.1 % ophthalmic suspension, INSTILL 1 DROP IN LEFT EYE 4 TIMES DAILY, Disp: , Rfl:   •  Tresiba FlexTouch 100 UNIT/ML solution pen-injector injection, Inject 19 Units under the skin into the appropriate area as directed Every Night., Disp: , Rfl:   •  triamcinolone (KENALOG) 0.1 % cream, APPLY A THIN LAYER TO THE AFFECTED AREA TWICE DAILY FOR 1 WEEK THEN ONCE DAILY FOR 1 WEEK, Disp: , Rfl:      Review of Systems   Cardiovascular: Positive for leg swelling (Improving). Negative for chest pain and dyspnea on exertion.       Vitals:    04/27/23 1010   BP: 148/70   BP Location: Right arm   Patient Position: Sitting   Pulse: 70   SpO2: 96%   Weight: 58.5 kg (129 lb)   Height: 149.9 cm (59\")       Physical Exam  Constitutional:       Appearance: Normal appearance. "   Cardiovascular:      Rate and Rhythm: Normal rate and regular rhythm.      Pulses: Normal pulses.      Heart sounds: Normal heart sounds.   Pulmonary:      Effort: Pulmonary effort is normal.      Breath sounds: Normal breath sounds.   Musculoskeletal:      Right lower leg: No edema.      Left lower leg: No edema.      Comments: No significant   Neurological:      Mental Status: She is alert.         Diagnostic Data:  Procedures  Lab Results   Component Value Date    TRIG 58 03/04/2023    HDL 64 (H) 03/04/2023     Lab Results   Component Value Date    GLUCOSE 328 (H) 03/08/2023    BUN 9 03/08/2023    CREATININE 0.71 03/08/2023     (L) 03/08/2023    K 4.7 03/08/2023    CL 95 (L) 03/08/2023    CO2 26.0 03/08/2023     Lab Results   Component Value Date    HGBA1C 7.60 (H) 02/25/2023     Lab Results   Component Value Date    WBC 9.12 03/06/2023    HGB 9.8 (L) 03/06/2023    HCT 29.7 (L) 03/06/2023     03/06/2023       Assessment:  No diagnosis found.    Plan:    1.  HTN  -Creatinine 1.05  -Renal artery duplex completed without stenosis  -Avoid additional diuretics due to hyponatremia  -Current medications: Bystolic 20 mg, irbesartan 300 mg, hydralazine, terazosin 2mg, clonidine as needed  -Increase hydralazine to 50 mg twice daily     2.  HLD  -Total 158, HDL 64, LDL 82  -She is now taking 20 mg of pravastatin     3.  Type I DM  -A1c 7.6  -on insulin regimen follows with endocrine     4.  Prior cerebellar infarct  -negative CTA  -no cardiac source on echo  -negative venous duplex  -14-day Holter with atrial tachycardia but no A. fib     5.  Factor V leiden  -heterozygous  -No known history of DVT     6.  Intermittent chest pains with multiple cardiac risk factors   -Her stress test was negative for ischemia August 2022     7.  Demyelinating disease  -On azathioprine and following with neurology    8.  Hyponatremia  -bumex 0.5mg BID for hyponatremia and volume per nephrology         ACP discussion was  declined by the patient. Patient does not have an advance directive, declines further assistance.      Yao Maya MD [facility]

## 2023-04-27 ENCOUNTER — OFFICE VISIT (OUTPATIENT)
Dept: CARDIOLOGY | Facility: CLINIC | Age: 78
End: 2023-04-27
Payer: MEDICARE

## 2023-04-27 VITALS
WEIGHT: 129 LBS | SYSTOLIC BLOOD PRESSURE: 148 MMHG | BODY MASS INDEX: 26 KG/M2 | OXYGEN SATURATION: 96 % | HEIGHT: 59 IN | DIASTOLIC BLOOD PRESSURE: 70 MMHG | HEART RATE: 70 BPM

## 2023-04-27 DIAGNOSIS — I10 ESSENTIAL HYPERTENSION: Primary | ICD-10-CM

## 2023-04-27 PROBLEM — M79.645 FINGER PAIN, LEFT: Status: ACTIVE | Noted: 2023-04-27

## 2023-04-27 PROCEDURE — 99214 OFFICE O/P EST MOD 30 MIN: CPT | Performed by: INTERNAL MEDICINE

## 2023-04-27 PROCEDURE — 3078F DIAST BP <80 MM HG: CPT | Performed by: INTERNAL MEDICINE

## 2023-04-27 PROCEDURE — 3077F SYST BP >= 140 MM HG: CPT | Performed by: INTERNAL MEDICINE

## 2023-04-27 RX ORDER — DOXYCYCLINE 100 MG/1
1 CAPSULE ORAL EVERY 12 HOURS SCHEDULED
COMMUNITY
Start: 2023-04-03 | End: 2023-04-27

## 2023-04-27 RX ORDER — BUMETANIDE 0.5 MG/1
0.5 TABLET ORAL 2 TIMES DAILY
COMMUNITY
Start: 2023-04-13

## 2023-04-27 RX ORDER — TERAZOSIN 2 MG/1
2 CAPSULE ORAL NIGHTLY
COMMUNITY

## 2023-04-27 RX ORDER — HYDRALAZINE HYDROCHLORIDE 50 MG/1
50 TABLET, FILM COATED ORAL 2 TIMES DAILY
Qty: 60 TABLET | Refills: 6 | Status: SHIPPED | OUTPATIENT
Start: 2023-04-27

## 2023-04-27 RX ORDER — GABAPENTIN 300 MG/1
300 CAPSULE ORAL
COMMUNITY
Start: 2023-04-20

## 2023-05-09 ENCOUNTER — OFFICE VISIT (OUTPATIENT)
Dept: NEUROLOGY | Facility: CLINIC | Age: 78
End: 2023-05-09
Payer: MEDICARE

## 2023-05-09 VITALS
BODY MASS INDEX: 25.8 KG/M2 | WEIGHT: 128 LBS | HEIGHT: 59 IN | DIASTOLIC BLOOD PRESSURE: 62 MMHG | SYSTOLIC BLOOD PRESSURE: 138 MMHG | OXYGEN SATURATION: 97 % | HEART RATE: 71 BPM

## 2023-05-09 DIAGNOSIS — G37.9 DEMYELINATING DISEASE: Primary | ICD-10-CM

## 2023-05-09 DIAGNOSIS — R41.89 EPISODE OF ALTERED COGNITION: ICD-10-CM

## 2023-05-09 DIAGNOSIS — R20.2 PARESTHESIA: ICD-10-CM

## 2023-05-09 DIAGNOSIS — E10.69 TYPE 1 DIABETES MELLITUS WITH OTHER SPECIFIED COMPLICATION: ICD-10-CM

## 2023-05-09 DIAGNOSIS — N88.9 CERVICAL LESION: ICD-10-CM

## 2023-05-09 DIAGNOSIS — Z86.73 HISTORY OF STROKE: ICD-10-CM

## 2023-05-09 PROBLEM — E03.9 HYPOTHYROIDISM, UNSPECIFIED: Status: ACTIVE | Noted: 2023-03-09

## 2023-05-09 PROBLEM — Z86.69 PERSONAL HISTORY OF OTHER DISEASES OF THE NERVOUS SYSTEM AND SENSE ORGANS: Status: ACTIVE | Noted: 2023-03-09

## 2023-05-09 PROBLEM — E87.1 HYPO-OSMOLALITY AND HYPONATREMIA: Status: ACTIVE | Noted: 2023-03-09

## 2023-05-09 PROBLEM — D68.51 ACTIVATED PROTEIN C RESISTANCE: Status: ACTIVE | Noted: 2023-03-09

## 2023-05-09 PROBLEM — E22.2 SYNDROME OF INAPPROPRIATE SECRETION OF ANTIDIURETIC HORMONE: Status: ACTIVE | Noted: 2023-03-09

## 2023-05-09 PROBLEM — E10.9 TYPE 1 DIABETES MELLITUS WITHOUT COMPLICATIONS: Status: ACTIVE | Noted: 2023-03-09

## 2023-05-09 PROBLEM — R26.2 DIFFICULTY IN WALKING, NOT ELSEWHERE CLASSIFIED: Status: ACTIVE | Noted: 2023-03-09

## 2023-05-09 PROBLEM — R13.10 DYSPHAGIA, UNSPECIFIED: Status: ACTIVE | Noted: 2023-03-09

## 2023-05-09 PROBLEM — J40 BRONCHITIS: Status: ACTIVE | Noted: 2023-05-09

## 2023-05-09 PROBLEM — N28.9 ABNORMAL KIDNEY FUNCTION: Status: ACTIVE | Noted: 2023-05-09

## 2023-05-09 PROBLEM — J96.01 ACUTE RESPIRATORY FAILURE WITH HYPOXIA: Status: ACTIVE | Noted: 2023-03-09

## 2023-05-09 PROBLEM — M62.81 GENERALIZED MUSCLE WEAKNESS: Status: ACTIVE | Noted: 2023-03-09

## 2023-05-09 PROBLEM — J20.9 ACUTE BRONCHITIS: Status: ACTIVE | Noted: 2023-05-09

## 2023-05-09 PROBLEM — R06.00 DYSPNEA, UNSPECIFIED: Status: ACTIVE | Noted: 2023-03-09

## 2023-05-09 PROBLEM — I10 ESSENTIAL (PRIMARY) HYPERTENSION: Status: ACTIVE | Noted: 2023-03-09

## 2023-05-09 PROCEDURE — 3078F DIAST BP <80 MM HG: CPT | Performed by: NURSE PRACTITIONER

## 2023-05-09 PROCEDURE — 1160F RVW MEDS BY RX/DR IN RCRD: CPT | Performed by: NURSE PRACTITIONER

## 2023-05-09 PROCEDURE — 99214 OFFICE O/P EST MOD 30 MIN: CPT | Performed by: NURSE PRACTITIONER

## 2023-05-09 PROCEDURE — 1159F MED LIST DOCD IN RCRD: CPT | Performed by: NURSE PRACTITIONER

## 2023-05-09 PROCEDURE — 3075F SYST BP GE 130 - 139MM HG: CPT | Performed by: NURSE PRACTITIONER

## 2023-05-09 RX ORDER — AMLODIPINE BESYLATE 10 MG/1
TABLET ORAL
COMMUNITY
Start: 2023-03-07

## 2023-05-09 NOTE — LETTER
May 9, 2023     Peter Baca MD  [address]  [address]  [facility] [address]    Patient: Marilyn Kunz   YOB: 1945   Date of Visit: 2023       Dear Dr. Keven MD:    Thank you for referring Marilyn Kunz to me for evaluation. Below are the relevant portions of my assessment and plan of care.    If you have questions, please do not hesitate to call me. I look forward to following Marilyn along with you.         Sincerely,        Bryson Murrell DNP, APRSARAVANAN        CC: No Recipients    Bryson Murrell DNP, APRN  [id number] [phone]  Signed     Neuro Office Visit      Encounter Date: 2023   Patient Name: Marilyn Kunz  : 1945   MRN: [id number]   PCP: Dr Eder Baca  Chief Complaint:    Chief Complaint   Patient presents with   • Numbness       History of Present Illness: Marilyn Kunz is a 77 y.o. female who is here today in Neurology for  paresthesia, history of stroke, headaches,      Last visit 2023 w me-resume cymbalta, eeg, cont [id number] bid and cosentyx, statin, control bp    EEG  EEG (Hospital Performed) (02/10/2023 10:47)-neg    Interval history  Admitted for hyponatremia and resp failure felt hyponatremia was due to cymbalta. Taking  1 q hs. Dr Eagle is writing this for her. She   Had rehab for one week. It was a bad experience. Had home health OT.    Demyelinating Disease  Cymbalta 30mg stopped due to hyponatremia.  Taking  bid w slight improvement. Itching intensity decreased. Pain in arms and back is intermittent and improved. Fatigue is improved.  Started Imuran 22, cont cosentyx  Denies slurred speech. Has some pharyngitis. Had EGD while hospitalized and has vocal cord abnormality. Has follow up scheduled. Denies vision changes. No falls. Gait is steady. Not using assistive devices.     PH  3 years of hands burning and itching, numbness, tremor is int. Weakness in hands is stable, but not worse. Still  "taking Cosyntex once a month.     Admitted at [facility]/4-5/6/22 for right hand pain and weakness.  Acute worsening of right hand and arm pain w worsening rash and swelling of BLE. Rash present since knee replacement Oct 2021.  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 No new lesions.    Alteration of awareness  No further episodes of alteration of awareness.    EEG (Hospital Performed) (02/10/2023 10:47)-neg  PH  Jan 16, 2023. Was in bed at 5:30 am had a strange sensation and then called out \"Oh God help me\". Denies LOC but lost time and became aware that she knocked over a lamp and injured her right arm and left finger. Has not had this sensation in the past. Did not go to ER. She doesn't remember falling but believes she did.    Headaches  Headaches are resolved.  Right occipital and temporal area with int throbbing. Symptoms are daily for 2 weeks. Duration is 30 minutes. Intermittent. Treated with tylenol at night.   No nausea, vomiting, photophobia, phonophobia, dizziness. Severity is 6/10    History of stroke  Taking Asa 81 and pravastatin 20mg.  Neg CTA, echo nml, neg Venous doppler. Holter w no a-fib    Paresthesia  Has psorriatic Arthritis. Taking Azathioprine. 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 Sister with multiple autoimmune diseases.  SH: -etoh/tob " use. Lives alone.  Subjective      Past Medical History:   Past Medical History:   Diagnosis Date   • Abnormal ECG Check A Date or [facility]    Brought to  from [facility]   • Acute sinusitis 02/06/2023   • Anemia    • Asthma    • CAP (community acquired pneumonia) 02/06/2023   • Chronic kidney disease     pt told it was dx from Dr Baca and to not eat much protein   • Congenital heart disease 1950's Patent 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    Surgery [facility] 1950s close   • CTS (carpal tunnel syndrome)     Psoriatic arthritis hands could be   • Deep vein thrombosis No on blood clots but have a blood clotting disorder called Leiden Factor 5   • Disease of thyroid gland    • Factor 5 Leiden mutation, heterozygous 2012   • Head injury    • Hyperlipidemia    • Hypertension    • Movement disorder 10/2021    knee replacement left [facility]   • Murmur, cardiac    • Neuropathy in diabetes     redness swelling legs   • Peripheral neuropathy 05/03/2022    hands, arms. shoulders, back   • Pill esophagitis 02/06/2023   • Psoriatic arthritis     hands   • Sleep apnea Always    diabetic do not sleep well up and down   • Stroke had one don't know when    showed on MRI Brain   • Type 1 diabetes        Past Surgical History:   Past Surgical History:   Procedure Laterality Date   • CARDIAC CATHETERIZATION  1952 2 of them    Patent Dictus operation   • CATARACT EXTRACTION Bilateral    • ENDOSCOPY N/A 10/20/2020    Procedure: ESOPHAGOGASTRODUODENOSCOPY;  Surgeon: Brunner, Mark I, MD;  Location: [facility] ENDOSCOPY;  Service: Gastroenterology;  Laterality: N/A;   • HAND SURGERY Left 1987    no hardware   • PATENT DUCTUS ARTERIOUS LIGATION         Family History:   Family History   Problem Relation Age of Onset   • Cancer Mother    • Pancreatic cancer Mother    • Stroke Mother         Mini strokes   • Cancer Father    • Lung cancer Father    • Cancer Sister    • Colon cancer Sister    • Diabetes Sister    • Breast cancer  Neg Hx    • Ovarian cancer Neg Hx        Social History:   Social History     Socioeconomic History   • Marital status: Single   Tobacco Use   • Smoking status: Never   • Smokeless tobacco: Never   Vaping Use   • Vaping Use: Never used   Substance and Sexual Activity   • Alcohol use: No   • Drug use: No   • Sexual activity: Not Currently     Partners: Male     Birth control/protection: Abstinence       Medications:     Current Outpatient Medications:   •  albuterol sulfate  (90 Base) MCG/ACT inhaler, Inhale 2 puffs As Needed., Disp: , Rfl:   •  amLODIPine (NORVASC) 10 MG tablet, Take  by mouth., Disp: , Rfl:   •  aspirin 81 MG EC tablet, Take 1 tablet by mouth Daily., Disp: 30 tablet, Rfl: 0  •  azaTHIOprine (IMURAN) 50 MG tablet, TAKE 2 TABLETS TWICE DAILY, Disp: 120 tablet, Rfl: 5  •  azelastine (ASTELIN) 0.1 % nasal spray, 1 spray into the nostril(s) as directed by provider Daily As Needed., Disp: , Rfl:   •  budesonide-formoterol (SYMBICORT) 160-4.5 MCG/ACT inhaler, Inhale 1 puff 2 (Two) Times a Day., Disp: , Rfl:   •  bumetanide (BUMEX) 0.5 MG tablet, 1 tablet 2 (Two) Times a Day., Disp: , Rfl:   •  cloNIDine (CATAPRES) 0.1 MG tablet, Take 1 tablet by mouth 3 (Three) Times a Day As Needed for High Blood Pressure (systolic BP greater than 180)., Disp: 90 tablet, Rfl: 3  •  Continuous Blood Gluc  (FreeStyle Marie 2 Greenleaf) device, FreeStyle Marie 2 Greenleaf  change q 14 days, Disp: , Rfl:   •  Droplet Pen Needles 32G X 4 MM misc, , Disp: , Rfl:   •  fluticasone (FLONASE) 50 MCG/ACT nasal spray, 2 sprays by Each Nare route Daily. Shake well before using., Disp: , Rfl:   •  FreeStyle Precision Ant Test test strip, , Disp: , Rfl:   •  gabapentin (NEURONTIN) 300 MG capsule, Take 1 capsule by mouth every night at bedtime., Disp: , Rfl:   •  hydrALAZINE (APRESOLINE) 50 MG tablet, Take 1 tablet by mouth 2 (Two) Times a Day., Disp: 60 tablet, Rfl: 6  •  insulin aspart (novoLOG FLEXPEN) 100 UNIT/ML solution  pen-injector sc pen, Inject  under the skin into the appropriate area as directed 3 (Three) Times a Day With Meals. Sliding scale, Disp: , Rfl:   •  irbesartan (Avapro) 300 MG tablet, Take 1 tablet by mouth Every Night., Disp: 90 tablet, Rfl: 6  •  levocetirizine (XYZAL) 5 MG tablet, Take 1 tablet by mouth Every Evening., Disp: , Rfl:   •  levothyroxine (SYNTHROID, LEVOTHROID) 75 MCG tablet, Take 1 tablet by mouth Daily., Disp: , Rfl:   •  montelukast (SINGULAIR) 10 MG tablet, Take 1 tablet by mouth Every Night., Disp: , Rfl:   •  nebivolol (BYSTOLIC) 20 MG tablet, Take 1 tablet by mouth Daily., Disp: 90 tablet, Rfl: 6  •  oxybutynin XL (DITROPAN-XL) 10 MG 24 hr tablet, Take 1 tablet by mouth Daily., Disp: , Rfl:   •  pravastatin (Pravachol) 20 MG tablet, Take 1 tablet by mouth Every Evening., Disp: 90 tablet, Rfl: 3  •  Secukinumab (Cosentyx) 150 MG/ML solution prefilled syringe, Inject 1 mL under the skin into the appropriate area as directed Every 28 (Twenty-Eight) Days. 1/2 dose monthly, Disp: , Rfl:   •  Spacer/Aero-Holding Chambers (OptiChamber Paula-Lg Mask) device, USE AS DIRECTED WITH MULTI DOSE INHALER, Disp: , Rfl:   •  terazosin (HYTRIN) 2 MG capsule, Take 1 capsule by mouth Every Night., Disp: , Rfl:   •  tobramycin-dexamethasone (TOBRADEX) 0.3-0.1 % ophthalmic suspension, INSTILL 1 DROP IN LEFT EYE 4 TIMES DAILY, Disp: , Rfl:   •  Tresiba FlexTouch 100 UNIT/ML solution pen-injector injection, Inject 19 Units under the skin into the appropriate area as directed Every Night., Disp: , Rfl:   •  triamcinolone (KENALOG) 0.1 % cream, APPLY A THIN LAYER TO THE AFFECTED AREA TWICE DAILY FOR 1 WEEK THEN ONCE DAILY FOR 1 WEEK, Disp: , Rfl:     Allergies:   Allergies   Allergen Reactions   • Atorvastatin Other (See Comments)   • Colesevelam Other (See Comments)   • Cymbalta [Duloxetine Hcl] Other (See Comments)     Hyponatremia   • Cephalexin Rash     Tolerated Ceftriaxone   • Hygroton [Chlorthalidone] Rash      Facial rash   • Penicillins Nausea And Vomiting and Rash     Has tolerated ceftriaxone       PHQ-9 Total Score:     SHAWN Fall Risk Assessment was completed, and patient is at HIGH risk for falls. Assessment completed on:2/6/2023    Objective     Physical Exam:   Physical Exam  Neurological:      Mental Status: She is oriented to person, place, and time.      Gait: Gait is intact.      Deep Tendon Reflexes:      Reflex Scores:       Tricep reflexes are 1+ on the right side and 1+ on the left side.       Bicep reflexes are 1+ on the right side and 1+ on the left side.       Brachioradialis reflexes are 1+ on the right side and 1+ on the left side.       Patellar reflexes are 1+ on the right side and 1+ on the left side.       Achilles reflexes are 1+ on the right side and 1+ on the left side.  Psychiatric:         Speech: Speech normal.         Neurologic Exam     Mental Status   Oriented to person, place, and time.   Follows 3 step commands.   Attention: normal. Concentration: normal.   Speech: speech is normal   Level of consciousness: alert  Knowledge: consistent with education.   Normal comprehension.     Cranial Nerves     CN III, IV, VI   Right pupil: Accommodation: intact.   Left pupil: Accommodation: intact.   CN III: no CN III palsy  CN VI: no CN VI palsy  Nystagmus: none   Diplopia: none  Upgaze: normal  Downgaze: normal  Conjugate gaze: present    CN VII   Facial expression full, symmetric.     CN VIII   Hearing: intact    CN XII   CN XII normal.     Motor Exam   Muscle bulk: decreased  Overall muscle tone: normal    Strength   Right biceps: 3/5  Left biceps: 3/5  Right triceps: 3/5  Left triceps: 3/5  Right interossei: 2/5  Left interossei: 2/5  Right quadriceps: 3/5  Left quadriceps: 3/5  Right anterior tibial: 3/5  Left anterior tibial: 3/5  Right posterior tibial: 3/5  Left posterior tibial: 3/5    Sensory Exam   Right arm light touch: decreased from fingers  Left arm light touch: decreased from  "fingers  Right leg light touch: normal  Left leg light touch: normal    Gait, Coordination, and Reflexes     Gait  Gait: normal    Tremor   Resting tremor: absent  Action tremor: absent    Reflexes   Right brachioradialis: 1+  Left brachioradialis: 1+  Right biceps: 1+  Left biceps: 1+  Right triceps: 1+  Left triceps: 1+  Right patellar: 1+  Left patellar: 1+  Right achilles: 1+  Left achilles: 1+  Right : 1+  Left : 1+       Vital Signs:   Vitals:    05/09/23 1114   BP: 138/62   Pulse: 71   SpO2: 97%   Weight: 58.1 kg (128 lb)   Height: 149.9 cm (59.02\")     Body mass index is 25.84 kg/m².     Results:   Imaging:   EEG (Hospital Performed)    Result Date: 2/10/2023  Normal study This report is transcribed using the Dragon dictation system.      CT Angiogram Chest    Result Date: 2/25/2023  Impression: Early/interstitial pulmonary edema. No evidence of pulmonary embolism. Electronically Signed: Sachin Silva  2/25/2023 3:17 PM EST  Workstation ID: [id number]    MRI Brain With & Without Contrast    Result Date: 1/19/2023  Impression: 1.There are white matter changes involving the cerebral hemispheres similar in appearance to prior study. There also is signal within the left cerebellar hemisphere. This has been noted as well. These findings could be more reflective of small vessel ischemic change as opposed to demyelinating process. Correlation with patient's clinical history would be recommended. Please see icometrix data sheet for white matter abnormality volume assessment. Electronically Signed: Jesse Durham  1/19/2023 10:30 PM EST  Workstation ID: [id number]    MRI Cervical Spine With & Without Contrast    Result Date: 1/21/2023  Impression: 1. Stable abnormal increased T2 signal within the ventral cord at the C6/7 level. No associated contrast enhancement. No change from multiple prior exams. 2. Multilevel degenerative disc disease and degenerative facet change resulting in multilevel mild neural foraminal " narrowing as detailed above. No significant canal stenosis. 3. No new area of abnormal T2 signal within the cord. 4. No pathologic contrast enhancement. Electronically Signed: Robert Rodrick  1/21/2023 7:14 AM EST  Workstation ID: [id number]    CT Angiogram Chest    Result Date: 3/2/2023  1. No evidence of a central or foraminal is a motion Limited imaging. More peripheral imaging is limited. 2. Interval increase in bilateral pleural effusions and dependent opacities 3. Increase in patchy groundglass opacities which could represent atelectasis, pulmonary edema or pneumonia in the correct clinical setting Electronically Signed: Fabian Fernandez  3/2/2023 10:24 PM EST  Workstation ID: [id number]       Assessment / Plan      Assessment/Plan:   Diagnoses and all orders for this visit:    1. Demyelinating disease (Primary)  Comments:  Cont Imuran    2. Paresthesia  Comments:  Cont GBP    3. Episode of altered cognition  Comments:  Resolved    4. History of stroke  Comments:  Cont Asa and statin    5. Cervical lesion  Comments:  Repeat MRI brain and c-spine in Jan 2024    6. Type 1 diabetes mellitus with other specified complication  Comments:  Per Endo           Patient Education:       Reviewed medications, potential side effects and signs and symptoms to report. Discussed risk versus benefits of treatment plan with patient and/or family-including medications, labs and radiology that may be ordered. Addressed questions and concerns during visit. Patient and/or family verbalized understanding and agree with plan. Instructed to call the office with any questions and report to ER with any life-threatening symptoms.     Follow Up:   Return in about 6 months (around 11/9/2023) for Recheck.    During this visit the following were done:  Labs Reviewed [x]    Labs Ordered []    Radiology Reports Reviewed []    Radiology Ordered []    PCP Records Reviewed []    Referring Provider Records Reviewed []    ER Records Reviewed []     Hospital Records Reviewed [x]    History Obtained From Family []    Radiology Images Reviewed []    Other Reviewed []    Records Requested []      Bryson Murrell, HIREN, APRN

## 2023-05-09 NOTE — PROGRESS NOTES
Neuro Office Visit      Encounter Date: 2023   Patient Name: Marilyn Kunz  : 1945   MRN: [id number]   PCP: Dr Eder Baca  Chief Complaint:    Chief Complaint   Patient presents with   • Numbness       History of Present Illness: Marilyn Kunz is a 77 y.o. female who is here today in Neurology for  paresthesia, history of stroke, headaches,      Last visit 2023 w me-resume cymbalta, eeg, cont [id number] bid and cosentyx, statin, control bp    EEG  EEG (Hospital Performed) (02/10/2023 10:47)-neg    Interval history  Admitted for hyponatremia and resp failure felt hyponatremia was due to cymbalta. Taking  1 q hs. Dr Eagle is writing this for her. She   Had rehab for one week. It was a bad experience. Had home health OT.    Demyelinating Disease  Cymbalta 30mg stopped due to hyponatremia.  Taking  bid w slight improvement. Itching intensity decreased. Pain in arms and back is intermittent and improved. Fatigue is improved.  Started Imuran 22, cont cosentyx  Denies slurred speech. Has some pharyngitis. Had EGD while hospitalized and has vocal cord abnormality. Has follow up scheduled. Denies vision changes. No falls. Gait is steady. Not using assistive devices.     PH  3 years of hands burning and itching, numbness, tremor is int. Weakness in hands is stable, but not worse. Still taking Cosyntex once a month.     Admitted at [facility]5/4-22 for right hand pain and weakness.  Acute worsening of right hand and arm pain w worsening rash and swelling of BLE. Rash present since knee replacement Oct 2021.  CTH-neg  CTA H/N-neg  CT perfuison-neg  MRI B/C 22 extensive T2 PVWM, C5/6 cord lesion     CSF-0OCB, 2 paired bands in csf prot 42.1     Labs cmp, TSH, T4, crp, mag, vit b12, JESSICA, cryoglobulin, CBC, IgM, IgA, IgG, NMO, MOG, ACE-neg  A1c 6.9, Factor V Leiden-heterozygous, sed rate 37    MRI Cervical Spine With & Without Contrast (2023 14:50)-stable C6-7 cord  "lesion  MRI Brain With & Without Contrast (01/19/2023 14:48)-Heavy WM disease. No new lesions.    Alteration of awareness  No further episodes of alteration of awareness.    EEG (Hospital Performed) (02/10/2023 10:47)-neg  PH  Jan 16, 2023. Was in bed at 5:30 am had a strange sensation and then called out \"Oh God help me\". Denies LOC but lost time and became aware that she knocked over a lamp and injured her right arm and left finger. Has not had this sensation in the past. Did not go to ER. She doesn't remember falling but believes she did.    Headaches  Headaches are resolved.  Right occipital and temporal area with int throbbing. Symptoms are daily for 2 weeks. Duration is 30 minutes. Intermittent. Treated with tylenol at night.   No nausea, vomiting, photophobia, phonophobia, dizziness. Severity is 6/10    History of stroke  Taking Asa 81 and pravastatin 20mg.  Neg CTA, echo nml, neg Venous doppler. Holter w no a-fib    Paresthesia  Has psorriatic Arthritis. Taking Azathioprine. 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 Sister with multiple autoimmune diseases.  SH: -etoh/tob use. Lives alone.  Subjective      Past Medical History:   Past Medical History:   Diagnosis Date   • Abnormal ECG Check hospitals Date or [facility]    Brought to  from hospitals Ambulance   • Acute sinusitis 02/06/2023   • Anemia    • Asthma    • CAP (community acquired pneumonia) 02/06/2023   • Chronic kidney disease     pt told it was dx from Dr Baca and to not eat much protein   • Congenital heart disease 1950's Patent 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    Surgery [facility] 1950s close   • CTS (carpal tunnel syndrome)     Psoriatic arthritis hands could be   • Deep vein " thrombosis No on blood clots but have a blood clotting disorder called Leiden Factor 5   • Disease of thyroid gland    • Factor 5 Leiden mutation, heterozygous 2012   • Head injury    • Hyperlipidemia    • Hypertension    • Movement disorder 10/2021    knee replacement left knww   • Murmur, cardiac    • Neuropathy in diabetes     redness swelling legs   • Peripheral neuropathy 05/03/2022    hands, arms. shoulders, back   • Pill esophagitis 02/06/2023   • Psoriatic arthritis     hands   • Sleep apnea Always    diabetic do not sleep well up and down   • Stroke had one don't know when    showed on MRI Brain   • Type 1 diabetes        Past Surgical History:   Past Surgical History:   Procedure Laterality Date   • CARDIAC CATHETERIZATION  1952 2 of them    Patent Dictus operation   • CATARACT EXTRACTION Bilateral    • ENDOSCOPY N/A 10/20/2020    Procedure: ESOPHAGOGASTRODUODENOSCOPY;  Surgeon: Brunner, Mark I, MD;  Location[facility] ENDOSCOPY;  Service: Gastroenterology;  Laterality: N/A;   • HAND SURGERY Left 1987    no hardware   • PATENT DUCTUS ARTERIOUS LIGATION         Family History:   Family History   Problem Relation Age of Onset   • Cancer Mother    • Pancreatic cancer Mother    • Stroke Mother         Mini strokes   • Cancer Father    • Lung cancer Father    • Cancer Sister    • Colon cancer Sister    • Diabetes Sister    • Breast cancer Neg Hx    • Ovarian cancer Neg Hx        Social History:   Social History     Socioeconomic History   • Marital status: Single   Tobacco Use   • Smoking status: Never   • Smokeless tobacco: Never   Vaping Use   • Vaping Use: Never used   Substance and Sexual Activity   • Alcohol use: No   • Drug use: No   • Sexual activity: Not Currently     Partners: Male     Birth control/protection: Abstinence       Medications:     Current Outpatient Medications:   •  albuterol sulfate  (90 Base) MCG/ACT inhaler, Inhale 2 puffs As Needed., Disp: , Rfl:   •  amLODIPine (NORVASC) 10 MG  tablet, Take  by mouth., Disp: , Rfl:   •  aspirin 81 MG EC tablet, Take 1 tablet by mouth Daily., Disp: 30 tablet, Rfl: 0  •  azaTHIOprine (IMURAN) 50 MG tablet, TAKE 2 TABLETS TWICE DAILY, Disp: 120 tablet, Rfl: 5  •  azelastine (ASTELIN) 0.1 % nasal spray, 1 spray into the nostril(s) as directed by provider Daily As Needed., Disp: , Rfl:   •  budesonide-formoterol (SYMBICORT) 160-4.5 MCG/ACT inhaler, Inhale 1 puff 2 (Two) Times a Day., Disp: , Rfl:   •  bumetanide (BUMEX) 0.5 MG tablet, 1 tablet 2 (Two) Times a Day., Disp: , Rfl:   •  cloNIDine (CATAPRES) 0.1 MG tablet, Take 1 tablet by mouth 3 (Three) Times a Day As Needed for High Blood Pressure (systolic BP greater than 180)., Disp: 90 tablet, Rfl: 3  •  Continuous Blood Gluc  (FreeStyle Marie 2 Naoma) device, FreeStyle Marie 2 Naoma  change q 14 days, Disp: , Rfl:   •  Droplet Pen Needles 32G X 4 MM misc, , Disp: , Rfl:   •  fluticasone (FLONASE) 50 MCG/ACT nasal spray, 2 sprays by Each Nare route Daily. Shake well before using., Disp: , Rfl:   •  FreeStyle Precision Ant Test test strip, , Disp: , Rfl:   •  gabapentin (NEURONTIN) 300 MG capsule, Take 1 capsule by mouth every night at bedtime., Disp: , Rfl:   •  hydrALAZINE (APRESOLINE) 50 MG tablet, Take 1 tablet by mouth 2 (Two) Times a Day., Disp: 60 tablet, Rfl: 6  •  insulin aspart (novoLOG FLEXPEN) 100 UNIT/ML solution pen-injector sc pen, Inject  under the skin into the appropriate area as directed 3 (Three) Times a Day With Meals. Sliding scale, Disp: , Rfl:   •  irbesartan (Avapro) 300 MG tablet, Take 1 tablet by mouth Every Night., Disp: 90 tablet, Rfl: 6  •  levocetirizine (XYZAL) 5 MG tablet, Take 1 tablet by mouth Every Evening., Disp: , Rfl:   •  levothyroxine (SYNTHROID, LEVOTHROID) 75 MCG tablet, Take 1 tablet by mouth Daily., Disp: , Rfl:   •  montelukast (SINGULAIR) 10 MG tablet, Take 1 tablet by mouth Every Night., Disp: , Rfl:   •  nebivolol (BYSTOLIC) 20 MG tablet, Take 1 tablet  by mouth Daily., Disp: 90 tablet, Rfl: 6  •  oxybutynin XL (DITROPAN-XL) 10 MG 24 hr tablet, Take 1 tablet by mouth Daily., Disp: , Rfl:   •  pravastatin (Pravachol) 20 MG tablet, Take 1 tablet by mouth Every Evening., Disp: 90 tablet, Rfl: 3  •  Secukinumab (Cosentyx) 150 MG/ML solution prefilled syringe, Inject 1 mL under the skin into the appropriate area as directed Every 28 (Twenty-Eight) Days. 1/2 dose monthly, Disp: , Rfl:   •  Spacer/Aero-Holding Chambers (OptiChamber Paula-Lg Mask) device, USE AS DIRECTED WITH MULTI DOSE INHALER, Disp: , Rfl:   •  terazosin (HYTRIN) 2 MG capsule, Take 1 capsule by mouth Every Night., Disp: , Rfl:   •  tobramycin-dexamethasone (TOBRADEX) 0.3-0.1 % ophthalmic suspension, INSTILL 1 DROP IN LEFT EYE 4 TIMES DAILY, Disp: , Rfl:   •  Tresiba FlexTouch 100 UNIT/ML solution pen-injector injection, Inject 19 Units under the skin into the appropriate area as directed Every Night., Disp: , Rfl:   •  triamcinolone (KENALOG) 0.1 % cream, APPLY A THIN LAYER TO THE AFFECTED AREA TWICE DAILY FOR 1 WEEK THEN ONCE DAILY FOR 1 WEEK, Disp: , Rfl:     Allergies:   Allergies   Allergen Reactions   • Atorvastatin Other (See Comments)   • Colesevelam Other (See Comments)   • Cymbalta [Duloxetine Hcl] Other (See Comments)     Hyponatremia   • Cephalexin Rash     Tolerated Ceftriaxone   • Hygroton [Chlorthalidone] Rash     Facial rash   • Penicillins Nausea And Vomiting and Rash     Has tolerated ceftriaxone       PHQ-9 Total Score:     STEADI Fall Risk Assessment was completed, and patient is at HIGH risk for falls. Assessment completed on:2/6/2023    Objective     Physical Exam:   Physical Exam  Neurological:      Mental Status: She is oriented to person, place, and time.      Gait: Gait is intact.      Deep Tendon Reflexes:      Reflex Scores:       Tricep reflexes are 1+ on the right side and 1+ on the left side.       Bicep reflexes are 1+ on the right side and 1+ on the left side.        "Brachioradialis reflexes are 1+ on the right side and 1+ on the left side.       Patellar reflexes are 1+ on the right side and 1+ on the left side.       Achilles reflexes are 1+ on the right side and 1+ on the left side.  Psychiatric:         Speech: Speech normal.         Neurologic Exam     Mental Status   Oriented to person, place, and time.   Follows 3 step commands.   Attention: normal. Concentration: normal.   Speech: speech is normal   Level of consciousness: alert  Knowledge: consistent with education.   Normal comprehension.     Cranial Nerves     CN III, IV, VI   Right pupil: Accommodation: intact.   Left pupil: Accommodation: intact.   CN III: no CN III palsy  CN VI: no CN VI palsy  Nystagmus: none   Diplopia: none  Upgaze: normal  Downgaze: normal  Conjugate gaze: present    CN VII   Facial expression full, symmetric.     CN VIII   Hearing: intact    CN XII   CN XII normal.     Motor Exam   Muscle bulk: decreased  Overall muscle tone: normal    Strength   Right biceps: 3/5  Left biceps: 3/5  Right triceps: 3/5  Left triceps: 3/5  Right interossei: 2/5  Left interossei: 2/5  Right quadriceps: 3/5  Left quadriceps: 3/5  Right anterior tibial: 3/5  Left anterior tibial: 3/5  Right posterior tibial: 3/5  Left posterior tibial: 3/5    Sensory Exam   Right arm light touch: decreased from fingers  Left arm light touch: decreased from fingers  Right leg light touch: normal  Left leg light touch: normal    Gait, Coordination, and Reflexes     Gait  Gait: normal    Tremor   Resting tremor: absent  Action tremor: absent    Reflexes   Right brachioradialis: 1+  Left brachioradialis: 1+  Right biceps: 1+  Left biceps: 1+  Right triceps: 1+  Left triceps: 1+  Right patellar: 1+  Left patellar: 1+  Right achilles: 1+  Left achilles: 1+  Right : 1+  Left : 1+       Vital Signs:   Vitals:    05/09/23 1114   BP: 138/62   Pulse: 71   SpO2: 97%   Weight: 58.1 kg (128 lb)   Height: 149.9 cm (59.02\")     Body mass " index is 25.84 kg/m².     Results:   Imaging:   EEG (Hospital Performed)    Result Date: 2/10/2023  Normal study This report is transcribed using the Dragon dictation system.      CT Angiogram Chest    Result Date: 2/25/2023  Impression: Early/interstitial pulmonary edema. No evidence of pulmonary embolism. Electronically Signed: Sachin Silva  2/25/2023 3:17 PM EST  Workstation ID: [id number]    MRI Brain With & Without Contrast    Result Date: 1/19/2023  Impression: 1.There are white matter changes involving the cerebral hemispheres similar in appearance to prior study. There also is signal within the left cerebellar hemisphere. This has been noted as well. These findings could be more reflective of small vessel ischemic change as opposed to demyelinating process. Correlation with patient's clinical history would be recommended. Please see icometrix data sheet for white matter abnormality volume assessment. Electronically Signed: Jesse Durham  1/19/2023 10:30 PM EST  Workstation ID: [id number]    MRI Cervical Spine With & Without Contrast    Result Date: 1/21/2023  Impression: 1. Stable abnormal increased T2 signal within the ventral cord at the C6/7 level. No associated contrast enhancement. No change from multiple prior exams. 2. Multilevel degenerative disc disease and degenerative facet change resulting in multilevel mild neural foraminal narrowing as detailed above. No significant canal stenosis. 3. No new area of abnormal T2 signal within the cord. 4. No pathologic contrast enhancement. Electronically Signed: Robert Mensah  1/21/2023 7:14 AM EST  Workstation ID: [id number]    CT Angiogram Chest    Result Date: 3/2/2023  1. No evidence of a central or foraminal is a motion Limited imaging. More peripheral imaging is limited. 2. Interval increase in bilateral pleural effusions and dependent opacities 3. Increase in patchy groundglass opacities which could represent atelectasis, pulmonary edema or pneumonia in the  correct clinical setting Electronically Signed: Fabian Fernandez  3/2/2023 10:24 PM EST  Workstation ID: [id number]       Assessment / Plan      Assessment/Plan:   Diagnoses and all orders for this visit:    1. Demyelinating disease (Primary)  Comments:  Cont Imuran    2. Paresthesia  Comments:  Cont GBP    3. Episode of altered cognition  Comments:  Resolved    4. History of stroke  Comments:  Cont Asa and statin    5. Cervical lesion  Comments:  Repeat MRI brain and c-spine in Jan 2024    6. Type 1 diabetes mellitus with other specified complication  Comments:  Per Endo           Patient Education:       Reviewed medications, potential side effects and signs and symptoms to report. Discussed risk versus benefits of treatment plan with patient and/or family-including medications, labs and radiology that may be ordered. Addressed questions and concerns during visit. Patient and/or family verbalized understanding and agree with plan. Instructed to call the office with any questions and report to ER with any life-threatening symptoms.     Follow Up:   Return in about 6 months (around 11/9/2023) for Recheck.    During this visit the following were done:  Labs Reviewed [x]    Labs Ordered []    Radiology Reports Reviewed []    Radiology Ordered []    PCP Records Reviewed []    Referring Provider Records Reviewed []    ER Records Reviewed []    Hospital Records Reviewed [x]    History Obtained From Family []    Radiology Images Reviewed []    Other Reviewed []    Records Requested []      Bryson Murrell, HIREN, APRN

## 2023-06-05 ENCOUNTER — TELEPHONE (OUTPATIENT)
Dept: CARDIOLOGY | Facility: CLINIC | Age: 78
End: 2023-06-05
Payer: MEDICARE

## 2023-06-05 RX ORDER — HYDRALAZINE HYDROCHLORIDE 100 MG/1
100 TABLET, FILM COATED ORAL 2 TIMES DAILY
Qty: 180 TABLET | Refills: 3 | Status: SHIPPED | OUTPATIENT
Start: 2023-06-05

## 2023-06-05 NOTE — TELEPHONE ENCOUNTER
Patient called and states that BP has been running high. Pt states that on 6/3 she felt a funny feeling in her chest, when BP was 197 systolic. Patient took clonidine and went to bed.    5/23 187/83  5/24 182/79  5/25 150/67  5/26 175/85  5/27 172/84  5/28 137/61  5/29 132/57  5/31 166/67  6/2 142/65   6/3 197/94  191/83 pt took 2 clonidine 0.1 mg tablets through out day  6/4 155/68  6/5 155/72    Patient has taken clonidine 0.1 mg for systolic >180.    Pt denies chest pain, SOB, dizziness, palpitations, swelling.     Please advise.

## 2023-06-05 NOTE — TELEPHONE ENCOUNTER
Lets have her increase hydralazine to 100 mg twice daily.  Continue to take clonidine as needed for systolics greater than 180.

## 2023-07-20 ENCOUNTER — APPOINTMENT (OUTPATIENT)
Dept: MRI IMAGING | Facility: HOSPITAL | Age: 78
DRG: 291 | End: 2023-07-20
Payer: MEDICARE

## 2023-07-20 ENCOUNTER — APPOINTMENT (OUTPATIENT)
Dept: GENERAL RADIOLOGY | Facility: HOSPITAL | Age: 78
DRG: 291 | End: 2023-07-20
Payer: MEDICARE

## 2023-07-20 ENCOUNTER — HOSPITAL ENCOUNTER (INPATIENT)
Facility: HOSPITAL | Age: 78
LOS: 3 days | Discharge: HOME | DRG: 291 | End: 2023-07-26
Attending: EMERGENCY MEDICINE | Admitting: INTERNAL MEDICINE
Payer: MEDICARE

## 2023-07-20 ENCOUNTER — APPOINTMENT (OUTPATIENT)
Dept: CT IMAGING | Facility: HOSPITAL | Age: 78
DRG: 291 | End: 2023-07-20
Payer: MEDICARE

## 2023-07-20 DIAGNOSIS — Z87.09 HISTORY OF ASTHMA: ICD-10-CM

## 2023-07-20 DIAGNOSIS — G37.9 DEMYELINATING DISEASE OF CENTRAL NERVOUS SYSTEM, UNSPECIFIED: ICD-10-CM

## 2023-07-20 DIAGNOSIS — E10.9 TYPE 1 DIABETES MELLITUS WITHOUT COMPLICATIONS: ICD-10-CM

## 2023-07-20 DIAGNOSIS — E03.9 HYPOTHYROIDISM, UNSPECIFIED TYPE: ICD-10-CM

## 2023-07-20 DIAGNOSIS — Z91.89 AT RISK FOR VENOUS THROMBOEMBOLISM (VTE): ICD-10-CM

## 2023-07-20 DIAGNOSIS — I50.9 ACUTE CONGESTIVE HEART FAILURE, UNSPECIFIED HEART FAILURE TYPE: Primary | ICD-10-CM

## 2023-07-20 DIAGNOSIS — I50.33 ACUTE ON CHRONIC DIASTOLIC CONGESTIVE HEART FAILURE: ICD-10-CM

## 2023-07-20 DIAGNOSIS — L40.50 PSORIATIC ARTHRITIS: ICD-10-CM

## 2023-07-20 DIAGNOSIS — J96.01 ACUTE RESPIRATORY FAILURE WITH HYPOXIA: ICD-10-CM

## 2023-07-20 DIAGNOSIS — R53.83 FATIGUE, UNSPECIFIED TYPE: ICD-10-CM

## 2023-07-20 DIAGNOSIS — D68.51 FACTOR V LEIDEN: ICD-10-CM

## 2023-07-20 DIAGNOSIS — I50.9 ACUTE ON CHRONIC CONGESTIVE HEART FAILURE, UNSPECIFIED HEART FAILURE TYPE: ICD-10-CM

## 2023-07-20 DIAGNOSIS — I10 UNCONTROLLED HYPERTENSION: ICD-10-CM

## 2023-07-20 DIAGNOSIS — R06.02 SHORTNESS OF BREATH: ICD-10-CM

## 2023-07-20 DIAGNOSIS — G37.9 DEMYELINATING DISEASE: ICD-10-CM

## 2023-07-20 DIAGNOSIS — I50.31 ACUTE HEART FAILURE WITH PRESERVED EJECTION FRACTION (HFPEF): ICD-10-CM

## 2023-07-20 DIAGNOSIS — E78.5 HYPERLIPIDEMIA LDL GOAL <70: ICD-10-CM

## 2023-07-20 DIAGNOSIS — I10 ESSENTIAL HYPERTENSION: ICD-10-CM

## 2023-07-20 DIAGNOSIS — M43.10 SPONDYLOLISTHESIS, GRADE 2: ICD-10-CM

## 2023-07-20 DIAGNOSIS — Q25.0 PDA (PATENT DUCTUS ARTERIOSUS): ICD-10-CM

## 2023-07-20 DIAGNOSIS — N18.31 STAGE 3A CHRONIC KIDNEY DISEASE: ICD-10-CM

## 2023-07-20 DIAGNOSIS — Z86.73 HISTORY OF STROKE: ICD-10-CM

## 2023-07-20 DIAGNOSIS — E10.9 TYPE 1 DIABETES MELLITUS WITHOUT COMPLICATION: ICD-10-CM

## 2023-07-20 LAB
ALBUMIN SERPL-MCNC: 3.7 G/DL (ref 3.5–5.2)
ALBUMIN/GLOB SERPL: 1.5 G/DL
ALP SERPL-CCNC: 65 U/L (ref 39–117)
ALT SERPL W P-5'-P-CCNC: 16 U/L (ref 1–33)
ANION GAP SERPL CALCULATED.3IONS-SCNC: 13 MMOL/L (ref 5–15)
ARTERIAL PATENCY WRIST A: ABNORMAL
AST SERPL-CCNC: 21 U/L (ref 1–32)
ATMOSPHERIC PRESS: ABNORMAL MM[HG]
BASE EXCESS BLDA CALC-SCNC: -0.5 MMOL/L (ref 0–2)
BASOPHILS # BLD AUTO: 0.04 10*3/MM3 (ref 0–0.2)
BASOPHILS NFR BLD AUTO: 0.7 % (ref 0–1.5)
BDY SITE: ABNORMAL
BILIRUB SERPL-MCNC: 1.3 MG/DL (ref 0–1.2)
BILIRUB UR QL STRIP: NEGATIVE
BODY TEMPERATURE: 37 C
BUN SERPL-MCNC: 26 MG/DL (ref 8–23)
BUN/CREAT SERPL: 23.9 (ref 7–25)
CALCIUM SPEC-SCNC: 8.8 MG/DL (ref 8.6–10.5)
CHLORIDE SERPL-SCNC: 104 MMOL/L (ref 98–107)
CLARITY UR: CLEAR
CO2 BLDA-SCNC: 26.4 MMOL/L (ref 22–33)
CO2 SERPL-SCNC: 22 MMOL/L (ref 22–29)
COHGB MFR BLD: 1.2 % (ref 0–2)
COLOR UR: YELLOW
CREAT SERPL-MCNC: 1.09 MG/DL (ref 0.57–1)
D-LACTATE SERPL-SCNC: 1.1 MMOL/L (ref 0.5–2)
DEPRECATED RDW RBC AUTO: 53.8 FL (ref 37–54)
EGFRCR SERPLBLD CKD-EPI 2021: 52.1 ML/MIN/1.73
EOSINOPHIL # BLD AUTO: 0.07 10*3/MM3 (ref 0–0.4)
EOSINOPHIL NFR BLD AUTO: 1.3 % (ref 0.3–6.2)
EPAP: 0
ERYTHROCYTE [DISTWIDTH] IN BLOOD BY AUTOMATED COUNT: 15 % (ref 12.3–15.4)
FLUAV SUBTYP SPEC NAA+PROBE: NOT DETECTED
FLUBV RNA ISLT QL NAA+PROBE: NOT DETECTED
GEN 5 2HR TROPONIN T REFLEX: 14 NG/L
GLOBULIN UR ELPH-MCNC: 2.5 GM/DL
GLUCOSE BLDC GLUCOMTR-MCNC: 231 MG/DL (ref 70–130)
GLUCOSE SERPL-MCNC: 245 MG/DL (ref 65–99)
GLUCOSE UR STRIP-MCNC: NEGATIVE MG/DL
HBA1C MFR BLD: 8 % (ref 4.8–5.6)
HCO3 BLDA-SCNC: 25 MMOL/L (ref 20–26)
HCT VFR BLD AUTO: 31.3 % (ref 34–46.6)
HCT VFR BLD CALC: 32.2 % (ref 38–51)
HGB BLD-MCNC: 9.8 G/DL (ref 12–15.9)
HGB BLDA-MCNC: 10.5 G/DL (ref 14–18)
HGB UR QL STRIP.AUTO: NEGATIVE
IMM GRANULOCYTES # BLD AUTO: 0.05 10*3/MM3 (ref 0–0.05)
IMM GRANULOCYTES NFR BLD AUTO: 0.9 % (ref 0–0.5)
INHALED O2 CONCENTRATION: 32 %
IPAP: 0
KETONES UR QL STRIP: NEGATIVE
LEUKOCYTE ESTERASE UR QL STRIP.AUTO: NEGATIVE
LIPASE SERPL-CCNC: 9 U/L (ref 13–60)
LYMPHOCYTES # BLD AUTO: 0.59 10*3/MM3 (ref 0.7–3.1)
LYMPHOCYTES NFR BLD AUTO: 10.5 % (ref 19.6–45.3)
MAGNESIUM SERPL-MCNC: 2.1 MG/DL (ref 1.6–2.4)
MCH RBC QN AUTO: 30.9 PG (ref 26.6–33)
MCHC RBC AUTO-ENTMCNC: 31.3 G/DL (ref 31.5–35.7)
MCV RBC AUTO: 98.7 FL (ref 79–97)
METHGB BLD QL: 0.2 % (ref 0–1.5)
MODALITY: ABNORMAL
MONOCYTES # BLD AUTO: 0.39 10*3/MM3 (ref 0.1–0.9)
MONOCYTES NFR BLD AUTO: 7 % (ref 5–12)
NEUTROPHILS NFR BLD AUTO: 4.46 10*3/MM3 (ref 1.7–7)
NEUTROPHILS NFR BLD AUTO: 79.6 % (ref 42.7–76)
NITRITE UR QL STRIP: NEGATIVE
NOTE: ABNORMAL
NRBC BLD AUTO-RTO: 0 /100 WBC (ref 0–0.2)
NT-PROBNP SERPL-MCNC: 3386 PG/ML (ref 0–1800)
OXYHGB MFR BLDV: 82.4 % (ref 94–99)
PAW @ PEAK INSP FLOW SETTING VENT: 0 CMH2O
PCO2 BLDA: 44 MM HG (ref 35–45)
PCO2 TEMP ADJ BLD: 44 MM HG (ref 35–45)
PH BLDA: 7.36 PH UNITS (ref 7.35–7.45)
PH UR STRIP.AUTO: 5.5 [PH] (ref 5–8)
PH, TEMP CORRECTED: 7.36 PH UNITS
PLATELET # BLD AUTO: 192 10*3/MM3 (ref 140–450)
PMV BLD AUTO: 11.9 FL (ref 6–12)
PO2 BLDA: 51.1 MM HG (ref 83–108)
PO2 TEMP ADJ BLD: 51.1 MM HG (ref 83–108)
POTASSIUM SERPL-SCNC: 4.5 MMOL/L (ref 3.5–5.2)
PROCALCITONIN SERPL-MCNC: 0.04 NG/ML (ref 0–0.25)
PROT SERPL-MCNC: 6.2 G/DL (ref 6–8.5)
PROT UR QL STRIP: NEGATIVE
QT INTERVAL: 456 MS
QTC INTERVAL: 478 MS
RBC # BLD AUTO: 3.17 10*6/MM3 (ref 3.77–5.28)
SARS-COV-2 RNA RESP QL NAA+PROBE: NOT DETECTED
SODIUM SERPL-SCNC: 139 MMOL/L (ref 136–145)
SP GR UR STRIP: 1.01 (ref 1–1.03)
TOTAL RATE: 0 BREATHS/MINUTE
TROPONIN T DELTA: 1 NG/L
TROPONIN T SERPL HS-MCNC: 13 NG/L
TSH SERPL DL<=0.05 MIU/L-ACNC: 3.59 UIU/ML (ref 0.27–4.2)
UROBILINOGEN UR QL STRIP: NORMAL
WBC NRBC COR # BLD: 5.6 10*3/MM3 (ref 3.4–10.8)

## 2023-07-20 PROCEDURE — 63710000001 INSULIN LISPRO (HUMAN) PER 5 UNITS: Performed by: INTERNAL MEDICINE

## 2023-07-20 PROCEDURE — 63710000001 INSULIN DETEMIR PER 5 UNITS: Performed by: INTERNAL MEDICINE

## 2023-07-20 PROCEDURE — 83690 ASSAY OF LIPASE: CPT | Performed by: EMERGENCY MEDICINE

## 2023-07-20 PROCEDURE — 94799 UNLISTED PULMONARY SVC/PX: CPT

## 2023-07-20 PROCEDURE — 99285 EMERGENCY DEPT VISIT HI MDM: CPT

## 2023-07-20 PROCEDURE — 83050 HGB METHEMOGLOBIN QUAN: CPT

## 2023-07-20 PROCEDURE — 85025 COMPLETE CBC W/AUTO DIFF WBC: CPT | Performed by: EMERGENCY MEDICINE

## 2023-07-20 PROCEDURE — 94664 DEMO&/EVAL PT USE INHALER: CPT

## 2023-07-20 PROCEDURE — 82948 REAGENT STRIP/BLOOD GLUCOSE: CPT

## 2023-07-20 PROCEDURE — 94640 AIRWAY INHALATION TREATMENT: CPT

## 2023-07-20 PROCEDURE — 82805 BLOOD GASES W/O2 SATURATION: CPT

## 2023-07-20 PROCEDURE — 83036 HEMOGLOBIN GLYCOSYLATED A1C: CPT | Performed by: INTERNAL MEDICINE

## 2023-07-20 PROCEDURE — 84443 ASSAY THYROID STIM HORMONE: CPT | Performed by: INTERNAL MEDICINE

## 2023-07-20 PROCEDURE — 71045 X-RAY EXAM CHEST 1 VIEW: CPT

## 2023-07-20 PROCEDURE — 83735 ASSAY OF MAGNESIUM: CPT | Performed by: INTERNAL MEDICINE

## 2023-07-20 PROCEDURE — 70450 CT HEAD/BRAIN W/O DYE: CPT

## 2023-07-20 PROCEDURE — 84484 ASSAY OF TROPONIN QUANT: CPT | Performed by: EMERGENCY MEDICINE

## 2023-07-20 PROCEDURE — 63710000001 AZATHIOPRINE PER 50 MG: Performed by: INTERNAL MEDICINE

## 2023-07-20 PROCEDURE — 25010000002 ENOXAPARIN PER 10 MG: Performed by: INTERNAL MEDICINE

## 2023-07-20 PROCEDURE — 82375 ASSAY CARBOXYHB QUANT: CPT

## 2023-07-20 PROCEDURE — 84145 PROCALCITONIN (PCT): CPT | Performed by: EMERGENCY MEDICINE

## 2023-07-20 PROCEDURE — 36415 COLL VENOUS BLD VENIPUNCTURE: CPT

## 2023-07-20 PROCEDURE — 93005 ELECTROCARDIOGRAM TRACING: CPT | Performed by: EMERGENCY MEDICINE

## 2023-07-20 PROCEDURE — 80053 COMPREHEN METABOLIC PANEL: CPT | Performed by: EMERGENCY MEDICINE

## 2023-07-20 PROCEDURE — 99223 1ST HOSP IP/OBS HIGH 75: CPT | Performed by: INTERNAL MEDICINE

## 2023-07-20 PROCEDURE — 36600 WITHDRAWAL OF ARTERIAL BLOOD: CPT

## 2023-07-20 PROCEDURE — 83605 ASSAY OF LACTIC ACID: CPT | Performed by: EMERGENCY MEDICINE

## 2023-07-20 PROCEDURE — 71275 CT ANGIOGRAPHY CHEST: CPT

## 2023-07-20 PROCEDURE — 25510000001 IOPAMIDOL PER 1 ML: Performed by: EMERGENCY MEDICINE

## 2023-07-20 PROCEDURE — 83880 ASSAY OF NATRIURETIC PEPTIDE: CPT | Performed by: EMERGENCY MEDICINE

## 2023-07-20 PROCEDURE — 81003 URINALYSIS AUTO W/O SCOPE: CPT | Performed by: EMERGENCY MEDICINE

## 2023-07-20 PROCEDURE — 87636 SARSCOV2 & INF A&B AMP PRB: CPT | Performed by: EMERGENCY MEDICINE

## 2023-07-20 RX ORDER — AZATHIOPRINE 50 MG/1
100 TABLET ORAL 2 TIMES DAILY
Status: DISCONTINUED | OUTPATIENT
Start: 2023-07-20 | End: 2023-07-26 | Stop reason: HOSPADM

## 2023-07-20 RX ORDER — IBUPROFEN 600 MG/1
1 TABLET ORAL
Status: DISCONTINUED | OUTPATIENT
Start: 2023-07-20 | End: 2023-07-26 | Stop reason: HOSPADM

## 2023-07-20 RX ORDER — BISACODYL 10 MG
10 SUPPOSITORY, RECTAL RECTAL DAILY PRN
Status: DISCONTINUED | OUTPATIENT
Start: 2023-07-20 | End: 2023-07-26 | Stop reason: HOSPADM

## 2023-07-20 RX ORDER — BUMETANIDE 0.25 MG/ML
2 INJECTION INTRAMUSCULAR; INTRAVENOUS ONCE
Status: COMPLETED | OUTPATIENT
Start: 2023-07-20 | End: 2023-07-20

## 2023-07-20 RX ORDER — BISACODYL 5 MG/1
5 TABLET, DELAYED RELEASE ORAL DAILY PRN
Status: DISCONTINUED | OUTPATIENT
Start: 2023-07-20 | End: 2023-07-26 | Stop reason: HOSPADM

## 2023-07-20 RX ORDER — IPRATROPIUM BROMIDE AND ALBUTEROL SULFATE 2.5; .5 MG/3ML; MG/3ML
3 SOLUTION RESPIRATORY (INHALATION) ONCE
Status: COMPLETED | OUTPATIENT
Start: 2023-07-20 | End: 2023-07-20

## 2023-07-20 RX ORDER — IPRATROPIUM BROMIDE AND ALBUTEROL SULFATE 2.5; .5 MG/3ML; MG/3ML
3 SOLUTION RESPIRATORY (INHALATION) EVERY 4 HOURS PRN
Status: DISCONTINUED | OUTPATIENT
Start: 2023-07-20 | End: 2023-07-26 | Stop reason: HOSPADM

## 2023-07-20 RX ORDER — BUDESONIDE AND FORMOTEROL FUMARATE DIHYDRATE 160; 4.5 UG/1; UG/1
2 AEROSOL RESPIRATORY (INHALATION)
Status: DISCONTINUED | OUTPATIENT
Start: 2023-07-20 | End: 2023-07-26 | Stop reason: HOSPADM

## 2023-07-20 RX ORDER — SODIUM CHLORIDE 0.9 % (FLUSH) 0.9 %
10 SYRINGE (ML) INJECTION AS NEEDED
Status: DISCONTINUED | OUTPATIENT
Start: 2023-07-20 | End: 2023-07-26 | Stop reason: HOSPADM

## 2023-07-20 RX ORDER — PANTOPRAZOLE SODIUM 40 MG/1
40 TABLET, DELAYED RELEASE ORAL
Status: DISCONTINUED | OUTPATIENT
Start: 2023-07-21 | End: 2023-07-26 | Stop reason: HOSPADM

## 2023-07-20 RX ORDER — NITROGLYCERIN 0.4 MG/1
0.4 TABLET SUBLINGUAL
Status: DISCONTINUED | OUTPATIENT
Start: 2023-07-20 | End: 2023-07-26 | Stop reason: HOSPADM

## 2023-07-20 RX ORDER — GABAPENTIN 300 MG/1
300 CAPSULE ORAL NIGHTLY
Status: DISCONTINUED | OUTPATIENT
Start: 2023-07-20 | End: 2023-07-26 | Stop reason: HOSPADM

## 2023-07-20 RX ORDER — DEXTROSE MONOHYDRATE 25 G/50ML
25 INJECTION, SOLUTION INTRAVENOUS
Status: DISCONTINUED | OUTPATIENT
Start: 2023-07-20 | End: 2023-07-26 | Stop reason: HOSPADM

## 2023-07-20 RX ORDER — NEBIVOLOL 5 MG/1
20 TABLET ORAL
Status: DISCONTINUED | OUTPATIENT
Start: 2023-07-20 | End: 2023-07-24

## 2023-07-20 RX ORDER — FLUTICASONE PROPIONATE 50 MCG
2 SPRAY, SUSPENSION (ML) NASAL DAILY
Status: DISCONTINUED | OUTPATIENT
Start: 2023-07-21 | End: 2023-07-26 | Stop reason: HOSPADM

## 2023-07-20 RX ORDER — LEVOTHYROXINE SODIUM 0.07 MG/1
75 TABLET ORAL DAILY
Status: DISCONTINUED | OUTPATIENT
Start: 2023-07-21 | End: 2023-07-26 | Stop reason: HOSPADM

## 2023-07-20 RX ORDER — CETIRIZINE HYDROCHLORIDE 10 MG/1
10 TABLET ORAL DAILY
Status: DISCONTINUED | OUTPATIENT
Start: 2023-07-21 | End: 2023-07-26 | Stop reason: HOSPADM

## 2023-07-20 RX ORDER — BUMETANIDE 1 MG/1
0.5 TABLET ORAL
Status: DISCONTINUED | OUTPATIENT
Start: 2023-07-20 | End: 2023-07-21

## 2023-07-20 RX ORDER — AMOXICILLIN 250 MG
2 CAPSULE ORAL 2 TIMES DAILY
Status: DISCONTINUED | OUTPATIENT
Start: 2023-07-20 | End: 2023-07-26 | Stop reason: HOSPADM

## 2023-07-20 RX ORDER — ALBUTEROL SULFATE 2.5 MG/3ML
10 SOLUTION RESPIRATORY (INHALATION)
Status: COMPLETED | OUTPATIENT
Start: 2023-07-20 | End: 2023-07-20

## 2023-07-20 RX ORDER — ONDANSETRON 2 MG/ML
4 INJECTION INTRAMUSCULAR; INTRAVENOUS EVERY 6 HOURS PRN
Status: DISCONTINUED | OUTPATIENT
Start: 2023-07-20 | End: 2023-07-26 | Stop reason: HOSPADM

## 2023-07-20 RX ORDER — HYDRALAZINE HYDROCHLORIDE 25 MG/1
100 TABLET, FILM COATED ORAL 2 TIMES DAILY
Status: DISCONTINUED | OUTPATIENT
Start: 2023-07-20 | End: 2023-07-22

## 2023-07-20 RX ORDER — ENOXAPARIN SODIUM 100 MG/ML
40 INJECTION SUBCUTANEOUS EVERY 24 HOURS
Status: DISCONTINUED | OUTPATIENT
Start: 2023-07-20 | End: 2023-07-24

## 2023-07-20 RX ORDER — CLONIDINE HYDROCHLORIDE 0.1 MG/1
0.1 TABLET ORAL EVERY 8 HOURS PRN
Status: DISCONTINUED | OUTPATIENT
Start: 2023-07-20 | End: 2023-07-24

## 2023-07-20 RX ORDER — ASPIRIN 81 MG/1
81 TABLET, CHEWABLE ORAL DAILY
Status: DISCONTINUED | OUTPATIENT
Start: 2023-07-20 | End: 2023-07-26 | Stop reason: HOSPADM

## 2023-07-20 RX ORDER — SODIUM CHLORIDE 0.9 % (FLUSH) 0.9 %
10 SYRINGE (ML) INJECTION EVERY 12 HOURS SCHEDULED
Status: DISCONTINUED | OUTPATIENT
Start: 2023-07-20 | End: 2023-07-26 | Stop reason: HOSPADM

## 2023-07-20 RX ORDER — TERAZOSIN 2 MG/1
4 CAPSULE ORAL NIGHTLY
Status: DISCONTINUED | OUTPATIENT
Start: 2023-07-20 | End: 2023-07-21

## 2023-07-20 RX ORDER — NICOTINE POLACRILEX 4 MG
15 LOZENGE BUCCAL
Status: DISCONTINUED | OUTPATIENT
Start: 2023-07-20 | End: 2023-07-23 | Stop reason: SDUPTHER

## 2023-07-20 RX ORDER — SODIUM CHLORIDE 9 MG/ML
40 INJECTION, SOLUTION INTRAVENOUS AS NEEDED
Status: DISCONTINUED | OUTPATIENT
Start: 2023-07-20 | End: 2023-07-26 | Stop reason: HOSPADM

## 2023-07-20 RX ORDER — POLYETHYLENE GLYCOL 3350 17 G/17G
17 POWDER, FOR SOLUTION ORAL DAILY PRN
Status: DISCONTINUED | OUTPATIENT
Start: 2023-07-20 | End: 2023-07-26 | Stop reason: HOSPADM

## 2023-07-20 RX ORDER — SODIUM CHLORIDE 0.9 % (FLUSH) 0.9 %
10 SYRINGE (ML) INJECTION AS NEEDED
Status: DISCONTINUED | OUTPATIENT
Start: 2023-07-20 | End: 2023-07-21

## 2023-07-20 RX ORDER — INSULIN LISPRO 100 [IU]/ML
2-7 INJECTION, SOLUTION INTRAVENOUS; SUBCUTANEOUS
Status: DISCONTINUED | OUTPATIENT
Start: 2023-07-20 | End: 2023-07-23

## 2023-07-20 RX ORDER — LOSARTAN POTASSIUM 50 MG/1
100 TABLET ORAL
Status: DISCONTINUED | OUTPATIENT
Start: 2023-07-21 | End: 2023-07-21

## 2023-07-20 RX ORDER — PRAVASTATIN SODIUM 40 MG
20 TABLET ORAL NIGHTLY
Status: DISCONTINUED | OUTPATIENT
Start: 2023-07-20 | End: 2023-07-26 | Stop reason: HOSPADM

## 2023-07-20 RX ADMIN — Medication 10 ML: at 20:21

## 2023-07-20 RX ADMIN — HYDRALAZINE HYDROCHLORIDE 100 MG: 25 TABLET, FILM COATED ORAL at 20:23

## 2023-07-20 RX ADMIN — NITROGLYCERIN 1 INCH: 20 OINTMENT TOPICAL at 18:16

## 2023-07-20 RX ADMIN — BUMETANIDE 0.5 MG: 1 TABLET ORAL at 20:23

## 2023-07-20 RX ADMIN — TERAZOSIN HYDROCHLORIDE 4 MG: 2 CAPSULE ORAL at 20:23

## 2023-07-20 RX ADMIN — NEBIVOLOL 20 MG: 20 TABLET ORAL at 20:24

## 2023-07-20 RX ADMIN — ALBUTEROL SULFATE 10 MG: 2.5 SOLUTION RESPIRATORY (INHALATION) at 17:11

## 2023-07-20 RX ADMIN — SODIUM CHLORIDE 1000 ML: 9 INJECTION, SOLUTION INTRAVENOUS at 13:35

## 2023-07-20 RX ADMIN — IOPAMIDOL 50 ML: 755 INJECTION, SOLUTION INTRAVENOUS at 17:12

## 2023-07-20 RX ADMIN — INSULIN DETEMIR 15 UNITS: 100 INJECTION, SOLUTION SUBCUTANEOUS at 20:21

## 2023-07-20 RX ADMIN — PRAVASTATIN SODIUM 20 MG: 40 TABLET ORAL at 20:22

## 2023-07-20 RX ADMIN — AZATHIOPRINE 100 MG: 50 TABLET ORAL at 20:24

## 2023-07-20 RX ADMIN — INSULIN LISPRO 3 UNITS: 100 INJECTION, SOLUTION INTRAVENOUS; SUBCUTANEOUS at 20:21

## 2023-07-20 RX ADMIN — ASPIRIN 81 MG: 81 TABLET, CHEWABLE ORAL at 20:22

## 2023-07-20 RX ADMIN — BUDESONIDE AND FORMOTEROL FUMARATE DIHYDRATE 2 PUFF: 160; 4.5 AEROSOL RESPIRATORY (INHALATION) at 20:16

## 2023-07-20 RX ADMIN — IPRATROPIUM BROMIDE AND ALBUTEROL SULFATE 3 ML: 2.5; .5 SOLUTION RESPIRATORY (INHALATION) at 15:30

## 2023-07-20 RX ADMIN — ENOXAPARIN SODIUM 40 MG: 100 INJECTION SUBCUTANEOUS at 20:22

## 2023-07-20 RX ADMIN — BUMETANIDE 2 MG: 0.25 INJECTION, SOLUTION INTRAMUSCULAR; INTRAVENOUS at 18:16

## 2023-07-20 NOTE — ED PROVIDER NOTES
Subjective   History of Present Illness  Patient is a 78-year-old female presenting to the emergency department with several days of progressive generalized fatigue and weakness.  Patient reports she felt like it was very difficult to walk this morning secondary to the weakness.  The patient does ambulate with the assistance of a cane.  Patient does have a complex medical history she has a history of diabetes, hyperlipidemia, chronic kidney disease, CVA, hypertension, factor V Leiden, psoriatic arthritis.  Patient has multiple admissions for respiratory related issues as well as hyponatremia.  Patient provided a copy of labs from her primary care physician that were drawn at the end of June which demonstrate some mild renal insufficiency but the remainder of the labs other than mild anemia are within normal limits including the sodium.  Patient denies any unilateral deficit or weakness.    History provided by:  Patient and medical records    Review of Systems    Past Medical History:   Diagnosis Date    Abnormal ECG Check A Date or [facility]    Brought to  from [facility]    Acute sinusitis 02/06/2023    Anemia     Asthma     CAP (community acquired pneumonia) 02/06/2023    CHF (congestive heart failure) 7/20/2023    Chronic kidney disease     pt told it was dx from Dr Baca and to not eat much protein    Congenital heart disease 1950's Patent 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    Surgery [facility] 1950s close    CTS (carpal tunnel syndrome)     Psoriatic arthritis hands could be    Deep vein thrombosis No on blood clots but have a blood clotting disorder called Leiden Factor 5    Disease of thyroid gland     Diverticulitis of colon     Factor 5 Leiden mutation, heterozygous 2012    Head injury     Hyperlipidemia     Hypertension     Movement disorder 10/2021    knee replacement left knww    Murmur, cardiac     Neuropathy in diabetes     redness swelling legs    Peripheral neuropathy 05/03/2022     hands, arms. shoulders, back    Pill esophagitis 02/06/2023    Psoriatic arthritis     hands    Sleep apnea Always    diabetic do not sleep well up and down    Stroke had one don't know when    showed on MRI Brain    Type 1 diabetes        Allergies   Allergen Reactions    Atorvastatin Other (See Comments)    Colesevelam Other (See Comments)    Cymbalta [Duloxetine Hcl] Other (See Comments)     Hyponatremia    Cephalexin Rash     Tolerated Ceftriaxone    Hygroton [Chlorthalidone] Rash     Facial rash    Penicillins Nausea And Vomiting and Rash     Has tolerated ceftriaxone       Past Surgical History:   Procedure Laterality Date    CARDIAC CATHETERIZATION  1952 2 of them    Patent Dictus operation    CATARACT EXTRACTION Bilateral     COLONOSCOPY      ENDOSCOPY N/A 10/20/2020    Procedure: ESOPHAGOGASTRODUODENOSCOPY;  Surgeon: Brunner, Mark I, MD;  Location: [facility] ENDOSCOPY;  Service: Gastroenterology;  Laterality: N/A;    HAND SURGERY Left 1987    no hardware    PATENT DUCTUS ARTERIOUS LIGATION         Family History   Problem Relation Age of Onset    Cancer Mother     Pancreatic cancer Mother     Stroke Mother         Mini strokes    Cancer Father     Lung cancer Father     Cancer Sister     Colon cancer Sister     Diabetes Sister     Breast cancer Neg Hx     Ovarian cancer Neg Hx        Social History     Socioeconomic History    Marital status: Single   Tobacco Use    Smoking status: Never    Smokeless tobacco: Never   Vaping Use    Vaping Use: Never used   Substance and Sexual Activity    Alcohol use: No    Drug use: No    Sexual activity: Not Currently     Partners: Male     Birth control/protection: Abstinence           Objective   Physical Exam  Vitals and nursing note reviewed.   Constitutional:       General: She is not in acute distress.     Appearance: Normal appearance. She is not toxic-appearing.   HENT:      Head: Normocephalic.   Cardiovascular:      Rate and Rhythm: Normal rate and regular  rhythm.      Pulses: Normal pulses.   Pulmonary:      Effort: Pulmonary effort is normal. No respiratory distress.      Breath sounds: Normal breath sounds.   Abdominal:      Palpations: Abdomen is soft.      Tenderness: There is no abdominal tenderness.   Musculoskeletal:         General: Normal range of motion.      Comments: Patient ambulated to the room independently with the assistance of a cane.  No further assistance was required.   Skin:     General: Skin is warm and dry.   Neurological:      Mental Status: She is alert and oriented to person, place, and time.      Comments: Patient has equal strength bilaterally.  No pronator drift.  No facial asymmetry.  Speech is normal.  No unilateral deficit elicited.  NIH stroke scale is 0.   Psychiatric:         Mood and Affect: Mood normal.         Behavior: Behavior normal.       Procedures           ED Course  ED Course as of 07/20/23 1824   Thu Jul 20, 2023   1052 Chart review demonstrates that the patient was admitted to the hospital for a week in March secondary to respiratory failure with generalized weakness and hyponatremia.  See that documentation for further details. [RS]   1052 BP(!): 186/79  Elevated blood pressure noted on arrival. [RS]   1101 Chart review also demonstrates the patient was admitted for similar in mid February as well with an admission of 2 days. [RS]   1155 CT Head Without Contrast  Personally reviewed the CT scan of the head.  On my interpretation there is no hemorrhage or mass effect.  See report from radiology for details. [RS]   1225 Hemoglobin(!): 9.8  Stable chronic anemia when compared to prior. [RS]   1239 High Sensitivity Troponin T(!)  Stable and consistent with prior values. [RS]   1611 Patient went over the MRI but she is having difficulty with shortness of breath when she lays down.  We will obtain an ABG.  Patient currently on nasal cannula. [RS]   1645 Blood Gas, Arterial With Co-Ox(!)  ABG does demonstrate hypoxemia. [RS]    1752 I personally reviewed the CT and the chest.  There is evidence of pulmonary edema and mild effusion noted.  No evidence of pulmonary embolism.  On my interpretation, no PE. [RS]   1752 Patient with evidence of CHF exacerbation with generalized weakness and fatigue.  We will plan admission to the hospital for further evaluation management.  Hospitalist messaged for admission [RS]      ED Course User Index  [RS] Sachin Caban MD                                           Medical Decision Making  Problems Addressed:  Acute congestive heart failure, unspecified heart failure type: complicated acute illness or injury  Fatigue, unspecified type: complicated acute illness or injury  History of asthma: complicated acute illness or injury  Shortness of breath: complicated acute illness or injury  Stage 3a chronic kidney disease: complicated acute illness or injury    Amount and/or Complexity of Data Reviewed  External Data Reviewed: labs.  Labs: ordered. Decision-making details documented in ED Course.  Radiology: ordered. Decision-making details documented in ED Course.  ECG/medicine tests: ordered.    Risk  Prescription drug management.  Decision regarding hospitalization.        Final diagnoses:   Acute congestive heart failure, unspecified heart failure type   Shortness of breath   Fatigue, unspecified type   Stage 3a chronic kidney disease   History of asthma       ED Disposition  ED Disposition       ED Disposition   Decision to Admit    Condition   --    Comment   Level of Care: Telemetry [5]   Diagnosis: CHF exacerbation [381952]                 No follow-up provider specified.       Medication List      No changes were made to your prescriptions during this visit.            Sachin Caban MD  07/20/23 [id number]

## 2023-07-20 NOTE — Clinical Note
Level of Care: Telemetry [5]   Diagnosis: CHF exacerbation [903615]   Certification: I Certify That Inpatient Hospital Services Are Medically Necessary For Greater Than 2 Midnights

## 2023-07-20 NOTE — H&P
[facility] Medicine Services  HISTORY AND PHYSICAL    Patient Name: Marilyn Kunz  : 1945  MRN: [id number]  Primary Care Physician: Peter Baca MD  Date of admission: 2023      Subjective   Subjective     Chief Complaint:  Dyspnea and fatigue    HPI:  Marilyn Kunz is a 78 y.o. female w/ dm1, hfpef, htn, hl, ckd 3, previous cva, demyelinating disease (on imuran followed by Dr. Hernandez), previous esophageal ulcer earlier in ,heterozygous factor V leiden status,  hypothyroidism, copd (follows w/ Dr. Haley of pulmonary), previous admission w/ hyponatremia 2023. Patient presents with profound fatigue over the past 1-2 weeks, with some exertional dyspnea over the past few days. Had been up-titrating bp meds recently (w/ assistance of cardiology Dr. Maya office) due to rising BP. About a week ago apparently saw nephrology as outpatient and, due to rising creatinine, was instructed to stop bumex (had been taking 0.5mg daily bumex). Then over the past few days the fatigue and dyspnea w/ exertion increased prompting patient to come to [facility] for evaluation. Denies overt chest pain or dysuria. No focal weakness, no headache, no confusion.    In ED room air sats were 86%. Bp 186/79. Wbc wbc 5.6, hgb 9.8. HS troponin 13, repeat 14. EKG nsr without overt ischemic changes. Probnp 3,386. Glucose 245, creatinine 1.09. u/a benign. Tsh normal. Abg ph 7.36, pco2 44, po2 51 (on 3Lnc). Covid and flu pcr negative. Procal normal. Ct head negative for acute process, only revealing atrophy. Ct angio chest was negative for PE, showed pulmonary edema dn small to moderate bilateral pleural effusions. Was given bumex 2mg iv in ed and admitted to hospitalist service.      Review of Systems   No f/c  No chest pain  No palpitations  No headache  No focal weakness  No dysuria    Personal History     Past Medical History:   Diagnosis Date    Abnormal ECG [facility]    Br[facility] to  from [facility]    Acute sinusitis 02/06/2023    Anemia     Asthma     CAP (community acquired pneumonia) 02/06/2023    CHF (congestive heart failure) 7/20/2023    Chronic kidney disease     pt told it was dx from Dr Baca and to not eat much protein    Congenital heart disease 1950's Patent 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    Surgery [facility] close    CTS (carpal tunnel syndrome)     Psoriatic arthritis hands could be    Deep vein thrombosis No on blood clots but have a blood clotting disorder called Leiden Factor 5    Disease of thyroid gland     Diverticulitis of colon     Factor 5 Leiden mutation, heterozygous 2012    Head injury     Hyperlipidemia     Hypertension     Movement disorder 10/2021    knee replacement left knww    Murmur, cardiac     Neuropathy in diabetes     redness swelling legs    Peripheral neuropathy 05/03/2022    hands, arms. shoulders, back    Pill esophagitis 02/06/2023    Psoriatic arthritis     hands    Sleep apnea Always    diabetic do not sleep well up and down    Stroke had one don't know when    showed on MRI Brain    Type 1 diabetes              Past Surgical History:   Procedure Laterality Date    CARDIAC CATHETERIZATION  1952 2 of them    Patent Dictus operation    CATARACT EXTRACTION Bilateral     COLONOSCOPY      ENDOSCOPY N/A 10/20/2020    Procedure: ESOPHAGOGASTRODUODENOSCOPY;  Surgeon: Brunner, Mark I, MD;  Location: [facility] ENDOSCOPY;  Service: Gastroenterology;  Laterality: N/A;    HAND SURGERY Left 1987    no hardware    PATENT DUCTUS ARTERIOUS LIGATION         Family History: family history includes Cancer in her father, mother, and sister; Colon cancer in her sister; Diabetes in her sister; Lung cancer in her father; Pancreatic cancer in her mother; Stroke in her mother.     Social History:  reports that she has never smoked. She has never used smokeless tobacco. She reports that she does not drink alcohol and does not  use drugs.  Social History     Social History Narrative    Independent with adl's.  Single.  Never .  No children.  Lives alone.  Niece-in-law assist with care as needed.   No home oxygen, HH or DME used currently        Medications:  Available home medication information reviewed.  Medications Prior to Admission   Medication Sig Dispense Refill Last Dose    albuterol sulfate  (90 Base) MCG/ACT inhaler Inhale 2 puffs As Needed.       aspirin 81 MG EC tablet Take 1 tablet by mouth Daily. 30 tablet 0     azaTHIOprine (IMURAN) 50 MG tablet TAKE 2 TABLETS TWICE DAILY 120 tablet 5     azelastine (ASTELIN) 0.1 % nasal spray 1 spray into the nostril(s) as directed by provider Daily As Needed.       budesonide-formoterol (SYMBICORT) 160-4.5 MCG/ACT inhaler Inhale 1 puff 2 (Two) Times a Day.       bumetanide (BUMEX) 0.5 MG tablet 1 tablet 2 (Two) Times a Day.       cloNIDine (CATAPRES) 0.1 MG tablet Take 1 tablet by mouth 3 (Three) Times a Day As Needed for High Blood Pressure (systolic BP greater than 180). 90 tablet 3     Continuous Blood Gluc  (FreeStyle Marie 2 Hague) device FreeStyle Marie 2 Hague   change q 14 days       Droplet Pen Needles 32G X 4 MM misc        fluticasone (FLONASE) 50 MCG/ACT nasal spray 2 sprays by Each Nare route Daily. Shake well before using.       FreeStyle Precision Ant Test test strip        gabapentin (NEURONTIN) 300 MG capsule Take 1 capsule by mouth every night at bedtime.       hydrALAZINE (APRESOLINE) 100 MG tablet Take 1 tablet by mouth 2 (Two) Times a Day. 180 tablet 3     insulin aspart (novoLOG FLEXPEN) 100 UNIT/ML solution pen-injector sc pen Inject  under the skin into the appropriate area as directed 3 (Three) Times a Day With Meals. Sliding scale       irbesartan (Avapro) 300 MG tablet Take 1 tablet by mouth Every Night. 90 tablet 6     levocetirizine (XYZAL) 5 MG tablet Take 1 tablet by mouth Every Evening.       levothyroxine (SYNTHROID, LEVOTHROID) 75  MCG tablet Take 1 tablet by mouth Daily.       montelukast (SINGULAIR) 10 MG tablet Take 1 tablet by mouth Every Night.       nebivolol (BYSTOLIC) 20 MG tablet Take 1 tablet by mouth Daily. 90 tablet 6     omeprazole (priLOSEC) 20 MG capsule Take 1 capsule by mouth Daily. 30 capsule 5     oxybutynin XL (DITROPAN-XL) 10 MG 24 hr tablet Take 1 tablet by mouth Daily.       pravastatin (Pravachol) 20 MG tablet Take 1 tablet by mouth Every Evening. 90 tablet 3     Secukinumab (Cosentyx) 150 MG/ML solution prefilled syringe Inject 1 mL under the skin into the appropriate area as directed Every 28 (Twenty-Eight) Days. 1/2 dose monthly       Spacer/Aero-Holding Chambers (OptiChamber Paula-Lg Mask) device USE AS DIRECTED WITH MULTI DOSE INHALER       terazosin (HYTRIN) 10 MG capsule Take 1 capsule by mouth Every Night. 30 capsule 5     tobramycin-dexamethasone (TOBRADEX) 0.3-0.1 % ophthalmic suspension INSTILL 1 DROP IN LEFT EYE 4 TIMES DAILY       Tresiba FlexTouch 100 UNIT/ML solution pen-injector injection Inject 19 Units under the skin into the appropriate area as directed Every Night.       triamcinolone (KENALOG) 0.1 % cream APPLY A THIN LAYER TO THE AFFECTED AREA TWICE DAILY FOR 1 WEEK THEN ONCE DAILY FOR 1 WEEK          Allergies   Allergen Reactions    Atorvastatin Other (See Comments)    Colesevelam Other (See Comments)    Cymbalta [Duloxetine Hcl] Other (See Comments)     Hyponatremia    Cephalexin Rash     Tolerated Ceftriaxone    Hygroton [Chlorthalidone] Rash     Facial rash    Penicillins Nausea And Vomiting and Rash     Has tolerated ceftriaxone       Objective   Objective     Vital Signs:   Temp:  [98 °F (36.7 °C)-98.7 °F (37.1 °C)] 98 °F (36.7 °C)  Heart Rate:  [53-93] 93  Resp:  [16-20] 20  BP: (132-186)/(56-95) 132/93  Flow (L/min):  [3] 3       Physical Exam   Constitutional:Alert, oriented x 3, nontoxic appearing, mildly dyspneic appearing but no distress  Psych:Normal/appropriate affect  HEENT:NCAT,  oropharynx clear  Neck: neck supple, full range of motion  Neuro: Face symmetric, speech clear, equal , moves all extremities  Cardiac: RRR, murur noted; No pretibial pitting edema  Resp: decreased air movement bilateral bases w/ faint bibasilar crackles  GI: abd soft, nontender  Skin: No extremity rash  Musculoskeletal/extremities: no cyanosis of extremities; no significant ankle edema      Result Review:  I have personally reviewed the results from the time of this admission to 7/20/2023 19:17 EDT and agree with these findings:  [x]  Laboratory list / accordion  []  Microbiology  [x]  Radiology  [x]  EKG/Telemetry   []  Cardiology/Vascular   []  Pathology  [x]  Old records  []  Other:  Most notable findings include: see hpi and below        LAB RESULTS:      Lab 07/20/23  1157   WBC 5.60   HEMOGLOBIN 9.8*   HEMATOCRIT 31.3*   PLATELETS 192   NEUTROS ABS 4.46   IMMATURE GRANS (ABS) 0.05   LYMPHS ABS 0.59*   MONOS ABS 0.39   EOS ABS 0.07   MCV 98.7*   PROCALCITONIN 0.04   LACTATE 1.1         Lab 07/20/23  1402 07/20/23  1157   SODIUM  --  139   POTASSIUM  --  4.5   CHLORIDE  --  104   CO2  --  22.0   ANION GAP  --  13.0   BUN  --  26*   CREATININE  --  1.09*   EGFR  --  52.1*   GLUCOSE  --  245*   CALCIUM  --  8.8   MAGNESIUM 2.1  --    HEMOGLOBIN A1C  --  8.00*   TSH 3.590  --          Lab 07/20/23  1157   TOTAL PROTEIN 6.2   ALBUMIN 3.7   GLOBULIN 2.5   ALT (SGPT) 16   AST (SGOT) 21   BILIRUBIN 1.3*   ALK PHOS 65   LIPASE 9*         Lab 07/20/23  1402 07/20/23  1157   PROBNP  --  3,386.0*   HSTROP T 14* 13*                 Lab 07/20/23  1628   PH, ARTERIAL 7.363   PCO2, ARTERIAL 44.0   PO2 ART 51.1*   FIO2 32   HCO3 ART 25.0   BASE EXCESS ART -0.5*   CARBOXYHEMOGLOBIN 1.2     UA          2/24/2023    11:22 7/20/2023    12:06   Urinalysis   Squamous Epithelial Cells, UA 7-12     Specific Gravity, UA 1.016  1.013    Ketones, UA Trace  Negative    Blood, UA Negative  Negative    Leukocytes, UA Large (3+)   Negative    Nitrite, UA Negative  Negative    RBC, UA 3-6     WBC, UA Too Numerous to Count     Bacteria, UA None Seen         Microbiology Results (last 10 days)       Procedure Component Value - Date/Time    COVID PRE-OP / PRE-PROCEDURE SCREENING ORDER (NO ISOLATION) - Swab, Nasopharynx [[id number]]  (Normal) Collected: 07/20/23 1819    Lab Status: Final result Specimen: Swab from Nasopharynx Updated: 07/20/23 1856    Narrative:      The following orders were created for panel order COVID PRE-OP / PRE-PROCEDURE SCREENING ORDER (NO ISOLATION) - Swab, Nasopharynx.  Procedure                               Abnormality         Status                     ---------                               -----------         ------                     COVID-19 and FLU A/B PCR...[[id number]]  Normal              Final result                 Please view results for these tests on the individual orders.    COVID-19 and FLU A/B PCR - Swab, Nasopharynx [[id number]]  (Normal) Collected: 07/20/23 1819    Lab Status: Final result Specimen: Swab from Nasopharynx Updated: 07/20/23 1856     COVID19 Not Detected     Influenza A PCR Not Detected     Influenza B PCR Not Detected    Narrative:      Fact sheet for providers: https://www.fda.gov/media/488250/download    Fact sheet for patients: https://www.fda.gov/media/346576/download    Test performed by PCR.            CT Head Without Contrast    Result Date: 7/20/2023  CT HEAD WO CONTRAST Date of Exam: 7/20/2023 11:23 AM EDT Indication: fatigue. Comparison: None available. Technique: Axial CT images were obtained of the head without contrast administration.  Automated exposure control and iterative construction methods were used. Findings: There is prominence of the ventricles and sulci suggestive of atrophy. There our areas of hypodensity within the periventricular white matter, corona radiata and the left greater than right cerebellar hemispheres. Additionally, there is a lacunar infarct  within  the left basal ganglia. Findings are suggestive of changes of chronic microvascular ischemic disease. No evidence of hemorrhage. No midline shift or mass effect is identified. Orbits paranasal sinuses and mastoid air cells are unremarkable.     Impression: Impression: 1. Atrophy, but no acute intracranial pathology. Electronically Signed: Robert Grove  7/20/2023 11:39 AM EDT  Workstation ID: [id number]    XR Chest 1 View    Result Date: 7/20/2023  XR CHEST 1 VW Date of Exam: 7/20/2023 11:03 AM EDT Indication: fatigue Comparison: 3/2/2023. Findings: There is continued moderate cardiomegaly. There is patchy infiltrate of the left lower lobe although improved. Previously seen right basilar infiltrates have largely resolved.     Impression: Impression: Patchy left lower lobe infiltrate remaining, overall improved from prior. Electronically Signed: Slime Whitlock MD  7/20/2023 11:34 AM EDT  Workstation ID: [id number]    CT Angiogram Chest    Result Date: 7/20/2023  CT ANGIOGRAM CHEST Date of Exam: 7/20/2023 5:04 PM EDT Indication: SOA and hypoxemia w/ clotting disorder. Comparison: 3/2/2023. Technique: CTA of the chest was performed after the uneventful intravenous administration of 50 mL Isovue-370. Reconstructed coronal and sagittal images were also obtained. In addition, a 3-D volume rendered image was created for interpretation. Automated exposure control and iterative reconstruction methods were used. Findings: There is no pathologic axillary adenopathy or other worrisome body wall soft tissue finding in the chest. No acute findings are present in the partially characterized upper abdomen. There are small bilateral pleural effusions, mildly decreased from comparison, without pericardial effusion. Nonaneurysmal mildly atherosclerotic thoracic aorta. The pulmonary arteries are well-opacified and without evidence of filling defect concerning for acute pulmonary embolus. The osseous structures demonstrate no evidence of  acute fracture or aggressive osseous lesion. The lung fields demonstrate evidence of pulmonary edema, with interlobular septal thickening and diffuse groundglass opacity present.     Impression: Impression: Mildly worsened pulmonary edema with small to moderate bilateral pleural effusions. No acute pulmonary embolus. Electronically Signed: Vick Grimaldo  7/20/2023 5:35 PM EDT  Workstation ID: [id number]     Results for orders placed during the hospital encounter of 02/24/23    Adult Transthoracic Echo Complete W/ Cont if Necessary Per Protocol    Interpretation Summary    Left ventricular ejection fraction appears to be 61 - 65%.    Estimated right ventricular systolic pressure from tricuspid regurgitation is mildly elevated (35-45 mmHg).      Assessment & Plan   Assessment & Plan     Active Hospital Problems    Diagnosis  POA    **CHF exacerbation [I50.9]  Yes    Acute respiratory failure with hypoxia [J96.01]  Yes     Priority: High    Difficulty in walking, not elsewhere classified [R26.2]  Yes    Benign essential hypertension [I10]  Yes     Increased lisinopril  and amlodipine 7/2021. Monitor at home.  D/C lisinopril, add irbestartan/hctz. 1/2022. Monitor at home.      Demyelinating disease [G37.9]  Yes     Dr Hernandez      Type 1 diabetes [E10.9]  Yes     This Chronic Condition affected the medical decision making - stable - follow up as needed. Followed by specialist Dr. Reid  10/2018 A1C= 9.5      At risk for venous thromboembolism (VTE) [Z91.89]  Yes     Problem added by Discern Expert Rule: EBN_VTERISKPROB_3      Essential hypertension [I10]  Yes    CKD (chronic kidney disease) stage 3, GFR 30-59 ml/min [N18.30]  Yes    Diabetic nephropathy associated with type 2 diabetes mellitus [E11.21]  Yes    Hypothyroidism [E03.9]  Yes         Acute hypoxic respiratory failure  A/C HFpEF  Accelerated HTN  HL  -bumex 2mg iv given in ed; start 0.5mg bumex po bid tomorrow (further iv diuresis prn), monitor diuresis/urine  output  -restart home bp meds  -wean oxygen as able  -currently 3Lnc  -ask cards to see tomorrow for assistance in medication adjustment (had been adjusting bp meds via phone w/ cardiology office recently; also had bumex held 1 week prior to this admission by nephrology)    Ckd 3 (baseline cr ~0.9-1.1)  -stable, monitor as diuresing    Dm1 (A1c 8.0)  -on tresiba 15 units sq nightly at home w/ sliding scale novalog  -will start levemir 15 unit sq nightly, sliding scale humalog, adjust prn. Diabetic diet. Adjust insulins prn    COPD  -symbicort, prn duonebs  -currently not wheezing  -follows w/ Dr. Haley as outpatient    Hypothyroidism  -continue synthroid  -tsh ok    Profound generalized weakness, unsteady gait  -followed by Dr. Hernandez as outpatient; continue imuran  -u/a benign  -ct head negative at admission  -covid 19 & flu pcr negative  -procal negative  -tsh ok  -pt/ot evals in a.m.  -if develops focal symptoms or no improvement as diurese consider mri imaging or neuro consultation      DVT prophylaxis:  sq lovenox      CODE STATUS:    Code Status and Medical Interventions:   Ordered at: 07/20/23 1905     Code Status (Patient has no pulse and is not breathing):    CPR (Attempt to Resuscitate)     Medical Interventions (Patient has pulse or is breathing):    Full Support     Release to patient:    Routine Release         Expected discharge date/ time has not been documented.     Lucas Garcia MD  07/20/23

## 2023-07-21 PROBLEM — Z72.89 OTHER PROBLEMS RELATED TO LIFESTYLE: Status: RESOLVED | Noted: 2023-02-06 | Resolved: 2023-07-21

## 2023-07-21 PROBLEM — T50.905A PILL ESOPHAGITIS: Status: RESOLVED | Noted: 2023-02-06 | Resolved: 2023-07-21

## 2023-07-21 PROBLEM — R94.31: Status: RESOLVED | Noted: 2023-02-06 | Resolved: 2023-07-21

## 2023-07-21 PROBLEM — Z12.39 BREAST CANCER SCREENING: Status: RESOLVED | Noted: 2023-02-06 | Resolved: 2023-07-21

## 2023-07-21 PROBLEM — J96.01 ACUTE RESPIRATORY FAILURE WITH HYPOXIA: Status: RESOLVED | Noted: 2023-03-09 | Resolved: 2023-07-21

## 2023-07-21 PROBLEM — I16.1 HYPERTENSIVE EMERGENCY: Status: RESOLVED | Noted: 2023-02-06 | Resolved: 2023-07-21

## 2023-07-21 PROBLEM — E10.9 TYPE 1 DIABETES: Status: RESOLVED | Noted: 2023-02-06 | Resolved: 2023-07-21

## 2023-07-21 PROBLEM — M54.50 LOW BACK PAIN: Status: RESOLVED | Noted: 2017-10-23 | Resolved: 2023-07-21

## 2023-07-21 PROBLEM — M19.071: Status: RESOLVED | Noted: 2022-06-23 | Resolved: 2023-07-21

## 2023-07-21 PROBLEM — I50.9 ACUTE EXACERBATION OF CHF (CONGESTIVE HEART FAILURE): Status: ACTIVE | Noted: 2023-07-21

## 2023-07-21 PROBLEM — T78.40XA ALLERGIES: Status: RESOLVED | Noted: 2023-02-06 | Resolved: 2023-07-21

## 2023-07-21 PROBLEM — R29.898 WEAKNESS OF RIGHT UPPER EXTREMITY: Status: RESOLVED | Noted: 2022-05-04 | Resolved: 2023-07-21

## 2023-07-21 PROBLEM — Z00.00 ENCOUNTER FOR GENERAL ADULT MEDICAL EXAMINATION WITHOUT ABNORMAL FINDINGS: Status: RESOLVED | Noted: 2023-02-06 | Resolved: 2023-07-21

## 2023-07-21 PROBLEM — Z23 NEED FOR VACCINATION AGAINST STREPTOCOCCUS PNEUMONIAE: Status: RESOLVED | Noted: 2022-10-28 | Resolved: 2023-07-21

## 2023-07-21 PROBLEM — E22.2 SYNDROME OF INAPPROPRIATE SECRETION OF ANTIDIURETIC HORMONE: Status: RESOLVED | Noted: 2023-03-09 | Resolved: 2023-07-21

## 2023-07-21 PROBLEM — D68.51 ACTIVATED PROTEIN C RESISTANCE: Status: RESOLVED | Noted: 2023-03-09 | Resolved: 2023-07-21

## 2023-07-21 PROBLEM — Z00.00 ROUTINE HISTORY AND PHYSICAL EXAMINATION OF ADULT: Status: RESOLVED | Noted: 2022-06-23 | Resolved: 2023-07-21

## 2023-07-21 PROBLEM — Z79.4 LONG TERM CURRENT USE OF INSULIN: Status: RESOLVED | Noted: 2023-02-06 | Resolved: 2023-07-21

## 2023-07-21 PROBLEM — Z09 HOSPITAL DISCHARGE FOLLOW-UP: Status: RESOLVED | Noted: 2022-06-23 | Resolved: 2023-07-21

## 2023-07-21 PROBLEM — M43.10 SPONDYLOLISTHESIS, GRADE 2: Status: RESOLVED | Noted: 2022-06-23 | Resolved: 2023-07-21

## 2023-07-21 PROBLEM — M79.645 FINGER PAIN, LEFT: Status: RESOLVED | Noted: 2023-04-27 | Resolved: 2023-07-21

## 2023-07-21 PROBLEM — L21.9 SEBORRHEIC DERMATITIS: Status: RESOLVED | Noted: 2022-06-23 | Resolved: 2023-07-21

## 2023-07-21 PROBLEM — E78.5 HLD (HYPERLIPIDEMIA): Status: RESOLVED | Noted: 2020-10-16 | Resolved: 2023-07-21

## 2023-07-21 PROBLEM — R26.89 IMPAIRMENT OF BALANCE: Status: RESOLVED | Noted: 2022-06-23 | Resolved: 2023-07-21

## 2023-07-21 PROBLEM — Z23 NEED FOR IMMUNIZATION AGAINST INFLUENZA: Status: RESOLVED | Noted: 2023-02-06 | Resolved: 2023-07-21

## 2023-07-21 PROBLEM — M62.81 GENERALIZED MUSCLE WEAKNESS: Status: RESOLVED | Noted: 2023-03-09 | Resolved: 2023-07-21

## 2023-07-21 PROBLEM — K20.80 PILL ESOPHAGITIS: Status: RESOLVED | Noted: 2023-02-06 | Resolved: 2023-07-21

## 2023-07-21 PROBLEM — I77.6 VASCULITIS: Status: RESOLVED | Noted: 2022-06-23 | Resolved: 2023-07-21

## 2023-07-21 PROBLEM — B37.31 YEAST VAGINITIS: Status: RESOLVED | Noted: 2020-10-26 | Resolved: 2023-07-21

## 2023-07-21 PROBLEM — I50.33 ACUTE ON CHRONIC DIASTOLIC CONGESTIVE HEART FAILURE: Status: ACTIVE | Noted: 2023-07-20

## 2023-07-21 PROBLEM — E78.00 HYPERCHOLESTEROLEMIA: Status: RESOLVED | Noted: 2022-06-23 | Resolved: 2023-07-21

## 2023-07-21 PROBLEM — R13.10 ODYNOPHAGIA: Status: RESOLVED | Noted: 2020-10-16 | Resolved: 2023-07-21

## 2023-07-21 PROBLEM — R26.2 DIFFICULTY IN WALKING, NOT ELSEWHERE CLASSIFIED: Status: RESOLVED | Noted: 2023-03-09 | Resolved: 2023-07-21

## 2023-07-21 PROBLEM — R13.10 DYSPHAGIA, UNSPECIFIED: Status: RESOLVED | Noted: 2023-03-09 | Resolved: 2023-07-21

## 2023-07-21 PROBLEM — Z13.31 SCREENING FOR DEPRESSION: Status: RESOLVED | Noted: 2022-10-28 | Resolved: 2023-07-21

## 2023-07-21 PROBLEM — E87.1 HYPONATREMIA: Status: RESOLVED | Noted: 2023-03-04 | Resolved: 2023-07-21

## 2023-07-21 PROBLEM — M79.641 PAIN IN RIGHT HAND: Status: RESOLVED | Noted: 2022-06-23 | Resolved: 2023-07-21

## 2023-07-21 PROBLEM — Z23 NEED FOR PROPHYLACTIC VACCINATION AGAINST STREPTOCOCCUS PNEUMONIAE (PNEUMOCOCCUS) AND INFLUENZA: Status: RESOLVED | Noted: 2023-02-06 | Resolved: 2023-07-21

## 2023-07-21 PROBLEM — R07.89 ATYPICAL CHEST PAIN: Status: RESOLVED | Noted: 2023-02-06 | Resolved: 2023-07-21

## 2023-07-21 PROBLEM — M94.9 DISORDER OF BONE AND CARTILAGE: Status: RESOLVED | Noted: 2022-06-23 | Resolved: 2023-07-21

## 2023-07-21 PROBLEM — N32.81 OVERACTIVE BLADDER: Status: RESOLVED | Noted: 2022-06-23 | Resolved: 2023-07-21

## 2023-07-21 PROBLEM — Z79.4 LONG-TERM INSULIN USE: Status: RESOLVED | Noted: 2023-02-06 | Resolved: 2023-07-21

## 2023-07-21 PROBLEM — J40 BRONCHITIS: Status: RESOLVED | Noted: 2023-05-09 | Resolved: 2023-07-21

## 2023-07-21 PROBLEM — R06.00 DYSPNEA, UNSPECIFIED: Status: RESOLVED | Noted: 2023-03-09 | Resolved: 2023-07-21

## 2023-07-21 PROBLEM — I10 ESSENTIAL (PRIMARY) HYPERTENSION: Status: RESOLVED | Noted: 2023-03-09 | Resolved: 2023-07-21

## 2023-07-21 PROBLEM — J01.90 ACUTE SINUSITIS: Status: RESOLVED | Noted: 2023-02-06 | Resolved: 2023-07-21

## 2023-07-21 PROBLEM — E87.1 HYPO-OSMOLALITY AND HYPONATREMIA: Status: RESOLVED | Noted: 2023-03-09 | Resolved: 2023-07-21

## 2023-07-21 PROBLEM — M89.9 DISORDER OF BONE AND CARTILAGE: Status: RESOLVED | Noted: 2022-06-23 | Resolved: 2023-07-21

## 2023-07-21 PROBLEM — E55.9 VITAMIN D DEFICIENCY: Status: RESOLVED | Noted: 2023-02-06 | Resolved: 2023-07-21

## 2023-07-21 PROBLEM — G37.9 DEMYELINATING CHANGES IN BRAIN: Chronic | Status: RESOLVED | Noted: 2022-10-28 | Resolved: 2023-07-21

## 2023-07-21 PROBLEM — R10.9 ABDOMINAL PAIN IN FEMALE: Status: RESOLVED | Noted: 2023-02-06 | Resolved: 2023-07-21

## 2023-07-21 PROBLEM — M62.838 MUSCLE SPASM: Status: RESOLVED | Noted: 2022-06-23 | Resolved: 2023-07-21

## 2023-07-21 PROBLEM — Z01.818 ENCOUNTER FOR PRE-OPERATIVE EXAMINATION: Status: RESOLVED | Noted: 2023-02-06 | Resolved: 2023-07-21

## 2023-07-21 PROBLEM — Z96.652 TOTAL KNEE REPLACEMENT STATUS, LEFT: Status: RESOLVED | Noted: 2022-06-23 | Resolved: 2023-07-21

## 2023-07-21 PROBLEM — M25.512 PAIN, JOINT, SHOULDER, LEFT: Status: RESOLVED | Noted: 2023-02-06 | Resolved: 2023-07-21

## 2023-07-21 PROBLEM — R53.83 FATIGUE: Status: RESOLVED | Noted: 2023-02-24 | Resolved: 2023-07-21

## 2023-07-21 PROBLEM — J20.9 ACUTE BRONCHITIS: Status: RESOLVED | Noted: 2023-05-09 | Resolved: 2023-07-21

## 2023-07-21 PROBLEM — R07.9 CHEST PAIN: Status: RESOLVED | Noted: 2020-10-16 | Resolved: 2023-07-21

## 2023-07-21 PROBLEM — Z86.69 PERSONAL HISTORY OF OTHER DISEASES OF THE NERVOUS SYSTEM AND SENSE ORGANS: Status: RESOLVED | Noted: 2023-03-09 | Resolved: 2023-07-21

## 2023-07-21 PROBLEM — Z86.69 HISTORY OF NEUROMUSCULAR DISORDER: Chronic | Status: RESOLVED | Noted: 2023-02-24 | Resolved: 2023-07-21

## 2023-07-21 PROBLEM — M54.12 CERVICAL SPONDYLITIS WITH RADICULITIS: Status: RESOLVED | Noted: 2022-06-23 | Resolved: 2023-07-21

## 2023-07-21 PROBLEM — K57.92 DIVERTICULITIS: Status: RESOLVED | Noted: 2023-02-06 | Resolved: 2023-07-21

## 2023-07-21 PROBLEM — K59.00 CONSTIPATION: Status: RESOLVED | Noted: 2023-02-06 | Resolved: 2023-07-21

## 2023-07-21 PROBLEM — R06.00 DYSPNEA: Status: RESOLVED | Noted: 2022-10-28 | Resolved: 2023-07-21

## 2023-07-21 PROBLEM — M46.92 CERVICAL SPONDYLITIS WITH RADICULITIS: Status: RESOLVED | Noted: 2022-06-23 | Resolved: 2023-07-21

## 2023-07-21 PROBLEM — N28.9 ABNORMAL KIDNEY FUNCTION: Status: RESOLVED | Noted: 2023-05-09 | Resolved: 2023-07-21

## 2023-07-21 PROBLEM — M25.569 ARTHRALGIA OF KNEE: Status: RESOLVED | Noted: 2019-05-31 | Resolved: 2023-07-21

## 2023-07-21 LAB
ANION GAP SERPL CALCULATED.3IONS-SCNC: 12 MMOL/L (ref 5–15)
BUN SERPL-MCNC: 22 MG/DL (ref 8–23)
BUN/CREAT SERPL: 21.4 (ref 7–25)
CALCIUM SPEC-SCNC: 8.4 MG/DL (ref 8.6–10.5)
CHLORIDE SERPL-SCNC: 102 MMOL/L (ref 98–107)
CO2 SERPL-SCNC: 28 MMOL/L (ref 22–29)
CREAT SERPL-MCNC: 1.03 MG/DL (ref 0.57–1)
DEPRECATED RDW RBC AUTO: 52.7 FL (ref 37–54)
EGFRCR SERPLBLD CKD-EPI 2021: 55.8 ML/MIN/1.73
ERYTHROCYTE [DISTWIDTH] IN BLOOD BY AUTOMATED COUNT: 15.1 % (ref 12.3–15.4)
GLUCOSE BLDC GLUCOMTR-MCNC: 133 MG/DL (ref 70–130)
GLUCOSE BLDC GLUCOMTR-MCNC: 311 MG/DL (ref 70–130)
GLUCOSE BLDC GLUCOMTR-MCNC: 354 MG/DL (ref 70–130)
GLUCOSE BLDC GLUCOMTR-MCNC: 98 MG/DL (ref 70–130)
GLUCOSE SERPL-MCNC: 147 MG/DL (ref 65–99)
HCT VFR BLD AUTO: 29.7 % (ref 34–46.6)
HGB BLD-MCNC: 9.2 G/DL (ref 12–15.9)
MAGNESIUM SERPL-MCNC: 1.9 MG/DL (ref 1.6–2.4)
MCH RBC QN AUTO: 30.2 PG (ref 26.6–33)
MCHC RBC AUTO-ENTMCNC: 31 G/DL (ref 31.5–35.7)
MCV RBC AUTO: 97.4 FL (ref 79–97)
PLATELET # BLD AUTO: 188 10*3/MM3 (ref 140–450)
PMV BLD AUTO: 11.6 FL (ref 6–12)
POTASSIUM SERPL-SCNC: 3.8 MMOL/L (ref 3.5–5.2)
RBC # BLD AUTO: 3.05 10*6/MM3 (ref 3.77–5.28)
SODIUM SERPL-SCNC: 142 MMOL/L (ref 136–145)
WBC NRBC COR # BLD: 5.84 10*3/MM3 (ref 3.4–10.8)

## 2023-07-21 PROCEDURE — G0378 HOSPITAL OBSERVATION PER HR: HCPCS

## 2023-07-21 PROCEDURE — 82948 REAGENT STRIP/BLOOD GLUCOSE: CPT

## 2023-07-21 PROCEDURE — 63710000001 AZATHIOPRINE PER 50 MG: Performed by: INTERNAL MEDICINE

## 2023-07-21 PROCEDURE — 80048 BASIC METABOLIC PNL TOTAL CA: CPT | Performed by: INTERNAL MEDICINE

## 2023-07-21 PROCEDURE — 94664 DEMO&/EVAL PT USE INHALER: CPT

## 2023-07-21 PROCEDURE — 97116 GAIT TRAINING THERAPY: CPT

## 2023-07-21 PROCEDURE — 99222 1ST HOSP IP/OBS MODERATE 55: CPT | Performed by: PSYCHIATRY & NEUROLOGY

## 2023-07-21 PROCEDURE — 85027 COMPLETE CBC AUTOMATED: CPT | Performed by: INTERNAL MEDICINE

## 2023-07-21 PROCEDURE — 97161 PT EVAL LOW COMPLEX 20 MIN: CPT

## 2023-07-21 PROCEDURE — 94799 UNLISTED PULMONARY SVC/PX: CPT

## 2023-07-21 PROCEDURE — 25010000002 ENOXAPARIN PER 10 MG: Performed by: INTERNAL MEDICINE

## 2023-07-21 PROCEDURE — 97535 SELF CARE MNGMENT TRAINING: CPT

## 2023-07-21 PROCEDURE — 99222 1ST HOSP IP/OBS MODERATE 55: CPT | Performed by: INTERNAL MEDICINE

## 2023-07-21 PROCEDURE — 97165 OT EVAL LOW COMPLEX 30 MIN: CPT

## 2023-07-21 PROCEDURE — 99233 SBSQ HOSP IP/OBS HIGH 50: CPT | Performed by: INTERNAL MEDICINE

## 2023-07-21 PROCEDURE — 63710000001 INSULIN LISPRO (HUMAN) PER 5 UNITS: Performed by: INTERNAL MEDICINE

## 2023-07-21 PROCEDURE — 63710000001 INSULIN DETEMIR PER 5 UNITS: Performed by: INTERNAL MEDICINE

## 2023-07-21 PROCEDURE — 83735 ASSAY OF MAGNESIUM: CPT | Performed by: INTERNAL MEDICINE

## 2023-07-21 PROCEDURE — 94761 N-INVAS EAR/PLS OXIMETRY MLT: CPT

## 2023-07-21 RX ORDER — BUMETANIDE 0.5 MG/1
0.5 TABLET ORAL DAILY
Status: DISCONTINUED | OUTPATIENT
Start: 2023-07-22 | End: 2023-07-24

## 2023-07-21 RX ADMIN — AZATHIOPRINE 100 MG: 50 TABLET ORAL at 08:21

## 2023-07-21 RX ADMIN — TERAZOSIN HYDROCHLORIDE 8 MG: 1 CAPSULE ORAL at 21:39

## 2023-07-21 RX ADMIN — HYDRALAZINE HYDROCHLORIDE 100 MG: 25 TABLET, FILM COATED ORAL at 08:18

## 2023-07-21 RX ADMIN — FLUTICASONE PROPIONATE 2 SPRAY: 50 SPRAY, METERED NASAL at 08:21

## 2023-07-21 RX ADMIN — HYDRALAZINE HYDROCHLORIDE 100 MG: 25 TABLET, FILM COATED ORAL at 21:39

## 2023-07-21 RX ADMIN — INSULIN LISPRO 6 UNITS: 100 INJECTION, SOLUTION INTRAVENOUS; SUBCUTANEOUS at 11:51

## 2023-07-21 RX ADMIN — NEBIVOLOL 20 MG: 20 TABLET ORAL at 08:20

## 2023-07-21 RX ADMIN — AZATHIOPRINE 100 MG: 50 TABLET ORAL at 21:47

## 2023-07-21 RX ADMIN — BUDESONIDE AND FORMOTEROL FUMARATE DIHYDRATE 2 PUFF: 160; 4.5 AEROSOL RESPIRATORY (INHALATION) at 20:54

## 2023-07-21 RX ADMIN — LEVOTHYROXINE SODIUM 75 MCG: 0.07 TABLET ORAL at 08:20

## 2023-07-21 RX ADMIN — INSULIN LISPRO 5 UNITS: 100 INJECTION, SOLUTION INTRAVENOUS; SUBCUTANEOUS at 21:40

## 2023-07-21 RX ADMIN — BUMETANIDE 0.5 MG: 1 TABLET ORAL at 08:18

## 2023-07-21 RX ADMIN — ASPIRIN 81 MG: 81 TABLET, CHEWABLE ORAL at 08:20

## 2023-07-21 RX ADMIN — PRAVASTATIN SODIUM 20 MG: 40 TABLET ORAL at 21:39

## 2023-07-21 RX ADMIN — PANTOPRAZOLE SODIUM 40 MG: 40 TABLET, DELAYED RELEASE ORAL at 05:13

## 2023-07-21 RX ADMIN — Medication 10 ML: at 21:44

## 2023-07-21 RX ADMIN — GABAPENTIN 300 MG: 100 CAPSULE ORAL at 21:39

## 2023-07-21 RX ADMIN — ENOXAPARIN SODIUM 40 MG: 100 INJECTION SUBCUTANEOUS at 21:40

## 2023-07-21 RX ADMIN — Medication 10 ML: at 08:21

## 2023-07-21 RX ADMIN — SENNOSIDES AND DOCUSATE SODIUM 2 TABLET: 50; 8.6 TABLET ORAL at 21:39

## 2023-07-21 RX ADMIN — BUDESONIDE AND FORMOTEROL FUMARATE DIHYDRATE 2 PUFF: 160; 4.5 AEROSOL RESPIRATORY (INHALATION) at 07:36

## 2023-07-21 RX ADMIN — SACUBITRIL AND VALSARTAN 1 TABLET: 24; 26 TABLET, FILM COATED ORAL at 11:51

## 2023-07-21 RX ADMIN — SACUBITRIL AND VALSARTAN 1 TABLET: 24; 26 TABLET, FILM COATED ORAL at 21:39

## 2023-07-21 RX ADMIN — INSULIN DETEMIR 15 UNITS: 100 INJECTION, SOLUTION SUBCUTANEOUS at 21:40

## 2023-07-21 RX ADMIN — CETIRIZINE HYDROCHLORIDE 10 MG: 10 TABLET, FILM COATED ORAL at 08:20

## 2023-07-21 NOTE — CONSULTS
DOS: 2023  NAME: Marilyn Kunz   : 1945  PCP: Peter Baca MD  CC: Problems with gait and balance.  Referring MD: Lucas Garcia MD, Charly Hernandez MD    Neurological Problem and Interval History:  78 y.o. right-handed white female with a Hx of diabetic peripheral neuropathy as well as severe osteoarthritis of her joints and she also has psoriasis has been on disease modifying agents for some time and followed by rheumatologist as outpatient.  She was admitted with the cardiology service Dr. Yao Morgan, please review his consultation notes for the details.  Patient states that she has been having problems with her balance and although she has not taken any falls at home she feels that she is not steady with her cane anymore.  I discussed with her that she would be better off with a walker and today I walked her from her bed to the window and back and also the physical therapist confirmed that she is back at baseline.    Past Medical/Surgical Hx:  Past Medical History:   Diagnosis Date    Abnormal ECG Check [facility] Date or [facility]    Brought to  from [facility]    Acute sinusitis 2023    Anemia     Asthma     CAP (community acquired pneumonia) 2023    CHF (congestive heart failure) 2023    Chronic kidney disease     pt told it was dx from Dr Baca and to not eat much protein    Congenital heart disease 's Patent 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    Surgery [facility]  close    CTS (carpal tunnel syndrome)     Psoriatic arthritis hands could be    Deep vein thrombosis No on blood clots but have a blood clotting disorder called Leiden Factor 5    Disease of thyroid gland     Diverticulitis of colon     Factor 5 Leiden mutation, heterozygous     Head injury     Hyperlipidemia     Hypertension     Movement disorder 10/2021    knee replacement left knww    Murmur, cardiac     Neuropathy in diabetes     redness swelling legs    Peripheral  neuropathy 05/03/2022    hands, arms. shoulders, back    Pill esophagitis 02/06/2023    Psoriatic arthritis     hands    Sleep apnea Always    diabetic do not sleep well up and down    Stroke had one don't know when    showed on MRI Brain    Type 1 diabetes      Past Surgical History:   Procedure Laterality Date    CARDIAC CATHETERIZATION  1952 2 of them    Patent Dictus operation    CATARACT EXTRACTION Bilateral     COLONOSCOPY      ENDOSCOPY N/A 10/20/2020    Procedure: ESOPHAGOGASTRODUODENOSCOPY;  Surgeon: Brunner, Mark I, MD;  Location: [facility] ENDOSCOPY;  Service: Gastroenterology;  Laterality: N/A;    HAND SURGERY Left 1987    no hardware    PATENT DUCTUS ARTERIOUS LIGATION         Review of Systems:    Constitutional: Pleasant lady very petite in nature with a BMI of only 27 kg/m² and she is only 4 feet 11 inches in height.  Cardiovascular: Denies any chest pain or palpitations.  Respiratory: No shortness of breath noted.  Gastrointestinal: No nausea and vomiting noted.  Genitourinary: No incontinence noted.  Musculoskeletal: Has severe arthritic changes in her joints specially the wrists in the hands and her knees.  Dermatological: Has evidence of psoriasis.  Neurological: No focal neurological deficits.  Psychiatric: In good spirits denies any major anxiety or depression.  Ophthalmological: Denies any vision changes.          Medications On Admission  Medications Prior to Admission   Medication Sig Dispense Refill Last Dose    albuterol sulfate  (90 Base) MCG/ACT inhaler Inhale 2 puffs As Needed.   Past Week    aspirin 81 MG EC tablet Take 1 tablet by mouth Daily. 30 tablet 0 7/20/2023 at 0900    azaTHIOprine (IMURAN) 50 MG tablet TAKE 2 TABLETS TWICE DAILY 120 tablet 5 7/20/2023 at 0900    azelastine (ASTELIN) 0.1 % nasal spray 1 spray into the nostril(s) as directed by provider Daily As Needed.   7/20/2023 at 0900    bumetanide (BUMEX) 0.5 MG tablet 1 tablet 2 (Two) Times a Day.   Past Week     cloNIDine (CATAPRES) 0.1 MG tablet Take 1 tablet by mouth 3 (Three) Times a Day As Needed for High Blood Pressure (systolic BP greater than 180). 90 tablet 3 Past Week    Continuous Blood Gluc  (FreeStyle Marie 2 Spruce) device FreeStyle Marie 2 Spruce   change q 14 days   7/20/2023    Droplet Pen Needles 32G X 4 MM misc    7/20/2023    fluticasone (FLONASE) 50 MCG/ACT nasal spray 2 sprays by Each Nare route Daily. Shake well before using.   7/20/2023 at 0900    FreeStyle Precision Ant Test test strip    7/20/2023    hydrALAZINE (APRESOLINE) 100 MG tablet Take 1 tablet by mouth 2 (Two) Times a Day. 180 tablet 3 7/20/2023 at 0900    insulin aspart (novoLOG FLEXPEN) 100 UNIT/ML solution pen-injector sc pen Inject  under the skin into the appropriate area as directed 3 (Three) Times a Day With Meals. Sliding scale   7/20/2023    irbesartan (Avapro) 300 MG tablet Take 1 tablet by mouth Every Night. 90 tablet 6 7/20/2023 at 0900    levocetirizine (XYZAL) 5 MG tablet Take 1 tablet by mouth Every Evening.   7/20/2023 at 0900    levothyroxine (SYNTHROID, LEVOTHROID) 75 MCG tablet Take 1 tablet by mouth Daily.   7/20/2023 at 0900    montelukast (SINGULAIR) 10 MG tablet Take 1 tablet by mouth Every Night.   7/19/2023 at 2100    nebivolol (BYSTOLIC) 20 MG tablet Take 1 tablet by mouth Daily. 90 tablet 6 7/19/2023 at 2100    oxybutynin XL (DITROPAN-XL) 10 MG 24 hr tablet Take 1 tablet by mouth Daily.   7/20/2023 at 0900    pravastatin (Pravachol) 20 MG tablet Take 1 tablet by mouth Every Evening. 90 tablet 3 7/19/2023 at 2100    Secukinumab (Cosentyx) 150 MG/ML solution prefilled syringe Inject 1 mL under the skin into the appropriate area as directed Every 28 (Twenty-Eight) Days. 1/2 dose monthly   Past Month    Spacer/Aero-Holding Chambers (OptiChamber Paula-Lg Mask) device USE AS DIRECTED WITH MULTI DOSE INHALER   7/20/2023    terazosin (HYTRIN) 10 MG capsule Take 1 capsule by mouth Every Night. 30 capsule 5  7/19/2023 at 2100    tobramycin-dexamethasone (TOBRADEX) 0.3-0.1 % ophthalmic suspension INSTILL 1 DROP IN LEFT EYE 4 TIMES DAILY   7/20/2023    Tresiba FlexTouch 100 UNIT/ML solution pen-injector injection Inject 19 Units under the skin into the appropriate area as directed Every Night.   7/20/2023    triamcinolone (KENALOG) 0.1 % cream APPLY A THIN LAYER TO THE AFFECTED AREA TWICE DAILY FOR 1 WEEK THEN ONCE DAILY FOR 1 WEEK   Past Month    budesonide-formoterol (SYMBICORT) 160-4.5 MCG/ACT inhaler Inhale 1 puff 2 (Two) Times a Day.   More than a month       Allergies:  Allergies   Allergen Reactions    Atorvastatin Other (See Comments)    Colesevelam Other (See Comments)    Cymbalta [Duloxetine Hcl] Other (See Comments)     Hyponatremia    Cephalexin Rash     Tolerated Ceftriaxone    Hygroton [Chlorthalidone] Rash     Facial rash    Penicillins Nausea And Vomiting and Rash     Has tolerated ceftriaxone       Social Hx:  Social History     Socioeconomic History    Marital status: Single   Tobacco Use    Smoking status: Never    Smokeless tobacco: Never   Vaping Use    Vaping Use: Never used   Substance and Sexual Activity    Alcohol use: No    Drug use: No    Sexual activity: Not Currently     Partners: Male     Birth control/protection: Abstinence       Family Hx:  Family History   Problem Relation Age of Onset    Cancer Mother     Pancreatic cancer Mother     Stroke Mother         Mini strokes    Cancer Father     Lung cancer Father     Cancer Sister     Colon cancer Sister     Diabetes Sister     Breast cancer Neg Hx     Ovarian cancer Neg Hx        Review of Imaging (Interpretation of images not reports): An MRI of the brain performed with and without contrast on January 19, 2023 was reviewed as follows:    Findings:   There is no restricted diffusion. There is T2 signal in the left cerebellar hemisphere. There are some periventricular and deep white matter changes which have been noted. While nonspecific these  findings could reflect small vessel ischemic change   although a demyelinating process could not be excluded given the clinical history. There is no definite orbital abnormality. Pituitary is not enlarged. The paranasal sinuses seem relatively clear. On the postcontrast images there is no abnormal   enhancement.      IMPRESSION:  Impression:   1.There are white matter changes involving the cerebral hemispheres similar in appearance to prior study. There also is signal within the left cerebellar hemisphere. This has been noted as well. These findings could be more reflective of small vessel   ischemic change as opposed to demyelinating process. Correlation with patient's clinical history would be recommended. Please see icometrix data sheet for white matter abnormality volume assessment.    CT Head Without Contrast    Result Date: 7/20/2023  CT HEAD WO CONTRAST Date of Exam: 7/20/2023 11:23 AM EDT Indication: fatigue. Comparison: None available. Technique: Axial CT images were obtained of the head without contrast administration.  Automated exposure control and iterative construction methods were used. Findings: There is prominence of the ventricles and sulci suggestive of atrophy. There our areas of hypodensity within the periventricular white matter, corona radiata and the left greater than right cerebellar hemispheres. Additionally, there is a lacunar infarct  within the left basal ganglia. Findings are suggestive of changes of chronic microvascular ischemic disease. No evidence of hemorrhage. No midline shift or mass effect is identified. Orbits paranasal sinuses and mastoid air cells are unremarkable.     Impression: Impression: 1. Atrophy, but no acute intracranial pathology. Electronically Signed: Robert Grove  7/20/2023 11:39 AM EDT  Workstation ID: [id number]              Additional Tests Performed: An EEG performed on February 10, 2023 showed the following:    Findings:     The awake tracing shows diffuse low  amplitude intermixed theta and alpha activity which is present symmetrically over both hemispheres.  EMG and eye blink artifact are seen over the anterior leads.  Photic stimulation does not change the background.  Drowsiness is seen with mild slowing of the tracing but stage II sleep is not seen.  No focal features or epileptiform activity are seen.  Hyperventilation is not performed.     Video: On     Technical quality: Superior     EKG: Regular, 50-60 bpm     SUMMARY:     Normal EEG in the awake and lightly drowsy states     No focal features or epileptiform activity are seen     IMPRESSION:     Normal study    Results for orders placed during the hospital encounter of 02/24/23    Adult Transthoracic Echo Complete W/ Cont if Necessary Per Protocol    Interpretation Summary    Left ventricular ejection fraction appears to be 61 - 65%.    Estimated right ventricular systolic pressure from tricuspid regurgitation is mildly elevated (35-45 mmHg).       Results for orders placed during the hospital encounter of 08/03/22    Duplex Renal Artery - Bilateral Complete CAR    Interpretation Summary  · No hemodynamically significant renal artery stenosis bilaterally.  · Resistive indices within normal limits.  · Renal veins patent       Laboratory Results:   Lab Results   Component Value Date    GLUCOSE 147 (H) 07/21/2023    CALCIUM 8.4 (L) 07/21/2023     07/21/2023    K 3.8 07/21/2023    CO2 28.0 07/21/2023     07/21/2023    BUN 22 07/21/2023    CREATININE 1.03 (H) 07/21/2023    EGFRIFNONA 63 10/22/2020    BCR 21.4 07/21/2023    ANIONGAP 12.0 07/21/2023     Lab Results   Component Value Date    WBC 5.84 07/21/2023    HGB 9.2 (L) 07/21/2023    HCT 29.7 (L) 07/21/2023    MCV 97.4 (H) 07/21/2023     07/21/2023     Lab Results   Component Value Date    CHOL 158 03/04/2023    CHOL 167 05/05/2022     Lab Results   Component Value Date    HDL 64 (H) 03/04/2023    HDL 79 (H) 05/05/2022     Lab Results   Component  "Value Date    LDL 82 03/04/2023    LDL 76 05/05/2022     Lab Results   Component Value Date    TRIG 58 03/04/2023    TRIG 63 05/05/2022     Lab Results   Component Value Date    HGBA1C 8.00 (H) 07/20/2023     No results found for: INR, PROTIME  Lab Results   Component Value Date    NNVSQAQY85 633 05/06/2022     No results found for: FOLATE  TSH          2/24/2023    10:50 7/20/2023    14:02   TSH   TSH 2.770  3.590       Brief Urine Lab Results  (Last result in the past 365 days)        Color   Clarity   Blood   Leuk Est   Nitrite   Protein   CREAT   Urine HCG        07/20/23 1206 Yellow   Clear   Negative   Negative   Negative   Negative                  Result Review:  I have personally reviewed the results from the time of this admission to 7/21/2023 13:36 EDT and agree with these findings:  [x]  Laboratory list / accordion  [x]  Microbiology  [x]  Radiology  [x]  EKG/Telemetry   [x]  Cardiology/Vascular   [x]  Pathology  [x]  Old records  []  Other:  Most notable findings include: On ambulation with a walker she is back to baseline and this was confirmed by our physical therapist.         Physical Examination:  /57 (BP Location: Left arm, Patient Position: Lying)   Pulse 61   Temp 97.6 °F (36.4 °C) (Oral)   Resp 16   Ht 149.9 cm (59\")   Wt 60.5 kg (133 lb 6.4 oz)   SpO2 94%   BMI 26.94 kg/m²   General Appearance:   Well developed, well nourished, well groomed, alert, and cooperative.  HEENT: Normocephalic.  Wears corrective lenses.  Neck and Spine: Normal range of motion.  Normal alignment. No mass or tenderness. No bruits.    Extremities:    Severe arthritic changes in both wrists and hands and also in the feet.  Skin:    No rashes or birth marks.    Neurological examination:  Higher Integrative  Function: Oriented to time, place and person. Normal registration, recall, attention span and concentration. Normal language including comprehension, spontaneous speech, repetition, reading, writing, " naming and vocabulary. No neglect with normal visual-spatial function and construction. Normal fund of knowledge and higher integrative function.  CN II:  Pupils are equal, round, and reactive to light. Normal visual acuity and visual fields.    CN III IV VI: Extraocular movements are full without nystagmus.   CN V:  Normal facial sensation and strength of muscles of mastication.  CN VII:  Facial movements are symmetric. No weakness.  CN VIII: Auditory acuity is normal.  CN IX & X: Symmetric palatal movement.  CN XI:  Sternocleidomastoid and trapezius are normal.  No weakness.  CN XII:  The tongue is midline.  No atrophy or fasciculations.  Motor:  Normal muscle strength, bulk and tone in upper and lower extremities.  No fasciculations, rigidity, spasticity, or abnormal movements.  Sensation: Normal to light touch, pinprick, vibration, temperature, and proprioception in arms and legs. Normal graphesthesia and no extinction on DSS.  Station and Gait: Was able to get up and ambulate independently with a gait belt and contact-guard assistance with a walker the entire hallway..    Coordination:  Finger to nose test shows no dysmetria.   Heel to shin normal.  Deep tendon reflexes: 2+ bilaterally with downgoing toes.     Diagnoses / Discussion:  78 y.o. who presents with Sx of severe psoriatic arthritis involving her joints.  However gait wise she seems to be back to baseline.  In addition she also has diabetic peripheral neuropathy.    Plan:  Requested an MRI of the brain with and without contrast to look for any new changes compared to the one done in January.    For the peripheral neuropathy I discussed about getting electrodiagnostic studies performed as an outpatient in the neurology clinic with Dr Charly Hernandez.    To follow-up with outpatient neurology once discharged from the hospital..     I have discussed the above with the patient and family.  Time spent with patient: 70 minutes in face-to-face evaluation and  management of the patient.  Electronically signed by Kevyn Quiroga MD, 07/21/23, 1:36 PM EDT.    Dictated using Dragon dictation.

## 2023-07-21 NOTE — THERAPY EVALUATION
Patient Name: Marilyn Kunz  : 1945    MRN: [id number]                              Today's Date: 2023       Admit Date: 2023    Visit Dx:     ICD-10-CM ICD-9-CM   1. Acute congestive heart failure, unspecified heart failure type  I50.9 428.0   2. Shortness of breath  R06.02 786.05   3. Fatigue, unspecified type  R53.83 780.79   4. Stage 3a chronic kidney disease  N18.31 585.3   5. History of asthma  Z87.09 V12.69     Patient Active Problem List   Diagnosis    Essential hypertension    Factor V Leiden    Psoriatic arthritis    At risk for venous thromboembolism (VTE)    Hypothyroidism    CKD (chronic kidney disease) stage 3, GFR 30-59 ml/min    Hyperlipidemia    PDA (patent ductus arteriosus)    Demyelinating disease    Uncontrolled hypertension    Spondylolisthesis, grade 2    DM (diabetes mellitus), type 1    History of stroke    Acute respiratory failure with hypoxia    Demyelinating disease of central nervous system, unspecified    Acute on chronic diastolic congestive heart failure    Acute exacerbation of CHF (congestive heart failure)     Past Medical History:   Diagnosis Date    Abnormal ECG Check A Date or [facility]    Brought to  from [facility]    Acute sinusitis 2023    Anemia     Asthma     CAP (community acquired pneumonia) 2023    CHF (congestive heart failure) 2023    Chronic kidney disease     pt told it was dx from Dr Baca and to not eat much protein    Congenital heart disease s Patent 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    Surgery [facility]  close    CTS (carpal tunnel syndrome)     Psoriatic arthritis hands could be    Deep vein thrombosis No on blood clots but have a blood clotting disorder called Leiden Factor 5    Disease of thyroid gland     Diverticulitis of colon     Factor 5 Leiden mutation, heterozygous     Head injury     Hyperlipidemia     Hypertension     Movement disorder 10/2021    knee replacement left knww     Murmur, cardiac     Neuropathy in diabetes     redness swelling legs    Peripheral neuropathy 05/03/2022    hands, arms. shoulders, back    Pill esophagitis 02/06/2023    Psoriatic arthritis     hands    Sleep apnea Always    diabetic do not sleep well up and down    Stroke had one don't know when    showed on MRI Brain    Type 1 diabetes      Past Surgical History:   Procedure Laterality Date    CARDIAC CATHETERIZATION  1952 2 of them    Patent Dictus operation    CATARACT EXTRACTION Bilateral     COLONOSCOPY      ENDOSCOPY N/A 10/20/2020    Procedure: ESOPHAGOGASTRODUODENOSCOPY;  Surgeon: Brunner, Mark I, MD;  Location: [facility] ENDOSCOPY;  Service: Gastroenterology;  Laterality: N/A;    HAND SURGERY Left 1987    no hardware    PATENT DUCTUS ARTERIOUS LIGATION        General Information       Row Name 07/21/23 0855          Physical Therapy Time and Intention    Document Type evaluation  -KR     Mode of Treatment physical therapy  -KR       Row Name 07/21/23 0855          General Information    Patient Profile Reviewed yes  -KR     Prior Level of Function independent:;all household mobility;ADL's;using stairs;home management  owns FWW and quad cane. Has been using quad cane within the last 2 weeks d/t instability, otherwise uses no AD.  -KR     Existing Precautions/Restrictions fall  -KR     Barriers to Rehab medically complex;previous functional deficit  -KR       Row Name 07/21/23 0855          Living Environment    People in Home alone  -KR       Row Name 07/21/23 0855          Home Main Entrance    Number of Stairs, Main Entrance one  -KR     Stair Railings, Main Entrance none  -KR       Row Name 07/21/23 0855          Stairs Within Home, Primary    Number of Stairs, Within Home, Primary none  -KR       Row Name 07/21/23 0855          Cognition    Orientation Status (Cognition) oriented x 4  -KR       Row Name 07/21/23 0855          Safety Issues, Functional Mobility    Safety Issues Affecting Function  (Mobility) insight into deficits/self-awareness;positioning of assistive device;problem-solving;safety precaution awareness;sequencing abilities;safety precautions follow-through/compliance  -KR     Impairments Affecting Function (Mobility) balance;endurance/activity tolerance;strength  -KR               User Key  (r) = Recorded By, (t) = Taken By, (c) = Cosigned By      Initials Name Provider Type    Jackelyn Miguel PT Physical Therapist                   Mobility       Row Name 07/21/23 0943          Sit-Stand Transfer    Sit-Stand [place] (Transfers) contact guard  -KR     Assistive Device (Sit-Stand Transfers) walker, front-wheeled  -KR     Comment, (Sit-Stand Transfer) 1x from bed, 1x from chair. Cues for UE push up from surface to stand and to reach back to surface to sit.  -KR       Row Name 07/21/23 0943          Gait/Stairs (Locomotion)    [place] Level (Gait) contact guard  -KR     Assistive Device (Gait) walker, front-wheeled  -KR     Distance in Feet (Gait) 5 + 60 + 60  -KR     Deviations/Abnormal Patterns (Gait) sam decreased;gait speed decreased;stride length decreased  -KR     [place] Level (Stairs) contact guard  -KR     Assistive Device (Stairs) walker, front-wheeled  -KR     Number of Steps (Stairs) 1  -KR     Comment, (Gait/Stairs) attempted short-distance with R quad cane, however pt with noteable unsteadiness, thus remainder of ambulation with FWW. Cues for positioning inside FWW and focus on breathing throughout ambulation. O2 90% on RA following ambulation with quick return to 95% following seated rest. Pt instructed to ascend step into house backwards with FWW/descend forwards. Pt with good safety awareness and balance with curb step navigation.  -KR               User Key  (r) = Recorded By, (t) = Taken By, (c) = Cosigned By      Initials Name Provider Type    Jackelyn Miguel PT Physical Therapist                   Obj/Interventions       Row Name 07/21/23 0915           Range of Motion Comprehensive    General Range of Motion bilateral lower extremity ROM WNL  -KR       Row Name 07/21/23 0948          Strength Comprehensive (MMT)    General Manual Muscle Testing (MMT) Assessment lower extremity strength deficits identified  -KR     Comment, General Manual Muscle Testing (MMT) Assessment RLE grossly 4+/5, L hip flexion 4+/5, L knee extension 4-/5, L knee flexion 4/5, L ankle DF 4-/5, L ankle PF 4/5  -KR       Row Name 07/21/23 0948          Balance    Balance Assessment sitting static balance;sitting dynamic balance;standing static balance;standing dynamic balance  -KR     Static Sitting Balance standby assist  -KR     Dynamic Sitting Balance standby assist  -KR     Position, Sitting Balance unsupported;sitting in chair;sitting edge of bed  -KR     Static Standing Balance contact guard  -KR     Dynamic Standing Balance contact guard  -KR     Position/Device Used, Standing Balance supported;walker, front-wheeled  -KR       Row Name 07/21/23 0948          Sensory Assessment (Somatosensory)    Sensory Assessment (Somatosensory) bilateral LE  -KR     Bilateral LE Sensory Assessment light touch awareness;light touch localization;proprioception;intact  -KR               User Key  (r) = Recorded By, (t) = Taken By, (c) = Cosigned By      Initials Name Provider Type    Jackelyn Miguel, PT Physical Therapist                   Goals/Plan       Row Name 07/21/23 0952          Bed Mobility Goal 1 (PT)    Activity/Assistive Device (Bed Mobility Goal 1, PT) bed mobility activities, all  -KR     [place] Level/Cues Needed (Bed Mobility Goal 1, PT) independent  -KR     Time Frame (Bed Mobility Goal 1, PT) long term goal (LTG);2 weeks  -KR       Row Name 07/21/23 0952          Transfer Goal 1 (PT)    Activity/Assistive Device (Transfer Goal 1, PT) sit-to-stand/stand-to-sit;bed-to-chair/chair-to-bed;walker, rolling  -KR     [place] Level/Cues Needed (Transfer Goal 1, PT) modified  independence  -KR     Time Frame (Transfer Goal 1, PT) long term goal (LTG);2 weeks  -KR       Row Name 07/21/23 0952          Gait Training Goal 1 (PT)    Activity/Assistive Device (Gait Training Goal 1, PT) gait (walking locomotion);improve balance and speed;increase endurance/gait distance;assistive device use;walker, rolling  -KR     [place] Level (Gait Training Goal 1, PT) modified independence  -KR     Distance (Gait Training Goal 1, PT) 150  -KR     Time Frame (Gait Training Goal 1, PT) long term goal (LTG);2 weeks  -KR       Row Name 07/21/23 0952          Stairs Goal 1 (PT)    Activity/Assistive Device (Stairs Goal 1, PT) stairs, all skills;walker, rolling  -KR     [place] Level/Cues Needed (Stairs Goal 1, PT) modified independence  -KR     Number of Stairs (Stairs Goal 1, PT) 1  -KR     Time Frame (Stairs Goal 1, PT) long term goal (LTG);2 weeks  -KR       Row Name 07/21/23 0952          Therapy Assessment/Plan (PT)    Planned Therapy Interventions (PT) balance training;bed mobility training;gait training;home exercise program;manual therapy techniques;postural re-education;patient/family education;neuromuscular re-education;ROM (range of motion);stair training;strengthening;stretching;transfer training  -KR               User Key  (r) = Recorded By, (t) = Taken By, (c) = Cosigned By      Initials Name Provider Type    Jackelyn Miguel, PT Physical Therapist                   Clinical Impression       Row Name 07/21/23 0940          Pain    Pretreatment Pain Rating 0/10 - no pain  -KR     Posttreatment Pain Rating 0/10 - no pain  -KR       Row Name 07/21/23 0950          Plan of Care Review    Plan of Care Reviewed With patient  -KR     Outcome Evaluation Pt presents with balance and endurance below baseline contributing to impairments in transfers, ambulation, and overall fxnl mobility. Pt will benefit from PT to address aforementioned deficits and return to PLOF. PT rec home with HH PT upon  dc.  -KR       Row Name 07/21/23 0950          Therapy Assessment/Plan (PT)    Rehab Potential (PT) good, to achieve stated therapy goals  -KR     Criteria for Skilled Interventions Met (PT) yes;skilled treatment is necessary  -KR     Therapy Frequency (PT) daily  -KR       Row Name 07/21/23 0950          Vital Signs    Pre Systolic BP Rehab 142  -KR     Pre Treatment Diastolic BP 63  -KR     Intra SpO2 (%) 90  -KR     O2 Delivery Intra Treatment room air  -KR     Post SpO2 (%) 96  -KR     O2 Delivery Post Treatment room air  -KR     Pre Patient Position Sitting  -KR     Intra Patient Position Standing  -KR     Post Patient Position Sitting  -KR       Row Name 07/21/23 0950          Positioning and Restraints    Pre-Treatment Position in bed  -KR     Post Treatment Position chair  -KR     In Chair notified nsg;reclined;call light within reach;encouraged to call for assist;exit alarm on;waffle cushion;legs elevated  -KR               User Key  (r) = Recorded By, (t) = Taken By, (c) = Cosigned By      Initials Name Provider Type    Jackelyn Miguel, PT Physical Therapist                   Outcome Measures       Row Name 07/21/23 0952 07/21/23 0823       How much help from another person do you currently need...    Turning from your back to your side while in flat bed without using bedrails? 4  -KR 4  -SN    Moving from lying on back to sitting on the side of a flat bed without bedrails? 4  -KR 4  -SN    Moving to and from a bed to a chair (including a wheelchair)? 3  -KR 3  -SN    Standing up from a chair using your arms (e.g., wheelchair, bedside chair)? 3  -KR 3  -SN    Climbing 3-5 steps with a railing? 3  -KR 2  -SN    To walk in hospital room? 3  -KR 2  -SN    AM-PAC 6 Clicks Score (PT) 20  -KR 18  -SN    Highest level of mobility 6 --> Walked 10 steps or more  -KR 6 --> Walked 10 steps or more  -SN              User Key  (r) = Recorded By, (t) = Taken By, (c) = Cosigned By      Initials Name Provider Type     Sole [place], RN Registered Nurse    Jackelyn Miguel, PT Physical Therapist                                 Physical Therapy Education       Title: PT OT SLP Therapies (In Progress)       Topic: Physical Therapy (Done)       Point: Mobility training (Done)       Learning Progress Summary             Patient Acceptance, E, VU by SHAHZAD at 7/21/2023 0953                         Point: Home exercise program (Done)       Learning Progress Summary             Patient Acceptance, E, VU by SHAHZAD at 7/21/2023 0953                         Point: Body mechanics (Done)       Learning Progress Summary             Patient Acceptance, E, VU by SHAHZAD at 7/21/2023 0953                         Point: Precautions (Done)       Learning Progress Summary             Patient Acceptance, E, VU by SHAHZAD at 7/21/2023 0953                                         User Key       Initials Effective Dates Name Provider Type Discipline    SHAHZAD 12/30/22 -  Jackelyn Mccormick PT Physical Therapist PT                  PT Recommendation and Plan  Planned Therapy Interventions (PT): balance training, bed mobility training, gait training, home exercise program, manual therapy techniques, postural re-education, patient/family education, neuromuscular re-education, ROM (range of motion), stair training, strengthening, stretching, transfer training  Plan of Care Reviewed With: patient  Outcome Evaluation: Pt presents with balance and endurance below baseline contributing to impairments in transfers, ambulation, and overall fxnl mobility. Pt will benefit from PT to address aforementioned deficits and return to PLOF. PT rec home with HH PT upon dc.     Time Calculation:   PT Evaluation Complexity  History, PT Evaluation Complexity: 3 or more personal factors and/or comorbidities  Examination of Body Systems (PT Eval Complexity): total of 4 or more elements  Clinical Presentation (PT Evaluation Complexity): stable  Clinical Decision Making (PT Evaluation Complexity):  low complexity  Overall Complexity (PT Evaluation Complexity): low complexity     PT Charges       Row Name 07/21/23 0855             Time Calculation    Start Time 0855  -KR      PT Received On 07/21/23  -KR      PT Goal Re-Cert Due Date 07/31/23  -KR         Timed Charges    74870 - Gait Training Minutes  8  -KR         Untimed Charges    PT Eval/Re-eval Minutes 46  -KR         Total Minutes    Timed Charges Total Minutes 8  -KR      Untimed Charges Total Minutes 46  -KR       Total Minutes 54  -KR                User Key  (r) = Recorded By, (t) = Taken By, (c) = Cosigned By      Initials Name Provider Type    KR Jackelyn Mccormick, PT Physical Therapist                  Therapy Charges for Today       Code Description Service Date Service Provider Modifiers Qty    79211117528 HC GAIT TRAINING EA 15 MIN 7/21/2023 Jackelyn Mccormick, PT GP 1    88492196027 HC PT EVAL LOW COMPLEXITY 4 7/21/2023 Jackelyn Mccormick, PT GP 1            PT G-Codes  AM-PAC 6 Clicks Score (PT): 20  PT Discharge Summary  Anticipated Discharge Disposition (PT): home with home health    Jackelyn Mccormick, PT  7/21/2023

## 2023-07-21 NOTE — CONSULTS
Consults    [facility] at [facility]  Cardiovascular Consultation Note    Reason for consultation: CHF and uncontrolled hypertension     Patient is a 78-year-old female with a history of type 1 diabetes mellitus, hypertension, hyperlipidemia, psoriatic arthritis, hypertension, factor V Leiden, surgery for patent ductus arteriosus at age 11, demyelinating disease, COPD and chronic kidney disease who we are being asked to consult for acute CHF and uncontrolled hypertension.  Patient presented to [facility] last evening with complaints of extreme fatigue and shortness of breath.  The patient was hospitalized in March for hyponatremia at which time chlorthalidone was discontinued.  She contacted our office reporting swelling from amlodipine and this was discontinued.  She then began having issues with hypertension and was started on hydralazine which has been titrated up.  She was also given clonidine to use as needed.  Terazosin was also increased.  Despite these changes she has continued to have high blood pressures.  The patient had Bumex ordered as needed but had been taking it daily.  She recently saw nephrology who recommended she stop it.  After stopping the diuretic she has had worsening shortness of breath and lower extremity edema prompting her visit to the ED.  She reports for the past month she has had weakness and difficulty with her balance.  She actually had an appointment with neurology today but had to cancel it because of her hospitalization.  She also reports numbness and tingling in her bilateral arms and in her head.  CT of the head showed chronic microvascular disease but no acute intracranial pathology.  CT angiogram of the chest was negative for pulmonary embolism but did show pulmonary edema and moderate bilateral pleural effusions.  proBNP was elevated at 3386.  High-sensitivity troponin was minimally elevated and flat.  EKG showed normal sinus rhythm with  PACs but no acute ischemic changes.  Initial blood pressure on arrival was elevated at 186/79.  She has been treated with 2 mg IV Bumex and started on p.o. dosing 0.5 mg twice daily.  Current blood pressure is improved at 142/63.  Patient is currently sitting up in the chair reports breathing has improved since receiving IV Bumex.  She is currently working with physical therapy.  She denies any chest pain/pressure/tightness or heaviness, palpitations, presyncope or syncope.    Cardiac risk factors: Diabetes mellitus, hypertension, hyperlipidemia, advanced age    Past medical and surgical history, social and family history reviewed in EMR.    REVIEW OF SYSTEMS:   H&P ROS reviewed and pertinent CV ROS as noted in HPI.         Vital Sign Min/Max for last 24 hours  Temp  Min: 97.8 °F (36.6 °C)  Max: 98.7 °F (37.1 °C)   BP  Min: 132/93  Max: 186/79   Pulse  Min: 53  Max: 93   Resp  Min: 16  Max: 20   SpO2  Min: 86 %  Max: 98 %   No data recorded      Intake/Output Summary (Last 24 hours) at 7/21/2023 0905  Last data filed at 7/21/2023 0553  Gross per 24 hour   Intake 1575 ml   Output 2900 ml   Net -1325 ml           Constitutional:       Appearance: Healthy appearance. Well-developed.   Eyes:      General: Lids are normal. No scleral icterus.     Conjunctiva/sclera: Conjunctivae normal.   HENT:      Head: Normocephalic and atraumatic.   Neck:      Thyroid: No thyromegaly.      Vascular: No carotid bruit or JVD.   Pulmonary:      Effort: Pulmonary effort is normal.      Breath sounds: Normal breath sounds. No wheezing. No rhonchi. No rales.   Cardiovascular:      Normal rate. Regular rhythm.      Murmurs: There is no murmur.      No gallop.  No rub.   Pulses:     Intact distal pulses.   Edema:     Peripheral edema absent.   Abdominal:      General: There is no distension.      Palpations: Abdomen is soft. There is no abdominal mass.   Musculoskeletal:      Cervical back: Normal range of motion. Skin:     General: Skin is  warm and dry.      Findings: No rash.   Neurological:      General: No focal deficit present.      Mental Status: Alert and oriented to person, place, and time.      Gait: Gait is intact.   Psychiatric:         Attention and Perception: Attention normal.         Mood and Affect: Mood normal.         Behavior: Behavior normal.        EK2023: Sinus rhythm with PACs, no acute ischemic changes    Lab Review:   Labs reviewed in the electronic medical record.  Pertinent findings include:  Lab Results   Component Value Date    GLUCOSE 147 (H) 2023    BUN 22 2023    CREATININE 1.03 (H) 2023    EGFR 55.8 (L) 2023    BCR 21.4 2023    K 3.8 2023    CO2 28.0 2023    CALCIUM 8.4 (L) 2023    PROTENTOTREF 7.9 2022    ALBUMIN 3.7 2023    BILITOT 1.3 (H) 2023    AST 21 2023    ALT 16 2023     Lab Results   Component Value Date    WBC 5.84 2023    HGB 9.2 (L) 2023    HCT 29.7 (L) 2023    MCV 97.4 (H) 2023     2023     Lab Results   Component Value Date    CHOL 158 2023    TRIG 58 2023    HDL 64 (H) 2023    LDL 82 2023     Results from last 7 days   Lab Units 23  1157   HEMOGLOBIN A1C % 8.00*            Active Hospital Problems    Diagnosis     **Acute on chronic diastolic congestive heart failure      Echo (2023):LVEF=61 - 65%.  RVSP 35-45 mmHg.  CT angiogram of the chest (2023): Worsened pulmonary edema and moderate bilateral pleural effusions.  No PE      Acute respiratory failure with hypoxia     Uncontrolled hypertension      Increased lisinopril  and amlodipine 2021. Monitor at home.  D/C lisinopril, add irbestartan/hctz. 2022. Monitor at home.      DM (diabetes mellitus), type 1      This Chronic Condition affected the medical decision making - stable - follow up as needed. Followed by specialist Dr. Reid  10/2018 A1C= 9.5      Essential hypertension     CKD  (chronic kidney disease) stage 3, GFR 30-59 ml/min     Factor V Leiden     Demyelinating disease      Dr Hernandez      Hypothyroidism     Hyperlipidemia                Acute diastolic heart failure  Bumex 0.5 mg p.o. twice daily  Bisoprolol 20 mg daily  Start Entresto 24/26 mg 1 tablet twice daily  Strict I's and O's and daily weights      Uncontrolled hypertension  Bisoprolol 20 mg daily  Terazosin 4 mg nightly  Hydralazine 100 mg twice daily  Start Entresto 24/26 mg 1 tablet twice daily  Monitor creatinine closely      Type 1 diabetes mellitus  Hemoglobin A1c 8      Chronic kidney disease  Current creatinine 1.03.  GFR 55.8      Factor V Leiden  Aspirin 81 mg daily      Hyperlipidemia  Pravastatin 20 mg daily        JAGDISH Hdez    Patient seen and examined in a face-to-face encounter.  Reports symptomatic improvement after IV diuresis.  Now breathing comfortably on room air.  Previous home dose of Bumex 0.5 mg twice daily was restarted.    We will change her ARB to Entresto 24/26 mg twice daily.  Will titrate as needed for goal systolic blood pressure less than 140.    We will follow her sodium levels on diuresis.  History of significant hyponatremia in the past.    If patient continues to exhibit better blood pressure control and improvement in symptoms possible candidate for discharge home tomorrow on new BP regimen detailed above    I, Yao Maya M.D.,  have reviewed the notes, assessments, and/or procedures performed by JAGDISH/PA, I CONCUR with the documentation of Marilyn Kunz.

## 2023-07-21 NOTE — PLAN OF CARE
Goal Outcome Evaluation:  Plan of Care Reviewed With: patient        Progress: no change  Outcome Evaluation: Pt presents near recent baseline with strength and endurance deficits, OT will follow to promote return to PLOF while admitted. Rec d/c to home with assist and HHOT/PT.      Anticipated Discharge Disposition (OT): home with assist, home with home health

## 2023-07-21 NOTE — PLAN OF CARE
Goal Outcome Evaluation:  Plan of Care Reviewed With: patient           Outcome Evaluation: Pt presents with balance and endurance below baseline contributing to impairments in transfers, ambulation, and overall fxnl mobility. Pt will benefit from PT to address aforementioned deficits and return to PLOF. PT rec home with HH PT upon dc.      Anticipated Discharge Disposition (PT): home with home health

## 2023-07-21 NOTE — CASE MANAGEMENT/SOCIAL WORK
Continued Stay Note  [facility]     Patient Name: Marilyn Kunz  MRN: [id number]  Today's Date: 7/21/2023    Admit Date: 7/20/2023    Plan: home with    Discharge Plan       Row Name 07/21/23 1128       Plan    Plan home with     Patient/Family in Agreement with Plan yes    Plan Comments Pt lives alone in [facility]. She reports she uses a cane or her walker for mobility and is independent with ADLs. She is followed by her PCP and has drug coverage. At this time her plan for discharge is to return home with .  has arranged this with [facility] who is the preferred group with her insurance. No other discharge needs at this time.    Final Discharge Disposition Code 06 - home with home health care                   Discharge Codes    No documentation.                       Cristal Moeller RN

## 2023-07-21 NOTE — THERAPY EVALUATION
Patient Name: Marilyn Kunz  : 1945    MRN: [id number]                              Today's Date: 2023       Admit Date: 2023    Visit Dx:     ICD-10-CM ICD-9-CM   1. Acute congestive heart failure, unspecified heart failure type  I50.9 428.0   2. Shortness of breath  R06.02 786.05   3. Fatigue, unspecified type  R53.83 780.79   4. Stage 3a chronic kidney disease  N18.31 585.3   5. History of asthma  Z87.09 V12.69     Patient Active Problem List   Diagnosis    Essential hypertension    Factor V Leiden    Psoriatic arthritis    At risk for venous thromboembolism (VTE)    Hypothyroidism    CKD (chronic kidney disease) stage 3, GFR 30-59 ml/min    Hyperlipidemia    PDA (patent ductus arteriosus)    Demyelinating disease    Uncontrolled hypertension    Spondylolisthesis, grade 2    DM (diabetes mellitus), type 1    History of stroke    Acute respiratory failure with hypoxia    Demyelinating disease of central nervous system, unspecified    Acute on chronic diastolic congestive heart failure    Acute exacerbation of CHF (congestive heart failure)     Past Medical History:   Diagnosis Date    Abnormal ECG Check A Date or [facility]    Brought to  from [facility]    Acute sinusitis 2023    Anemia     Asthma     CAP (community acquired pneumonia) 2023    CHF (congestive heart failure) 2023    Chronic kidney disease     pt told it was dx from Dr Baca and to not eat much protein    Congenital heart disease s Patent 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    Surgery [facility]  close    CTS (carpal tunnel syndrome)     Psoriatic arthritis hands could be    Deep vein thrombosis No on blood clots but have a blood clotting disorder called Leiden Factor 5    Disease of thyroid gland     Diverticulitis of colon     Factor 5 Leiden mutation, heterozygous     Head injury     Hyperlipidemia     Hypertension     Movement disorder 10/2021    knee replacement left knww     Murmur, cardiac     Neuropathy in diabetes     redness swelling legs    Peripheral neuropathy 05/03/2022    hands, arms. shoulders, back    Pill esophagitis 02/06/2023    Psoriatic arthritis     hands    Sleep apnea Always    diabetic do not sleep well up and down    Stroke had one don't know when    showed on MRI Brain    Type 1 diabetes      Past Surgical History:   Procedure Laterality Date    CARDIAC CATHETERIZATION  1952 2 of them    Patent Dictus operation    CATARACT EXTRACTION Bilateral     COLONOSCOPY      ENDOSCOPY N/A 10/20/2020    Procedure: ESOPHAGOGASTRODUODENOSCOPY;  Surgeon: Brunner, Mark I, MD;  Location: [facility] ENDOSCOPY;  Service: Gastroenterology;  Laterality: N/A;    HAND SURGERY Left 1987    no hardware    PATENT DUCTUS ARTERIOUS LIGATION        General Information       Row Name 07/21/23 1055          OT Time and Intention    Document Type evaluation  -CS     Mode of Treatment occupational therapy  -CS       Row Name 07/21/23 1055          General Information    Patient Profile Reviewed yes  -CS     Prior Level of Function independent:;all household mobility;ADL's;using stairs  Pt reports little use of AD for mobility (QC as needed, owns RW). Walk-in shower w/ built in seating. Brother nearby to assist as needed.  -CS     Existing Precautions/Restrictions fall  -CS     Barriers to Rehab medically complex;previous functional deficit  -CS       Row Name 07/21/23 1055          Living Environment    People in Home alone  -CS       Row Name 07/21/23 1055          Home Main Entrance    Number of Stairs, Main Entrance one  -CS       Row Name 07/21/23 1055          Stairs Within Home, Primary    Number of Stairs, Within Home, Primary none  -CS       Row Name 07/21/23 1055          Cognition    Orientation Status (Cognition) oriented x 4  -CS       Row Name 07/21/23 1055          Safety Issues, Functional Mobility    Safety Issues Affecting Function (Mobility) safety precaution awareness;safety  precautions follow-through/compliance;insight into deficits/self-awareness  -     Impairments Affecting Function (Mobility) balance;endurance/activity tolerance;strength  -CS               User Key  (r) = Recorded By, (t) = Taken By, (c) = Cosigned By      Initials Name Provider Type    CS Madi Leonard OT Occupational Therapist                     Mobility/ADL's       Row Name 07/21/23 1056          Bed Mobility    Bed Mobility supine-sit;scooting/bridging  -CS     Scooting/Bridging [place] (Bed Mobility) contact guard;supervision  -CS     Supine-Sit [place] (Bed Mobility) standby assist  -CS     Assistive Device (Bed Mobility) bed rails;head of bed elevated  -CS     Comment, (Bed Mobility) appropriate sequencing intact, no dizziness reported upon sitting  -       Row Name 07/21/23 1056          Transfers    Transfers sit-stand transfer;toilet transfer  -     Comment, (Transfers) increased cues at toilet for grab bar use and AD positioning prior to sit  -       Row Name 07/21/23 1056          Sit-Stand Transfer    Sit-Stand [place] (Transfers) standby assist;verbal cues  -     Assistive Device (Sit-Stand Transfers) walker, front-wheeled  -       Row Name 07/21/23 1056          Toilet Transfer    Type (Toilet Transfer) sit-stand;stand-sit  -     [place] Level (Toilet Transfer) contact guard;set up  -     Assistive Device (Toilet Transfer) walker, front-wheeled;grab bars/safety frame  -       Row Name 07/21/23 1056          Functional Mobility    Functional Mobility- Comment defer to PT for specifics, SBA for ADL related distances w/ RW, cues for improved posture  -       Row Name 07/21/23 1056          Activities of Daily Living    BADL Assessment/Intervention lower body dressing;toileting;feeding;grooming  -       Row Name 07/21/23 1056          Lower Body Dressing Assessment/Training    [place] Level (Lower Body Dressing) don;socks;standby assist  -CS     Position  (Lower Body Dressing) edge of bed sitting  -       Row Name 07/21/23 1056          Toileting Assessment/Training    [place] Level (Toileting) adjust/manage clothing;perform perineal hygiene;standby assist  -[facility] Name 07/21/23 1056          Self-Feeding Assessment/Training    [place] Level (Feeding) feeding skills;liquids to mouth;prepare tray/open items;scoop food and bring to mouth;independent  -     Position (Self-Feeding) sitting up in bed  -[facility] Name 07/21/23 1056          Grooming Assessment/Training    [place] Level (Grooming) hair care, combing/brushing;wash face, hands;set up  -     Position (Grooming) supported sitting  -               User Key  (r) = Recorded By, (t) = Taken By, (c) = Cosigned By      Initials Name Provider Type     Madi Leonard, OT Occupational Therapist                   Obj/Interventions       [facility] Name 07/21/23 1058          Sensory Assessment (Somatosensory)    Sensory Assessment (Somatosensory) UE sensation intact  -CS       Row Name 07/21/23 1058          Vision Assessment/Intervention    Visual Impairment/Limitations WFL;corrective lenses full-time  -[facility] Name 07/21/23 1058          Range of Motion Comprehensive    General Range of Motion bilateral upper extremity ROM WFL  -[facility] Name 07/21/23 1058          Strength Comprehensive (MMT)    General Manual Muscle Testing (MMT) Assessment upper extremity strength deficits identified  -     Comment, General Manual Muscle Testing (MMT) Assessment BUE grossly 4+/5, no significant asymmetry,  -[facility] Name 07/21/23 1058          Motor Skills    Motor Skills coordination;functional endurance;neuro-muscular function  -     Coordination bimanual skills;finger to nose;WFL  -     Functional Endurance O2 sats stable on RA  -     Neuromuscular Function tremor, intention;minimal impairment  -       Row Name 07/21/23 1058          Balance    Balance Assessment sitting static  balance;sitting dynamic balance;standing static balance;standing dynamic balance  -CS     Static Sitting Balance supervision  -CS     Dynamic Sitting Balance standby assist  -CS     Position, Sitting Balance sitting edge of bed;unsupported  -CS     Static Standing Balance standby assist  -CS     Dynamic Standing Balance contact guard  -CS     Balance Interventions sit to stand;standing;sitting;occupation based/functional task  -CS               User Key  (r) = Recorded By, (t) = Taken By, (c) = Cosigned By      Initials Name Provider Type    CS Madi Leonard, OT Occupational Therapist                   Goals/Plan       Row Name 07/21/23 1102          Transfer Goal 1 (OT)    Activity/Assistive Device (Transfer Goal 1, OT) bed-to-chair/chair-to-bed;sit-to-stand/stand-to-sit  -CS     [place] Level/Cues Needed (Transfer Goal 1, OT) modified independence  -CS     Time Frame (Transfer Goal 1, OT) long term goal (LTG);10 days  -CS     Strategies/Barriers (Transfers Goal 1, OT) AD recs per PT  -CS     Progress/Outcome (Transfer Goal 1, OT) new goal  -CS       Row Name 07/21/23 1102          Dressing Goal 1 (OT)    Activity/Device (Dressing Goal 1, OT) lower body dressing  -CS     [place]/Cues Needed (Dressing Goal 1, OT) supervision required  -CS     Time Frame (Dressing Goal 1, OT) 10 days;long term goal (LTG)  -CS     Progress/Outcome (Dressing Goal 1, OT) new goal  -CS       Row Name 07/21/23 1102          Strength Goal 1 (OT)    Strength Goal 1 (OT) Increase gross BUE strength 1/2 muscle grade to promote increased safety/Ind with ADL completion and related mobility  -CS       Row Name 07/21/23 1102          Therapy Assessment/Plan (OT)    Planned Therapy Interventions (OT) functional balance retraining;occupation/activity based interventions;ROM/therapeutic exercise;strengthening exercise;transfer/mobility retraining;patient/caregiver education/training;neuromuscular control/coordination retraining;adaptive  equipment training  -CS               User Key  (r) = Recorded By, (t) = Taken By, (c) = Cosigned By      Initials Name Provider Type    CS Madi Leonard OT Occupational Therapist                   Clinical Impression       Row Name 07/21/23 1100          Pain Assessment    Pretreatment Pain Rating 0/10 - no pain  -CS     Posttreatment Pain Rating 0/10 - no pain  -CS       Row Name 07/21/23 1100          Plan of Care Review    Plan of Care Reviewed With patient  -CS     Progress no change  -CS     Outcome Evaluation Pt presents near recent baseline with strength and endurance deficits, OT will follow to promote return to PLOF while admitted. Rec d/c to home with assist and HHOT/PT.  -CS       Row Name 07/21/23 1100          Therapy Assessment/Plan (OT)    Rehab Potential (OT) good, to achieve stated therapy goals  -CS     Criteria for Skilled Therapeutic Interventions Met (OT) yes;meets criteria;skilled treatment is necessary  -CS     Therapy Frequency (OT) daily  -CS       Row Name 07/21/23 1100          Therapy Plan Review/Discharge Plan (OT)    Anticipated Discharge Disposition (OT) home with assist;home with home health  -CS       Row Name 07/21/23 1100          Vital Signs    Pre Systolic BP Rehab --  RN cleared for eval, VSS on RA  -CS     O2 Delivery Pre Treatment room air  -CS     O2 Delivery Intra Treatment room air  -CS     O2 Delivery Post Treatment room air  -CS     Pre Patient Position Supine  -CS     Intra Patient Position Standing  -CS     Post Patient Position Sitting  -CS       Row Name 07/21/23 1100          Positioning and Restraints    Pre-Treatment Position in bed  -CS     Post Treatment Position chair  -CS     In Chair sitting;with PT  -CS               User Key  (r) = Recorded By, (t) = Taken By, (c) = Cosigned By      Initials Name Provider Type    CS Madi Leonard, OT Occupational Therapist                   Outcome Measures       Row Name 07/21/23 1103          How much help from  another is currently needed...    Putting on and taking off regular lower body clothing? 3  -CS     Bathing (including washing, rinsing, and drying) 3  -CS     Toileting (which includes using toilet bed pan or urinal) 3  -CS     Putting on and taking off regular upper body clothing 4  -CS     Taking care of personal grooming (such as brushing teeth) 4  -CS     Eating meals 4  -CS     AM-PAC 6 Clicks Score (OT) 21  -CS       Row Name 07/21/23 0952 07/21/23 0823       How much help from another person do you currently need...    Turning from your back to your side while in flat bed without using bedrails? 4  -KR 4  -SN    Moving from lying on back to sitting on the side of a flat bed without bedrails? 4  -KR 4  -SN    Moving to and from a bed to a chair (including a wheelchair)? 3  -KR 3  -SN    Standing up from a chair using your arms (e.g., wheelchair, bedside chair)? 3  -KR 3  -SN    Climbing 3-5 steps with a railing? 3  -KR 2  -SN    To walk in hospital room? 3  -KR 2  -SN    AM-PAC 6 Clicks Score (PT) 20  -KR 18  -SN    Highest level of mobility 6 --> Walked 10 steps or more  -KR 6 --> Walked 10 steps or more  -SN      Row Name 07/21/23 1103          Functional Assessment    Outcome Measure Options AM-PAC 6 Clicks Daily Activity (OT)  -CS               User Key  (r) = Recorded By, (t) = Taken By, (c) = Cosigned By      Initials Name Provider Type    CS Madi Leonard OT Occupational Therapist    Sole [place], RN Registered Nurse    Jackelyn Miguel, PT Physical Therapist                    Occupational Therapy Education       Title: PT OT SLP Therapies (In Progress)       Topic: Occupational Therapy (In Progress)       Point: ADL training (Done)       Description:   Instruct learner(s) on proper safety adaptation and remediation techniques during self care or transfers.   Instruct in proper use of assistive devices.                  Learning Progress Summary             Patient Acceptance, E, BETTE,CARLO by   at 7/21/2023 1104                         Point: Home exercise program (Not Started)       Description:   Instruct learner(s) on appropriate technique for monitoring, assisting and/or progressing therapeutic exercises/activities.                  Learner Progress:  Not documented in this visit.              Point: Precautions (Done)       Description:   Instruct learner(s) on prescribed precautions during self-care and functional transfers.                  Learning Progress Summary             Patient Acceptance, E, VU,DU by  at 7/21/2023 1104                         Point: Body mechanics (Done)       Description:   Instruct learner(s) on proper positioning and spine alignment during self-care, functional mobility activities and/or exercises.                  Learning Progress Summary             Patient Acceptance, E, VU,DU by  at 7/21/2023 1104                                         User Key       Initials Effective Dates Name Provider Type Discipline     06/16/21 -  Madi Leonard OT Occupational Therapist OT                  OT Recommendation and Plan  Planned Therapy Interventions (OT): functional balance retraining, occupation/activity based interventions, ROM/therapeutic exercise, strengthening exercise, transfer/mobility retraining, patient/caregiver education/training, neuromuscular control/coordination retraining, adaptive equipment training  Therapy Frequency (OT): daily  Plan of Care Review  Plan of Care Reviewed With: patient  Progress: no change  Outcome Evaluation: Pt presents near recent baseline with strength and endurance deficits, OT will follow to promote return to PLOF while admitted. Rec d/c to home with assist and HHOT/PT.     Time Calculation:   Evaluation Complexity (OT)  Review Occupational Profile/Medical/Therapy History Complexity: expanded/moderate complexity  Assessment, Occupational Performance/Identification of Deficit Complexity: 1-3 performance deficits  Clinical  Decision Making Complexity (OT): problem focused assessment/low complexity  Overall Complexity of Evaluation (OT): low complexity     Time Calculation- OT       Row Name 07/21/23 1104 07/21/23 0855          Time Calculation- OT    OT Start Time 0834  -CS --     OT Received On 07/21/23  -CS --     OT Goal Re-Cert Due Date 07/31/23  -CS --        Timed Charges    55668 - Gait Training Minutes  -- 8  -KR     94216 - OT Self Care/Mgmt Minutes 10  -CS --        Untimed Charges    OT Eval/Re-eval Minutes 36  -CS --        Total Minutes    Timed Charges Total Minutes 10  -CS 8  -KR     Untimed Charges Total Minutes 36  -CS --      Total Minutes 46  -CS 8  -KR               User Key  (r) = Recorded By, (t) = Taken By, (c) = Cosigned By      Initials Name Provider Type    CS Madi Leonard, OT Occupational Therapist    Jackelyn Miguel, PT Physical Therapist                  Therapy Charges for Today       Code Description Service Date Service Provider Modifiers Qty    [id number] HC OT EVAL LOW COMPLEXITY 3 7/21/2023 Madi Leonard OT GO 1    [id number]  OT SELF CARE/MGMT/TRAIN EA 15 MIN 7/21/2023 Madi Leonard OT GO 1                 Madi Leonard OT  7/21/2023

## 2023-07-21 NOTE — PROGRESS NOTES
[facility] Medicine Services  PROGRESS NOTE    Patient Name: Marilyn Kunz  : 1945  MRN: [id number]    Date of Admission: 2023  Primary Care Physician: Peter Baca MD    Subjective   Subjective     CC:  Gen weakness, dyspnea  HPI:  Dyspnea resolved, generalized weakness improved today    ROS:  No f/c  No cp  No palpitations    Objective   Objective     Vital Signs:   Temp:  [97.6 °F (36.4 °C)-98.2 °F (36.8 °C)] 97.8 °F (36.6 °C)  Heart Rate:  [57-93] 57  Resp:  [16-20] 18  BP: (132-153)/(50-95) 151/50  Flow (L/min):  [2-3] 2     Physical Exam:  Constitutional:Alert, oriented x 3, nontoxic appearing  Psych:Normal/appropriate affect  HEENT:NCAT, oropharynx clear  Neck: neck supple, full range of motion  Neuro: Face symmetric, speech clear, equal , moves all extremities  Cardiac: RRR; No pretibial pitting edema  Resp: CTAB, normal effort  GI: abd soft, nontender  Skin: No extremity rash  Musculoskeletal/extremities: no cyanosis of extremities; no significant ankle edema      Results Reviewed:  LAB RESULTS:      Lab 23  04223  1157   WBC 5.84 5.60   HEMOGLOBIN 9.2* 9.8*   HEMATOCRIT 29.7* 31.3*   PLATELETS 188 192   NEUTROS ABS  --  4.46   IMMATURE GRANS (ABS)  --  0.05   LYMPHS ABS  --  0.59*   MONOS ABS  --  0.39   EOS ABS  --  0.07   MCV 97.4* 98.7*   PROCALCITONIN  --  0.04   LACTATE  --  1.1         Lab 23  0421 23  1402 23  1157   SODIUM 142  --  139   POTASSIUM 3.8  --  4.5   CHLORIDE 102  --  104   CO2 28.0  --  22.0   ANION GAP 12.0  --  13.0   BUN 22  --  26*   CREATININE 1.03*  --  1.09*   EGFR 55.8*  --  52.1*   GLUCOSE 147*  --  245*   CALCIUM 8.4*  --  8.8   MAGNESIUM 1.9 2.1  --    HEMOGLOBIN A1C  --   --  8.00*   TSH  --  3.590  --          Lab 23  1157   TOTAL PROTEIN 6.2   ALBUMIN 3.7   GLOBULIN 2.5   ALT (SGPT) 16   AST (SGOT) 21   BILIRUBIN 1.3*   ALK PHOS 65   LIPASE 9*         Lab 23  1402  07/20/23  1157   PROBNP  --  3,386.0*   HSTROP T 14* 13*                 Lab 07/20/23  1628   PH, ARTERIAL 7.363   PCO2, ARTERIAL 44.0   PO2 ART 51.1*   FIO2 32   HCO3 ART 25.0   BASE EXCESS ART -0.5*   CARBOXYHEMOGLOBIN 1.2     Brief Urine Lab Results  (Last result in the past 365 days)        Color   Clarity   Blood   Leuk Est   Nitrite   Protein   CREAT   Urine HCG        07/20/23 1206 Yellow   Clear   Negative   Negative   Negative   Negative                   Microbiology Results Abnormal       Procedure Component Value - Date/Time    COVID PRE-OP / PRE-PROCEDURE SCREENING ORDER (NO ISOLATION) - Swab, Nasopharynx [[id number]]  (Normal) Collected: 07/20/23 1819    Lab Status: Final result Specimen: Swab from Nasopharynx Updated: 07/20/23 1856    Narrative:      The following orders were created for panel order COVID PRE-OP / PRE-PROCEDURE SCREENING ORDER (NO ISOLATION) - Swab, Nasopharynx.  Procedure                               Abnormality         Status                     ---------                               -----------         ------                     COVID-19 and FLU A/B PCR...[[id number]]  Normal              Final result                 Please view results for these tests on the individual orders.    COVID-19 and FLU A/B PCR - Swab, Nasopharynx [[id number]]  (Normal) Collected: 07/20/23 1819    Lab Status: Final result Specimen: Swab from Nasopharynx Updated: 07/20/23 1856     COVID19 Not Detected     Influenza A PCR Not Detected     Influenza B PCR Not Detected    Narrative:      Fact sheet for providers: https://www.fda.gov/media/332229/download    Fact sheet for patients: https://www.fda.gov/media/862239/download    Test performed by PCR.            CT Head Without Contrast    Result Date: 7/20/2023  CT HEAD WO CONTRAST Date of Exam: 7/20/2023 11:23 AM EDT Indication: fatigue. Comparison: None available. Technique: Axial CT images were obtained of the head without contrast administration.  Automated  exposure control and iterative construction methods were used. Findings: There is prominence of the ventricles and sulci suggestive of atrophy. There our areas of hypodensity within the periventricular white matter, corona radiata and the left greater than right cerebellar hemispheres. Additionally, there is a lacunar infarct  within the left basal ganglia. Findings are suggestive of changes of chronic microvascular ischemic disease. No evidence of hemorrhage. No midline shift or mass effect is identified. Orbits paranasal sinuses and mastoid air cells are unremarkable.     Impression: Impression: 1. Atrophy, but no acute intracranial pathology. Electronically Signed: Robert Grove  7/20/2023 11:39 AM EDT  Workstation ID: [id number]    XR Chest 1 View    Result Date: 7/20/2023  XR CHEST 1 VW Date of Exam: 7/20/2023 11:03 AM EDT Indication: fatigue Comparison: 3/2/2023. Findings: There is continued moderate cardiomegaly. There is patchy infiltrate of the left lower lobe although improved. Previously seen right basilar infiltrates have largely resolved.     Impression: Impression: Patchy left lower lobe infiltrate remaining, overall improved from prior. Electronically Signed: Slime Whitlock MD  7/20/2023 11:34 AM EDT  Workstation ID: [id number]    CT Angiogram Chest    Result Date: 7/20/2023  CT ANGIOGRAM CHEST Date of Exam: 7/20/2023 5:04 PM EDT Indication: SOA and hypoxemia w/ clotting disorder. Comparison: 3/2/2023. Technique: CTA of the chest was performed after the uneventful intravenous administration of 50 mL Isovue-370. Reconstructed coronal and sagittal images were also obtained. In addition, a 3-D volume rendered image was created for interpretation. Automated exposure control and iterative reconstruction methods were used. Findings: There is no pathologic axillary adenopathy or other worrisome body wall soft tissue finding in the chest. No acute findings are present in the partially characterized upper abdomen.  There are small bilateral pleural effusions, mildly decreased from comparison, without pericardial effusion. Nonaneurysmal mildly atherosclerotic thoracic aorta. The pulmonary arteries are well-opacified and without evidence of filling defect concerning for acute pulmonary embolus. The osseous structures demonstrate no evidence of acute fracture or aggressive osseous lesion. The lung fields demonstrate evidence of pulmonary edema, with interlobular septal thickening and diffuse groundglass opacity present.     Impression: Impression: Mildly worsened pulmonary edema with small to moderate bilateral pleural effusions. No acute pulmonary embolus. Electronically Signed: Vick Grimaldo  7/20/2023 5:35 PM EDT  Workstation ID: [id number]     Results for orders placed during the hospital encounter of 02/24/23    Adult Transthoracic Echo Complete W/ Cont if Necessary Per Protocol    Interpretation Summary    Left ventricular ejection fraction appears to be 61 - 65%.    Estimated right ventricular systolic pressure from tricuspid regurgitation is mildly elevated (35-45 mmHg).      Current medications:  Scheduled Meds:aspirin, 81 mg, Oral, Daily  azaTHIOprine, 100 mg, Oral, BID  budesonide-formoterol, 2 puff, Inhalation, BID - RT  [START ON 7/22/2023] bumetanide, 0.5 mg, Oral, Daily  cetirizine, 10 mg, Oral, Daily  enoxaparin, 40 mg, Subcutaneous, Q24H  fluticasone, 2 spray, Each Nare, Daily  gabapentin, 300 mg, Oral, Nightly  hydrALAZINE, 100 mg, Oral, BID  insulin detemir, 15 Units, Subcutaneous, Nightly  insulin lispro, 2-7 Units, Subcutaneous, 4x Daily AC & at Bedtime  levothyroxine, 75 mcg, Oral, Daily  nebivolol, 20 mg, Oral, Q24H  pantoprazole, 40 mg, Oral, Q AM  pharmacy consult - MTM, , Does not apply, Daily  pravastatin, 20 mg, Oral, Nightly  sacubitril-valsartan, 1 tablet, Oral, Q12H  senna-docusate sodium, 2 tablet, Oral, BID  sodium chloride, 10 mL, Intravenous, Q12H  terazosin, 4 mg, Oral, Nightly      Continuous  Infusions:   PRN Meds:.  senna-docusate sodium **AND** polyethylene glycol **AND** bisacodyl **AND** bisacodyl    cloNIDine    dextrose    dextrose    glucagon (human recombinant)    ipratropium-albuterol    Magnesium Standard Dose Replacement - Follow Nurse / BPA Driven Protocol    nitroglycerin    ondansetron    sodium chloride    sodium chloride    Assessment & Plan   Assessment & Plan     Active Hospital Problems    Diagnosis  POA    **Acute on chronic diastolic congestive heart failure [I50.33]  Yes    Acute respiratory failure with hypoxia [J96.01]  Yes     Priority: High    Acute exacerbation of CHF (congestive heart failure) [I50.9]  Yes    Uncontrolled hypertension [I10]  Yes    Demyelinating disease [G37.9]  Yes    DM (diabetes mellitus), type 1 [E10.9]  Yes    Factor V Leiden [D68.51]  Yes    Essential hypertension [I10]  Yes    CKD (chronic kidney disease) stage 3, GFR 30-59 ml/min [N18.30]  Yes    Hypothyroidism [E03.9]  Yes    Hyperlipidemia [E78.5]  Yes      Resolved Hospital Problems    Diagnosis Date Resolved POA    CHF (congestive heart failure) [I50.9] 07/20/2023 Yes    Type 1 diabetes mellitus without complications [E10.9] 07/20/2023 Yes    Hypothyroidism, unspecified [E03.9] 07/20/2023 Yes        Brief Hospital Course to date:  Marilyn Kunz is a 78 y.o. female w/ dm1, hfpef, htn, hl, ckd 3, previous cva, demyelinating disease (on imuran followed by Dr. Hernandez), previous esophageal ulcer earlier in 2023,heterozygous factor V leiden status,  hypothyroidism, copd (follows w/ Dr. Haley of pulmonary), previous admission w/ hyponatremia march 2023. Patient presents with profound fatigue over the past 1-2 weeks, with some exertional dyspnea over the past few days. Had been up-titrating bp meds recently (w/ assistance of cardiology Dr. Maya office) due to rising BP. About a week ago apparently saw nephrology as outpatient and, due to rising creatinine, was instructed to stop bumex (had been  taking 0.5mg daily bumex). Then over the past few days the fatigue and dyspnea w/ exertion increased prompting patient to come to [facility] for evaluation. Denies overt chest pain or dysuria. No focal weakness, no headache, no confusion.              In ED room air sats were 86%. Bp 186/79. Wbc wbc 5.6, hgb 9.8. HS troponin 13, repeat 14. EKG nsr without overt ischemic changes. Probnp 3,386. Glucose 245, creatinine 1.09. u/a benign. Tsh normal. Abg ph 7.36, pco2 44, po2 51 (on 3Lnc). Covid and flu pcr negative. Procal normal. Ct head negative for acute process, only revealing atrophy. Ct angio chest was negative for PE, showed pulmonary edema dn small to moderate bilateral pleural effusions. Was given bumex 2mg iv in ed and admitted to hospitalist service.    Acute hypoxic respiratory failure  A/C HFpEF  Accelerated HTN  HL  -bumex 2mg iv given in ed;  -restart home bp meds  -wean oxygen as able  -currently 1Lnc   -cards following: near euvolemia; start back bumex at 0.5mg daily, change arb to entresto 24/26mg bid (goal sbp <140); continue hydralazine 100mg bid, terazosin 8mg nightly, bisoprolol 20mg daily. If euvolemic, bp ok, labs ok then likely d/c home tomorrow/soon (outpatient f/u w/ [facility] valve clinic)    -ask cards to see tomorrow for assistance in medication adjustment (had been adjusting bp meds via phone w/ cardiology office recently; also had bumex held 1 week prior to this admission by nephrology)     Ckd 3 (baseline cr ~0.9-1.1)  -stable, monitor as diuresing     Dm1 (A1c 8.0)  -on tresiba 15 units sq nightly at home w/ sliding scale novalog  -will start levemir 15 unit sq nightly, sliding scale humalog, adjust prn. Diabetic diet. Adjust insulins prn     COPD  -currently not wheezing, not in exacerbatoin  -continue symbicort, prn duonebs  -follows w/ Dr. Haley as outpatient     Hypothyroidism  -continue synthroid  -tsh ok     Profound generalized weakness, unsteady gait  Hx demyelinating  diseas  Peripheral neuropathy  -followed by Dr. Hernandez as outpatient; continue imuran  -u/a benign  -ct head negative at admission  -covid 19 & flu pcr negative  -procal negative  -tsh ok  -pt/ot have evaluated, recommend home w/ home health  -continue imuran 100mg bid (follows w/ Dr. Hernandez)  -neurology following (had outpatient appointment set up for 7/21/23):  mri brain ordered & pending. If mri negative for acute process then recommends follow up w/ Dr. Hernandez as outpatient for electrodiagnostic studies    Hx of Heterozygous factor V leiden  -asa; no previous hx thrombus per patient    Am labs: bmp, follow up mri      Expected Discharge Location and Transportation: HOME W/ HOME HEALTH  Expected Discharge ? 7/22/23 IF CLINICALLY STABLE   Expected discharge date/ time has not been documented.     DVT prophylaxis:  Medical DVT prophylaxis orders are present.     AM-PAC 6 Clicks Score (PT): 20 (07/21/23 0988)    CODE STATUS:   Code Status and Medical Interventions:   Ordered at: 07/20/23 6186     Code Status (Patient has no pulse and is not breathing):    CPR (Attempt to Resuscitate)     Medical Interventions (Patient has pulse or is breathing):    Full Support     Release to patient:    Routine Release       Lucas Garcia MD  07/21/23

## 2023-07-22 ENCOUNTER — APPOINTMENT (OUTPATIENT)
Dept: MRI IMAGING | Facility: HOSPITAL | Age: 78
DRG: 291 | End: 2023-07-22
Payer: MEDICARE

## 2023-07-22 LAB
ANION GAP SERPL CALCULATED.3IONS-SCNC: 15 MMOL/L (ref 5–15)
BUN SERPL-MCNC: 28 MG/DL (ref 8–23)
BUN/CREAT SERPL: 23.5 (ref 7–25)
CALCIUM SPEC-SCNC: 8.7 MG/DL (ref 8.6–10.5)
CHLORIDE SERPL-SCNC: 101 MMOL/L (ref 98–107)
CO2 SERPL-SCNC: 28 MMOL/L (ref 22–29)
CREAT SERPL-MCNC: 1.19 MG/DL (ref 0.57–1)
EGFRCR SERPLBLD CKD-EPI 2021: 46.9 ML/MIN/1.73
GLUCOSE BLDC GLUCOMTR-MCNC: 131 MG/DL (ref 70–130)
GLUCOSE BLDC GLUCOMTR-MCNC: 158 MG/DL (ref 70–130)
GLUCOSE BLDC GLUCOMTR-MCNC: 329 MG/DL (ref 70–130)
GLUCOSE BLDC GLUCOMTR-MCNC: 382 MG/DL (ref 70–130)
GLUCOSE SERPL-MCNC: 151 MG/DL (ref 65–99)
MAGNESIUM SERPL-MCNC: 1.9 MG/DL (ref 1.6–2.4)
POTASSIUM SERPL-SCNC: 4.1 MMOL/L (ref 3.5–5.2)
SODIUM SERPL-SCNC: 144 MMOL/L (ref 136–145)

## 2023-07-22 PROCEDURE — 80048 BASIC METABOLIC PNL TOTAL CA: CPT | Performed by: INTERNAL MEDICINE

## 2023-07-22 PROCEDURE — 94664 DEMO&/EVAL PT USE INHALER: CPT

## 2023-07-22 PROCEDURE — 70553 MRI BRAIN STEM W/O & W/DYE: CPT

## 2023-07-22 PROCEDURE — 99232 SBSQ HOSP IP/OBS MODERATE 35: CPT | Performed by: NURSE PRACTITIONER

## 2023-07-22 PROCEDURE — G0378 HOSPITAL OBSERVATION PER HR: HCPCS

## 2023-07-22 PROCEDURE — 82948 REAGENT STRIP/BLOOD GLUCOSE: CPT

## 2023-07-22 PROCEDURE — 25010000002 ENOXAPARIN PER 10 MG: Performed by: INTERNAL MEDICINE

## 2023-07-22 PROCEDURE — 99232 SBSQ HOSP IP/OBS MODERATE 35: CPT | Performed by: PSYCHIATRY & NEUROLOGY

## 2023-07-22 PROCEDURE — 63710000001 INSULIN DETEMIR PER 5 UNITS: Performed by: INTERNAL MEDICINE

## 2023-07-22 PROCEDURE — 63710000001 INSULIN LISPRO (HUMAN) PER 5 UNITS: Performed by: INTERNAL MEDICINE

## 2023-07-22 PROCEDURE — 83735 ASSAY OF MAGNESIUM: CPT | Performed by: INTERNAL MEDICINE

## 2023-07-22 PROCEDURE — A9577 INJ MULTIHANCE: HCPCS | Performed by: INTERNAL MEDICINE

## 2023-07-22 PROCEDURE — 63710000001 AZATHIOPRINE PER 50 MG: Performed by: INTERNAL MEDICINE

## 2023-07-22 PROCEDURE — 94799 UNLISTED PULMONARY SVC/PX: CPT

## 2023-07-22 PROCEDURE — 0 GADOBENATE DIMEGLUMINE 529 MG/ML SOLUTION: Performed by: INTERNAL MEDICINE

## 2023-07-22 RX ORDER — HYDRALAZINE HYDROCHLORIDE 50 MG/1
50 TABLET, FILM COATED ORAL 2 TIMES DAILY
Status: DISCONTINUED | OUTPATIENT
Start: 2023-07-22 | End: 2023-07-23

## 2023-07-22 RX ADMIN — INSULIN LISPRO 2 UNITS: 100 INJECTION, SOLUTION INTRAVENOUS; SUBCUTANEOUS at 17:14

## 2023-07-22 RX ADMIN — Medication 10 ML: at 09:38

## 2023-07-22 RX ADMIN — ASPIRIN 81 MG: 81 TABLET, CHEWABLE ORAL at 09:38

## 2023-07-22 RX ADMIN — GADOBENATE DIMEGLUMINE 10 ML: 529 INJECTION, SOLUTION INTRAVENOUS at 04:51

## 2023-07-22 RX ADMIN — CETIRIZINE HYDROCHLORIDE 10 MG: 10 TABLET, FILM COATED ORAL at 09:38

## 2023-07-22 RX ADMIN — AZATHIOPRINE 100 MG: 50 TABLET ORAL at 21:20

## 2023-07-22 RX ADMIN — HYDRALAZINE HYDROCHLORIDE 50 MG: 50 TABLET, FILM COATED ORAL at 21:19

## 2023-07-22 RX ADMIN — INSULIN LISPRO 5 UNITS: 100 INJECTION, SOLUTION INTRAVENOUS; SUBCUTANEOUS at 11:28

## 2023-07-22 RX ADMIN — Medication 10 ML: at 21:25

## 2023-07-22 RX ADMIN — PRAVASTATIN SODIUM 20 MG: 40 TABLET ORAL at 21:19

## 2023-07-22 RX ADMIN — BUDESONIDE AND FORMOTEROL FUMARATE DIHYDRATE 2 PUFF: 160; 4.5 AEROSOL RESPIRATORY (INHALATION) at 22:17

## 2023-07-22 RX ADMIN — LEVOTHYROXINE SODIUM 75 MCG: 0.07 TABLET ORAL at 06:20

## 2023-07-22 RX ADMIN — BUDESONIDE AND FORMOTEROL FUMARATE DIHYDRATE 2 PUFF: 160; 4.5 AEROSOL RESPIRATORY (INHALATION) at 08:13

## 2023-07-22 RX ADMIN — GABAPENTIN 300 MG: 100 CAPSULE ORAL at 21:19

## 2023-07-22 RX ADMIN — BUMETANIDE 0.5 MG: 0.5 TABLET ORAL at 09:38

## 2023-07-22 RX ADMIN — SACUBITRIL AND VALSARTAN 1 TABLET: 24; 26 TABLET, FILM COATED ORAL at 21:19

## 2023-07-22 RX ADMIN — INSULIN LISPRO 6 UNITS: 100 INJECTION, SOLUTION INTRAVENOUS; SUBCUTANEOUS at 21:24

## 2023-07-22 RX ADMIN — SENNOSIDES AND DOCUSATE SODIUM 2 TABLET: 50; 8.6 TABLET ORAL at 21:19

## 2023-07-22 RX ADMIN — INSULIN DETEMIR 15 UNITS: 100 INJECTION, SOLUTION SUBCUTANEOUS at 21:23

## 2023-07-22 RX ADMIN — PANTOPRAZOLE SODIUM 40 MG: 40 TABLET, DELAYED RELEASE ORAL at 06:20

## 2023-07-22 RX ADMIN — SENNOSIDES AND DOCUSATE SODIUM 2 TABLET: 50; 8.6 TABLET ORAL at 09:38

## 2023-07-22 RX ADMIN — NEBIVOLOL 20 MG: 20 TABLET ORAL at 09:38

## 2023-07-22 RX ADMIN — TERAZOSIN HYDROCHLORIDE 8 MG: 1 CAPSULE ORAL at 21:19

## 2023-07-22 RX ADMIN — HYDRALAZINE HYDROCHLORIDE 100 MG: 25 TABLET, FILM COATED ORAL at 09:38

## 2023-07-22 RX ADMIN — SACUBITRIL AND VALSARTAN 1 TABLET: 24; 26 TABLET, FILM COATED ORAL at 09:38

## 2023-07-22 RX ADMIN — FLUTICASONE PROPIONATE 2 SPRAY: 50 SPRAY, METERED NASAL at 09:41

## 2023-07-22 RX ADMIN — ENOXAPARIN SODIUM 40 MG: 100 INJECTION SUBCUTANEOUS at 21:19

## 2023-07-22 RX ADMIN — AZATHIOPRINE 100 MG: 50 TABLET ORAL at 09:37

## 2023-07-22 NOTE — PROGRESS NOTES
"DOS: 2023  NAME: Marilyn Kunz   : 1945  PCP: Peter Baca MD  Chief Complaint   Patient presents with    Fatigue       Chief complaint: She was noted to have significant orthostatic hypotension which was symptomatic.  Subjective: Walking at baseline with her walker.  Underwent the MRI of the brain results of which are not available on the PACS at this time.    Objective:  Vital signs: /53 (BP Location: Left arm, Patient Position: Lying)   Pulse 72   Temp 97.9 °F (36.6 °C) (Oral)   Resp 18   Ht 149.9 cm (59\")   Wt 60.5 kg (133 lb 6.4 oz)   SpO2 94%   BMI 26.94 kg/m²    Gen: NAD, vitals reviewed  MS: Normal.  CN: Cranials 2-12: No focal deficits noted.  Motor: Muscles of both upper and lower extremities show good bulk power and tone.  Sensory: No sensory loss noted except for possible carpal tunnel in bilateral hands.  Coordination: Normal finger-to-nose coordination normal heel-to-shin testing.  Gait: Able to get up and ambulate with her walker.    ROS:  General: Pleasant lady with severe arthritis involving both her hands and wrists and her toes.  Neurological: Except for the symptomatic orthostatic hypotension in the carpal tunnel syndrome she is back to baseline regarding her walking capability.    Laboratory results:  Lab Results   Component Value Date    GLUCOSE 151 (H) 2023    CALCIUM 8.7 2023     2023    K 4.1 2023    CO2 28.0 2023     2023    BUN 28 (H) 2023    CREATININE 1.19 (H) 2023    EGFRIFNONA 63 10/22/2020    BCR 23.5 2023    ANIONGAP 15.0 2023     Lab Results   Component Value Date    WBC 5.84 2023    HGB 9.2 (L) 2023    HCT 29.7 (L) 2023    MCV 97.4 (H) 2023     2023     Lab Results   Component Value Date    LDL 82 2023    LDL 76 2022         Lab 23  1157   HEMOGLOBIN A1C 8.00*        Review of labs: The hemoglobin A1c is 8 and the " hemoglobin is low at 9.2 and hematocrit is low at 29.7.     CMP:        Lab 07/22/23  0657 07/21/23  0421 07/20/23  1402 07/20/23  1157   SODIUM 144 142  --  139   POTASSIUM 4.1 3.8  --  4.5   CHLORIDE 101 102  --  104   CO2 28.0 28.0  --  22.0   ANION GAP 15.0 12.0  --  13.0   BUN 28* 22  --  26*   CREATININE 1.19* 1.03*  --  1.09*   EGFR 46.9* 55.8*  --  52.1*   GLUCOSE 151* 147*  --  245*   CALCIUM 8.7 8.4*  --  8.8   MAGNESIUM 1.9 1.9 2.1  --    TOTAL PROTEIN  --   --   --  6.2   ALBUMIN  --   --   --  3.7   GLOBULIN  --   --   --  2.5   ALT (SGPT)  --   --   --  16   AST (SGOT)  --   --   --  21   BILIRUBIN  --   --   --  1.3*   ALK PHOS  --   --   --  65   LIPASE  --   --   --  9*        TSH          2/24/2023    10:50 7/20/2023    14:02   TSH   TSH 2.770  3.590         Lipid Panel          3/4/2023    05:48   Lipid Panel   Total Cholesterol 158    Triglycerides 58    HDL Cholesterol 64    VLDL Cholesterol 12    LDL Cholesterol  82    LDL/HDL Ratio 1.29         Lab Results   Component Value Date    [id number] 633 05/06/2022           Lab Results   Component Value Date    HGBA1C 8.00 (H) 07/20/2023       Result Review:  I have personally reviewed the results from the time of this admission to 7/22/2023 15:20 EDT and agree with these findings:  [x]  Laboratory list / accordion  [x]  Microbiology  [x]  Radiology  [x]  EKG/Telemetry   [x]  Cardiology/Vascular   [x]  Pathology  [x]  Old records  []  Other:  Most notable findings include: Her gait is back to baseline.  However she has severe arthritis of her fingers and wrists and she definitely has carpal tunnel syndrome.           Review and interpretation of imaging: The MRI of the brain has been completed but the results are currently pending.    CT Head Without Contrast    Result Date: 7/20/2023  CT HEAD WO CONTRAST Date of Exam: 7/20/2023 11:23 AM EDT Indication: fatigue. Comparison: None available. Technique: Axial CT images were obtained of the head without  contrast administration.  Automated exposure control and iterative construction methods were used. Findings: There is prominence of the ventricles and sulci suggestive of atrophy. There our areas of hypodensity within the periventricular white matter, corona radiata and the left greater than right cerebellar hemispheres. Additionally, there is a lacunar infarct  within the left basal ganglia. Findings are suggestive of changes of chronic microvascular ischemic disease. No evidence of hemorrhage. No midline shift or mass effect is identified. Orbits paranasal sinuses and mastoid air cells are unremarkable.     Impression: Impression: 1. Atrophy, but no acute intracranial pathology. Electronically Signed: Robert Grove  7/20/2023 11:39 AM EDT  Workstation ID: [id number]               Workup to date: The most recent MRI performed on January 19, 2023 was reviewed as follows:    Findings:   There is no restricted diffusion. There is T2 signal in the left cerebellar hemisphere. There are some periventricular and deep white matter changes which have been noted. While nonspecific these findings could reflect small vessel ischemic change   although a demyelinating process could not be excluded given the clinical history. There is no definite orbital abnormality. Pituitary is not enlarged. The paranasal sinuses seem relatively clear. On the postcontrast images there is no abnormal   enhancement.      IMPRESSION:  Impression:   1.There are white matter changes involving the cerebral hemispheres similar in appearance to prior study. There also is signal within the left cerebellar hemisphere. This has been noted as well. These findings could be more reflective of small vessel   ischemic change as opposed to demyelinating process. Correlation with patient's clinical history would be recommended. Please see icometrix data sheet for white matter abnormality volume assessment.     Recent EEG performed on February 10, 2023 was  interpreted as follows:    Findings:     The awake tracing shows diffuse low amplitude intermixed theta and alpha activity which is present symmetrically over both hemispheres.  EMG and eye blink artifact are seen over the anterior leads.  Photic stimulation does not change the background.  Drowsiness is seen with mild slowing of the tracing but stage II sleep is not seen.  No focal features or epileptiform activity are seen.  Hyperventilation is not performed.     Video: On     Technical quality: Superior     EKG: Regular, 50-60 bpm     SUMMARY:     Normal EEG in the awake and lightly drowsy states     No focal features or epileptiform activity are seen     IMPRESSION:     Normal study  Results for orders placed during the hospital encounter of 02/24/23    Adult Transthoracic Echo Complete W/ Cont if Necessary Per Protocol    Interpretation Summary    Left ventricular ejection fraction appears to be 61 - 65%.    Estimated right ventricular systolic pressure from tricuspid regurgitation is mildly elevated (35-45 mmHg).       Results for orders placed during the hospital encounter of 08/03/22    Duplex Renal Artery - Bilateral Complete CAR    Interpretation Summary  · No hemodynamically significant renal artery stenosis bilaterally.  · Resistive indices within normal limits.  · Renal veins patent         Diagnoses: Patient with symptomatic orthostatic hypotension.  Also has chronic white matter disease in the previous EEG.      Comment: Discussed about getting the images loaded so we can review it and compared to the previous images.    Plan:  1.  To optimize blood pressure medications and cardiac medication so that she does not get the features of symptomatic orthostatic hypotension which will make her fall.  2.  Continue to use the walker for safety.  3.  We will try to get her organized with Dr. Charly Hernandez for outpatient electrodiagnostic study of both upper extremities for carpal tunnel syndrome versus  diabetic peripheral neuropathy.  4.  We will review the MRI films once the pictures are available on the PACS.    Discussed with the patient and the care team and will follow-up.    Spent a total of 30 minutes in face-to-face evaluation and management of the patient.     Electronically signed by Kevyn Quiroga MD, 07/22/23, 3:20 PM EDT.

## 2023-07-22 NOTE — PROGRESS NOTES
"    [facility] Medicine Services  PROGRESS NOTE    Patient Name: Marilyn Kunz  : 1945  MRN: [id number]    Date of Admission: 2023  Primary Care Physician: Peter Baca MD    Subjective   Subjective     CC:  Follow-up generalized weakness, dyspnea    HPI:  Patient seen resting in bed no apparent distress.  No acute events overnight per nursing.  She notes that dyspnea has resolved.  Notes that she feels \"drunk\" this morning due to being lightheaded.  Noted to be hypotensive overnight.    ROS:  Gen- No fevers, chills, + dizziness, + lightheadedness  CV- No chest pain, palpitations  Resp- No cough, dyspnea  GI- No N/V/D, abd pain      Objective   Objective     Vital Signs:   Temp:  [97.6 °F (36.4 °C)-98.2 °F (36.8 °C)] 98.2 °F (36.8 °C)  Heart Rate:  [49-64] 64  Resp:  [16-18] 16  BP: ()/(42-73) 123/73  Flow (L/min):  [2] 2     Physical Exam:  Constitutional: No acute distress, awake, alert  HENT: NCAT, mucous membranes moist  Respiratory: Diminished in bases, respiratory effort normal on 2 L  Cardiovascular: RRR, no murmurs, cap refill brisk   Gastrointestinal: Positive bowel sounds, soft, nontender, nondistended  Musculoskeletal: Trace BLE edema   Psychiatric: Appropriate affect, cooperative, talkative  Neurologic: Oriented x 3, moves all extremities, speech clear  Skin: warm, dry, no visible rash     Results Reviewed:  LAB RESULTS:      Lab 23  04223  1157   WBC 5.84 5.60   HEMOGLOBIN 9.2* 9.8*   HEMATOCRIT 29.7* 31.3*   PLATELETS 188 192   NEUTROS ABS  --  4.46   IMMATURE GRANS (ABS)  --  0.05   LYMPHS ABS  --  0.59*   MONOS ABS  --  0.39   EOS ABS  --  0.07   MCV 97.4* 98.7*   PROCALCITONIN  --  0.04   LACTATE  --  1.1         Lab 23  0421 23  1402 23  1157   SODIUM 142  --  139   POTASSIUM 3.8  --  4.5   CHLORIDE 102  --  104   CO2 28.0  --  22.0   ANION GAP 12.0  --  13.0   BUN 22  --  26*   CREATININE 1.03*  --  1.09* "   EGFR 55.8*  --  52.1*   GLUCOSE 147*  --  245*   CALCIUM 8.4*  --  8.8   MAGNESIUM 1.9 2.1  --    HEMOGLOBIN A1C  --   --  8.00*   TSH  --  3.590  --          Lab 07/20/23  1157   TOTAL PROTEIN 6.2   ALBUMIN 3.7   GLOBULIN 2.5   ALT (SGPT) 16   AST (SGOT) 21   BILIRUBIN 1.3*   ALK PHOS 65   LIPASE 9*         Lab 07/20/23  1402 07/20/23  1157   PROBNP  --  3,386.0*   HSTROP T 14* 13*                 Lab 07/20/23  1628   PH, ARTERIAL 7.363   PCO2, ARTERIAL 44.0   PO2 ART 51.1*   FIO2 32   HCO3 ART 25.0   BASE EXCESS ART -0.5*   CARBOXYHEMOGLOBIN 1.2     Brief Urine Lab Results  (Last result in the past 365 days)        Color   Clarity   Blood   Leuk Est   Nitrite   Protein   CREAT   Urine HCG        07/20/23 1206 Yellow   Clear   Negative   Negative   Negative   Negative                   Microbiology Results Abnormal       Procedure Component Value - Date/Time    COVID PRE-OP / PRE-PROCEDURE SCREENING ORDER (NO ISOLATION) - Swab, Nasopharynx [[id number]]  (Normal) Collected: 07/20/23 1819    Lab Status: Final result Specimen: Swab from Nasopharynx Updated: 07/20/23 1856    Narrative:      The following orders were created for panel order COVID PRE-OP / PRE-PROCEDURE SCREENING ORDER (NO ISOLATION) - Swab, Nasopharynx.  Procedure                               Abnormality         Status                     ---------                               -----------         ------                     COVID-19 and FLU A/B PCR...[[id number]]  Normal              Final result                 Please view results for these tests on the individual orders.    COVID-19 and FLU A/B PCR - Swab, Nasopharynx [[id number]]  (Normal) Collected: 07/20/23 1819    Lab Status: Final result Specimen: Swab from Nasopharynx Updated: 07/20/23 1856     COVID19 Not Detected     Influenza A PCR Not Detected     Influenza B PCR Not Detected    Narrative:      Fact sheet for providers: https://www.fda.gov/media/199313/download    Fact sheet for patients:  https://www.fda.gov/media/906341/download    Test performed by PCR.            CT Head Without Contrast    Result Date: 7/20/2023  CT HEAD WO CONTRAST Date of Exam: 7/20/2023 11:23 AM EDT Indication: fatigue. Comparison: None available. Technique: Axial CT images were obtained of the head without contrast administration.  Automated exposure control and iterative construction methods were used. Findings: There is prominence of the ventricles and sulci suggestive of atrophy. There our areas of hypodensity within the periventricular white matter, corona radiata and the left greater than right cerebellar hemispheres. Additionally, there is a lacunar infarct  within the left basal ganglia. Findings are suggestive of changes of chronic microvascular ischemic disease. No evidence of hemorrhage. No midline shift or mass effect is identified. Orbits paranasal sinuses and mastoid air cells are unremarkable.     Impression: Impression: 1. Atrophy, but no acute intracranial pathology. Electronically Signed: Robert Grove  7/20/2023 11:39 AM EDT  Workstation ID: [id number]    XR Chest 1 View    Result Date: 7/20/2023  XR CHEST 1 VW Date of Exam: 7/20/2023 11:03 AM EDT Indication: fatigue Comparison: 3/2/2023. Findings: There is continued moderate cardiomegaly. There is patchy infiltrate of the left lower lobe although improved. Previously seen right basilar infiltrates have largely resolved.     Impression: Impression: Patchy left lower lobe infiltrate remaining, overall improved from prior. Electronically Signed: Slime Whitlock MD  7/20/2023 11:34 AM EDT  Workstation ID: [id number]    CT Angiogram Chest    Result Date: 7/20/2023  CT ANGIOGRAM CHEST Date of Exam: 7/20/2023 5:04 PM EDT Indication: SOA and hypoxemia w/ clotting disorder. Comparison: 3/2/2023. Technique: CTA of the chest was performed after the uneventful intravenous administration of 50 mL Isovue-370. Reconstructed coronal and sagittal images were also obtained. In  addition, a 3-D volume rendered image was created for interpretation. Automated exposure control and iterative reconstruction methods were used. Findings: There is no pathologic axillary adenopathy or other worrisome body wall soft tissue finding in the chest. No acute findings are present in the partially characterized upper abdomen. There are small bilateral pleural effusions, mildly decreased from comparison, without pericardial effusion. Nonaneurysmal mildly atherosclerotic thoracic aorta. The pulmonary arteries are well-opacified and without evidence of filling defect concerning for acute pulmonary embolus. The osseous structures demonstrate no evidence of acute fracture or aggressive osseous lesion. The lung fields demonstrate evidence of pulmonary edema, with interlobular septal thickening and diffuse groundglass opacity present.     Impression: Impression: Mildly worsened pulmonary edema with small to moderate bilateral pleural effusions. No acute pulmonary embolus. Electronically Signed: Vick Grimaldo  7/20/2023 5:35 PM EDT  Workstation ID: [id number]     Results for orders placed during the hospital encounter of 02/24/23    Adult Transthoracic Echo Complete W/ Cont if Necessary Per Protocol    Interpretation Summary    Left ventricular ejection fraction appears to be 61 - 65%.    Estimated right ventricular systolic pressure from tricuspid regurgitation is mildly elevated (35-45 mmHg).      Current medications:  Scheduled Meds:aspirin, 81 mg, Oral, Daily  azaTHIOprine, 100 mg, Oral, BID  budesonide-formoterol, 2 puff, Inhalation, BID - RT  bumetanide, 0.5 mg, Oral, Daily  cetirizine, 10 mg, Oral, Daily  enoxaparin, 40 mg, Subcutaneous, Q24H  fluticasone, 2 spray, Each Nare, Daily  gabapentin, 300 mg, Oral, Nightly  hydrALAZINE, 100 mg, Oral, BID  insulin detemir, 15 Units, Subcutaneous, Nightly  insulin lispro, 2-7 Units, Subcutaneous, 4x Daily AC & at Bedtime  levothyroxine, 75 mcg, Oral, Daily  nebivolol,  20 mg, Oral, Q24H  pantoprazole, 40 mg, Oral, Q AM  pharmacy consult - MTM, , Does not apply, Daily  pravastatin, 20 mg, Oral, Nightly  sacubitril-valsartan, 1 tablet, Oral, Q12H  senna-docusate sodium, 2 tablet, Oral, BID  sodium chloride, 10 mL, Intravenous, Q12H  terazosin, 8 mg, Oral, Nightly      Continuous Infusions:   PRN Meds:.  senna-docusate sodium **AND** polyethylene glycol **AND** bisacodyl **AND** bisacodyl    cloNIDine    dextrose    dextrose    glucagon (human recombinant)    ipratropium-albuterol    Magnesium Standard Dose Replacement - Follow Nurse / BPA Driven Protocol    nitroglycerin    ondansetron    sodium chloride    sodium chloride    Assessment & Plan   Assessment & Plan     Active Hospital Problems    Diagnosis  POA    **Acute on chronic diastolic congestive heart failure [I50.33]  Yes    Acute exacerbation of CHF (congestive heart failure) [I50.9]  Yes    Acute respiratory failure with hypoxia [J96.01]  Yes    Uncontrolled hypertension [I10]  Yes    Demyelinating disease [G37.9]  Yes    DM (diabetes mellitus), type 1 [E10.9]  Yes    Factor V Leiden [D68.51]  Yes    Essential hypertension [I10]  Yes    CKD (chronic kidney disease) stage 3, GFR 30-59 ml/min [N18.30]  Yes    Hypothyroidism [E03.9]  Yes    Hyperlipidemia [E78.5]  Yes      Resolved Hospital Problems    Diagnosis Date Resolved POA    CHF (congestive heart failure) [I50.9] 07/20/2023 Yes    Type 1 diabetes mellitus without complications [E10.9] 07/20/2023 Yes    Hypothyroidism, unspecified [E03.9] 07/20/2023 Yes        Brief Hospital Course to date:  Marilyn Kunz is a 78 y.o. female  w/ dm1, hfpef, htn, hl, ckd 3, previous cva, demyelinating disease (on imuran followed by Dr. Hernandez), previous esophageal ulcer earlier in 2023,heterozygous factor V leiden status,  hypothyroidism, copd (follows w/ Dr. Haley of pulmonary), previous admission w/ hyponatremia march 2023. Patient presents with profound fatigue over the past  1-2 weeks, with some exertional dyspnea over the past few days. Had been up-titrating bp meds recently (w/ assistance of cardiology Dr. Maya office) due to rising BP. About a week ago apparently saw nephrology as outpatient and, due to rising creatinine, was instructed to stop bumex (had been taking 0.5mg daily bumex). Then over the past few days the fatigue and dyspnea w/ exertion increased prompting patient to come to [facility] for evaluation. Denies overt chest pain or dysuria. No focal weakness, no headache, no confusion.              In ED room air sats were 86%. Bp 186/79. Wbc wbc 5.6, hgb 9.8. HS troponin 13, repeat 14. EKG nsr without overt ischemic changes. Probnp 3,386. Glucose 245, creatinine 1.09. u/a benign. Tsh normal. Abg ph 7.36, pco2 44, po2 51 (on 3Lnc). Covid and flu pcr negative. Procal normal. Ct head negative for acute process, only revealing atrophy. Ct angio chest was negative for PE, showed pulmonary edema dn small to moderate bilateral pleural effusions. She received Bumex 2mg iv in ed and was admitted to hospitalist service. Cardiology is following and assisting with medical management.     This patient's problems and plans were partially entered by my partner and updated as appropriate by me 07/22/23.     Acute hypoxic respiratory failure  A/C HFpEF  Accelerated HTN  HL  -bumex 2mg iv given in ed  -Cardiology following  -Continue Bumex 0.5 mg daily, started on Entresto 24/26 mg twice daily, continue hydralazine 100 mg twice daily, terazosin 8 mg nightly, bisoprolol 20 mg daily  -Currently on 2 L, wean oxygen as able  -BP borderline low this a.m, check orthostatic  -Will need to arrange outpatient cardiology and heart & valve clinic follow-ups  AM labs    Ckd 3 (baseline cr ~0.9-1.1)  -Cr 1.19 this a.m., monitor with diuresis     Dm1 (A1c 8.0)  -on tresiba 15 units sq nightly at home w/ sliding scale novalog  -continue levemir 15 unit sq nightly, sliding scale humalog, adjust prn.       COPD  -currently not wheezing, not in exacerbatoin  -continue symbicort, prn duonebs  -follows w/ Dr. Haley as outpatient     Hypothyroidism  -continue synthroid  -tsh ok     Profound generalized weakness, unsteady gait  Hx demyelinating disease  Peripheral neuropathy  -followed by Dr. Hernandez as outpatient; continue imuran  -u/a benign  -ct head negative at admission  -covid 19 & flu pcr negative  -procal negative  -tsh ok  -pt/ot have evaluated, recommend home w/ home health  -continue imuran 100mg bid (follows w/ Dr. Hernandez)  -neurology following (had outpatient appointment set up for 7/21/23):  mri brain ordered & pending. If mri negative for acute process then recommends follow up w/ Dr. Hernandez as outpatient for electrodiagnostic studies     Hx of Heterozygous factor V leiden  -asa; no previous hx thrombus per patient      Expected Discharge Location and Transportation: HOME W/ HOME HEALTH  Expected Discharge 7/23/23 IF CLINICALLY STABLE   Expected discharge date/ time has not been documented.      DVT prophylaxis:  Medical DVT prophylaxis orders are present.     AM-PAC 6 Clicks Score (PT): 20 (07/21/23 0982)    CODE STATUS:   Code Status and Medical Interventions:   Ordered at: 07/20/23 1900     Code Status (Patient has no pulse and is not breathing):    CPR (Attempt to Resuscitate)     Medical Interventions (Patient has pulse or is breathing):    Full Support     Release to patient:    Routine Release       Hari Valencia, APRN  07/22/23

## 2023-07-22 NOTE — PROGRESS NOTES
Cardiology Progress Note      Reason for visit:    Diastolic heart failure  Uncontrolled hypertension    IDENTIFICATION: 78-year-old female who resides in [place], [place]    Active Hospital Problems    Diagnosis  POA    **Acute on chronic diastolic congestive heart failure [I50.33]  Yes     Priority: High     Echo (2/25/2023):LVEF=61 - 65%.  RVSP 35-45 mmHg.  CT angiogram of the chest (7/20/2023): Worsened pulmonary edema and moderate bilateral pleural effusions.  No PE      Acute respiratory failure with hypoxia [J96.01]  Yes     Priority: High    Uncontrolled hypertension [I10]  Yes     Priority: High     Increased lisinopril  and amlodipine 7/2021. Monitor at home.  D/C lisinopril, add irbestartan/hctz. 1/2022. Monitor at home.      DM (diabetes mellitus), type 1 [E10.9]  Yes     Priority: High     This Chronic Condition affected the medical decision making - stable - follow up as needed. Followed by specialist Dr. Reid  10/2018 A1C= 9.5      Essential hypertension [I10]  Yes     Priority: High    CKD (chronic kidney disease) stage 3, GFR 30-59 ml/min [N18.30]  Yes     Priority: High    Factor V Leiden [D68.51]  Yes     Priority: Medium    Demyelinating disease [G37.9]  Yes     Priority: Low     Dr Hernandez      Hypothyroidism [E03.9]  Yes     Priority: Low    Hyperlipidemia [E78.5]  Yes     Priority: Low    Acute exacerbation of CHF (congestive heart failure) [I50.9]  Yes            Patient is sitting up in the bed breathing easy on room air.  She appears euvolemic.  She was started on Entresto yesterday and blood pressures decreased.  She is having some symptomatic hypotension.  She reports feeling off balance and lightheaded.           Vital Sign Min/Max for last 24 hours  Temp  Min: 97.8 °F (36.6 °C)  Max: 98.2 °F (36.8 °C)   BP  Min: 94/42  Max: 151/50   Pulse  Min: 49  Max: 72   Resp  Min: 16  Max: 18   SpO2  Min: 93 %  Max: 97 %   No data recorded    No intake or output data in the 24 hours ending  07/22/23 1227        Physical Exam  Constitutional:       General: She is awake.   Neck:      Vascular: No JVD.   Cardiovascular:      Rate and Rhythm: Normal rate and regular rhythm.   Pulmonary:      Effort: Pulmonary effort is normal.      Breath sounds: Normal breath sounds.   Musculoskeletal:      Right lower leg: No edema.      Left lower leg: No edema.   Skin:     General: Skin is warm and dry.   Neurological:      Mental Status: She is alert.   Psychiatric:         Behavior: Behavior is cooperative.       Tele: Normal sinus rhythm    Results Review (reviewed the patient's recent labs in the electronic medical record):     EKG (7/20/2023): Sinus rhythm with PACs    CT angiogram of the chest (7/20/2023): Mildly worsened pulmonary edema with small to moderate bilateral pleural effusions.  No PE    ECHO (2/25/2023): LVEF 61-65%    Results from last 7 days   Lab Units 07/22/23  0657 07/21/23  0421 07/20/23  1402 07/20/23  1157   SODIUM mmol/L 144 142  --  139   POTASSIUM mmol/L 4.1 3.8  --  4.5   CHLORIDE mmol/L 101 102  --  104   BUN mg/dL 28* 22  --  26*   CREATININE mg/dL 1.19* 1.03*  --  1.09*   MAGNESIUM mg/dL 1.9 1.9 2.1  --      Results from last 7 days   Lab Units 07/20/23  1402 07/20/23  1157   HSTROP T ng/L 14* 13*     Results from last 7 days   Lab Units 07/21/23  0421 07/20/23  1157   WBC 10*3/mm3 5.84 5.60   HEMOGLOBIN g/dL 9.2* 9.8*   HEMATOCRIT % 29.7* 31.3*   PLATELETS 10*3/mm3 188 192       Lab Results   Component Value Date    HGBA1C 8.00 (H) 07/20/2023       Lab Results   Component Value Date    CHOL 158 03/04/2023    TRIG 58 03/04/2023    HDL 64 (H) 03/04/2023    LDL 82 03/04/2023              Acute diastolic heart failure  Bumex 0.5 mg daily  Bisoprolol 20 mg daily  Entresto 24/26 mg 1 tablet twice daily        Hypertension/hypotension  Bisoprolol 20 mg daily  Terazosin 4 mg nightly  Hydralazine 100 mg twice daily  Entresto 24/26 mg 1 tablet twice daily  Blood pressures now low.  Patient  symptomatic hypotension        Type 1 diabetes mellitus  Hemoglobin A1c 8        Chronic kidney disease  Current creatinine 1.03.  GFR 55.8        Factor V Leiden  Aspirin 81 mg daily        Hyperlipidemia  Pravastatin 20 mg daily                   Decrease hydralazine 50 mg twice daily  Would decrease terazosin if continues to have hypotension  Observe overnight hopefully discharge home tomorrow if BP stable    Electronically signed by JAGDISH Cronin, 07/22/23, 12:27 PM EDT.

## 2023-07-23 PROBLEM — I10 UNCONTROLLED HYPERTENSION: Status: RESOLVED | Noted: 2023-02-06 | Resolved: 2023-07-23

## 2023-07-23 PROBLEM — I50.9 ACUTE EXACERBATION OF CHF (CONGESTIVE HEART FAILURE): Status: RESOLVED | Noted: 2023-07-21 | Resolved: 2023-07-23

## 2023-07-23 PROBLEM — J96.01 ACUTE RESPIRATORY FAILURE WITH HYPOXIA: Status: RESOLVED | Noted: 2023-03-02 | Resolved: 2023-07-23

## 2023-07-23 LAB
ANION GAP SERPL CALCULATED.3IONS-SCNC: 15 MMOL/L (ref 5–15)
BUN SERPL-MCNC: 38 MG/DL (ref 8–23)
BUN/CREAT SERPL: 28.1 (ref 7–25)
CALCIUM SPEC-SCNC: 8.6 MG/DL (ref 8.6–10.5)
CHLORIDE SERPL-SCNC: 102 MMOL/L (ref 98–107)
CO2 SERPL-SCNC: 24 MMOL/L (ref 22–29)
CREAT SERPL-MCNC: 1.35 MG/DL (ref 0.57–1)
DEPRECATED RDW RBC AUTO: 52.7 FL (ref 37–54)
EGFRCR SERPLBLD CKD-EPI 2021: 40.3 ML/MIN/1.73
ERYTHROCYTE [DISTWIDTH] IN BLOOD BY AUTOMATED COUNT: 15.3 % (ref 12.3–15.4)
GLUCOSE BLDC GLUCOMTR-MCNC: 149 MG/DL (ref 70–130)
GLUCOSE BLDC GLUCOMTR-MCNC: 304 MG/DL (ref 70–130)
GLUCOSE BLDC GLUCOMTR-MCNC: 336 MG/DL (ref 70–130)
GLUCOSE BLDC GLUCOMTR-MCNC: 389 MG/DL (ref 70–130)
GLUCOSE BLDC GLUCOMTR-MCNC: 405 MG/DL (ref 70–130)
GLUCOSE BLDC GLUCOMTR-MCNC: 416 MG/DL (ref 70–130)
GLUCOSE SERPL-MCNC: 107 MG/DL (ref 65–99)
HCT VFR BLD AUTO: 36 % (ref 34–46.6)
HGB BLD-MCNC: 11.5 G/DL (ref 12–15.9)
MAGNESIUM SERPL-MCNC: 1.8 MG/DL (ref 1.6–2.4)
MCH RBC QN AUTO: 30.7 PG (ref 26.6–33)
MCHC RBC AUTO-ENTMCNC: 31.9 G/DL (ref 31.5–35.7)
MCV RBC AUTO: 96 FL (ref 79–97)
PLATELET # BLD AUTO: 261 10*3/MM3 (ref 140–450)
PMV BLD AUTO: 11.1 FL (ref 6–12)
POTASSIUM SERPL-SCNC: 3.8 MMOL/L (ref 3.5–5.2)
RBC # BLD AUTO: 3.75 10*6/MM3 (ref 3.77–5.28)
SODIUM SERPL-SCNC: 141 MMOL/L (ref 136–145)
WBC NRBC COR # BLD: 6.29 10*3/MM3 (ref 3.4–10.8)

## 2023-07-23 PROCEDURE — 97116 GAIT TRAINING THERAPY: CPT

## 2023-07-23 PROCEDURE — 94664 DEMO&/EVAL PT USE INHALER: CPT

## 2023-07-23 PROCEDURE — 80048 BASIC METABOLIC PNL TOTAL CA: CPT | Performed by: NURSE PRACTITIONER

## 2023-07-23 PROCEDURE — 99232 SBSQ HOSP IP/OBS MODERATE 35: CPT | Performed by: PSYCHIATRY & NEUROLOGY

## 2023-07-23 PROCEDURE — 25010000002 ENOXAPARIN PER 10 MG: Performed by: INTERNAL MEDICINE

## 2023-07-23 PROCEDURE — 63710000001 AZATHIOPRINE PER 50 MG: Performed by: INTERNAL MEDICINE

## 2023-07-23 PROCEDURE — 63710000001 INSULIN LISPRO (HUMAN) PER 5 UNITS: Performed by: NURSE PRACTITIONER

## 2023-07-23 PROCEDURE — 97112 NEUROMUSCULAR REEDUCATION: CPT

## 2023-07-23 PROCEDURE — 63710000001 INSULIN LISPRO (HUMAN) PER 5 UNITS: Performed by: INTERNAL MEDICINE

## 2023-07-23 PROCEDURE — 94799 UNLISTED PULMONARY SVC/PX: CPT

## 2023-07-23 PROCEDURE — 82948 REAGENT STRIP/BLOOD GLUCOSE: CPT

## 2023-07-23 PROCEDURE — 99232 SBSQ HOSP IP/OBS MODERATE 35: CPT | Performed by: NURSE PRACTITIONER

## 2023-07-23 PROCEDURE — G0378 HOSPITAL OBSERVATION PER HR: HCPCS

## 2023-07-23 PROCEDURE — 83735 ASSAY OF MAGNESIUM: CPT | Performed by: NURSE PRACTITIONER

## 2023-07-23 PROCEDURE — 85027 COMPLETE CBC AUTOMATED: CPT | Performed by: NURSE PRACTITIONER

## 2023-07-23 PROCEDURE — 63710000001 INSULIN DETEMIR PER 5 UNITS: Performed by: INTERNAL MEDICINE

## 2023-07-23 PROCEDURE — 94761 N-INVAS EAR/PLS OXIMETRY MLT: CPT

## 2023-07-23 PROCEDURE — 97530 THERAPEUTIC ACTIVITIES: CPT

## 2023-07-23 RX ORDER — NICOTINE POLACRILEX 4 MG
15 LOZENGE BUCCAL
Status: DISCONTINUED | OUTPATIENT
Start: 2023-07-23 | End: 2023-07-26 | Stop reason: HOSPADM

## 2023-07-23 RX ORDER — INSULIN LISPRO 100 [IU]/ML
3-14 INJECTION, SOLUTION INTRAVENOUS; SUBCUTANEOUS
Status: DISCONTINUED | OUTPATIENT
Start: 2023-07-23 | End: 2023-07-24

## 2023-07-23 RX ORDER — DEXTROSE MONOHYDRATE 25 G/50ML
25 INJECTION, SOLUTION INTRAVENOUS
Status: DISCONTINUED | OUTPATIENT
Start: 2023-07-23 | End: 2023-07-24 | Stop reason: SDUPTHER

## 2023-07-23 RX ADMIN — PRAVASTATIN SODIUM 20 MG: 40 TABLET ORAL at 21:45

## 2023-07-23 RX ADMIN — AZATHIOPRINE 100 MG: 50 TABLET ORAL at 09:21

## 2023-07-23 RX ADMIN — LEVOTHYROXINE SODIUM 75 MCG: 0.07 TABLET ORAL at 06:49

## 2023-07-23 RX ADMIN — CETIRIZINE HYDROCHLORIDE 10 MG: 10 TABLET, FILM COATED ORAL at 09:20

## 2023-07-23 RX ADMIN — INSULIN LISPRO 14 UNITS: 100 INJECTION, SOLUTION INTRAVENOUS; SUBCUTANEOUS at 17:14

## 2023-07-23 RX ADMIN — Medication 10 ML: at 21:45

## 2023-07-23 RX ADMIN — INSULIN LISPRO 5 UNITS: 100 INJECTION, SOLUTION INTRAVENOUS; SUBCUTANEOUS at 11:11

## 2023-07-23 RX ADMIN — AZATHIOPRINE 100 MG: 50 TABLET ORAL at 21:44

## 2023-07-23 RX ADMIN — ENOXAPARIN SODIUM 40 MG: 100 INJECTION SUBCUTANEOUS at 21:45

## 2023-07-23 RX ADMIN — FLUTICASONE PROPIONATE 2 SPRAY: 50 SPRAY, METERED NASAL at 09:22

## 2023-07-23 RX ADMIN — INSULIN DETEMIR 15 UNITS: 100 INJECTION, SOLUTION SUBCUTANEOUS at 21:47

## 2023-07-23 RX ADMIN — Medication 10 ML: at 09:21

## 2023-07-23 RX ADMIN — SODIUM CHLORIDE 250 ML: 9 INJECTION, SOLUTION INTRAVENOUS at 10:19

## 2023-07-23 RX ADMIN — SENNOSIDES AND DOCUSATE SODIUM 2 TABLET: 50; 8.6 TABLET ORAL at 09:20

## 2023-07-23 RX ADMIN — BUDESONIDE AND FORMOTEROL FUMARATE DIHYDRATE 2 PUFF: 160; 4.5 AEROSOL RESPIRATORY (INHALATION) at 07:48

## 2023-07-23 RX ADMIN — INSULIN LISPRO 10 UNITS: 100 INJECTION, SOLUTION INTRAVENOUS; SUBCUTANEOUS at 21:47

## 2023-07-23 RX ADMIN — ASPIRIN 81 MG: 81 TABLET, CHEWABLE ORAL at 09:20

## 2023-07-23 RX ADMIN — SACUBITRIL AND VALSARTAN 1 TABLET: 24; 26 TABLET, FILM COATED ORAL at 21:46

## 2023-07-23 RX ADMIN — PANTOPRAZOLE SODIUM 40 MG: 40 TABLET, DELAYED RELEASE ORAL at 06:49

## 2023-07-23 NOTE — PROGRESS NOTES
[facility] Medicine Services  PROGRESS NOTE    Patient Name: Marilyn Kunz  : 1945  MRN: [id number]    Date of Admission: 2023  Primary Care Physician: Peter Baca MD    Subjective   Subjective     CC:  Follow-up generalized weakness, dyspnea    HPI:  Patient seen resting in bed no apparent distress.  Nursing notes reviewed.  Patient remained hypotensive overnight. Patient is concerned about ongoing lightheadedness. No additional concerns at this time.     ROS:  Gen- No fevers, chills, +dizzy, +lightheaded  CV- No chest pain, palpitations  Resp- No cough, dyspnea  GI- No N/V/D, abd pain    Objective   Objective     Vital Signs:   Temp:  [97.9 °F (36.6 °C)-98.3 °F (36.8 °C)] 98 °F (36.7 °C)  Heart Rate:  [56-72] 64  Resp:  [16-18] 16  BP: ()/(51-93) 90/53     Physical Exam:  Constitutional: No acute distress, awake, alert  HENT: NCAT, mucous membranes moist  Respiratory: Clear to auscultation bilaterally, respiratory effort normal on room air  Cardiovascular: RRR, no murmurs, cap refill brisk   Gastrointestinal: Positive bowel sounds, soft, nontender, nondistended  Musculoskeletal: No BLE edema   Psychiatric: Appropriate affect, cooperative  Neurologic: Oriented x 3, moves all extremities, speech clear  Skin: warm, dry, no visible rash     Results Reviewed:  LAB RESULTS:      Lab 23  0404 23  0421 23  1157   WBC 6.29 5.84 5.60   HEMOGLOBIN 11.5* 9.2* 9.8*   HEMATOCRIT 36.0 29.7* 31.3*   PLATELETS 261 188 192   NEUTROS ABS  --   --  4.46   IMMATURE GRANS (ABS)  --   --  0.05   LYMPHS ABS  --   --  0.59*   MONOS ABS  --   --  0.39   EOS ABS  --   --  0.07   MCV 96.0 97.4* 98.7*   PROCALCITONIN  --   --  0.04   LACTATE  --   --  1.1         Lab 23  0404 23  0657 23  0421 23  1402 23  1157   SODIUM 141 144 142  --  139   POTASSIUM 3.8 4.1 3.8  --  4.5   CHLORIDE 102 101 102  --  104   CO2 24.0 28.0 28.0  --   22.0   ANION GAP 15.0 15.0 12.0  --  13.0   BUN 38* 28* 22  --  26*   CREATININE 1.35* 1.19* 1.03*  --  1.09*   EGFR 40.3* 46.9* 55.8*  --  52.1*   GLUCOSE 107* 151* 147*  --  245*   CALCIUM 8.6 8.7 8.4*  --  8.8   MAGNESIUM 1.8 1.9 1.9 2.1  --    HEMOGLOBIN A1C  --   --   --   --  8.00*   TSH  --   --   --  3.590  --          Lab 07/20/23  1157   TOTAL PROTEIN 6.2   ALBUMIN 3.7   GLOBULIN 2.5   ALT (SGPT) 16   AST (SGOT) 21   BILIRUBIN 1.3*   ALK PHOS 65   LIPASE 9*         Lab 07/20/23  1402 07/20/23  1157   PROBNP  --  3,386.0*   HSTROP T 14* 13*                 Lab 07/20/23  1628   PH, ARTERIAL 7.363   PCO2, ARTERIAL 44.0   PO2 ART 51.1*   FIO2 32   HCO3 ART 25.0   BASE EXCESS ART -0.5*   CARBOXYHEMOGLOBIN 1.2     Brief Urine Lab Results  (Last result in the past 365 days)        Color   Clarity   Blood   Leuk Est   Nitrite   Protein   CREAT   Urine HCG        07/20/23 1206 Yellow   Clear   Negative   Negative   Negative   Negative                   Microbiology Results Abnormal       Procedure Component Value - Date/Time    COVID PRE-OP / PRE-PROCEDURE SCREENING ORDER (NO ISOLATION) - Swab, Nasopharynx [[id number]]  (Normal) Collected: 07/20/23 1819    Lab Status: Final result Specimen: Swab from Nasopharynx Updated: 07/20/23 1856    Narrative:      The following orders were created for panel order COVID PRE-OP / PRE-PROCEDURE SCREENING ORDER (NO ISOLATION) - Swab, Nasopharynx.  Procedure                               Abnormality         Status                     ---------                               -----------         ------                     COVID-19 and FLU A/B PCR...[[id number]]  Normal              Final result                 Please view results for these tests on the individual orders.    COVID-19 and FLU A/B PCR - Swab, Nasopharynx [[id number]]  (Normal) Collected: 07/20/23 1819    Lab Status: Final result Specimen: Swab from Nasopharynx Updated: 07/20/23 1856     COVID19 Not Detected     Influenza A  PCR Not Detected     Influenza B PCR Not Detected    Narrative:      Fact sheet for providers: https://www.fda.gov/media/149149/download    Fact sheet for patients: https://www.fda.gov/media/708670/download    Test performed by PCR.            MRI Brain With & Without Contrast    Result Date: 7/22/2023  MRI BRAIN W WO CONTRAST Date of Exam: 7/22/2023 4:29 AM EDT Indication: Vertigo.  Comparison: None available. Technique:  Routine multiplanar/multisequence sequence images of the brain were obtained before and after the uneventful administration of 13 mL Multihance. Findings: No acute infarct is present on diffusion weighted sequences. Midline structures are normal and the craniocervical junction appears satisfactory. Age-related changes are present with mild generalized volume loss and typical pontine and periventricular leukomalacia. There is otherwise no evidence of intracranial hemorrhage, mass or mass effect. There is no abnormal enhancement there is ex vacuo prominence of the ventricles and sulci. The orbits are normal. The paranasal sinuses are grossly clear.     Impression: Impression: Advanced age-related changes are noted as above. There is otherwise no evidence of acute infarct, hemorrhage, mass or abnormal enhancement. Electronically Signed: Vick Grimaldo  7/22/2023 7:25 PM EDT  Workstation ID: [id number]     Results for orders placed during the hospital encounter of 02/24/23    Adult Transthoracic Echo Complete W/ Cont if Necessary Per Protocol    Interpretation Summary    Left ventricular ejection fraction appears to be 61 - 65%.    Estimated right ventricular systolic pressure from tricuspid regurgitation is mildly elevated (35-45 mmHg).      Current medications:  Scheduled Meds:aspirin, 81 mg, Oral, Daily  azaTHIOprine, 100 mg, Oral, BID  budesonide-formoterol, 2 puff, Inhalation, BID - RT  bumetanide, 0.5 mg, Oral, Daily  cetirizine, 10 mg, Oral, Daily  enoxaparin, 40 mg, Subcutaneous,  Q24H  fluticasone, 2 spray, Each Nare, Daily  gabapentin, 300 mg, Oral, Nightly  insulin detemir, 15 Units, Subcutaneous, Nightly  insulin lispro, 2-7 Units, Subcutaneous, 4x Daily AC & at Bedtime  levothyroxine, 75 mcg, Oral, Daily  nebivolol, 20 mg, Oral, Q24H  pantoprazole, 40 mg, Oral, Q AM  pharmacy consult - MTM, , Does not apply, Daily  pravastatin, 20 mg, Oral, Nightly  sacubitril-valsartan, 1 tablet, Oral, Q12H  senna-docusate sodium, 2 tablet, Oral, BID  sodium chloride, 250 mL, Intravenous, Once  sodium chloride, 10 mL, Intravenous, Q12H      Continuous Infusions:   PRN Meds:.  senna-docusate sodium **AND** polyethylene glycol **AND** bisacodyl **AND** bisacodyl    cloNIDine    dextrose    dextrose    glucagon (human recombinant)    ipratropium-albuterol    Magnesium Standard Dose Replacement - Follow Nurse / BPA Driven Protocol    nitroglycerin    ondansetron    sodium chloride    sodium chloride    Assessment & Plan   Assessment & Plan     Active Hospital Problems    Diagnosis  POA    **Acute on chronic diastolic congestive heart failure [I50.33]  Yes    Acute exacerbation of CHF (congestive heart failure) [I50.9]  Yes    Acute respiratory failure with hypoxia [J96.01]  Yes    Uncontrolled hypertension [I10]  Yes    Demyelinating disease [G37.9]  Yes    DM (diabetes mellitus), type 1 [E10.9]  Yes    Factor V Leiden [D68.51]  Yes    Essential hypertension [I10]  Yes    CKD (chronic kidney disease) stage 3, GFR 30-59 ml/min [N18.30]  Yes    Hypothyroidism [E03.9]  Yes    Hyperlipidemia [E78.5]  Yes      Resolved Hospital Problems    Diagnosis Date Resolved POA    CHF (congestive heart failure) [I50.9] 07/20/2023 Yes    Type 1 diabetes mellitus without complications [E10.9] 07/20/2023 Yes    Hypothyroidism, unspecified [E03.9] 07/20/2023 Yes        Brief Hospital Course to date:  Marilyn Kunz is a 78 y.o. female  w/ dm1, hfpef, htn, hl, ckd 3, previous cva, demyelinating disease (on imuran followed  by Dr. Hernandez), previous esophageal ulcer earlier in 2023,heterozygous factor V leiden status,  hypothyroidism, copd (follows w/ Dr. Haley of pulmonary), previous admission w/ hyponatremia march 2023. Patient presents with profound fatigue over the past 1-2 weeks, with some exertional dyspnea over the past few days. Had been up-titrating bp meds recently (w/ assistance of cardiology Dr. Maya office) due to rising BP. About a week ago apparently saw nephrology as outpatient and, due to rising creatinine, was instructed to stop bumex (had been taking 0.5mg daily bumex). Then over the past few days the fatigue and dyspnea w/ exertion increased prompting patient to come to [facility] for evaluation. Denies overt chest pain or dysuria. No focal weakness, no headache, no confusion.     In ED room air sats were 86%. Bp 186/79. Wbc wbc 5.6, hgb 9.8. HS troponin 13, repeat 14. EKG nsr without overt ischemic changes. Probnp 3,386. Glucose 245, creatinine 1.09. u/a benign. Tsh normal. Abg ph 7.36, pco2 44, po2 51 (on 3Lnc). Covid and flu pcr negative. Procal normal. Ct head negative for acute process, only revealing atrophy. Ct angio chest was negative for PE, showed pulmonary edema dn small to moderate bilateral pleural effusions. She received Bumex 2mg iv in ed and was admitted to hospitalist service. Cardiology is following and assisting with medical management.      This patient's problems and plans were partially entered by my partner and updated as appropriate by me 07/23/23.     Acute hypoxic respiratory failure  A/C HFpEF  Accelerated HTN  HL  -s/p bumex 2mg IV in ED  -Cardiology following  -Orthostatics + on 7/22  -BP remains low this a.m, recheck orthostatics  -d/c'd hydralazine, Terazosin   -Entresto started this admission, resume in AM with hold parameters, continue Bumex 0.5 mg daily   -give 250 ml NS bolus now  -Currently on RA  -Will need to arrange outpatient cardiology and heart & valve clinic follow-ups  -AM  labs     Ckd 3 (baseline cr ~0.9-1.1)  -Cr up to 1.35 this a.m.  -started Entresto this admission  -Hold Bumex today, resume in AM  -s/p 250 ml NS today   -BMP in AM      Dm1 (A1c 8.0)  -on tresiba 15 units sq nightly at home w/ sliding scale novalog  -continue levemir 15 unit sq nightly,  -poor glucose control, increase to mod/high SSI today, adjust prn.      COPD  -currently not wheezing, not in exacerbatoin  -continue symbicort, prn duonebs  -follows w/ Dr. Haley as outpatient     Hypothyroidism  -continue synthroid  -tsh ok     Profound generalized weakness, unsteady gait  Hx demyelinating disease  Peripheral neuropathy  -followed by Dr. Hernandez as outpatient; continue imuran  -u/a benign  -ct head negative at admission  -covid 19 & flu pcr negative  -procal negative  -tsh ok  -pt/ot have evaluated, recommend home w/ home health  -continue imuran 100mg bid (follows w/ Dr. Hernandez)  -neurology following (had outpatient appointment set up for 7/21/23)  -mri brain revealed advanced age-related changes with no evidence of acute abnormality.   -Neurology recommended follow up w/ Dr. Hernandez as outpatient for electrodiagnostic studies     Hx of Heterozygous factor V leiden  -asa; no previous hx thrombus per patient      Expected Discharge Location and Transportation: HOME W/ HOME HEALTH  Expected Discharge 7/24/23 IF CLINICALLY STABLE   Expected discharge date/ time has not been documented.      DVT prophylaxis:  Medical DVT prophylaxis orders are present.     AM-PAC 6 Clicks Score (PT): 20 (07/21/23 4625)    CODE STATUS:   Code Status and Medical Interventions:   Ordered at: 07/20/23 8503     Code Status (Patient has no pulse and is not breathing):    CPR (Attempt to Resuscitate)     Medical Interventions (Patient has pulse or is breathing):    Full Support     Release to patient:    Routine Release       Hari Valencia, APRN  07/23/23

## 2023-07-23 NOTE — PROGRESS NOTES
Cardiology Progress Note      Reason for visit:    Diastolic heart failure  Uncontrolled hypertension    IDENTIFICATION: 78-year-old female who resides in [place], [place]    Active Hospital Problems    Diagnosis  POA    **Acute on chronic diastolic congestive heart failure [I50.33]  Yes     Priority: High     Echo (2/25/2023): LVEF=62%.  RVSP 35-45 mmHg.  CT angiogram chest (7/20/2023): Worsened pulmonary edema and moderate bilateral pleural effusions.  No PE      Demyelinating disease [G37.9]  Yes     Dr Hernandez      Type 1 diabetes mellitus [E10.9]  Yes     Followed by Dr. Reid      Factor V Leiden [D68.51]  Yes    Essential hypertension [I10]  Yes     Target blood pressure <130/80 mmHg      CKD (chronic kidney disease) stage 3, GFR 30-59 ml/min [N18.30]  Yes    Hypothyroidism [E03.9]  Yes    Hyperlipidemia LDL goal <70 [E78.5]  Yes            Hypotensive this morning.  Antihypertensive medications held  Patient feels dizzy             Vital Sign Min/Max for last 24 hours  Temp  Min: 98 °F (36.7 °C)  Max: 98.3 °F (36.8 °C)   BP  Min: 90/53  Max: 135/58   Pulse  Min: 53  Max: 86   Resp  Min: 16  Max: 18   SpO2  Min: 92 %  Max: 96 %   Flow (L/min)  Min: 0  Max: 0    No intake or output data in the 24 hours ending 07/23/23 1404        Physical Exam  Constitutional:       General: She is sleeping.      Appearance: Normal appearance.   Neck:      Vascular: No JVD.   Cardiovascular:      Rate and Rhythm: Normal rate and regular rhythm.   Pulmonary:      Effort: Pulmonary effort is normal.      Breath sounds: Normal breath sounds.   Musculoskeletal:      Right lower leg: No edema.      Left lower leg: No edema.   Skin:     General: Skin is warm and dry.   Neurological:      Mental Status: She is easily aroused.       Tele: Normal sinus rhythm     Results Review (reviewed the patient's recent labs in the electronic medical record):      EKG (7/20/2023): Sinus rhythm with PACs     CT angiogram of the chest  (7/20/2023): Mildly worsened pulmonary edema with small to moderate bilateral pleural effusions.  No PE     ECHO (2/25/2023): LVEF 61-65%    Results from last 7 days   Lab Units 07/23/23  0404 07/22/23  0657 07/21/23  0421 07/20/23  1402 07/20/23  1157   SODIUM mmol/L 141 144 142  --  139   POTASSIUM mmol/L 3.8 4.1 3.8  --  4.5   CHLORIDE mmol/L 102 101 102  --  104   BUN mg/dL 38* 28* 22  --  26*   CREATININE mg/dL 1.35* 1.19* 1.03*  --  1.09*   MAGNESIUM mg/dL 1.8 1.9 1.9   < >  --     < > = values in this interval not displayed.     Results from last 7 days   Lab Units 07/20/23  1402 07/20/23  1157   HSTROP T ng/L 14* 13*     Results from last 7 days   Lab Units 07/23/23  0404 07/21/23  0421 07/20/23  1157   WBC 10*3/mm3 6.29 5.84 5.60   HEMOGLOBIN g/dL 11.5* 9.2* 9.8*   HEMATOCRIT % 36.0 29.7* 31.3*   PLATELETS 10*3/mm3 261 188 192       Lab Results   Component Value Date    HGBA1C 8.00 (H) 07/20/2023       Lab Results   Component Value Date    CHOL 158 03/04/2023    TRIG 58 03/04/2023    HDL 64 (H) 03/04/2023    LDL 82 03/04/2023              Acute diastolic heart failure  Continue Bumex     Hypertension  Presently hypotensive     Type 1 diabetes mellitus  Hemoglobin A1c 8     Factor V Leiden  Aspirin 81 mg daily      Hyperlipidemia  Pravastatin 20 mg daily                Continue to observe blood pressure overnight  Dr. Maya to reassess in the a.m.    Electronically signed by Syed Joy IV, MD, 07/23/23, 2:04 PM EDT.

## 2023-07-23 NOTE — PLAN OF CARE
Goal Outcome Evaluation:  Plan of Care Reviewed With: patient        Progress: improving  Outcome Evaluation: Patient continuing to demonstrate decrease in BP with transitional movements but BP increases with 1-2 min of pause after each transition change. Continuing to require less physical assistance for bed mobilty, transfers, and ambulation. VC required for attention to task and safe use of AD. Patient will continue to benefit from skilled IP PT services to address impairments for return to PLOF. Cont IP PT POC.      Anticipated Discharge Disposition (PT): home with home health, home with assist

## 2023-07-23 NOTE — PROGRESS NOTES
"DOS: 2023  NAME: Marilyn Kunz   : 1945  PCP: Peter Baca MD  Chief Complaint   Patient presents with    Fatigue       Chief complaint: She feels lightheaded when she stands up and her systolic blood pressure dropped from 119-98 and she was symptomatic.  Subjective: Reassured her that the MRI of the brain performed with and without contrast did not have any acute changes from the one done more than 6 months ago.  She still has carpal tunnel in bilateral hands.    Objective:  Vital signs: /61 (BP Location: Left arm, Patient Position: Lying)   Pulse 53   Temp 98.3 °F (36.8 °C) (Oral)   Resp 18   Ht 149.9 cm (59\")   Wt 60.5 kg (133 lb 6.4 oz)   SpO2 94%   BMI 26.94 kg/m²    Gen: NAD, vitals reviewed  MS: Normal.  CN: Cranials 2-12: No focal deficits noted.  Motor: Muscles of both upper and lower extremities show good bulk power and tone although she has a lot of arthritic changes in her joints specially in the hands and feet.  Sensory: She has bilateral carpal tunnel syndrome.  Coordination: Normal finger-to-nose and normal heel-to-shin testing noted.  Gait: She walks steady with a walker and a gait belt and contact-guard assistance but once you try to get her up and try to walker without any walker she gets extremely lightheaded from the orthostatic hypotension and tends to lose balance and fall.  Deep tendon reflexes: 2+ bilaterally with downgoing toes.    ROS:  General: Pleasant lady currently laying in bed in no distress but gets extremely lightheaded when she stands up as discussed above.  Neurological: No focal neurological deficits.  Has bilateral carpal tunnel syndrome which has been stable for some time.    Laboratory results:  Lab Results   Component Value Date    GLUCOSE 107 (H) 2023    CALCIUM 8.6 2023     2023    K 3.8 2023    CO2 24.0 2023     2023    BUN 38 (H) 2023    CREATININE 1.35 (H) 2023    " EGFRIFNONA 63 10/22/2020    BCR 28.1 (H) 07/23/2023    ANIONGAP 15.0 07/23/2023     Lab Results   Component Value Date    WBC 6.29 07/23/2023    HGB 11.5 (L) 07/23/2023    HCT 36.0 07/23/2023    MCV 96.0 07/23/2023     07/23/2023     Lab Results   Component Value Date    LDL 82 03/04/2023    LDL 76 05/05/2022         Lab 07/20/23  1157   HEMOGLOBIN A1C 8.00*        Review of labs: Hemoglobin A1c is elevated at 8 and the EGFR is greater than 60.     CMP:        Lab 07/23/23  0404 07/22/23  0657 07/21/23  0421 07/20/23  1402 07/20/23  1157   SODIUM 141 144 142  --  139   POTASSIUM 3.8 4.1 3.8  --  4.5   CHLORIDE 102 101 102  --  104   CO2 24.0 28.0 28.0  --  22.0   ANION GAP 15.0 15.0 12.0  --  13.0   BUN 38* 28* 22  --  26*   CREATININE 1.35* 1.19* 1.03*  --  1.09*   EGFR 40.3* 46.9* 55.8*  --  52.1*   GLUCOSE 107* 151* 147*  --  245*   CALCIUM 8.6 8.7 8.4*  --  8.8   MAGNESIUM 1.8 1.9 1.9 2.1  --    TOTAL PROTEIN  --   --   --   --  6.2   ALBUMIN  --   --   --   --  3.7   GLOBULIN  --   --   --   --  2.5   ALT (SGPT)  --   --   --   --  16   AST (SGOT)  --   --   --   --  21   BILIRUBIN  --   --   --   --  1.3*   ALK PHOS  --   --   --   --  65   LIPASE  --   --   --   --  9*        TSH          2/24/2023    10:50 7/20/2023    14:02   TSH   TSH 2.770  3.590         Lipid Panel          3/4/2023    05:48   Lipid Panel   Total Cholesterol 158    Triglycerides 58    HDL Cholesterol 64    VLDL Cholesterol 12    LDL Cholesterol  82    LDL/HDL Ratio 1.29         Lab Results   Component Value Date    FBBFHCMJ03 633 05/06/2022       No results found for: FOLATE    Lab Results   Component Value Date    HGBA1C 8.00 (H) 07/20/2023       Result Review:  I have personally reviewed the results from the time of this admission to 7/23/2023 13:56 EDT and agree with these findings:  [x]  Laboratory list / accordion  [x]  Microbiology  [x]  Radiology  [x]  EKG/Telemetry   [x]  Cardiology/Vascular   [x]  Pathology  []  Old  records  []  Other:  Most notable findings include: Osteoarthritis of both upper and lower extremities.  She has been on disease modifying agents.  She also has symptomatic orthostatic hypotension for which reason the cardiology team is currently tweaking her medications.           Review and interpretation of imaging: MRI of the brain without contrast performed on May 4, 2022 was reviewed as follows:    FINDINGS:        No diffusion signal abnormality to suggest acute ischemia.   No acute hemorrhage. No extra-axial fluid collection.   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 Moderate burden of T2/FLAIR hyperintensity in the white matter, nonspecific, but compatible with sequela of chronic microvascular ischemia. Mild volume loss.  No intracranial mass or mass effect.   No hydrocephalus. Basal cisterns are patent.   Expected flow voids are seen in the major intracranial vessels.        Bilateral ocular lens surgeries.  Imaged paranasal sinuses are essentially clear. Imaged mastoid air cells are essentially clear.     Visualized portions of the upper neck are unremarkable.  Marrow signal in the skull base is normal.            IMPRESSION:     1.  No acute intracranial abnormality. No acute infarct.  2.  Moderate chronic small vessel ischemic changes. Mild volume loss. 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       CT Head Without Contrast    Result Date: 7/20/2023  CT HEAD WO CONTRAST Date of Exam: 7/20/2023 11:23 AM EDT Indication: fatigue. Comparison: None available. Technique: Axial CT images were obtained of the head without contrast administration.  Automated exposure control and iterative construction methods were used. Findings: There is prominence of the ventricles and sulci suggestive of atrophy. There our areas of hypodensity within the periventricular white matter, corona radiata and the left greater than right cerebellar hemispheres. Additionally, there is a lacunar infarct  within the left  basal ganglia. Findings are suggestive of changes of chronic microvascular ischemic disease. No evidence of hemorrhage. No midline shift or mass effect is identified. Orbits paranasal sinuses and mastoid air cells are unremarkable.     Impression: Impression: 1. Atrophy, but no acute intracranial pathology. Electronically Signed: Robert Grove  7/20/2023 11:39 AM EDT  Workstation ID: [id number]       MRI Brain With & Without Contrast    Result Date: 7/22/2023  MRI BRAIN W WO CONTRAST Date of Exam: 7/22/2023 4:29 AM EDT Indication: Vertigo.  Comparison: None available. Technique:  Routine multiplanar/multisequence sequence images of the brain were obtained before and after the uneventful administration of 13 mL Multihance. Findings: No acute infarct is present on diffusion weighted sequences. Midline structures are normal and the craniocervical junction appears satisfactory. Age-related changes are present with mild generalized volume loss and typical pontine and periventricular leukomalacia. There is otherwise no evidence of intracranial hemorrhage, mass or mass effect. There is no abnormal enhancement there is ex vacuo prominence of the ventricles and sulci. The orbits are normal. The paranasal sinuses are grossly clear.     Impression: Impression: Advanced age-related changes are noted as above. There is otherwise no evidence of acute infarct, hemorrhage, mass or abnormal enhancement. Electronically Signed: Vick Grimaldo  7/22/2023 7:25 PM EDT  Workstation ID: [id number]      MRI of the cervical spine performed on January 19, 2023 with and without contrast was reviewed as follows:    Findings:   There is normal height and alignment of the cervical vertebral bodies. There is disc desiccation disc space narrowing throughout cervical spine. Cranial vertebral junction is within normal limits. Again seen is an area of abnormal increased T2 signal   within the ventral cord spanning the C6 and C7 levels. This is unchanged  from prior studies. This area demonstrates no enhancement following administration of contrast. No enhancement elsewhere within the cervical spinal cord. No other areas of abnormal   increased T2 signal within the visualized cord.     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     Axial images demonstrate:     C2/3: No significant disc bulge. There are mild degenerative facet changes bilaterally. No spinal canal stenosis or neural foraminal narrowing.     C3/4: Minimal posterior disc bulge and mild degenerative facet change. No spinal canal stenosis. There is mild bilateral neural foraminal narrowing.     C4/5: Small central disc protrusion. There is bilateral uncovertebral joint spurring and mild degenerative facet change. There is mild bilateral neural foraminal narrowing.     C5/6: No significant disc bulge. There are mild degenerative facet changes. No significant canal stenosis or neural foraminal narrowing.     C6/7: Minimal posterior disc osteophyte complex. There are mild degenerative facet changes and uncovertebral joint spurring produce mild bilateral neural foraminal narrowing but no spinal canal stenosis.     C7/T1: No significant disc bulge, spinal canal stenosis, or neural foraminal narrowing.      IMPRESSION:  Impression:      1. Stable abnormal increased T2 signal within the ventral cord at the C6/7 level. No associated contrast enhancement. No change from multiple prior exams.  2. Multilevel degenerative disc disease and degenerative facet change resulting in multilevel mild neural foraminal narrowing as detailed above. No significant canal stenosis.  3. No new area of abnormal T2 signal within the cord.  4. No pathologic contrast enhancement.       Workup to date: EEG performed on February 10, 2023 showed the following:    Findings:     The awake tracing shows diffuse low amplitude intermixed theta and alpha activity which is present symmetrically over both  hemispheres.  EMG and eye blink artifact are seen over the anterior leads.  Photic stimulation does not change the background.  Drowsiness is seen with mild slowing of the tracing but stage II sleep is not seen.  No focal features or epileptiform activity are seen.  Hyperventilation is not performed.     Video: On     Technical quality: Superior     EKG: Regular, 50-60 bpm     SUMMARY:     Normal EEG in the awake and lightly drowsy states     No focal features or epileptiform activity are seen     IMPRESSION:     Normal study       Results for orders placed during the hospital encounter of 02/24/23    Adult Transthoracic Echo Complete W/ Cont if Necessary Per Protocol    Interpretation Summary    Left ventricular ejection fraction appears to be 61 - 65%.    Estimated right ventricular systolic pressure from tricuspid regurgitation is mildly elevated (35-45 mmHg).       Results for orders placed during the hospital encounter of 08/03/22    Duplex Renal Artery - Bilateral Complete CAR    Interpretation Summary  · No hemodynamically significant renal artery stenosis bilaterally.  · Resistive indices within normal limits.  · Renal veins patent         Diagnoses: Patient with symptomatic orthostatic hypotension.      Comment: She also has incidental carpal tunnel syndrome in both her hands.    Plan:  1.  The cardiology team is currently tweaking her medications so that her blood pressure does not drop dramatically when she goes home.  2.  Currently it is safe for her to use a walker otherwise she is at a high risk of fall.  3.  She will follow-up with Dr. Charly Hernandez in the outpatient neurology clinic for the electrodiagnostic testing for carpal tunnel.  4.  She is also following up with outpatient rheumatology.    Discussed with the patient and Dr. Lucas Garcia, the hospitalist and at this time I do not have any further recommendations and will be signing off.    Spent a total of 30 minutes in face-to-face  evaluation and management of the patient.     Electronically signed by Kevyn Quiroga MD, 07/23/23, 1:56 PM EDT.

## 2023-07-23 NOTE — THERAPY TREATMENT NOTE
Patient Name: Marilyn Kunz  : 1945    MRN: [id number]                              Today's Date: 2023       Admit Date: 2023    Visit Dx:     ICD-10-CM ICD-9-CM   1. Acute congestive heart failure, unspecified heart failure type  I50.9 428.0   2. Shortness of breath  R06.02 786.05   3. Fatigue, unspecified type  R53.83 780.79   4. Stage 3a chronic kidney disease  N18.31 585.3   5. History of asthma  Z87.09 V12.69   6. Acute on chronic congestive heart failure, unspecified heart failure type  I50.9 428.0     Patient Active Problem List   Diagnosis    Essential hypertension    Factor V Leiden    Psoriatic arthritis    At risk for venous thromboembolism (VTE)    Hypothyroidism    CKD (chronic kidney disease) stage 3, GFR 30-59 ml/min    Hyperlipidemia LDL goal <70    PDA (patent ductus arteriosus)    Demyelinating disease    Spondylolisthesis, grade 2    Type 1 diabetes mellitus    History of stroke    Demyelinating disease of central nervous system, unspecified    Acute on chronic diastolic congestive heart failure     Past Medical History:   Diagnosis Date    Abnormal ECG Check A Date or [facility]    Brought to  from [facility]    Acute sinusitis 2023    Anemia     Asthma     CAP (community acquired pneumonia) 2023    CHF (congestive heart failure) 2023    Chronic kidney disease     pt told it was dx from Dr Baca and to not eat much protein    Congenital heart disease 's Patent 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    Surgery [facility]  close    CTS (carpal tunnel syndrome)     Psoriatic arthritis hands could be    Deep vein thrombosis No on blood clots but have a blood clotting disorder called Leiden Factor 5    Disease of thyroid gland     Diverticulitis of colon     Factor 5 Leiden mutation, heterozygous     Head injury     Hyperlipidemia     Hypertension     Movement disorder 10/2021    knee replacement left knww    Murmur, cardiac      Neuropathy in diabetes     redness swelling legs    Peripheral neuropathy 05/03/2022    hands, arms. shoulders, back    Pill esophagitis 02/06/2023    Psoriatic arthritis     hands    Sleep apnea Always    diabetic do not sleep well up and down    Stroke had one don't know when    showed on MRI Brain    Type 1 diabetes      Past Surgical History:   Procedure Laterality Date    CARDIAC CATHETERIZATION  1952 2 of them    Patent Dictus operation    CATARACT EXTRACTION Bilateral     COLONOSCOPY      ENDOSCOPY N/A 10/20/2020    Procedure: ESOPHAGOGASTRODUODENOSCOPY;  Surgeon: Brunner, Mark I, MD;  Location: [facility] ENDOSCOPY;  Service: Gastroenterology;  Laterality: N/A;    HAND SURGERY Left 1987    no hardware    PATENT DUCTUS ARTERIOUS LIGATION        General Information       Row Name 07/23/23 1550          Physical Therapy Time and Intention    Document Type therapy note (daily note)  -LO     Mode of Treatment individual therapy;physical therapy  -LO       Row Name 07/23/23 1550          General Information    Patient Profile Reviewed yes  -LO     Existing Precautions/Restrictions fall;orthostatic hypotension  -LO       Row Name 07/23/23 1550          Cognition    Orientation Status (Cognition) oriented x 4  -LO       Row Name 07/23/23 1550          Safety Issues, Functional Mobility    Impairments Affecting Function (Mobility) balance;endurance/activity tolerance;strength  -LO     Comment, Safety Issues/Impairments (Mobility) monitor BP  -LO               User Key  (r) = Recorded By, (t) = Taken By, (c) = Cosigned By      Initials Name Provider Type    LO Sera Carbajal, PT Physical Therapist                   Mobility       Row Name 07/23/23 1551          Bed Mobility    Bed Mobility supine-sit;scooting/bridging  -LO     Scooting/Bridging [place] (Bed Mobility) supervision  -LO     Supine-Sit [place] (Bed Mobility) supervision  -LO     Assistive Device (Bed Mobility) bed rails;head of bed elevated  -LO      "Comment, (Bed Mobility) safe sequencing, feels \"swimmy headed\" when first sitting with BP initially dropping from 126/50 to 75/58. with 1-2 min of sitting, resolves back to 138/62  -LO       Row Name 07/23/23 1551          Transfers    Comment, (Transfers) EOB>FWW>EOB good sequencing  -LO       Row Name 07/23/23 1551          Sit-Stand Transfer    Sit-Stand [place] (Transfers) standby assist;verbal cues  -LO     Assistive Device (Sit-Stand Transfers) walker, front-wheeled  -LO       Row Name 07/23/23 1551          Gait/Stairs (Locomotion)    [place] Level (Gait) contact guard  -LO     Assistive Device (Gait) walker, front-wheeled  -LO     Distance in Feet (Gait) 40  -LO     Deviations/Abnormal Patterns (Gait) sam decreased;gait speed decreased;stride length decreased  -LO     Comment, (Gait/Stairs) Ambulates with FWW CGA with reduced step height, length, speed. Vc to focus on task. No losses of balance. Feels \"swimmy headed\" with BP measures of 110/60 post gait  -LO               User Key  (r) = Recorded By, (t) = Taken By, (c) = Cosigned By      Initials Name Provider Type    Sera Bro, PT Physical Therapist                   Obj/Interventions       Row Name 07/23/23 1556          Balance    Balance Assessment sitting static balance;sitting dynamic balance;standing static balance;standing dynamic balance  -LO     Static Sitting Balance independent  -LO     Dynamic Sitting Balance independent  -LO     Position, Sitting Balance unsupported;sitting edge of bed  -LO     Static Standing Balance standby assist  -LO     Dynamic Standing Balance standby assist  -LO     Position/Device Used, Standing Balance supported;walker, rolling  -LO     Comment, Balance FWW and SBA for safety in standing  -LO               User Key  (r) = Recorded By, (t) = Taken By, (c) = Cosigned By      Initials Name Provider Type    Sera Bro, PT Physical Therapist                   Goals/Plan    No documentation.        "           Clinical Impression       Row Name 07/23/23 1557          Pain    Pretreatment Pain Rating 0/10 - no pain  -LO     Posttreatment Pain Rating 0/10 - no pain  -LO       Row Name 07/23/23 1557          Plan of Care Review    Plan of Care Reviewed With patient  -LO     Progress improving  -LO     Outcome Evaluation Patient continuing to demonstrate decrease in BP with transitional movements but BP increases with 1-2 min of pause after each transition change. Continuing to require less physical assistance for bed mobilty, transfers, and ambulation. VC required for attention to task and safe use of AD. Patient will continue to benefit from skilled IP PT services to address impairments for return to PLOF. Cont IP PT POC.  -LO       Row Name 07/23/23 1557          Therapy Assessment/Plan (PT)    Rehab Potential (PT) good, to achieve stated therapy goals  -LO     Criteria for Skilled Interventions Met (PT) yes;skilled treatment is necessary  -LO     Therapy Frequency (PT) daily  -LO       Row Name 07/23/23 1557          Vital Signs    Pre Systolic BP Rehab 126  -LO     Pre Treatment Diastolic BP 50  -LO     Intra Systolic BP Rehab 75   -LO     Intra Treatment Diastolic BP 58  -LO     Post Systolic BP Rehab 112  -LO     Post Treatment Diastolic BP 43  -LO     Pretreatment Heart Rate (beats/min) 62  -LO     Intratreatment Heart Rate (beats/min) 78  -LO     Posttreatment Heart Rate (beats/min) 76  -LO     Pre SpO2 (%) 96  -LO     O2 Delivery Pre Treatment room air  -LO     O2 Delivery Intra Treatment room air  -LO     O2 Delivery Post Treatment room air  -LO     Pre Patient Position Supine  -LO     Intra Patient Position Standing  -LO     Post Patient Position Supine  -LO     Rest Breaks  2  -LO       Row Name 07/23/23 1557          Positioning and Restraints    Pre-Treatment Position in bed  -LO     Post Treatment Position bed  -LO     In Bed notified nsg;supine;fowlers;call light within reach;encouraged to call for  assist;exit alarm on  -LO               User Key  (r) = Recorded By, (t) = Taken By, (c) = Cosigned By      Initials Name Provider Type    Sera Bro PT Physical Therapist                   Outcome Measures       Row Name 07/23/23 1600          How much help from another person do you currently need...    Turning from your back to your side while in flat bed without using bedrails? 4  -LO     Moving from lying on back to sitting on the side of a flat bed without bedrails? 4  -LO     Moving to and from a bed to a chair (including a wheelchair)? 3  -LO     Standing up from a chair using your arms (e.g., wheelchair, bedside chair)? 3  -LO     Climbing 3-5 steps with a railing? 3  -LO     To walk in hospital room? 3  -LO     AM-PAC 6 Clicks Score (PT) 20  -LO     Highest level of mobility 6 --> Walked 10 steps or more  -       Row Name 07/23/23 1600          Functional Assessment    Outcome Measure Options AM-PAC 6 Clicks Basic Mobility (PT)  -LO               User Key  (r) = Recorded By, (t) = Taken By, (c) = Cosigned By      Initials Name Provider Type    Sera Bro, SAFIA Physical Therapist                                 Physical Therapy Education       Title: PT OT SLP Therapies (In Progress)       Topic: Physical Therapy (Done)       Point: Mobility training (Done)       Learning Progress Summary             Patient Acceptance, E, VU,NR by DIAZ at 7/23/2023 1518    Comment: PT POC    Acceptance, E, VU by KR at 7/21/2023 0953                         Point: Home exercise program (Done)       Learning Progress Summary             Patient Acceptance, E, VU,NR by LO at 7/23/2023 1518    Comment: PT POC    Acceptance, E, VU by KR at 7/21/2023 0953                         Point: Body mechanics (Done)       Learning Progress Summary             Patient Acceptance, E, VU,NR by LO at 7/23/2023 1518    Comment: PT POC    Acceptance, E, VU by KR at 7/21/2023 0953                         Point: Precautions (Done)        Learning Progress Summary             Patient Acceptance, E, VU,NR by DIAZ at 7/23/2023 1518    Comment: PT POC    Acceptance, E, VU by KR at 7/21/2023 0953                                         User Key       Initials Effective Dates Name Provider Type Discipline    LO 06/16/21 -  Sera Carbajal, PT Physical Therapist PT    KR 12/30/22 -  Jackelyn Mccormick, SAFIA Physical Therapist PT                  PT Recommendation and Plan     Plan of Care Reviewed With: patient  Progress: improving  Outcome Evaluation: Patient continuing to demonstrate decrease in BP with transitional movements but BP increases with 1-2 min of pause after each transition change. Continuing to require less physical assistance for bed mobilty, transfers, and ambulation. VC required for attention to task and safe use of AD. Patient will continue to benefit from skilled IP PT services to address impairments for return to PLOF. Cont IP PT POC.     Time Calculation:         PT Charges       Row Name 07/23/23 1518             Time Calculation    Start Time 1518  -LO      PT Received On 07/23/23  -      PT Goal Re-Cert Due Date 07/31/23  -         Timed Charges    86652 -  PT Neuromuscular Reeducation Minutes 6  -LO      52236 - Gait Training Minutes  15  -LO      02216 - PT Therapeutic Activity Minutes 21  -LO         Total Minutes    Timed Charges Total Minutes 42  -LO       Total Minutes 42  -LO                User Key  (r) = Recorded By, (t) = Taken By, (c) = Cosigned By      Initials Name Provider Type    Sera Bro, PT Physical Therapist                  Therapy Charges for Today       Code Description Service Date Service Provider Modifiers Qty    82892915057 HC PT NEUROMUSC RE EDUCATION EA 15 MIN 7/23/2023 Sera Carbajal, PT GP 1    77551512623 HC GAIT TRAINING EA 15 MIN 7/23/2023 Sera Carbajal, PT GP 1    46980177825 HC PT THERAPEUTIC ACT EA 15 MIN 7/23/2023 Sera Carbajal, PT GP 1            PT G-Codes  Outcome Measure Options: AM-PAC 6 Clicks  Basic Mobility (PT)  AM-PAC 6 Clicks Score (PT): 20  AM-PAC 6 Clicks Score (OT): 21  PT Discharge Summary  Anticipated Discharge Disposition (PT): home with home health, home with assist    Sera Carbajal, PT  7/23/2023

## 2023-07-24 PROBLEM — I50.9 CHF (CONGESTIVE HEART FAILURE): Status: ACTIVE | Noted: 2023-07-24

## 2023-07-24 LAB
ANION GAP SERPL CALCULATED.3IONS-SCNC: 14 MMOL/L (ref 5–15)
BUN SERPL-MCNC: 52 MG/DL (ref 8–23)
BUN/CREAT SERPL: 33.8 (ref 7–25)
CALCIUM SPEC-SCNC: 8.7 MG/DL (ref 8.6–10.5)
CHLORIDE SERPL-SCNC: 103 MMOL/L (ref 98–107)
CO2 SERPL-SCNC: 26 MMOL/L (ref 22–29)
CREAT SERPL-MCNC: 1.54 MG/DL (ref 0.57–1)
DEPRECATED RDW RBC AUTO: 52.1 FL (ref 37–54)
EGFRCR SERPLBLD CKD-EPI 2021: 34.4 ML/MIN/1.73
ERYTHROCYTE [DISTWIDTH] IN BLOOD BY AUTOMATED COUNT: 15 % (ref 12.3–15.4)
FOLATE SERPL-MCNC: 19.3 NG/ML (ref 4.78–24.2)
GLUCOSE BLDC GLUCOMTR-MCNC: 107 MG/DL (ref 70–130)
GLUCOSE BLDC GLUCOMTR-MCNC: 222 MG/DL (ref 70–130)
GLUCOSE BLDC GLUCOMTR-MCNC: 244 MG/DL (ref 70–130)
GLUCOSE BLDC GLUCOMTR-MCNC: 292 MG/DL (ref 70–130)
GLUCOSE SERPL-MCNC: 88 MG/DL (ref 65–99)
HCT VFR BLD AUTO: 33.4 % (ref 34–46.6)
HGB BLD-MCNC: 10.5 G/DL (ref 12–15.9)
MAGNESIUM SERPL-MCNC: 1.9 MG/DL (ref 1.6–2.4)
MCH RBC QN AUTO: 30.1 PG (ref 26.6–33)
MCHC RBC AUTO-ENTMCNC: 31.4 G/DL (ref 31.5–35.7)
MCV RBC AUTO: 95.7 FL (ref 79–97)
PLATELET # BLD AUTO: 245 10*3/MM3 (ref 140–450)
PMV BLD AUTO: 11.4 FL (ref 6–12)
POTASSIUM SERPL-SCNC: 4 MMOL/L (ref 3.5–5.2)
RBC # BLD AUTO: 3.49 10*6/MM3 (ref 3.77–5.28)
SODIUM SERPL-SCNC: 143 MMOL/L (ref 136–145)
VIT B12 BLD-MCNC: 235 PG/ML (ref 211–946)
WBC NRBC COR # BLD: 5.45 10*3/MM3 (ref 3.4–10.8)

## 2023-07-24 PROCEDURE — 63710000001 AZATHIOPRINE PER 50 MG: Performed by: INTERNAL MEDICINE

## 2023-07-24 PROCEDURE — 94761 N-INVAS EAR/PLS OXIMETRY MLT: CPT

## 2023-07-24 PROCEDURE — 83735 ASSAY OF MAGNESIUM: CPT | Performed by: NURSE PRACTITIONER

## 2023-07-24 PROCEDURE — 82746 ASSAY OF FOLIC ACID SERUM: CPT | Performed by: PSYCHIATRY & NEUROLOGY

## 2023-07-24 PROCEDURE — 80048 BASIC METABOLIC PNL TOTAL CA: CPT | Performed by: NURSE PRACTITIONER

## 2023-07-24 PROCEDURE — 85027 COMPLETE CBC AUTOMATED: CPT | Performed by: NURSE PRACTITIONER

## 2023-07-24 PROCEDURE — 25010000002 ENOXAPARIN PER 10 MG

## 2023-07-24 PROCEDURE — 63710000001 INSULIN LISPRO (HUMAN) PER 5 UNITS: Performed by: PHYSICIAN ASSISTANT

## 2023-07-24 PROCEDURE — 99232 SBSQ HOSP IP/OBS MODERATE 35: CPT | Performed by: INTERNAL MEDICINE

## 2023-07-24 PROCEDURE — 82948 REAGENT STRIP/BLOOD GLUCOSE: CPT

## 2023-07-24 PROCEDURE — 82607 VITAMIN B-12: CPT | Performed by: PSYCHIATRY & NEUROLOGY

## 2023-07-24 PROCEDURE — 63710000001 INSULIN DETEMIR PER 5 UNITS: Performed by: PHYSICIAN ASSISTANT

## 2023-07-24 PROCEDURE — 94799 UNLISTED PULMONARY SVC/PX: CPT

## 2023-07-24 PROCEDURE — 99232 SBSQ HOSP IP/OBS MODERATE 35: CPT | Performed by: PHYSICIAN ASSISTANT

## 2023-07-24 RX ORDER — INSULIN LISPRO 100 [IU]/ML
2-9 INJECTION, SOLUTION INTRAVENOUS; SUBCUTANEOUS
Status: DISCONTINUED | OUTPATIENT
Start: 2023-07-24 | End: 2023-07-26 | Stop reason: HOSPADM

## 2023-07-24 RX ORDER — ENOXAPARIN SODIUM 100 MG/ML
30 INJECTION SUBCUTANEOUS EVERY 24 HOURS
Status: DISCONTINUED | OUTPATIENT
Start: 2023-07-24 | End: 2023-07-25

## 2023-07-24 RX ORDER — CHLORTHALIDONE 25 MG/1
25 TABLET ORAL DAILY
COMMUNITY
End: 2023-07-26 | Stop reason: HOSPADM

## 2023-07-24 RX ORDER — INSULIN LISPRO 100 [IU]/ML
4 INJECTION, SOLUTION INTRAVENOUS; SUBCUTANEOUS
Status: DISCONTINUED | OUTPATIENT
Start: 2023-07-24 | End: 2023-07-25

## 2023-07-24 RX ORDER — DULOXETIN HYDROCHLORIDE 30 MG/1
30 CAPSULE, DELAYED RELEASE ORAL DAILY
COMMUNITY
End: 2023-07-26 | Stop reason: HOSPADM

## 2023-07-24 RX ORDER — NEBIVOLOL 5 MG/1
10 TABLET ORAL
Status: DISCONTINUED | OUTPATIENT
Start: 2023-07-24 | End: 2023-07-25

## 2023-07-24 RX ADMIN — INSULIN DETEMIR 12 UNITS: 100 INJECTION, SOLUTION SUBCUTANEOUS at 20:45

## 2023-07-24 RX ADMIN — AZATHIOPRINE 100 MG: 50 TABLET ORAL at 20:45

## 2023-07-24 RX ADMIN — BUDESONIDE AND FORMOTEROL FUMARATE DIHYDRATE 2 PUFF: 160; 4.5 AEROSOL RESPIRATORY (INHALATION) at 07:49

## 2023-07-24 RX ADMIN — ENOXAPARIN SODIUM 30 MG: 30 INJECTION SUBCUTANEOUS at 20:45

## 2023-07-24 RX ADMIN — INSULIN LISPRO 6 UNITS: 100 INJECTION, SOLUTION INTRAVENOUS; SUBCUTANEOUS at 12:02

## 2023-07-24 RX ADMIN — PRAVASTATIN SODIUM 20 MG: 40 TABLET ORAL at 20:44

## 2023-07-24 RX ADMIN — AZATHIOPRINE 100 MG: 50 TABLET ORAL at 09:07

## 2023-07-24 RX ADMIN — INSULIN LISPRO 4 UNITS: 100 INJECTION, SOLUTION INTRAVENOUS; SUBCUTANEOUS at 12:02

## 2023-07-24 RX ADMIN — Medication 10 ML: at 09:09

## 2023-07-24 RX ADMIN — INSULIN LISPRO 4 UNITS: 100 INJECTION, SOLUTION INTRAVENOUS; SUBCUTANEOUS at 17:15

## 2023-07-24 RX ADMIN — LEVOTHYROXINE SODIUM 75 MCG: 0.07 TABLET ORAL at 05:37

## 2023-07-24 RX ADMIN — PANTOPRAZOLE SODIUM 40 MG: 40 TABLET, DELAYED RELEASE ORAL at 05:37

## 2023-07-24 RX ADMIN — ASPIRIN 81 MG: 81 TABLET, CHEWABLE ORAL at 09:07

## 2023-07-24 RX ADMIN — CETIRIZINE HYDROCHLORIDE 10 MG: 10 TABLET, FILM COATED ORAL at 09:06

## 2023-07-24 RX ADMIN — Medication 10 ML: at 20:46

## 2023-07-24 RX ADMIN — INSULIN LISPRO 4 UNITS: 100 INJECTION, SOLUTION INTRAVENOUS; SUBCUTANEOUS at 20:45

## 2023-07-24 RX ADMIN — BUDESONIDE AND FORMOTEROL FUMARATE DIHYDRATE 2 PUFF: 160; 4.5 AEROSOL RESPIRATORY (INHALATION) at 20:18

## 2023-07-24 RX ADMIN — FLUTICASONE PROPIONATE 2 SPRAY: 50 SPRAY, METERED NASAL at 09:08

## 2023-07-24 NOTE — PLAN OF CARE
Problem: Fall Injury Risk  Goal: Absence of Fall and Fall-Related Injury  Outcome: Ongoing, Progressing  Intervention: Identify and Manage Contributors  Recent Flowsheet Documentation  Taken 7/24/2023 0800 by Mack Poon RN  Medication Review/Management: medications reviewed  Intervention: Promote Injury-Free Environment  Recent Flowsheet Documentation  Taken 7/24/2023 1600 by Mack Poon RN  Safety Promotion/Fall Prevention:   activity supervised   assistive device/personal items within reach   clutter free environment maintained   fall prevention program maintained   safety round/check completed  Taken 7/24/2023 1400 by Mack Poon RN  Safety Promotion/Fall Prevention:   assistive device/personal items within reach   clutter free environment maintained   fall prevention program maintained   safety round/check completed  Taken 7/24/2023 1200 by Mack Poon RN  Safety Promotion/Fall Prevention:   activity supervised   assistive device/personal items within reach   safety round/check completed  Taken 7/24/2023 1000 by Mack Poon RN  Safety Promotion/Fall Prevention:   activity supervised   assistive device/personal items within reach   clutter free environment maintained   safety round/check completed  Taken 7/24/2023 0800 by Mack Poon RN  Safety Promotion/Fall Prevention:   activity supervised   assistive device/personal items within reach   clutter free environment maintained   fall prevention program maintained   safety round/check completed   Goal Outcome Evaluation:

## 2023-07-24 NOTE — PROGRESS NOTES
"[facility] at [facility] Progress Note     LOS: 1 day   Patient Care Team:  Peter Baca MD as PCP - General (Family Medicine)  Eun Eagle MD as Consulting Physician (Rheumatology)  Yao Maya MD as Consulting Physician (Cardiology)  Lucas Haley DO as Consulting Physician (Pulmonary Disease)  PCP:  Peter Baca MD    Chief Complaint: Follow-up hypertension, volume overload    Subjective: Patient admitted initially with hypertension.  Since initiation of Entresto she is either been normotensive or slightly hypotensive.  Terazosin and hydralazine have been discontinued.  She also does have some increase in her renal function today      Review of Systems:   All systems have been reviewed and are negative with the exception of those mentioned above.      Objective:    Vital Sign Min/Max for last 24 hours  Temp  Min: 98 °F (36.7 °C)  Max: 98.3 °F (36.8 °C)   BP  Min: 98/42  Max: 130/61   Pulse  Min: 53  Max: 96   Resp  Min: 16  Max: 18   SpO2  Min: 88 %  Max: 98 %   No data recorded   No data recorded     Flowsheet Rows      Flowsheet Row First Filed Value   Admission Height 149.9 cm (59\") Documented at 07/20/2023 1048   Admission Weight 59 kg (130 lb) Documented at 07/20/2023 1048            Telemetry: Sinus rhythm      Intake/Output Summary (Last 24 hours) at 7/24/2023 0901  Last data filed at 7/23/2023 1119  Gross per 24 hour   Intake 250 ml   Output --   Net 250 ml     Intake & Output (last 3 days)         07/21 0701 07/22 0700 07/22 0701  07/23 0700 07/23 0701  07/24 0700 07/24 0701  07/25 0700    P.O.        IV Piggyback   250     Total Intake(mL/kg)   250 (4.1)     Urine (mL/kg/hr)        Total Output        Net   +250             Urine Unmeasured Occurrence 8 x 4 x 3 x 2 x    Stool Unmeasured Occurrence   2 x              Physical Exam:  Constitutional:       Appearance: Healthy appearance. Not in distress. "   Pulmonary:      Effort: Pulmonary effort is normal. Increased respiratory effort.   Cardiovascular:      Regular rhythm.      Murmurs: There is no murmur.   Edema:     Peripheral edema absent.   Musculoskeletal:      Cervical back: Normal range of motion. Neurological:      General: No focal deficit present.        LABS/DIAGNOSTIC DATA:  Results from last 7 days   Lab Units 07/24/23  0529 07/23/23  0404 07/21/23  0421   WBC 10*3/mm3 5.45 6.29 5.84   HEMOGLOBIN g/dL 10.5* 11.5* 9.2*   HEMATOCRIT % 33.4* 36.0 29.7*   PLATELETS 10*3/mm3 245 261 188     Lab Results   Lab Value Date/Time    TROPONINT 14 (H) 07/20/2023 1402    TROPONINT 13 (H) 07/20/2023 1157    TROPONINT 19 (H) 03/02/2023 1539    TROPONINT 14 (H) 02/25/2023 0419    TROPONINT 18 (H) 02/24/2023 2054    TROPONINT 14 (H) 02/24/2023 1926    TROPONINT 14 (H) 02/24/2023 1050    TROPONINT <0.010 09/26/2022 1127    TROPONINT <0.010 05/05/2022 0204    TROPONINT <0.010 05/04/2022 2049    TROPONINT <0.010 10/16/2020 1438    TROPONINT <0.010 10/16/2020 1232    TROPONINT <0.010 10/14/2020 2044    TROPONINT <0.010 10/14/2020 1855         Results from last 7 days   Lab Units 07/24/23  0529 07/23/23  0404 07/22/23  0657 07/21/23  0421 07/20/23  1157   SODIUM mmol/L 143 141 144   < > 139   POTASSIUM mmol/L 4.0 3.8 4.1   < > 4.5   CHLORIDE mmol/L 103 102 101   < > 104   CO2 mmol/L 26.0 24.0 28.0   < > 22.0   BUN mg/dL 52* 38* 28*   < > 26*   CREATININE mg/dL 1.54* 1.35* 1.19*   < > 1.09*   CALCIUM mg/dL 8.7 8.6 8.7   < > 8.8   BILIRUBIN mg/dL  --   --   --   --  1.3*   ALK PHOS U/L  --   --   --   --  65   ALT (SGPT) U/L  --   --   --   --  16   AST (SGOT) U/L  --   --   --   --  21   GLUCOSE mg/dL 88 107* 151*   < > 245*    < > = values in this interval not displayed.     Results from last 7 days   Lab Units 07/20/23  1157   HEMOGLOBIN A1C % 8.00*         Results from last 7 days   Lab Units 07/20/23  1402   TSH uIU/mL 3.590           Medication Review:   aspirin, 81  mg, Oral, Daily  azaTHIOprine, 100 mg, Oral, BID  budesonide-formoterol, 2 puff, Inhalation, BID - RT  cetirizine, 10 mg, Oral, Daily  enoxaparin, 40 mg, Subcutaneous, Q24H  fluticasone, 2 spray, Each Nare, Daily  gabapentin, 300 mg, Oral, Nightly  insulin detemir, 15 Units, Subcutaneous, Nightly  insulin lispro, 3-14 Units, Subcutaneous, 4x Daily AC & at Bedtime  levothyroxine, 75 mcg, Oral, Daily  nebivolol, 10 mg, Oral, Q24H  pantoprazole, 40 mg, Oral, Q AM  pharmacy consult - MTM, , Does not apply, Daily  pravastatin, 20 mg, Oral, Nightly  sacubitril-valsartan, 1 tablet, Oral, Q12H  senna-docusate sodium, 2 tablet, Oral, BID  sodium chloride, 10 mL, Intravenous, Q12H               Acute on chronic diastolic congestive heart failure    Essential hypertension    Factor V Leiden    Hypothyroidism    CKD (chronic kidney disease) stage 3, GFR 30-59 ml/min    Hyperlipidemia LDL goal <70    Demyelinating disease    Type 1 diabetes mellitus      Assessment/Plan:  1.  HTN  -Negative renal artery duplex August 2022  -Reduce Bystolic to 10 mg daily  -Hold this morning's Entresto dose due to ELMER  -Remain off of hydralazine and terazosin at this time  -Recheck orthostatics       2.  Type I DM  -Management per primary team     3.  Prior cerebellar infarct, factor V Leiden  -MRI this admission with no acute changes, age-related changes present.     4.  Factor V leiden  -heterozygous  -No known history of DVT     5.  Diastolic heart failure  -Bumex held due to increased creatinine.  Appears euvolemic  -EF 61 to 65% February 2023       6.  ELMER  -Hold Entresto this morning and discontinue Bumex  -Creatinine 1.54  -Encourage p.o. hydration              Yao Maya MD [facility]  07/24/23

## 2023-07-24 NOTE — PROGRESS NOTES
[facility]  PROGRESS NOTE    Patient Name: Marilyn Kunz  : 1945  MRN: [id number]    Date of Admission: 2023  Primary Care Physician: Peter Baca MD    Subjective     CC: f/u HTN, volume overload     HPI:  In bed. Very pleased she got a shower and a full bed linen change today. BP improved but still low overnight. She denied dizziness / lightheadedness while bathing. Feels that lower extremity edema has improved since admission    ROS:  Gen- No fevers, chills  CV- No chest pain, palpitations  Resp- No cough, dyspnea  GI- No N/V/D, abd pain    Objective     Vital Signs:   Temp:  [98 °F (36.7 °C)-98.2 °F (36.8 °C)] 98 °F (36.7 °C)  Heart Rate:  [59-96] 71  Resp:  [16-18] 18  BP: ()/(42-61) 138/56     Physical Exam:  Constitutional: No acute distress, awake and alert. Sitting up in bed. Chatty   HENT: NCAT, mucous membranes moist  Respiratory: Clear to auscultation bilaterally, respiratory effort normal on room air   Cardiovascular: RRR, no murmurs, rubs, or gallops  Gastrointestinal: Positive bowel sounds, soft, nontender, nondistended  Musculoskeletal: No bilateral ankle edema  Psychiatric: Appropriate affect, cooperative  Neurologic: Oriented x 3, moves all extremities spontaneously without focal deficits, speech clear    Results Reviewed:  LAB RESULTS:      Lab 23  0529 23  0404 23  0421 23  1157   WBC 5.45 6.29 5.84 5.60   HEMOGLOBIN 10.5* 11.5* 9.2* 9.8*   HEMATOCRIT 33.4* 36.0 29.7* 31.3*   PLATELETS 245 261 188 192   NEUTROS ABS  --   --   --  4.46   IMMATURE GRANS (ABS)  --   --   --  0.05   LYMPHS ABS  --   --   --  0.59*   MONOS ABS  --   --   --  0.39   EOS ABS  --   --   --  0.07   MCV 95.7 96.0 97.4* 98.7*   PROCALCITONIN  --   --   --  0.04   LACTATE  --   --   --  1.1           Lab 23  0529 23  0404 23  0657 23  0421 23  1402 23  1157   SODIUM 143 141 144 142  --   139   POTASSIUM 4.0 3.8 4.1 3.8  --  4.5   CHLORIDE 103 102 101 102  --  104   CO2 26.0 24.0 28.0 28.0  --  22.0   ANION GAP 14.0 15.0 15.0 12.0  --  13.0   BUN 52* 38* 28* 22  --  26*   CREATININE 1.54* 1.35* 1.19* 1.03*  --  1.09*   EGFR 34.4* 40.3* 46.9* 55.8*  --  52.1*   GLUCOSE 88 107* 151* 147*  --  245*   CALCIUM 8.7 8.6 8.7 8.4*  --  8.8   MAGNESIUM 1.9 1.8 1.9 1.9 2.1  --    HEMOGLOBIN A1C  --   --   --   --   --  8.00*   TSH  --   --   --   --  3.590  --            Lab 07/20/23  1157   TOTAL PROTEIN 6.2   ALBUMIN 3.7   GLOBULIN 2.5   ALT (SGPT) 16   AST (SGOT) 21   BILIRUBIN 1.3*   ALK PHOS 65   LIPASE 9*           Lab 07/20/23  1402 07/20/23  1157   PROBNP  --  3,386.0*   HSTROP T 14* 13*           Lab 07/24/23  0529   FOLATE 19.30   VITAMIN B 12 235     Brief Urine Lab Results  (Last result in the past 365 days)        Color   Clarity   Blood   Leuk Est   Nitrite   Protein   CREAT   Urine HCG        07/20/23 1206 Yellow   Clear   Negative   Negative   Negative   Negative                 Microbiology Results Abnormal       Procedure Component Value - Date/Time    COVID PRE-OP / PRE-PROCEDURE SCREENING ORDER (NO ISOLATION) - Swab, Nasopharynx [[id number]]  (Normal) Collected: 07/20/23 1819    Lab Status: Final result Specimen: Swab from Nasopharynx Updated: 07/20/23 1856    Narrative:      The following orders were created for panel order COVID PRE-OP / PRE-PROCEDURE SCREENING ORDER (NO ISOLATION) - Swab, Nasopharynx.  Procedure                               Abnormality         Status                     ---------                               -----------         ------                     COVID-19 and FLU A/B PCR...[[id number]]  Normal              Final result                 Please view results for these tests on the individual orders.    COVID-19 and FLU A/B PCR - Swab, Nasopharynx [[id number]]  (Normal) Collected: 07/20/23 1810    Lab Status: Final result Specimen: Swab from Nasopharynx Updated:  07/20/23 1856     COVID19 Not Detected     Influenza A PCR Not Detected     Influenza B PCR Not Detected    Narrative:      Fact sheet for providers: https://www.fda.gov/media/371305/download    Fact sheet for patients: https://www.fda.gov/media/505866/download    Test performed by PCR.          No radiology results from the last 24 hrs    Results for orders placed during the hospital encounter of 02/24/23    Adult Transthoracic Echo Complete W/ Cont if Necessary Per Protocol    Interpretation Summary    Left ventricular ejection fraction appears to be 61 - 65%.    Estimated right ventricular systolic pressure from tricuspid regurgitation is mildly elevated (35-45 mmHg).    Current medications:  Scheduled Meds:aspirin, 81 mg, Oral, Daily  azaTHIOprine, 100 mg, Oral, BID  budesonide-formoterol, 2 puff, Inhalation, BID - RT  cetirizine, 10 mg, Oral, Daily  enoxaparin, 30 mg, Subcutaneous, Q24H  fluticasone, 2 spray, Each Nare, Daily  gabapentin, 300 mg, Oral, Nightly  insulin detemir, 12 Units, Subcutaneous, Nightly  insulin lispro, 2-9 Units, Subcutaneous, 4x Daily AC & at Bedtime  Insulin Lispro, 4 Units, Subcutaneous, TID With Meals  levothyroxine, 75 mcg, Oral, Daily  nebivolol, 10 mg, Oral, Q24H  pantoprazole, 40 mg, Oral, Q AM  pharmacy consult - MTM, , Does not apply, Daily  pravastatin, 20 mg, Oral, Nightly  sacubitril-valsartan, 1 tablet, Oral, Q12H  senna-docusate sodium, 2 tablet, Oral, BID  sodium chloride, 10 mL, Intravenous, Q12H      Continuous Infusions:   PRN Meds:.  senna-docusate sodium **AND** polyethylene glycol **AND** bisacodyl **AND** bisacodyl    dextrose    dextrose    glucagon (human recombinant)    ipratropium-albuterol    Magnesium Standard Dose Replacement - Follow Nurse / BPA Driven Protocol    nitroglycerin    ondansetron    sodium chloride    sodium chloride    Assessment & Plan     Active Hospital Problems    Diagnosis  POA    **Acute on chronic diastolic congestive heart failure  [I50.33]  Yes    CHF (congestive heart failure) [I50.9]  Yes    Demyelinating disease [G37.9]  Yes    Type 1 diabetes mellitus [E10.9]  Yes    Factor V Leiden [D68.51]  Yes    Essential hypertension [I10]  Yes    CKD (chronic kidney disease) stage 3, GFR 30-59 ml/min [N18.30]  Yes    Hypothyroidism [E03.9]  Yes    Hyperlipidemia LDL goal <70 [E78.5]  Yes      Resolved Hospital Problems    Diagnosis Date Resolved POA    Acute exacerbation of CHF (congestive heart failure) [I50.9] 07/23/2023 Yes    CHF (congestive heart failure) [I50.9] 07/20/2023 Yes    Type 1 diabetes mellitus without complications [E10.9] 07/20/2023 Yes    Hypothyroidism, unspecified [E03.9] 07/20/2023 Yes    Acute respiratory failure with hypoxia [J96.01] 07/23/2023 Yes    Uncontrolled hypertension [I10] 07/23/2023 Yes     Brief Hospital Course to date:  Marilyn Kunz is a 78 y.o. female with PMH significant for HTN, HLD, chronic HFpEF, DM1, CKD III, prior CVA, COPD (followed by Dr. Haley), demyelinating disease (on Imuran / Azothioprine - followed by Dr. Hernandez), prior esophageal ulcer (2023), heterozygous factor V Leiden status and hypothyroidism. Last admitted to [facility] 3/2-3/9/23 for severe hyponatremia secondary to SIADH. Cymbalta discontinued. Discharged on PO salt tabs.     Presented to [facility] 7/20/23 for evaluation of exertional dyspnea and profound fatigue. Had been up-titrating BP meds as an outpatient under the direction of cardiology (Dr. Maya) due to rising BP. About a week prior to presentation, saw nephrology outpatient and due to rising creatinine, was instructed to stop taking Bumex (had been taking 0.5mg daily).     In ED, room air saturation 86%. /79. Serial HS TroponinT 13 --> 14. EKG NSR without ischemic changes. proBNP 3386. Creatinine 1.09. ABG showed pH 7.36, pO2 51 (on 3L NC), pCO2 44. COVID-19 and influenza PCR negative. CT head without acute abnormalities. CTA chest  negative for PE, notable for pulmonary edema and small to moderate bilateral pleural effusions      Acute hypoxic respiratory failure with hypoxia   A/C HFpEF  Accelerated HTN  - ABG findings as above. Noted to have decreased air movement and rales on exam in ED. Required 3L NC to maintain O2 saturations  - s/p IV diuresis. Has weaned to room air  - At home, was on Hydralazine 100mg BID, Irbesartan 300mg daily, Nebivolol 20mg daily, Terazoein 10mg QHS   - Started on Entresto this admission.   - BP has been trending low and creatinine bumped to 1.54 this AM despite small fluid bolus yesterday   - Entresto and Bumex on hold  - Continue Nebivolol 10mg daily   - AM BMP  - Will need outpatient follow up with [facility] after DC    ELMER on CKD III  - Baseline creatinine appears to be 0.9-1.1  - Creatinine 1.54 today - likely related to diuretics / Entresto, holding both   - AM BMP      DM1 (A1c 8.0)  - Decrease Levemir to 12 units QHS, add Lispro 4 units TID AC + moderate dose SSI      COPD  - Stable / not in acute exacerbation  - Continue Symbicort / PRN nebs     Hypothyroidism  - TSH WNL. Continue synthroid     Profound generalized weakness, unsteady gait  Hx demyelinating disease  Peripheral neuropathy  - Followed by Dr. Hernandez as outpatient  - Continue Azothioprine and Imuran  - Work up here has largely been negative including CT head / MRI brain, UA, COVID/flu PCRs and TSH  - Neurology has seen - no changes to current plan  - Will need outpatient follow up with neurology rescheduled - missed appointment due to hospitalization     Hx of Heterozygous factor V leiden  - Continue ASA - patient denies history of thrombus       Expected Discharge Location and Transportation: home with    Expected Discharge Expected Discharge Date: 7/26/2023; Expected Discharge Time:       DVT prophylaxis:Medical DVT prophylaxis orders are present.     AM-PAC 6 Clicks Score (PT): 20 (07/24/23 0800)    CODE STATUS:   Code Status and  Medical Interventions:   Ordered at: 07/20/23 1905     Code Status (Patient has no pulse and is not breathing):    CPR (Attempt to Resuscitate)     Medical Interventions (Patient has pulse or is breathing):    Full Support     Release to patient:    Routine Release     Randi Bloom PA-C  07/24/23

## 2023-07-25 ENCOUNTER — APPOINTMENT (OUTPATIENT)
Dept: CARDIOLOGY | Facility: HOSPITAL | Age: 78
DRG: 291 | End: 2023-07-25
Payer: MEDICARE

## 2023-07-25 ENCOUNTER — TELEPHONE (OUTPATIENT)
Dept: NEUROLOGY | Facility: CLINIC | Age: 78
End: 2023-07-25
Payer: MEDICARE

## 2023-07-25 ENCOUNTER — TELEPHONE (OUTPATIENT)
Dept: NEUROLOGY | Facility: CLINIC | Age: 78
End: 2023-07-25

## 2023-07-25 LAB
ANION GAP SERPL CALCULATED.3IONS-SCNC: 11 MMOL/L (ref 5–15)
BH CV XLRA MEAS LEFT DIST CCA EDV: 19.3 CM/SEC
BH CV XLRA MEAS LEFT DIST CCA PSV: 71.6 CM/SEC
BH CV XLRA MEAS LEFT DIST ICA EDV: 47.2 CM/SEC
BH CV XLRA MEAS LEFT DIST ICA PSV: 168 CM/SEC
BH CV XLRA MEAS LEFT ICA/CCA RATIO: 1.4
BH CV XLRA MEAS LEFT MID CCA EDV: 18.4 CM/SEC
BH CV XLRA MEAS LEFT MID CCA PSV: 77.7 CM/SEC
BH CV XLRA MEAS LEFT MID ICA EDV: 16.5 CM/SEC
BH CV XLRA MEAS LEFT MID ICA PSV: 71.5 CM/SEC
BH CV XLRA MEAS LEFT PROX CCA EDV: 15.8 CM/SEC
BH CV XLRA MEAS LEFT PROX CCA PSV: 61.5 CM/SEC
BH CV XLRA MEAS LEFT PROX ECA EDV: 18.6 CM/SEC
BH CV XLRA MEAS LEFT PROX ECA PSV: 80.8 CM/SEC
BH CV XLRA MEAS LEFT PROX ICA EDV: 20.9 CM/SEC
BH CV XLRA MEAS LEFT PROX ICA PSV: 108 CM/SEC
BH CV XLRA MEAS LEFT PROX SCLA PSV: 121 CM/SEC
BH CV XLRA MEAS LEFT VERTEBRAL A EDV: 16.2 CM/SEC
BH CV XLRA MEAS LEFT VERTEBRAL A PSV: 66.5 CM/SEC
BH CV XLRA MEAS RIGHT DIST CCA EDV: 18.2 CM/SEC
BH CV XLRA MEAS RIGHT DIST CCA PSV: 103 CM/SEC
BH CV XLRA MEAS RIGHT DIST ICA EDV: 24.3 CM/SEC
BH CV XLRA MEAS RIGHT DIST ICA PSV: 111 CM/SEC
BH CV XLRA MEAS RIGHT ICA/CCA RATIO: 0.82
BH CV XLRA MEAS RIGHT MID CCA EDV: 17.5 CM/SEC
BH CV XLRA MEAS RIGHT MID CCA PSV: 90.4 CM/SEC
BH CV XLRA MEAS RIGHT MID ICA EDV: 18.7 CM/SEC
BH CV XLRA MEAS RIGHT MID ICA PSV: 77.4 CM/SEC
BH CV XLRA MEAS RIGHT PROX CCA EDV: 14.7 CM/SEC
BH CV XLRA MEAS RIGHT PROX CCA PSV: 94.6 CM/SEC
BH CV XLRA MEAS RIGHT PROX ECA EDV: 10.6 CM/SEC
BH CV XLRA MEAS RIGHT PROX ECA PSV: 77.7 CM/SEC
BH CV XLRA MEAS RIGHT PROX ICA EDV: 14.8 CM/SEC
BH CV XLRA MEAS RIGHT PROX ICA PSV: 74.1 CM/SEC
BH CV XLRA MEAS RIGHT PROX SCLA PSV: 159 CM/SEC
BH CV XLRA MEAS RIGHT VERTEBRAL A EDV: 9.2 CM/SEC
BH CV XLRA MEAS RIGHT VERTEBRAL A PSV: 52.3 CM/SEC
BUN SERPL-MCNC: 40 MG/DL (ref 8–23)
BUN/CREAT SERPL: 44.4 (ref 7–25)
CALCIUM SPEC-SCNC: 9 MG/DL (ref 8.6–10.5)
CHLORIDE SERPL-SCNC: 108 MMOL/L (ref 98–107)
CO2 SERPL-SCNC: 27 MMOL/L (ref 22–29)
CREAT SERPL-MCNC: 0.9 MG/DL (ref 0.57–1)
EGFRCR SERPLBLD CKD-EPI 2021: 65.6 ML/MIN/1.73
GLUCOSE BLDC GLUCOMTR-MCNC: 160 MG/DL (ref 70–130)
GLUCOSE BLDC GLUCOMTR-MCNC: 266 MG/DL (ref 70–130)
GLUCOSE BLDC GLUCOMTR-MCNC: 271 MG/DL (ref 70–130)
GLUCOSE BLDC GLUCOMTR-MCNC: 274 MG/DL (ref 70–130)
GLUCOSE BLDC GLUCOMTR-MCNC: 331 MG/DL (ref 70–130)
GLUCOSE SERPL-MCNC: 126 MG/DL (ref 65–99)
POTASSIUM SERPL-SCNC: 4.2 MMOL/L (ref 3.5–5.2)
RIGHT ARM BP: NORMAL MMHG
SODIUM SERPL-SCNC: 146 MMOL/L (ref 136–145)

## 2023-07-25 PROCEDURE — 99231 SBSQ HOSP IP/OBS SF/LOW 25: CPT | Performed by: NURSE PRACTITIONER

## 2023-07-25 PROCEDURE — 25010000002 ENOXAPARIN PER 10 MG: Performed by: INTERNAL MEDICINE

## 2023-07-25 PROCEDURE — 97110 THERAPEUTIC EXERCISES: CPT

## 2023-07-25 PROCEDURE — 80048 BASIC METABOLIC PNL TOTAL CA: CPT | Performed by: INTERNAL MEDICINE

## 2023-07-25 PROCEDURE — 97535 SELF CARE MNGMENT TRAINING: CPT

## 2023-07-25 PROCEDURE — 63710000001 AZATHIOPRINE PER 50 MG: Performed by: INTERNAL MEDICINE

## 2023-07-25 PROCEDURE — 97530 THERAPEUTIC ACTIVITIES: CPT

## 2023-07-25 PROCEDURE — 99231 SBSQ HOSP IP/OBS SF/LOW 25: CPT | Performed by: PHYSICIAN ASSISTANT

## 2023-07-25 PROCEDURE — 94799 UNLISTED PULMONARY SVC/PX: CPT

## 2023-07-25 PROCEDURE — 94761 N-INVAS EAR/PLS OXIMETRY MLT: CPT

## 2023-07-25 PROCEDURE — 93880 EXTRACRANIAL BILAT STUDY: CPT

## 2023-07-25 PROCEDURE — 63710000001 INSULIN LISPRO (HUMAN) PER 5 UNITS: Performed by: PHYSICIAN ASSISTANT

## 2023-07-25 PROCEDURE — 94664 DEMO&/EVAL PT USE INHALER: CPT

## 2023-07-25 PROCEDURE — 82948 REAGENT STRIP/BLOOD GLUCOSE: CPT

## 2023-07-25 PROCEDURE — 63710000001 INSULIN DETEMIR PER 5 UNITS: Performed by: PHYSICIAN ASSISTANT

## 2023-07-25 PROCEDURE — 97116 GAIT TRAINING THERAPY: CPT

## 2023-07-25 PROCEDURE — 93880 EXTRACRANIAL BILAT STUDY: CPT | Performed by: INTERNAL MEDICINE

## 2023-07-25 RX ORDER — ENOXAPARIN SODIUM 100 MG/ML
40 INJECTION SUBCUTANEOUS EVERY 24 HOURS
Status: DISCONTINUED | OUTPATIENT
Start: 2023-07-25 | End: 2023-07-26 | Stop reason: HOSPADM

## 2023-07-25 RX ORDER — INSULIN LISPRO 100 [IU]/ML
5 INJECTION, SOLUTION INTRAVENOUS; SUBCUTANEOUS
Status: DISCONTINUED | OUTPATIENT
Start: 2023-07-25 | End: 2023-07-26 | Stop reason: HOSPADM

## 2023-07-25 RX ORDER — ACETAMINOPHEN 325 MG/1
650 TABLET ORAL EVERY 6 HOURS PRN
Status: DISCONTINUED | OUTPATIENT
Start: 2023-07-25 | End: 2023-07-26 | Stop reason: HOSPADM

## 2023-07-25 RX ADMIN — BUDESONIDE AND FORMOTEROL FUMARATE DIHYDRATE 2 PUFF: 160; 4.5 AEROSOL RESPIRATORY (INHALATION) at 08:14

## 2023-07-25 RX ADMIN — SENNOSIDES AND DOCUSATE SODIUM 2 TABLET: 50; 8.6 TABLET ORAL at 08:16

## 2023-07-25 RX ADMIN — AZATHIOPRINE 100 MG: 50 TABLET ORAL at 21:13

## 2023-07-25 RX ADMIN — AZATHIOPRINE 100 MG: 50 TABLET ORAL at 08:19

## 2023-07-25 RX ADMIN — SACUBITRIL AND VALSARTAN 1 TABLET: 24; 26 TABLET, FILM COATED ORAL at 21:13

## 2023-07-25 RX ADMIN — INSULIN LISPRO 7 UNITS: 100 INJECTION, SOLUTION INTRAVENOUS; SUBCUTANEOUS at 16:57

## 2023-07-25 RX ADMIN — BUDESONIDE AND FORMOTEROL FUMARATE DIHYDRATE 2 PUFF: 160; 4.5 AEROSOL RESPIRATORY (INHALATION) at 21:17

## 2023-07-25 RX ADMIN — SENNOSIDES AND DOCUSATE SODIUM 2 TABLET: 50; 8.6 TABLET ORAL at 21:13

## 2023-07-25 RX ADMIN — INSULIN LISPRO 6 UNITS: 100 INJECTION, SOLUTION INTRAVENOUS; SUBCUTANEOUS at 12:05

## 2023-07-25 RX ADMIN — FLUTICASONE PROPIONATE 2 SPRAY: 50 SPRAY, METERED NASAL at 08:17

## 2023-07-25 RX ADMIN — PANTOPRAZOLE SODIUM 40 MG: 40 TABLET, DELAYED RELEASE ORAL at 05:14

## 2023-07-25 RX ADMIN — INSULIN LISPRO 4 UNITS: 100 INJECTION, SOLUTION INTRAVENOUS; SUBCUTANEOUS at 12:05

## 2023-07-25 RX ADMIN — LEVOTHYROXINE SODIUM 75 MCG: 0.07 TABLET ORAL at 05:14

## 2023-07-25 RX ADMIN — Medication 10 ML: at 21:13

## 2023-07-25 RX ADMIN — PRAVASTATIN SODIUM 20 MG: 40 TABLET ORAL at 21:13

## 2023-07-25 RX ADMIN — ENOXAPARIN SODIUM 40 MG: 100 INJECTION SUBCUTANEOUS at 21:12

## 2023-07-25 RX ADMIN — ASPIRIN 81 MG: 81 TABLET, CHEWABLE ORAL at 08:16

## 2023-07-25 RX ADMIN — Medication 10 ML: at 08:20

## 2023-07-25 RX ADMIN — CETIRIZINE HYDROCHLORIDE 10 MG: 10 TABLET, FILM COATED ORAL at 08:16

## 2023-07-25 RX ADMIN — ACETAMINOPHEN 650 MG: 325 TABLET ORAL at 03:57

## 2023-07-25 RX ADMIN — SACUBITRIL AND VALSARTAN 1 TABLET: 24; 26 TABLET, FILM COATED ORAL at 08:19

## 2023-07-25 RX ADMIN — INSULIN LISPRO 5 UNITS: 100 INJECTION, SOLUTION INTRAVENOUS; SUBCUTANEOUS at 16:58

## 2023-07-25 RX ADMIN — INSULIN LISPRO 4 UNITS: 100 INJECTION, SOLUTION INTRAVENOUS; SUBCUTANEOUS at 08:17

## 2023-07-25 RX ADMIN — INSULIN DETEMIR 12 UNITS: 100 INJECTION, SOLUTION SUBCUTANEOUS at 21:12

## 2023-07-25 RX ADMIN — INSULIN LISPRO 2 UNITS: 100 INJECTION, SOLUTION INTRAVENOUS; SUBCUTANEOUS at 08:16

## 2023-07-25 RX ADMIN — INSULIN LISPRO 4 UNITS: 100 INJECTION, SOLUTION INTRAVENOUS; SUBCUTANEOUS at 21:12

## 2023-07-25 NOTE — PROGRESS NOTES
Marilyn Kunz  [id number]  1945   LOS: 2 days   Patient Care Team:  Peter Baca MD as PCP - General (Family Medicine)  Eun Eagle MD as Consulting Physician (Rheumatology)  Yao Maya MD as Consulting Physician (Cardiology)  Lucas Haley DO as Consulting Physician (Pulmonary Disease)    Chief Complaint:  Follow-up hypertension, volume overload     Subjective  She denies chest pain, SOB, palpitations, but says that ever since she took her orange pills this morning she feels a little tingling in her lips and her head feels heavy.  She denies vertigo, presyncope, or syncope.  She denies any difficulty swallowing or swelling in her tongue or mouth.    Objective     Vital Sign Min/Max for last 24 hours  Temp  Min: 97.7 °F (36.5 °C)  Max: 98.9 °F (37.2 °C)   BP  Min: 116/42  Max: 180/64   Pulse  Min: 52  Max: 71   Resp  Min: 16  Max: 18   SpO2  Min: 93 %  Max: 96 %   No data recorded   No data recorded         07/20/23  1048 07/20/23  1856   Weight: 59 kg (130 lb) 60.5 kg (133 lb 6.4 oz)         Intake/Output Summary (Last 24 hours) at 7/25/2023 0918  Last data filed at 7/24/2023 2300  Gross per 24 hour   Intake 510 ml   Output --   Net 510 ml       Physical Exam:     General Appearance:    Alert, cooperative, in no acute distress   Lungs:     Clear to auscultation,respirations regular, even and                unlabored    Heart:    Regular and normal rate, normal S1 and S2, no            murmur, no gallop, no rub, no click   Abdomen:  Extremities:   Soft, nontender, bowel sounds audible x4    No edema, normal range of motion   Pulses:   Pulses palpable and equal bilaterally      Results Review:   Results from last 7 days   Lab Units 07/25/23  0602 07/24/23  0529 07/23/23  0404   SODIUM mmol/L 146* 143 141   POTASSIUM mmol/L 4.2 4.0 3.8   CHLORIDE mmol/L 108* 103 102   CO2 mmol/L 27.0 26.0 24.0   BUN mg/dL 40* 52* 38*   CREATININE mg/dL 0.90 1.54* 1.35*    GLUCOSE mg/dL 126* 88 107*   CALCIUM mg/dL 9.0 8.7 8.6     Results from last 7 days   Lab Units 07/24/23  0529 07/23/23  0404 07/21/23  0421   WBC 10*3/mm3 5.45 6.29 5.84   HEMOGLOBIN g/dL 10.5* 11.5* 9.2*   HEMATOCRIT % 33.4* 36.0 29.7*   PLATELETS 10*3/mm3 245 261 188         Results from last 7 days   Lab Units 07/20/23  1157   HEMOGLOBIN A1C % 8.00*     Results from last 7 days   Lab Units 07/20/23  1402 07/20/23  1157   HSTROP T ng/L 14* 13*   No new chest x ray or ECG to review    Medication Review: Reviewed    Assessment & Plan   1.  HTN  -Negative renal artery duplex August 2022  --Restart Entresto today since ELMER resolved  -Remain off of hydralazine, Bystolic, and terazosin at this time  -Recheck orthostatics  -We will order carotid duplex       2.  Type I DM  -Management per primary team     3.  Prior cerebellar infarct, factor V Leiden  -MRI this admission with no acute changes, age-related changes present.     4.  Factor V leiden  -heterozygous  -No known history of DVT     5.  Diastolic heart failure  -Bumex held due to increased creatinine.  Appears euvolemic  -EF 61 to 65% February 2023        6.  ELMER  -Improvement this morning with holding entresto and bumex yesterday, will reinitiate Entresto today      Acute on chronic diastolic congestive heart failure    Essential hypertension    Factor V Leiden    Hypothyroidism    CKD (chronic kidney disease) stage 3, GFR 30-59 ml/min    Hyperlipidemia LDL goal <70    Demyelinating disease    Type 1 diabetes mellitus    CHF (congestive heart failure)    Electronically signed by JAGDISH De La Fuente, 07/25/23, 9:56 AM EDT.     07/25/23  09:18 EDT

## 2023-07-25 NOTE — PROGRESS NOTES
[facility] Medicine Services  PROGRESS NOTE    Patient Name: Marilyn Kunz  : 1945  MRN: [id number]    Date of Admission: 2023  Primary Care Physician: Peter Baca MD    Subjective     CC: f/u HTN, volume overload     HPI:  In bed. Headache last night that kept her from sleeping - was eventually able to sleep after taking Tylenol. Was feeling well this morning but after taking medications, began to feel a heavy sensation in her head, almost like difficulty holding her head up. Says lips are tingly as well, which is unusual. She is very focused on this and wants to test her balance with nursing or therapy to see if her balance has been affected. Discussed possible repeat head imaging, which she said she would prefer to avoid. BP better today, now running high in 150-160's.     ROS:  Gen- No fevers, chills  CV- No chest pain, palpitations  Resp- No cough, dyspnea  GI- No N/V/D, abd pain    Objective     Vital Signs:   Temp:  [97.7 °F (36.5 °C)-98.9 °F (37.2 °C)] 97.7 °F (36.5 °C)  Heart Rate:  [52-71] 64  Resp:  [16-18] 18  BP: (116-180)/(42-68) 167/60     Physical Exam:  Constitutional: No acute distress, awake and alert. Sitting up in bed.   HENT: NCAT, mucous membranes moist  Respiratory: Clear to auscultation bilaterally, respiratory effort normal on room air   Cardiovascular: RRR, no murmurs, rubs, or gallops  Gastrointestinal: Positive bowel sounds, soft, nontender, nondistended  Musculoskeletal: No bilateral ankle edema  Psychiatric: Somewhat anxious affect, cooperative with exam   Neurologic: Oriented x 3, 5/5 strength bilaterally, normal finger to nose and heel to shin tests, no nystagmus, speech is clear and coherent     Results Reviewed:  LAB RESULTS:      Lab 23  0529 23  0404 23  0421 23  1157   WBC 5.45 6.29 5.84 5.60   HEMOGLOBIN 10.5* 11.5* 9.2* 9.8*   HEMATOCRIT 33.4* 36.0 29.7* 31.3*   PLATELETS 245 261 188 192   NEUTROS  ABS  --   --   --  4.46   IMMATURE GRANS (ABS)  --   --   --  0.05   LYMPHS ABS  --   --   --  0.59*   MONOS ABS  --   --   --  0.39   EOS ABS  --   --   --  0.07   MCV 95.7 96.0 97.4* 98.7*   PROCALCITONIN  --   --   --  0.04   LACTATE  --   --   --  1.1           Lab 07/25/23  0602 07/24/23  0529 07/23/23  0404 07/22/23  0657 07/21/23  0421 07/20/23  1402 07/20/23  1157   SODIUM 146* 143 141 144 142  --  139   POTASSIUM 4.2 4.0 3.8 4.1 3.8  --  4.5   CHLORIDE 108* 103 102 101 102  --  104   CO2 27.0 26.0 24.0 28.0 28.0  --  22.0   ANION GAP 11.0 14.0 15.0 15.0 12.0  --  13.0   BUN 40* 52* 38* 28* 22  --  26*   CREATININE 0.90 1.54* 1.35* 1.19* 1.03*  --  1.09*   EGFR 65.6 34.4* 40.3* 46.9* 55.8*  --  52.1*   GLUCOSE 126* 88 107* 151* 147*  --  245*   CALCIUM 9.0 8.7 8.6 8.7 8.4*  --  8.8   MAGNESIUM  --  1.9 1.8 1.9 1.9 2.1  --    HEMOGLOBIN A1C  --   --   --   --   --   --  8.00*   TSH  --   --   --   --   --  3.590  --            Lab 07/20/23  1157   TOTAL PROTEIN 6.2   ALBUMIN 3.7   GLOBULIN 2.5   ALT (SGPT) 16   AST (SGOT) 21   BILIRUBIN 1.3*   ALK PHOS 65   LIPASE 9*           Lab 07/20/23  1402 07/20/23  1157   PROBNP  --  3,386.0*   HSTROP T 14* 13*           Lab 07/24/23  0529   FOLATE 19.30   VITAMIN B 12 235       Brief Urine Lab Results  (Last result in the past 365 days)        Color   Clarity   Blood   Leuk Est   Nitrite   Protein   CREAT   Urine HCG        07/20/23 1206 Yellow   Clear   Negative   Negative   Negative   Negative                 Microbiology Results Abnormal       Procedure Component Value - Date/Time    COVID PRE-OP / PRE-PROCEDURE SCREENING ORDER (NO ISOLATION) - Swab, Nasopharynx [[id number]]  (Normal) Collected: 07/20/23 1819    Lab Status: Final result Specimen: Swab from Nasopharynx Updated: 07/20/23 1856    Narrative:      The following orders were created for panel order COVID PRE-OP / PRE-PROCEDURE SCREENING ORDER (NO ISOLATION) - Swab, Nasopharynx.  Procedure                                Abnormality         Status                     ---------                               -----------         ------                     COVID-19 and FLU A/B PCR...[[id number]]  Normal              Final result                 Please view results for these tests on the individual orders.    COVID-19 and FLU A/B PCR - Swab, Nasopharynx [[id number]]  (Normal) Collected: 07/20/23 1819    Lab Status: Final result Specimen: Swab from Nasopharynx Updated: 07/20/23 1856     COVID19 Not Detected     Influenza A PCR Not Detected     Influenza B PCR Not Detected    Narrative:      Fact sheet for providers: https://www.fda.gov/media/753605/download    Fact sheet for patients: https://www.fda.gov/media/258478/download    Test performed by PCR.          No radiology results from the last 24 hrs    Results for orders placed during the hospital encounter of 02/24/23    Adult Transthoracic Echo Complete W/ Cont if Necessary Per Protocol    Interpretation Summary    Left ventricular ejection fraction appears to be 61 - 65%.    Estimated right ventricular systolic pressure from tricuspid regurgitation is mildly elevated (35-45 mmHg).    Current medications:  Scheduled Meds:aspirin, 81 mg, Oral, Daily  azaTHIOprine, 100 mg, Oral, BID  budesonide-formoterol, 2 puff, Inhalation, BID - RT  cetirizine, 10 mg, Oral, Daily  enoxaparin, 30 mg, Subcutaneous, Q24H  fluticasone, 2 spray, Each Nare, Daily  gabapentin, 300 mg, Oral, Nightly  insulin detemir, 12 Units, Subcutaneous, Nightly  insulin lispro, 2-9 Units, Subcutaneous, 4x Daily AC & at Bedtime  Insulin Lispro, 4 Units, Subcutaneous, TID With Meals  levothyroxine, 75 mcg, Oral, Daily  pantoprazole, 40 mg, Oral, Q AM  pharmacy consult - MTM, , Does not apply, Daily  pravastatin, 20 mg, Oral, Nightly  sacubitril-valsartan, 1 tablet, Oral, Q12H  senna-docusate sodium, 2 tablet, Oral, BID  sodium chloride, 10 mL, Intravenous, Q12H      Continuous Infusions:   PRN Meds:.   acetaminophen    senna-docusate sodium **AND** polyethylene glycol **AND** bisacodyl **AND** bisacodyl    dextrose    dextrose    glucagon (human recombinant)    ipratropium-albuterol    Magnesium Standard Dose Replacement - Follow Nurse / BPA Driven Protocol    nitroglycerin    ondansetron    sodium chloride    sodium chloride    Assessment & Plan     Active Hospital Problems    Diagnosis  POA    **Acute on chronic diastolic congestive heart failure [I50.33]  Yes    CHF (congestive heart failure) [I50.9]  Yes    Demyelinating disease [G37.9]  Yes    Type 1 diabetes mellitus [E10.9]  Yes    Factor V Leiden [D68.51]  Yes    Essential hypertension [I10]  Yes    CKD (chronic kidney disease) stage 3, GFR 30-59 ml/min [N18.30]  Yes    Hypothyroidism [E03.9]  Yes    Hyperlipidemia LDL goal <70 [E78.5]  Yes      Resolved Hospital Problems    Diagnosis Date Resolved POA    Acute exacerbation of CHF (congestive heart failure) [I50.9] 07/23/2023 Yes    CHF (congestive heart failure) [I50.9] 07/20/2023 Yes    Type 1 diabetes mellitus without complications [E10.9] 07/20/2023 Yes    Hypothyroidism, unspecified [E03.9] 07/20/2023 Yes    Acute respiratory failure with hypoxia [J96.01] 07/23/2023 Yes    Uncontrolled hypertension [I10] 07/23/2023 Yes     Brief Hospital Course to date:  Marilyn Kunz is a 78 y.o. female with PMH significant for HTN, HLD, chronic HFpEF, DM1, CKD III, prior CVA, COPD (followed by Dr. Haley), demyelinating disease (on Imuran / Azothioprine - followed by Dr. Hernandez), prior esophageal ulcer (2023), heterozygous factor V Leiden status and hypothyroidism. Last admitted to [facility] 3/2-3/9/23 for severe hyponatremia secondary to SIADH. Cymbalta discontinued. Discharged on PO salt tabs.     Presented to [facility] 7/20/23 for evaluation of exertional dyspnea and profound fatigue. Had been up-titrating BP meds as an outpatient under the direction of cardiology (  Zulma) due to rising BP. About a week prior to presentation, saw nephrology outpatient and due to rising creatinine, was instructed to stop taking Bumex (had been taking 0.5mg daily).     In ED, room air saturation 86%. /79. Serial HS TroponinT 13 --> 14. EKG NSR without ischemic changes. proBNP 3386. Creatinine 1.09. ABG showed pH 7.36, pO2 51 (on 3L NC), pCO2 44. COVID-19 and influenza PCR negative. CT head without acute abnormalities. CTA chest negative for PE, notable for pulmonary edema and small to moderate bilateral pleural effusions      Acute hypoxic respiratory failure with hypoxia   A/C HFpEF  Accelerated HTN  - ABG findings as above. Noted to have decreased air movement and rales on exam in ED. Required 3L NC to maintain O2 saturations  - s/p IV diuresis. Has weaned to room air  - At home, was on Hydralazine 100mg BID, Irbesartan 300mg daily, Nebivolol 20mg daily, Terazosin 10mg QHS   - Was taking Bumex 0.5mg daily until recently when nephrology instructed her to stop  - All above antihypertensives on hold  - Started on Entresto this admission. Held 7/24 due to elevated creatinine and restarted today as creatinine has normalized  - AM BMP   - Will need outpatient follow up with [facility] after DC    ELMER on CKD III  - Baseline creatinine appears to be 0.9-1.1  - 1.54 on 7/24 - improved to 0.90 today     DM1 (A1c 8.0)  - Continue Levemir 12 units QHS, increase Lispro to 5 units TID AC + moderate dose SSI      COPD  - Stable / not in acute exacerbation  - Continue Symbicort / PRN nebs     Hypothyroidism  - TSH WNL. Continue Synthroid     Profound generalized weakness, unsteady gait  Hx demyelinating disease  Peripheral neuropathy  - Followed by Dr. Hernandez as outpatient  - Continue Azothioprine and Imuran  - Work up here has largely been negative including CT head / MRI brain, UA, COVID/flu PCRs and TSH  - Neurology has seen - no changes to current plan  - Will need outpatient follow up with  neurology rescheduled - missed appointment due to hospitalization     Hx of Heterozygous factor V leiden  - Continue ASA - patient denies history of thrombus       Expected Discharge Location and Transportation: home with    Expected Discharge Expected Discharge Date: 7/26/2023; Expected Discharge Time:       DVT prophylaxis:Medical DVT prophylaxis orders are present.     AM-PAC 6 Clicks Score (PT): 20 (07/25/23 0849)    CODE STATUS:   Code Status and Medical Interventions:   Ordered at: 07/20/23 4071     Code Status (Patient has no pulse and is not breathing):    CPR (Attempt to Resuscitate)     Medical Interventions (Patient has pulse or is breathing):    Full Support     Release to patient:    Routine Release     Randi Bloom PA-C  07/25/23

## 2023-07-25 NOTE — PLAN OF CARE
Goal Outcome Evaluation:  Plan of Care Reviewed With: patient        Progress: improving  Outcome Evaluation: Pt demonstrates return to baseline for ADL completion and related mobility. BP stable with positional changee, all therapeutic goals met, OT signing off. Rec d/c to home when medically appropriate, defer to PT for any OP therapy recs.      Anticipated Discharge Disposition (OT): home, home with assist

## 2023-07-25 NOTE — THERAPY DISCHARGE NOTE
Acute Care - Occupational Therapy Discharge  [facility]    Patient Name: Marilyn Kunz  : 1945    MRN: [id number]                              Today's Date: 2023       Admit Date: 2023    Visit Dx:     ICD-10-CM ICD-9-CM   1. Acute congestive heart failure, unspecified heart failure type  I50.9 428.0   2. Shortness of breath  R06.02 786.05   3. Fatigue, unspecified type  R53.83 780.79   4. Stage 3a chronic kidney disease  N18.31 585.3   5. History of asthma  Z87.09 V12.69   6. Acute on chronic congestive heart failure, unspecified heart failure type  I50.9 428.0     Patient Active Problem List   Diagnosis    Essential hypertension    Factor V Leiden    Psoriatic arthritis    At risk for venous thromboembolism (VTE)    Hypothyroidism    CKD (chronic kidney disease) stage 3, GFR 30-59 ml/min    Hyperlipidemia LDL goal <70    PDA (patent ductus arteriosus)    Demyelinating disease    Spondylolisthesis, grade 2    Type 1 diabetes mellitus    History of stroke    Demyelinating disease of central nervous system, unspecified    Acute on chronic diastolic congestive heart failure    CHF (congestive heart failure)     Past Medical History:   Diagnosis Date    Abnormal ECG Check FPA Date or [facility]    [facility]    Acute sinusitis 2023    Anemia     Asthma     CAP (community acquired pneumonia) 2023    CHF (congestive heart failure) 2023    Chronic kidney disease     pt told it was dx from Dr Baca and to not eat much protein    Congenital heart disease 's Patent 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    Surgery [facility]  close    CTS (carpal tunnel syndrome)     Psoriatic arthritis hands could be    Deep vein thrombosis No on blood clots but have a blood clotting disorder called Leiden Factor 5    Disease of thyroid gland     Diverticulitis of colon     Factor 5 Leiden mutation, heterozygous     Head injury     Hyperlipidemia      Hypertension     Movement disorder 10/2021    knee replacement left knww    Murmur, cardiac     Neuropathy in diabetes     redness swelling legs    Peripheral neuropathy 05/03/2022    hands, arms. shoulders, back    Pill esophagitis 02/06/2023    Psoriatic arthritis     hands    Sleep apnea Always    diabetic do not sleep well up and down    Stroke had one don't know when    showed on MRI Brain    Type 1 diabetes      Past Surgical History:   Procedure Laterality Date    CARDIAC CATHETERIZATION  1952 2 of them    Patent Dictus operation    CATARACT EXTRACTION Bilateral     COLONOSCOPY      ENDOSCOPY N/A 10/20/2020    Procedure: ESOPHAGOGASTRODUODENOSCOPY;  Surgeon: Brunner, Mark I, MD;  Location: [facility] ENDOSCOPY;  Service: Gastroenterology;  Laterality: N/A;    HAND SURGERY Left 1987    no hardware    PATENT DUCTUS ARTERIOUS LIGATION        General Information       Row Name 07/25/23 1322          OT Time and Intention    Document Type discharge treatment  -CS     Mode of Treatment occupational therapy  -CS       Row Name 07/25/23 1322          General Information    Patient Profile Reviewed yes  -CS     Existing Precautions/Restrictions fall  -CS     Barriers to Rehab medically complex  -CS       Row Name 07/25/23 1322          Cognition    Orientation Status (Cognition) oriented x 4  -CS       Row Name 07/25/23 1322          Safety Issues, Functional Mobility    Safety Issues Affecting Function (Mobility) insight into deficits/self-awareness  -CS     Impairments Affecting Function (Mobility) strength  -CS               User Key  (r) = Recorded By, (t) = Taken By, (c) = Cosigned By      Initials Name Provider Type    CS Madi Leonard OT Occupational Therapist                   Mobility/ADL's       Row Name 07/25/23 1330          Bed Mobility    Bed Mobility supine-sit;scooting/bridging  -CS     Scooting/Bridging [place] (Bed Mobility) supervision  -CS     Supine-Sit [place] (Bed Mobility) supervision   -CS     Comment, (Bed Mobility) no assist, appropriate sequencing, no dizziness reported upon sitting  -       Row Name 07/25/23 1330          Transfers    Transfers toilet transfer;sit-stand transfer  -       Row Name 07/25/23 1330          Sit-Stand Transfer    Sit-Stand [place] (Transfers) modified independence  -CS     Assistive Device (Sit-Stand Transfers) walker, front-wheeled  -       Row Name 07/25/23 1330          Toilet Transfer    Type (Toilet Transfer) stand-sit;sit-stand  -CS     [place] Level (Toilet Transfer) modified independence  -CS     Assistive Device (Toilet Transfer) walker, front-wheeled;grab bars/safety frame  -       Row Name 07/25/23 1330          Functional Mobility    Functional Mobility- Ind. Level conditional independence  -     Functional Mobility- Device walker, front-wheeled  -     Functional Mobility-Distance (Feet) 40  -CS     Functional Mobility- Comment Mod [place] w/ RW for in-room distances, no LOB, no dizziness, safe navigation and sequencing  -       Row Name 07/25/23 1330          Activities of Daily Living    BADL Assessment/Intervention lower body dressing;grooming;toileting;feeding  -       Row Name 07/25/23 1330          Lower Body Dressing Assessment/Training    [place] Level (Lower Body Dressing) don;socks;independent  -CS     Position (Lower Body Dressing) edge of bed sitting  -       Row Name 07/25/23 1330          Toileting Assessment/Training    [place] Level (Toileting) adjust/manage clothing;perform perineal hygiene;change pad/brief;independent  -CS     Position (Toileting) unsupported sitting;supported standing  -       Row Name 07/25/23 1330          Self-Feeding Assessment/Training    [place] Level (Feeding) feeding skills;liquids to mouth;prepare tray/open items;scoop food and bring to mouth;independent  -CS     Position (Self-Feeding) sitting up in bed  -       Row Name 07/25/23 1330          Grooming  Assessment/Training    [place] Level (Grooming) hair care, combing/brushing;wash face, hands;supervision;verbal cues  -     Position (Grooming) sink side  -CS     Comment, (Grooming) cues for improved RW positioning at sinkside  -               User Key  (r) = Recorded By, (t) = Taken By, (c) = Cosigned By      Initials Name Provider Type     Madi Leonard OT Occupational Therapist                   Obj/Interventions       Row Name 07/25/23 1332          Motor Skills    Motor Skills functional endurance;coordination  -     Functional Endurance O2 sats stable on RA  -     Therapeutic Exercise shoulder;elbow/forearm;other (see comments)  BUE AROM incorporated into targeted reaching activities  -       Row Name 07/25/23 1332          Balance    Balance Assessment sitting static balance;sitting dynamic balance;standing static balance;standing dynamic balance  -     Static Sitting Balance independent  -CS     Dynamic Sitting Balance independent  -CS     Position, Sitting Balance unsupported;sitting edge of bed  -     Static Standing Balance supervision  -     Dynamic Standing Balance supervision  -     Position/Device Used, Standing Balance supported;walker, front-wheeled  -     Balance Interventions sitting;sit to stand;standing;dynamic reaching;UE activity with balance activity;occupation based/functional task  -     Comment, Balance no LOB during ADL completion, no LOB during targeted/dynamic reaching activities w/ Min-Mod challenge  -               User Key  (r) = Recorded By, (t) = Taken By, (c) = Cosigned By      Initials Name Provider Type     Maid Leonard OT Occupational Therapist                   Goals/Plan       [facility] Name 07/25/23 1338          Transfer Goal 1 (OT)    Progress/Outcome (Transfer Goal 1, OT) goal met  -CS       Row Name 07/25/23 1338          Dressing Goal 1 (OT)    Progress/Outcome (Dressing Goal 1, OT) goal met  -[facility] Name 07/25/23 1338           Strength Goal 1 (OT)    Progress/Outcome (Strength Goal 1, OT) goal met  -CS               User Key  (r) = Recorded By, (t) = Taken By, (c) = Cosigned By      Initials Name Provider Type    CS Madi Leonard OT Occupational Therapist                   Clinical Impression       Row Name 07/25/23 8460          Pain Assessment    Pretreatment Pain Rating 0/10 - no pain  -CS     Posttreatment Pain Rating 0/10 - no pain  -CS       Row Name 07/25/23 0373          Plan of Care Review    Plan of Care Reviewed With patient  -CS     Progress improving  -CS     Outcome Evaluation Pt demonstrates return to baseline for ADL completion and related mobility. BP stable with positional changee, all therapeutic goals met, OT signing off. Rec d/c to home when medically appropriate, defer to PT for any OP therapy recs.  -CS       Row Name 07/25/23 4527          Therapy Plan Review/Discharge Plan (OT)    Anticipated Discharge Disposition (OT) home;home with assist  -CS       Row Name 07/25/23 3133          Vital Signs    Pre Systolic BP Rehab --  RN cleared for tx  -CS     O2 Delivery Pre Treatment room air  -CS     O2 Delivery Intra Treatment room air  -CS     O2 Delivery Post Treatment room air  -CS     Pre Patient Position Supine  -CS     Intra Patient Position Standing  -CS     Post Patient Position Standing  -CS       Row Name 07/25/23 4763          Positioning and Restraints    Pre-Treatment Position in bed  -CS     Post Treatment Position other  -CS     In Bed with PT  -CS               User Key  (r) = Recorded By, (t) = Taken By, (c) = Cosigned By      Initials Name Provider Type    Madi Pettit OT Occupational Therapist                   Outcome Measures       Row Name 07/25/23 4582          How much help from another is currently needed...    Putting on and taking off regular lower body clothing? 4  -CS     Bathing (including washing, rinsing, and drying) 4  -CS     Toileting (which includes using toilet bed pan or  urinal) 4  -CS     Putting on and taking off regular upper body clothing 4  -CS     Taking care of personal grooming (such as brushing teeth) 4  -CS     Eating meals 4  -CS     AM-PAC 6 Clicks Score (OT) 24  -CS       Row Name 07/25/23 0827          How much help from another person do you currently need...    Turning from your back to your side while in flat bed without using bedrails? 4  -JENNIFER     Moving from lying on back to sitting on the side of a flat bed without bedrails? 4  -JENNIFER     Moving to and from a bed to a chair (including a wheelchair)? 3  -JENNIFER     Standing up from a chair using your arms (e.g., wheelchair, bedside chair)? 3  -JENNIFER     Climbing 3-5 steps with a railing? 3  -JENNIFER     To walk in hospital room? 3  -JENNIFER     AM-PAC 6 Clicks Score (PT) 20  -JENNIFER     Highest level of mobility 6 --> Walked 10 steps or more  -       Row Name 07/25/23 1341          Functional Assessment    Outcome Measure Options AM-PAC 6 Clicks Daily Activity (OT)  -CS               User Key  (r) = Recorded By, (t) = Taken By, (c) = Cosigned By      Initials Name Provider Type    Mack Renteria RN Registered Nurse    Madi Pettit OT Occupational Therapist                  Occupational Therapy Education       Title: PT OT SLP Therapies (In Progress)       Topic: Occupational Therapy (In Progress)       Point: ADL training (Done)       Description:   Instruct learner(s) on proper safety adaptation and remediation techniques during self care or transfers.   Instruct in proper use of assistive devices.                  Learning Progress Summary             Patient Acceptance, E,D, VU,DU by  at 7/25/2023 1341    Acceptance, E, VU,DU by  at 7/21/2023 1104                         Point: Home exercise program (Not Started)       Description:   Instruct learner(s) on appropriate technique for monitoring, assisting and/or progressing therapeutic exercises/activities.                  Learner Progress:  Not documented in this  visit.              Point: Precautions (Done)       Description:   Instruct learner(s) on prescribed precautions during self-care and functional transfers.                  Learning Progress Summary             Patient Acceptance, E,D, VU,DU by  at 7/25/2023 1341    Acceptance, E, VU,DU by  at 7/21/2023 1104                         Point: Body mechanics (Done)       Description:   Instruct learner(s) on proper positioning and spine alignment during self-care, functional mobility activities and/or exercises.                  Learning Progress Summary             Patient Acceptance, E,D, VU,DU by  at 7/25/2023 1341    Acceptance, E, VU,DU by  at 7/21/2023 1104                                         User Key       Initials Effective Dates Name Provider Type Discipline     06/16/21 -  Madi Leonard OT Occupational Therapist OT                  OT Recommendation and Plan  Planned Therapy Interventions (OT): functional balance retraining, occupation/activity based interventions, ROM/therapeutic exercise, strengthening exercise, transfer/mobility retraining, patient/caregiver education/training, neuromuscular control/coordination retraining, adaptive equipment training  Therapy Frequency (OT): daily  Plan of Care Review  Plan of Care Reviewed With: patient  Progress: improving  Outcome Evaluation: Pt demonstrates return to baseline for ADL completion and related mobility. BP stable with positional changee, all therapeutic goals met, OT signing off. Rec d/c to home when medically appropriate, defer to PT for any OP therapy recs.  Plan of Care Reviewed With: patient  Outcome Evaluation: Pt demonstrates return to baseline for ADL completion and related mobility. BP stable with positional changee, all therapeutic goals met, OT signing off. Rec d/c to home when medically appropriate, defer to PT for any OP therapy recs.     Time Calculation:   Evaluation Complexity (OT)  Review Occupational  Profile/Medical/Therapy History Complexity: expanded/moderate complexity  Assessment, Occupational Performance/Identification of Deficit Complexity: 1-3 performance deficits  Clinical Decision Making Complexity (OT): problem focused assessment/low complexity  Overall Complexity of Evaluation (OT): low complexity     Time Calculation- OT       Row Name 07/25/23 1342             Time Calculation- OT    OT Start Time 1250  -CS      OT Received On 07/25/23  -CS      OT Goal Re-Cert Due Date 07/31/23  -CS         Timed Charges    17145 - OT Therapeutic Activity Minutes 12  -CS      60702 - OT Self Care/Mgmt Minutes 12  -CS         Total Minutes    Timed Charges Total Minutes 24  -CS       Total Minutes 24  -CS                User Key  (r) = Recorded By, (t) = Taken By, (c) = Cosigned By      Initials Name Provider Type    CS Madi Leonard OT Occupational Therapist                  Therapy Charges for Today       Code Description Service Date Service Provider Modifiers Qty    56151510307  OT THERAPEUTIC ACT EA 15 MIN 7/25/2023 Madi Leonard OT GO 1    [id number]  OT SELF CARE/MGMT/TRAIN EA 15 MIN 7/25/2023 Madi Leonard OT GO 1               OT Discharge Summary  Anticipated Discharge Disposition (OT): home, home with assist  Reason for Discharge: other (comment), At baseline function (for ADL completion)  Discharge Destination: Home, Home with assist    Madi Leonard OT  7/25/2023

## 2023-07-25 NOTE — PLAN OF CARE
Problem: Fall Injury Risk  Goal: Absence of Fall and Fall-Related Injury  Outcome: Ongoing, Progressing  Intervention: Identify and Manage Contributors  Recent Flowsheet Documentation  Taken 7/25/2023 1400 by Mack Poon RN  Medication Review/Management: medications reviewed  Taken 7/25/2023 1000 by Mack Poon RN  Medication Review/Management: medications reviewed  Taken 7/25/2023 0800 by Mack Poon RN  Medication Review/Management: medications reviewed  Intervention: Promote Injury-Free Environment  Recent Flowsheet Documentation  Taken 7/25/2023 1600 by Mack Poon RN  Safety Promotion/Fall Prevention:   activity supervised   assistive device/personal items within reach   clutter free environment maintained   safety round/check completed  Taken 7/25/2023 1400 by Mack oPon RN  Safety Promotion/Fall Prevention:   activity supervised   assistive device/personal items within reach   clutter free environment maintained   safety round/check completed  Taken 7/25/2023 1200 by Mack Poon RN  Safety Promotion/Fall Prevention:   activity supervised   clutter free environment maintained   assistive device/personal items within reach   fall prevention program maintained   safety round/check completed  Taken 7/25/2023 1000 by Mack Poon RN  Safety Promotion/Fall Prevention:   activity supervised   assistive device/personal items within reach   clutter free environment maintained   fall prevention program maintained   safety round/check completed  Taken 7/25/2023 0800 by Mack Poon RN  Safety Promotion/Fall Prevention:   activity supervised   assistive device/personal items within reach   clutter free environment maintained   fall prevention program maintained   safety round/check completed     Problem: Adult Inpatient Plan of Care  Goal: Absence of Hospital-Acquired Illness or Injury  Intervention: Identify and Manage Fall Risk  Recent Flowsheet Documentation  Taken 7/25/2023 1600 by Cleve  Mack L, RN  Safety Promotion/Fall Prevention:   activity supervised   assistive device/personal items within reach   clutter free environment maintained   safety round/check completed  Taken 7/25/2023 1400 by Mack Poon RN  Safety Promotion/Fall Prevention:   activity supervised   assistive device/personal items within reach   clutter free environment maintained   safety round/check completed  Taken 7/25/2023 1200 by Mack Poon RN  Safety Promotion/Fall Prevention:   activity supervised   clutter free environment maintained   assistive device/personal items within reach   fall prevention program maintained   safety round/check completed  Taken 7/25/2023 1000 by Mack Poon RN  Safety Promotion/Fall Prevention:   activity supervised   assistive device/personal items within reach   clutter free environment maintained   fall prevention program maintained   safety round/check completed  Taken 7/25/2023 0800 by Mack Poon RN  Safety Promotion/Fall Prevention:   activity supervised   assistive device/personal items within reach   clutter free environment maintained   fall prevention program maintained   safety round/check completed  Intervention: Prevent Skin Injury  Recent Flowsheet Documentation  Taken 7/25/2023 1600 by Mack Poon RN  Body Position: position changed independently  Taken 7/25/2023 1400 by Mack Poon RN  Body Position: position changed independently  Taken 7/25/2023 1200 by Mack Poon RN  Body Position: position changed independently  Taken 7/25/2023 1000 by Mack Poon RN  Body Position: position changed independently  Taken 7/25/2023 0800 by Mack Poon RN  Body Position: position changed independently  Intervention: Prevent and Manage VTE (Venous Thromboembolism) Risk  Recent Flowsheet Documentation  Taken 7/25/2023 1600 by Mack Poon RN  Activity Management: activity encouraged  VTE Prevention/Management: (lovenox) other (see comments)  Taken 7/25/2023 1400 by  Cleve, Mack L, RN  Activity Management: activity encouraged  VTE Prevention/Management: (lovenox) other (see comments)  Taken 7/25/2023 1200 by Mack Poon RN  Activity Management: activity encouraged  VTE Prevention/Management: (lovenox) other (see comments)  Taken 7/25/2023 1000 by Mack Poon RN  Activity Management: activity encouraged  VTE Prevention/Management: (lovenox) other (see comments)  Taken 7/25/2023 0800 by Mack Poon RN  Activity Management: activity encouraged  VTE Prevention/Management: (lovenox) other (see comments)  Intervention: Prevent Infection  Recent Flowsheet Documentation  Taken 7/25/2023 0800 by Mack Poon RN  Infection Prevention: environmental surveillance performed  Goal: Optimal Comfort and Wellbeing  Intervention: Provide Person-Centered Care  Recent Flowsheet Documentation  Taken 7/25/2023 0800 by Mack Poon RN  Trust Relationship/Rapport: care explained   Goal Outcome Evaluation:goal met

## 2023-07-25 NOTE — PLAN OF CARE
Goal Outcome Evaluation:  Plan of Care Reviewed With: patient        Progress: improving  Outcome Evaluation: Pt. presents below baseline function w/generalized weakness and balance deficits affecting her ability to safely participate in functional mobility. She performed bed mobility, transfers and ambulated 100' w/front wheeled walker, contact guard assist. Activity limited by fatigue. Pt. tolerated ther-ex well. Pt. remained asymptomatic throughout mobility. Continue IPPT to progress as tolerated.      Anticipated Discharge Disposition (PT): home with home health, home with assist

## 2023-07-25 NOTE — THERAPY EVALUATION
Patient Name: Marilyn Kunz  : 1945    MRN: [id number]                              Today's Date: 2023       Admit Date: 2023    Visit Dx:     ICD-10-CM ICD-9-CM   1. Acute congestive heart failure, unspecified heart failure type  I50.9 428.0   2. Shortness of breath  R06.02 786.05   3. Fatigue, unspecified type  R53.83 780.79   4. Stage 3a chronic kidney disease  N18.31 585.3   5. History of asthma  Z87.09 V12.69   6. Acute on chronic congestive heart failure, unspecified heart failure type  I50.9 428.0     Patient Active Problem List   Diagnosis    Essential hypertension    Factor V Leiden    Psoriatic arthritis    At risk for venous thromboembolism (VTE)    Hypothyroidism    CKD (chronic kidney disease) stage 3, GFR 30-59 ml/min    Hyperlipidemia LDL goal <70    PDA (patent ductus arteriosus)    Demyelinating disease    Spondylolisthesis, grade 2    Type 1 diabetes mellitus    History of stroke    Demyelinating disease of central nervous system, unspecified    Acute on chronic diastolic congestive heart failure    CHF (congestive heart failure)     Past Medical History:   Diagnosis Date    Abnormal ECG Check A Date or [facility]    Brought to [facility] from [facility]    Acute sinusitis 2023    Anemia     Asthma     CAP (community acquired pneumonia) 2023    CHF (congestive heart failure) 2023    Chronic kidney disease     pt told it was dx from Dr Baca and to not eat much protein    Congenital heart disease 's Patent 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    Surgery [facility]  close    CTS (carpal tunnel syndrome)     Psoriatic arthritis hands could be    Deep vein thrombosis No on blood clots but have a blood clotting disorder called Leiden Factor 5    Disease of thyroid gland     Diverticulitis of colon     Factor 5 Leiden mutation, heterozygous     Head injury     Hyperlipidemia     Hypertension     Movement disorder 10/2021    knee replacement left  knww    Murmur, cardiac     Neuropathy in diabetes     redness swelling legs    Peripheral neuropathy 05/03/2022    hands, arms. shoulders, back    Pill esophagitis 02/06/2023    Psoriatic arthritis     hands    Sleep apnea Always    diabetic do not sleep well up and down    Stroke had one don't know when    showed on MRI Brain    Type 1 diabetes      Past Surgical History:   Procedure Laterality Date    CARDIAC CATHETERIZATION  1952 2 of them    Patent Dictus operation    CATARACT EXTRACTION Bilateral     COLONOSCOPY      ENDOSCOPY N/A 10/20/2020    Procedure: ESOPHAGOGASTRODUODENOSCOPY;  Surgeon: Brunner, Mark I, MD;  Location: [facility] ENDOSCOPY;  Service: Gastroenterology;  Laterality: N/A;    HAND SURGERY Left 1987    no hardware    PATENT DUCTUS ARTERIOUS LIGATION        General Information       Row Name 07/25/23 1322          OT Time and Intention    Document Type discharge treatment  -CS     Mode of Treatment occupational therapy  -CS       Row Name 07/25/23 1322          General Information    Patient Profile Reviewed yes  -CS     Existing Precautions/Restrictions fall  -CS     Barriers to Rehab medically complex  -CS       Row Name 07/25/23 1322          Cognition    Orientation Status (Cognition) oriented x 4  -CS       Row Name 07/25/23 1322          Safety Issues, Functional Mobility    Safety Issues Affecting Function (Mobility) insight into deficits/self-awareness  -CS     Impairments Affecting Function (Mobility) strength  -CS               User Key  (r) = Recorded By, (t) = Taken By, (c) = Cosigned By      Initials Name Provider Type    CS Madi Leonard OT Occupational Therapist                     Mobility/ADL's       Row Name 07/25/23 1330          Bed Mobility    Bed Mobility supine-sit;scooting/bridging  -CS     Scooting/Bridging [place] (Bed Mobility) supervision  -CS     Supine-Sit [place] (Bed Mobility) supervision  -CS     Comment, (Bed Mobility) no assist, appropriate  sequencing, no dizziness reported upon sitting  -       Row Name 07/25/23 1330          Transfers    Transfers toilet transfer;sit-stand transfer  -       Row Name 07/25/23 1330          Sit-Stand Transfer    Sit-Stand [place] (Transfers) modified independence  -CS     Assistive Device (Sit-Stand Transfers) walker, front-wheeled  -       Row Name 07/25/23 1330          Toilet Transfer    Type (Toilet Transfer) stand-sit;sit-stand  -     [place] Level (Toilet Transfer) modified independence  -CS     Assistive Device (Toilet Transfer) walker, front-wheeled;grab bars/safety frame  -       Row Name 07/25/23 1330          Functional Mobility    Functional Mobility- Ind. Level conditional independence  -CS     Functional Mobility- Device walker, front-wheeled  -CS     Functional Mobility-Distance (Feet) 40  -CS     Functional Mobility- Comment Mod [place] w/ RW for in-room distances, no LOB, no dizziness, safe navigation and sequencing  -       Row Name 07/25/23 1330          Activities of Daily Living    BADL Assessment/Intervention lower body dressing;grooming;toileting;feeding  -       Row Name 07/25/23 1330          Lower Body Dressing Assessment/Training    [place] Level (Lower Body Dressing) don;socks;independent  -     Position (Lower Body Dressing) edge of bed sitting  -       Row Name 07/25/23 1330          Toileting Assessment/Training    [place] Level (Toileting) adjust/manage clothing;perform perineal hygiene;change pad/brief;independent  -CS     Position (Toileting) unsupported sitting;supported standing  -       Row Name 07/25/23 1330          Self-Feeding Assessment/Training    [place] Level (Feeding) feeding skills;liquids to mouth;prepare tray/open items;scoop food and bring to mouth;independent  -CS     Position (Self-Feeding) sitting up in bed  -       Row Name 07/25/23 1330          Grooming Assessment/Training    [place] Level (Grooming) hair  care, combing/brushing;wash face, hands;supervision;verbal cues  -CS     Position (Grooming) sink side  -CS     Comment, (Grooming) cues for improved RW positioning at sinkside  -               User Key  (r) = Recorded By, (t) = Taken By, (c) = Cosigned By      Initials Name Provider Type    CS Madi Leonard OT Occupational Therapist                   Obj/Interventions       Row Name 07/25/23 1332          Motor Skills    Motor Skills functional endurance;coordination  -     Functional Endurance O2 sats stable on RA  -CS     Therapeutic Exercise shoulder;elbow/forearm;other (see comments)  BUE AROM incorporated into targeted reaching activities  -       Row Name 07/25/23 1332          Balance    Balance Assessment sitting static balance;sitting dynamic balance;standing static balance;standing dynamic balance  -     Static Sitting Balance independent  -CS     Dynamic Sitting Balance independent  -CS     Position, Sitting Balance unsupported;sitting edge of bed  -     Static Standing Balance supervision  -     Dynamic Standing Balance supervision  -CS     Position/Device Used, Standing Balance supported;walker, front-wheeled  -     Balance Interventions sitting;sit to stand;standing;dynamic reaching;UE activity with balance activity;occupation based/functional task  -     Comment, Balance no LOB during ADL completion, no LOB during targeted/dynamic reaching activities w/ Min-Mod challenge  -CS               User Key  (r) = Recorded By, (t) = Taken By, (c) = Cosigned By      Initials Name Provider Type    CS Madi Leonard OT Occupational Therapist                   Goals/Plan       Row Name 07/25/23 1338          Transfer Goal 1 (OT)    Progress/Outcome (Transfer Goal 1, OT) goal met  -[facility] Name 07/25/23 1338          Dressing Goal 1 (OT)    Progress/Outcome (Dressing Goal 1, OT) goal met  -[facility] Name 07/25/23 1338          Strength Goal 1 (OT)    Progress/Outcome (Strength Goal  1, OT) goal met  -CS               User Key  (r) = Recorded By, (t) = Taken By, (c) = Cosigned By      Initials Name Provider Type    Madi Pettit, FRANCO Occupational Therapist                   Clinical Impression       Row Name 07/25/23 5632          Pain Assessment    Pretreatment Pain Rating 0/10 - no pain  -CS     Posttreatment Pain Rating 0/10 - no pain  -CS       Row Name 07/25/23 5331          Plan of Care Review    Plan of Care Reviewed With patient  -CS     Progress improving  -CS     Outcome Evaluation Pt demonstrates return to baseline for ADL completion and related mobility. BP stable with positional changee, all therapeutic goals met, OT signing off. Rec d/c to home when medically appropriate, defer to PT for any OP therapy recs.  -CS       Row Name 07/25/23 8458          Therapy Plan Review/Discharge Plan (OT)    Anticipated Discharge Disposition (OT) home;home with assist  -CS       Row Name 07/25/23 0734          Vital Signs    Pre Systolic BP Rehab --  RN cleared for tx  -CS     O2 Delivery Pre Treatment room air  -CS     O2 Delivery Intra Treatment room air  -CS     O2 Delivery Post Treatment room air  -CS     Pre Patient Position Supine  -CS     Intra Patient Position Standing  -CS     Post Patient Position Standing  -CS       Row Name 07/25/23 6899          Positioning and Restraints    Pre-Treatment Position in bed  -CS     Post Treatment Position other  -CS     In Bed with PT  -CS               User Key  (r) = Recorded By, (t) = Taken By, (c) = Cosigned By      Initials Name Provider Type    Madi Pettit, OT Occupational Therapist                   Outcome Measures       Row Name 07/25/23 3213          How much help from another is currently needed...    Putting on and taking off regular lower body clothing? 4  -CS     Bathing (including washing, rinsing, and drying) 4  -CS     Toileting (which includes using toilet bed pan or urinal) 4  -CS     Putting on and taking off regular  upper body clothing 4  -CS     Taking care of personal grooming (such as brushing teeth) 4  -CS     Eating meals 4  -CS     AM-PAC 6 Clicks Score (OT) 24  -CS       Row Name 07/25/23 0827          How much help from another person do you currently need...    Turning from your back to your side while in flat bed without using bedrails? 4  -JENNIFER     Moving from lying on back to sitting on the side of a flat bed without bedrails? 4  -JENNIFER     Moving to and from a bed to a chair (including a wheelchair)? 3  -EJNNIFER     Standing up from a chair using your arms (e.g., wheelchair, bedside chair)? 3  -JENNIFER     Climbing 3-5 steps with a railing? 3  -JENNIFER     To walk in hospital room? 3  -JENNIFER     AM-PAC 6 Clicks Score (PT) 20  -JENNIFER     Highest level of mobility 6 --> Walked 10 steps or more  -       Row Name 07/25/23 1341          Functional Assessment    Outcome Measure Options AM-PAC 6 Clicks Daily Activity (OT)  -CS               User Key  (r) = Recorded By, (t) = Taken By, (c) = Cosigned By      Initials Name Provider Type    JENNIFER Mack Poon, RN Registered Nurse    Madi Pettit OT Occupational Therapist                    Occupational Therapy Education       Title: PT OT SLP Therapies (In Progress)       Topic: Occupational Therapy (In Progress)       Point: ADL training (Done)       Description:   Instruct learner(s) on proper safety adaptation and remediation techniques during self care or transfers.   Instruct in proper use of assistive devices.                  Learning Progress Summary             Patient Acceptance, E,D, VU,DU by  at 7/25/2023 1341    Acceptance, E, VU,DU by  at 7/21/2023 1104                         Point: Home exercise program (Not Started)       Description:   Instruct learner(s) on appropriate technique for monitoring, assisting and/or progressing therapeutic exercises/activities.                  Learner Progress:  Not documented in this visit.              Point: Precautions (Done)        Description:   Instruct learner(s) on prescribed precautions during self-care and functional transfers.                  Learning Progress Summary             Patient Acceptance, E,D, VU,DU by  at 7/25/2023 1341    Acceptance, E, VU,DU by  at 7/21/2023 1104                         Point: Body mechanics (Done)       Description:   Instruct learner(s) on proper positioning and spine alignment during self-care, functional mobility activities and/or exercises.                  Learning Progress Summary             Patient Acceptance, E,D, VU,DU by  at 7/25/2023 1341    Acceptance, E, VU,DU by  at 7/21/2023 1104                                         User Key       Initials Effective Dates Name Provider Type Discipline     06/16/21 -  Madi Leonard OT Occupational Therapist OT                  OT Recommendation and Plan  Planned Therapy Interventions (OT): functional balance retraining, occupation/activity based interventions, ROM/therapeutic exercise, strengthening exercise, transfer/mobility retraining, patient/caregiver education/training, neuromuscular control/coordination retraining, adaptive equipment training  Therapy Frequency (OT): daily  Plan of Care Review  Plan of Care Reviewed With: patient  Progress: improving  Outcome Evaluation: Pt demonstrates return to baseline for ADL completion and related mobility. BP stable with positional changee, all therapeutic goals met, OT signing off. Rec d/c to home when medically appropriate, defer to PT for any OP therapy recs.     Time Calculation:   Evaluation Complexity (OT)  Review Occupational Profile/Medical/Therapy History Complexity: expanded/moderate complexity  Assessment, Occupational Performance/Identification of Deficit Complexity: 1-3 performance deficits  Clinical Decision Making Complexity (OT): problem focused assessment/low complexity  Overall Complexity of Evaluation (OT): low complexity     Time Calculation- OT       Row Name 07/25/23 1348              Time Calculation- OT    OT Start Time 1250  -CS      OT Received On 07/25/23  -CS      OT Goal Re-Cert Due Date 07/31/23  -CS         Timed Charges    34922 - OT Therapeutic Activity Minutes 12  -CS      11126 - OT Self Care/Mgmt Minutes 12  -CS         Total Minutes    Timed Charges Total Minutes 24  -CS       Total Minutes 24  -CS                User Key  (r) = Recorded By, (t) = Taken By, (c) = Cosigned By      Initials Name Provider Type    CS Madi Leonard, OT Occupational Therapist                  Therapy Charges for Today       Code Description Service Date Service Provider Modifiers Qty    16010276907 HC OT THERAPEUTIC ACT EA 15 MIN 7/25/2023 Madi Leonard OT GO 1    68065574501 HC OT SELF CARE/MGMT/TRAIN EA 15 MIN 7/25/2023 Madi Leonard OT GO 1                 Madi Leonard OT  7/25/2023

## 2023-07-25 NOTE — THERAPY TREATMENT NOTE
Patient Name: Marilyn Kunz  : 1945    MRN: [id number]                              Today's Date: 2023       Admit Date: 2023    Visit Dx:     ICD-10-CM ICD-9-CM   1. Acute congestive heart failure, unspecified heart failure type  I50.9 428.0   2. Shortness of breath  R06.02 786.05   3. Fatigue, unspecified type  R53.83 780.79   4. Stage 3a chronic kidney disease  N18.31 585.3   5. History of asthma  Z87.09 V12.69   6. Acute on chronic congestive heart failure, unspecified heart failure type  I50.9 428.0     Patient Active Problem List   Diagnosis    Essential hypertension    Factor V Leiden    Psoriatic arthritis    At risk for venous thromboembolism (VTE)    Hypothyroidism    CKD (chronic kidney disease) stage 3, GFR 30-59 ml/min    Hyperlipidemia LDL goal <70    PDA (patent ductus arteriosus)    Demyelinating disease    Spondylolisthesis, grade 2    Type 1 diabetes mellitus    History of stroke    Demyelinating disease of central nervous system, unspecified    Acute on chronic diastolic congestive heart failure    CHF (congestive heart failure)     Past Medical History:   Diagnosis Date    Abnormal ECG Check A Date or [facility] Records    Brought to [facility] from [facility]    Acute sinusitis 2023    Anemia     Asthma     CAP (community acquired pneumonia) 2023    CHF (congestive heart failure) 2023    Chronic kidney disease     pt told it was dx from Dr Baca and to not eat much protein    Congenital heart disease 's Patent 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    Surgery [facility]  close    CTS (carpal tunnel syndrome)     Psoriatic arthritis hands could be    Deep vein thrombosis No on blood clots but have a blood clotting disorder called Leiden Factor 5    Disease of thyroid gland     Diverticulitis of colon     Factor 5 Leiden mutation, heterozygous     Head injury     Hyperlipidemia     Hypertension     Movement disorder 10/2021    knee replacement left  kn[facility]    Murmur, cardiac     Neuropathy in diabetes     redness swelling legs    Peripheral neuropathy 05/03/2022    hands, arms. shoulders, back    Pill esophagitis 02/06/2023    Psoriatic arthritis     hands    Sleep apnea Always    diabetic do not sleep well up and down    Stroke had one don't know when    showed on MRI Brain    Type 1 diabetes      Past Surgical History:   Procedure Laterality Date    CARDIAC CATHETERIZATION  1952 2 of them    Patent Dictus operation    CATARACT EXTRACTION Bilateral     COLONOSCOPY      ENDOSCOPY N/A 10/20/2020    Procedure: ESOPHAGOGASTRODUODENOSCOPY;  Surgeon: Brunner, Mark I, MD;  Location: [facility] ENDOSCOPY;  Service: Gastroenterology;  Laterality: N/A;    HAND SURGERY Left 1987    no hardware    PATENT DUCTUS ARTERIOUS LIGATION        General Information       Row Name 07/25/23 1345          Physical Therapy Time and Intention    Document Type therapy note (daily note)  -     Mode of Treatment physical therapy  -SS       Row Name 07/25/23 3765          General Information    Patient Profile Reviewed yes  -SS     Existing Precautions/Restrictions fall  -SS     Barriers to Rehab medically complex  -SS       Row Name 07/25/23 1345          Cognition    Orientation Status (Cognition) oriented x 4  -SS       Row Name 07/25/23 1345          Safety Issues, Functional Mobility    Safety Issues Affecting Function (Mobility) awareness of need for assistance;insight into deficits/self-awareness;judgment;problem-solving;safety precaution awareness;safety precautions follow-through/compliance;sequencing abilities  -SS     Impairments Affecting Function (Mobility) balance;endurance/activity tolerance;postural/trunk control;strength  -     Comment, Safety Issues/Impairments (Mobility) requires frequent redirection for attention to task on hand  -SS               User Key  (r) = Recorded By, (t) = Taken By, (c) = Cosigned By      Initials Name Provider Type    SS Sofiya Herbert, PT  Physical Therapist                   Mobility       Row Name 07/25/23 1354          Bed Mobility    Bed Mobility scooting/bridging;sit-supine  -     Scooting/Bridging [place] (Bed Mobility) standby assist;verbal cues  -     Sit-Supine [place] (Bed Mobility) verbal cues;standby assist  -     Assistive Device (Bed Mobility) head of bed elevated  -     Comment, (Bed Mobility) VC for sequencing  -       Row Name 07/25/23 1353          Sit-Stand Transfer    Sit-Stand [place] (Transfers) standby assist;verbal cues  -     Assistive Device (Sit-Stand Transfers) walker, front-wheeled  -SS     Comment, (Sit-Stand Transfer) VC for hand placement  -       Row Name 07/25/23 1358          Gait/Stairs (Locomotion)    [place] Level (Gait) contact guard;verbal cues  -     Assistive Device (Gait) walker, front-wheeled  -SS     Distance in Feet (Gait) 100  -SS     Deviations/Abnormal Patterns (Gait) sam decreased;gait speed decreased;stride length decreased;bilateral deviations;base of support, narrow;scissoring  -     Bilateral Gait Deviations forward flexed posture;heel strike decreased  -     Comment, (Gait/Stairs) Pt. ambulated with a step through gait pattern w/NBOS and occasional scissoring noted. VC for increased step width, attention to task on hand. Trialed head turns with ambulation; no LOB noted.  -               User Key  (r) = Recorded By, (t) = Taken By, (c) = Cosigned By      Initials Name Provider Type     Sofiya Herbert, PT Physical Therapist                   Obj/Interventions       Row Name 07/25/23 1353          Motor Skills    Therapeutic Exercise hip;ankle;knee  -       Row Name 07/25/23 135          Hip (Therapeutic Exercise)    Hip (Therapeutic Exercise) strengthening exercise;isometric exercises  -     Hip Isometrics (Therapeutic Exercise) bilateral;gluteal sets;10 repetitions  -     Hip Strengthening (Therapeutic Exercise) bilateral;marching while  seated;marching while standing;standing;aBduction;mini squats;10 repetitions  -       Row Name 07/25/23 1358          Knee (Therapeutic Exercise)    Knee (Therapeutic Exercise) strengthening exercise;isometric exercises  -     Knee Isometrics (Therapeutic Exercise) bilateral;quad sets;10 repetitions  -     Knee Strengthening (Therapeutic Exercise) bilateral;heel slides;SLR (straight leg raise);LAQ (long arc quad);10 repetitions  -       Row Name 07/25/23 1351          Ankle (Therapeutic Exercise)    Ankle (Therapeutic Exercise) AROM (active range of motion);strengthening exercise  -     Ankle AROM (Therapeutic Exercise) bilateral;dorsiflexion;plantarflexion;10 repetitions  -     Ankle Strengthening (Therapeutic Exercise) bilateral;standing;plantarflexion;10 repetitions  -       Row Name 07/25/23 135          Balance    Balance Assessment sitting static balance;sitting dynamic balance;sit to stand dynamic balance;standing dynamic balance;standing static balance  -     Static Sitting Balance independent  -     Dynamic Sitting Balance standby assist  -     Position, Sitting Balance unsupported;sitting edge of bed  -     Sit to Stand Dynamic Balance standby assist  -     Static Standing Balance standby assist  -     Dynamic Standing Balance contact guard  -     Position/Device Used, Standing Balance supported;walker, front-wheeled  -     Balance Interventions sitting;standing;sit to stand;supported;static;dynamic  -     Comment, Balance performed NBOS EO, NBOS EC, semi tandem L/R x 30 seconds each  -               User Key  (r) = Recorded By, (t) = Taken By, (c) = Cosigned By      Initials Name Provider Type     Sofiya Herbert PT Physical Therapist                   Goals/Plan    No documentation.                  Clinical Impression       Row Name 07/25/23 1357          Pain    Pretreatment Pain Rating 0/10 - no pain  -     Posttreatment Pain Rating 0/10 - no pain  -     Pain  Intervention(s) Repositioned;Ambulation/increased activity;Elevated  -     Additional Documentation Pain Scale: Numbers Pre/Post-Treatment (Group)  -       Row Name 07/25/23 2444          Plan of Care Review    Plan of Care Reviewed With patient  -     Progress improving  -     Outcome Evaluation Pt. presents below baseline function w/generalized weakness and balance deficits affecting her ability to safely participate in functional mobility. She performed bed mobility, transfers and ambulated 100' w/front wheeled walker, contact guard assist. Activity limited by fatigue. Pt. tolerated ther-ex well. Pt. remained asymptomatic throughout mobility. Continue IPPT to progress as tolerated.  -       Row Name 07/25/23 1353          Therapy Assessment/Plan (PT)    Rehab Potential (PT) good, to achieve stated therapy goals  -     Criteria for Skilled Interventions Met (PT) yes;meets criteria;skilled treatment is necessary  -     Therapy Frequency (PT) daily  -       Row Name 07/25/23 1350          Vital Signs    Pre Systolic BP Rehab 171  -SS     Pre Treatment Diastolic BP 74  -SS     Post Systolic BP Rehab 175  -SS     Post Treatment Diastolic BP 68  -SS     Pretreatment Heart Rate (beats/min) 59  -SS     Posttreatment Heart Rate (beats/min) 61  -SS     Post SpO2 (%) 93  -SS     O2 Delivery Post Treatment room air  -     Pre Patient Position Supine  -SS     Post Patient Position Supine  -       Row Name 07/25/23 9607          Positioning and Restraints    Pre-Treatment Position in bed  -SS     Post Treatment Position bed  -SS     In Bed notified nsg;fowlers;call light within reach;encouraged to call for assist;exit alarm on;legs elevated  -               User Key  (r) = Recorded By, (t) = Taken By, (c) = Cosigned By      Initials Name Provider Type     Sofiya Herbert, PT Physical Therapist                   Outcome Measures       Row Name 07/25/23 1402 07/25/23 0847       How much help from another  person do you currently need...    Turning from your back to your side while in flat bed without using bedrails? 3  -SS 4  -JENNIFER    Moving from lying on back to sitting on the side of a flat bed without bedrails? 3  -SS 4  -JENNIFER    Moving to and from a bed to a chair (including a wheelchair)? 3  -SS 3  -JENNIFER    Standing up from a chair using your arms (e.g., wheelchair, bedside chair)? 3  - 3  -JENNIFER    Climbing 3-5 steps with a railing? 3  - 3  -JENNIFER    To walk in hospital room? 3  -SS 3  -JENNIFER    AM-PAC 6 Clicks Score (PT) 18  -SS 20  -JENNIFER    Highest level of mobility 6 --> Walked 10 steps or more  - 6 --> Walked 10 steps or more  -      Row Name 07/25/23 1402 07/25/23 1341       Functional Assessment    Outcome Measure Options AM-PAC 6 Clicks Basic Mobility (PT)  - AM-PAC 6 Clicks Daily Activity (OT)  -              User Key  (r) = Recorded By, (t) = Taken By, (c) = Cosigned By      Initials Name Provider Type     Mack Poon, RN Registered Nurse    Madi Pettit, OT Occupational Therapist    SS Sofiya Herbert, PT Physical Therapist                                 Physical Therapy Education       Title: PT OT SLP Therapies (In Progress)       Topic: Physical Therapy (Done)       Point: Mobility training (Done)       Learning Progress Summary             Patient Eager, E, VU,DU,NR by  at 7/25/2023 1403    Comment: Reviewed safety/technique w/bed mobility, transfers, ambulation, HEP, PT POC    Acceptance, E, VU,NR by  at 7/23/2023 1518    Comment: PT POC    Acceptance, E, VU by KR at 7/21/2023 0953                         Point: Home exercise program (Done)       Learning Progress Summary             Patient Eager, E, VU,DU,NR by  at 7/25/2023 1403    Comment: Reviewed safety/technique w/bed mobility, transfers, ambulation, HEP, PT POC    Acceptance, E, VU,NR by  at 7/23/2023 1518    Comment: PT POC    Acceptance, E, VU by KR at 7/21/2023 0953                         Point: Body mechanics (Done)        Learning Progress Summary             Patient Eager, E, VU,DU,NR by  at 7/25/2023 1403    Comment: Reviewed safety/technique w/bed mobility, transfers, ambulation, HEP, PT POC    Acceptance, E, VU,NR by  at 7/23/2023 1518    Comment: PT POC    Acceptance, E, VU by KR at 7/21/2023 0953                         Point: Precautions (Done)       Learning Progress Summary             Patient Eager, E, VU,DU,NR by  at 7/25/2023 1403    Comment: Reviewed safety/technique w/bed mobility, transfers, ambulation, HEP, PT POC    Acceptance, E, VU,NR by  at 7/23/2023 1518    Comment: PT POC    Acceptance, E, VU by KR at 7/21/2023 0953                                         User Key       Initials Effective Dates Name Provider Type Discipline     06/16/21 -  Sera Carbajal, PT Physical Therapist PT    SS 06/01/21 -  Sofiya Herbert, PT Physical Therapist PT    KR 12/30/22 -  Jackelyn Mccormick, PT Physical Therapist PT                  PT Recommendation and Plan     Plan of Care Reviewed With: patient  Progress: improving  Outcome Evaluation: Pt. presents below baseline function w/generalized weakness and balance deficits affecting her ability to safely participate in functional mobility. She performed bed mobility, transfers and ambulated 100' w/front wheeled walker, contact guard assist. Activity limited by fatigue. Pt. tolerated ther-ex well. Pt. remained asymptomatic throughout mobility. Continue IPPT to progress as tolerated.     Time Calculation:         PT Charges       Row Name 07/25/23 1403             Time Calculation    Start Time 1318  -SS      PT Received On 07/25/23  -SS         Timed Charges    66962 - PT Therapeutic Exercise Minutes 10  -SS      85720 - Gait Training Minutes  10  -SS      39627 - PT Therapeutic Activity Minutes 3  -SS         Total Minutes    Timed Charges Total Minutes 23  -SS       Total Minutes 23  -SS                User Key  (r) = Recorded By, (t) = Taken By, (c) = Cosigned By       Initials Name Provider Type    SS Sofiya Herbert, PT Physical Therapist                  Therapy Charges for Today       Code Description Service Date Service Provider Modifiers Qty    57561430844 HC PT THER PROC EA 15 MIN 7/25/2023 Sofiya Herbert, PT GP 1    48252104812 HC GAIT TRAINING EA 15 MIN 7/25/2023 Sofiya Herbert, PT GP 1            PT G-Codes  Outcome Measure Options: AM-PAC 6 Clicks Basic Mobility (PT)  AM-PAC 6 Clicks Score (PT): 18  AM-PAC 6 Clicks Score (OT): 24  PT Discharge Summary  Anticipated Discharge Disposition (PT): home with home health, home with assist    Sofiya Herbert PT  7/25/2023

## 2023-07-25 NOTE — TELEPHONE ENCOUNTER
I called pt back. She is hospitalized. I reviewed her chart. She would like proof that her dizziness is not related to the changes on her MRI.  I explained that there are many causes of dizziness and it is difficult to prove causes of dizziness. Reiterated that she is in good hands with the care she is receiving in the hospital. She has requested an EMG when she is discharged. I will see her after discharge.

## 2023-07-26 ENCOUNTER — READMISSION MANAGEMENT (OUTPATIENT)
Dept: CALL CENTER | Facility: HOSPITAL | Age: 78
End: 2023-07-26
Payer: MEDICARE

## 2023-07-26 VITALS
WEIGHT: 133 LBS | TEMPERATURE: 98.7 F | OXYGEN SATURATION: 95 % | BODY MASS INDEX: 26.81 KG/M2 | RESPIRATION RATE: 18 BRPM | HEART RATE: 66 BPM | HEIGHT: 59 IN | DIASTOLIC BLOOD PRESSURE: 76 MMHG | SYSTOLIC BLOOD PRESSURE: 113 MMHG

## 2023-07-26 PROBLEM — I50.31 ACUTE HEART FAILURE WITH PRESERVED EJECTION FRACTION (HFPEF): Status: ACTIVE | Noted: 2023-07-26

## 2023-07-26 LAB
ANION GAP SERPL CALCULATED.3IONS-SCNC: 10 MMOL/L (ref 5–15)
BUN SERPL-MCNC: 34 MG/DL (ref 8–23)
BUN/CREAT SERPL: 37 (ref 7–25)
CALCIUM SPEC-SCNC: 9.2 MG/DL (ref 8.6–10.5)
CHLORIDE SERPL-SCNC: 110 MMOL/L (ref 98–107)
CO2 SERPL-SCNC: 25 MMOL/L (ref 22–29)
CREAT SERPL-MCNC: 0.92 MG/DL (ref 0.57–1)
EGFRCR SERPLBLD CKD-EPI 2021: 63.9 ML/MIN/1.73
GLUCOSE BLDC GLUCOMTR-MCNC: 260 MG/DL (ref 70–130)
GLUCOSE BLDC GLUCOMTR-MCNC: 307 MG/DL (ref 70–130)
GLUCOSE SERPL-MCNC: 307 MG/DL (ref 65–99)
POTASSIUM SERPL-SCNC: 4.5 MMOL/L (ref 3.5–5.2)
SODIUM SERPL-SCNC: 145 MMOL/L (ref 136–145)

## 2023-07-26 PROCEDURE — 94761 N-INVAS EAR/PLS OXIMETRY MLT: CPT

## 2023-07-26 PROCEDURE — 63710000001 INSULIN LISPRO (HUMAN) PER 5 UNITS: Performed by: PHYSICIAN ASSISTANT

## 2023-07-26 PROCEDURE — 99232 SBSQ HOSP IP/OBS MODERATE 35: CPT | Performed by: INTERNAL MEDICINE

## 2023-07-26 PROCEDURE — 99239 HOSP IP/OBS DSCHRG MGMT >30: CPT | Performed by: INTERNAL MEDICINE

## 2023-07-26 PROCEDURE — 94799 UNLISTED PULMONARY SVC/PX: CPT

## 2023-07-26 PROCEDURE — 63710000001 AZATHIOPRINE PER 50 MG: Performed by: INTERNAL MEDICINE

## 2023-07-26 PROCEDURE — 82948 REAGENT STRIP/BLOOD GLUCOSE: CPT

## 2023-07-26 PROCEDURE — 94664 DEMO&/EVAL PT USE INHALER: CPT

## 2023-07-26 PROCEDURE — 80048 BASIC METABOLIC PNL TOTAL CA: CPT | Performed by: PHYSICIAN ASSISTANT

## 2023-07-26 RX ORDER — BUMETANIDE 0.5 MG/1
0.5 TABLET ORAL EVERY OTHER DAY
Start: 2023-07-26

## 2023-07-26 RX ADMIN — PANTOPRAZOLE SODIUM 40 MG: 40 TABLET, DELAYED RELEASE ORAL at 05:27

## 2023-07-26 RX ADMIN — CETIRIZINE HYDROCHLORIDE 10 MG: 10 TABLET, FILM COATED ORAL at 08:57

## 2023-07-26 RX ADMIN — INSULIN LISPRO 6 UNITS: 100 INJECTION, SOLUTION INTRAVENOUS; SUBCUTANEOUS at 07:31

## 2023-07-26 RX ADMIN — ASPIRIN 81 MG: 81 TABLET, CHEWABLE ORAL at 08:57

## 2023-07-26 RX ADMIN — FLUTICASONE PROPIONATE 2 SPRAY: 50 SPRAY, METERED NASAL at 09:00

## 2023-07-26 RX ADMIN — Medication 10 ML: at 08:58

## 2023-07-26 RX ADMIN — LEVOTHYROXINE SODIUM 75 MCG: 0.07 TABLET ORAL at 05:27

## 2023-07-26 RX ADMIN — INSULIN LISPRO 7 UNITS: 100 INJECTION, SOLUTION INTRAVENOUS; SUBCUTANEOUS at 12:30

## 2023-07-26 RX ADMIN — INSULIN LISPRO 5 UNITS: 100 INJECTION, SOLUTION INTRAVENOUS; SUBCUTANEOUS at 12:31

## 2023-07-26 RX ADMIN — SACUBITRIL AND VALSARTAN 1 TABLET: 24; 26 TABLET, FILM COATED ORAL at 08:57

## 2023-07-26 RX ADMIN — SENNOSIDES AND DOCUSATE SODIUM 2 TABLET: 50; 8.6 TABLET ORAL at 08:58

## 2023-07-26 RX ADMIN — INSULIN LISPRO 5 UNITS: 100 INJECTION, SOLUTION INTRAVENOUS; SUBCUTANEOUS at 07:31

## 2023-07-26 RX ADMIN — BUDESONIDE AND FORMOTEROL FUMARATE DIHYDRATE 2 PUFF: 160; 4.5 AEROSOL RESPIRATORY (INHALATION) at 09:17

## 2023-07-26 RX ADMIN — AZATHIOPRINE 100 MG: 50 TABLET ORAL at 08:57

## 2023-07-26 NOTE — DISCHARGE SUMMARY
[facility] Medicine Services  DISCHARGE SUMMARY    Patient Name: Marilyn Kunz  : 1945  MRN: [id number]    Date of Admission: 2023 10:52 AM  Date of Discharge: 2023  Primary Care Physician: Peter Baca MD    Consults       Date and Time Order Name Status Description    2023 11:46 AM Inpatient Neurology Consult General Completed     2023 12:34 AM Inpatient Cardiology Consult Completed             Hospital Course     Presenting Problem: Dyspnea    Active Hospital Problems    Diagnosis  POA    **Acute on chronic diastolic congestive heart failure [I50.33]  Yes    Acute heart failure with preserved ejection fraction (HFpEF) [I50.31]  Yes    CHF (congestive heart failure) [I50.9]  Yes    Demyelinating disease [G37.9]  Yes    Type 1 diabetes mellitus [E10.9]  Yes    Factor V Leiden [D68.51]  Yes    Essential hypertension [I10]  Yes    CKD (chronic kidney disease) stage 3, GFR 30-59 ml/min [N18.30]  Yes    Hypothyroidism [E03.9]  Yes    Hyperlipidemia LDL goal <70 [E78.5]  Yes      Resolved Hospital Problems    Diagnosis Date Resolved POA    Acute exacerbation of CHF (congestive heart failure) [I50.9] 2023 Yes    CHF (congestive heart failure) [I50.9] 2023 Yes    Type 1 diabetes mellitus without complications [E10.9] 2023 Yes    Hypothyroidism, unspecified [E03.9] 2023 Yes    Acute respiratory failure with hypoxia [J96.01] 2023 Yes    Uncontrolled hypertension [I10] 2023 Yes          Hospital Course:  Marilyn Kunz is a 78 y.o. female with PMH significant for HTN, HLD, chronic HFpEF, DM1, CKD III, prior CVA, COPD (followed by Dr. Haley), demyelinating disease (on Imuran / Azothioprine - followed by Dr. Hernandez), prior esophageal ulcer (), heterozygous factor V Leiden status and hypothyroidism. Last admitted to [facility] 3/2-3/9/23 for severe hyponatremia secondary to SIADH. Cymbalta  discontinued. Discharged on PO salt tabs.      Presented to [facility] 7/20/23 for evaluation of exertional dyspnea and profound fatigue. Had been up-titrating BP meds as an outpatient under the direction of cardiology (Dr. Maya) due to rising BP. About a week prior to presentation, saw nephrology outpatient and due to rising creatinine, was instructed to stop taking Bumex (had been taking 0.5mg daily).    She presented afterwards with SOB, pleural effusions.    Acute hypoxic respiratory failure with hypoxia   A/C HFpEF  Accelerated HTN  - s/p IV diuresis. Has weaned to room air  - At home, was on Hydralazine 100mg BID, Irbesartan 300mg daily, Nebivolol 20mg daily, Terazosin 10mg QHS   - All above antihypertensives held at DC  - Started on entresto this admit, BP has been relatively stable  - Will need outpatient follow up with [facility] after DC    -Restarted Bumex 0.5 mg every other day with daily weights     ELMER on CKD III  -Renal function back to baseline     DM1 (A1c 8.0)  -Resume home Tresiba    COPD  - Stable / not in acute exacerbation  - Continue Symbicort / PRN nebs     Hypothyroidism  - TSH WNL. Continue Synthroid     Profound generalized weakness, unsteady gait  Hx demyelinating disease  Peripheral neuropathy  - Followed by Dr. Hernandez as outpatient  - Continue Azothioprine and Imuran  - Work up here has largely been negative including CT head / MRI brain, UA, COVID/flu PCRs and TSH  - Neurology has seen - no changes to current plan  - Will need outpatient follow up with neurology rescheduled - missed appointment due to hospitalization     Hx of Heterozygous factor V leiden  - Continue ASA - patient denies history of thrombus       Discharge Follow Up Recommendations for outpatient labs/diagnostics:  Heart and valve clinic in 1 week  PCP in 1 week    Day of Discharge     HPI:   Doing well, breathing at baseline    Review of Systems  Gen- No fevers, chills  CV- No chest pain,  palpitations  Resp- No cough, dyspnea  GI- No N/V/D, abd pain      Vital Signs:   Temp:  [97.8 °F (36.6 °C)-98.7 °F (37.1 °C)] 98.7 °F (37.1 °C)  Heart Rate:  [50-68] 66  Resp:  [16-18] 18  BP: (113-174)/(59-76) 113/76      Physical Exam:  Constitutional: No acute distress, awake, alert  HENT: NCAT, mucous membranes moist  Respiratory: Respiratory effort normal   Cardiovascular: RRR, no murmurs, rubs, or gallops  Musculoskeletal: No bilateral ankle edema  Psychiatric: Appropriate affect, cooperative  Neurologic: Oriented x 3, speech clear  Skin: No rashes      Pertinent  and/or Most Recent Results     LAB RESULTS:      Lab 07/24/23  0529 07/23/23  0404 07/21/23  0421 07/20/23  1157   WBC 5.45 6.29 5.84 5.60   HEMOGLOBIN 10.5* 11.5* 9.2* 9.8*   HEMATOCRIT 33.4* 36.0 29.7* 31.3*   PLATELETS 245 261 188 192   NEUTROS ABS  --   --   --  4.46   IMMATURE GRANS (ABS)  --   --   --  0.05   LYMPHS ABS  --   --   --  0.59*   MONOS ABS  --   --   --  0.39   EOS ABS  --   --   --  0.07   MCV 95.7 96.0 97.4* 98.7*   PROCALCITONIN  --   --   --  0.04   LACTATE  --   --   --  1.1         Lab 07/26/23  0505 07/25/23  0602 07/24/23  0529 07/23/23  0404 07/22/23  0657 07/21/23  0421 07/20/23  1402 07/20/23  1157   SODIUM 145 146* 143 141 144 142  --  139   POTASSIUM 4.5 4.2 4.0 3.8 4.1 3.8  --  4.5   CHLORIDE 110* 108* 103 102 101 102  --  104   CO2 25.0 27.0 26.0 24.0 28.0 28.0  --  22.0   ANION GAP 10.0 11.0 14.0 15.0 15.0 12.0  --  13.0   BUN 34* 40* 52* 38* 28* 22  --  26*   CREATININE 0.92 0.90 1.54* 1.35* 1.19* 1.03*  --  1.09*   EGFR 63.9 65.6 34.4* 40.3* 46.9* 55.8*  --  52.1*   GLUCOSE 307* 126* 88 107* 151* 147*  --  245*   CALCIUM 9.2 9.0 8.7 8.6 8.7 8.4*  --  8.8   MAGNESIUM  --   --  1.9 1.8 1.9 1.9 2.1  --    HEMOGLOBIN A1C  --   --   --   --   --   --   --  8.00*   TSH  --   --   --   --   --   --  3.590  --          Lab 07/20/23  1157   TOTAL PROTEIN 6.2   ALBUMIN 3.7   GLOBULIN 2.5   ALT (SGPT) 16   AST (SGOT) 21    BILIRUBIN 1.3*   ALK PHOS 65   LIPASE 9*         Lab 07/20/23  1402 07/20/23  1157   PROBNP  --  3,386.0*   HSTROP T 14* 13*             Lab 07/24/23  0529   FOLATE 19.30   VITAMIN B 12 235         Lab 07/20/23  1628   PH, ARTERIAL 7.363   PCO2, ARTERIAL 44.0   PO2 ART 51.1*   FIO2 32   HCO3 ART 25.0   BASE EXCESS ART -0.5*   CARBOXYHEMOGLOBIN 1.2     Brief Urine Lab Results  (Last result in the past 365 days)        Color   Clarity   Blood   Leuk Est   Nitrite   Protein   CREAT   Urine HCG        07/20/23 1206 Yellow   Clear   Negative   Negative   Negative   Negative                 Microbiology Results (last 10 days)       Procedure Component Value - Date/Time    COVID PRE-OP / PRE-PROCEDURE SCREENING ORDER (NO ISOLATION) - Swab, Nasopharynx [[id number]]  (Normal) Collected: 07/20/23 1819    Lab Status: Final result Specimen: Swab from Nasopharynx Updated: 07/20/23 1856    Narrative:      The following orders were created for panel order COVID PRE-OP / PRE-PROCEDURE SCREENING ORDER (NO ISOLATION) - Swab, Nasopharynx.  Procedure                               Abnormality         Status                     ---------                               -----------         ------                     COVID-19 and FLU A/B PCR...[[id number]]  Normal              Final result                 Please view results for these tests on the individual orders.    COVID-19 and FLU A/B PCR - Swab, Nasopharynx [[id number]]  (Normal) Collected: 07/20/23 1819    Lab Status: Final result Specimen: Swab from Nasopharynx Updated: 07/20/23 1856     COVID19 Not Detected     Influenza A PCR Not Detected     Influenza B PCR Not Detected    Narrative:      Fact sheet for providers: https://www.fda.gov/media/727899/download    Fact sheet for patients: https://www.fda.gov/media/767661/download    Test performed by PCR.            CT Head Without Contrast    Result Date: 7/20/2023  CT HEAD WO CONTRAST Date of Exam: 7/20/2023 11:23 AM EDT Indication:  fatigue. Comparison: None available. Technique: Axial CT images were obtained of the head without contrast administration.  Automated exposure control and iterative construction methods were used. Findings: There is prominence of the ventricles and sulci suggestive of atrophy. There our areas of hypodensity within the periventricular white matter, corona radiata and the left greater than right cerebellar hemispheres. Additionally, there is a lacunar infarct  within the left basal ganglia. Findings are suggestive of changes of chronic microvascular ischemic disease. No evidence of hemorrhage. No midline shift or mass effect is identified. Orbits paranasal sinuses and mastoid air cells are unremarkable.     Impression: 1. Atrophy, but no acute intracranial pathology. Electronically Signed: Robert Grove  7/20/2023 11:39 AM EDT  Workstation ID: [id number]    MRI Brain With & Without Contrast    Result Date: 7/22/2023  MRI BRAIN W WO CONTRAST Date of Exam: 7/22/2023 4:29 AM EDT Indication: Vertigo.  Comparison: None available. Technique:  Routine multiplanar/multisequence sequence images of the brain were obtained before and after the uneventful administration of 13 mL Multihance. Findings: No acute infarct is present on diffusion weighted sequences. Midline structures are normal and the craniocervical junction appears satisfactory. Age-related changes are present with mild generalized volume loss and typical pontine and periventricular leukomalacia. There is otherwise no evidence of intracranial hemorrhage, mass or mass effect. There is no abnormal enhancement there is ex vacuo prominence of the ventricles and sulci. The orbits are normal. The paranasal sinuses are grossly clear.     Impression: Advanced age-related changes are noted as above. There is otherwise no evidence of acute infarct, hemorrhage, mass or abnormal enhancement. Electronically Signed: Vick Grimaldo  7/22/2023 7:25 PM EDT  Workstation ID:  [id number]    XR Chest 1 View    Result Date: 7/20/2023  XR CHEST 1 VW Date of Exam: 7/20/2023 11:03 AM EDT Indication: fatigue Comparison: 3/2/2023. Findings: There is continued moderate cardiomegaly. There is patchy infiltrate of the left lower lobe although improved. Previously seen right basilar infiltrates have largely resolved.     Impression: Patchy left lower lobe infiltrate remaining, overall improved from prior. Electronically Signed: Slime Whitlock MD  7/20/2023 11:34 AM EDT  Workstation ID: [id number]    Duplex Carotid Ultrasound CAR    Result Date: 7/25/2023    Right internal carotid artery demonstrates a less than 50% stenosis.   Left internal carotid artery demonstrates a less than 50% stenosis.   Vertebral artery flow antegrade bilaterally     CT Angiogram Chest    Result Date: 7/20/2023  CT ANGIOGRAM CHEST Date of Exam: 7/20/2023 5:04 PM EDT Indication: SOA and hypoxemia w/ clotting disorder. Comparison: 3/2/2023. Technique: CTA of the chest was performed after the uneventful intravenous administration of 50 mL Isovue-370. Reconstructed coronal and sagittal images were also obtained. In addition, a 3-D volume rendered image was created for interpretation. Automated exposure control and iterative reconstruction methods were used. Findings: There is no pathologic axillary adenopathy or other worrisome body wall soft tissue finding in the chest. No acute findings are present in the partially characterized upper abdomen. There are small bilateral pleural effusions, mildly decreased from comparison, without pericardial effusion. Nonaneurysmal mildly atherosclerotic thoracic aorta. The pulmonary arteries are well-opacified and without evidence of filling defect concerning for acute pulmonary embolus. The osseous structures demonstrate no evidence of acute fracture or aggressive osseous lesion. The lung fields demonstrate evidence of pulmonary edema, with interlobular septal thickening and diffuse groundglass  opacity present.     Impression: Mildly worsened pulmonary edema with small to moderate bilateral pleural effusions. No acute pulmonary embolus. Electronically Signed: Vick Grimaldo  7/20/2023 5:35 PM EDT  Workstation ID: [id number]     Results for orders placed during the hospital encounter of 07/20/23    Duplex Carotid Ultrasound CAR    Interpretation Summary    Right internal carotid artery demonstrates a less than 50% stenosis.    Left internal carotid artery demonstrates a less than 50% stenosis.    Vertebral artery flow antegrade bilaterally      Results for orders placed during the hospital encounter of 07/20/23    Duplex Carotid Ultrasound CAR    Interpretation Summary    Right internal carotid artery demonstrates a less than 50% stenosis.    Left internal carotid artery demonstrates a less than 50% stenosis.    Vertebral artery flow antegrade bilaterally      Results for orders placed during the hospital encounter of 02/24/23    Adult Transthoracic Echo Complete W/ Cont if Necessary Per Protocol    Interpretation Summary    Left ventricular ejection fraction appears to be 61 - 65%.    Estimated right ventricular systolic pressure from tricuspid regurgitation is mildly elevated (35-45 mmHg).      Plan for Follow-up of Pending Labs/Results:     Discharge Details        Discharge Medications        New Medications        Instructions Start Date   Entresto 24-26 MG tablet  Generic drug: sacubitril-valsartan   1 tablet, Oral, Every 12 Hours Scheduled             Changes to Medications        Instructions Start Date   bumetanide 0.5 MG tablet  Commonly known as: BUMEX  What changed:   when to take this  reasons to take this   0.5 mg, Oral, Every Other Day      Tresiba FlexTouch 100 UNIT/ML solution pen-injector injection  Generic drug: insulin degludec  What changed: additional instructions   Inject 19 Units under the skin into the appropriate area as directed Every Night.             Continue These Medications         Instructions Start Date   aspirin 81 MG EC tablet   81 mg, Oral, Daily      azaTHIOprine 50 MG tablet  Commonly known as: IMURAN   TAKE 2 TABLETS TWICE DAILY      Cosentyx 150 MG/ML solution prefilled syringe  Generic drug: Secukinumab   75 mg, Subcutaneous, Every 28 Days, 1/2 dose monthly      fluticasone 50 MCG/ACT nasal spray  Commonly known as: FLONASE   2 sprays, Each Nare, Daily, Shake well before using.       levocetirizine 5 MG tablet  Commonly known as: XYZAL   5 mg, Oral, Every Evening      levothyroxine 75 MCG tablet  Commonly known as: SYNTHROID, LEVOTHROID   75 mcg, Oral, Daily      pravastatin 20 MG tablet  Commonly known as: Pravachol   20 mg, Oral, Every Evening             Stop These Medications      albuterol sulfate  (90 Base) MCG/ACT inhaler  Commonly known as: PROVENTIL HFA;VENTOLIN HFA;PROAIR HFA     azelastine 0.1 % nasal spray  Commonly known as: ASTELIN     chlorthalidone 25 MG tablet  Commonly known as: HYGROTON     cloNIDine 0.1 MG tablet  Commonly known as: CATAPRES     DULoxetine 30 MG capsule  Commonly known as: CYMBALTA     hydrALAZINE 100 MG tablet  Commonly known as: APRESOLINE     insulin aspart 100 UNIT/ML solution pen-injector sc pen  Commonly known as: novoLOG FLEXPEN     montelukast 10 MG tablet  Commonly known as: SINGULAIR     nebivolol 20 MG tablet  Commonly known as: BYSTOLIC     oxybutynin XL 10 MG 24 hr tablet  Commonly known as: DITROPAN-XL     terazosin 10 MG capsule  Commonly known as: HYTRIN     tobramycin-dexamethasone 0.3-0.1 % ophthalmic suspension  Commonly known as: TOBRADEX     triamcinolone 0.1 % cream  Commonly known as: KENALOG              Allergies   Allergen Reactions    Atorvastatin Other (See Comments)    Colesevelam Other (See Comments)    Cymbalta [Duloxetine Hcl] Other (See Comments)     Hyponatremia    Cephalexin Rash     Tolerated Ceftriaxone    Hygroton [Chlorthalidone] Rash     Facial rash    Penicillins Nausea And Vomiting and Rash      Has tolerated ceftriaxone         Discharge Disposition:  Home or Self Care    Diet:  Hospital:  Diet Order   Procedures    Diet: Regular/House Diet, Cardiac Diets, Diabetic Diets; Healthy Heart (2-3 Na+); Consistent Carbohydrate; Texture: Regular Texture (IDDSI 7); Fluid Consistency: Thin (IDDSI 0)       Activity:      Restrictions or Other Recommendations:  Weigh daily    CODE STATUS:    Code Status and Medical Interventions:   Ordered at: 07/20/23 1905     Code Status (Patient has no pulse and is not breathing):    CPR (Attempt to Resuscitate)     Medical Interventions (Patient has pulse or is breathing):    Full Support     Release to patient:    Routine Release       Future Appointments   Date Time Provider Department Center   8/2/2023  8:30 AM Sofiya Krause APRN [facility]   8/8/2023 11:15 AM Lucas Haley DO [facility]   11/10/2023 11:30 AM Bryson Murrell DNP, APRN [facility]   4/30/2024 11:30 AM Yao Maya MD [facility]       Additional Instructions for the Follow-ups that You Need to Schedule       Ambulatory Referral to Home Health   As directed      Face to Face Visit Date: 7/26/2023   Follow-up provider for Plan of Care?: I treated the patient in an acute care facility and will not continue treatment after discharge.   Follow-up provider: PETER BACA [[id number]]   Reason/Clinical Findings: acute on chronic diastolic CHF   Describe mobility limitations that make leaving home difficult: lives alone   Nursing/Therapeutic Services Requested: Skilled Nursing Physical Therapy   Skilled nursing orders: Medication education   Frequency: Other (2-3 times a week)        Discharge Follow-up with PCP   As directed       Currently Documented PCP:    Peter Baca MD    PCP Phone Number:    [phone]     Follow Up Details: 1 week        Discharge Follow-up with Specified Provider: Heart and valve clinic; 1 Week   As directed      To: Heart  and valve clinic   Follow Up: 1 Week                      Brandee Long DO  07/26/23      Time Spent on Discharge:  I spent  35  minutes on this discharge activity which included: face-to-face encounter with the patient, reviewing the data in the system, coordination of the care with the nursing staff as well as consultants, documentation, and entering orders.

## 2023-07-26 NOTE — CASE MANAGEMENT/SOCIAL WORK
Continued Stay Note  [facility]     Patient Name: Marilyn Kunz  MRN: [id number]  Today's Date: 7/26/2023    Admit Date: 7/20/2023    Plan: Home   Discharge Plan       Row Name 07/26/23 0746       Plan    Plan Home    Plan Comments I spoke with patient to follow up on the plans for home with [facility]. She is fine with this plan. I will reach out to this agency at discharge.    Final Discharge Disposition Code 06 - home with home health care                   Discharge Codes    No documentation.                 Expected Discharge Date and Time       Expected Discharge Date Expected Discharge Time    Jul 26, 2023               Sofiya Chávez RN

## 2023-07-26 NOTE — CASE MANAGEMENT/SOCIAL WORK
Continued Stay Note  [facility]     Patient Name: Marilyn Kunz  MRN: [id number]  Today's Date: 7/26/2023    Admit Date: 7/20/2023    Plan: Home   Discharge Plan       Row Name 07/26/23 1121       Plan    Plan Home    Plan Comments I will notify Joey of discharge.    Final Discharge Disposition Code 06 - home with home health care                   Discharge Codes    No documentation.                 Expected Discharge Date and Time       Expected Discharge Date Expected Discharge Time    Jul 26, 2023               Sofiya Chávez RN

## 2023-07-26 NOTE — PROGRESS NOTES
"[facility] Progress Note     LOS: 3 days   Patient Care Team:  Peter Baca MD as PCP - General (Family Medicine)  Eun Eagle MD as Consulting Physician (Rheumatology)  Yao Maya MD as Consulting Physician (Cardiology)  Lucas Haley DO as Consulting Physician (Pulmonary Disease)  PCP:  Peter Baca MD    Chief Complaint: Follow-up hypertension, diastolic heart failure    Subjective: Patient reports feeling well this morning.  Feels agreeable to discharge home.  Blood pressure at this morning limits.  No signs of volume overload      Review of Systems:   All systems have been reviewed and are negative with the exception of those mentioned above.      Objective:    Vital Sign Min/Max for last 24 hours  Temp  Min: 97.4 °F (36.3 °C)  Max: 98.5 °F (36.9 °C)   BP  Min: 113/76  Max: 174/67   Pulse  Min: 50  Max: 68   Resp  Min: 16  Max: 18   SpO2  Min: 93 %  Max: 96 %   No data recorded   Weight  Min: 60.3 kg (133 lb)  Max: 60.3 kg (133 lb)     Flowsheet Rows      Flowsheet Row First Filed Value   Admission Height 149.9 cm (59\") Documented at 07/20/2023 1048   Admission Weight 59 kg (130 lb) Documented at 07/20/2023 1048            Telemetry: Sinus bradycardia      Intake/Output Summary (Last 24 hours) at 7/26/2023 0924  Last data filed at 7/26/2023 0500  Gross per 24 hour   Intake 400 ml   Output --   Net 400 ml     Intake & Output (last 3 days)         07/23 0701 07/24 0700 07/24 0701  07/25 0700 07/25 0701  07/26 0700 07/26 0701  07/27 0700    P.O.  630 400     IV Piggyback 250       Total Intake(mL/kg) 250 (4.1) 630 (10.4) 400 (6.6)     Net +250 +630 +400             Urine Unmeasured Occurrence 3 x 8 x 9 x     Stool Unmeasured Occurrence 2 x  1 x              Physical Exam:  Constitutional:       Appearance: Not in distress.   Pulmonary:      Effort: Pulmonary effort is normal.      Breath sounds: No " rales.   Cardiovascular:      Bradycardia present. Regular rhythm.      Murmurs: There is no murmur.   Edema:     Peripheral edema absent.        LABS/DIAGNOSTIC DATA:  Results from last 7 days   Lab Units 07/24/23  0529 07/23/23  0404 07/21/23  0421   WBC 10*3/mm3 5.45 6.29 5.84   HEMOGLOBIN g/dL 10.5* 11.5* 9.2*   HEMATOCRIT % 33.4* 36.0 29.7*   PLATELETS 10*3/mm3 245 261 188     Lab Results   Lab Value Date/Time    TROPONINT 14 (H) 07/20/2023 1402    TROPONINT 13 (H) 07/20/2023 1157    TROPONINT 19 (H) 03/02/2023 1539    TROPONINT 14 (H) 02/25/2023 0419    TROPONINT 18 (H) 02/24/2023 2054    TROPONINT 14 (H) 02/24/2023 1926    TROPONINT 14 (H) 02/24/2023 1050    TROPONINT <0.010 09/26/2022 1127    TROPONINT <0.010 05/05/2022 0204    TROPONINT <0.010 05/04/2022 2049    TROPONINT <0.010 10/16/2020 1438    TROPONINT <0.010 10/16/2020 1232    TROPONINT <0.010 10/14/2020 2044    TROPONINT <0.010 10/14/2020 1855         Results from last 7 days   Lab Units 07/26/23  0505 07/25/23  0602 07/24/23  0529 07/21/23  0421 07/20/23  1157   SODIUM mmol/L 145 146* 143   < > 139   POTASSIUM mmol/L 4.5 4.2 4.0   < > 4.5   CHLORIDE mmol/L 110* 108* 103   < > 104   CO2 mmol/L 25.0 27.0 26.0   < > 22.0   BUN mg/dL 34* 40* 52*   < > 26*   CREATININE mg/dL 0.92 0.90 1.54*   < > 1.09*   CALCIUM mg/dL 9.2 9.0 8.7   < > 8.8   BILIRUBIN mg/dL  --   --   --   --  1.3*   ALK PHOS U/L  --   --   --   --  65   ALT (SGPT) U/L  --   --   --   --  16   AST (SGOT) U/L  --   --   --   --  21   GLUCOSE mg/dL 307* 126* 88   < > 245*    < > = values in this interval not displayed.     Results from last 7 days   Lab Units 07/20/23  1157   HEMOGLOBIN A1C % 8.00*         Results from last 7 days   Lab Units 07/20/23  1402   TSH uIU/mL 3.590           Medication Review:   aspirin, 81 mg, Oral, Daily  azaTHIOprine, 100 mg, Oral, BID  budesonide-formoterol, 2 puff, Inhalation, BID - RT  cetirizine, 10 mg, Oral, Daily  enoxaparin, 40 mg, Subcutaneous,  Q24H  fluticasone, 2 spray, Each Nare, Daily  gabapentin, 300 mg, Oral, Nightly  insulin detemir, 12 Units, Subcutaneous, Nightly  insulin lispro, 2-9 Units, Subcutaneous, 4x Daily AC & at Bedtime  Insulin Lispro, 5 Units, Subcutaneous, TID With Meals  levothyroxine, 75 mcg, Oral, Daily  pantoprazole, 40 mg, Oral, Q AM  pharmacy consult - [place], , Does not apply, Daily  pravastatin, 20 mg, Oral, Nightly  sacubitril-valsartan, 1 tablet, Oral, Q12H  senna-docusate sodium, 2 tablet, Oral, BID  sodium chloride, 10 mL, Intravenous, Q12H               Acute on chronic diastolic congestive heart failure    Essential hypertension    Factor V Leiden    Hypothyroidism    CKD (chronic kidney disease) stage 3, GFR 30-59 ml/min    Hyperlipidemia LDL goal <70    Demyelinating disease    Type 1 diabetes mellitus    CHF (congestive heart failure)      Assessment/Plan:  1.  HTN  -Negative renal artery duplex August 2022  -Plan discharge home on Entresto 24/26 mg twice daily  -Diastolic discontinued due to bradycardia  -Remain off of hydralazine and terazosin at this time  -We will schedule follow-up in heart valve clinic in 1 week reassessment of blood pressure        2.  Type I DM  -Management per primary team     3.  Prior cerebellar infarct, factor V Leiden  -MRI this admission with no acute changes, age-related changes present.  -Less than 50% carotid stenosis bilaterally     4.  Factor V leiden  -heterozygous  -No known history of DVT     5.  Diastolic heart failure  -We will plan on Bumex 0.5 mg every other day at discharge for volume A-fib  -EF 61 to 65% February 2023        6.  ELMER  -Resolved, creatinine on day of discharge 0.92.    Patient stable for discharge from cardiology standpoint.  Will discharge home on Entresto and Bumex.  Hold other antihypertensives.  Schedule heart valve clinic appointment in 1 week and follow-up with me in 1 month.  Discussed with Dr. Long and patient          Yao Maya MD  [facility]  07/26/23

## 2023-07-26 NOTE — PLAN OF CARE
Problem: Adult Inpatient Plan of Care  Goal: Plan of Care Review  Outcome: Adequate for Care Transition  Goal: Patient-Specific Goal (Individualized)  Outcome: Adequate for Care Transition  Goal: Absence of Hospital-Acquired Illness or Injury  Outcome: Adequate for Care Transition  Intervention: Identify and Manage Fall Risk  Description: Perform standard risk assessment on admission using a validated tool or comprehensive approach appropriate to the patient; reassess fall risk frequently, with change in status or transfer to another level of care.  Communicate fall injury risk to interprofessional healthcare team.  Determine need for increased observation, equipment and environmental modification, such as low bed, signage and supportive, nonskid footwear.  Adjust safety measures to individual developmental age, stage and identified risk factors.  Reinforce the importance of safety and physical activity with patient and family.  Perform regular intentional rounding to assess need for position change, pain assessment and personal needs, including assistance with toileting.  Recent Flowsheet Documentation  Taken 7/26/2023 1015 by Sondra Tavares RN  Safety Promotion/Fall Prevention:   activity supervised   safety round/check completed  Taken 7/26/2023 0763 by Sondra Tavares RN  Safety Promotion/Fall Prevention:   activity supervised   safety round/check completed  Intervention: Prevent Skin Injury  Description: Perform a screening for skin injury risk, such as pressure or moisture associated skin damage on admission and at regular intervals throughout hospital stay.  Keep all areas of skin (especially folds) clean and dry.  Maintain adequate skin hydration.  Relieve and redistribute pressure and protect bony prominences; implement measures based on patient-specific risk factors.  Match turning and repositioning schedule to clinical condition.  Encourage weight shift frequently; assist with reposition if unable  to complete independently.  Float heels off bed; avoid pressure on the Achilles tendon.  Keep skin free from extended contact with medical devices.  Encourage functional activity and mobility, as early as tolerated.  Use aids (e.g., slide boards, mechanical lift) during transfer.  Recent Flowsheet Documentation  Taken 7/26/2023 1015 by Sondra Tavares RN  Body Position: position changed independently  Skin Protection: adhesive use limited  Taken 7/26/2023 0727 by Sondar Tavares RN  Body Position: position changed independently  Intervention: Prevent and Manage VTE (Venous Thromboembolism) Risk  Description: Assess for VTE (venous thromboembolism) risk.  Encourage and assist with early ambulation.  Initiate and maintain compression or other therapy, as indicated, based on identified risk in accordance with organizational protocol and provider order.  Encourage both active and passive leg exercises while in bed, if unable to ambulate.  Recent Flowsheet Documentation  Taken 7/26/2023 1015 by Sondra Tavares RN  Activity Management: activity encouraged  Taken 7/26/2023 0727 by Sondra Tavares RN  Activity Management: activity encouraged  Intervention: Prevent Infection  Description: Maintain skin and mucous membrane integrity; promote hand, oral and pulmonary hygiene.  Optimize fluid balance, nutrition, sleep and glycemic control to maximize infection resistance.  Identify potential sources of infection early to prevent or mitigate progression of infection (e.g., wound, lines, devices).  Evaluate ongoing need for invasive devices; remove promptly when no longer indicated.  Recent Flowsheet Documentation  Taken 7/26/2023 1015 by Sondra Tavares RN  Infection Prevention: environmental surveillance performed  Taken 7/26/2023 0727 by Sondra Tavares RN  Infection Prevention: environmental surveillance performed  Goal: Optimal Comfort and Wellbeing  Outcome: Adequate for Care Transition  Goal: Readiness for  Transition of Care  Outcome: Adequate for Care Transition   Goal Outcome Evaluation:   Pt A&Ox4, RA, VSS, tele d/c'd. No c/o pain. D/c teaching complete, pt in agreement with plan. Brother to transport home and has been notified. Tele box removed, cleaned and returned to [facility]. Pt taken to [address] lobby by this nurse and assisted into vehicle.

## 2023-07-27 NOTE — OUTREACH NOTE
Prep Survey      Flowsheet Row Responses   [facility] facility patient discharged from? [facility]   Is LACE score < 7 ? No   Eligibility Readm Mgmt   Discharge diagnosis Acute on chronic diastolic congestive heart failure   Does the patient have one of the following disease processes/diagnoses(primary or secondary)? CHF   Does the patient have Home health ordered? Yes   What is the Home health agency?  [facility]   Is there a DME ordered? No   Prep survey completed? Yes            Heather PETTY - Registered Nurse

## 2023-07-28 ENCOUNTER — OFFICE VISIT (OUTPATIENT)
Dept: NEUROLOGY | Facility: CLINIC | Age: 78
End: 2023-07-28
Payer: MEDICARE

## 2023-07-28 VITALS
WEIGHT: 124 LBS | OXYGEN SATURATION: 97 % | DIASTOLIC BLOOD PRESSURE: 62 MMHG | SYSTOLIC BLOOD PRESSURE: 170 MMHG | HEART RATE: 64 BPM | BODY MASS INDEX: 25 KG/M2 | HEIGHT: 59 IN

## 2023-07-28 DIAGNOSIS — G37.9 DEMYELINATING CHANGES IN BRAIN: Primary | ICD-10-CM

## 2023-07-28 DIAGNOSIS — R20.2 PARESTHESIA: ICD-10-CM

## 2023-07-28 DIAGNOSIS — N88.9 CERVICAL LESION: ICD-10-CM

## 2023-07-28 PROCEDURE — 3077F SYST BP >= 140 MM HG: CPT | Performed by: NURSE PRACTITIONER

## 2023-07-28 PROCEDURE — 99214 OFFICE O/P EST MOD 30 MIN: CPT | Performed by: NURSE PRACTITIONER

## 2023-07-28 PROCEDURE — 3078F DIAST BP <80 MM HG: CPT | Performed by: NURSE PRACTITIONER

## 2023-07-28 PROCEDURE — 1159F MED LIST DOCD IN RCRD: CPT | Performed by: NURSE PRACTITIONER

## 2023-07-28 PROCEDURE — 1160F RVW MEDS BY RX/DR IN RCRD: CPT | Performed by: NURSE PRACTITIONER

## 2023-07-28 NOTE — LETTER
2023     Peter Baca MD  [address]  [address]  [facility] [address]    Patient: Marilyn Kunz   YOB: 1945   Date of Visit: 2023     Dear Peter Baca MD:       Thank you for referring Marilyn Kunz to me for evaluation. Below are the relevant portions of my assessment and plan of care.    If you have questions, please do not hesitate to call me. I look forward to following Marilyn along with you.         Sincerely,        Bryson Murrell DNP, APRSARAVANAN        CC: No Recipients    Bryson Murrell DNP, APRN  23 1622  Signed     Neuro Office Visit      Encounter Date: 2023   Patient Name: Marilyn Kunz  : 1945   MRN: [id number]   PCP: Dr Eder Baca  Chief Complaint:    Chief Complaint   Patient presents with   • Hospital Follow Up Visit       History of Present Illness: Marilyn Kunz is a 78 y.o. female who is here today in Neurology for  hospital follow up, demyelinating disease.      Last visit 2023 w me-cont Imuran, GBP, repeat mri brain and c-spine 2024    Pt admitted to [facility]  for  profound fatigue, VIDAL. She stopped Bumex due to worsening CKD and was increasing bp med for hypertension. Found to have  CHF, pulm edema w bilat effusions. Was diuresed.  Seen by Dr Quiroga who ordered MRI brain. Pt was complaining of numbness in her hands with itching.Dr Quiroga recommended out pt EMG.  MRI Brain With & Without Contrast (2023 05:13)-stable, no acute findings.    Appt with PCP next week.  Appt with Cardiology, Pulm and Endo-scheduled.    Pt is doing well since. DC. No sob or edema. She is weighing daily. BP is running a little high 150-170 SBP.  She has home PT who saw her today. She is walking w walker and is doing quite well.      Paresthesia  Pt with itching and numbness of bilat hands up to biceps for more than 3 years.  While hospitalized it was suggested she have EMG. She has a  "known cervical cord lesion C6-7  Taking Imuran.    Demyelinating Disease spinal cord lesion  Cymbalta 30mg stopped due to hyponatremia. GBP caused confused so she stopped GBP, and symptoms resolved   Taking  bid w slight improvement. Itching intensity decreased. Pain in arms and back is intermittent and improved. Fatigue is improved.  Started Imuran 8/23/22, cont cosentyx  Denies slurred speech. Has some pharyngitis. Had EGD while hospitalized and has vocal cord abnormality. Has follow up scheduled. Denies vision changes. No falls. Gait is steady. Not using assistive devices.     PH  3 years of hands burning and itching, numbness, tremor is int. Weakness in hands is stable, but not worse. Still taking Cosyntex once a month.     Admitted at [facility]5/4-5/6/22 for right hand pain and weakness.  Acute worsening of right hand and arm pain w worsening rash and swelling of BLE. Rash present since knee replacement Oct 2021.  CTH-neg  CTA H/N-neg  CT perfuison-neg  MRI B/C 7/30/22 extensive T2 PVWM, C5/6 cord lesion     CSF-0OCB, 2 paired bands in csf prot 42.1     Labs cmp, TSH, T4, crp, mag, vit b12, JESSICA, cryoglobulin, CBC, IgM, IgA, IgG, NMO, MOG, ACE-neg  A1c 6.9, Factor V Leiden-heterozygous, sed rate 37    Started Imuran 8/23/22, cont cosentyx.  Treated w  bid w SE of itching but improved fatigue     MRI Cervical Spine With & Without Contrast (01/19/2023 14:50)-stable C6-7 cord lesion  MRI Brain With & Without Contrast (01/19/2023 14:48)-Heavy WM disease. No new lesions.      Alteration of awareness  No further episodes of alteration of awareness.     EEG (Hospital Performed) (02/10/2023 10:47)-neg  PH  Jan 16, 2023. Was in bed at 5:30 am had a strange sensation and then called out \"Oh God help me\". Denies LOC but lost time and became aware that she knocked over a lamp and injured her right arm and left finger. Has not had this sensation in the past. Did not go to ER. She doesn't remember falling but believes " she did.    Headaches  Headaches are resolved.  Right occipital and temporal area with int throbbing. Symptoms are daily for 2 weeks. Duration is 30 minutes. Intermittent. Treated with tylenol at night.   No nausea, vomiting, photophobia, phonophobia, dizziness. Severity is 6/10     History of stroke  Taking Asa 81 and pravastatin 20mg.  Neg CTA, echo nml, neg Venous doppler. Holter w no a-fib       Psorriatic Arthritis.   Took Methotrexate for 17 years, Enbrel 2091, and Cosyntex.  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 Sister with multiple autoimmune diseases.  SH: -etoh/tob use. Lives alone.  Subjective      Past Medical History:   Past Medical History:   Diagnosis Date   • Abnormal ECG Check [facility] Date or [facility]    Brought to [facility] from [facility]   • Acute sinusitis 02/06/2023   • Anemia    • Asthma    • CAP (community acquired pneumonia) 02/06/2023   • CHF (congestive heart failure) 7/20/2023   • Chronic kidney disease     pt told it was dx from Dr Baca and to not eat much protein   • Congenital heart disease 1950's Patent 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    Surgery [facility] 1950s close   • CTS (carpal tunnel syndrome)     Psoriatic arthritis hands could be   • Deep vein thrombosis No on blood clots but have a blood clotting disorder called Leiden Factor 5   • Disease of thyroid gland    • Diverticulitis of colon    • Factor 5 Leiden mutation, heterozygous 2012   • Head injury    • Hyperlipidemia    • Hypertension    • Movement disorder 10/2021    knee replacement left knww   • Murmur, cardiac    • Neuropathy in diabetes     redness swelling legs   • Peripheral neuropathy 05/03/2022    hands, arms. shoulders, back   • Pill  esophagitis 02/06/2023   • Psoriatic arthritis     hands   • Sleep apnea Always    diabetic do not sleep well up and down   • Stroke had one don't know when    showed on MRI Brain   • Type 1 diabetes        Past Surgical History:   Past Surgical History:   Procedure Laterality Date   • CARDIAC CATHETERIZATION  1952 2 of them    Patent Dictus operation   • CATARACT EXTRACTION Bilateral    • COLONOSCOPY     • ENDOSCOPY N/A 10/20/2020    Procedure: ESOPHAGOGASTRODUODENOSCOPY;  Surgeon: Brunner, Mark I, MD;  [facility] ENDOSCOPY;  Service: Gastroenterology;  Laterality: N/A;   • HAND SURGERY Left 1987    no hardware   • PATENT DUCTUS ARTERIOUS LIGATION         Family History:   Family History   Problem Relation Age of Onset   • Cancer Mother    • Pancreatic cancer Mother    • Stroke Mother         Mini strokes   • Cancer Father    • Lung cancer Father    • Cancer Sister    • Colon cancer Sister    • Diabetes Sister    • Breast cancer Neg Hx    • Ovarian cancer Neg Hx        Social History:   Social History     Socioeconomic History   • Marital status: Single   Tobacco Use   • Smoking status: Never   • Smokeless tobacco: Never   Vaping Use   • Vaping Use: Never used   Substance and Sexual Activity   • Alcohol use: No   • Drug use: No   • Sexual activity: Not Currently     Partners: Male     Birth control/protection: Abstinence       Medications:     Current Outpatient Medications:   •  aspirin 81 MG EC tablet, Take 1 tablet by mouth Daily., Disp: 30 tablet, Rfl: 0  •  azaTHIOprine (IMURAN) 50 MG tablet, TAKE 2 TABLETS TWICE DAILY, Disp: 120 tablet, Rfl: 5  •  bumetanide (BUMEX) 0.5 MG tablet, Take 1 tablet by mouth Every Other Day., Disp: , Rfl:   •  fluticasone (FLONASE) 50 MCG/ACT nasal spray, 2 sprays by Each Nare route Daily. Shake well before using., Disp: , Rfl:   •  levocetirizine (XYZAL) 5 MG tablet, Take 1 tablet by mouth Every Evening., Disp: , Rfl:   •  levothyroxine (SYNTHROID, LEVOTHROID) 75 MCG  tablet, Take 1 tablet by mouth Daily., Disp: , Rfl:   •  pravastatin (Pravachol) 20 MG tablet, Take 1 tablet by mouth Every Evening., Disp: 90 tablet, Rfl: 3  •  sacubitril-valsartan (ENTRESTO) 24-26 MG tablet, Take 1 tablet by mouth Every 12 (Twelve) Hours for 30 days., Disp: 60 tablet, Rfl: 0  •  Secukinumab (Cosentyx) 150 MG/ML solution prefilled syringe, Inject 0.5 mL under the skin into the appropriate area as directed Every 28 (Twenty-Eight) Days. 1/2 dose monthly, Disp: , Rfl:   •  Tresiba FlexTouch 100 UNIT/ML solution pen-injector injection, Inject 19 Units under the skin into the appropriate area as directed Every Night. (Patient taking differently: Patient takes per sliding scale), Disp: , Rfl:     Allergies:   Allergies   Allergen Reactions   • Atorvastatin Other (See Comments)   • Colesevelam Other (See Comments)   • Cymbalta [Duloxetine Hcl] Other (See Comments)     Hyponatremia   • Cephalexin Rash     Tolerated Ceftriaxone   • Hygroton [Chlorthalidone] Rash     Facial rash   • Penicillins Nausea And Vomiting and Rash     Has tolerated ceftriaxone       PHQ-9 Total Score:     [facility], and patient is at MODERATE risk for falls. Assessment completed on:7/28/2023    Objective     Physical Exam:   Physical Exam  Neurological:      Mental Status: She is oriented to person, place, and time.      Gait: Gait is intact.      Deep Tendon Reflexes:      Reflex Scores:       Tricep reflexes are 2+ on the right side and 2+ on the left side.       Bicep reflexes are 2+ on the right side and 2+ on the left side.       Brachioradialis reflexes are 2+ on the right side and 2+ on the left side.       Patellar reflexes are 1+ on the right side and 1+ on the left side.       Achilles reflexes are 2+ on the right side and 2+ on the left side.  Psychiatric:         Speech: Speech normal.       Neurologic Exam     Mental Status   Oriented to person, place, and time.   Follows 3 step  "commands.   Attention: normal. Concentration: normal.   Speech: speech is normal   Level of consciousness: alert  Knowledge: consistent with education.   Normal comprehension.     Cranial Nerves     CN III, IV, VI   Right pupil: Accommodation: intact.   Left pupil: Accommodation: intact.   CN III: no CN III palsy  CN VI: no CN VI palsy  Nystagmus: none   Diplopia: none  Upgaze: normal  Downgaze: normal  Conjugate gaze: present    CN VII   Facial expression full, symmetric.     CN VIII   Hearing: intact    CN XII   CN XII normal.     Motor Exam   Muscle bulk: normal  Overall muscle tone: normal    Strength   Right biceps: 3/5  Left biceps: 3/5  Right triceps: 3/5  Left triceps: 3/5  Right interossei: 2/5  Left interossei: 2/5  Right quadriceps: 3/5  Left quadriceps: 3/5  Right anterior tibial: 3/5  Left anterior tibial: 3/5  Right posterior tibial: 3/5  Left posterior tibial: 3/5    Sensory Exam   Light touch normal.     Gait, Coordination, and Reflexes     Gait  Gait: normal    Tremor   Resting tremor: absent  Action tremor: absent    Reflexes   Right brachioradialis: 2+  Left brachioradialis: 2+  Right biceps: 2+  Left biceps: 2+  Right triceps: 2+  Left triceps: 2+  Right patellar: 1+  Left patellar: 1+  Right achilles: 2+  Left achilles: 2+  Right : 2+  Left : 2+Gait steady w walker.      Vital Signs:   Vitals:    07/28/23 1525   BP: 170/62   Pulse: 64   SpO2: 97%   Weight: 56.2 kg (124 lb)   Height: 150 cm (59.06\")     Body mass index is 25 kg/m².     Results:   Imaging:   CT Head Without Contrast    Result Date: 7/20/2023  Impression: 1. Atrophy, but no acute intracranial pathology. Electronically Signed: Robert Grove  7/20/2023 11:39 AM EDT  Workstation ID: [id number]    MRI Brain With & Without Contrast    Result Date: 7/22/2023  Impression: Advanced age-related changes are noted as above. There is otherwise no evidence of acute infarct, hemorrhage, mass or abnormal enhancement. Electronically Signed: " Vick Grimaldo  7/22/2023 7:25 PM EDT  Workstation ID: [id number]    XR Chest 1 View    Result Date: 7/20/2023  Impression: Patchy left lower lobe infiltrate remaining, overall improved from prior. Electronically Signed: Slime Whitlock MD  7/20/2023 11:34 AM EDT  Workstation ID: [id number]    CT Angiogram Chest    Result Date: 7/20/2023  Impression: Mildly worsened pulmonary edema with small to moderate bilateral pleural effusions. No acute pulmonary embolus. Electronically Signed: Vick Grimaldo  7/20/2023 5:35 PM EDT  Workstation ID: [id number]         Assessment / Plan      Assessment/Plan:   Diagnoses and all orders for this visit:    1. Demyelinating changes in brain (Primary)  Comments:  Cont imuran  Orders:  -     EMG & Nerve Conduction Test; Future  -     MRI Cervical Spine With & Without Contrast; Future    2. Paresthesia  Comments:  Likely due to cervical lesion. Pt desires EMG.  Orders:  -     EMG & Nerve Conduction Test; Future  -     MRI Cervical Spine With & Without Contrast; Future    3. Cervical lesion  Comments:  Cont Imuran  Orders:  -     MRI Cervical Spine With & Without Contrast; Future           Patient Education:     Reviewed medications, potential side effects and signs and symptoms to report. Discussed risk versus benefits of treatment plan with patient and/or family-including medications, labs and radiology that may be ordered. Addressed questions and concerns during visit. Patient and/or family verbalized understanding and agree with plan. Instructed to call the office with any questions and report to ER with any life-threatening symptoms.     Follow Up:   Return in about 6 months (around 1/28/2024) for Recheck.    During this visit the following were done:  Labs Reviewed [x]    Labs Ordered []    Radiology Reports Reviewed [x]    Radiology Ordered []    PCP Records Reviewed []    Referring Provider Records Reviewed []    ER Records Reviewed []    Hospital Records Reviewed [x]    History Obtained  From Family []    Radiology Images Reviewed []    Other Reviewed [x]    Records Requested []      Bryson Murrell, DNP, APRN

## 2023-07-28 NOTE — PROGRESS NOTES
Neuro Office Visit      Encounter Date: 2023   Patient Name: Marilyn Kunz  : 1945   MRN: [id number]   PCP: Dr Eder Baca  Chief Complaint:    Chief Complaint   Patient presents with    Hospital Follow Up Visit       History of Present Illness: Marilyn Kunz is a 78 y.o. female who is here today in Neurology for  hospital follow up, demyelinating disease.      Last visit 2023 w me-cont Imuran, GBP, repeat mri brain and c-spine 2024    Pt admitted to [facility]  for  profound fatigue, VIDAL. She stopped Bumex due to worsening CKD and was increasing bp med for hypertension. Found to have  CHF, pulm edema w bilat effusions. Was diuresed.  Seen by Dr Quiroga who ordered MRI brain. Pt was complaining of numbness in her hands with itching.Dr Quiroga recommended out pt EMG.  MRI Brain With & Without Contrast (2023 05:13)-stable, no acute findings.    Appt with PCP next week.  Appt with Cardiology, Pulm and Endo-scheduled.    Pt is doing well since. DC. No sob or edema. She is weighing daily. BP is running a little high 150-170 SBP.  She has home PT who saw her today. She is walking w walker and is doing quite well.      Paresthesia  Pt with itching and numbness of bilat hands up to biceps for more than 3 years.  While hospitalized it was suggested she have EMG. She has a known cervical cord lesion C6-7  Taking Imuran.    Demyelinating Disease spinal cord lesion  Cymbalta 30mg stopped due to hyponatremia. GBP caused confused so she stopped GBP, and symptoms resolved   Taking  bid w slight improvement. Itching intensity decreased. Pain in arms and back is intermittent and improved. Fatigue is improved.  Started Imuran 22, cont cosentyx  Denies slurred speech. Has some pharyngitis. Had EGD while hospitalized and has vocal cord abnormality. Has follow up scheduled. Denies vision changes. No falls. Gait is steady. Not using assistive devices.     PH  3 years of  "hands burning and itching, numbness, tremor is int. Weakness in hands is stable, but not worse. Still taking Cosyntex once a month.     Admitted at [facility] for right hand pain and weakness.  Acute worsening of right hand and arm pain w worsening rash and swelling of BLE. Rash present since knee replacement Oct 2021.  CTH-neg  CTA H/N-neg  CT perfuison-neg  MRI B/C 7/30/22 extensive T2 PVWM, C5/6 cord lesion     CSF-0OCB, 2 paired bands in csf prot 42.1     Labs cmp, TSH, T4, crp, mag, vit b12, JESSICA, cryoglobulin, CBC, IgM, IgA, IgG, NMO, MOG, ACE-neg  A1c 6.9, Factor V Leiden-heterozygous, sed rate 37    Started Imuran 8/23/22, cont cosentyx.  Treated w  bid w SE of itching but improved fatigue     MRI Cervical Spine With & Without Contrast (01/19/2023 14:50)-stable C6-7 cord lesion  MRI Brain With & Without Contrast (01/19/2023 14:48)-Heavy WM disease. No new lesions.      Alteration of awareness  No further episodes of alteration of awareness.     EEG (Hospital Performed) (02/10/2023 10:47)-neg  PH  Jan 16, 2023. Was in bed at 5:30 am had a strange sensation and then called out \"Oh God help me\". Denies LOC but lost time and became aware that she knocked over a lamp and injured her right arm and left finger. Has not had this sensation in the past. Did not go to ER. She doesn't remember falling but believes she did.    Headaches  Headaches are resolved.  Right occipital and temporal area with int throbbing. Symptoms are daily for 2 weeks. Duration is 30 minutes. Intermittent. Treated with tylenol at night.   No nausea, vomiting, photophobia, phonophobia, dizziness. Severity is 6/10     History of stroke  Taking Asa 81 and pravastatin 20mg.  Neg CTA, echo nml, neg Venous doppler. Holter w no a-fib       Psorriatic Arthritis.   Took Methotrexate for 17 years, Enbrel 2091, and Cosyntex.  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 Sister with multiple autoimmune diseases.  SH: -etoh/tob use. Lives alone.  Subjective      Past Medical History:   Past Medical History:   Diagnosis Date    Abnormal ECG Check [facility] Date or [facility] Records    Brought to  from [facility]    Acute sinusitis 02/06/2023    Anemia     Asthma     CAP (community acquired pneumonia) 02/06/2023    CHF (congestive heart failure) 7/20/2023    Chronic kidney disease     pt told it was dx from Dr Baca and to not eat much protein    Congenital heart disease 1950's Patent 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    Surgery [facility] 1950s close    CTS (carpal tunnel syndrome)     Psoriatic arthritis hands could be    Deep vein thrombosis No on blood clots but have a blood clotting disorder called Leiden Factor 5    Disease of thyroid gland     Diverticulitis of colon     Factor 5 Leiden mutation, heterozygous 2012    Head injury     Hyperlipidemia     Hypertension     Movement disorder 10/2021    knee replacement left [facility]    Murmur, cardiac     Neuropathy in diabetes     redness swelling legs    Peripheral neuropathy 05/03/2022    hands, arms. shoulders, back    Pill esophagitis 02/06/2023    Psoriatic arthritis     hands    Sleep apnea Always    diabetic do not sleep well up and down    Stroke had one don't know when    showed on MRI Brain    Type 1 diabetes        Past Surgical History:   Past Surgical History:   Procedure Laterality Date    CARDIAC CATHETERIZATION  1952 2 of them    Patent Dictus operation    CATARACT EXTRACTION Bilateral     COLONOSCOPY      ENDOSCOPY N/A 10/20/2020    Procedure: ESOPHAGOGASTRODUODENOSCOPY;  Surgeon: Brunner, Mark I, MD;  Location: [facility] ENDOSCOPY;  Service: Gastroenterology;  Laterality: N/A;    HAND SURGERY Left 1987    no hardware    PATENT DUCTUS  ARTERIOUS LIGATION         Family History:   Family History   Problem Relation Age of Onset    Cancer Mother     Pancreatic cancer Mother     Stroke Mother         Mini strokes    Cancer Father     Lung cancer Father     Cancer Sister     Colon cancer Sister     Diabetes Sister     Breast cancer Neg Hx     Ovarian cancer Neg Hx        Social History:   Social History     Socioeconomic History    Marital status: Single   Tobacco Use    Smoking status: Never    Smokeless tobacco: Never   Vaping Use    Vaping Use: Never used   Substance and Sexual Activity    Alcohol use: No    Drug use: No    Sexual activity: Not Currently     Partners: Male     Birth control/protection: Abstinence       Medications:     Current Outpatient Medications:     aspirin 81 MG EC tablet, Take 1 tablet by mouth Daily., Disp: 30 tablet, Rfl: 0    azaTHIOprine (IMURAN) 50 MG tablet, TAKE 2 TABLETS TWICE DAILY, Disp: 120 tablet, Rfl: 5    bumetanide (BUMEX) 0.5 MG tablet, Take 1 tablet by mouth Every Other Day., Disp: , Rfl:     fluticasone (FLONASE) 50 MCG/ACT nasal spray, 2 sprays by Each Nare route Daily. Shake well before using., Disp: , Rfl:     levocetirizine (XYZAL) 5 MG tablet, Take 1 tablet by mouth Every Evening., Disp: , Rfl:     levothyroxine (SYNTHROID, LEVOTHROID) 75 MCG tablet, Take 1 tablet by mouth Daily., Disp: , Rfl:     pravastatin (Pravachol) 20 MG tablet, Take 1 tablet by mouth Every Evening., Disp: 90 tablet, Rfl: 3    sacubitril-valsartan (ENTRESTO) 24-26 MG tablet, Take 1 tablet by mouth Every 12 (Twelve) Hours for 30 days., Disp: 60 tablet, Rfl: 0    Secukinumab (Cosentyx) 150 MG/ML solution prefilled syringe, Inject 0.5 mL under the skin into the appropriate area as directed Every 28 (Twenty-Eight) Days. 1/2 dose monthly, Disp: , Rfl:     Tresiba FlexTouch 100 UNIT/ML solution pen-injector injection, Inject 19 Units under the skin into the appropriate area as directed Every Night. (Patient taking differently: Patient  takes per sliding scale), Disp: , Rfl:     Allergies:   Allergies   Allergen Reactions    Atorvastatin Other (See Comments)    Colesevelam Other (See Comments)    Cymbalta [Duloxetine Hcl] Other (See Comments)     Hyponatremia    Cephalexin Rash     Tolerated Ceftriaxone    Hygroton [Chlorthalidone] Rash     Facial rash    Penicillins Nausea And Vomiting and Rash     Has tolerated ceftriaxone       PHQ-9 Total Score:     STEADI Fall Risk Assessment was completed, and patient is at MODERATE risk for falls. Assessment completed on:7/28/2023    Objective     Physical Exam:   Physical Exam  Neurological:      Mental Status: She is oriented to person, place, and time.      Gait: Gait is intact.      Deep Tendon Reflexes:      Reflex Scores:       Tricep reflexes are 2+ on the right side and 2+ on the left side.       Bicep reflexes are 2+ on the right side and 2+ on the left side.       Brachioradialis reflexes are 2+ on the right side and 2+ on the left side.       Patellar reflexes are 1+ on the right side and 1+ on the left side.       Achilles reflexes are 2+ on the right side and 2+ on the left side.  Psychiatric:         Speech: Speech normal.       Neurologic Exam     Mental Status   Oriented to person, place, and time.   Follows 3 step commands.   Attention: normal. Concentration: normal.   Speech: speech is normal   Level of consciousness: alert  Knowledge: consistent with education.   Normal comprehension.     Cranial Nerves     CN III, IV, VI   Right pupil: Accommodation: intact.   Left pupil: Accommodation: intact.   CN III: no CN III palsy  CN VI: no CN VI palsy  Nystagmus: none   Diplopia: none  Upgaze: normal  Downgaze: normal  Conjugate gaze: present    CN VII   Facial expression full, symmetric.     CN VIII   Hearing: intact    CN XII   CN XII normal.     Motor Exam   Muscle bulk: normal  Overall muscle tone: normal    Strength   Right biceps: 3/5  Left biceps: 3/5  Right triceps: 3/5  Left triceps:  "3/5  Right interossei: 2/5  Left interossei: 2/5  Right quadriceps: 3/5  Left quadriceps: 3/5  Right anterior tibial: 3/5  Left anterior tibial: 3/5  Right posterior tibial: 3/5  Left posterior tibial: 3/5    Sensory Exam   Light touch normal.     Gait, Coordination, and Reflexes     Gait  Gait: normal    Tremor   Resting tremor: absent  Action tremor: absent    Reflexes   Right brachioradialis: 2+  Left brachioradialis: 2+  Right biceps: 2+  Left biceps: 2+  Right triceps: 2+  Left triceps: 2+  Right patellar: 1+  Left patellar: 1+  Right achilles: 2+  Left achilles: 2+  Right : 2+  Left : 2+Gait steady w walker.      Vital Signs:   Vitals:    07/28/23 1525   BP: 170/62   Pulse: 64   SpO2: 97%   Weight: 56.2 kg (124 lb)   Height: 150 cm (59.06\")     Body mass index is 25 kg/m².     Results:   Imaging:   CT Head Without Contrast    Result Date: 7/20/2023  Impression: 1. Atrophy, but no acute intracranial pathology. Electronically Signed: Robert Grove  7/20/2023 11:39 AM EDT  Workstation ID: [id number]    MRI Brain With & Without Contrast    Result Date: 7/22/2023  Impression: Advanced age-related changes are noted as above. There is otherwise no evidence of acute infarct, hemorrhage, mass or abnormal enhancement. Electronically Signed: Vick Grimaldo  7/22/2023 7:25 PM EDT  Workstation ID: [id number]    XR Chest 1 View    Result Date: 7/20/2023  Impression: Patchy left lower lobe infiltrate remaining, overall improved from prior. Electronically Signed: Slime Whitlock MD  7/20/2023 11:34 AM EDT  Workstation ID: [id number]    CT Angiogram Chest    Result Date: 7/20/2023  Impression: Mildly worsened pulmonary edema with small to moderate bilateral pleural effusions. No acute pulmonary embolus. Electronically Signed: Vick Grimaldo  7/20/2023 5:35 PM EDT  Workstation ID: [id number]         Assessment / Plan      Assessment/Plan:   Diagnoses and all orders for this visit:    1. Demyelinating changes in brain " (Primary)  Comments:  Cont imuran  Orders:  -     EMG & Nerve Conduction Test; Future  -     MRI Cervical Spine With & Without Contrast; Future    2. Paresthesia  Comments:  Likely due to cervical lesion. Pt desires EMG.  Orders:  -     EMG & Nerve Conduction Test; Future  -     MRI Cervical Spine With & Without Contrast; Future    3. Cervical lesion  Comments:  Cont Imuran  Orders:  -     MRI Cervical Spine With & Without Contrast; Future           Patient Education:     Reviewed medications, potential side effects and signs and symptoms to report. Discussed risk versus benefits of treatment plan with patient and/or family-including medications, labs and radiology that may be ordered. Addressed questions and concerns during visit. Patient and/or family verbalized understanding and agree with plan. Instructed to call the office with any questions and report to ER with any life-threatening symptoms.     Follow Up:   Return in about 6 months (around 1/28/2024) for Recheck.    During this visit the following were done:  Labs Reviewed [x]    Labs Ordered []    Radiology Reports Reviewed [x]    Radiology Ordered []    PCP Records Reviewed []    Referring Provider Records Reviewed []    ER Records Reviewed []    Hospital Records Reviewed [x]    History Obtained From Family []    Radiology Images Reviewed []    Other Reviewed [x]    Records Requested []      Bryson Murrell, HIREN, APRN

## 2023-08-02 ENCOUNTER — OFFICE VISIT (OUTPATIENT)
Dept: CARDIOLOGY | Facility: HOSPITAL | Age: 78
End: 2023-08-02
Payer: MEDICARE

## 2023-08-02 VITALS
HEART RATE: 67 BPM | TEMPERATURE: 97 F | WEIGHT: 122.4 LBS | BODY MASS INDEX: 24.68 KG/M2 | SYSTOLIC BLOOD PRESSURE: 170 MMHG | RESPIRATION RATE: 16 BRPM | HEIGHT: 59 IN | OXYGEN SATURATION: 96 % | DIASTOLIC BLOOD PRESSURE: 78 MMHG

## 2023-08-02 DIAGNOSIS — I10 ESSENTIAL HYPERTENSION: ICD-10-CM

## 2023-08-02 DIAGNOSIS — E78.5 HYPERLIPIDEMIA LDL GOAL <70: ICD-10-CM

## 2023-08-02 DIAGNOSIS — I50.33 ACUTE ON CHRONIC DIASTOLIC CONGESTIVE HEART FAILURE: Primary | ICD-10-CM

## 2023-08-02 RX ORDER — SACUBITRIL AND VALSARTAN 49; 51 MG/1; MG/1
1 TABLET, FILM COATED ORAL 2 TIMES DAILY
Qty: 60 TABLET | Refills: 1 | Status: SHIPPED | OUTPATIENT
Start: 2023-08-02

## 2023-08-03 DIAGNOSIS — G37.9 DEMYELINATING DISEASE: ICD-10-CM

## 2023-08-03 RX ORDER — CHLORTHALIDONE 25 MG/1
TABLET ORAL
Qty: 90 TABLET | OUTPATIENT
Start: 2023-08-03

## 2023-08-03 RX ORDER — AZATHIOPRINE 50 MG/1
TABLET ORAL
Qty: 360 TABLET | Refills: 3 | Status: SHIPPED | OUTPATIENT
Start: 2023-08-03

## 2023-08-04 ENCOUNTER — READMISSION MANAGEMENT (OUTPATIENT)
Dept: CALL CENTER | Facility: HOSPITAL | Age: 78
End: 2023-08-04
Payer: MEDICARE

## 2023-08-07 ENCOUNTER — TELEPHONE (OUTPATIENT)
Dept: CARDIOLOGY | Facility: HOSPITAL | Age: 78
End: 2023-08-07
Payer: MEDICARE

## 2023-08-07 NOTE — TELEPHONE ENCOUNTER
"----- Message from Natalie Hdez sent at 8/7/2023  8:55 AM EDT -----  Regarding: Question about BP Medication Dosage  This patient left a voicemail this morning requesting a call from you. She states she \"feels drunk\" and  may need to decrease her blood pressure medication. She requests a call to [phone].    "

## 2023-08-07 NOTE — TELEPHONE ENCOUNTER
Spoke with patient regarding questions about blood pressure.  Since time of last visit, patient has been taking increased dose of Entresto at 49-51 mg twice daily.  She states that she is continue to monitor blood pressure at home which has been running in the 120s over 70s up to 170s over 80s.  Patient endorses feeling lightheaded with position changes especially.  Patient states that she is drinking approximately three 8 ounce glasses of water a day.  Encourage patient to increase fluid intake over next few days and reassure patient that blood pressures of 120s to 130s systolic are normal.  Patient verbalized understanding and will attempt to increase fluid intake to see if this helps with her dizziness.  She will notify our office if symptoms worsen.  She will plan to keep scheduled follow-up with myself.

## 2023-08-08 ENCOUNTER — READMISSION MANAGEMENT (OUTPATIENT)
Dept: CALL CENTER | Facility: HOSPITAL | Age: 78
End: 2023-08-08
Payer: MEDICARE

## 2023-08-08 ENCOUNTER — OFFICE VISIT (OUTPATIENT)
Dept: PULMONOLOGY | Facility: CLINIC | Age: 78
End: 2023-08-08
Payer: MEDICARE

## 2023-08-08 VITALS
SYSTOLIC BLOOD PRESSURE: 164 MMHG | OXYGEN SATURATION: 97 % | TEMPERATURE: 97.5 F | DIASTOLIC BLOOD PRESSURE: 60 MMHG | WEIGHT: 120.7 LBS | BODY MASS INDEX: 24.38 KG/M2 | HEART RATE: 86 BPM

## 2023-08-08 DIAGNOSIS — R06.02 SHORTNESS OF BREATH: Primary | ICD-10-CM

## 2023-08-08 DIAGNOSIS — I10 ESSENTIAL HYPERTENSION: ICD-10-CM

## 2023-08-08 DIAGNOSIS — I50.32 CHRONIC DIASTOLIC CONGESTIVE HEART FAILURE: ICD-10-CM

## 2023-08-08 PROBLEM — I50.33 ACUTE ON CHRONIC DIASTOLIC CONGESTIVE HEART FAILURE: Status: RESOLVED | Noted: 2023-07-20 | Resolved: 2023-08-08

## 2023-08-08 RX ORDER — PEN NEEDLE, DIABETIC 32GX 5/32"
NEEDLE, DISPOSABLE MISCELLANEOUS
COMMUNITY
Start: 2023-08-07

## 2023-08-08 NOTE — PROGRESS NOTES
Follow Up Office Note       Patient Name: Marilyn Kunz    Referring Physician: No ref. provider found    Chief Complaint: Shortness of breath      History of Present Illness: Marilyn Kunz is a 78 y.o. female who is here today to follow-up care with Pulmonary.  Patient has a past medical history significant for hypertension, chronic diastolic heart failure, hyperlipidemia, factor V Leiden, type 2 diabetes mellitus, hypothyroidism, neuropathy, and CKD stage III.   Since last time I saw the patient she was hospitalized with acute decompensated heart failure.  Her blood pressure medicines were adjusted and she is doing much better now also had her diuretics adjusted.  She had actually stopped her diuretics prior to going into the hospital which was likely the catalyst for getting her admitted.  Currently off all inhalers.  No other acute complaints.    Review of Systems:   Review of Systems   Constitutional:  Negative for chills, fatigue and fever.   HENT:  Negative for congestion and voice change.    Eyes:  Negative for blurred vision.   Respiratory:  Negative for cough, shortness of breath and wheezing.    Cardiovascular:  Negative for chest pain.   Skin:  Negative for dry skin.   Hematological:  Negative for adenopathy.   Psychiatric/Behavioral:  Negative for agitation and depressed mood.      The following portions of the patient's history were reviewed and updated as appropriate: allergies, current medications, past family history, past medical history, past social history, past surgical history and problem list.    Physical Exam:  Vital Signs:   Vitals:    08/08/23 1111   BP: 164/60   BP Location: Right arm   Patient Position: Sitting   Pulse: 86   Temp: 97.5 øF (36.4 øC)   TempSrc: Temporal   SpO2: 97%  Comment: room air   Weight: 54.7 kg (120 lb 11.2 oz)       Physical Exam  Vitals and nursing note reviewed.   Constitutional:       General: She is not in acute distress.     Appearance: She is  well-developed and normal weight. She is not ill-appearing or toxic-appearing.   HENT:      Head: Normocephalic and atraumatic.   Cardiovascular:      Rate and Rhythm: Normal rate and regular rhythm.      Pulses: Normal pulses.      Heart sounds: Normal heart sounds. No murmur heard.    No friction rub. No gallop.   Pulmonary:      Effort: Pulmonary effort is normal. No respiratory distress.      Breath sounds: Normal breath sounds. No wheezing, rhonchi or rales.   Musculoskeletal:      Right lower leg: No edema.      Left lower leg: No edema.   Skin:     General: Skin is warm and dry.   Neurological:      Mental Status: She is alert and oriented to person, place, and time.       Immunization History   Administered Date(s) Administered    COVID-19 (PFIZER) BIVALENT 12+YRS 09/17/2022    COVID-19 (PFIZER) Purple Cap Monovalent 02/06/2021, 03/03/2021, 10/14/2021    COVID-19 (UNSPECIFIED) 02/06/2021, 03/03/2021    Covid-19 (Pfizer) Gray Cap Monovalent 04/27/2022    Flu Vaccine Intradermal Quad 18-64YR 10/01/2019    FluLaval/Fluzone >6mos 09/18/2020    Fluad Quad 65+ 09/14/2022    Fluzone High Dose =>65 Years ([employer] ONLY) 09/04/2012, 09/18/2013, 10/01/2015, 09/06/2016, 09/20/2017, 10/17/2018, 10/01/2019    Fluzone High-Dose 65+yrs 09/03/2021    Influenza Injectable Mdck Pf Quad 09/18/2020    Influenza Quad Vaccine (Inpatient) 10/24/2008    PPD Test 03/09/2023, 03/16/2023    Pneumococcal Conjugate 13-Valent (PCV13) 12/01/2015    Pneumococcal Conjugate 20-Valent (PCV20) 09/19/2022    Pneumococcal Polysaccharide (PPSV23) 10/17/2018    Shingrix 06/11/2019       Results Review:   -I personally viewed the patient's CT of the chest from July 2023 which showed bilateral pleural effusions right greater than left, with increased interstitial edema.  - high-resolution CT scan from 10/7/2022 which showed some biapical scarring, no signs of interstitial lung disease, there is no clinically significant bronchiectasis.              -  CT scan of the chest 9/26/2022 shows no pulmonary emboli, no nodules, masses, or infiltrates.              - chest x-ray 9/19/2022 showed no acute cardiopulmonary process  - pulmonary function testing from 9/22/2002, which was extremely poor and inadequate, this is uninterpretable.              -spirometry from 9/19/2022, extremely poor testing, uninterpretable  -6-minute walk test from 9/22/2022 and when she started at 97% and maintained at 96% throughout the entire test, dyspnea scale started 01 up to 2, she was able to walk 137 m which was 34% of predicted.  -I personally reviewed the patient's echo report from 2/25/2023 which showed a EF of 61 to 65%, with a mildly elevated RVSP at 35 to 45 mmHg.  - Echo report from 5/5/2022 showed an EF of 70%, saline test was negative, mildly increased left atrial volume.  - stress test result from 8/31/2022 showed mild coronary artery calcifications, nonspecific ST changes, considered to be a low risk study.  EF was noted to be 70%.  -Personally viewed the patient's chart with regards to her recent hospitalization for acute decompensated heart failure and volume overload.    Assessment / Plan:   Diagnoses and all orders for this visit:    1. Shortness of breath (Primary)  2. Chronic diastolic congestive heart failure  3. Essential hypertension  -I think the patient shortness of breath is mainly due to diastolic heart failure, renal failure and hypertension.  I have never been convinced that she has asthma or COPD.  She is a never smoker so I actually think she does not have COPD there is always a possibility she can develop some level of asthma especially the longer that she has heart failure, but for now I do not think any daily inhalers are needed she has an albuterol inhaler that she can use on as-needed basis.  Otherwise I want her to continue to follow with the heart and valve clinic they are currently adjusting her medications her blood pressure is slightly up on  today's visit but she has multiple readings recently which she has been normal.  She informed that she does get a little bit dizzy whenever she has normal blood pressures I told her that that could be her body adjusting to the lower levels where she is consistently been into the 200s systolic prior to this.  She does need to be very careful to make sure she is not going to fall.  She has a walker and is already seeing physical therapy.  I will continue to follow along over the next 6 months to see if there is any need for the inhaler as she continues to optimize her heart.        Follow Up:   Return in about 6 months (around 2/8/2024).       SUKHDEV Haley, DO  Pulmonary and Critical Care Medicine  Note Electronically Signed    Part of this note may be an electronic transcription/translation of spoken language to printed text using the Dragon Dictation System.

## 2023-08-08 NOTE — OUTREACH NOTE
CHF Week 2 Survey      Flowsheet Row Responses   [facility] patient discharged from? [place]   Does the patient have one of the following disease processes/diagnoses(primary or secondary)? CHF   Week 2 attempt successful? Yes   Call start time 1458   Call end time 1507   Discharge diagnosis Acute on chronic diastolic congestive heart failure   Person spoke with today (if not patient) and relationship Patient   Meds reviewed with patient/caregiver? Yes   Does the patient have all medications ordered at discharge? Yes   Is the patient taking all medications as directed (includes completed medication regime)? Yes   Comments regarding appointments Cardiology NP earline 8/16, Dr Maya 9/5/23  3pm   Does the patient have a primary care provider?  Yes   Comments regarding PCP Peter Baca MD PCP   Has the patient kept scheduled appointments due by today? Yes   What is the Home health agency?  [facility]   Has home health visited the patient within 72 hours of discharge? Yes   Pulse Ox monitoring Intermittent   Pulse Ox device source Patient   O2 Sat: education provided Sat levels, Monitoring frequency, When to seek care   Psychosocial issues? No   Did the patient receive a copy of their discharge instructions? Yes   Nursing interventions Reviewed instructions with patient   What is the patient's perception of their health status since discharge? Improving  [Patient reports that she still feels weak from medication adjustment. ]   Nursing interventions Nurse provided patient education   Is the patient able to teach back signs and symptoms of worsening condition? (i.e. weight gain, shortness of air, etc.) Yes   If the patient is a current smoker, are they able to teach back resources for cessation? Not a smoker   Is the patient/caregiver able to teach back the hierarchy of who to call/visit for symptoms/problems? PCP, Specialist, Home health nurse, Urgent Care, ED, 911 Yes   CHF Zone  this Call Green Zone   Green Zone No new or worsening shortness of breath, No new swelling -  feet, ankles and legs look normal for you, Weight check stable   Green Zone Interventions Follow up visits planned, Meds as directed, Daily weight check   CHF Week 2 call completed? Yes   Is the patient interested in additional calls from an ambulatory ? No   Would this patient benefit from a Referral to [facility]? No   Call end time [id number]            HARRIET ISLAS - Registered Nurse

## 2023-08-10 ENCOUNTER — TELEPHONE (OUTPATIENT)
Dept: CARDIOLOGY | Facility: HOSPITAL | Age: 78
End: 2023-08-10
Payer: MEDICARE

## 2023-08-10 RX ORDER — NYSTATIN 100000 [USP'U]/G
POWDER TOPICAL 3 TIMES DAILY
Qty: 60 G | Refills: 0 | Status: SHIPPED | OUTPATIENT
Start: 2023-08-10

## 2023-08-10 RX ORDER — FLUCONAZOLE 150 MG/1
150 TABLET ORAL ONCE
Qty: 1 TABLET | Refills: 1 | Status: SHIPPED | OUTPATIENT
Start: 2023-08-10 | End: 2023-08-10

## 2023-08-16 ENCOUNTER — READMISSION MANAGEMENT (OUTPATIENT)
Dept: CALL CENTER | Facility: HOSPITAL | Age: 78
End: 2023-08-16
Payer: MEDICARE

## 2023-08-16 ENCOUNTER — LAB (OUTPATIENT)
Dept: LAB | Facility: HOSPITAL | Age: 78
End: 2023-08-16
Payer: MEDICARE

## 2023-08-16 ENCOUNTER — OFFICE VISIT (OUTPATIENT)
Dept: CARDIOLOGY | Facility: HOSPITAL | Age: 78
End: 2023-08-16
Payer: MEDICARE

## 2023-08-16 VITALS
RESPIRATION RATE: 18 BRPM | HEIGHT: 59 IN | DIASTOLIC BLOOD PRESSURE: 70 MMHG | WEIGHT: 121 LBS | OXYGEN SATURATION: 96 % | BODY MASS INDEX: 24.39 KG/M2 | HEART RATE: 63 BPM | SYSTOLIC BLOOD PRESSURE: 148 MMHG | TEMPERATURE: 97.2 F

## 2023-08-16 DIAGNOSIS — I50.33 ACUTE ON CHRONIC DIASTOLIC CONGESTIVE HEART FAILURE: ICD-10-CM

## 2023-08-16 DIAGNOSIS — I50.33 ACUTE ON CHRONIC DIASTOLIC CONGESTIVE HEART FAILURE: Primary | ICD-10-CM

## 2023-08-16 LAB
ANION GAP SERPL CALCULATED.3IONS-SCNC: 9.2 MMOL/L (ref 5–15)
BUN SERPL-MCNC: 35 MG/DL (ref 8–23)
BUN/CREAT SERPL: 29.4 (ref 7–25)
CALCIUM SPEC-SCNC: 9.7 MG/DL (ref 8.6–10.5)
CHLORIDE SERPL-SCNC: 105 MMOL/L (ref 98–107)
CO2 SERPL-SCNC: 25.8 MMOL/L (ref 22–29)
CREAT SERPL-MCNC: 1.19 MG/DL (ref 0.57–1)
EGFRCR SERPLBLD CKD-EPI 2021: 46.9 ML/MIN/1.73
GLUCOSE SERPL-MCNC: 162 MG/DL (ref 65–99)
POTASSIUM SERPL-SCNC: 4.6 MMOL/L (ref 3.5–5.2)
SODIUM SERPL-SCNC: 140 MMOL/L (ref 136–145)

## 2023-08-16 PROCEDURE — 80048 BASIC METABOLIC PNL TOTAL CA: CPT

## 2023-08-16 PROCEDURE — 36415 COLL VENOUS BLD VENIPUNCTURE: CPT

## 2023-08-16 RX ORDER — SACUBITRIL AND VALSARTAN 97; 103 MG/1; MG/1
1 TABLET, FILM COATED ORAL 2 TIMES DAILY
Qty: 60 TABLET | Refills: 1 | Status: SHIPPED | OUTPATIENT
Start: 2023-08-16

## 2023-08-16 NOTE — PROGRESS NOTES
Chief Complaint  Congestive Heart Failure    Subjective      History of Present Illness {CC  Problem List  Visit  Diagnosis   Encounters  Notes  Medications  Labs  Result Review Imaging  Media :23}     Marilyn Kunz, 78 y.o. female with past medical history significant for hypertension, hyperlipidemia, HFpEF, diabetes, CKD 3, CVA, COPD, demyelinating disease, factor V Leiden, and hypothyroidism, who presents to [facility] for Congestive Heart Failure.  At time of previous evaluation, dose of Entresto was increased.  Patient reached out to our office since that time and endorsed dizziness especially with position changes.  She was encouraged to increase fluid intake.  Is also reached out to our office to states she has had vaginal irritation since addition of Jardiance.    Since that time, blood pressure at home has been running 120-150/80's. Weight is steady around 119-120. She denies chest pain/pressure, significant weight gain, syncope, or near syncope, lower extremity edema, and palpitations. She has mild shortness of air, and fatigue with exertion. She is drinking adequate water daily.  Patient states that after taking Diflucan for vaginal yeast infection her symptoms have improved.  Patient would like to continue Jardiance for now.        Initial presentation:   She was admitted to our facility from 7/20 - 7/26 for acute on chronic congestive heart failure. Patient was given IV diuretics with improvement in condition.  Patient was discharged on Entresto, Bumex, and pravastatin. Bystolic was discontinued while in the hospital due to bradycardia.    Since time of discharge, she is weighing herself daily. Weight has been consistent recently. She denies worsening edema in lower extremities. She is checking her blood pressure at home which is fluctuating, but states that it is running around 170s over 80s.  She denies chest pain/pressure, shortness of air, syncope or near  "syncope, worsening edema, or palpitations. She believes she is slightly dehydrated but is trying to stay adequately hydrated.     Echocardiogram 2/25/2023:     Left ventricular ejection fraction appears to be 61 - 65%.    Estimated right ventricular systolic pressure from tricuspid regurgitation is mildly elevated (35-45 mmHg).         Objective     Vital Signs:   Vitals:    08/16/23 1032 08/16/23 1053   BP: (!) 191/73 148/70   BP Location: Left arm Left arm   Patient Position: Sitting    Cuff Size: Adult    Pulse: 63    Resp: 18    Temp: 97.2 øF (36.2 øC)    TempSrc: Temporal    SpO2: 96%    Weight: 54.9 kg (121 lb)    Height: 149.9 cm (59\")      Body mass index is 24.44 kg/mý.  Physical Exam  Vitals and nursing note reviewed.   Constitutional:       Appearance: Normal appearance.   HENT:      Head: Normocephalic.   Eyes:      Pupils: Pupils are equal, round, and reactive to light.   Cardiovascular:      Rate and Rhythm: Normal rate and regular rhythm.      Pulses: Normal pulses.      Heart sounds: Normal heart sounds. No murmur heard.  Pulmonary:      Effort: Pulmonary effort is normal.      Breath sounds: Normal breath sounds.   Abdominal:      General: Bowel sounds are normal.      Palpations: Abdomen is soft.   Musculoskeletal:         General: Normal range of motion.      Cervical back: Normal range of motion.      Right lower leg: No edema.      Left lower leg: No edema.   Skin:     General: Skin is warm and dry.      Capillary Refill: Capillary refill takes less than 2 seconds.   Neurological:      Mental Status: She is alert and oriented to person, place, and time.   Psychiatric:         Mood and Affect: Mood normal.         Thought Content: Thought content normal.        Data Reviewed:{ Labs  Result Review  Imaging  Med Tab  Media :23}   Duplex Carotid Ultrasound CAR (07/25/2023 12:03)   Lab Results   Component Value Date    WBC 5.45 07/24/2023    HGB 10.5 (L) 07/24/2023    HCT 33.4 (L) 07/24/2023    " MCV 95.7 07/24/2023     07/24/2023      Lab Results   Component Value Date    GLUCOSE 307 (H) 07/26/2023    BUN 34 (H) 07/26/2023    CREATININE 0.92 07/26/2023    EGFR 63.9 07/26/2023    BCR 37.0 (H) 07/26/2023    K 4.5 07/26/2023    CO2 25.0 07/26/2023    CALCIUM 9.2 07/26/2023    PROTENTOTREF 7.9 06/23/2022    ALBUMIN 3.7 07/20/2023    BILITOT 1.3 (H) 07/20/2023    AST 21 07/20/2023    ALT 16 07/20/2023      Lab Results   Component Value Date    CKTOTAL 91 02/24/2023    TROPONINI <0.01 03/19/2014    TROPONINT 14 (H) 07/20/2023      Adult Transthoracic Echo Complete W/ Cont if Necessary Per Protocol (02/25/2023 12:44)     Assessment & Plan   Assessment and Plan {CC Problem List  Visit Diagnosis  ROS  Review (Popup)  Health Maintenance  Quality  BestPractice  Medications  SmartSets  SnapShot Encounters  Media :23}     1. Acute on chronic diastolic congestive heart failure  -Patient appears to be euvolemic on today's visit  -Continue current dose of Bumex at 0.5 mg daily  -She is tolerating current dose of Entresto to 49-51 mg twice daily,  Will consider further uptitration of medication based on BMP results today  -Continue Jardiance 10 mg daily  -Patient was on Bystolic while inpatient however this was discontinued due to bradycardia.  Patient's heart rate remains in the 60's. No further bradycardia noted  -Patient to see Dr. Maya in few weeks, encouraged patient to keep this appointment    2. Essential hypertension  -Hypertension recently more adequately controlled  -Continue current Entresto at 49-51 mg twice daily.  Will consider further uptitration of medication based on BMP results today  -Patient to continue ambulatory blood pressure monitoring  -She will notify clinic if blood pressure consistently greater than 140/90  -We will obtain BMP today      3. Hyperlipidemia LDL goal <70  -Currently maintained on pravastatin, will continue for now      Follow Up {Instructions Charge Capture   Follow-up Communications :23}     Return if symptoms worsen or fail to improve, for Heart Failure.      Patient was given instructions and counseling regarding her condition or for health maintenance advice. Please see specific information pulled into the AVS if appropriate.  Patient was instructed to call the [facility] with any questions, concerns, or worsening symptoms.    Dictated Utilizing Dragon Dictation   Please note that portions of this note were completed with a voice recognition program.   Part of this note may be an electronic transcription/translation of spoken language to printed text using the Dragon Dictation System.

## 2023-08-16 NOTE — PROGRESS NOTES
Called patient to discuss BMP from today 8/16. After discussion with patient and Dr. Maya, decision was made to increase Entresto in attempt to obtain more adequate blood pressure control. Patient was notified of this change, and informed of need to repeat BMP in 2 weeks. New script sent in for medication. Patient to continue to monitor blood pressure while at home. She will reach out to our clinic for any issues. Also, question about Bumex answered for patient as well.

## 2023-08-17 NOTE — OUTREACH NOTE
CHF Week 3 Survey      Flowsheet Row Responses   [facility] by [facility] patient discharged from? [facility]   Does the patient have one of the following disease processes/diagnoses(primary or secondary)? CHF   Week 3 attempt successful? Yes   Call start time 1951   Call end time 1958   Discharge diagnosis Acute on chronic diastolic congestive heart failure   Is the patient taking all medications as directed (includes completed medication regime)? Yes   Medication comments BP medication has been increased   Does the patient have a primary care provider?  Yes   Has the patient kept scheduled appointments due by today? Yes   Comments Saw [facility] today   What is the Home health agency?  [facility]   Has home health visited the patient within 72 hours of discharge? Yes   DME comments States she has a cane but wanting a mental one and therapy is working on ordering her one.   Psychosocial issues? No   What is the patient's perception of their health status since discharge? Improving   Is the patient able to teach back signs and symptoms of worsening condition? (i.e. weight gain, shortness of air, etc.) Yes   If the patient is a current smoker, are they able to teach back resources for cessation? Not a smoker   Is the patient/caregiver able to teach back the hierarchy of who to call/visit for symptoms/problems? PCP, Specialist, Home health nurse, Urgent Care, ED, 911 Yes   Additional teach back comments States they had her do labs.  Bloods sugar is down.  She will follow up with Dr. Maya in 2 weeks.  Pt has been decreased to once a week and has been approved for 6 weeks if she needs it.  She is doing daily weights and bp.  She has been maintaining her weight.   CHF Zone this Call Green Zone   Green Zone Patient reports doing well, No new or worsening shortness of breath, Physical activity level is normal for you, No new swelling -  feet, ankles and legs look normal for you, Weight check  stable, No chest pain   Green Zone Interventions Daily weight check, Low sodium diet, Meds as directed, Follow up visits planned   CHF Week 3 call completed? Yes   Graduated Yes   Call end time 1958            Yvonne FITZGERALD - Licensed Nurse

## 2023-08-29 ENCOUNTER — LAB (OUTPATIENT)
Dept: LAB | Facility: HOSPITAL | Age: 78
End: 2023-08-29
Payer: MEDICARE

## 2023-08-29 DIAGNOSIS — I50.33 ACUTE ON CHRONIC DIASTOLIC CONGESTIVE HEART FAILURE: ICD-10-CM

## 2023-08-29 LAB
ANION GAP SERPL CALCULATED.3IONS-SCNC: 14.5 MMOL/L (ref 5–15)
BUN SERPL-MCNC: 30 MG/DL (ref 8–23)
BUN/CREAT SERPL: 26.5 (ref 7–25)
CALCIUM SPEC-SCNC: 9.8 MG/DL (ref 8.6–10.5)
CHLORIDE SERPL-SCNC: 104 MMOL/L (ref 98–107)
CO2 SERPL-SCNC: 23.5 MMOL/L (ref 22–29)
CREAT SERPL-MCNC: 1.13 MG/DL (ref 0.57–1)
EGFRCR SERPLBLD CKD-EPI 2021: 49.9 ML/MIN/1.73
GLUCOSE SERPL-MCNC: 185 MG/DL (ref 65–99)
POTASSIUM SERPL-SCNC: 3.9 MMOL/L (ref 3.5–5.2)
SODIUM SERPL-SCNC: 142 MMOL/L (ref 136–145)

## 2023-08-29 PROCEDURE — 80048 BASIC METABOLIC PNL TOTAL CA: CPT

## 2023-08-29 PROCEDURE — 36415 COLL VENOUS BLD VENIPUNCTURE: CPT

## 2023-08-30 NOTE — PROGRESS NOTES
I received the results of your recent lab work.  Your kidney function remains stable, your potassium level is also within normal limits.  Please keep scheduled follow-up with cardiology.

## 2023-09-05 ENCOUNTER — OFFICE VISIT (OUTPATIENT)
Dept: CARDIOLOGY | Facility: CLINIC | Age: 78
End: 2023-09-05
Payer: MEDICARE

## 2023-09-05 VITALS
SYSTOLIC BLOOD PRESSURE: 108 MMHG | DIASTOLIC BLOOD PRESSURE: 58 MMHG | WEIGHT: 121 LBS | OXYGEN SATURATION: 97 % | BODY MASS INDEX: 24.39 KG/M2 | HEIGHT: 59 IN | HEART RATE: 98 BPM

## 2023-09-05 DIAGNOSIS — I10 ESSENTIAL HYPERTENSION: Primary | ICD-10-CM

## 2023-09-05 PROCEDURE — 99214 OFFICE O/P EST MOD 30 MIN: CPT | Performed by: INTERNAL MEDICINE

## 2023-09-05 PROCEDURE — 3078F DIAST BP <80 MM HG: CPT | Performed by: INTERNAL MEDICINE

## 2023-09-05 PROCEDURE — 3074F SYST BP LT 130 MM HG: CPT | Performed by: INTERNAL MEDICINE

## 2023-09-05 RX ORDER — TERAZOSIN 2 MG/1
2 CAPSULE ORAL NIGHTLY
Qty: 30 CAPSULE | Refills: 6 | Status: SHIPPED | OUTPATIENT
Start: 2023-09-05

## 2023-09-05 NOTE — PROGRESS NOTES
[facility]  Follow Up Visit  Marilyn Kunz  1945    VISIT DATE:  09/05/23    PCP:   Peter Baca MD  [address] [place], [place] [id number]          CC:  Hypertension      Problem List:  1.  HTN  2.  Type I DM  3.  HLD  4.  Psoriatic arthritis  5.  Hypothyroidism  6.  DJD  7.  CKD 2  8.  Factor V Leiden  9.  Prior cerebellar infarct  10.  Surgery for patent ductus arteriosus age 11  11.  Demyelinating disease on azathioprine  12.  Severe hyponatremia     Cardiac testing:     Echo May 2022:  Saline test results are negative.  Estimated left ventricular EF was in agreement with the calculated left ventricular EF. Left ventricular ejection fraction appears to be greater than 70%. Left ventricular systolic function is hyperdynamic (EF > 70%).  Left atrial volume is mildly increased.  Lumason contrast shows normal LV systolic function and wall motion with an ejection fraction of 72%     Venous duplex May 2022: Normal     Stress test August 2022  Patient denied chest pain with Lexiscan.  Baseline EKG sinus with nonspecific ST changes. She did have ST depression in the inferior and lateral leads. Maximum of 1 mm.  Myocardial perfusion imaging indicates a normal myocardial perfusion study with no evidence of ischemia. No TID  Left ventricular ejection fraction is hyperdynamic (Calculated EF > 70%).  There is mild coronary artery calcification  Impressions are consistent with a low risk study.        MRI May 2022  1.  No acute intracranial abnormality. No acute infarct.  2.  Moderate chronic small vessel ischemic changes. Mild volume loss. 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    Carotid duplex July 2023     Right internal carotid artery demonstrates a less than 50% stenosis.    Left internal carotid artery demonstrates a less than 50% stenosis.    Vertebral artery flow antegrade bilaterally        Renal artery duplex August 2022  No hemodynamically  significant renal artery stenosis bilaterally.  Resistive indices within normal limits.  Renal veins patent      History of Present Illness:  Marilyn Kunz  Is a 78 y.o. female with pertinent cardiac history detailed above.  Reviewed patient's weights which are remaining stable.  She is now on Entresto  mg twice daily.  Blood pressure in the office is normal.  At home she still has had some elevation in her systolics typically varying between 140 and 170.  Her weight has been very stable at 120.  Her most recent BMP showed mild elevation in creatinine but normal potassium and sodium.  She is overall feeling better compared with hospital discharge.  Now walking with a cane instead of a walker.  We discussed restarting terazosin 2 mg nightly to help further treat her blood pressure with the Entresto      Patient Active Problem List    Diagnosis Date Noted    Chronic diastolic congestive heart failure 07/24/2023    Demyelinating disease of central nervous system, unspecified 03/09/2023    Demyelinating disease 02/06/2023     Note Last Updated: 2/6/2023     Dr Hernandez      Spondylolisthesis, grade 2 02/06/2023     Note Last Updated: 2/6/2023     Followed by Specialist - stable - follow up as needed. Dr. Rojas, degenerative disc disease      Type 1 diabetes mellitus 02/06/2023     Note Last Updated: 7/23/2023     Followed by Dr. Reid      History of stroke 02/06/2023     Note Last Updated: 2/6/2023     Cont Asa, statin and control bp      PDA (patent ductus arteriosus) 09/12/2022    Psoriatic arthritis 05/05/2022    At risk for venous thromboembolism (VTE) 09/10/2021     Note Last Updated: 6/23/2022     Problem added by Discern Expert Rule: [id number]_VTERISKPROB_3      Factor V Leiden 08/16/2021    Essential hypertension 10/16/2020     Note Last Updated: 7/23/2023     Target blood pressure <130/80 mmHg      CKD (chronic kidney disease) stage 3, GFR 30-59 ml/min 06/16/2016    Hypothyroidism 09/16/2015     Hyperlipidemia LDL goal <70 09/16/2015       Allergies   Allergen Reactions    Atorvastatin Other (See Comments)    Colesevelam Other (See Comments)    Cymbalta [Duloxetine Hcl] Other (See Comments)     Hyponatremia    Famotidine Other (See Comments)    Lidocaine Other (See Comments)     Sores in throat    Cephalexin Rash     Tolerated Ceftriaxone    Clindamycin Rash    Hygroton [Chlorthalidone] Rash     Facial rash    Penicillins Nausea And Vomiting and Rash     Has tolerated ceftriaxone       Social History     Socioeconomic History    Marital status: Single   Tobacco Use    Smoking status: Never    Smokeless tobacco: Never   Vaping Use    Vaping Use: Never used   Substance and Sexual Activity    Alcohol use: No    Drug use: No    Sexual activity: Not Currently     Partners: Male     Birth control/protection: Abstinence       Family History   Problem Relation Age of Onset    Cancer Mother     Pancreatic cancer Mother     Stroke Mother         Mini strokes    Cancer Father     Lung cancer Father     Cancer Sister     Colon cancer Sister     Diabetes Sister     Diabetes Brother     Breast cancer Neg Hx     Ovarian cancer Neg Hx        Current Medications:    Current Outpatient Medications:     aspirin 81 MG EC tablet, Take 1 tablet by mouth Daily., Disp: 30 tablet, Rfl: 0    azaTHIOprine (IMURAN) 50 MG tablet, TAKE 2 TABLETS TWICE DAILY, Disp: 360 tablet, Rfl: 3    bumetanide (BUMEX) 0.5 MG tablet, Take 1 tablet by mouth Every Other Day., Disp: , Rfl:     Droplet Pen Needles 32G X 4 MM misc, , Disp: , Rfl:     fluticasone (FLONASE) 50 MCG/ACT nasal spray, 2 sprays by Each Nare route Daily. Shake well before using., Disp: , Rfl:     insulin aspart (novoLOG FLEXPEN) 100 UNIT/ML solution pen-injector sc pen, Inject  under the skin into the appropriate area as directed 3 (Three) Times a Day With Meals. Sliding scale, Disp: , Rfl:     levocetirizine (XYZAL) 5 MG tablet, Take 1 tablet by mouth Every Evening., Disp: , Rfl:  "    levothyroxine (SYNTHROID, LEVOTHROID) 75 MCG tablet, Take 1 tablet by mouth Daily., Disp: , Rfl:     nystatin (MYCOSTATIN) 207569 UNIT/GM powder, Apply  topically to the appropriate area as directed 3 (Three) Times a Day., Disp: 60 g, Rfl: 0    pravastatin (Pravachol) 20 MG tablet, Take 1 tablet by mouth Every Evening., Disp: 90 tablet, Rfl: 3    sacubitril-valsartan (Entresto)  MG tablet, Take 1 tablet by mouth 2 (Two) Times a Day., Disp: 60 tablet, Rfl: 1    Secukinumab (Cosentyx) 150 MG/ML solution prefilled syringe, Inject 0.5 mL under the skin into the appropriate area as directed Every 28 (Twenty-Eight) Days. 1/2 dose monthly, Disp: , Rfl:     Tresiba FlexTouch 100 UNIT/ML solution pen-injector injection, Inject 19 Units under the skin into the appropriate area as directed Every Night. (Patient taking differently: Patient takes per sliding scale), Disp: , Rfl:      Review of Systems   Constitutional: Positive for weight gain.   Cardiovascular:  Negative for chest pain, dyspnea on exertion, irregular heartbeat and leg swelling (No significant).   Musculoskeletal:  Positive for arthritis.     Vitals:    09/05/23 1453   BP: 108/58   BP Location: Right arm   Patient Position: Sitting   Pulse: 98   SpO2: 97%   Weight: 54.9 kg (121 lb)   Height: 149.9 cm (59.02\")       Physical Exam  Constitutional:       Appearance: Normal appearance.   Neck:      Vascular: No carotid bruit.   Cardiovascular:      Rate and Rhythm: Normal rate and regular rhythm.   Pulmonary:      Effort: Pulmonary effort is normal.      Breath sounds: Normal breath sounds.   Musculoskeletal:      Right lower leg: No edema.      Left lower leg: No edema.      Comments: Uses a cane to ambulate   Neurological:      Mental Status: She is alert.       Diagnostic Data:  Procedures  Lab Results   Component Value Date    TRIG 58 03/04/2023    HDL 64 (H) 03/04/2023     Lab Results   Component Value Date    GLUCOSE 185 (H) 08/29/2023    BUN 30 (H) " 08/29/2023    CREATININE 1.13 (H) 08/29/2023     08/29/2023    K 3.9 08/29/2023     08/29/2023    CO2 23.5 08/29/2023     Lab Results   Component Value Date    HGBA1C 8.00 (H) 07/20/2023     Lab Results   Component Value Date    WBC 5.45 07/24/2023    HGB 10.5 (L) 07/24/2023    HCT 33.4 (L) 07/24/2023     07/24/2023       Assessment:  No diagnosis found.    Plan:    1.  HTN  -Negative renal artery duplex August 2022  -On Entresto 97/103 mg twice daily  -Bystolic was stopped bradycardia  -Blood pressure within normal limits in the office but elevated at home consistently, will have her resume her terazosin 2 mg nightly  -Creatinine 1.13 with potassium 3.9     2.  Type I DM  -Remain off of Jardiance given type 1 status, recent yeast infection     3.  Prior cerebellar infarct, factor V Leiden  -MRI 2023 with no acute changes, age-related changes present.  -Less than 50% carotid stenosis bilaterally  -Continue aspirin 81 mg daily     4.  Factor V leiden  -heterozygous  -No known history of DVT     5.  Diastolic heart failure  -Continue Bumex 0.5 mg every other day.  Weight has been stable.  Renal function stable  -EF 61 to 65% February 2023  -Creatinine 1.13  -sodium 142 8/29/23       ACP discussion was held with the patient during this visit. Patient does not have an advance directive, declines further assistance.      Yao Maya MD [facility]

## 2023-09-18 ENCOUNTER — TELEPHONE (OUTPATIENT)
Dept: CARDIOLOGY | Facility: CLINIC | Age: 78
End: 2023-09-18
Payer: MEDICARE

## 2023-09-18 NOTE — TELEPHONE ENCOUNTER
I think her blood pressure looks reasonably well controlled.  On a prior Holter monitor she did have some atrial tachycardia which could contribute to racing heartbeat and elevated rates.  We could try recent starting her on a low-dose of a beta-blocker Toprol-XL 25 mg daily and see if this helps resolve her symptoms.

## 2023-09-18 NOTE — TELEPHONE ENCOUNTER
Pt called to update BP readings after starting terazosin 2 mg nightly.    9/6/23 138/78 95    100/61 101    125/68 112    126/67 90     125/66 119     106/68 114     110/63 88     107/63 108     145/73 89     130/64 99     116/63 113     108/69   120     136/67 113     125/64 105     119/67 111     131/70 123     9/18/23 120/74 102      Pt states that she can feel her heart racing with higher heart rates and sometimes wakes up feeling fatigue/draggy. Pt denies SOB, chest pain, dizziness.     Please advise.

## 2023-09-19 RX ORDER — METOPROLOL SUCCINATE 25 MG/1
25 TABLET, EXTENDED RELEASE ORAL DAILY
Qty: 90 TABLET | Refills: 3 | Status: SHIPPED | OUTPATIENT
Start: 2023-09-19

## 2023-09-19 NOTE — TELEPHONE ENCOUNTER
Called pt and discussed NSK recommendations above. Pt verbalizes understanding and agreeable to plan.

## 2023-09-30 ENCOUNTER — HOSPITAL ENCOUNTER (EMERGENCY)
Facility: HOSPITAL | Age: 78
Discharge: HOME | End: 2023-09-30
Attending: EMERGENCY MEDICINE
Payer: MEDICARE

## 2023-09-30 ENCOUNTER — APPOINTMENT (OUTPATIENT)
Dept: GENERAL RADIOLOGY | Facility: HOSPITAL | Age: 78
End: 2023-09-30
Payer: MEDICARE

## 2023-09-30 VITALS
TEMPERATURE: 97.8 F | HEART RATE: 54 BPM | BODY MASS INDEX: 24.6 KG/M2 | HEIGHT: 59 IN | DIASTOLIC BLOOD PRESSURE: 60 MMHG | RESPIRATION RATE: 16 BRPM | WEIGHT: 122 LBS | SYSTOLIC BLOOD PRESSURE: 142 MMHG | OXYGEN SATURATION: 96 %

## 2023-09-30 DIAGNOSIS — R53.83 OTHER FATIGUE: ICD-10-CM

## 2023-09-30 DIAGNOSIS — R53.1 WEAKNESS: Primary | ICD-10-CM

## 2023-09-30 DIAGNOSIS — R73.09 ELEVATED GLUCOSE: ICD-10-CM

## 2023-09-30 LAB
ALBUMIN SERPL-MCNC: 3.8 G/DL (ref 3.5–5.2)
ALBUMIN/GLOB SERPL: 1.5 G/DL
ALP SERPL-CCNC: 52 U/L (ref 39–117)
ALT SERPL W P-5'-P-CCNC: 13 U/L (ref 1–33)
ANION GAP SERPL CALCULATED.3IONS-SCNC: 12 MMOL/L (ref 5–15)
AST SERPL-CCNC: 14 U/L (ref 1–32)
BACTERIA UR QL AUTO: ABNORMAL /HPF
BASOPHILS # BLD AUTO: 0.02 10*3/MM3 (ref 0–0.2)
BASOPHILS NFR BLD AUTO: 0.2 % (ref 0–1.5)
BILIRUB SERPL-MCNC: 1.3 MG/DL (ref 0–1.2)
BILIRUB UR QL STRIP: NEGATIVE
BUN SERPL-MCNC: 44 MG/DL (ref 8–23)
BUN/CREAT SERPL: 41.9 (ref 7–25)
CALCIUM SPEC-SCNC: 8.9 MG/DL (ref 8.6–10.5)
CHLORIDE SERPL-SCNC: 103 MMOL/L (ref 98–107)
CLARITY UR: CLEAR
CO2 SERPL-SCNC: 25 MMOL/L (ref 22–29)
COLOR UR: YELLOW
CREAT SERPL-MCNC: 1.05 MG/DL (ref 0.57–1)
DEPRECATED RDW RBC AUTO: 55.9 FL (ref 37–54)
EGFRCR SERPLBLD CKD-EPI 2021: 54.5 ML/MIN/1.73
EOSINOPHIL # BLD AUTO: 0.05 10*3/MM3 (ref 0–0.4)
EOSINOPHIL NFR BLD AUTO: 0.6 % (ref 0.3–6.2)
ERYTHROCYTE [DISTWIDTH] IN BLOOD BY AUTOMATED COUNT: 15.9 % (ref 12.3–15.4)
GLOBULIN UR ELPH-MCNC: 2.5 GM/DL
GLUCOSE SERPL-MCNC: 258 MG/DL (ref 65–99)
GLUCOSE UR STRIP-MCNC: NEGATIVE MG/DL
HCT VFR BLD AUTO: 36 % (ref 34–46.6)
HGB BLD-MCNC: 11.5 G/DL (ref 12–15.9)
HGB UR QL STRIP.AUTO: ABNORMAL
HOLD SPECIMEN: NORMAL
HYALINE CASTS UR QL AUTO: ABNORMAL /LPF
IMM GRANULOCYTES # BLD AUTO: 0.05 10*3/MM3 (ref 0–0.05)
IMM GRANULOCYTES NFR BLD AUTO: 0.6 % (ref 0–0.5)
KETONES UR QL STRIP: NEGATIVE
LEUKOCYTE ESTERASE UR QL STRIP.AUTO: ABNORMAL
LYMPHOCYTES # BLD AUTO: 0.96 10*3/MM3 (ref 0.7–3.1)
LYMPHOCYTES NFR BLD AUTO: 11.7 % (ref 19.6–45.3)
MAGNESIUM SERPL-MCNC: 2.2 MG/DL (ref 1.6–2.4)
MCH RBC QN AUTO: 30.7 PG (ref 26.6–33)
MCHC RBC AUTO-ENTMCNC: 31.9 G/DL (ref 31.5–35.7)
MCV RBC AUTO: 96.3 FL (ref 79–97)
MONOCYTES # BLD AUTO: 0.53 10*3/MM3 (ref 0.1–0.9)
MONOCYTES NFR BLD AUTO: 6.5 % (ref 5–12)
NEUTROPHILS NFR BLD AUTO: 6.58 10*3/MM3 (ref 1.7–7)
NEUTROPHILS NFR BLD AUTO: 80.4 % (ref 42.7–76)
NITRITE UR QL STRIP: NEGATIVE
NRBC BLD AUTO-RTO: 0 /100 WBC (ref 0–0.2)
NT-PROBNP SERPL-MCNC: 446.7 PG/ML (ref 0–1800)
PH UR STRIP.AUTO: <=5 [PH] (ref 5–8)
PLATELET # BLD AUTO: 185 10*3/MM3 (ref 140–450)
PMV BLD AUTO: 11.3 FL (ref 6–12)
POTASSIUM SERPL-SCNC: 4.2 MMOL/L (ref 3.5–5.2)
PROT SERPL-MCNC: 6.3 G/DL (ref 6–8.5)
PROT UR QL STRIP: NEGATIVE
QT INTERVAL: 434 MS
QTC INTERVAL: 454 MS
RBC # BLD AUTO: 3.74 10*6/MM3 (ref 3.77–5.28)
RBC # UR STRIP: ABNORMAL /HPF
REF LAB TEST METHOD: ABNORMAL
SODIUM SERPL-SCNC: 140 MMOL/L (ref 136–145)
SP GR UR STRIP: 1.02 (ref 1–1.03)
SQUAMOUS #/AREA URNS HPF: ABNORMAL /HPF
TROPONIN T SERPL HS-MCNC: 20 NG/L
UROBILINOGEN UR QL STRIP: ABNORMAL
WBC # UR STRIP: ABNORMAL /HPF
WBC NRBC COR # BLD: 8.19 10*3/MM3 (ref 3.4–10.8)
WHOLE BLOOD HOLD COAG: NORMAL
WHOLE BLOOD HOLD SPECIMEN: NORMAL

## 2023-09-30 PROCEDURE — 80053 COMPREHEN METABOLIC PANEL: CPT | Performed by: EMERGENCY MEDICINE

## 2023-09-30 PROCEDURE — 81001 URINALYSIS AUTO W/SCOPE: CPT | Performed by: EMERGENCY MEDICINE

## 2023-09-30 PROCEDURE — 83735 ASSAY OF MAGNESIUM: CPT | Performed by: EMERGENCY MEDICINE

## 2023-09-30 PROCEDURE — 71045 X-RAY EXAM CHEST 1 VIEW: CPT

## 2023-09-30 PROCEDURE — 83880 ASSAY OF NATRIURETIC PEPTIDE: CPT | Performed by: EMERGENCY MEDICINE

## 2023-09-30 PROCEDURE — 85025 COMPLETE CBC W/AUTO DIFF WBC: CPT | Performed by: EMERGENCY MEDICINE

## 2023-09-30 PROCEDURE — 84484 ASSAY OF TROPONIN QUANT: CPT | Performed by: EMERGENCY MEDICINE

## 2023-09-30 PROCEDURE — 99284 EMERGENCY DEPT VISIT MOD MDM: CPT

## 2023-09-30 PROCEDURE — 93005 ELECTROCARDIOGRAM TRACING: CPT | Performed by: EMERGENCY MEDICINE

## 2023-09-30 RX ORDER — SODIUM CHLORIDE 0.9 % (FLUSH) 0.9 %
10 SYRINGE (ML) INJECTION AS NEEDED
Status: DISCONTINUED | OUTPATIENT
Start: 2023-09-30 | End: 2023-09-30 | Stop reason: HOSPADM

## 2023-09-30 NOTE — ED PROVIDER NOTES
Subjective   History of Present Illness  This is a pleasant 78-year-old female who presents to the emergency department today by EMS for evaluation of fatigue.    At her baseline she lives independently.  He has cats at home.  She has a brother who checks on her.  She occasionally drives a car but most the time uses wheels for her medical appointments.  She was able to go to the store last week to make it up and down the [place] normally she uses a cane which was a graduation from using a walker after her last hospital admission in June.  She had physical therapy and home nursing coming out until last week but that ran out.    She said she was feeling fair up until 2 days ago when she just had a overwhelming feeling of fatigue.  She could only get out of bed with difficulty would just do small activities and would be totally exhausted and have to lay back down.  Last time this happened to her she had heart failure and hyponatremia.    She reports she has been compliant with her medications.  She has had no fevers or chills.  Appetites been fair her weight and blood pressure have been steady at home she monitors as her sugars were okay recently but a couple weeks ago she got an injection in the shoulder for arthritis and that really made her sugars go up at that point.  She is on Imuran for a demyelinating disease.  This is a vague diagnosis as far as I can tell have reviewed the neurology notes her last few MRIs of her brain and her neck.  Does not appear to be high-grade MS.    She has had no urinary problems bowel movements have been okay she has not been passing blood she has had no peripheral edema.        All other systems reviewed and are negative except as noted above.      Review of Systems   All other systems reviewed and are negative.    Past Medical History:   Diagnosis Date    Abnormal ECG Check [facility] Date or [facility] Records    Brought to  from [facility]    Acute sinusitis 02/06/2023    Anemia      Asthma     CAP (community acquired pneumonia) 02/06/2023    CHF (congestive heart failure) 7/20/2023    Chronic kidney disease     pt told it was dx from Dr Baca and to not eat much protein    Congenital heart disease 1950's Patent 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    Surgery [facility] 1950s close    CTS (carpal tunnel syndrome)     Psoriatic arthritis hands could be    Deep vein thrombosis No on blood clots but have a blood clotting disorder called Leiden Factor 5    Disease of thyroid gland     Diverticulitis of colon     Factor 5 Leiden mutation, heterozygous 2012    Head injury     Hyperlipidemia     Hypertension     Movement disorder 10/2021    knee replacement left knww    Murmur, cardiac     Neuropathy in diabetes     redness swelling legs    Peripheral neuropathy 05/03/2022    hands, arms. shoulders, back    Pill esophagitis 02/06/2023    Psoriatic arthritis     hands    Sleep apnea Always    diabetic do not sleep well up and down    Stroke had one don't know when    showed on MRI Brain    Type 1 diabetes    9/05/23     PCP:   Peter Baca MD  [address] [id number]              CC:  Hypertension        Problem List:  1.  HTN  2.  Type I DM  3.  HLD  4.  Psoriatic arthritis  5.  Hypothyroidism  6.  DJD  7.  CKD 2  8.  Factor V Leiden  9.  Prior cerebellar infarct  10.  Surgery for patent ductus arteriosus age 11  11.  Demyelinating disease on azathioprine  12.  Severe hyponatremia     Cardiac testing:     Echo May 2022:  Saline test results are negative.  Estimated left ventricular EF was in agreement with the calculated left ventricular EF. Left ventricular ejection fraction appears to be greater than 70%. Left ventricular systolic function is hyperdynamic (EF > 70%).  Left atrial volume is mildly increased.  Lumason contrast shows normal LV systolic function and wall motion with an ejection fraction of 72%     Venous duplex May 2022: Normal     Stress test August  2022  Patient denied chest pain with Lexiscan.  Baseline EKG sinus with nonspecific ST changes. She did have ST depression in the inferior and lateral leads. Maximum of 1 mm.  Myocardial perfusion imaging indicates a normal myocardial perfusion study with no evidence of ischemia. No TID  Left ventricular ejection fraction is hyperdynamic (Calculated EF > 70%).  There is mild coronary artery calcification  Impressions are consistent with a low risk study.        MRI May 2022  1.  No acute intracranial abnormality. No acute infarct.  2.  Moderate chronic small vessel ischemic changes. Mild volume loss. 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     Carotid duplex July 2023     Right internal carotid artery demonstrates a less than 50% stenosis.    Left internal carotid artery demonstrates a less than 50% stenosis.    Vertebral artery flow antegrade bilaterally        Renal artery duplex August 2022  No hemodynamically significant renal artery stenosis bilaterally.  Resistive indices within normal limits.  Renal veins patent        History of Present Illness:  Marilyn Kunz  Is a 78 y.o. female with pertinent cardiac history detailed above.  Reviewed patient's weights which are remaining stable.  She is now on Entresto  mg twice daily.  Blood pressure in the office is normal.  At home she still has had some elevation in her systolics typically varying between 140 and 170.  Her weight has been very stable at 120.  Her most recent BMP showed mild elevation in creatinine but normal potassium and sodium.  She is overall feeling better compared with hospital discharge.  Now walking with a cane instead of a walker.  We discussed restarting terazosin 2 mg nightly to help further treat her blood pressure with the Entresto    Allergies   Allergen Reactions    Atorvastatin Other (See Comments)    Colesevelam Other (See Comments)    Cymbalta [Duloxetine Hcl] Other (See Comments)     Hyponatremia    Famotidine Other  (See Comments)    Lidocaine Other (See Comments)     Sores in throat    Cephalexin Rash     Tolerated Ceftriaxone    Clindamycin Rash    Hygroton [Chlorthalidone] Rash     Facial rash    Penicillins Nausea And Vomiting and Rash     Has tolerated ceftriaxone       Past Surgical History:   Procedure Laterality Date    CARDIAC CATHETERIZATION  1952 2 of them    Patent Dictus operation    CATARACT EXTRACTION Bilateral     COLONOSCOPY      ENDOSCOPY N/A 10/20/2020    Procedure: ESOPHAGOGASTRODUODENOSCOPY;  Surgeon: Brunner, Mark I, MD;  Location: [facility] ENDOSCOPY;  Service: Gastroenterology;  Laterality: N/A;    HAND SURGERY Left 1987    no hardware    KNEE ARTHROPLASTY      PATENT DUCTUS ARTERIOUS LIGATION         Family History   Problem Relation Age of Onset    Cancer Mother     Pancreatic cancer Mother     Stroke Mother         Mini strokes    Cancer Father     Lung cancer Father     Cancer Sister     Colon cancer Sister     Diabetes Sister     Diabetes Brother     Breast cancer Neg Hx     Ovarian cancer Neg Hx        Social History     Socioeconomic History    Marital status: Single   Tobacco Use    Smoking status: Never    Smokeless tobacco: Never   Vaping Use    Vaping Use: Never used   Substance and Sexual Activity    Alcohol use: No    Drug use: No    Sexual activity: Not Currently     Partners: Male     Birth control/protection: Abstinence           Objective   Physical Exam  Vitals and nursing note reviewed.   Constitutional:       Comments: Is a pleasant frail-appearing 78-year-old alert and oriented GCS 15.   HENT:      Head: Normocephalic and atraumatic.      Right Ear: External ear normal.      Left Ear: External ear normal.      Nose: Nose normal.      Mouth/Throat:      Mouth: Mucous membranes are moist.      Pharynx: Oropharynx is clear.   Eyes:      Extraocular Movements: Extraocular movements intact.      Conjunctiva/sclera: Conjunctivae normal.      Pupils: Pupils are equal, round, and reactive to  light.   Neck:      Vascular: No carotid bruit.   Cardiovascular:      Rate and Rhythm: Normal rate and regular rhythm.      Pulses: Normal pulses.      Heart sounds: Normal heart sounds.   Pulmonary:      Effort: Pulmonary effort is normal.      Breath sounds: Normal breath sounds.   Abdominal:      Comments: BMI 24 positive bowel sounds soft and nontender no organomegaly, masses, or guarding.   Musculoskeletal:         General: No swelling or tenderness. Normal range of motion.      Cervical back: Normal range of motion and neck supple. No rigidity or tenderness.   Lymphadenopathy:      Cervical: No cervical adenopathy.   Skin:     General: Skin is warm and dry.      Capillary Refill: Capillary refill takes less than 2 seconds.   Neurological:      Mental Status: She is alert.      Comments: Asymmetric, voice strong, tongue midline.  Vision, hearing, speech preserved.  Moderate generalized weakness without focality.       Procedures           ED Course           Recent Results (from the past 24 hour(s))   Comprehensive Metabolic Panel    Collection Time: 09/30/23 11:34 AM    Specimen: Blood   Result Value Ref Range    Glucose 258 (H) 65 - 99 mg/dL    BUN 44 (H) 8 - 23 mg/dL    Creatinine 1.05 (H) 0.57 - 1.00 mg/dL    Sodium 140 136 - 145 mmol/L    Potassium 4.2 3.5 - 5.2 mmol/L    Chloride 103 98 - 107 mmol/L    CO2 25.0 22.0 - 29.0 mmol/L    Calcium 8.9 8.6 - 10.5 mg/dL    Total Protein 6.3 6.0 - 8.5 g/dL    Albumin 3.8 3.5 - 5.2 g/dL    ALT (SGPT) 13 1 - 33 U/L    AST (SGOT) 14 1 - 32 U/L    Alkaline Phosphatase 52 39 - 117 U/L    Total Bilirubin 1.3 (H) 0.0 - 1.2 mg/dL    Globulin 2.5 gm/dL    A/G Ratio 1.5 g/dL    BUN/Creatinine Ratio 41.9 (H) 7.0 - 25.0    Anion Gap 12.0 5.0 - 15.0 mmol/L    eGFR 54.5 (L) >60.0 mL/min/1.73   Single High Sensitivity Troponin T    Collection Time: 09/30/23 11:34 AM    Specimen: Blood   Result Value Ref Range    HS Troponin T 20 (H) <10 ng/L   Magnesium    Collection Time:  09/30/23 11:34 AM    Specimen: Blood   Result Value Ref Range    Magnesium 2.2 1.6 - 2.4 mg/dL   Green Top (Gel)    Collection Time: 09/30/23 11:34 AM   Result Value Ref Range    Extra Tube Hold for add-ons.    Lavender Top    Collection Time: 09/30/23 11:34 AM   Result Value Ref Range    Extra Tube hold for add-on    Gold Top - SST    Collection Time: 09/30/23 11:34 AM   Result Value Ref Range    Extra Tube Hold for add-ons.    Gray Top    Collection Time: 09/30/23 11:34 AM   Result Value Ref Range    Extra Tube Hold for add-ons.    Light Blue Top    Collection Time: 09/30/23 11:34 AM   Result Value Ref Range    Extra Tube Hold for add-ons.    CBC Auto Differential    Collection Time: 09/30/23 11:34 AM    Specimen: Blood   Result Value Ref Range    WBC 8.19 3.40 - 10.80 10*3/mm3    RBC 3.74 (L) 3.77 - 5.28 10*6/mm3    Hemoglobin 11.5 (L) 12.0 - 15.9 g/dL    Hematocrit 36.0 34.0 - 46.6 %    MCV 96.3 79.0 - 97.0 fL    MCH 30.7 26.6 - 33.0 pg    MCHC 31.9 31.5 - 35.7 g/dL    RDW 15.9 (H) 12.3 - 15.4 %    RDW-SD 55.9 (H) 37.0 - 54.0 fl    MPV 11.3 6.0 - 12.0 fL    Platelets 185 140 - 450 10*3/mm3    Neutrophil % 80.4 (H) 42.7 - 76.0 %    Lymphocyte % 11.7 (L) 19.6 - 45.3 %    Monocyte % 6.5 5.0 - 12.0 %    Eosinophil % 0.6 0.3 - 6.2 %    Basophil % 0.2 0.0 - 1.5 %    Immature Grans % 0.6 (H) 0.0 - 0.5 %    Neutrophils, Absolute 6.58 1.70 - 7.00 10*3/mm3    Lymphocytes, Absolute 0.96 0.70 - 3.10 10*3/mm3    Monocytes, Absolute 0.53 0.10 - 0.90 10*3/mm3    Eosinophils, Absolute 0.05 0.00 - 0.40 10*3/mm3    Basophils, Absolute 0.02 0.00 - 0.20 10*3/mm3    Immature Grans, Absolute 0.05 0.00 - 0.05 10*3/mm3    nRBC 0.0 0.0 - 0.2 /100 WBC   BNP    Collection Time: 09/30/23 11:34 AM    Specimen: Blood   Result Value Ref Range    proBNP 446.7 0.0 - 1,800.0 pg/mL   Urinalysis With Microscopic If Indicated (No Culture) - Urine, Clean Catch    Collection Time: 09/30/23 11:35 AM    Specimen: Urine, Clean Catch   Result Value Ref  Range    Color, UA Yellow Yellow, Straw    Appearance, UA Clear Clear    pH, UA <=5.0 5.0 - 8.0    Specific Gravity, UA 1.017 1.001 - 1.030    Glucose, UA Negative Negative    Ketones, UA Negative Negative    Bilirubin, UA Negative Negative    Blood, UA Small (1+) (A) Negative    Protein, UA Negative Negative    Leuk Esterase, UA Moderate (2+) (A) Negative    Nitrite, UA Negative Negative    Urobilinogen, UA 0.2 E.U./dL 0.2 - 1.0 E.U./dL   Urinalysis, Microscopic Only - Urine, Clean Catch    Collection Time: 09/30/23 11:35 AM    Specimen: Urine, Clean Catch   Result Value Ref Range    RBC, UA 0-2 None Seen, 0-2 /HPF    WBC, UA 13-20 (A) None Seen, 0-2 /HPF    Bacteria, UA None Seen None Seen, Trace /HPF    Squamous Epithelial Cells, UA 3-6 (A) None Seen, 0-2 /HPF    Hyaline Casts, UA 7-12 0 - 6 /LPF    Methodology Automated Microscopy    ECG 12 Lead ED Triage Standing Order; Weak / Dizzy / AMS    Collection Time: 09/30/23 12:02 PM   Result Value Ref Range    QT Interval 434 ms    QTC Interval 454 ms     Note: In addition to lab results from this visit, the labs listed above may include labs taken at another facility or during a different encounter within the last 24 hours. Please correlate lab times with ED admission and discharge times for further clarification of the services performed during this visit.    XR Chest 1 View   Final Result   Impression:   No convincing active pulmonary process.         Electronically Signed: Peter Kulkarni MD     9/30/2023 11:54 AM EDT     Workstation ID: [id number]        Vitals:    09/30/23 1200 09/30/23 1230 09/30/23 1259 09/30/23 1300   BP: 124/71 146/63  142/60   BP Location:       Patient Position:       Pulse: 66 75 54    Resp:       Temp:       TempSrc:       SpO2: 97% 95% 96%    Weight:       Height:         Medications   sodium chloride 0.9 % flush 10 mL (has no administration in time range)     ECG/EMG Results (last 24 hours)       Procedure Component Value Units Date/Time     ECG 12 Lead ED Triage Standing Order; Weak / Dizzy / AMS [[id number]] Collected: 09/30/23 1202     Updated: 09/30/23 1200     QT Interval 434 ms      QTC Interval 454 ms     Narrative:      Test Reason : ED Triage Standing Order~  Blood Pressure :   */*   mmHG  Vent. Rate :  66 BPM     Atrial Rate :  66 BPM     P-R Int : 120 ms          QRS Dur :  72 ms      QT Int : 434 ms       P-R-T Axes :  88  68  16 degrees     QTc Int : 454 ms    Normal sinus rhythm  Normal ECG  When compared with ECG of 20-JUL-2023 12:16,  premature atrial complexes are no longer present    Referred By: ED MD           Confirmed By:           ECG 12 Lead ED Triage Standing Order; Weak / Dizzy / AMS   Final Result   Test Reason : ED Triage Standing Order~   Blood Pressure :   */*   mmHG   Vent. Rate :  66 BPM     Atrial Rate :  66 BPM      P-R Int : 120 ms          QRS Dur :  72 ms       QT Int : 434 ms       P-R-T Axes :  88  68  16 degrees      QTc Int : 454 ms      Normal sinus rhythm   Normal ECG   When compared with ECG of 20-JUL-2023 12:16,   premature atrial complexes are no longer present   Confirmed by IVETT WARE MD ([id number]) on 9/30/2023 12:35:33 PM      Referred By: ED MD           Confirmed By: IVETT WARE MD                                          Medical Decision Making        I reviewed all available studies at bedside with the patient.  Her labs and EKG are bland.  Her BNP is much improved from her baseline.  Her sodium is normal.  Her glucose is modestly elevated and she has had problems with this recently.  EKG and chest x-ray personally reviewed by me.  They are bland.    On recheck she is resting comfortably.    Exact etiology of her weakness and fatigue are a bit unclear.  Certainly her labs are bland.  She does have this history of demyelinating disorder and is immunosuppressed secondary to it.  She has no evidence of infection she does have few white blood cells in her urine but no dysuria so I do not think she  needs treatment for this.    I reviewed her last MRIs of brain and cervical spine.  They were fairly bland.  She is scheduled for NCV's and EMGs but I will see any previous results.  The only thing I can think is perhaps she has had an exacerbation of her previous problem.  She is poorly tolerant to steroids so at this point,, change anything but have asked her to follow-up with her neurologist to follow her closely.    I will also consulted home health to see if they can restart home physical therapy to help increase her strength and ambulation status and to keep her independent as long as possible.    I did start discussions with the patient as she gets older chances are she is going to have increased weakness and need more help.  She has a brother who is older than she is and has a neighbor who looks in on her but told her she really needs to be happy to have a better plan in place and case she gets debilitated where there she moves in with somebody or somebody moves in with her she looks at a long-term care facility of some sort.  She will try to think about this.    This point not can to change anything up estrogens take a multivitamin and B12 supplement to see if maybe this gives her some energy.    She will need her glucose followed up as well.    I have asked her to follow-up with her primary care neurologist and return to the ED if worse in any way.    All are agreeable with plan        Problems Addressed:  Elevated glucose: chronic illness or injury with exacerbation, progression, or side effects of treatment  Other fatigue: undiagnosed new problem with uncertain prognosis  Weakness: undiagnosed new problem with uncertain prognosis    Amount and/or Complexity of Data Reviewed  External Data Reviewed: labs, radiology, ECG and notes.  Labs: ordered. Decision-making details documented in ED Course.  Radiology: ordered and independent interpretation performed. Decision-making details documented in ED  Course.  ECG/medicine tests: ordered and independent interpretation performed. Decision-making details documented in ED Course.    Risk  Prescription drug management.        Final diagnoses:   Weakness   Other fatigue   Elevated glucose       ED Disposition  ED Disposition       ED Disposition   Discharge    Condition   Stable    Comment   --               Peter Baca MD  [address]  [address]  [place] [id number]  [phone]      Keep your appointment Friday    Charly Hernandez MD  [address]  [address]  [place] [id number]  [phone]    Schedule an appointment as soon as possible for a visit       Yao Maya MD  [address]  [address]  [facility] [address]  [phone]      As needed         Medication List        Changed      Tresiba FlexTouch 100 UNIT/ML solution pen-injector injection  Generic drug: insulin degludec  Inject 19 Units under the skin into the appropriate area as directed Every Night.  What changed: additional instructions                 Anmol Johnson MD  09/30/23 5620

## 2023-09-30 NOTE — DISCHARGE INSTRUCTIONS
Consider a vitamin B12 oral supplement of 1000 mg a day.  Consider a multivitamin every day.  I try to get home health to come back and see you for physical therapy.    Come up with a long-term plan if you get increasing disability to have a plan for either somebody moving in with you or you moving in with somebody or finding a residential facility that you think you would enjoy going to says or about your only options.

## 2023-10-02 ENCOUNTER — TELEPHONE (OUTPATIENT)
Dept: CARDIOLOGY | Facility: CLINIC | Age: 78
End: 2023-10-02
Payer: MEDICARE

## 2023-10-02 NOTE — TELEPHONE ENCOUNTER
Pt called back. Pt states that she wakes up with no energy and is having to take naps during the day. Says that she is so tired she is scared to drive and cannot do ADLs such as take shower. Pt states that she has had these symptoms for several days, not sure exactly when this started.     Pt went to [facility] ED 9/30/23 due to fatigue.    BP 110s-140s/60s-70s HR 70s-90s    Pt denies palpitations, dizziness, SOB, chest pain.     Please advise.

## 2023-10-02 NOTE — TELEPHONE ENCOUNTER
Pt called and LVM stating that she is fatigued/groggy in the morning, thinks it could be either terazosin or Entresto.     Called pt. Line clicks and then disconnects. Called three times and same thing happened each time.     Pt was seen in ED 9/30/23 and note says restart terazosin. Unsure if and when patient stopped taking terazosin.     Will await return call.

## 2023-10-02 NOTE — TELEPHONE ENCOUNTER
We will try to have her continue the Entresto because I think that has helped with blood pressure control significantly.  We could have her go back off of the terazosin and see if her symptoms improve.      We will need to keep monitoring her blood pressure to see if we will have to restart an alternative agent

## 2023-10-16 RX ORDER — SACUBITRIL AND VALSARTAN 97; 103 MG/1; MG/1
1 TABLET, FILM COATED ORAL 2 TIMES DAILY
Qty: 180 TABLET | Refills: 1 | Status: SHIPPED | OUTPATIENT
Start: 2023-10-16

## 2023-10-26 ENCOUNTER — TRANSCRIBE ORDERS (OUTPATIENT)
Dept: ADMINISTRATIVE | Facility: HOSPITAL | Age: 78
End: 2023-10-26
Payer: MEDICARE

## 2023-10-26 DIAGNOSIS — Z12.31 SCREENING MAMMOGRAM FOR BREAST CANCER: Primary | ICD-10-CM

## 2023-12-08 DIAGNOSIS — Z86.73 HISTORY OF STROKE: ICD-10-CM

## 2023-12-08 RX ORDER — PRAVASTATIN SODIUM 20 MG
20 TABLET ORAL EVERY EVENING
Qty: 30 TABLET | Refills: 0 | Status: SHIPPED | OUTPATIENT
Start: 2023-12-08

## 2023-12-08 NOTE — TELEPHONE ENCOUNTER
Rx Refill Note  Requested Prescriptions     Pending Prescriptions Disp Refills    pravastatin (PRAVACHOL) 20 MG tablet [Pharmacy Med Name: PRAVASTATIN SODIUM 20 MG Tablet] 90 tablet 3     Sig: TAKE 1 TABLET EVERY EVENING      Last filled: 10/28/22 90 with 3 refills.  Last office visit with prescribing clinician: 7/28/2023      Next office visit with prescribing clinician: Visit date not found     Sent in 30 with 0 refills.  Patient needs appointment for further refills.    Robina Sainz MA  12/08/23, 10:53 EST

## 2023-12-15 ENCOUNTER — HOSPITAL ENCOUNTER (OUTPATIENT)
Dept: MAMMOGRAPHY | Facility: HOSPITAL | Age: 78
Discharge: HOME | End: 2023-12-15
Admitting: FAMILY MEDICINE
Payer: MEDICARE

## 2023-12-15 DIAGNOSIS — Z12.31 SCREENING MAMMOGRAM FOR BREAST CANCER: ICD-10-CM

## 2023-12-15 PROCEDURE — 77067 SCR MAMMO BI INCL CAD: CPT

## 2023-12-15 PROCEDURE — 77063 BREAST TOMOSYNTHESIS BI: CPT

## 2024-01-24 ENCOUNTER — HOSPITAL ENCOUNTER (OUTPATIENT)
Dept: MRI IMAGING | Facility: HOSPITAL | Age: 79
Discharge: HOME | End: 2024-01-24
Admitting: NURSE PRACTITIONER
Payer: MEDICARE

## 2024-01-24 DIAGNOSIS — G37.9 DEMYELINATING CHANGES IN BRAIN: ICD-10-CM

## 2024-01-24 DIAGNOSIS — R20.2 PARESTHESIA: ICD-10-CM

## 2024-01-24 DIAGNOSIS — N88.9 CERVICAL LESION: ICD-10-CM

## 2024-01-24 PROCEDURE — A9577 INJ MULTIHANCE: HCPCS | Performed by: NURSE PRACTITIONER

## 2024-01-24 PROCEDURE — 72156 MRI NECK SPINE W/O & W/DYE: CPT

## 2024-01-24 PROCEDURE — 0 GADOBENATE DIMEGLUMINE 529 MG/ML SOLUTION: Performed by: NURSE PRACTITIONER

## 2024-01-24 RX ADMIN — GADOBENATE DIMEGLUMINE 11 ML: 529 INJECTION, SOLUTION INTRAVENOUS at 14:46

## 2024-01-25 ENCOUNTER — TELEPHONE (OUTPATIENT)
Dept: NEUROLOGY | Facility: CLINIC | Age: 79
End: 2024-01-25
Payer: MEDICARE

## 2024-01-25 NOTE — TELEPHONE ENCOUNTER
Called patient and gave results.  Patient stated her insurance no longer covers our office.  Patient stated she will miss us and was very happy here.      ----- Message from Bryson Murrell DNP, APRN sent at 1/25/2024 12:28 PM EST -----  Please notify pt MRI of her neck appears stable with no new changes.

## 2024-02-04 LAB — CREAT BLDA-MCNC: 1.2 MG/DL (ref 0.6–1.3)

## 2024-03-25 RX ORDER — TERAZOSIN 2 MG/1
2 CAPSULE ORAL NIGHTLY
Qty: 30 CAPSULE | Refills: 6 | OUTPATIENT
Start: 2024-03-25

## 2024-05-28 ENCOUNTER — TELEPHONE (OUTPATIENT)
Age: 79
End: 2024-05-28
Payer: MEDICARE

## 2024-05-28 NOTE — TELEPHONE ENCOUNTER
PT IS CALLING REGARDING HER RINVOQ.  SHE HAS 2 PILLS LEFT AND SHE NEEDS A NEW SCRIPT.  IT WAS LAST FILLED ON 3/5/24 BY [facility].  SHE BELIEVES IT WAS FILLED THROUGH A PROGRAM WHERE SHE GOT IT FREE.  SHE IS SCHEDULED TO SEE J[facility] ON 6/5/24.

## 2024-05-30 PROBLEM — M15.4 EROSIVE OSTEOARTHRITIS: Status: ACTIVE | Noted: 2024-05-30

## 2024-05-30 PROBLEM — Z79.899 IMMUNOSUPPRESSION DUE TO DRUG THERAPY: Status: ACTIVE | Noted: 2024-05-30

## 2024-05-30 PROBLEM — G62.9 PERIPHERAL POLYNEUROPATHY: Status: ACTIVE | Noted: 2024-05-30

## 2024-05-30 PROBLEM — D84.821 IMMUNOSUPPRESSION DUE TO DRUG THERAPY: Status: ACTIVE | Noted: 2024-05-30

## 2024-05-30 NOTE — ASSESSMENT & PLAN NOTE
Now followed by Dr Laird.  - Cervical lesion found on 7/2022 MRI.  She will have repeat MRI in 6-12 months with neurology.  - told she has an unspecified demyelinating lesion.    - She has started on Imuran 50mg twice per day  - Encouraged her to speak with neurologist regarding elevated LFT and possible d/c of Imuran. .

## 2024-05-30 NOTE — ASSESSMENT & PLAN NOTE
Rinvoq and azathioprine per neurology.  I urged her to discuss stopping Imuran if ok with neuro.   1. Hold if the patient develops infection.   2. Avoid live vaccines while on this medication.   3. No recent serious infections  4. Also hold this medication perioperatively if the patient is going to have a surgical procedure.

## 2024-05-30 NOTE — PROGRESS NOTES
Office Follow Up      Date: 06/05/2024   Patient Name: Marilyn Kunz  MRN: [id number]  YOB: 1945    Referring Physician: Peter Baca,*     Chief Complaint: Psoriatic arthritis      History of Present Illness: Marilyn Kunz is a 78 y.o. female who is here today for follow up on psoriatic arthritis.  She is having pain in R hand. No swelling. She has diffuse neuropathic pain.   Had CT release earlier this year. Saw Dr Laird at  and he ordered PT.   Has her on pregabalin for a cervical lesion.   Has chronic pain but no swelling, likely neuropathic. Has constant throb.     On Rinvoq and azathioprine. .    We got an XR of the hands on 10/12/2023 which revealed ongoing severe osteoarthritis with fusion of multiple joints.  Pain scale: 6/10. EMS is all day. The symptoms are intermittent. The problem has not changed. The primary symptoms reported include: stiffness and swelling. The following symptoms are not reported:  pain. The patient assesses the interval disease activity as: worsening. The locations affected since last visit are low back and bilateral hand. Associated symptoms include fatigue and AM stiffness. Pertinent negatives include fever, change in vision, skin lesion(s), chest pain, changing cough, edema and abdominal pain.        Subjective   Review of Systems: Review of Systems   Constitutional:  Positive for fatigue and unexpected weight gain. Negative for chills, fever and unexpected weight loss.   HENT:  Positive for tinnitus. Negative for dental problem, mouth sores, sinus pressure and swollen glands.         Dry mouth   Eyes:  Negative for photophobia, pain and redness.   Respiratory:  Negative for apnea, cough, chest tightness and shortness of breath.    Cardiovascular:  Negative for chest pain and leg swelling.   Gastrointestinal:  Negative for abdominal pain, diarrhea, nausea and GERD.   Endocrine:        Hair loss   Genitourinary:  Negative  for dysuria and hematuria.        Kidney stones     Musculoskeletal:         Joint pain  Joint tenderness     Skin:  Negative for dry skin, rash, skin lesions and bruise.   Allergic/Immunologic: Positive for environmental allergies.   Neurological:  Negative for dizziness, seizures, syncope, weakness, headache and memory problem.   Hematological:  Negative for adenopathy. Does not bruise/bleed easily.   Psychiatric/Behavioral:  Negative for sleep disturbance, suicidal ideas, depressed mood and stress. The patient is not nervous/anxious.    All other systems reviewed and are negative.       Medications:   Current Outpatient Medications:     acetaminophen (TYLENOL) 500 MG tablet, Take 2 tablets by mouth. every 4 - 6 hours as needed not to exceed 8 tablets per 24hrs, Disp: , Rfl:     aspirin 81 MG EC tablet, Take 1 tablet by mouth Daily., Disp: 30 tablet, Rfl: 0    azaTHIOprine (IMURAN) 50 MG tablet, TAKE 2 TABLETS TWICE DAILY, Disp: 360 tablet, Rfl: 3    bumetanide (BUMEX) 0.5 MG tablet, Take 1 tablet by mouth Every Other Day., Disp: , Rfl:     Droplet Pen Needles 32G X 4 MM misc, , Disp: , Rfl:     fluticasone (FLONASE) 50 MCG/ACT nasal spray, 2 sprays by Each Nare route Daily. Shake well before using., Disp: , Rfl:     hydrALAZINE (APRESOLINE) 25 MG tablet, Take 1 tablet by mouth 2 (Two) Times a Day. WITH FOOD, Disp: , Rfl:     insulin aspart (novoLOG FLEXPEN) 100 UNIT/ML solution pen-injector sc pen, Inject  under the skin into the appropriate area as directed 3 (Three) Times a Day With Meals. Sliding scale, Disp: , Rfl:     insulin NPH (humuLIN N,novoLIN N) 100 UNIT/ML injection, Inject  under the skin into the appropriate area as directed., Disp: , Rfl:     irbesartan-hydrochlorothiazide (AVALIDE) 300-12.5 MG tablet, Take 1 tablet by mouth Daily., Disp: , Rfl:     levocetirizine (XYZAL) 5 MG tablet, Take 1 tablet by mouth Every Evening., Disp: , Rfl:     levothyroxine (SYNTHROID, LEVOTHROID) 75 MCG tablet, Take 1  tablet by mouth Daily., Disp: , Rfl:     metoprolol succinate XL (TOPROL-XL) 25 MG 24 hr tablet, Take 1 tablet by mouth Daily., Disp: 90 tablet, Rfl: 3    montelukast (SINGULAIR) 10 MG tablet, Take 1 tablet by mouth Every Evening., Disp: , Rfl:     nebivolol (BYSTOLIC) 20 MG tablet, Take 1 tablet by mouth Daily., Disp: , Rfl:     nystatin (MYCOSTATIN) 803062 UNIT/GM powder, Apply  topically to the appropriate area as directed 3 (Three) Times a Day., Disp: 60 g, Rfl: 0    oxybutynin (DITROPAN) 5 MG tablet, Take 1 tablet by mouth 2 (Two) Times a Day., Disp: , Rfl:     pravastatin (PRAVACHOL) 20 MG tablet, TAKE 1 TABLET EVERY EVENING, Disp: 30 tablet, Rfl: 0    pregabalin (LYRICA) 75 MG capsule, Take 1 capsule by mouth 2 (Two) Times a Day., Disp: , Rfl:     sacubitril-valsartan (Entresto)  MG tablet, TAKE 1 TABLET BY MOUTH TWICE DAILY, Disp: 180 tablet, Rfl: 1    terazosin (HYTRIN) 2 MG capsule, Take 1 capsule by mouth Every Night., Disp: 30 capsule, Rfl: 6    Tresiba FlexTouch 100 UNIT/ML solution pen-injector injection, Inject 19 Units under the skin into the appropriate area as directed Every Night. (Patient taking differently: Patient takes per sliding scale), Disp: , Rfl:     Upadacitinib ER (Rinvoq) 15 MG tablet sustained-release 24 hour, Take 1 tablet by mouth Daily., Disp: 90 tablet, Rfl: 1    Fluticasone Propionate, Inhal, (Flovent Diskus) 50 MCG/ACT diskus inhaler, Inhale 2 puffs 2 (Two) Times a Day. (Patient not taking: Reported on 6/5/2024), Disp: , Rfl:     Allergies:   Allergies   Allergen Reactions    Atorvastatin Other (See Comments)    Colesevelam Other (See Comments)    Cymbalta [Duloxetine Hcl] Other (See Comments)     Hyponatremia    Famotidine Other (See Comments)    Cephalexin Rash     Tolerated Ceftriaxone    Clindamycin Rash    Hygroton [Chlorthalidone] Rash     Facial rash    Lidocaine Other (See Comments)     Sores in throat    Penicillins Nausea And Vomiting, Rash and Other (See  "Comments)     Has tolerated ceftriaxone  TROUBLE BREATHING       I have reviewed and updated the patient's chief complaint, history of present illness, review of systems, past medical history, surgical history, family history, social history, medications and allergy list as appropriate.     Objective    Vital Signs:   Vitals:    06/05/24 1020   BP: 140/60   Pulse: 64   Temp: 98.2 °F (36.8 °C)   Weight: 60.5 kg (133 lb 6.4 oz)   Height: 149.9 cm (59.02\")   PainSc:   6   PainLoc: Hand     Body mass index is 26.93 kg/m².    Physical Exam:  General: The patient is well-developed and well nourished. Cooperative, alert and oriented x3. Affect is normal. Hydration appears normal.   HEENT: Normocephalic and atraumatic. No notable alopecia. Lids and conjunctiva are normal. Pupils are equal and sclera are clear. Oropharynx is clear   NECK neck is supple without adenopathy, masses or thyromegaly.   CARDIOVASCULAR: Regular rate and rhythm. No murmurs, rubs or gallops   LUNGS: Effort is normal. Lungs are clear bilateral   ABDOMEN: Soft and non-tender without masses or hepatosplenomegaly..   EXTREMITIES: No edema.  No cyanosis or clubbing.  SKIN: Inspection and palpation are normal.  NEUROLOGIC: Gait is normal.    MUSCULOSKELETAL: Complete joint exam was performed. There was full range of motion of the shoulders, elbows, wrists and hands without soft tissue swelling synovitis or deformities except as noted. Her 5th fingered is fused, her 4th PIP is fused, right 2nd PIP is fused. On the left, all her PIPs and DIPs are fused pretty much. There is markedly decreased range of motion of the fingers at the PIP and DIP joints. Left 5th finger very tender to palpate. She has significant squaring in bilateral CMCs. No active synovitis. In the left hand the second MCP has minimal flexion.  She does have decreased flexion of the right second MCP but it still moves fairly well. Hips have good flexion and internal and external rotation.  " Knees have no palpable effusions.  There is full extension and flexion.  Ankles and feet have no soft tissue swelling or synovitis.  BACK:  Straight without scoliosis  Joint Exam 06/05/2024     The following joints were examined and normal:   Left Glenohumeral, Right Glenohumeral, Left Elbow, Right Elbow, Left Wrist, Right Wrist, Left MCP 1, Right MCP 1, Left MCP 2, Right MCP 2, Left MCP 3, Right MCP 3, Left MCP 4, Right MCP 4, Left MCP 5, Right MCP 5, Left IP (thumb), Right IP (thumb), Left PIP 2 (finger), Right PIP 2 (finger), Left PIP 3 (finger), Right PIP 3 (finger), Left PIP 4 (finger), Right PIP 4 (finger), Left PIP 5 (finger), Right PIP 5 (finger), Left Knee, Right Knee       Patient Global: 5 mm  Provider Global: 0 mm    Assessment / Plan      Assessment & Plan  Psoriatic arthritis  Dx: 2001.  Xrays in 2016 with fusion of many PIP and DIPs.  Has fusion of left 2nd digit.    Previous Rx: methotrexate (no change), Enbrel (no change), colchicine (diarrhea), cosentyx, Taltz.  Current Rx:  Rinvoq.  XR hands 10/2023 look like PsA with arthritis mutilans vs OA and fusion of multiple joints..    I see no inflammation.   Swollen joint count is: 0, Tender joint count is: 0, Physician global is: 0, VAS is: 6, Patient global is: 6.  - This is likely more peripheral neuropathy/cervical lesion related.  - She is tolerating Rinvoq.   - She had carpal tunnel release on the left with Dr. Chino at [facility].  - She was started on Imuran with neurology [facility].  This is for demyelinating disease that is unspecified.  Last labs did show elevated LFT and mild decrease in wbc. If not necessary, I would suggest stopping Imuran if ok with neuro. We discussed this at length. She will discuss with neuro.   -Labs today.   F/u in 3 months.   Peripheral polyneuropathy  ?demyelinating disease vs. peripheral neuropathy with her type I DM  Cymbalta caused hyponatremia.   She had left carpal tunnel release with Dr. Chino with [facility] last  month.  Stitches removed yesterday.  - She is now following with Dr. Nelson, neurology.  - Pregabalin and imuran per Dr Laird.  Immunosuppression due to drug therapy  Rinvoq and azathioprine per neurology.  I urged her to discuss stopping Imuran if ok with neuro.   1. Hold if the patient develops infection.   2. Avoid live vaccines while on this medication.   3. No recent serious infections  4. Also hold this medication perioperatively if the patient is going to have a surgical procedure.  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  Demyelinating disease  Now followed by Dr Laird.  - Cervical lesion found on 7/2022 MRI.  She will have repeat MRI in 6-12 months with neurology.  - told she has an unspecified demyelinating lesion.    - She has started on Imuran 50mg twice per day  - Encouraged her to speak with neurologist regarding elevated LFT and possible d/c of Imuran. .  Osteopenia of multiple sites  Followed by her PCP.    Orders Placed This Encounter   Procedures    CBC Auto Differential    Comprehensive Metabolic Panel    C-reactive Protein    Sedimentation Rate             Follow Up:   Return in about 3 months (around 9/5/2024).        Anthony Edwards MD  [facility]

## 2024-05-30 NOTE — ASSESSMENT & PLAN NOTE
Dx: 2001.  Xrays in 2016 with fusion of many PIP and DIPs.  Has fusion of left 2nd digit.    Previous Rx: methotrexate (no change), Enbrel (no change), colchicine (diarrhea), cosentyx, Taltz.  Current Rx:  Rinvoq.  XR hands 10/2023 look like PsA with arthritis mutilans vs OA and fusion of multiple joints..    I see no inflammation.   Swollen joint count is: 0, Tender joint count is: 0, Physician global is: 0, VAS is: 6, Patient global is: 6.  - This is likely more peripheral neuropathy/cervical lesion related.  - She is tolerating Rinvoq.   - She had carpal tunnel release on the left with Dr. Chino at [facility].  - She was started on Imuran with neurology [facility].  This is for demyelinating disease that is unspecified.  Last labs did show elevated LFT and mild decrease in wbc. If not necessary, I would suggest stopping Imuran if ok with neuro. We discussed this at length. She will discuss with neuro.   -Labs today.   F/u in 3 months.

## 2024-05-30 NOTE — ASSESSMENT & PLAN NOTE
?demyelinating disease vs. peripheral neuropathy with her type I DM  Cymbalta caused hyponatremia.   She had left carpal tunnel release with Dr. Chino with [facility] last month.  Stitches removed yesterday.  - She is now following with Dr. Nelson, neurology.  - Pregabalin and imuran per Dr Laird.

## 2024-05-31 ENCOUNTER — SPECIALTY PHARMACY (OUTPATIENT)
Dept: GENERAL RADIOLOGY | Facility: HOSPITAL | Age: 79
End: 2024-05-31
Payer: MEDICARE

## 2024-05-31 RX ORDER — UPADACITINIB 15 MG/1
15 TABLET, EXTENDED RELEASE ORAL DAILY
Qty: 90 TABLET | Refills: 1 | Status: SHIPPED | OUTPATIENT
Start: 2024-05-31

## 2024-06-04 PROBLEM — R21 RASH: Status: ACTIVE | Noted: 2024-06-04

## 2024-06-04 PROBLEM — M54.12 CERVICAL RADICULOPATHY: Status: ACTIVE | Noted: 2024-06-04

## 2024-06-05 ENCOUNTER — OFFICE VISIT (OUTPATIENT)
Age: 79
End: 2024-06-05
Payer: MEDICARE

## 2024-06-05 ENCOUNTER — TELEPHONE (OUTPATIENT)
Age: 79
End: 2024-06-05

## 2024-06-05 VITALS
DIASTOLIC BLOOD PRESSURE: 60 MMHG | HEIGHT: 59 IN | TEMPERATURE: 98.2 F | HEART RATE: 64 BPM | BODY MASS INDEX: 26.89 KG/M2 | WEIGHT: 133.4 LBS | SYSTOLIC BLOOD PRESSURE: 140 MMHG

## 2024-06-05 DIAGNOSIS — L40.50 PSORIATIC ARTHRITIS: Primary | ICD-10-CM

## 2024-06-05 DIAGNOSIS — G62.9 PERIPHERAL POLYNEUROPATHY: ICD-10-CM

## 2024-06-05 DIAGNOSIS — Z79.899 IMMUNOSUPPRESSION DUE TO DRUG THERAPY: ICD-10-CM

## 2024-06-05 DIAGNOSIS — D84.821 IMMUNOSUPPRESSION DUE TO DRUG THERAPY: ICD-10-CM

## 2024-06-05 DIAGNOSIS — M85.89 OSTEOPENIA OF MULTIPLE SITES: ICD-10-CM

## 2024-06-05 DIAGNOSIS — M15.4 EROSIVE OSTEOARTHRITIS: ICD-10-CM

## 2024-06-05 DIAGNOSIS — G37.9 DEMYELINATING DISEASE: ICD-10-CM

## 2024-06-05 RX ORDER — UPADACITINIB 15 MG/1
15 TABLET, EXTENDED RELEASE ORAL DAILY
Qty: 90 TABLET | Refills: 3 | Status: SHIPPED | OUTPATIENT
Start: 2024-06-05

## 2024-06-07 ENCOUNTER — SPECIALTY PHARMACY (OUTPATIENT)
Age: 79
End: 2024-06-07
Payer: MEDICARE

## 2024-06-07 ENCOUNTER — TELEPHONE (OUTPATIENT)
Age: 79
End: 2024-06-07

## 2024-09-06 ENCOUNTER — APPOINTMENT (OUTPATIENT)
Dept: GENERAL RADIOLOGY | Facility: HOSPITAL | Age: 79
End: 2024-09-06
Payer: MEDICARE

## 2024-09-06 ENCOUNTER — HOSPITAL ENCOUNTER (EMERGENCY)
Facility: HOSPITAL | Age: 79
Discharge: HOME | End: 2024-09-06
Attending: EMERGENCY MEDICINE
Payer: MEDICARE

## 2024-09-06 VITALS
BODY MASS INDEX: 27.42 KG/M2 | HEIGHT: 59 IN | DIASTOLIC BLOOD PRESSURE: 83 MMHG | RESPIRATION RATE: 18 BRPM | OXYGEN SATURATION: 97 % | HEART RATE: 83 BPM | WEIGHT: 136 LBS | SYSTOLIC BLOOD PRESSURE: 192 MMHG | TEMPERATURE: 98.3 F

## 2024-09-06 DIAGNOSIS — I10 HYPERTENSION, UNSPECIFIED TYPE: ICD-10-CM

## 2024-09-06 DIAGNOSIS — S92.355A CLOSED NONDISPLACED FRACTURE OF FIFTH METATARSAL BONE OF LEFT FOOT, INITIAL ENCOUNTER: Primary | ICD-10-CM

## 2024-09-06 PROCEDURE — 99283 EMERGENCY DEPT VISIT LOW MDM: CPT

## 2024-09-06 PROCEDURE — 73630 X-RAY EXAM OF FOOT: CPT

## 2024-09-06 RX ORDER — METOPROLOL SUCCINATE 25 MG/1
25 TABLET, EXTENDED RELEASE ORAL DAILY
Qty: 90 TABLET | Refills: 0 | OUTPATIENT
Start: 2024-09-06

## 2024-09-06 RX ADMIN — SACUBITRIL AND VALSARTAN 1 TABLET: 97; 103 TABLET, FILM COATED ORAL at 21:06

## 2024-09-07 NOTE — ED PROVIDER NOTES
Subjective   History of Present Illness  Pleasant patient who presents to the ER for left foot pain.  It occurred when she was walking in the yard feeding the birds.  She is able to walk on it.  She presents wearing sandals and an Ace wrap.  She is able to weight-bear without much difficulty.  She is hypertensive on arrival.  She denies any chest pain or difficulty breathing.  She is not taking her nighttime blood pressure medication yet.  She follows last in clinic orthopedics.        Review of Systems    Past Medical History:   Diagnosis Date    Abnormal ECG Check [facility] Date or [facility] Records    Brought to  from [facility]    Acute sinusitis 02/06/2023    Anemia     Asthma     CAP (community acquired pneumonia) 02/06/2023    CHF (congestive heart failure) 07/20/2023    Chronic kidney disease     pt told it was dx from Dr Baca and to not eat much protein    Congenital heart disease 1950's Patent 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    Surgery [facility] 1950s close    CTS (carpal tunnel syndrome)     Psoriatic arthritis hands could be    Deep vein thrombosis No on blood clots but have a blood clotting disorder called Leiden Factor 5    Demyelinating disease of central nervous system, unspecified 03/09/2023    Diabetes     Impression: Patient states that her diabetes is not doing well.  I asked her to go ahead and try to talk to her endocrinokogist.  She is still having the lower extremity neuropathic pan, but not enough for me to administer medication.    Disease of thyroid gland     Diverticulitis of colon     Erosive osteoarthritis 05/30/2024    Factor 5 Leiden mutation, heterozygous 2012    Head injury     Hyperlipidemia     Hypertension     Medication monitoring encounter     Impression:  She has renal insufficiency.  She is just taking Tylenol.    Movement disorder 10/2021    knee replacement left knww    Murmur, cardiac     Neuropathy in diabetes     redness swelling legs    Osteopenia     Story:  Femoral Neck Impression: Up-ro-date on bone density scan.  Follow up bone density every 2 years.    Peripheral neuropathy 05/03/2022    hands, arms. 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  I went over her exercises and told her to get a shoe insert,    Psoriasis     Impression: No rash    Psoriatic arthritis     hands    Sleep apnea Always    diabetic do not sleep well up and down    Stroke had one don't know when    showed on MRI Brain    Type 1 diabetes        Allergies   Allergen Reactions    Atorvastatin Other (See Comments)    Colesevelam Other (See Comments)    Cymbalta [Duloxetine Hcl] Other (See Comments)     Hyponatremia    Famotidine Other (See Comments)    Cephalexin Rash     Tolerated Ceftriaxone    Clindamycin Rash    Hygroton [Chlorthalidone] Rash     Facial rash    Lidocaine Other (See Comments)     Sores in throat    Penicillins Nausea And Vomiting, Rash and Other (See Comments)     Has tolerated ceftriaxone  TROUBLE BREATHING       Past Surgical History:   Procedure Laterality Date    CARDIAC CATHETERIZATION  1952 2 of them    Patent Dictus operation    CATARACT EXTRACTION Bilateral     COLONOSCOPY      ENDOSCOPY N/A 10/20/2020    Procedure: ESOPHAGOGASTRODUODENOSCOPY;  Surgeon: Brunner, Mark I, MD;  Location: [facility] ENDOSCOPY;  Service: Gastroenterology;  Laterality: N/A;    HAND SURGERY Left 1987    no hardware    KNEE ARTHROPLASTY      KNEE SURGERY Left     PATENT DUCTUS ARTERIOUS LIGATION      REPLACEMENT TOTAL KNEE Left        Family History   Problem Relation Age of Onset    Cancer Mother     Pancreatic cancer Mother     Stroke Mother         Mini strokes    Cancer Father     Lung cancer Father     Cancer Sister     Colon cancer Sister     Diabetes Sister     Diabetes Brother     Breast cancer Neg Hx     Ovarian cancer Neg Hx        Social History     Socioeconomic History    Marital status: Single   Tobacco Use    Smoking  status: Never     Passive exposure: Never    Smokeless tobacco: Never   Vaping Use    Vaping status: Never Used   Substance and Sexual Activity    Alcohol use: No    Drug use: No    Sexual activity: Not Currently     Partners: Male     Birth control/protection: Abstinence           Objective   Physical Exam  Constitutional:       Appearance: She is well-developed.   HENT:      Head: Normocephalic and atraumatic.      Right Ear: External ear normal.      Left Ear: External ear normal.      Nose: Nose normal.   Eyes:      Conjunctiva/sclera: Conjunctivae normal.      Pupils: Pupils are equal, round, and reactive to light.   Cardiovascular:      Rate and Rhythm: Normal rate and regular rhythm.      Heart sounds: Normal heart sounds.   Pulmonary:      Effort: Pulmonary effort is normal.      Breath sounds: Normal breath sounds.   Abdominal:      General: Bowel sounds are normal.      Palpations: Abdomen is soft.   Musculoskeletal:         General: Normal range of motion.      Cervical back: Normal range of motion and neck supple.      Comments: Good pulses good movement in her toes.  Good sensation good pulses.  No deformity.   Skin:     General: Skin is warm and dry.   Neurological:      Mental Status: She is alert and oriented to person, place, and time.   Psychiatric:         Behavior: Behavior normal.         Thought Content: Thought content normal.         Judgment: Judgment normal.         Procedures           ED Course  ED Course as of 09/06/24 2037   Fri Sep 06, 2024   2030 Pleasant patient.  Will place the patient in a walking boot patient is actually walking very well without a boot and just using an Ace wrap and sandals.  Will place her in a boot.  She will follow-up with orthopedics.  With [facility].  We will repeat her blood pressure as well she is due for her nighttime medications.  Patient well aware the signs of worsening's.  All thankful and agreeable. [JM]      ED Course User Index  [JOLANTA] Rebel  JAGDISH Castro                                        XR Foot 3+ View Left   Final Result   Impression:   Acute nondisplaced fracture involving proximal fifth metatarsal without intra-articular extension or joint malalignment.         Electronically Signed: Peter Kulkarni MD     9/6/2024 7:48 PM EDT     Workstation ID: [id number]               Medical Decision Making  Amount and/or Complexity of Data Reviewed  External Data Reviewed: radiology.  Radiology: ordered. Decision-making details documented in ED Course.     Details: X-ray interpreted by myself apparent fifth metatarsal fracture we will check with the official radiologist reading as well.        Final diagnoses:   Closed nondisplaced fracture of fifth metatarsal bone of left foot, initial encounter   Hypertension, unspecified type       ED Disposition  ED Disposition       ED Disposition   Discharge    Condition   Stable    Comment   --               [facility] ORTHOPEDICS  [address]   [facility] [address]  [phone]  Schedule an appointment as soon as possible for a visit       [facility] EMERGENCY DEPARTMENT  [address] [facility] [address] [phone]    If symptoms worsen         Medication List        Changed      Tresiba FlexTouch 100 UNIT/ML solution pen-injector injection  Generic drug: insulin degludec  Inject 19 Units under the skin into the appropriate area as directed Every Night.  What changed: additional instructions                 Matthew Luque APRN  09/06/24 2037

## 2024-09-23 ENCOUNTER — APPOINTMENT (OUTPATIENT)
Dept: GENERAL RADIOLOGY | Facility: HOSPITAL | Age: 79
End: 2024-09-23
Payer: MEDICARE

## 2024-09-23 ENCOUNTER — HOSPITAL ENCOUNTER (EMERGENCY)
Facility: HOSPITAL | Age: 79
Discharge: HOME | End: 2024-09-24
Attending: EMERGENCY MEDICINE | Admitting: EMERGENCY MEDICINE
Payer: MEDICARE

## 2024-09-23 VITALS
BODY MASS INDEX: 27.11 KG/M2 | DIASTOLIC BLOOD PRESSURE: 65 MMHG | HEART RATE: 95 BPM | WEIGHT: 134.48 LBS | SYSTOLIC BLOOD PRESSURE: 172 MMHG | OXYGEN SATURATION: 98 % | HEIGHT: 59 IN | TEMPERATURE: 98 F | RESPIRATION RATE: 18 BRPM

## 2024-09-23 DIAGNOSIS — R53.83 MALAISE AND FATIGUE: Primary | ICD-10-CM

## 2024-09-23 DIAGNOSIS — Z86.79 HISTORY OF HYPERTENSION: ICD-10-CM

## 2024-09-23 DIAGNOSIS — R53.81 MALAISE AND FATIGUE: Primary | ICD-10-CM

## 2024-09-23 DIAGNOSIS — Z86.39 HISTORY OF DIABETES MELLITUS: ICD-10-CM

## 2024-09-23 DIAGNOSIS — Z86.79 HISTORY OF CHF (CONGESTIVE HEART FAILURE): ICD-10-CM

## 2024-09-23 DIAGNOSIS — B37.31 VAGINAL YEAST INFECTION: ICD-10-CM

## 2024-09-23 DIAGNOSIS — S92.902A CLOSED FRACTURE OF LEFT FOOT, INITIAL ENCOUNTER: ICD-10-CM

## 2024-09-23 DIAGNOSIS — R26.2 IMPAIRED AMBULATION: ICD-10-CM

## 2024-09-23 DIAGNOSIS — Z87.440 HISTORY OF UTI: ICD-10-CM

## 2024-09-23 LAB
ALBUMIN SERPL-MCNC: 3.7 G/DL (ref 3.5–5.2)
ALBUMIN/GLOB SERPL: 1.2 G/DL
ALP SERPL-CCNC: 82 U/L (ref 39–117)
ALT SERPL W P-5'-P-CCNC: 17 U/L (ref 1–33)
ANION GAP SERPL CALCULATED.3IONS-SCNC: 13 MMOL/L (ref 5–15)
AST SERPL-CCNC: 24 U/L (ref 1–32)
BASOPHILS # BLD AUTO: 0.02 10*3/MM3 (ref 0–0.2)
BASOPHILS NFR BLD AUTO: 0.2 % (ref 0–1.5)
BILIRUB SERPL-MCNC: 0.5 MG/DL (ref 0–1.2)
BUN SERPL-MCNC: 27 MG/DL (ref 8–23)
BUN/CREAT SERPL: 26.5 (ref 7–25)
CALCIUM SPEC-SCNC: 8.7 MG/DL (ref 8.6–10.5)
CHLORIDE SERPL-SCNC: 103 MMOL/L (ref 98–107)
CK SERPL-CCNC: 175 U/L (ref 20–180)
CO2 SERPL-SCNC: 25 MMOL/L (ref 22–29)
CREAT SERPL-MCNC: 1.02 MG/DL (ref 0.57–1)
D-LACTATE SERPL-SCNC: 1 MMOL/L (ref 0.5–2)
DEPRECATED RDW RBC AUTO: 46.8 FL (ref 37–54)
EGFRCR SERPLBLD CKD-EPI 2021: 56.1 ML/MIN/1.73
EOSINOPHIL # BLD AUTO: 0.63 10*3/MM3 (ref 0–0.4)
EOSINOPHIL NFR BLD AUTO: 6 % (ref 0.3–6.2)
ERYTHROCYTE [DISTWIDTH] IN BLOOD BY AUTOMATED COUNT: 13.5 % (ref 12.3–15.4)
GLOBULIN UR ELPH-MCNC: 3.2 GM/DL
GLUCOSE SERPL-MCNC: 262 MG/DL (ref 65–99)
HCT VFR BLD AUTO: 37 % (ref 34–46.6)
HGB BLD-MCNC: 11.9 G/DL (ref 12–15.9)
HOLD SPECIMEN: NORMAL
IMM GRANULOCYTES # BLD AUTO: 0.04 10*3/MM3 (ref 0–0.05)
IMM GRANULOCYTES NFR BLD AUTO: 0.4 % (ref 0–0.5)
LYMPHOCYTES # BLD AUTO: 0.19 10*3/MM3 (ref 0.7–3.1)
LYMPHOCYTES NFR BLD AUTO: 1.8 % (ref 19.6–45.3)
MAGNESIUM SERPL-MCNC: 1.9 MG/DL (ref 1.6–2.4)
MCH RBC QN AUTO: 30.5 PG (ref 26.6–33)
MCHC RBC AUTO-ENTMCNC: 32.2 G/DL (ref 31.5–35.7)
MCV RBC AUTO: 94.9 FL (ref 79–97)
MONOCYTES # BLD AUTO: 0.63 10*3/MM3 (ref 0.1–0.9)
MONOCYTES NFR BLD AUTO: 6 % (ref 5–12)
MYOGLOBIN SERPL-MCNC: 102.2 NG/ML (ref 25–58)
NEUTROPHILS NFR BLD AUTO: 8.97 10*3/MM3 (ref 1.7–7)
NEUTROPHILS NFR BLD AUTO: 85.6 % (ref 42.7–76)
NRBC BLD AUTO-RTO: 0 /100 WBC (ref 0–0.2)
PLATELET # BLD AUTO: 197 10*3/MM3 (ref 140–450)
PMV BLD AUTO: 11 FL (ref 6–12)
POTASSIUM SERPL-SCNC: 3.9 MMOL/L (ref 3.5–5.2)
PROT SERPL-MCNC: 6.9 G/DL (ref 6–8.5)
RBC # BLD AUTO: 3.9 10*6/MM3 (ref 3.77–5.28)
SODIUM SERPL-SCNC: 141 MMOL/L (ref 136–145)
T4 FREE SERPL-MCNC: 1.43 NG/DL (ref 0.92–1.68)
TROPONIN T SERPL HS-MCNC: 27 NG/L
TSH SERPL DL<=0.05 MIU/L-ACNC: 0.39 UIU/ML (ref 0.27–4.2)
WBC NRBC COR # BLD AUTO: 10.48 10*3/MM3 (ref 3.4–10.8)
WHOLE BLOOD HOLD COAG: NORMAL
WHOLE BLOOD HOLD SPECIMEN: NORMAL

## 2024-09-23 PROCEDURE — 85025 COMPLETE CBC W/AUTO DIFF WBC: CPT

## 2024-09-23 PROCEDURE — 84439 ASSAY OF FREE THYROXINE: CPT | Performed by: PHYSICIAN ASSISTANT

## 2024-09-23 PROCEDURE — 99284 EMERGENCY DEPT VISIT MOD MDM: CPT

## 2024-09-23 PROCEDURE — 71045 X-RAY EXAM CHEST 1 VIEW: CPT

## 2024-09-23 PROCEDURE — 82550 ASSAY OF CK (CPK): CPT | Performed by: PHYSICIAN ASSISTANT

## 2024-09-23 PROCEDURE — 83874 ASSAY OF MYOGLOBIN: CPT | Performed by: PHYSICIAN ASSISTANT

## 2024-09-23 PROCEDURE — 80053 COMPREHEN METABOLIC PANEL: CPT

## 2024-09-23 PROCEDURE — 84443 ASSAY THYROID STIM HORMONE: CPT | Performed by: PHYSICIAN ASSISTANT

## 2024-09-23 PROCEDURE — 83735 ASSAY OF MAGNESIUM: CPT

## 2024-09-23 PROCEDURE — 84484 ASSAY OF TROPONIN QUANT: CPT

## 2024-09-23 PROCEDURE — 93005 ELECTROCARDIOGRAM TRACING: CPT | Performed by: EMERGENCY MEDICINE

## 2024-09-23 PROCEDURE — 83605 ASSAY OF LACTIC ACID: CPT | Performed by: PHYSICIAN ASSISTANT

## 2024-09-23 RX ORDER — SODIUM CHLORIDE 0.9 % (FLUSH) 0.9 %
10 SYRINGE (ML) INJECTION AS NEEDED
Status: DISCONTINUED | OUTPATIENT
Start: 2024-09-23 | End: 2024-09-24 | Stop reason: HOSPADM

## 2024-09-24 ENCOUNTER — APPOINTMENT (OUTPATIENT)
Dept: CT IMAGING | Facility: HOSPITAL | Age: 79
End: 2024-09-24
Payer: MEDICARE

## 2024-09-24 LAB
B PARAPERT DNA SPEC QL NAA+PROBE: NOT DETECTED
B PERT DNA SPEC QL NAA+PROBE: NOT DETECTED
BACTERIA UR QL AUTO: ABNORMAL /HPF
BILIRUB UR QL STRIP: NEGATIVE
C PNEUM DNA NPH QL NAA+NON-PROBE: NOT DETECTED
CLARITY UR: CLEAR
COLOR UR: YELLOW
FLUAV SUBTYP SPEC NAA+PROBE: NOT DETECTED
FLUBV RNA ISLT QL NAA+PROBE: NOT DETECTED
GLUCOSE UR STRIP-MCNC: NEGATIVE MG/DL
HADV DNA SPEC NAA+PROBE: NOT DETECTED
HCOV 229E RNA SPEC QL NAA+PROBE: NOT DETECTED
HCOV HKU1 RNA SPEC QL NAA+PROBE: NOT DETECTED
HCOV NL63 RNA SPEC QL NAA+PROBE: NOT DETECTED
HCOV OC43 RNA SPEC QL NAA+PROBE: NOT DETECTED
HGB UR QL STRIP.AUTO: NEGATIVE
HMPV RNA NPH QL NAA+NON-PROBE: NOT DETECTED
HPIV1 RNA ISLT QL NAA+PROBE: NOT DETECTED
HPIV2 RNA SPEC QL NAA+PROBE: NOT DETECTED
HPIV3 RNA NPH QL NAA+PROBE: NOT DETECTED
HPIV4 P GENE NPH QL NAA+PROBE: NOT DETECTED
HYALINE CASTS UR QL AUTO: ABNORMAL /LPF
KETONES UR QL STRIP: NEGATIVE
LEUKOCYTE ESTERASE UR QL STRIP.AUTO: ABNORMAL
M PNEUMO IGG SER IA-ACNC: NOT DETECTED
NITRITE UR QL STRIP: NEGATIVE
PH UR STRIP.AUTO: 5.5 [PH] (ref 5–8)
PROT UR QL STRIP: NEGATIVE
QT INTERVAL: 416 MS
QTC INTERVAL: 474 MS
RBC # UR STRIP: ABNORMAL /HPF
REF LAB TEST METHOD: ABNORMAL
RHINOVIRUS RNA SPEC NAA+PROBE: NOT DETECTED
RSV RNA NPH QL NAA+NON-PROBE: NOT DETECTED
SARS-COV-2 RNA NPH QL NAA+NON-PROBE: NOT DETECTED
SP GR UR STRIP: 1.01 (ref 1–1.03)
SQUAMOUS #/AREA URNS HPF: ABNORMAL /HPF
TROPONIN T SERPL HS-MCNC: 24 NG/L
UROBILINOGEN UR QL STRIP: ABNORMAL
WBC # UR STRIP: ABNORMAL /HPF

## 2024-09-24 PROCEDURE — 0202U NFCT DS 22 TRGT SARS-COV-2: CPT | Performed by: PHYSICIAN ASSISTANT

## 2024-09-24 PROCEDURE — 84484 ASSAY OF TROPONIN QUANT: CPT | Performed by: PHYSICIAN ASSISTANT

## 2024-09-24 PROCEDURE — 25810000003 SODIUM CHLORIDE 0.9 % SOLUTION: Performed by: PHYSICIAN ASSISTANT

## 2024-09-24 PROCEDURE — 36415 COLL VENOUS BLD VENIPUNCTURE: CPT

## 2024-09-24 PROCEDURE — 70450 CT HEAD/BRAIN W/O DYE: CPT

## 2024-09-24 PROCEDURE — 93005 ELECTROCARDIOGRAM TRACING: CPT | Performed by: EMERGENCY MEDICINE

## 2024-09-24 PROCEDURE — 81001 URINALYSIS AUTO W/SCOPE: CPT | Performed by: EMERGENCY MEDICINE

## 2024-09-24 RX ORDER — FLUCONAZOLE 150 MG/1
150 TABLET ORAL ONCE
Status: DISCONTINUED | OUTPATIENT
Start: 2024-09-24 | End: 2024-09-24 | Stop reason: HOSPADM

## 2024-09-24 RX ADMIN — SODIUM CHLORIDE 1000 ML: 9 INJECTION, SOLUTION INTRAVENOUS at 01:04

## 2024-09-24 NOTE — DISCHARGE INSTRUCTIONS
ER workup was reassuring.  EKG was normal and cardiac troponins x 2 sets were normal.  Chest x-ray revealed no evidence of pneumonia or volume overload.  CT the brain without contrast was normal.  CBC and chemistries were essentially normal.  Respiratory PCR panel tested negative for 10-12 viruses, most notably testing negative for COVID-19 and influenza.  Urinalysis reveals resolved UTI.  Patient needs to finish course of antibiotics.  Encourage fluids.  Take all routine daily medications.  There is no evidence of acute illness.  Recommend close PCP follow-up for recheck.  Return to the ER if worsening symptoms.

## 2024-09-24 NOTE — ED PROVIDER NOTES
"Subjective   History of Present Illness  This is a 79-year-old female that presents the ER with malaise/fatigue and generalized weakness.  Patient says that she was seen recently in our ER on 9/6/2024 after twisting her left foot in the backyard while feeding birds.  Patient ended up having a nondisplaced fracture to the proximal fifth metatarsal.  She was evaluated by Dr. Laird, orthopedist, on 9/12/2024 and placed in a boot.  Patient says that activities have been hard on her at home due to left foot pain and wearing the boot.  She got up this morning and went to take a shower.  She said it took quite a while and felt like the shower \"took it out of me\".  She decided to lay down on the couch for a little bit.  She felt extremely fatigued.  She laid down around 9:30 AM and when she awoke, it was 1730.  Patient had no idea that she had laid on the couch for 8 hours.  She got up and ate her supper.  She denied any complaints of pain.  She denies chest pain or shortness of breath.  She denies any abdominal pain or nausea/vomiting/diarrhea.  She reports recent diagnosis of UTI per PCP approximately 2 weeks ago.  She was placed on antibiotic for 10 days and has 4 more days of antibiotics.  She feels like it is giving her a yeast infection in her vagina feels irritated and red.  She denies any abdominal pain or flank/CVA pain.  There is no urine culture from recent urinalysis in epic.  Patient denies any recent URI symptoms.  She denies any headache, dizziness, or falls earlier today.  She denies any difficulty with speech or thought forming.  She denies any unilateral extremity weakness or numbness/tingling.  Patient feels back to baseline, but she lives alone, and decided to be evaluated due to increased fatigue today.    History provided by:  Patient and medical records  Weakness - Generalized  Chronicity:  New  Context: urinary tract infection (recent UTI approximately 2 weeks ago.)    Context comment:  History " "of left foot fx from twisting foot in yard on 9/6/24. Seen per Ortho, Dr. Laird, 9/12/24 and placed in boot. Today, pt was \"out of it\" on the couch from [id number]. No URI sxs.  Relieved by:  Nothing  Worsened by:  Nothing  Ineffective treatments:  None tried  Associated symptoms: lethargy    Associated symptoms: no abdominal pain, no aphasia, no arthralgias, no ataxia, no chest pain, no cough, no diarrhea, no difficulty walking, no dizziness, no dysphagia, no dysuria, no numbness in extremities, no falls, no fever, no foul-smelling urine, no frequency, no headaches, no hematochezia, no loss of consciousness, no melena, no myalgias, no nausea, no near-syncope, no seizures, no sensory-motor deficit, no shortness of breath, no stroke symptoms, no syncope, no urgency, no vision change and no vomiting    Risk factors comment:  Pt was started on recent abx for UTI. Pt says the course was at least 10 days. Finishing it up now.      Review of Systems   Constitutional:  Positive for activity change and fatigue. Negative for appetite change, chills, diaphoresis and fever.   HENT: Negative.  Negative for congestion, ear pain, postnasal drip, rhinorrhea, sinus pressure, sinus pain, sneezing and sore throat.    Respiratory: Negative.  Negative for cough and shortness of breath.    Cardiovascular: Negative.  Negative for chest pain, palpitations, leg swelling, syncope and near-syncope.   Gastrointestinal: Negative.  Negative for abdominal pain, blood in stool, constipation, diarrhea, dysphagia, hematochezia, melena, nausea and vomiting.   Genitourinary:  Positive for vaginal pain (Vaginal irritation and redness with recent antibiotic course for UTI.  Patient believes she has a yeast infection.). Negative for dysuria, flank pain, frequency, urgency, vaginal bleeding and vaginal discharge.        Recent UTI. Prescribed approximately 2 wks ago. Pt has 4 more days of antibiotics. Pt believes she has a vaginal yeast infection. " Vagina is sore and red.   Musculoskeletal: Negative.  Negative for arthralgias, back pain, falls, gait problem and myalgias.        No recent falls today.   Skin: Negative.    Neurological:  Positive for weakness. Negative for dizziness, seizures, loss of consciousness, syncope, facial asymmetry, light-headedness and headaches.   All other systems reviewed and are negative.      Past Medical History:   Diagnosis Date    Abnormal ECG Check [facility] Date or [facility]    Brought to  from [facility]    Acute sinusitis 02/06/2023    Anemia     Asthma     CAP (community acquired pneumonia) 02/06/2023    CHF (congestive heart failure) 07/20/2023    Chronic kidney disease     pt told it was dx from Dr Baca and to not eat much protein    Congenital heart disease 1950's Patent 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    Surgery [facility] 1950s close    CTS (carpal tunnel syndrome)     Psoriatic arthritis hands could be    Deep vein thrombosis No on blood clots but have a blood clotting disorder called Leiden Factor 5    Demyelinating disease of central nervous system, unspecified 03/09/2023    Diabetes     Impression: Patient states that her diabetes is not doing well.  I asked her to go ahead and try to talk to her endocrinokogist.  She is still having the lower extremity neuropathic pan, but not enough for me to administer medication.    Disease of thyroid gland     Diverticulitis of colon     Erosive osteoarthritis 05/30/2024    Factor 5 Leiden mutation, heterozygous 2012    Head injury     Hyperlipidemia     Hypertension     Medication monitoring encounter     Impression:  She has renal insufficiency.  She is just taking Tylenol.    Movement disorder 10/2021    knee replacement left knww    Murmur, cardiac     Neuropathy in diabetes     redness swelling legs    Osteopenia     Story: Femoral Neck Impression: Up-ro-date on bone density scan.  Follow up bone density every 2 years.    Peripheral neuropathy 05/03/2022     hands, arms. 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  I went over her exercises and told her to get a shoe insert,    Psoriasis     Impression: No rash    Psoriatic arthritis     hands    Sleep apnea Always    diabetic do not sleep well up and down    Stroke had one don't know when    showed on MRI Brain    Type 1 diabetes        Allergies   Allergen Reactions    Atorvastatin Other (See Comments)    Colesevelam Other (See Comments)    Cymbalta [Duloxetine Hcl] Other (See Comments)     Hyponatremia    Famotidine Other (See Comments)    Cephalexin Rash     Tolerated Ceftriaxone    Clindamycin Rash    Hygroton [Chlorthalidone] Rash     Facial rash    Lidocaine Other (See Comments)     Sores in throat    Penicillins Nausea And Vomiting, Rash and Other (See Comments)     Has tolerated ceftriaxone  TROUBLE BREATHING       Past Surgical History:   Procedure Laterality Date    CARDIAC CATHETERIZATION  1952 2 of them    Patent Dictus operation    CATARACT EXTRACTION Bilateral     COLONOSCOPY      ENDOSCOPY N/A 10/20/2020    Procedure: ESOPHAGOGASTRODUODENOSCOPY;  Surgeon: Brunner, Mark I, MD;  Location: [facility] ENDOSCOPY;  Service: Gastroenterology;  Laterality: N/A;    HAND SURGERY Left 1987    no hardware    KNEE ARTHROPLASTY      KNEE SURGERY Left     PATENT DUCTUS ARTERIOUS LIGATION      REPLACEMENT TOTAL KNEE Left        Family History   Problem Relation Age of Onset    Cancer Mother     Pancreatic cancer Mother     Stroke Mother         Mini strokes    Cancer Father     Lung cancer Father     Cancer Sister     Colon cancer Sister     Diabetes Sister     Diabetes Brother     Breast cancer Neg Hx     Ovarian cancer Neg Hx        Social History     Socioeconomic History    Marital status: Single   Tobacco Use    Smoking status: Never     Passive exposure: Never    Smokeless tobacco: Never   Vaping Use    Vaping status: Never Used   Substance and Sexual  Activity    Alcohol use: No    Drug use: No    Sexual activity: Not Currently     Partners: Male     Birth control/protection: Abstinence           Objective   Physical Exam  Vitals and nursing note reviewed.   Constitutional:       General: She is not in acute distress.     Appearance: Normal appearance. She is not ill-appearing, toxic-appearing or diaphoretic.      Comments: Pleasant elderly female.  Patient does not appear ill.  Nontoxic.   HENT:      Head: Normocephalic and atraumatic.      Nose: Nose normal. No congestion or rhinorrhea.      Comments: No nasal congestion or rhinorrhea     Mouth/Throat:      Mouth: Mucous membranes are moist.      Pharynx: Oropharynx is clear. No pharyngeal swelling, oropharyngeal exudate, posterior oropharyngeal erythema or uvula swelling.      Comments: Oral mucous membranes are moist.  Posterior pharynx is not erythematous.  No exudate or vesicles.  Eyes:      Extraocular Movements: Extraocular movements intact.      Conjunctiva/sclera: Conjunctivae normal.      Pupils: Pupils are equal, round, and reactive to light.   Cardiovascular:      Rate and Rhythm: Normal rate and regular rhythm. No extrasystoles are present.     Pulses: Normal pulses.      Heart sounds: Normal heart sounds.      Comments: Regular rate and rhythm.  No ectopy.  No pedal edema to lower extremities  Pulmonary:      Effort: Pulmonary effort is normal. No tachypnea, accessory muscle usage or retractions.      Breath sounds: Normal breath sounds. No decreased breath sounds, wheezing or rhonchi.      Comments: Lungs are clear to auscultation bilaterally  Abdominal:      General: Bowel sounds are normal. There is no distension.      Palpations: Abdomen is soft.      Tenderness: There is no abdominal tenderness. There is no right CVA tenderness, left CVA tenderness, guarding or rebound.      Comments: Abdomen soft without distention.  Active bowel sounds in all 4 quadrants.  Nontender to palpation.    Musculoskeletal:         General: Normal range of motion.      Cervical back: Normal range of motion and neck supple.      Right lower leg: No edema.      Left lower leg: No edema.   Lymphadenopathy:      Cervical: No cervical adenopathy.      Right cervical: No superficial, deep or posterior cervical adenopathy.     Left cervical: No superficial, deep or posterior cervical adenopathy.   Skin:     General: Skin is warm and dry.   Neurological:      General: No focal deficit present.      Mental Status: She is alert and oriented to person, place, and time.      Cranial Nerves: Cranial nerves 2-12 are intact.      Sensory: Sensation is intact.      Motor: Motor function is intact.      Coordination: Coordination is intact.      Comments: Alert and oriented x 3.  Neuro intact and nonfocal   Psychiatric:         Mood and Affect: Mood and affect normal.         Speech: Speech normal.         Behavior: Behavior normal.         Thought Content: Thought content normal.         Cognition and Memory: Cognition normal.         Judgment: Judgment normal.      Comments: Normal mood and friendly affect.  Normal cognition and normal thought content         Procedures           ED Course  ED Course as of 09/24/24 0159   Tue Sep 24, 2024   0153 Patient is afebrile and all other vital signs are stable.  I personally interpreted EKG which shows sinus rhythm.  No evidence of ectopy, arrhythmia, or ischemia.  I personally interpreted chest x-ray which showed no acute cardiopulmonary process.  CBC shows normal white blood cell count at 10,000.  Chemistries reveal serum glucose 262, BUN and creatinine 27 and 1.2 and normal LFTs.  Urinalysis reveals trace leukocytes, no bacteria, negative nitrite and 6-10 white blood cells.  High-sensitivity troponins were 27 and 24 respectively.  Respiratory PCR panel was completely negative.  Free T4 and TSH were normal and CK was 175.  Magnesium is 1.9 and myoglobin was mildly elevated at 102 with  unknown etiology.  CT of the brain without contrast revealed atrophy but no acute intracranial process.  Discussed the case with Dr. Beebe, ER attending physician.  Patient given IV fluid bolus.  She does not appear to have any symptoms of pain or discomfort.  Appetite is good.  She did report recent UTI, which I do not see an office note or urine culture in epic.  She continues to take antibiotics and has 4 more days.  She reports vaginal yeast infection and we will give her dose of Diflucan 150 mg by mouth x 1 dose here in the ER and she may utilize over-the-counter Monistat cream topically for irritation.  No evidence of acute illness.  Return to the ER if worsening symptoms and continue with all current medical management. [FC]      ED Course User Index  [FC] Joie Ayers, NINI                                   Recent Results (from the past 24 hour(s))   Comprehensive Metabolic Panel    Collection Time: 09/23/24  9:59 PM    Specimen: Blood   Result Value Ref Range    Glucose 262 (H) 65 - 99 mg/dL    BUN 27 (H) 8 - 23 mg/dL    Creatinine 1.02 (H) 0.57 - 1.00 mg/dL    Sodium 141 136 - 145 mmol/L    Potassium 3.9 3.5 - 5.2 mmol/L    Chloride 103 98 - 107 mmol/L    CO2 25.0 22.0 - 29.0 mmol/L    Calcium 8.7 8.6 - 10.5 mg/dL    Total Protein 6.9 6.0 - 8.5 g/dL    Albumin 3.7 3.5 - 5.2 g/dL    ALT (SGPT) 17 1 - 33 U/L    AST (SGOT) 24 1 - 32 U/L    Alkaline Phosphatase 82 39 - 117 U/L    Total Bilirubin 0.5 0.0 - 1.2 mg/dL    Globulin 3.2 gm/dL    A/G Ratio 1.2 g/dL    BUN/Creatinine Ratio 26.5 (H) 7.0 - 25.0    Anion Gap 13.0 5.0 - 15.0 mmol/L    eGFR 56.1 (L) >60.0 mL/min/1.73   Single High Sensitivity Troponin T    Collection Time: 09/23/24  9:59 PM    Specimen: Blood   Result Value Ref Range    HS Troponin T 27 (H) <14 ng/L   Magnesium    Collection Time: 09/23/24  9:59 PM    Specimen: Blood   Result Value Ref Range    Magnesium 1.9 1.6 - 2.4 mg/dL   Green Top (Gel)    Collection Time: 09/23/24  9:59 PM    Result Value Ref Range    Extra Tube Hold for add-ons.    Lavender Top    Collection Time: 09/23/24  9:59 PM   Result Value Ref Range    Extra Tube hold for add-on    Gold Top - SST    Collection Time: 09/23/24  9:59 PM   Result Value Ref Range    Extra Tube Hold for add-ons.    Gray Top    Collection Time: 09/23/24  9:59 PM   Result Value Ref Range    Extra Tube Hold for add-ons.    Light Blue Top    Collection Time: 09/23/24  9:59 PM   Result Value Ref Range    Extra Tube Hold for add-ons.    CBC Auto Differential    Collection Time: 09/23/24  9:59 PM    Specimen: Blood   Result Value Ref Range    WBC 10.48 3.40 - 10.80 10*3/mm3    RBC 3.90 3.77 - 5.28 10*6/mm3    Hemoglobin 11.9 (L) 12.0 - 15.9 g/dL    Hematocrit 37.0 34.0 - 46.6 %    MCV 94.9 79.0 - 97.0 fL    MCH 30.5 26.6 - 33.0 pg    MCHC 32.2 31.5 - 35.7 g/dL    RDW 13.5 12.3 - 15.4 %    RDW-SD 46.8 37.0 - 54.0 fl    MPV 11.0 6.0 - 12.0 fL    Platelets 197 140 - 450 10*3/mm3    Neutrophil % 85.6 (H) 42.7 - 76.0 %    Lymphocyte % 1.8 (L) 19.6 - 45.3 %    Monocyte % 6.0 5.0 - 12.0 %    Eosinophil % 6.0 0.3 - 6.2 %    Basophil % 0.2 0.0 - 1.5 %    Immature Grans % 0.4 0.0 - 0.5 %    Neutrophils, Absolute 8.97 (H) 1.70 - 7.00 10*3/mm3    Lymphocytes, Absolute 0.19 (L) 0.70 - 3.10 10*3/mm3    Monocytes, Absolute 0.63 0.10 - 0.90 10*3/mm3    Eosinophils, Absolute 0.63 (H) 0.00 - 0.40 10*3/mm3    Basophils, Absolute 0.02 0.00 - 0.20 10*3/mm3    Immature Grans, Absolute 0.04 0.00 - 0.05 10*3/mm3    nRBC 0.0 0.0 - 0.2 /100 WBC   Lactic Acid, Plasma    Collection Time: 09/23/24  9:59 PM    Specimen: Blood   Result Value Ref Range    Lactate 1.0 0.5 - 2.0 mmol/L   TSH    Collection Time: 09/23/24  9:59 PM    Specimen: Blood   Result Value Ref Range    TSH 0.386 0.270 - 4.200 uIU/mL   T4, Free    Collection Time: 09/23/24  9:59 PM    Specimen: Blood   Result Value Ref Range    Free T4 1.43 0.92 - 1.68 ng/dL   Myoglobin, Serum    Collection Time: 09/23/24  9:59 PM     Specimen: Blood   Result Value Ref Range    Myoglobin 102.2 (H) 25.0 - 58.0 ng/mL   CK    Collection Time: 09/23/24  9:59 PM    Specimen: Blood   Result Value Ref Range    Creatine Kinase 175 20 - 180 U/L   Respiratory Panel PCR w/COVID-19(SARS-CoV-2) [facility]/D[facility]/WEND[facility] In-House, NP Swab in UTM/VTM, 2 HR TAT - Swab, Nasopharynx    Collection Time: 09/24/24 12:04 AM    Specimen: Nasopharynx; Swab   Result Value Ref Range    ADENOVIRUS, PCR Not Detected Not Detected    Coronavirus 229E Not Detected Not Detected    Coronavirus HKU1 Not Detected Not Detected    Coronavirus NL63 Not Detected Not Detected    Coronavirus OC43 Not Detected Not Detected    COVID19 Not Detected Not Detected - Ref. Range    Human Metapneumovirus Not Detected Not Detected    Human Rhinovirus/Enterovirus Not Detected Not Detected    Influenza A PCR Not Detected Not Detected    Influenza B PCR Not Detected Not Detected    Parainfluenza Virus 1 Not Detected Not Detected    Parainfluenza Virus 2 Not Detected Not Detected    Parainfluenza Virus 3 Not Detected Not Detected    Parainfluenza Virus 4 Not Detected Not Detected    RSV, PCR Not Detected Not Detected    Bordetella pertussis pcr Not Detected Not Detected    Bordetella parapertussis PCR Not Detected Not Detected    Chlamydophila pneumoniae PCR Not Detected Not Detected    Mycoplasma pneumo by PCR Not Detected Not Detected   Single High Sensitivity Troponin T    Collection Time: 09/24/24 12:17 AM    Specimen: Blood   Result Value Ref Range    HS Troponin T 24 (H) <14 ng/L   Urinalysis With Microscopic If Indicated (No Culture) - Urine, Clean Catch    Collection Time: 09/24/24 12:27 AM    Specimen: Urine, Clean Catch   Result Value Ref Range    Color, UA Yellow Yellow, Straw    Appearance, UA Clear Clear    pH, UA 5.5 5.0 - 8.0    Specific Gravity, UA 1.014 1.001 - 1.030    Glucose, UA Negative Negative    Ketones, UA Negative Negative    Bilirubin, UA Negative Negative    Blood, UA  "Negative Negative    Protein, UA Negative Negative    Leuk Esterase, UA Trace (A) Negative    Nitrite, UA Negative Negative    Urobilinogen, UA 0.2 E.U./dL 0.2 - 1.0 E.U./dL   Urinalysis, Microscopic Only - Urine, Clean Catch    Collection Time: 09/24/24 12:27 AM    Specimen: Urine, Clean Catch   Result Value Ref Range    RBC, UA 0-2 None Seen, 0-2 /HPF    WBC, UA 6-10 (A) None Seen, 0-2 /HPF    Bacteria, UA None Seen None Seen, Trace /HPF    Squamous Epithelial Cells, UA 3-6 (A) None Seen, 0-2 /HPF    Hyaline Casts, UA 0-6 0 - 6 /LPF    Methodology Automated Microscopy    ECG 12 Lead Chest Pain    Collection Time: 09/24/24  1:32 AM   Result Value Ref Range    QT Interval 416 ms    QTC Interval 474 ms     Note: In addition to lab results from this visit, the labs listed above may include labs taken at another facility or during a different encounter within the last 24 hours. Please correlate lab times with ED admission and discharge times for further clarification of the services performed during this visit.    CT Head Without Contrast   Final Result   Impression:   Atrophy and chronic microvascular ischemic change. No acute intracranial process.            Electronically Signed: Manolo Keller MD     9/24/2024 1:00 AM EDT     Workstation ID: [id number]      XR Chest 1 View   Final Result   Impression:   No evidence of acute cardiopulmonary disease.          Electronically Signed: Guero Concepcion MD     9/23/2024 10:56 PM EDT     Workstation ID: [id number]        Vitals:    09/23/24 2134   BP: 172/65   BP Location: Right arm   Patient Position: Sitting   Pulse: 95   Resp: 18   Temp: 98 °F (36.7 °C)   TempSrc: Oral   SpO2: 98%   Weight: 61 kg (134 lb 7.7 oz)   Height: 149.9 cm (59\")     Medications   sodium chloride 0.9 % flush 10 mL (has no administration in time range)   sodium chloride 0.9 % flush 10 mL (has no administration in time range)   fluconazole (DIFLUCAN) tablet 150 mg (has no administration in time range) "   sodium chloride 0.9 % bolus 1,000 mL (1,000 mL Intravenous New Bag 9/24/24 0104)     ECG/EMG Results (last 24 hours)       ** No results found for the last 24 hours. **          ECG 12 Lead Chest Pain   Preliminary Result   Test Reason : Chest Pain   Blood Pressure :   */*   mmHG   Vent. Rate :  78 BPM     Atrial Rate :  78 BPM      P-R Int : 148 ms          QRS Dur :  72 ms       QT Int : 416 ms       P-R-T Axes :  82  45 -13 degrees      QTc Int : 474 ms      Normal sinus rhythm   T wave abnormality, consider inferior ischemia   Abnormal ECG   When compared with ECG of 23-SEP-2024 21:53, (Unconfirmed)   No significant change was found      Referred By:            Confirmed By:       ECG 12 Lead ED Triage Standing Order; Weak / Dizzy / AMS    (Results Pending)                 Medical Decision Making  Amount and/or Complexity of Data Reviewed  Labs: ordered.  Radiology: ordered.  ECG/medicine tests: ordered.    Risk  Prescription drug management.        Final diagnoses:   Malaise and fatigue   Vaginal yeast infection   History of UTI   History of diabetes mellitus   History of hypertension   History of CHF (congestive heart failure)   Closed fracture of left foot, initial encounter   Impaired ambulation       ED Disposition  ED Disposition       ED Disposition   Discharge    Condition   Stable    Comment   --               [facility] EMERGENCY DEPARTMENT  [address] [address] [phone]    If symptoms worsen    Peter Baca MD  [address] [address]  [phone]    Schedule an appointment as soon as possible for a visit in 2 days  Close PCP follow-up for recheck         Medication List        Changed      Tresiba FlexTouch 100 UNIT/ML solution pen-injector injection  Generic drug: insulin degludec  Inject 19 Units under the skin into the appropriate area as directed Every Night.  What changed: additional instructions                  Joie Ayers PA-C  09/24/24 0159

## 2024-10-03 LAB
QT INTERVAL: 386 MS
QT INTERVAL: 416 MS
QTC INTERVAL: 469 MS
QTC INTERVAL: 474 MS

## 2024-10-10 LAB
QT INTERVAL: 386 MS
QTC INTERVAL: 469 MS

## 2024-10-16 ENCOUNTER — TRANSCRIBE ORDERS (OUTPATIENT)
Dept: GENERAL RADIOLOGY | Facility: HOSPITAL | Age: 79
End: 2024-10-16
Payer: MEDICARE

## 2024-10-16 ENCOUNTER — HOSPITAL ENCOUNTER (OUTPATIENT)
Dept: GENERAL RADIOLOGY | Facility: HOSPITAL | Age: 79
Discharge: HOME | End: 2024-10-16
Payer: MEDICARE

## 2024-10-16 DIAGNOSIS — M25.562 LEFT KNEE PAIN, UNSPECIFIED CHRONICITY: ICD-10-CM

## 2024-10-16 DIAGNOSIS — S69.92XA INJURY, FINGER, LEFT, INITIAL ENCOUNTER: Primary | ICD-10-CM

## 2024-10-16 DIAGNOSIS — S69.92XA INJURY, FINGER, LEFT, INITIAL ENCOUNTER: ICD-10-CM

## 2024-10-16 PROCEDURE — 73562 X-RAY EXAM OF KNEE 3: CPT

## 2024-10-16 PROCEDURE — 73130 X-RAY EXAM OF HAND: CPT

## 2024-10-31 ENCOUNTER — TRANSCRIBE ORDERS (OUTPATIENT)
Dept: ADMINISTRATIVE | Facility: HOSPITAL | Age: 79
End: 2024-10-31
Payer: MEDICARE

## 2024-10-31 DIAGNOSIS — Z12.31 OTHER SCREENING MAMMOGRAM: Primary | ICD-10-CM

## 2024-11-08 ENCOUNTER — TRANSCRIBE ORDERS (OUTPATIENT)
Dept: ADMINISTRATIVE | Facility: HOSPITAL | Age: 79
End: 2024-11-08
Payer: MEDICARE

## 2024-11-08 DIAGNOSIS — G95.9 MYELOPATHY OF CERVICAL SPINAL CORD WITH CERVICAL RADICULOPATHY: Primary | ICD-10-CM

## 2024-11-08 DIAGNOSIS — M54.12 MYELOPATHY OF CERVICAL SPINAL CORD WITH CERVICAL RADICULOPATHY: Primary | ICD-10-CM

## 2024-11-15 ENCOUNTER — HOSPITAL ENCOUNTER (INPATIENT)
Facility: HOSPITAL | Age: 79
LOS: 10 days | Discharge: HOME | End: 2024-11-25
Attending: EMERGENCY MEDICINE | Admitting: STUDENT IN AN ORGANIZED HEALTH CARE EDUCATION/TRAINING PROGRAM
Payer: MEDICARE

## 2024-11-15 ENCOUNTER — APPOINTMENT (OUTPATIENT)
Dept: CT IMAGING | Facility: HOSPITAL | Age: 79
End: 2024-11-15
Payer: MEDICARE

## 2024-11-15 ENCOUNTER — APPOINTMENT (OUTPATIENT)
Dept: GENERAL RADIOLOGY | Facility: HOSPITAL | Age: 79
End: 2024-11-15
Payer: MEDICARE

## 2024-11-15 DIAGNOSIS — N39.0 URINARY TRACT INFECTION WITHOUT HEMATURIA, SITE UNSPECIFIED: Primary | ICD-10-CM

## 2024-11-15 DIAGNOSIS — D72.825 BANDEMIA: ICD-10-CM

## 2024-11-15 DIAGNOSIS — S92.902S CLOSED FRACTURE OF LEFT FOOT, SEQUELA: ICD-10-CM

## 2024-11-15 DIAGNOSIS — I10 ESSENTIAL HYPERTENSION: ICD-10-CM

## 2024-11-15 DIAGNOSIS — I50.32 CHRONIC DIASTOLIC CONGESTIVE HEART FAILURE: ICD-10-CM

## 2024-11-15 DIAGNOSIS — G37.9 DEMYELINATING DISEASE OF CENTRAL NERVOUS SYSTEM, UNSPECIFIED: ICD-10-CM

## 2024-11-15 DIAGNOSIS — N28.9 RENAL INSUFFICIENCY: ICD-10-CM

## 2024-11-15 DIAGNOSIS — J43.9 PULMONARY EMPHYSEMA, UNSPECIFIED EMPHYSEMA TYPE: ICD-10-CM

## 2024-11-15 DIAGNOSIS — G37.9 DEMYELINATING DISEASE: ICD-10-CM

## 2024-11-15 DIAGNOSIS — E78.5 HYPERLIPIDEMIA LDL GOAL <70: ICD-10-CM

## 2024-11-15 DIAGNOSIS — I48.91 ATRIAL FIBRILLATION, UNSPECIFIED TYPE: ICD-10-CM

## 2024-11-15 DIAGNOSIS — R53.1 GENERALIZED WEAKNESS: ICD-10-CM

## 2024-11-15 DIAGNOSIS — R73.9 HYPERGLYCEMIA: ICD-10-CM

## 2024-11-15 PROBLEM — E11.65 TYPE 2 DIABETES MELLITUS WITH HYPERGLYCEMIA: Status: ACTIVE | Noted: 2024-11-15

## 2024-11-15 PROBLEM — J44.9 COPD (CHRONIC OBSTRUCTIVE PULMONARY DISEASE): Status: ACTIVE | Noted: 2024-11-15

## 2024-11-15 PROBLEM — G47.33 OSA (OBSTRUCTIVE SLEEP APNEA): Status: ACTIVE | Noted: 2024-11-15

## 2024-11-15 PROBLEM — E10.65 TYPE 1 DIABETES MELLITUS WITH HYPERGLYCEMIA: Status: ACTIVE | Noted: 2024-11-15

## 2024-11-15 PROBLEM — R79.89 ELEVATED SERUM CREATININE: Status: ACTIVE | Noted: 2024-11-15

## 2024-11-15 PROBLEM — S92.902A CLOSED FRACTURE OF LEFT FOOT: Status: ACTIVE | Noted: 2024-11-15

## 2024-11-15 PROBLEM — R74.8 ELEVATED CK: Status: ACTIVE | Noted: 2024-11-15

## 2024-11-15 PROBLEM — A41.9 SEPSIS: Status: ACTIVE | Noted: 2024-11-15

## 2024-11-15 PROBLEM — R55 SYNCOPE: Status: ACTIVE | Noted: 2024-11-15

## 2024-11-15 LAB
ALBUMIN SERPL-MCNC: 3.6 G/DL (ref 3.5–5.2)
ALBUMIN/GLOB SERPL: 1 G/DL
ALP SERPL-CCNC: 71 U/L (ref 39–117)
ALT SERPL W P-5'-P-CCNC: 22 U/L (ref 1–33)
ANION GAP SERPL CALCULATED.3IONS-SCNC: 16 MMOL/L (ref 5–15)
AST SERPL-CCNC: 41 U/L (ref 1–32)
ATMOSPHERIC PRESS: ABNORMAL MM[HG]
B-OH-BUTYR SERPL-SCNC: 0.61 MMOL/L (ref 0.02–0.27)
BACTERIA UR QL AUTO: ABNORMAL /HPF
BASE EXCESS BLDV CALC-SCNC: -1.8 MMOL/L (ref -2–2)
BASOPHILS # BLD AUTO: 0.03 10*3/MM3 (ref 0–0.2)
BASOPHILS NFR BLD AUTO: 0.1 % (ref 0–1.5)
BDY SITE: ABNORMAL
BILIRUB SERPL-MCNC: 0.9 MG/DL (ref 0–1.2)
BILIRUB UR QL STRIP: NEGATIVE
BODY TEMPERATURE: 37
BUN SERPL-MCNC: 50 MG/DL (ref 8–23)
BUN/CREAT SERPL: 32.5 (ref 7–25)
CALCIUM SPEC-SCNC: 8.8 MG/DL (ref 8.6–10.5)
CHLORIDE SERPL-SCNC: 97 MMOL/L (ref 98–107)
CK SERPL-CCNC: 1331 U/L (ref 20–180)
CLARITY UR: ABNORMAL
CO2 BLDA-SCNC: 24.5 MMOL/L (ref 22–33)
CO2 SERPL-SCNC: 21 MMOL/L (ref 22–29)
COHGB MFR BLD: 0.2 %
COLOR UR: ABNORMAL
CREAT SERPL-MCNC: 1.54 MG/DL (ref 0.57–1)
D-LACTATE SERPL-SCNC: 2.6 MMOL/L (ref 0.5–2)
D-LACTATE SERPL-SCNC: 2.7 MMOL/L (ref 0.5–2)
DEPRECATED RDW RBC AUTO: 50.6 FL (ref 37–54)
EGFRCR SERPLBLD CKD-EPI 2021: 34.2 ML/MIN/1.73
EOSINOPHIL # BLD AUTO: 1.01 10*3/MM3 (ref 0–0.4)
EOSINOPHIL NFR BLD AUTO: 4.5 % (ref 0.3–6.2)
EPAP: 0
ERYTHROCYTE [DISTWIDTH] IN BLOOD BY AUTOMATED COUNT: 14.4 % (ref 12.3–15.4)
GLOBULIN UR ELPH-MCNC: 3.5 GM/DL
GLUCOSE BLDC GLUCOMTR-MCNC: 267 MG/DL (ref 70–130)
GLUCOSE BLDC GLUCOMTR-MCNC: 303 MG/DL (ref 70–130)
GLUCOSE BLDC GLUCOMTR-MCNC: 445 MG/DL (ref 70–130)
GLUCOSE BLDC GLUCOMTR-MCNC: 485 MG/DL (ref 70–130)
GLUCOSE SERPL-MCNC: 510 MG/DL (ref 65–99)
GLUCOSE UR STRIP-MCNC: ABNORMAL MG/DL
HBA1C MFR BLD: 8 % (ref 4.8–5.6)
HCO3 BLDV-SCNC: 23.3 MMOL/L (ref 22–28)
HCT VFR BLD AUTO: 38.4 % (ref 34–46.6)
HGB BLD-MCNC: 12.1 G/DL (ref 12–15.9)
HGB BLDA-MCNC: 12.9 G/DL (ref 14–18)
HGB UR QL STRIP.AUTO: NEGATIVE
HOLD SPECIMEN: NORMAL
HYALINE CASTS UR QL AUTO: ABNORMAL /LPF
IMM GRANULOCYTES # BLD AUTO: 0.11 10*3/MM3 (ref 0–0.05)
IMM GRANULOCYTES NFR BLD AUTO: 0.5 % (ref 0–0.5)
INHALED O2 CONCENTRATION: 21 %
IPAP: 0
KETONES UR QL STRIP: ABNORMAL
LEUKOCYTE ESTERASE UR QL STRIP.AUTO: ABNORMAL
LYMPHOCYTES # BLD AUTO: 0.57 10*3/MM3 (ref 0.7–3.1)
LYMPHOCYTES NFR BLD AUTO: 2.6 % (ref 19.6–45.3)
MCH RBC QN AUTO: 30.1 PG (ref 26.6–33)
MCHC RBC AUTO-ENTMCNC: 31.5 G/DL (ref 31.5–35.7)
MCV RBC AUTO: 95.5 FL (ref 79–97)
METHGB BLD QL: 0.2 %
MODALITY: ABNORMAL
MONOCYTES # BLD AUTO: 0.63 10*3/MM3 (ref 0.1–0.9)
MONOCYTES NFR BLD AUTO: 2.8 % (ref 5–12)
NEUTROPHILS NFR BLD AUTO: 19.89 10*3/MM3 (ref 1.7–7)
NEUTROPHILS NFR BLD AUTO: 89.5 % (ref 42.7–76)
NITRITE UR QL STRIP: NEGATIVE
NRBC BLD AUTO-RTO: 0 /100 WBC (ref 0–0.2)
OXYHGB MFR BLDV: 84.9 %
PAW @ PEAK INSP FLOW SETTING VENT: 0 CMH2O
PCO2 BLDV: 39.7 MM HG (ref 41–51)
PH BLDV: 7.38 PH UNITS (ref 7.31–7.41)
PH UR STRIP.AUTO: <=5 [PH] (ref 5–8)
PLATELET # BLD AUTO: 172 10*3/MM3 (ref 140–450)
PMV BLD AUTO: 12.7 FL (ref 6–12)
PO2 BLDV: 53.9 MM HG (ref 27–53)
POTASSIUM SERPL-SCNC: 3.9 MMOL/L (ref 3.5–5.2)
PROCALCITONIN SERPL-MCNC: 5.37 NG/ML (ref 0–0.25)
PROT SERPL-MCNC: 7.1 G/DL (ref 6–8.5)
PROT UR QL STRIP: ABNORMAL
RBC # BLD AUTO: 4.02 10*6/MM3 (ref 3.77–5.28)
RBC # UR STRIP: ABNORMAL /HPF
REF LAB TEST METHOD: ABNORMAL
SODIUM SERPL-SCNC: 134 MMOL/L (ref 136–145)
SP GR UR STRIP: 1.02 (ref 1–1.03)
SQUAMOUS #/AREA URNS HPF: ABNORMAL /HPF
TOTAL RATE: 0 BREATHS/MINUTE
UROBILINOGEN UR QL STRIP: ABNORMAL
VENTILATOR MODE: ABNORMAL
WBC # UR STRIP: ABNORMAL /HPF
WBC NRBC COR # BLD AUTO: 22.24 10*3/MM3 (ref 3.4–10.8)
WHOLE BLOOD HOLD COAG: NORMAL
WHOLE BLOOD HOLD SPECIMEN: NORMAL

## 2024-11-15 PROCEDURE — 82010 KETONE BODYS QUAN: CPT | Performed by: EMERGENCY MEDICINE

## 2024-11-15 PROCEDURE — 99223 1ST HOSP IP/OBS HIGH 75: CPT | Performed by: NURSE PRACTITIONER

## 2024-11-15 PROCEDURE — 84145 PROCALCITONIN (PCT): CPT | Performed by: EMERGENCY MEDICINE

## 2024-11-15 PROCEDURE — 25810000003 SODIUM CHLORIDE 0.9 % SOLUTION: Performed by: EMERGENCY MEDICINE

## 2024-11-15 PROCEDURE — 36415 COLL VENOUS BLD VENIPUNCTURE: CPT

## 2024-11-15 PROCEDURE — 87086 URINE CULTURE/COLONY COUNT: CPT | Performed by: INTERNAL MEDICINE

## 2024-11-15 PROCEDURE — 82550 ASSAY OF CK (CPK): CPT | Performed by: NURSE PRACTITIONER

## 2024-11-15 PROCEDURE — 63710000001 INSULIN REGULAR HUMAN PER 5 UNITS: Performed by: EMERGENCY MEDICINE

## 2024-11-15 PROCEDURE — 83036 HEMOGLOBIN GLYCOSYLATED A1C: CPT | Performed by: EMERGENCY MEDICINE

## 2024-11-15 PROCEDURE — 99291 CRITICAL CARE FIRST HOUR: CPT

## 2024-11-15 PROCEDURE — 70450 CT HEAD/BRAIN W/O DYE: CPT

## 2024-11-15 PROCEDURE — 81001 URINALYSIS AUTO W/SCOPE: CPT | Performed by: EMERGENCY MEDICINE

## 2024-11-15 PROCEDURE — 93010 ELECTROCARDIOGRAM REPORT: CPT | Performed by: INTERNAL MEDICINE

## 2024-11-15 PROCEDURE — 87088 URINE BACTERIA CULTURE: CPT | Performed by: INTERNAL MEDICINE

## 2024-11-15 PROCEDURE — 25810000003 SODIUM CHLORIDE 0.9 % SOLUTION: Performed by: NURSE PRACTITIONER

## 2024-11-15 PROCEDURE — 82948 REAGENT STRIP/BLOOD GLUCOSE: CPT

## 2024-11-15 PROCEDURE — 87040 BLOOD CULTURE FOR BACTERIA: CPT | Performed by: EMERGENCY MEDICINE

## 2024-11-15 PROCEDURE — 25010000002 CEFTRIAXONE PER 250 MG: Performed by: EMERGENCY MEDICINE

## 2024-11-15 PROCEDURE — 82805 BLOOD GASES W/O2 SATURATION: CPT

## 2024-11-15 PROCEDURE — 80053 COMPREHEN METABOLIC PANEL: CPT | Performed by: EMERGENCY MEDICINE

## 2024-11-15 PROCEDURE — 71045 X-RAY EXAM CHEST 1 VIEW: CPT

## 2024-11-15 PROCEDURE — 93005 ELECTROCARDIOGRAM TRACING: CPT | Performed by: NURSE PRACTITIONER

## 2024-11-15 PROCEDURE — 83605 ASSAY OF LACTIC ACID: CPT | Performed by: EMERGENCY MEDICINE

## 2024-11-15 PROCEDURE — 87186 SC STD MICRODIL/AGAR DIL: CPT | Performed by: INTERNAL MEDICINE

## 2024-11-15 PROCEDURE — 85025 COMPLETE CBC W/AUTO DIFF WBC: CPT | Performed by: EMERGENCY MEDICINE

## 2024-11-15 RX ORDER — SODIUM CHLORIDE 9 MG/ML
40 INJECTION, SOLUTION INTRAVENOUS AS NEEDED
Status: DISCONTINUED | OUTPATIENT
Start: 2024-11-15 | End: 2024-11-25 | Stop reason: HOSPADM

## 2024-11-15 RX ORDER — SODIUM CHLORIDE 0.9 % (FLUSH) 0.9 %
10 SYRINGE (ML) INJECTION AS NEEDED
Status: DISCONTINUED | OUTPATIENT
Start: 2024-11-15 | End: 2024-11-23

## 2024-11-15 RX ORDER — ACETAMINOPHEN 325 MG/1
650 TABLET ORAL EVERY 4 HOURS PRN
Status: DISCONTINUED | OUTPATIENT
Start: 2024-11-15 | End: 2024-11-25 | Stop reason: HOSPADM

## 2024-11-15 RX ORDER — PRAVASTATIN SODIUM 20 MG
20 TABLET ORAL EVERY EVENING
Status: DISCONTINUED | OUTPATIENT
Start: 2024-11-16 | End: 2024-11-25 | Stop reason: HOSPADM

## 2024-11-15 RX ORDER — SODIUM CHLORIDE 0.9 % (FLUSH) 0.9 %
10 SYRINGE (ML) INJECTION EVERY 12 HOURS SCHEDULED
Status: DISCONTINUED | OUTPATIENT
Start: 2024-11-16 | End: 2024-11-25 | Stop reason: HOSPADM

## 2024-11-15 RX ORDER — BISACODYL 5 MG/1
5 TABLET, DELAYED RELEASE ORAL DAILY PRN
Status: DISCONTINUED | OUTPATIENT
Start: 2024-11-15 | End: 2024-11-25 | Stop reason: HOSPADM

## 2024-11-15 RX ORDER — LEVOTHYROXINE SODIUM 75 UG/1
75 TABLET ORAL
Status: DISCONTINUED | OUTPATIENT
Start: 2024-11-16 | End: 2024-11-25 | Stop reason: HOSPADM

## 2024-11-15 RX ORDER — AMOXICILLIN 250 MG
2 CAPSULE ORAL 2 TIMES DAILY PRN
Status: DISCONTINUED | OUTPATIENT
Start: 2024-11-15 | End: 2024-11-25 | Stop reason: HOSPADM

## 2024-11-15 RX ORDER — IBUPROFEN 600 MG/1
1 TABLET ORAL
Status: DISCONTINUED | OUTPATIENT
Start: 2024-11-15 | End: 2024-11-25 | Stop reason: HOSPADM

## 2024-11-15 RX ORDER — CETIRIZINE HYDROCHLORIDE 10 MG/1
10 TABLET ORAL DAILY
Status: DISCONTINUED | OUTPATIENT
Start: 2024-11-16 | End: 2024-11-25 | Stop reason: HOSPADM

## 2024-11-15 RX ORDER — FLUTICASONE PROPIONATE 50 MCG
2 SPRAY, SUSPENSION (ML) NASAL DAILY
Status: DISCONTINUED | OUTPATIENT
Start: 2024-11-16 | End: 2024-11-25 | Stop reason: HOSPADM

## 2024-11-15 RX ORDER — SODIUM CHLORIDE 9 MG/ML
100 INJECTION, SOLUTION INTRAVENOUS CONTINUOUS
Status: ACTIVE | OUTPATIENT
Start: 2024-11-16 | End: 2024-11-16

## 2024-11-15 RX ORDER — SODIUM CHLORIDE 0.9 % (FLUSH) 0.9 %
10 SYRINGE (ML) INJECTION AS NEEDED
Status: DISCONTINUED | OUTPATIENT
Start: 2024-11-15 | End: 2024-11-25 | Stop reason: HOSPADM

## 2024-11-15 RX ORDER — DEXTROSE MONOHYDRATE 25 G/50ML
25 INJECTION, SOLUTION INTRAVENOUS
Status: DISCONTINUED | OUTPATIENT
Start: 2024-11-15 | End: 2024-11-25 | Stop reason: HOSPADM

## 2024-11-15 RX ORDER — POLYETHYLENE GLYCOL 3350 17 G/17G
17 POWDER, FOR SOLUTION ORAL DAILY PRN
Status: DISCONTINUED | OUTPATIENT
Start: 2024-11-15 | End: 2024-11-25 | Stop reason: HOSPADM

## 2024-11-15 RX ORDER — NICOTINE POLACRILEX 4 MG
15 LOZENGE BUCCAL
Status: DISCONTINUED | OUTPATIENT
Start: 2024-11-15 | End: 2024-11-25 | Stop reason: HOSPADM

## 2024-11-15 RX ORDER — MONTELUKAST SODIUM 10 MG/1
10 TABLET ORAL EVERY EVENING
Status: DISCONTINUED | OUTPATIENT
Start: 2024-11-16 | End: 2024-11-25 | Stop reason: HOSPADM

## 2024-11-15 RX ORDER — TERAZOSIN 2 MG/1
2 CAPSULE ORAL NIGHTLY
Status: DISCONTINUED | OUTPATIENT
Start: 2024-11-16 | End: 2024-11-25 | Stop reason: HOSPADM

## 2024-11-15 RX ORDER — OXYBUTYNIN CHLORIDE 5 MG/1
5 TABLET ORAL 2 TIMES DAILY
Status: DISCONTINUED | OUTPATIENT
Start: 2024-11-16 | End: 2024-11-25 | Stop reason: HOSPADM

## 2024-11-15 RX ORDER — INSULIN LISPRO 100 [IU]/ML
2-9 INJECTION, SOLUTION INTRAVENOUS; SUBCUTANEOUS
Status: DISCONTINUED | OUTPATIENT
Start: 2024-11-16 | End: 2024-11-21

## 2024-11-15 RX ORDER — ASPIRIN 81 MG/1
81 TABLET ORAL DAILY
Status: DISCONTINUED | OUTPATIENT
Start: 2024-11-16 | End: 2024-11-16

## 2024-11-15 RX ORDER — BISACODYL 10 MG
10 SUPPOSITORY, RECTAL RECTAL DAILY PRN
Status: DISCONTINUED | OUTPATIENT
Start: 2024-11-15 | End: 2024-11-25 | Stop reason: HOSPADM

## 2024-11-15 RX ADMIN — SODIUM CHLORIDE 1000 ML: 9 INJECTION, SOLUTION INTRAVENOUS at 21:53

## 2024-11-15 RX ADMIN — SODIUM CHLORIDE 1000 MG: 900 INJECTION INTRAVENOUS at 18:57

## 2024-11-15 RX ADMIN — SODIUM CHLORIDE 1000 ML: 9 INJECTION, SOLUTION INTRAVENOUS at 18:19

## 2024-11-15 RX ADMIN — INSULIN HUMAN 10 UNITS: 100 INJECTION, SOLUTION PARENTERAL at 18:46

## 2024-11-15 NOTE — ED PROVIDER NOTES
"  [place]    EMERGENCY DEPARTMENT ENCOUNTER      Pt Name: Marilyn Kunz  MRN: [id number]  YOB: 1945  Date of evaluation: 11/15/2024  Provider: Ben Anders DO    CHIEF COMPLAINT       Chief Complaint   Patient presents with    Weakness - Generalized         HISTORY OF PRESENT ILLNESS  (Location/Symptom, Timing/Onset, Context/Setting, Quality, Duration, Modifying Factors, Severity.)   Marilyn Kunz is a 79 y.o. female who presents to the emergency department for evaluation as a referral over by recommendation from her PCP Dr. Gerhardtstein.  The patient notes she was seen yesterday for generalized weakness, is overall not been feeling well over the last few days, has missed her insulin a few times yesterday she has been weak, has been falling asleep and laying on the floor missing her breakfast and lunch doses and not been taking her insulin, took her blood sugar and it was \"high\" took her insulin last night.  Got some labs completed yesterday afternoon and was called today secondary to a concern for her elevated blood sugars, possible infection told to come to the ED.  She denies any fever chills or cough or congestion, has had increased urinary frequency, states she has been trying to eat and drink well but she unfortunately has been just very weak and fatigued intermittently over the last couple days.  Denies any nausea or vomiting, no diarrhea.  She denies any chest pain or palpitations, does not have any other acute systemic complaints.    Nursing notes were reviewed.      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  I asked her to go ahead and try to talk to her endocrinokogist.  She is still having the lower extremity neuropathic pan, but not enough for me to administer medication.    Disease of thyroid gland     Diverticulitis of colon     Erosive osteoarthritis 05/30/2024    Factor 5 Leiden mutation, heterozygous 2012    Head injury     Hyperlipidemia     Hypertension     Medication monitoring encounter     Impression:  She has renal insufficiency.  She is just taking Tylenol.    Movement disorder 10/2021    knee replacement left knww    Murmur, cardiac     Neuropathy in diabetes     redness swelling legs    TIFFANIE (obstructive sleep apnea) 11/15/2024    Osteopenia     Story: Femoral Neck Impression: Up-ro-date on bone density scan.  Follow up bone density every 2 years.    Peripheral neuropathy 05/03/2022    hands, arms. 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  I went over her exercises and told her to get a shoe insert,    Psoriasis     Impression: No rash    Psoriatic arthritis     hands    Sleep apnea Always    diabetic do not sleep well up and down    Stroke had one don't know when    showed on MRI Brain    Syncope 11/15/2024    Type 1 diabetes          SURGICAL HISTORY       Past Surgical History:   Procedure Laterality Date    CARDIAC CATHETERIZATION  1952 2 of them    Patent Dictus operation    CATARACT EXTRACTION Bilateral     COLONOSCOPY      ENDOSCOPY N/A 10/20/2020    Procedure:  "ESOPHAGOGASTRODUODENOSCOPY;  Surgeon: Brunner, Mark I, MD;  Location: [facility] ENDOSCOPY;  Service: Gastroenterology;  Laterality: N/A;    HAND SURGERY Left 1987    no hardware    KNEE ARTHROPLASTY      KNEE SURGERY Left     PATENT DUCTUS ARTERIOUS LIGATION      REPLACEMENT TOTAL KNEE Left          CURRENT MEDICATIONS       Current Facility-Administered Medications:     sodium chloride 0.9 % flush 10 mL, 10 mL, Intravenous, PRN, Pacitti, Ben Fermin, DO    ALLERGIES     Atorvastatin, Colesevelam, Cymbalta [duloxetine hcl], Famotidine, Cephalexin, Clindamycin, Hygroton [chlorthalidone], Lidocaine, and Penicillins    FAMILY HISTORY       Family History   Problem Relation Age of Onset    Cancer Mother     Pancreatic cancer Mother     Stroke Mother         Mini strokes    Cancer Father     Lung cancer Father     Cancer Sister     Colon cancer Sister     Diabetes Sister     Diabetes Brother     Breast cancer Neg Hx     Ovarian cancer Neg Hx           SOCIAL HISTORY       Social History     Socioeconomic History    Marital status: Single   Tobacco Use    Smoking status: Never     Passive exposure: Never    Smokeless tobacco: Never   Vaping Use    Vaping status: Never Used   Substance and Sexual Activity    Alcohol use: No    Drug use: No    Sexual activity: Not Currently     Partners: Male     Birth control/protection: Abstinence         PHYSICAL EXAM    (up to 7 for level 4, 8 or more for level 5)     Vitals:    11/15/24 1641 11/15/24 2000 11/15/24 2014 11/15/24 2134   BP: 168/59 123/73  118/55   BP Location: Left arm   Right arm   Patient Position: Sitting   Lying   Pulse:  92 90 86   Resp:    17   Temp: 98.2 °F (36.8 °C)   98.6 °F (37 °C)   TempSrc: Oral   Oral   SpO2:  94% 94% 95%   Weight: 59.9 kg (132 lb)      Height: 149.9 cm (59\")          Physical Exam  General : Patient is awake, alert, oriented, in no acute distress, nontoxic appearing  HEENT: Pupils are equally round, EOMI, conjunctivae clear  Neck: Neck is " supple, full range of motion, trachea midline  Cardiac: Heart tachycardic rate with regular rhythm, no murmurs, rubs, or gallops  Lungs: Lungs are clear to auscultation, there is no wheezing, rhonchi, or rales. There is no use of accessory muscles  Abdomen: Abdomen is soft, nontender, nondistended. There are no firm or pulsatile masses, no rebound rigidity or guarding  Musculoskeletal: Walking boot left lower extremity 5 out of 5 strength in all 4 extremities.  No focal muscle deficits are appreciated  Neuro: Motor intact, sensory intact, level of consciousness is normal, GCS 15  Dermatology: Skin is warm and dry  Psych: Mentation is grossly normal, cognition is grossly normal. Affect is appropriate      DIAGNOSTIC RESULTS     EKG:  All EKGs are interpreted by the Emergency Department Physician who either signs or Co-signs this chart in the absence of a cardiologist.    No orders to display       RADIOLOGY:     [x] Radiologist's Report Reviewed:  XR Chest 1 View   Final Result   Impression:   No evidence of acute cardiopulmonary abnormality or significant change.         Electronically Signed: Sera Mercer     11/15/2024 6:34 PM EST     Workstation ID: [id number]          I ordered and independently reviewed the above noted radiographic studies.      I viewed images of chest x-ray which showed no acute cardiopulmonary process per my independent interpretation.    See radiologist's dictation for official interpretation.      ED BEDSIDE ULTRASOUND:   Performed by ED Physician - none    LABS:    I have reviewed and interpreted all of the currently available lab results from this visit (if applicable):  Results for orders placed or performed during the hospital encounter of 11/15/24   POC Glucose Once    Collection Time: 11/15/24  4:40 PM    Specimen: Blood   Result Value Ref Range    Glucose 445 (C) 70 - 130 mg/dL   POC Glucose Once    Collection Time: 11/15/24  4:41 PM    Specimen: Blood   Result Value Ref Range     Glucose 485 (C) 70 - 130 mg/dL   Comprehensive Metabolic Panel    Collection Time: 11/15/24  5:09 PM    Specimen: Blood   Result Value Ref Range    Glucose 510 (C) 65 - 99 mg/dL    BUN 50 (H) 8 - 23 mg/dL    Creatinine 1.54 (H) 0.57 - 1.00 mg/dL    Sodium 134 (L) 136 - 145 mmol/L    Potassium 3.9 3.5 - 5.2 mmol/L    Chloride 97 (L) 98 - 107 mmol/L    CO2 21.0 (L) 22.0 - 29.0 mmol/L    Calcium 8.8 8.6 - 10.5 mg/dL    Total Protein 7.1 6.0 - 8.5 g/dL    Albumin 3.6 3.5 - 5.2 g/dL    ALT (SGPT) 22 1 - 33 U/L    AST (SGOT) 41 (H) 1 - 32 U/L    Alkaline Phosphatase 71 39 - 117 U/L    Total Bilirubin 0.9 0.0 - 1.2 mg/dL    Globulin 3.5 gm/dL    A/G Ratio 1.0 g/dL    BUN/Creatinine Ratio 32.5 (H) 7.0 - 25.0    Anion Gap 16.0 (H) 5.0 - 15.0 mmol/L    eGFR 34.2 (L) >60.0 mL/min/1.73   Hemoglobin A1c    Collection Time: 11/15/24  5:09 PM    Specimen: Blood   Result Value Ref Range    Hemoglobin A1C 8.00 (H) 4.80 - 5.60 %   Beta Hydroxybutyrate Quantitative    Collection Time: 11/15/24  5:09 PM    Specimen: Blood   Result Value Ref Range    Beta-Hydroxybutyrate Quant 0.611 (H) 0.020 - 0.270 mmol/L   CBC Auto Differential    Collection Time: 11/15/24  5:09 PM    Specimen: Blood   Result Value Ref Range    WBC 22.24 (H) 3.40 - 10.80 10*3/mm3    RBC 4.02 3.77 - 5.28 10*6/mm3    Hemoglobin 12.1 12.0 - 15.9 g/dL    Hematocrit 38.4 34.0 - 46.6 %    MCV 95.5 79.0 - 97.0 fL    MCH 30.1 26.6 - 33.0 pg    MCHC 31.5 31.5 - 35.7 g/dL    RDW 14.4 12.3 - 15.4 %    RDW-SD 50.6 37.0 - 54.0 fl    MPV 12.7 (H) 6.0 - 12.0 fL    Platelets 172 140 - 450 10*3/mm3    Neutrophil % 89.5 (H) 42.7 - 76.0 %    Lymphocyte % 2.6 (L) 19.6 - 45.3 %    Monocyte % 2.8 (L) 5.0 - 12.0 %    Eosinophil % 4.5 0.3 - 6.2 %    Basophil % 0.1 0.0 - 1.5 %    Immature Grans % 0.5 0.0 - 0.5 %    Neutrophils, Absolute 19.89 (H) 1.70 - 7.00 10*3/mm3    Lymphocytes, Absolute 0.57 (L) 0.70 - 3.10 10*3/mm3    Monocytes, Absolute 0.63 0.10 - 0.90 10*3/mm3    Eosinophils,  Absolute 1.01 (H) 0.00 - 0.40 10*3/mm3    Basophils, Absolute 0.03 0.00 - 0.20 10*3/mm3    Immature Grans, Absolute 0.11 (H) 0.00 - 0.05 10*3/mm3    nRBC 0.0 0.0 - 0.2 /100 WBC   Lactic Acid, Plasma    Collection Time: 11/15/24  5:09 PM    Specimen: Blood   Result Value Ref Range    Lactate 2.6 (C) 0.5 - 2.0 mmol/L   Procalcitonin    Collection Time: 11/15/24  5:09 PM    Specimen: Blood   Result Value Ref Range    Procalcitonin 5.37 (H) 0.00 - 0.25 ng/mL   CK    Collection Time: 11/15/24  5:09 PM    Specimen: Blood   Result Value Ref Range    Creatine Kinase 1,331 (H) 20 - 180 U/L   Green Top (Gel)    Collection Time: 11/15/24  5:09 PM   Result Value Ref Range    Extra Tube Hold for add-ons.    Lavender Top    Collection Time: 11/15/24  5:09 PM   Result Value Ref Range    Extra Tube hold for add-on    Gold Top - SST    Collection Time: 11/15/24  5:09 PM   Result Value Ref Range    Extra Tube Hold for add-ons.    Gray Top    Collection Time: 11/15/24  5:09 PM   Result Value Ref Range    Extra Tube Hold for add-ons.    Light Blue Top    Collection Time: 11/15/24  5:09 PM   Result Value Ref Range    Extra Tube Hold for add-ons.    Blood Gas, Venous With Co-Ox    Collection Time: 11/15/24  5:11 PM    Specimen: Venous Blood   Result Value Ref Range    Site Nurse/Dr Draw     pH, Venous 7.376 7.310 - 7.410 pH Units    pCO2, Venous 39.7 (L) 41.0 - 51.0 mm Hg    pO2, Venous 53.9 (H) 27.0 - 53.0 mm Hg    HCO3, Venous 23.3 22.0 - 28.0 mmol/L    Base Excess, Venous -1.8 -2.0 - 2.0 mmol/L    Hemoglobin, Blood Gas 12.9 (L) 14 - 18 g/dL    Oxyhemoglobin Venous 84.9 %    Methemoglobin Venous 0.2 %    Carboxyhemoglobin Venous 0.2 %    CO2 Content 24.5 22 - 33 mmol/L    Temperature 37.0     Barometric Pressure for Blood Gas      Modality Room Air     FIO2 21 %    Ventilator Mode      Rate 0 Breaths/minute    PIP 0 cmH2O    IPAP 0     EPAP 0    Urinalysis With Microscopic If Indicated (No Culture) - Urine, Clean Catch    Collection  Time: 11/15/24  5:39 PM    Specimen: Urine, Clean Catch   Result Value Ref Range    Color, UA Dark Yellow (A) Yellow, Straw    Appearance, UA Cloudy (A) Clear    pH, UA <=5.0 5.0 - 8.0    Specific Gravity, UA 1.017 1.001 - 1.030    Glucose,  mg/dL (1+) (A) Negative    Ketones, UA Trace (A) Negative    Bilirubin, UA Negative Negative    Blood, UA Negative Negative    Protein, UA 30 mg/dL (1+) (A) Negative    Leuk Esterase, UA Moderate (2+) (A) Negative    Nitrite, UA Negative Negative    Urobilinogen, UA 1.0 E.U./dL 0.2 - 1.0 E.U./dL   Urinalysis, Microscopic Only - Urine, Clean Catch    Collection Time: 11/15/24  5:39 PM    Specimen: Urine, Clean Catch   Result Value Ref Range    RBC, UA None Seen None Seen, 0-2 /HPF    WBC, UA 11-20 (A) None Seen, 0-2 /HPF    Bacteria, UA 3+ (A) None Seen, Trace /HPF    Squamous Epithelial Cells, UA 7-12 (A) None Seen, 0-2 /HPF    Hyaline Casts, UA None Seen 0 - 6 /LPF    Methodology Manual Light Microscopy    POC Glucose Once    Collection Time: 11/15/24  8:26 PM    Specimen: Blood   Result Value Ref Range    Glucose 303 (H) 70 - 130 mg/dL   STAT Lactic Acid, Reflex    Collection Time: 11/15/24  8:34 PM    Specimen: Blood   Result Value Ref Range    Lactate 2.7 (C) 0.5 - 2.0 mmol/L   POC Glucose Once    Collection Time: 11/15/24 10:11 PM    Specimen: Blood   Result Value Ref Range    Glucose 267 (H) 70 - 130 mg/dL        If labs were ordered, I independently reviewed the results and considered them in treating the patient.      EMERGENCY DEPARTMENT COURSE and DIFFERENTIAL DIAGNOSIS/MDM:   Vitals:  AS OF 22:33 EST    BP - 118/55  HR - 86  TEMP - 98.6 °F (37 °C) (Oral)  O2 SATS - 95%      Orders placed during this visit:  Orders Placed This Encounter   Procedures    Blood Culture - Blood,    Blood Culture - Blood,    Urine Culture - Urine,    XR Chest 1 View    [place] Draw    Comprehensive Metabolic Panel    Urinalysis With Microscopic If Indicated (No Culture) - Urine,  Catheter    Hemoglobin A1c    Blood Gas, Venous -With Co-Ox Panel: Yes    Beta Hydroxybutyrate Quantitative    CBC Auto Differential    Blood Gas, Venous With Co-Ox    Urinalysis, Microscopic Only - Urine, Clean Catch    Lactic Acid, Plasma    Procalcitonin    STAT Lactic Acid, Reflex    CK    STAT Lactic Acid, Reflex    CK    Diet: Diabetic; Consistent Carbohydrate; Fluid Consistency: Thin (IDDSI 0)    Continuous Pulse Oximetry    Vital Signs    Code Status and Medical Interventions: CPR (Attempt to Resuscitate); Full Support    Oxygen Therapy- Nasal Cannula; Titrate 1-6 LPM Per SpO2; 90 - 95%    NIPPV-IPPV / BIPAP / CPAP    POC Glucose Once    POC Glucose Once    POC Glucose Once    POC Glucose Once    POC Glucose Finger Q1H    POC Glucose Once    POC Glucose Finger STAT    POC Glucose Once    Insert Peripheral IV    Inpatient Admission    Fall Precautions    CBC & Differential    Green Top (Gel)    Lavender Top    Gold Top - SST    Gray Top    Light Blue Top       All labs have been independently reviewed by me.  All radiology studies have been reviewed by me and the radiologist dictating the report.  All EKG's have been independently viewed and interpreted by me.      Discussion below represents my analysis of pertinent findings related to patient's condition, differential diagnosis, treatment plan and final disposition.    Differential diagnosis:  The differential diagnosis associated with the patient's presentation includes: Dehydration, DKA, electrolyte abnormalities, hyperglycemia, UTI, pneumonia, sepsis,    Additional sources  Discussed/ obtained information from independent historians:   [] Spouse  [] Parent  [] Family member  [] Friend  [] EMS   [] Other:    External (non-ED) record review:   [] Inpatient record:   [x] Office record: Reviewed record from June 5, 2024 with patient's rheumatologist Dr. Russo, following up on her psoriatic arthritis, chronic pain, likely neuropathic, at that time on Rinvoq  and azathioprine.   [] Outpatient record:   [] Prior Outpatient labs:   [] Prior Outpatient radiology:   [] Primary Care record:   [] Outside ED record:   [] Other:     Patient's care impacted by:   [x] Diabetes  [x] Hypertension  [x] CHF  [] Hyperlipidemia  [] Coronary Artery Disease   [] COPD   [] Cancer   [] Tobacco Abuse   [] Substance Abuse    [] Other:     Care significantly affected by Social Determinants of Health (housing and economic circumstances, unemployment)    [] Yes     [x] No   If yes, Patient's care significantly limited by Social Determinants of Health including:   [] Inadequate housing   [] Low income   [] Alcoholism and drug addiction in family   [] Problems related to primary support group   [] Unemployment   [] Problems related to employment   [] Other Social Determinants of Health:       MEDICATIONS ADMINISTERED IN ED:  Medications   sodium chloride 0.9 % flush 10 mL (has no administration in time range)   sodium chloride 0.9 % bolus 1,000 mL (0 mL Intravenous Stopped 11/15/24 2008)   insulin regular (humuLIN R,novoLIN R) injection 10 Units (10 Units Intravenous Given 11/15/24 1846)   cefTRIAXone (ROCEPHIN) 1,000 mg in sodium chloride 0.9 % 100 mL MBP (0 mg Intravenous Stopped 11/15/24 2008)   sodium chloride 0.9 % bolus 1,000 mL (1,000 mL Intravenous New Bag 11/15/24 2153)              This is a very pleasant 79-year-old female send weakness over the last few days, elevated blood sugars, for she has not been taking her insulin over the last couple days.  She does have a blood glucose in the 400s, is had increased urinary frequency, is overall not feeling well.  Nontoxic-appearing, she is tachycardic, vital signs are otherwise stable.  Will obtain blood work labs and imaging for further assessment.  Urinalysis with 3+ bacteria 11-20 WBCs with moderate leukocytes, possible urinary tract infection which we will treat.  Her VBG pH is 7.37, pCO2 40, bicarb of 23.  Her procalcitonin is 5.3, lactic  acid 2.6.  She has metabolic panel with a glucose of 510, potassium 3.9 with a creatinine 1.54, anion gap at 16.  She was given IV fluids, IV antibiotics.  Her CBC is a white count of 22.2, stable H&H, left shift is noted.  At this point we will treat for sepsis, urinary tract infection, hyperglycemia, dehydration with renal insufficiency.  Will plan for admission into the hospital for further workup and treatment.  Case discussed with hospitalist Dr. Moya.        PROCEDURES:  Procedures    CRITICAL CARE TIME    Total Critical Care time was 30 minutes, excluding separately reportable procedures.  Patient with urosepsis, significant hyperglycemia, acute renal insufficiency requiring IV fluids, electrolyte replacements, antibiotics, reassessments, discussion with consultants. There was a high probability of clinically significant/life threatening deterioration in the patient's condition which required my urgent intervention.      FINAL IMPRESSION      1. Urinary tract infection without hematuria, site unspecified    2. Generalized weakness    3. Hyperglycemia    4. Renal insufficiency    5. Bandemia          DISPOSITION/PLAN     ED Disposition       ED Disposition   Decision to Admit    Condition   --    Comment   Level of Care: Telemetry [5]   Diagnosis: UTI (urinary tract infection) [774790]   Admitting Physician: NIA MOYA [[id number]]   Attending Physician: NIA MOYA [[id number]]   Bed Request Comments: may need glucommander   Certification: I Certify That Inpatient Hospital Services Are Medically Necessary For Greater Than 2 Midnights                   Comment: Please note this report has been produced using speech recognition software.      Ben Anders DO  Attending Emergency Physician         Ben Anders DO  11/15/24 6862

## 2024-11-16 ENCOUNTER — APPOINTMENT (OUTPATIENT)
Dept: CARDIOLOGY | Facility: HOSPITAL | Age: 79
End: 2024-11-16
Payer: MEDICARE

## 2024-11-16 LAB
ANION GAP SERPL CALCULATED.3IONS-SCNC: 15 MMOL/L (ref 5–15)
BASOPHILS # BLD AUTO: 0.01 10*3/MM3 (ref 0–0.2)
BASOPHILS NFR BLD AUTO: 0.1 % (ref 0–1.5)
BH CV ECHO MEAS - AO MAX PG: 9 MMHG
BH CV ECHO MEAS - AO MEAN PG: 4.5 MMHG
BH CV ECHO MEAS - AO ROOT DIAM: 2.9 CM
BH CV ECHO MEAS - AO V2 MAX: 150 CM/SEC
BH CV ECHO MEAS - AO V2 VTI: 23.7 CM
BH CV ECHO MEAS - AVA(I,D): 1.96 CM2
BH CV ECHO MEAS - EDV(CUBED): 42.9 ML
BH CV ECHO MEAS - EDV(MOD-SP2): 56.5 ML
BH CV ECHO MEAS - EDV(MOD-SP4): 53 ML
BH CV ECHO MEAS - EF(MOD-BP): 56.7 %
BH CV ECHO MEAS - EF(MOD-SP2): 55 %
BH CV ECHO MEAS - EF(MOD-SP4): 55.8 %
BH CV ECHO MEAS - ESV(CUBED): 13.8 ML
BH CV ECHO MEAS - ESV(MOD-SP2): 25.4 ML
BH CV ECHO MEAS - ESV(MOD-SP4): 23.4 ML
BH CV ECHO MEAS - FS: 31.4 %
BH CV ECHO MEAS - IVS/LVPW: 1 CM
BH CV ECHO MEAS - IVSD: 1 CM
BH CV ECHO MEAS - LA DIMENSION: 3.7 CM
BH CV ECHO MEAS - LAT PEAK E' VEL: 8.6 CM/SEC
BH CV ECHO MEAS - LV MASS(C)D: 103.4 GRAMS
BH CV ECHO MEAS - LV MAX PG: 4.7 MMHG
BH CV ECHO MEAS - LV MEAN PG: 2 MMHG
BH CV ECHO MEAS - LV V1 MAX: 108.5 CM/SEC
BH CV ECHO MEAS - LV V1 VTI: 16.4 CM
BH CV ECHO MEAS - LVIDD: 3.5 CM
BH CV ECHO MEAS - LVIDS: 2.4 CM
BH CV ECHO MEAS - LVOT AREA: 2.8 CM2
BH CV ECHO MEAS - LVOT DIAM: 1.9 CM
BH CV ECHO MEAS - LVPWD: 1 CM
BH CV ECHO MEAS - MED PEAK E' VEL: 8.7 CM/SEC
BH CV ECHO MEAS - MV DEC SLOPE: 563 CM/SEC2
BH CV ECHO MEAS - MV E MAX VEL: 112 CM/SEC
BH CV ECHO MEAS - MV MAX PG: 7.2 MMHG
BH CV ECHO MEAS - MV MEAN PG: 2 MMHG
BH CV ECHO MEAS - MV P1/2T: 60.9 MSEC
BH CV ECHO MEAS - MV V2 VTI: 29.2 CM
BH CV ECHO MEAS - MVA(P1/2T): 3.6 CM2
BH CV ECHO MEAS - MVA(VTI): 1.59 CM2
BH CV ECHO MEAS - RAP SYSTOLE: 3 MMHG
BH CV ECHO MEAS - RVSP: 28 MMHG
BH CV ECHO MEAS - SV(LVOT): 46.5 ML
BH CV ECHO MEAS - SV(MOD-SP2): 31.1 ML
BH CV ECHO MEAS - SV(MOD-SP4): 29.6 ML
BH CV ECHO MEAS - TAPSE (>1.6): 2.06 CM
BH CV ECHO MEAS - TR MAX PG: 25 MMHG
BH CV ECHO MEAS - TR MAX VEL: 250 CM/SEC
BH CV ECHO MEASUREMENTS AVERAGE E/E' RATIO: 12.95
BH CV XLRA - RV BASE: 2.6 CM
BH CV XLRA - RV LENGTH: 5 CM
BH CV XLRA - RV MID: 1.9 CM
BH CV XLRA - TDI S': 10.1 CM/SEC
BUN SERPL-MCNC: 52 MG/DL (ref 8–23)
BUN/CREAT SERPL: 40.6 (ref 7–25)
CALCIUM SPEC-SCNC: 7.8 MG/DL (ref 8.6–10.5)
CHLORIDE SERPL-SCNC: 106 MMOL/L (ref 98–107)
CK SERPL-CCNC: 831 U/L (ref 20–180)
CO2 SERPL-SCNC: 18 MMOL/L (ref 22–29)
CREAT SERPL-MCNC: 1.28 MG/DL (ref 0.57–1)
D-LACTATE SERPL-SCNC: 1.3 MMOL/L (ref 0.5–2)
D-LACTATE SERPL-SCNC: 2.2 MMOL/L (ref 0.5–2)
D-LACTATE SERPL-SCNC: 2.5 MMOL/L (ref 0.5–2)
DEPRECATED RDW RBC AUTO: 50.2 FL (ref 37–54)
EGFRCR SERPLBLD CKD-EPI 2021: 42.7 ML/MIN/1.73
EOSINOPHIL # BLD AUTO: 1.25 10*3/MM3 (ref 0–0.4)
EOSINOPHIL NFR BLD AUTO: 7.6 % (ref 0.3–6.2)
ERYTHROCYTE [DISTWIDTH] IN BLOOD BY AUTOMATED COUNT: 14.3 % (ref 12.3–15.4)
GEN 5 2HR TROPONIN T REFLEX: 46 NG/L
GLUCOSE BLDC GLUCOMTR-MCNC: 139 MG/DL (ref 70–130)
GLUCOSE BLDC GLUCOMTR-MCNC: 152 MG/DL (ref 70–130)
GLUCOSE BLDC GLUCOMTR-MCNC: 162 MG/DL (ref 70–130)
GLUCOSE BLDC GLUCOMTR-MCNC: 175 MG/DL (ref 70–130)
GLUCOSE BLDC GLUCOMTR-MCNC: 257 MG/DL (ref 70–130)
GLUCOSE SERPL-MCNC: 251 MG/DL (ref 65–99)
HCT VFR BLD AUTO: 35 % (ref 34–46.6)
HGB BLD-MCNC: 11 G/DL (ref 12–15.9)
IMM GRANULOCYTES # BLD AUTO: 0.06 10*3/MM3 (ref 0–0.05)
IMM GRANULOCYTES NFR BLD AUTO: 0.4 % (ref 0–0.5)
LEFT ATRIUM VOLUME INDEX: 37.7 ML/M2
LYMPHOCYTES # BLD AUTO: 1.01 10*3/MM3 (ref 0.7–3.1)
LYMPHOCYTES NFR BLD AUTO: 6.2 % (ref 19.6–45.3)
MAGNESIUM SERPL-MCNC: 1.8 MG/DL (ref 1.6–2.4)
MCH RBC QN AUTO: 30.1 PG (ref 26.6–33)
MCHC RBC AUTO-ENTMCNC: 31.4 G/DL (ref 31.5–35.7)
MCV RBC AUTO: 95.9 FL (ref 79–97)
MONOCYTES # BLD AUTO: 0.56 10*3/MM3 (ref 0.1–0.9)
MONOCYTES NFR BLD AUTO: 3.4 % (ref 5–12)
NEUTROPHILS NFR BLD AUTO: 13.51 10*3/MM3 (ref 1.7–7)
NEUTROPHILS NFR BLD AUTO: 82.3 % (ref 42.7–76)
NRBC BLD AUTO-RTO: 0 /100 WBC (ref 0–0.2)
PHOSPHATE SERPL-MCNC: 2.6 MG/DL (ref 2.5–4.5)
PLATELET # BLD AUTO: 161 10*3/MM3 (ref 140–450)
PMV BLD AUTO: 12.5 FL (ref 6–12)
POTASSIUM SERPL-SCNC: 3.4 MMOL/L (ref 3.5–5.2)
QT INTERVAL: 412 MS
QT INTERVAL: 414 MS
QTC INTERVAL: 486 MS
QTC INTERVAL: 515 MS
RBC # BLD AUTO: 3.65 10*6/MM3 (ref 3.77–5.28)
SODIUM SERPL-SCNC: 139 MMOL/L (ref 136–145)
TROPONIN T DELTA: 4 NG/L
TROPONIN T SERPL HS-MCNC: 42 NG/L
WBC NRBC COR # BLD AUTO: 16.4 10*3/MM3 (ref 3.4–10.8)

## 2024-11-16 PROCEDURE — 82550 ASSAY OF CK (CPK): CPT | Performed by: NURSE PRACTITIONER

## 2024-11-16 PROCEDURE — 85025 COMPLETE CBC W/AUTO DIFF WBC: CPT | Performed by: NURSE PRACTITIONER

## 2024-11-16 PROCEDURE — 25810000003 SODIUM CHLORIDE 0.9 % SOLUTION: Performed by: NURSE PRACTITIONER

## 2024-11-16 PROCEDURE — 84484 ASSAY OF TROPONIN QUANT: CPT | Performed by: NURSE PRACTITIONER

## 2024-11-16 PROCEDURE — 83735 ASSAY OF MAGNESIUM: CPT | Performed by: NURSE PRACTITIONER

## 2024-11-16 PROCEDURE — 80048 BASIC METABOLIC PNL TOTAL CA: CPT | Performed by: NURSE PRACTITIONER

## 2024-11-16 PROCEDURE — 84100 ASSAY OF PHOSPHORUS: CPT | Performed by: NURSE PRACTITIONER

## 2024-11-16 PROCEDURE — 99222 1ST HOSP IP/OBS MODERATE 55: CPT | Performed by: INTERNAL MEDICINE

## 2024-11-16 PROCEDURE — 63710000001 INSULIN LISPRO (HUMAN) PER 5 UNITS: Performed by: NURSE PRACTITIONER

## 2024-11-16 PROCEDURE — 99232 SBSQ HOSP IP/OBS MODERATE 35: CPT | Performed by: STUDENT IN AN ORGANIZED HEALTH CARE EDUCATION/TRAINING PROGRAM

## 2024-11-16 PROCEDURE — 93010 ELECTROCARDIOGRAM REPORT: CPT | Performed by: INTERNAL MEDICINE

## 2024-11-16 PROCEDURE — 93306 TTE W/DOPPLER COMPLETE: CPT

## 2024-11-16 PROCEDURE — 97161 PT EVAL LOW COMPLEX 20 MIN: CPT

## 2024-11-16 PROCEDURE — 25010000002 CEFTRIAXONE PER 250 MG: Performed by: NURSE PRACTITIONER

## 2024-11-16 PROCEDURE — 63710000001 INSULIN GLARGINE PER 5 UNITS: Performed by: NURSE PRACTITIONER

## 2024-11-16 PROCEDURE — 97165 OT EVAL LOW COMPLEX 30 MIN: CPT

## 2024-11-16 PROCEDURE — 97535 SELF CARE MNGMENT TRAINING: CPT

## 2024-11-16 PROCEDURE — 83605 ASSAY OF LACTIC ACID: CPT | Performed by: EMERGENCY MEDICINE

## 2024-11-16 PROCEDURE — 82948 REAGENT STRIP/BLOOD GLUCOSE: CPT

## 2024-11-16 PROCEDURE — 93005 ELECTROCARDIOGRAM TRACING: CPT | Performed by: NURSE PRACTITIONER

## 2024-11-16 PROCEDURE — 93306 TTE W/DOPPLER COMPLETE: CPT | Performed by: INTERNAL MEDICINE

## 2024-11-16 RX ORDER — PREGABALIN 75 MG/1
75 CAPSULE ORAL 2 TIMES DAILY
Status: DISCONTINUED | OUTPATIENT
Start: 2024-11-16 | End: 2024-11-25 | Stop reason: HOSPADM

## 2024-11-16 RX ADMIN — INSULIN LISPRO 2 UNITS: 100 INJECTION, SOLUTION INTRAVENOUS; SUBCUTANEOUS at 13:14

## 2024-11-16 RX ADMIN — OXYBUTYNIN CHLORIDE 5 MG: 5 TABLET ORAL at 09:18

## 2024-11-16 RX ADMIN — SODIUM CHLORIDE 1000 MG: 900 INJECTION INTRAVENOUS at 17:32

## 2024-11-16 RX ADMIN — CETIRIZINE HYDROCHLORIDE 10 MG: 10 TABLET, FILM COATED ORAL at 09:18

## 2024-11-16 RX ADMIN — SODIUM CHLORIDE 100 ML/HR: 9 INJECTION, SOLUTION INTRAVENOUS at 01:04

## 2024-11-16 RX ADMIN — Medication 10 ML: at 09:18

## 2024-11-16 RX ADMIN — PREGABALIN 75 MG: 75 CAPSULE ORAL at 20:56

## 2024-11-16 RX ADMIN — INSULIN LISPRO 2 UNITS: 100 INJECTION, SOLUTION INTRAVENOUS; SUBCUTANEOUS at 20:55

## 2024-11-16 RX ADMIN — MONTELUKAST 10 MG: 10 TABLET, FILM COATED ORAL at 17:32

## 2024-11-16 RX ADMIN — INSULIN GLARGINE 10 UNITS: 100 INJECTION, SOLUTION SUBCUTANEOUS at 20:56

## 2024-11-16 RX ADMIN — FLUTICASONE PROPIONATE 2 SPRAY: 50 SPRAY, METERED NASAL at 09:18

## 2024-11-16 RX ADMIN — INSULIN LISPRO 2 UNITS: 100 INJECTION, SOLUTION INTRAVENOUS; SUBCUTANEOUS at 09:18

## 2024-11-16 RX ADMIN — TERAZOSIN HYDROCHLORIDE 2 MG: 2 CAPSULE ORAL at 20:56

## 2024-11-16 RX ADMIN — SODIUM CHLORIDE 100 ML/HR: 9 INJECTION, SOLUTION INTRAVENOUS at 10:40

## 2024-11-16 RX ADMIN — LEVOTHYROXINE SODIUM 75 MCG: 0.07 TABLET ORAL at 05:28

## 2024-11-16 RX ADMIN — ASPIRIN 81 MG: 81 TABLET, COATED ORAL at 09:18

## 2024-11-16 RX ADMIN — PRAVASTATIN SODIUM 20 MG: 20 TABLET ORAL at 17:32

## 2024-11-16 RX ADMIN — APIXABAN 2.5 MG: 2.5 TABLET, FILM COATED ORAL at 20:56

## 2024-11-16 RX ADMIN — Medication 10 ML: at 00:57

## 2024-11-16 RX ADMIN — Medication 10 ML: at 20:56

## 2024-11-16 RX ADMIN — OXYBUTYNIN CHLORIDE 5 MG: 5 TABLET ORAL at 20:56

## 2024-11-16 RX ADMIN — INSULIN GLARGINE 10 UNITS: 100 INJECTION, SOLUTION SUBCUTANEOUS at 00:07

## 2024-11-16 NOTE — THERAPY EVALUATION
Patient Name: Marilyn Kunz  : 1945    MRN: [id number]                              Today's Date: 2024       Admit Date: 11/15/2024    Visit Dx:     ICD-10-CM ICD-9-CM   1. Urinary tract infection without hematuria, site unspecified  N39.0 599.0   2. Generalized weakness  R53.1 780.79   3. Hyperglycemia  R73.9 790.29   4. Renal insufficiency  N28.9 593.9   5. Bandemia  D72.825 288.66     Patient Active Problem List   Diagnosis    Essential hypertension    Factor V Leiden    Psoriatic arthritis    At risk for venous thromboembolism (VTE)    Hypothyroidism    CKD (chronic kidney disease) stage 3, GFR 30-59 ml/min    Hyperlipidemia LDL goal <70    PDA (patent ductus arteriosus)    Demyelinating disease    Spondylolisthesis, grade 2    Type 1 diabetes mellitus    History of stroke    Demyelinating disease of central nervous system, unspecified    Chronic diastolic congestive heart failure    Peripheral polyneuropathy    Immunosuppression due to drug therapy    Erosive osteoarthritis    Cervical radiculopathy    Rash    UTI (urinary tract infection)    Type 1 diabetes mellitus with hyperglycemia    Syncope    Elevated serum creatinine    Sepsis    Closed fracture of left foot    TIFFANIE (obstructive sleep apnea)    COPD (chronic obstructive pulmonary disease)    Elevated CK     Past Medical History:   Diagnosis Date    Abnormal ECG Check FPA Date or [facility] Records    [facility]    Acute sinusitis 2023    Anemia     Asthma     CAP (community acquired pneumonia) 2023    CHF (congestive heart failure) 2023    Chronic kidney disease     pt told it was dx from Dr Baca and to not eat much protein    Congenital heart disease 's Patent 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    Surgery [facility] s close    COPD (chronic obstructive pulmonary disease) 11/15/2024    CTS (carpal tunnel syndrome)     Psoriatic arthritis hands could be    Deep vein thrombosis No on  blood clots but have a blood clotting disorder called Leiden Factor 5    Demyelinating disease of central nervous system, unspecified 03/09/2023    Diabetes     Impression: Patient states that her diabetes is not doing well.  I asked her to go ahead and try to talk to her endocrinokogist.  She is still having the lower extremity neuropathic pan, but not enough for me to administer medication.    Disease of thyroid gland     Diverticulitis of colon     Erosive osteoarthritis 05/30/2024    Factor 5 Leiden mutation, heterozygous 2012    Head injury     Hyperlipidemia     Hypertension     Medication monitoring encounter     Impression:  She has renal insufficiency.  She is just taking Tylenol.    Movement disorder 10/2021    knee replacement left knww    Murmur, cardiac     Neuropathy in diabetes     redness swelling legs    TIFFANIE (obstructive sleep apnea) 11/15/2024    Osteopenia     Story: Femoral Neck Impression: Up-ro-date on bone density scan.  Follow up bone density every 2 years.    Peripheral neuropathy 05/03/2022    hands, arms. 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  I went over her exercises and told her to get a shoe insert,    Psoriasis     Impression: No rash    Psoriatic arthritis     hands    Sleep apnea Always    diabetic do not sleep well up and down    Stroke had one don't know when    showed on MRI Brain    Syncope 11/15/2024    Type 1 diabetes      Past Surgical History:   Procedure Laterality Date    CARDIAC CATHETERIZATION  1952 2 of them    Patent Dictus operation    CATARACT EXTRACTION Bilateral     COLONOSCOPY      ENDOSCOPY N/A 10/20/2020    Procedure: ESOPHAGOGASTRODUODENOSCOPY;  Surgeon: Brunner, Mark I, MD;  Location: [facility] ENDOSCOPY;  Service: Gastroenterology;  Laterality: N/A;    HAND SURGERY Left 1987    no hardware    KNEE ARTHROPLASTY      KNEE SURGERY Left     PATENT DUCTUS ARTERIOUS LIGATION      REPLACEMENT TOTAL  KNEE Left       General Information       Row Name 11/16/24 0956          OT Time and Intention    Document Type evaluation  -LR     Mode of Treatment occupational therapy  -LR       Row Name 11/16/24 0956          General Information    Patient Profile Reviewed yes  -LR     Prior Level of Function independent:;bed mobility;ADL's;home management;all household mobility  -LR     Existing Precautions/Restrictions fall;other (see comments)  psoriatic arthritis, LLE in boot, monitor HR  -LR     Barriers to Rehab none identified  -LR       Row Name 11/16/24 0956          Occupational Profile    Environmental Supports and Barriers (Occupational Profile) Uses cane at night  -LR       Row Name 11/16/24 0956          Living Environment    People in Home alone  -LR       Row Name 11/16/24 0956          Home Main Entrance    Number of Stairs, Main Entrance two;other (see comments)  through garage  -LR     Stair Railings, Main Entrance railing on left side (ascending)  -LR       Row Name 11/16/24 0956          Stairs Within Home, Primary    Number of Stairs, Within Home, Primary none  -LR       Row Name 11/16/24 0956          Cognition    Orientation Status (Cognition) oriented x 4  -LR       Row Name 11/16/24 0956          Safety Issues/Impairments Affecting Functional Mobility    Safety Issues Affecting Function (Mobility) awareness of need for assistance;insight into deficits/self-awareness;safety precautions follow-through/compliance;safety precaution awareness  -LR     Impairments Affecting Function (Mobility) balance;endurance/activity tolerance;strength;grasp  -LR               User Key  (r) = Recorded By, (t) = Taken By, (c) = Cosigned By      Initials Name Provider Type    LR Thais Rojo, OT Occupational Therapist                     Mobility/ADL's       Row Name 11/16/24 1003          Bed Mobility    Bed Mobility supine-sit  -LR     Supine-Sit [place] (Bed Mobility) standby assist  -LR     Assistive Device  (Bed Mobility) bed rails;head of bed elevated  -LR       Row Name 11/16/24 1003          Transfers    Transfers sit-stand transfer;stand-sit transfer;toilet transfer;bed-chair transfer  -LR     Comment, (Transfers) VC for attention and awareness  -LR       Row Name 11/16/24 1003          Bed-Chair Transfer    Bed-Chair [place] (Transfers) minimum assist (75% patient effort);verbal cues;nonverbal cues (demo/gesture)  -LR     Assistive Device (Bed-Chair Transfers) other (see comments)  none  -LR       Row Name 11/16/24 1003          Sit-Stand Transfer    Sit-Stand [place] (Transfers) contact guard  -LR       Row Name 11/16/24 1003          Stand-Sit Transfer    Stand-Sit [place] (Transfers) contact guard  -LR       Row Name 11/16/24 1003          Toilet Transfer    Type (Toilet Transfer) sit-stand;stand-sit  -LR     [place] Level (Toilet Transfer) contact guard;verbal cues  -       Row Name 11/16/24 1003          Functional Mobility    Functional Mobility- Ind. Level minimum assist (75% patient effort);contact guard assist;other (see comments)  progressed to [employer]  -LR     Functional Mobility-Distance (Feet) --  In room for ADLs  -LR     Patient was able to Ambulate yes  -LR       Row Name 11/16/24 1003          Activities of Daily Living    BADL Assessment/Intervention lower body dressing;grooming;toileting  -       Row Name 11/16/24 1003          Mobility    Extremity Weight-bearing Status left lower extremity  -LR     Left Lower Extremity (Weight-bearing Status) weight-bearing as tolerated (WBAT);other (see comments)  in boot per patient  -LR       Row Name 11/16/24 1003          Lower Body Dressing Assessment/Training    [place] Level (Lower Body Dressing) doff;don;socks;set up  -LR     Position (Lower Body Dressing) long sitting  -LR       Row Name 11/16/24 1003          Grooming Assessment/Training    [place] Level (Grooming) hair care, combing/brushing;wash face, hands;oral care  regimen;set up  -LR     Position (Grooming) sink side  -LR       Row Name 11/16/24 1003          Toileting Assessment/Training    [place] Level (Toileting) adjust/manage clothing;change pad/brief;perform perineal hygiene;standby assist  -LR     Assistive Devices (Toileting) commode;raised toilet seat  -LR     Position (Toileting) supported sitting  -LR               User Key  (r) = Recorded By, (t) = Taken By, (c) = Cosigned By      Initials Name Provider Type    LR Thais Rojo, OT Occupational Therapist                   Obj/Interventions       Row Name 11/16/24 1007          Sensory Assessment (Somatosensory)    Sensory Assessment (Somatosensory) UE sensation intact  -LR       Row Name 11/16/24 1007          Vision Assessment/Intervention    Visual Impairment/Limitations WFL;corrective lenses full-time  -LR       Row Name 11/16/24 1007          Range of Motion Comprehensive    General Range of Motion bilateral upper extremity ROM WNL  -LR       Row Name 11/16/24 1007          Strength Comprehensive (MMT)    General Manual Muscle Testing (MMT) Assessment upper extremity strength deficits identified  -LR     Comment, General Manual Muscle Testing (MMT) Assessment BUE strength 3+/5 MMT  -LR       Row Name 11/16/24 1007          Balance    Balance Assessment sitting static balance;sitting dynamic balance;sit to stand dynamic balance;standing static balance;standing dynamic balance  -LR     Static Sitting Balance supervision  -LR     Dynamic Sitting Balance standby assist  -LR     Position, Sitting Balance unsupported;sitting edge of bed;supported;sitting in chair  -LR     Sit to Stand Dynamic Balance contact guard  -LR     Static Standing Balance contact guard  -LR     Dynamic Standing Balance minimal assist  -LR     Position/Device Used, Standing Balance unsupported  -LR     Balance Interventions sitting;standing;sit to stand;supported;static;dynamic;occupation based/functional task  -LR               User  Key  (r) = Recorded By, (t) = Taken By, (c) = Cosigned By      Initials Name Provider Type    LR Thais Rojo OT Occupational Therapist                   Goals/Plan       Row Name 11/16/24 1015          Transfer Goal 1 (OT)    Activity/Assistive Device (Transfer Goal 1, OT) sit-to-stand/stand-to-sit;bed-to-chair/chair-to-bed;toilet  -LR     [place] Level/Cues Needed (Transfer Goal 1, OT) standby assist  -LR     Time Frame (Transfer Goal 1, OT) long term goal (LTG);10 days  -LR     Progress/Outcome (Transfer Goal 1, OT) goal ongoing;new goal  -LR       Row Name 11/16/24 1015          Toileting Goal 1 (OT)    Activity/Device (Toileting Goal 1, OT) adjust/manage clothing;perform perineal hygiene;commode  -LR     [place] Level/Cues Needed (Toileting Goal 1, OT) modified independence  -LR     Time Frame (Toileting Goal 1, OT) short term goal (STG);5 days  -LR     Progress/Outcome (Toileting Goal 1, OT) new goal;goal ongoing  -LR       Row Name 11/16/24 1015          Therapy Assessment/Plan (OT)    Planned Therapy Interventions (OT) activity tolerance training;patient/caregiver education/training;adaptive equipment training;BADL retraining;occupation/activity based interventions;strengthening exercise;ROM/therapeutic exercise;functional balance retraining;IADL retraining;transfer/mobility retraining;passive ROM/stretching  -LR               User Key  (r) = Recorded By, (t) = Taken By, (c) = Cosigned By      Initials Name Provider Type    LR Thais Rojo OT Occupational Therapist                   Clinical Impression       Row Name 11/16/24 1008          Pain Assessment    Pretreatment Pain Rating 0/10 - no pain  -LR     Posttreatment Pain Rating 0/10 - no pain  -LR       Row Name 11/16/24 1008          Plan of Care Review    Plan of Care Reviewed With patient  -LR     Progress no change  -LR     Outcome Evaluation OT eval completed. Pt presents below baseline with decreased activity tolerance,  strength, and mild balance deficits. Pt was able to complete ADLs sink side with no complaints of fatigue during activity. HR fluctuating during functional mobility. RN notified and aware. Recommend IPOT POC and IRF at D/C.  -LR       Row Name 11/16/24 1008          Therapy Assessment/Plan (OT)    Patient/Family Therapy Goal Statement (OT) Return to PLOF  -LR     Rehab Potential (OT) good  -LR     Criteria for Skilled Therapeutic Interventions Met (OT) yes;skilled treatment is necessary  -LR     Therapy Frequency (OT) daily  -LR     Predicted Duration of Therapy Intervention (OT) 10 days  -LR       Row Name 11/16/24 1008          Therapy Plan Review/Discharge Plan (OT)    Anticipated Discharge Disposition (OT) inpatient rehabilitation facility  -LR       Row Name 11/16/24 1008          Vital Signs    Pre Systolic BP Rehab 107  -LR     Pre Treatment Diastolic BP 42  -LR     Post Systolic BP Rehab 103  -LR     Post Treatment Diastolic BP 55  -LR     Pretreatment Heart Rate (beats/min) 95  -LR     Intratreatment Heart Rate (beats/min) 120   -LR     Posttreatment Heart Rate (beats/min) 94  -LR     Pre SpO2 (%) 95  -LR     O2 Delivery Pre Treatment room air  -LR     O2 Delivery Intra Treatment room air  -LR     Post SpO2 (%) 95  -LR     O2 Delivery Post Treatment room air  -LR     Pre Patient Position Supine  -LR     Intra Patient Position Standing  -LR     Post Patient Position Sitting  -LR       Row Name 11/16/24 1008          Positioning and Restraints    Pre-Treatment Position in bed  -LR     Post Treatment Position chair  -LR     In Chair notified nsg;reclined;with nsg;call light within reach;encouraged to call for assist;exit alarm on;legs elevated;waffle cushion  -LR               User Key  (r) = Recorded By, (t) = Taken By, (c) = Cosigned By      Initials Name Provider Type    LR Thais Rojo, OT Occupational Therapist                   Outcome Measures       Row Name 11/16/24 1016          How much help  from another is currently needed...    Putting on and taking off regular lower body clothing? 4  -LR     Bathing (including washing, rinsing, and drying) 3  -LR     Toileting (which includes using toilet bed pan or urinal) 3  -LR     Putting on and taking off regular upper body clothing 4  -LR     Taking care of personal grooming (such as brushing teeth) 3  -LR     Eating meals 3  -LR     AM-PAC 6 Clicks Score (OT) 20  -LR       Row Name 11/15/24 2225          How much help from another person do you currently need...    Turning from your back to your side while in flat bed without using bedrails? 4  -SS     Moving from lying on back to sitting on the side of a flat bed without bedrails? 4  -SS     Moving to and from a bed to a chair (including a wheelchair)? 4  -SS     Standing up from a chair using your arms (e.g., wheelchair, bedside chair)? 4  -SS     Climbing 3-5 steps with a railing? 3  -SS     To walk in hospital room? 4  -SS     AM-PAC 6 Clicks Score (PT) 23  -SS       Row Name 11/16/24 1016          Functional Assessment    Outcome Measure Options AM-PAC 6 Clicks Daily Activity (OT)  -LR               User Key  (r) = Recorded By, (t) = Taken By, (c) = Cosigned By      Initials Name Provider Type    LR Thais Rojo, OT Occupational Therapist    SS Faith Byrd, RN Registered Nurse                    Occupational Therapy Education       Title: PT OT SLP Therapies (In Progress)       Topic: Occupational Therapy (In Progress)       Point: ADL training (In Progress)       Description:   Instruct learner(s) on proper safety adaptation and remediation techniques during self care or transfers.   Instruct in proper use of assistive devices.                  Learning Progress Summary            Patient Acceptance, E, NR by LR at 11/16/2024 0220                      Point: Home exercise program (Not Started)       Description:   Instruct learner(s) on appropriate technique for monitoring, assisting and/or  progressing therapeutic exercises/activities.                  Learner Progress:  Not documented in this visit.              Point: Precautions (In Progress)       Description:   Instruct learner(s) on prescribed precautions during self-care and functional transfers.                  Learning Progress Summary            Patient Acceptance, E, NR by LR at 11/16/2024 0835                      Point: Body mechanics (In Progress)       Description:   Instruct learner(s) on proper positioning and spine alignment during self-care, functional mobility activities and/or exercises.                  Learning Progress Summary            Patient Acceptance, E, NR by LR at 11/16/2024 0835                                      User Key       Initials Effective Dates Name Provider Type Discipline    LR 10/09/24 -  Thais Rojo, OT Occupational Therapist OT                  OT Recommendation and Plan  Planned Therapy Interventions (OT): activity tolerance training, patient/caregiver education/training, adaptive equipment training, BADL retraining, occupation/activity based interventions, strengthening exercise, ROM/therapeutic exercise, functional balance retraining, IADL retraining, transfer/mobility retraining, passive ROM/stretching  Therapy Frequency (OT): daily  Plan of Care Review  Plan of Care Reviewed With: patient  Progress: no change  Outcome Evaluation: OT eval completed. Pt presents below baseline with decreased activity tolerance, strength, and mild balance deficits. Pt was able to complete ADLs sink side with no complaints of fatigue during activity. HR fluctuating during functional mobility. RN notified and aware. Recommend IPOT POC and IRF at D/C.     Time Calculation:   Evaluation Complexity (OT)  Review Occupational Profile/Medical/Therapy History Complexity: brief/low complexity  Assessment, Occupational Performance/Identification of Deficit Complexity: 1-3 performance deficits  Clinical Decision Making  Complexity (OT): problem focused assessment/low complexity  Overall Complexity of Evaluation (OT): low complexity     Time Calculation- OT       Row Name 11/16/24 1018             Time Calculation- OT    OT Start Time 0835  -LR      OT Received On 11/16/24  -LR      OT Goal Re-Cert Due Date 11/26/24  -LR         Timed Charges    72894 - OT Self Care/Mgmt Minutes 12  -LR         Untimed Charges    OT Eval/Re-eval Minutes 55  -LR         Total Minutes    Timed Charges Total Minutes 12  -LR      Untimed Charges Total Minutes 55  -LR       Total Minutes 67  -LR                User Key  (r) = Recorded By, (t) = Taken By, (c) = Cosigned By      Initials Name Provider Type    LR Thais Rojo OT Occupational Therapist                  Therapy Charges for Today       Code Description Service Date Service Provider Modifiers Qty    70541007204 HC OT SELF CARE/MGMT/TRAIN EA 15 MIN 11/16/2024 Thais Rojo OT GO 1    50766850425 HC OT EVAL LOW COMPLEXITY 4 11/16/2024 Thais Rojo OT GO 1                 Thais Rojo OT  11/16/2024

## 2024-11-16 NOTE — PLAN OF CARE
Goal Outcome Evaluation:  Plan of Care Reviewed With: patient        Progress: no change  Outcome Evaluation: Patient presents with deficits in strength, balance, and activity tolerance compared to baseline. Skilled IP PT services warranted to support return to independence. Anticipate that pt would benefit from IRF at d/c.    Anticipated Discharge Disposition (PT): inpatient rehabilitation facility

## 2024-11-16 NOTE — ED NOTES
Marilyn Kunz    Nursing Report ED to Floor:  Mental status: A/Ox4  Ambulatory status: Assist x1  Oxygen Therapy:  Room air  Cardiac Rhythm: NSR  Admitted from: ED  Safety Concerns:  Fall  Social Issues: None  ED Room #:  19    ED Nurse Phone Extension - [phone] or may call [phone].      HPI:   Chief Complaint   Patient presents with    Weakness - Generalized       Past Medical History:  Past Medical History:   Diagnosis Date    Abnormal ECG Check [facility] Date or [facility]    Brought to  from [facility]    Acute sinusitis 02/06/2023    Anemia     Asthma     CAP (community acquired pneumonia) 02/06/2023    CHF (congestive heart failure) 07/20/2023    Chronic kidney disease     pt told it was dx from Dr Baca and to not eat much protein    Congenital heart disease 1950's Patent 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    Surgery [facility] 1950s close    CTS (carpal tunnel syndrome)     Psoriatic arthritis hands could be    Deep vein thrombosis No on blood clots but have a blood clotting disorder called Leiden Factor 5    Demyelinating disease of central nervous system, unspecified 03/09/2023    Diabetes     Impression: Patient states that her diabetes is not doing well.  I asked her to go ahead and try to talk to her endocrinokogist.  She is still having the lower extremity neuropathic pan, but not enough for me to administer medication.    Disease of thyroid gland     Diverticulitis of colon     Erosive osteoarthritis 05/30/2024    Factor 5 Leiden mutation, heterozygous 2012    Head injury     Hyperlipidemia     Hypertension     Medication monitoring encounter     Impression:  She has renal insufficiency.  She is just taking Tylenol.    Movement disorder 10/2021    knee replacement left knww    Murmur, cardiac     Neuropathy in diabetes     redness swelling legs    Osteopenia     Story: Femoral Neck Impression: Up-ro-date on bone density scan.  Follow up bone density every 2 years.    Peripheral neuropathy  "05/03/2022    hands, arms. 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  I went over her exercises and told her to get a shoe insert,    Psoriasis     Impression: No rash    Psoriatic arthritis     hands    Sleep apnea Always    diabetic do not sleep well up and down    Stroke had one don't know when    showed on MRI Brain    Type 1 diabetes         Past Surgical History:  Past Surgical History:   Procedure Laterality Date    CARDIAC CATHETERIZATION  1952 2 of them    Patent Dictus operation    CATARACT EXTRACTION Bilateral     COLONOSCOPY      ENDOSCOPY N/A 10/20/2020    Procedure: ESOPHAGOGASTRODUODENOSCOPY;  Surgeon: Brunner, Mark I, MD;  Location: [facility] ENDOSCOPY;  Service: Gastroenterology;  Laterality: N/A;    HAND SURGERY Left 1987    no hardware    KNEE ARTHROPLASTY      KNEE SURGERY Left     PATENT DUCTUS ARTERIOUS LIGATION      REPLACEMENT TOTAL KNEE Left         Admitting Doctor:   No admitting provider for patient encounter.    Consulting Provider(s):  Consults       No orders found from 10/17/2024 to 11/16/2024.             Admitting Diagnosis:   The primary encounter diagnosis was Urinary tract infection without hematuria, site unspecified. Diagnoses of Generalized weakness, Hyperglycemia, Renal insufficiency, and Bandemia were also pertinent to this visit.    Most Recent Vitals:   Vitals:    11/15/24 1638 11/15/24 1641   BP:  168/59   BP Location:  Left arm   Patient Position:  Sitting   Pulse: 112    Resp: 18    Temp:  98.2 °F (36.8 °C)   TempSrc:  Oral   SpO2: 95%    Weight:  59.9 kg (132 lb)   Height:  149.9 cm (59\")       Active LDAs/IV Access:   Lines, Drains & Airways       Active LDAs       Name Placement date Placement time Site Days    Peripheral IV 11/15/24 1810 Right Antecubital 11/15/24  1810  Antecubital  less than 1                    Labs (abnormal labs have a star):   Labs Reviewed   COMPREHENSIVE METABOLIC PANEL - Abnormal; " Notable for the following components:       Result Value    Glucose 510 (*)     BUN 50 (*)     Creatinine 1.54 (*)     Sodium 134 (*)     Chloride 97 (*)     CO2 21.0 (*)     AST (SGOT) 41 (*)     BUN/Creatinine Ratio 32.5 (*)     Anion Gap 16.0 (*)     eGFR 34.2 (*)     All other components within normal limits    Narrative:     GFR Normal >60  Chronic Kidney Disease <60  Kidney Failure <15    The GFR formula is only valid for adults with stable renal function between ages 18 and 70.   URINALYSIS W/ MICROSCOPIC IF INDICATED (NO CULTURE) - Abnormal; Notable for the following components:    Color, UA Dark Yellow (*)     Appearance, UA Cloudy (*)     Glucose,  mg/dL (1+) (*)     Ketones, UA Trace (*)     Protein, UA 30 mg/dL (1+) (*)     Leuk Esterase, UA Moderate (2+) (*)     All other components within normal limits   HEMOGLOBIN A1C - Abnormal; Notable for the following components:    Hemoglobin A1C 8.00 (*)     All other components within normal limits    Narrative:     Hemoglobin A1C Ranges:    Increased Risk for Diabetes  5.7% to 6.4%  Diabetes                     >= 6.5%  Diabetic Goal                < 7.0%   BETA HYDROXYBUTYRATE QUANTITATIVE - Abnormal; Notable for the following components:    Beta-Hydroxybutyrate Quant 0.611 (*)     All other components within normal limits    Narrative:     In the assessment of possible diabetic ketoacidosis, the test should be interpreted along with other clinical and laboratory findings.  A level greater than 1 mmol/L should require further evaluation and levels of more than 3 mmol/L require immediate medical review.   CBC WITH AUTO DIFFERENTIAL - Abnormal; Notable for the following components:    WBC 22.24 (*)     MPV 12.7 (*)     Neutrophil % 89.5 (*)     Lymphocyte % 2.6 (*)     Monocyte % 2.8 (*)     Neutrophils, Absolute 19.89 (*)     Lymphocytes, Absolute 0.57 (*)     Eosinophils, Absolute 1.01 (*)     Immature Grans, Absolute 0.11 (*)     All other components  "within normal limits   BLOOD GAS, VENOUS W/CO-OXIMETRY - Abnormal; Notable for the following components:    pCO2, Venous 39.7 (*)     pO2, Venous 53.9 (*)     Hemoglobin, Blood Gas 12.9 (*)     All other components within normal limits   URINALYSIS, MICROSCOPIC ONLY - Abnormal; Notable for the following components:    WBC, UA 11-20 (*)     Bacteria, UA 3+ (*)     Squamous Epithelial Cells, UA 7-12 (*)     All other components within normal limits   LACTIC ACID, PLASMA - Abnormal; Notable for the following components:    Lactate 2.6 (*)     All other components within normal limits   PROCALCITONIN - Abnormal; Notable for the following components:    Procalcitonin 5.37 (*)     All other components within normal limits    Narrative:     As a Marker for Sepsis (Non-Neonates):    1. <0.5 ng/mL represents a low risk of severe sepsis and/or septic shock.  2. >2 ng/mL represents a high risk of severe sepsis and/or septic shock.    As a Marker for Lower Respiratory Tract Infections that require antibiotic therapy:    PCT on Admission    Antibiotic Therapy       6-12 Hrs later    >0.5                Strongly Recommended  >0.25 - <0.5        Recommended   0.1 - 0.25          Discouraged              Remeasure/reassess PCT  <0.1                Strongly Discouraged     Remeasure/reassess PCT    As 28 day mortality risk marker: \"Change in Procalcitonin Result\" (>80% or <=80%) if Day 0 (or Day 1) and Day 4 values are available. Refer to http://www.[facility]-pct-calculator.com    Change in PCT <=80%  A decrease of PCT levels below or equal to 80% defines a positive change in PCT test result representing a higher risk for 28-day all-cause mortality of patients diagnosed with severe sepsis for septic shock.    Change in PCT >80%  A decrease of PCT levels of more than 80% defines a negative change in PCT result representing a lower risk for 28-day all-cause mortality of patients diagnosed with severe sepsis or septic shock.      POCT " GLUCOSE FINGERSTICK - Abnormal; Notable for the following components:    Glucose 445 (*)     All other components within normal limits   POCT GLUCOSE FINGERSTICK - Abnormal; Notable for the following components:    Glucose 485 (*)     All other components within normal limits   BLOOD CULTURE   BLOOD CULTURE   RAINBOW DRAW    Narrative:     The following orders were created for panel order [place] Draw.  Procedure                               Abnormality         Status                     ---------                               -----------         ------                     Green Top (Gel)[[id number]]                                  Final result               Lavender Top[[id number]]                                     Final result               Gold Top - SST[[id number]]                                   Final result               Schmidt Top[[id number]]                                         Final result               Light Blue Top[[id number]]                                   Final result                 Please view results for these tests on the individual orders.   BLOOD GAS, VENOUS   LACTIC ACID, REFLEX   POCT GLUCOSE FINGERSTICK   POCT GLUCOSE FINGERSTICK   CBC AND DIFFERENTIAL    Narrative:     The following orders were created for panel order CBC & Differential.  Procedure                               Abnormality         Status                     ---------                               -----------         ------                     CBC Auto Differential[[id number]]        Abnormal            Final result                 Please view results for these tests on the individual orders.   GREEN TOP   LAVENDER TOP   GOLD TOP - SST   GRAY TOP   LIGHT BLUE TOP       Meds Given in ED:   Medications   sodium chloride 0.9 % flush 10 mL (has no administration in time range)   cefTRIAXone (ROCEPHIN) 1,000 mg in sodium chloride 0.9 % 100 mL MBP (1,000 mg Intravenous New Bag 11/15/24 4269)   sodium chloride 0.9 % bolus 1,000  mL (1,000 mL Intravenous New Bag 11/15/24 1819)   insulin regular (humuLIN R,novoLIN R) injection 10 Units (10 Units Intravenous Given 11/15/24 1846)           Last NIH score:                                                          Dysphagia screening results:        Elmer Coma Scale:  No data recorded     CIWA:        Restraint Type:            Isolation Status:  No active isolations

## 2024-11-16 NOTE — CONSULTS
[facility]  CARDIOLOGY CONSULTATION NOTE    Marilyn Kunz  : 1945  MRN:[id number]  Home Phone:[phone]    Date of Admission:11/15/2024  Date of Consultation: 24    PCP: Peter Baca MD    IDENTIFICATION: A 79 y.o. female resident of [place], [place]     Chief Complaint   Patient presents with    Weakness - Generalized     PROBLEM LIST:   1.  HTN  2.  Type I DM  3.  HLD  4.  Psoriatic arthritis  5.  Hypothyroidism  6.  DJD  7.  CKD 2  8.  Factor V Leiden  9.  Prior cerebellar infarct  10.  Surgery for patent ductus arteriosus age 11  11.  Demyelinating disease on azathioprine      UTI (urinary tract infection)    Essential hypertension    Factor V Leiden    Psoriatic arthritis    Hypothyroidism    CKD (chronic kidney disease) stage 3, GFR 30-59 ml/min    Hyperlipidemia LDL goal <70    History of stroke    Chronic diastolic congestive heart failure    Immunosuppression due to drug therapy    Type 1 diabetes mellitus with hyperglycemia    Syncope    Elevated serum creatinine    Sepsis    Closed fracture of left foot    TIFFANIE (obstructive sleep apnea)    COPD (chronic obstructive pulmonary disease)    Elevated CK    ALLERGIES:   Allergies   Allergen Reactions    Atorvastatin Other (See Comments)    Colesevelam Other (See Comments)    Cymbalta [Duloxetine Hcl] Other (See Comments)     Hyponatremia    Famotidine Other (See Comments)    Cephalexin Rash     Tolerated Ceftriaxone    Clindamycin Rash    Hygroton [Chlorthalidone] Rash     Facial rash    Lidocaine Other (See Comments)     Sores in throat    Penicillins Nausea And Vomiting, Rash and Other (See Comments)     Has tolerated ceftriaxone  TROUBLE BREATHING       HOME MEDICINES:   Current Outpatient Medications   Medication Instructions    acetaminophen (TYLENOL) 500 MG tablet 2 tablets    aspirin 81 mg, Oral, Daily    azaTHIOprine (IMURAN) 50 MG tablet TAKE 2 TABLETS TWICE DAILY    bumetanide (BUMEX)  0.5 mg, Oral, Every Other Day    Droplet Pen Needles 32G X 4 MM misc     fluticasone (FLONASE) 50 MCG/ACT nasal spray 2 sprays, Each Nare, Daily, Shake well before using.     Fluticasone Propionate, Inhal, (Flovent Diskus) 50 MCG/ACT diskus inhaler 2 puffs, 2 Times Daily - RT    hydrALAZINE (APRESOLINE) 25 mg, 2 Times Daily    insulin aspart (novoLOG FLEXPEN) 100 UNIT/ML solution pen-injector sc pen 3 Times Daily With Meals    insulin NPH (humuLIN N,novoLIN N) 100 UNIT/ML injection Inject  under the skin into the appropriate area as directed.    irbesartan-hydrochlorothiazide (AVALIDE) 300-12.5 MG tablet 1 tablet, Oral, Daily    levocetirizine (XYZAL) 5 mg, Every Evening    levothyroxine (SYNTHROID, LEVOTHROID) 75 mcg, Daily    metoprolol succinate XL (TOPROL-XL) 25 mg, Oral, Daily    montelukast (SINGULAIR) 10 mg, Oral, Every Evening    nebivolol (BYSTOLIC) 20 mg, Oral, Daily    nystatin (MYCOSTATIN) 726138 UNIT/GM powder Topical, 3 Times Daily    oxybutynin (DITROPAN) 5 mg, 2 Times Daily    pravastatin (PRAVACHOL) 20 mg, Oral, Every Evening    pregabalin (LYRICA) 75 mg, 2 Times Daily    Rinvoq 15 mg, Oral, Daily    sacubitril-valsartan (Entresto)  MG tablet 1 tablet, Oral, 2 Times Daily    terazosin (HYTRIN) 2 mg, Oral, Nightly    Tresiba FlexTouch 100 UNIT/ML solution pen-injector injection Inject 19 Units under the skin into the appropriate area as directed Every Night.       HPI: Mrs. Kunz is a 78 y/o female with type I diabetes, HTN, HLD, psoriatic arthritis, CKD, prior CVA, heterozygous factor V leiden deficiency, demyelinating disease on Imuran who is seen in consultation for new Afib. She has previously been seen by Dr. Maya for hypertension management. She is admitted with severe hyperglycemia and urosepsis. Found to have new Afib on EKG 11/15, rates are controlled. Cardiology is asked to see in this regard. Echo 11/16 showed normal EF, no significant abnormalities.  No previous history of  "atrial fibrillation, unaware of H fib currently denies palpitations chest pain or shortness of breath.      ROS: All systems have been reviewed and are negative with the exception of those mentioned in the HPI and problem list above.    Surgical History:   Past Surgical History:   Procedure Laterality Date    CARDIAC CATHETERIZATION  1952 2 of them    Patent Dictus operation    CATARACT EXTRACTION Bilateral     COLONOSCOPY      ENDOSCOPY N/A 10/20/2020    Procedure: ESOPHAGOGASTRODUODENOSCOPY;  Surgeon: Brunner, Mark I, MD;  Location: [facility] END[facility];  Service: Gastroenterology;  Laterality: N/A;    HAND SURGERY Left 1987    no hardware    KNEE ARTHROPLASTY      KNEE SURGERY Left     PATENT DUCTUS ARTERIOUS LIGATION      REPLACEMENT TOTAL KNEE Left        Social History:   Social History     Socioeconomic History    Marital status: Single   Tobacco Use    Smoking status: Never     Passive exposure: Never    Smokeless tobacco: Never   Vaping Use    Vaping status: Never Used   Substance and Sexual Activity    Alcohol use: No    Drug use: No    Sexual activity: Not Currently     Partners: Male     Birth control/protection: Abstinence       Family History:   Family History   Problem Relation Age of Onset    Cancer Mother     Pancreatic cancer Mother     Stroke Mother         Mini strokes    Cancer Father     Lung cancer Father     Cancer Sister     Colon cancer Sister     Diabetes Sister     Diabetes Brother     Breast cancer Neg Hx     Ovarian cancer Neg Hx        Objective     /56 (BP Location: Left arm, Patient Position: Lying)   Pulse 83   Temp 98.1 °F (36.7 °C) (Oral)   Resp 17   Ht 149.9 cm (59\")   Wt 59.9 kg (132 lb)   SpO2 96%   BMI 26.66 kg/m²     Intake/Output Summary (Last 24 hours) at 11/16/2024 1241  Last data filed at 11/16/2024 0900  Gross per 24 hour   Intake 180 ml   Output --   Net 180 ml     PHYSICAL EXAM:  General Appearance:   · well developed  · well nourished  Neck:  · thyroid not " enlarged  · supple  Respiratory:  · no respiratory distress  · normal breath sounds  · no rales  Cardiovascular:  · no jugular venous distention  · Irregular irregular rhythm  · apical impulse normal  · S1 normal, S2 normal  · no S3, no S4   · no murmur  · no rub, no thrill  · carotid pulses normal; no bruit  · pedal pulses normal  · lower extremity edema: none    Skin:   warm, dry        Labs/Diagnostic Data  Results from last 7 days   Lab Units 11/16/24  0111 11/15/24  1709   SODIUM mmol/L 139 134*   POTASSIUM mmol/L 3.4* 3.9   CHLORIDE mmol/L 106 97*   CO2 mmol/L 18.0* 21.0*   BUN mg/dL 52* 50*   CREATININE mg/dL 1.28* 1.54*   GLUCOSE mg/dL 251* 510*   CALCIUM mg/dL 7.8* 8.8     Results from last 7 days   Lab Units 11/16/24  0339 11/16/24  0111 11/15/24  1709   CK TOTAL U/L  --  831* 1,331*   HSTROP T ng/L 46* 42*  --      Results from last 7 days   Lab Units 11/16/24  0111 11/15/24  1709   WBC 10*3/mm3 16.40* 22.24*   HEMOGLOBIN g/dL 11.0* 12.1   HEMATOCRIT % 35.0 38.4   PLATELETS 10*3/mm3 161 172     Results from last 7 days   Lab Units 11/16/24  0111   MAGNESIUM mg/dL 1.8         Results from last 7 days   Lab Units 11/15/24  1709   HEMOGLOBIN A1C % 8.00*     I personally reviewed the patient's EKG/Telemetry data    Radiology Data:   CT Head Without Contrast    Result Date: 11/16/2024  Impression: No acute intracranial process. Electronically Signed: Ana Mckay MD  11/16/2024 12:42 AM EST  Workstation ID: [id number]    XR Chest 1 View    Result Date: 11/15/2024  Impression: No evidence of acute cardiopulmonary abnormality or significant change. Electronically Signed: Sera Mercer  11/15/2024 6:34 PM EST  Workstation ID: [id number]     Results for orders placed during the hospital encounter of 11/15/24    Adult Transthoracic Echo Complete W/ Cont if Necessary Per Protocol    Interpretation Summary    Left ventricular systolic function is normal. Left ventricular ejection fraction appears to be 66 - 70%.    Left  ventricular diastolic function was normal.    Left atrial volume is mildly increased.    Estimated right ventricular systolic pressure from tricuspid regurgitation is normal (<35 mmHg).    No significant change compared to 2023 echo        Current Medications:  aspirin, 81 mg, Oral, Daily  cefTRIAXone, 1,000 mg, Intravenous, Q24H  cetirizine, 10 mg, Oral, Daily  fluticasone, 2 spray, Each Nare, Daily  insulin glargine, 10 Units, Subcutaneous, Nightly  insulin lispro, 2-9 Units, Subcutaneous, 4x Daily With Meals & Nightly  levothyroxine, 75 mcg, Oral, Q AM  montelukast, 10 mg, Oral, Q PM  oxybutynin, 5 mg, Oral, BID  pravastatin, 20 mg, Oral, Q PM  [Held by provider] sacubitril-valsartan, 1 tablet, Oral, BID  sodium chloride, 10 mL, Intravenous, Q12H  terazosin, 2 mg, Oral, Nightly      sodium chloride, 100 mL/hr, Last Rate: 100 mL/hr (11/16/24 1040)        Assessment and Plan:     1. Atrial fibrillation   - new diagnosis 11/15 in setting of urosepsis and severe hyperglycemia  - rate control strategy, resume home Bystolic at 10mg daily  - echo with normal EF, no significant abnormalities  - CHADsvASC= 6, I recommend switching aspirin to Eliquis 2.5 mg twice daily due to age and renal dysfunction and body weight    2. Urosepsis  - per hospitalists   Antibiotics    3. Type I Diabetes  - per hospitalists    4. Hypertension   - hypotensive currently, holding Entresto and other home meds    Karlos Navarrete MD, [facility], [facility]  Interventional Cardiology

## 2024-11-16 NOTE — H&P
[facility] Medicine Services  HISTORY AND PHYSICAL    Patient Name: Marilyn Kunz  : 1945  MRN: [id number]  Primary Care Physician: Peter Baca MD  Date of admission: 11/15/2024    Subjective   Subjective     Chief Complaint:  Syncope, hyperglycemia     HPI:  Marilyn Kunz is a 79 y.o. female w/ a hx of HTN, HLD, diastolic CHF, psoriatic arthritis, demyelinating disease (on Imuran- followed by Dr. Hernandez), T1DM, CKD Stage III, prior CVA, COPD, hypothyroidism, TIFFANIE on CPAP, heterozygous factor V Leiden who presented to the ED w/ c/o syncope and hyperglycemia.   Pt reports that she has not felt well for several weeks 2/2 persistent hyperglycemia. Pt was seen by her PCP yesterday and had labs collected. Pt remembers being home last night and at some point going to use the restroom but then is unable to recall what happened after until she heard her phone ringing today around 3:30pm. Pt reports that her PCP's office was calling her b/c her glucose on labs yesterday was >1000. Pt was advised to come to the ED for further evaluation. Pt reports that she was on her bathroom floor when she woke to her phone ringing today. She vaguely recalls intermittently waking for brief seconds b/c she remembers her cat checking on her at some point throughout the day. Pt is unsure how long she was on the floor or exactly what happened prior to ending up on the floor.   Pt evaluated in the ED. Glucose 510 initially. UA c/w UTI. WBC 22.24, lactic acid 2.6, procal 5.37. Pt admitted to the hospital medicine service for further evaluation.     Review of Systems   Constitutional:  Positive for fatigue. Negative for chills, fever and unexpected weight change.   HENT: Negative.  Negative for congestion, rhinorrhea, sinus pressure, sinus pain and trouble swallowing.    Eyes: Negative.  Negative for visual disturbance.   Respiratory: Negative.  Negative for cough and shortness of breath.     Cardiovascular: Negative.  Negative for chest pain, palpitations and leg swelling.   Gastrointestinal: Negative.  Negative for abdominal distention, abdominal pain, constipation, diarrhea, nausea and vomiting.   Endocrine:        Persistent hyperglycemia for last several weeks.    Genitourinary:  Positive for decreased urine volume and urgency. Negative for difficulty urinating, dysuria, flank pain and frequency.   Musculoskeletal: Negative.  Negative for arthralgias and myalgias.   Skin: Negative.  Negative for wound.   Allergic/Immunologic: Positive for immunocompromised state.   Neurological:  Positive for syncope. Negative for dizziness, light-headedness and headaches.   Hematological: Negative.  Does not bruise/bleed easily.   Psychiatric/Behavioral: Negative.  Negative for confusion.    All other systems reviewed and are negative.     Personal History     Past Medical History:   Diagnosis Date    Abnormal ECG Check A Date or [facility]    Brought to  from 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  I asked her to go ahead and try to talk to her endocrinokogist.  She is still having the lower extremity neuropathic pan, but not enough for me to administer medication.    Disease of thyroid gland      Diverticulitis of colon     Erosive osteoarthritis 05/30/2024    Factor 5 Leiden mutation, heterozygous 2012    Head injury     Hyperlipidemia     Hypertension     Medication monitoring encounter     Impression:  She has renal insufficiency.  She is just taking Tylenol.    Movement disorder 10/2021    knee replacement left knww    Murmur, cardiac     Neuropathy in diabetes     redness swelling legs    TIFFANIE (obstructive sleep apnea) 11/15/2024    Osteopenia     Story: Femoral Neck Impression: Up-ro-date on bone density scan.  Follow up bone density every 2 years.    Peripheral neuropathy 05/03/2022    hands, arms. 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  I went over her exercises and told her to get a shoe insert,    Psoriasis     Impression: No rash    Psoriatic arthritis     hands    Sleep apnea Always    diabetic do not sleep well up and down    Stroke had one don't know when    showed on MRI Brain    Syncope 11/15/2024    Type 1 diabetes      Past Surgical History:   Procedure Laterality Date    CARDIAC CATHETERIZATION  1952 2 of them    Patent Dictus operation    CATARACT EXTRACTION Bilateral     COLONOSCOPY      ENDOSCOPY N/A 10/20/2020    Procedure: ESOPHAGOGASTRODUODENOSCOPY;  Surgeon: Brunner, Mark I, MD;  Location: [facility] ENDOSCOPY;  Service: Gastroenterology;  Laterality: N/A;    HAND SURGERY Left 1987    no hardware    KNEE ARTHROPLASTY      KNEE SURGERY Left     PATENT DUCTUS ARTERIOUS LIGATION      REPLACEMENT TOTAL KNEE Left      Family History: family history includes Cancer in her father, mother, and sister; Colon cancer in her sister; Diabetes in her brother and sister; Lung cancer in her father; Pancreatic cancer in her mother; Stroke in her mother.     Social History:  reports that she has never smoked. She has never been exposed to tobacco smoke. She has never used smokeless tobacco. She reports that she does not drink alcohol and  does not use drugs.  Social History     Social History Narrative    Independent with adl's.  Single.  Never .  No children.  Lives alone.  Niece-in-law assist with care as needed.   No home oxygen, HH or DME used currently      Medications:  Fluticasone Propionate (Inhal), Insulin Pen Needle, Upadacitinib ER, acetaminophen, aspirin, azaTHIOprine, bumetanide, fluticasone, hydrALAZINE, insulin NPH, insulin aspart, insulin degludec, irbesartan-hydrochlorothiazide, levocetirizine, levothyroxine, metoprolol succinate XL, montelukast, nebivolol, nystatin, oxybutynin, pravastatin, pregabalin, sacubitril-valsartan, and terazosin    Allergies   Allergen Reactions    Atorvastatin Other (See Comments)    Colesevelam Other (See Comments)    Cymbalta [Duloxetine Hcl] Other (See Comments)     Hyponatremia    Famotidine Other (See Comments)    Cephalexin Rash     Tolerated Ceftriaxone    Clindamycin Rash    Hygroton [Chlorthalidone] Rash     Facial rash    Lidocaine Other (See Comments)     Sores in throat    Penicillins Nausea And Vomiting, Rash and Other (See Comments)     Has tolerated ceftriaxone  TROUBLE BREATHING     Objective   Objective     Vital Signs:   Temp:  [98.2 °F (36.8 °C)-98.6 °F (37 °C)] 98.6 °F (37 °C)  Heart Rate:  [] 86  Resp:  [17-18] 17  BP: (118-168)/(55-73) 118/55    Physical Exam     Constitutional: Awake, alert; non-toxic appearing   Eyes: PERRLA, sclerae anicteric, no conjunctival injection  HENT: NCAT, mucous membranes dry   Neck: Supple, no thyromegaly, no lymphadenopathy, trachea midline  Respiratory: Clear to auscultation bilaterally, nonlabored respirations   Cardiovascular: RRR, no murmurs, rubs, or gallops, RLE without edema; LLE in boot   Gastrointestinal: Positive bowel sounds, soft, nontender, nondistended  Musculoskeletal: Normal ROM bilaterally (LLE/foot in boot)  Psychiatric: Appropriate affect, cooperative  Neurologic: Oriented x 3, strength symmetric in all extremities,  speech clear  Skin: No rashes, lesions or wounds     Result Review:  I have personally reviewed the results from the time of this admission to 11/15/2024 21:45 EST and agree with these findings:  [x]  Laboratory list / accordion  []  Microbiology  [x]  Radiology  []  EKG/Telemetry   []  Cardiology/Vascular   []  Pathology  [x]  Old records    LAB RESULTS:      Lab 11/15/24  2034 11/15/24  1709   WBC  --  22.24*   HEMOGLOBIN  --  12.1   HEMATOCRIT  --  38.4   PLATELETS  --  172   NEUTROS ABS  --  19.89*   IMMATURE GRANS (ABS)  --  0.11*   LYMPHS ABS  --  0.57*   MONOS ABS  --  0.63   EOS ABS  --  1.01*   MCV  --  95.5   PROCALCITONIN  --  5.37*   LACTATE 2.7* 2.6*   CK TOTAL  --  1,331*         Lab 11/15/24  1709   SODIUM 134*   POTASSIUM 3.9   CHLORIDE 97*   CO2 21.0*   ANION GAP 16.0*   BUN 50*   CREATININE 1.54*   EGFR 34.2*   GLUCOSE 510*   CALCIUM 8.8   HEMOGLOBIN A1C 8.00*         Lab 11/15/24  1709   TOTAL PROTEIN 7.1   ALBUMIN 3.6   GLOBULIN 3.5   ALT (SGPT) 22   AST (SGOT) 41*   BILIRUBIN 0.9   ALK PHOS 71                     Lab 11/15/24  1711   FIO2 21   CARBOXYHEMOGLOBIN (VENOUS) 0.2     Brief Urine Lab Results  (Last result in the past 365 days)        Color   Clarity   Blood   Leuk Est   Nitrite   Protein   CREAT   Urine HCG        11/15/24 1739 Dark Yellow   Cloudy   Negative   Moderate (2+)   Negative   30 mg/dL (1+)                 Microbiology Results (last 10 days)       ** No results found for the last 240 hours. **          XR Chest 1 View    Result Date: 11/15/2024  XR CHEST 1 VW Date of Exam: 11/15/2024 6:07 PM EST Indication: AMS Protocol Comparison: AP chest x-ray 9/23/2024, [facility] chest x-ray 9/30/2023, CT chest 7/20/2023, AP chest x-ray 3/2/2023. Findings: Cardiomediastinal contours are stable. Lungs are adequately expanded and appear clear. No pneumothorax or large pleural effusion is seen.     Impression: Impression: No evidence of acute cardiopulmonary abnormality or significant change.  "Electronically Signed: Sera Mercer  11/15/2024 6:34 PM EST  Workstation ID: [id number]     Results for orders placed in visit on 06/04/24    Echocardiogram Scan    Assessment & Plan   Assessment & Plan       UTI (urinary tract infection)    Essential hypertension    Factor V Leiden    Psoriatic arthritis    Hypothyroidism    CKD (chronic kidney disease) stage 3, GFR 30-59 ml/min    Hyperlipidemia LDL goal <70    History of stroke    Chronic diastolic congestive heart failure    Immunosuppression due to drug therapy    Type 1 diabetes mellitus with hyperglycemia    Syncope    Elevated serum creatinine    Sepsis    Closed fracture of left foot    TIFFANIE (obstructive sleep apnea)    COPD (chronic obstructive pulmonary disease)    Elevated CK    Marilyn Kunz is a 79 y.o. female w/ a hx of HTN, HLD, diastolic CHF, psoriatic arthritis, demyelinating disease (on Imuran- followed by Dr. Hernandez), T1DM, CKD Stage III, prior CVA, COPD, hypothyroidism, TIFFANIE on CPAP, heterozygous factor V Leiden who presented to the ED w/ c/o syncope and hyperglycemia.     **Sepsis 2/2 UTI   -meets sepsis criteria based on HR 112bpm, WBC 22.24, procal 5.37, lactic acid 2.6, + source per UA  -UA c/w UTI; urine cx pending   -prior urine cx \"mixed emerson\"  -blood cx pending   -IV Rocephin  -s/p 1 liter NS bolus given in ED; repeat 1 liter bolus now   -continue NS @ 100ml/hr  -bladder scan   -symptom mgt    **T1DM w/ hyperglycemia   -glucose 510 in ED  -s/p regular insulin 10 units  -hem A1c 8%  -VBG stable   -s/p 1 liter NS bolus; repeat bolus now and continue NS @ 100ml/hour  -Lantus 10 units nightly   -holding routine novolog for now   -FSBG q ac/hs w/ MDSS  -diabetes educator consult in am   -pt follows w/ [facility]     **Syncope   -pt recalls being home Thursday night and going to use the restroom- then woke today on the bathroom floor to her phone ringing ~3:30pm  -EKG pending, troponin pending   -CT Head wo pending "   -IVF per above  -recent ECHO 6/4/24: EF >70%  -orthostatic Bps   -fall precautions/bed alarm   -consider carotid duplex, repeat ECHO     **Elevated CK   -CK 1,331   -sp 2 liters NS bolus   -continue NS @ 100ml/hour   -repeat labs in am     **Elevated creatinine   **CKD Stage III  -previous creatinine 1.02 on 9/23  -current creatinine 1.54 w/ eGFR 34  -UA c/w UTI   -s/p 2 liters NS bolus   -continue NS @ 100ml/hour  -hold nephrotoxic meds for now   -repeat labs in am     **Closed fracture of left foot   -9/6/24- XRAY: Acute nondisplaced fracture involving proximal fifth metatarsal without intra-articular extension or joint malalignment.   -pt currently on boot and following w/ Ortho     **Diastolic CHF   **HTN, HLD   **Hx of CVA   -ECHO 6/2024 w/ EF >70%  -EKG pending   -awaiting med list update (plan to hold diuretics for now)  -monitor I/Os     **COPD; stable   **TIFFANIE   -CXR unremarkable   -CPAP nightly     **Psoriatic arthritis   **Demyelinating disease   -previously on Imuran- followed by Dr. Hernandez for demyelinating disease   -awaiting med list completion     DVT prophylaxis:  Mechanical     CODE STATUS:    Code Status (Patient has no pulse and is not breathing): CPR (Attempt to Resuscitate)  Medical Interventions (Patient has pulse or is breathing): Full Support    Expected DischargeExpected Discharge Date: 11/17/2024; Expected Discharge Time:     This note has been completed as part of a split-shared workflow.     Signature:Electronically signed by JAGDISH Norris, 11/15/24, 9:46 PM EST.    Time spent: 55 minutes     Patient seen and examined,  Agree with above,  Sara Blankenship MD 11/16/24 00:31 EST

## 2024-11-16 NOTE — SIGNIFICANT NOTE
EKG obtained and c/w A.Fib. No known hx of A.Fib. HR . BP stable. Repeat EKG in am. Consider Cardiology consult tomorrow. ECHO ordered. Will continue to monitor.

## 2024-11-16 NOTE — THERAPY EVALUATION
Patient Name: Marilyn Kunz  : 1945    MRN: [id number]                              Today's Date: 2024       Admit Date: 11/15/2024    Visit Dx:     ICD-10-CM ICD-9-CM   1. Urinary tract infection without hematuria, site unspecified  N39.0 599.0   2. Generalized weakness  R53.1 780.79   3. Hyperglycemia  R73.9 790.29   4. Renal insufficiency  N28.9 593.9   5. Bandemia  D72.825 288.66     Patient Active Problem List   Diagnosis    Essential hypertension    Factor V Leiden    Psoriatic arthritis    At risk for venous thromboembolism (VTE)    Hypothyroidism    CKD (chronic kidney disease) stage 3, GFR 30-59 ml/min    Hyperlipidemia LDL goal <70    PDA (patent ductus arteriosus)    Demyelinating disease    Spondylolisthesis, grade 2    Type 1 diabetes mellitus    History of stroke    Demyelinating disease of central nervous system, unspecified    Chronic diastolic congestive heart failure    Peripheral polyneuropathy    Immunosuppression due to drug therapy    Erosive osteoarthritis    Cervical radiculopathy    Rash    UTI (urinary tract infection)    Type 1 diabetes mellitus with hyperglycemia    Syncope    Elevated serum creatinine    Sepsis    Closed fracture of left foot    TIFFANIE (obstructive sleep apnea)    COPD (chronic obstructive pulmonary disease)    Elevated CK     Past Medical History:   Diagnosis Date    Abnormal ECG Check FPA Date or [facility]    [facility]    Acute sinusitis 2023    Anemia     Asthma     CAP (community acquired pneumonia) 2023    CHF (congestive heart failure) 2023    Chronic kidney disease     pt told it was dx from Dr Baca and to not eat much protein    Congenital heart disease 's Patent 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    Surgery [facility] s close    COPD (chronic obstructive pulmonary disease) 11/15/2024    CTS (carpal tunnel syndrome)     Psoriatic arthritis hands could be    Deep vein thrombosis No on  blood clots but have a blood clotting disorder called Leiden Factor 5    Demyelinating disease of central nervous system, unspecified 03/09/2023    Diabetes     Impression: Patient states that her diabetes is not doing well.  I asked her to go ahead and try to talk to her endocrinokogist.  She is still having the lower extremity neuropathic pan, but not enough for me to administer medication.    Disease of thyroid gland     Diverticulitis of colon     Erosive osteoarthritis 05/30/2024    Factor 5 Leiden mutation, heterozygous 2012    Head injury     Hyperlipidemia     Hypertension     Medication monitoring encounter     Impression:  She has renal insufficiency.  She is just taking Tylenol.    Movement disorder 10/2021    knee replacement left knww    Murmur, cardiac     Neuropathy in diabetes     redness swelling legs    TIFFANIE (obstructive sleep apnea) 11/15/2024    Osteopenia     Story: Femoral Neck Impression: Up-ro-date on bone density scan.  Follow up bone density every 2 years.    Peripheral neuropathy 05/03/2022    hands, arms. 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  I went over her exercises and told her to get a shoe insert,    Psoriasis     Impression: No rash    Psoriatic arthritis     hands    Sleep apnea Always    diabetic do not sleep well up and down    Stroke had one don't know when    showed on MRI Brain    Syncope 11/15/2024    Type 1 diabetes      Past Surgical History:   Procedure Laterality Date    CARDIAC CATHETERIZATION  1952 2 of them    Patent Dictus operation    CATARACT EXTRACTION Bilateral     COLONOSCOPY      ENDOSCOPY N/A 10/20/2020    Procedure: ESOPHAGOGASTRODUODENOSCOPY;  Surgeon: Brunner, Mark I, MD;  Location: [facility] ENDOSCOPY;  Service: Gastroenterology;  Laterality: N/A;    HAND SURGERY Left 1987    no hardware    KNEE ARTHROPLASTY      KNEE SURGERY Left     PATENT DUCTUS ARTERIOUS LIGATION      REPLACEMENT TOTAL  KNEE Left       General Information       Row Name 11/16/24 1128          Physical Therapy Time and Intention    Document Type evaluation  -NS     Mode of Treatment physical therapy  -NS       Row Name 11/16/24 1128          General Information    Patient Profile Reviewed yes  -NS     Prior Level of Function independent:;all household mobility;gait;transfer;bed mobility;ADL's  Pt does not use AD at baseline, reports having canes and walkers but does not use due to bad shoulders.  -NS     Existing Precautions/Restrictions fall;other (see comments)  LLE in boot, WBAT per pt report  -NS     Barriers to Rehab none identified  -NS       Row Name 11/16/24 1128          Living Environment    People in Home alone  -NS       Row Name 11/16/24 1128          Home Main Entrance    Number of Stairs, Main Entrance two  -NS     Stair Railings, Main Entrance railing on left side (ascending)  -NS       Row Name 11/16/24 1128          Stairs Within Home, Primary    Stair Railings, Within Home, Primary none  -NS       Row Name 11/16/24 1128          Cognition    Orientation Status (Cognition) oriented x 3  -NS       Row Name 11/16/24 1128          Safety Issues/Impairments Affecting Functional Mobility    Safety Issues Affecting Function (Mobility) safety precaution awareness;safety precautions follow-through/compliance;sequencing abilities;insight into deficits/self-awareness  -NS     Impairments Affecting Function (Mobility) balance;endurance/activity tolerance;strength  -NS               User Key  (r) = Recorded By, (t) = Taken By, (c) = Cosigned By      Initials Name Provider Type    Alisha Yates PT Physical Therapist                   Mobility       Row Name 11/16/24 1128          Bed Mobility    Bed Mobility sit-supine  -NS     Sit-Supine [place] (Bed Mobility) standby assist  -NS     Assistive Device (Bed Mobility) bed rails;head of bed elevated  -NS       [facility] Name 11/16/24 1128          Sit-Stand Transfer     Sit-Stand [place] (Transfers) contact guard  -NS       Row Name 11/16/24 1128          Gait/Stairs (Locomotion)    [place] Level (Gait) contact guard  -NS     Patient was able to Ambulate yes  -NS     Distance in Feet (Gait) 15  + 40  -NS     Deviations/Abnormal Patterns (Gait) sam decreased;gait speed decreased;stride length decreased  -NS     Left Sided Gait Deviations weight shift ability decreased  -NS     Comment, (Gait/Stairs) Pt ambulated to the restroom with a RW, then ambulated an additional 40ft without  AD per pt preference. Mild unsteadiness noted but no overt LOB.  -NS       Row Name 11/16/24 1128          Mobility    Extremity Weight-bearing Status left lower extremity  -NS     Left Lower Extremity (Weight-bearing Status) weight-bearing as tolerated (WBAT)  per pt report, in boot  -NS               User Key  (r) = Recorded By, (t) = Taken By, (c) = Cosigned By      Initials Name Provider Type    Alisha Yates PT Physical Therapist                   Obj/Interventions       Row Name 11/16/24 1128          Range of Motion Comprehensive    General Range of Motion bilateral lower extremity ROM WFL  -NS     Comment, General Range of Motion unable to formally assess LLE due to boot  -NS       Row Name 11/16/24 1128          Strength Comprehensive (MMT)    General Manual Muscle Testing (MMT) Assessment lower extremity strength deficits identified  -NS     Comment, General Manual Muscle Testing (MMT) Assessment unable to formally assess LLE due to boot, RLE grossly 4+/5  -NS       Row Name 11/16/24 1128          Balance    Balance Assessment sitting static balance;sitting dynamic balance;standing static balance;standing dynamic balance  -NS     Static Sitting Balance standby assist  -NS     Dynamic Sitting Balance standby assist  -NS     Position, Sitting Balance unsupported;sitting in chair  -NS     Static Standing Balance contact guard  -NS     Dynamic Standing Balance contact guard  -NS      Position/Device Used, Standing Balance unsupported  -NS       [facility] Name 11/16/24 1128          Sensory Assessment (Somatosensory)    Sensory Assessment (Somatosensory) LE sensation intact  -NS               User Key  (r) = Recorded By, (t) = Taken By, (c) = Cosigned By      Initials Name Provider Type    Alihsa Yates PT Physical Therapist                   Goals/Plan       Row Name 11/16/24 1128          Bed Mobility Goal 1 (PT)    Activity/Assistive Device (Bed Mobility Goal 1, PT) supine to sit  -NS     [place] Level/Cues Needed (Bed Mobility Goal 1, PT) independent  -NS     Time Frame (Bed Mobility Goal 1, PT) short term goal (STG);1 week  -NS       Row Name 11/16/24 1128          Transfer Goal 1 (PT)    Activity/Assistive Device (Transfer Goal 1, PT) sit-to-stand/stand-to-sit;bed-to-chair/chair-to-bed  -NS     [place] Level/Cues Needed (Transfer Goal 1, PT) independent  -NS     Time Frame (Transfer Goal 1, PT) long term goal (LTG);10 days  -NS       Row Name 11/16/24 1128          Gait Training Goal 1 (PT)    Activity/Assistive Device (Gait Training Goal 1, PT) gait (walking locomotion)  -NS     [place] Level (Gait Training Goal 1, PT) independent  -NS     Distance (Gait Training Goal 1, PT) 200  -NS     Time Frame (Gait Training Goal 1, PT) long term goal (LTG);10 days  -NS       Row Name 11/16/24 1128          Therapy Assessment/Plan (PT)    Planned Therapy Interventions (PT) balance training;bed mobility training;gait training;home exercise program;neuromuscular re-education;patient/family education;strengthening;stair training;transfer training  -NS               User Key  (r) = Recorded By, (t) = Taken By, (c) = Cosigned By      Initials Name Provider Type    Alisha Yates PT Physical Therapist                   Clinical Impression       Row Name 11/16/24 1128          Pain    Pretreatment Pain Rating 0/10 - no pain  -NS     Posttreatment Pain Rating 0/10 - no pain  -NS       Row Name  11/16/24 1128          Plan of Care Review    Plan of Care Reviewed With patient  -NS     Progress no change  -NS     Outcome Evaluation Patient presents with deficits in strength, balance, and activity tolerance compared to baseline. Skilled IP PT services warranted to support return to independence. Anticipate that pt would benefit from IRF at d/c.  -NS       Row Name 11/16/24 1128          Therapy Assessment/Plan (PT)    Patient/Family Therapy Goals Statement (PT) return to OP PT  -NS     Rehab Potential (PT) good  -NS     Criteria for Skilled Interventions Met (PT) yes;meets criteria;skilled treatment is necessary  -NS     Therapy Frequency (PT) daily  -NS     Predicted Duration of Therapy Intervention (PT) 10 days  -NS       Row Name 11/16/24 1128          Vital Signs    Pre Systolic BP Rehab 109  sitting  -NS     Pre Treatment Diastolic BP 56  -NS     Intra Systolic BP Rehab 113  standing  -NS     Intra Treatment Diastolic BP 68  -NS     Pretreatment Heart Rate (beats/min) 83  -NS     Posttreatment Heart Rate (beats/min) 82  -NS     Pre SpO2 (%) 97  -NS     O2 Delivery Pre Treatment room air  -NS     Pre Patient Position Sitting  -NS     Intra Patient Position Standing  -NS     Post Patient Position Supine  -NS       Row Name 11/16/24 1128          Positioning and Restraints    Pre-Treatment Position sitting in chair/recliner  -NS     Post Treatment Position bed  -NS     In Bed notified nsg;supine;call light within reach;encouraged to call for assist;exit alarm on;side rails up x2  -NS               User Key  (r) = Recorded By, (t) = Taken By, (c) = Cosigned By      Initials Name Provider Type    Alisha Yates PT Physical Therapist                   Outcome Measures       Row Name 11/16/24 1128          How much help from another person do you currently need...    Turning from your back to your side while in flat bed without using bedrails? 4  -NS     Moving from lying on back to sitting on the side of a  flat bed without bedrails? 3  -NS     Moving to and from a bed to a chair (including a wheelchair)? 3  -NS     Standing up from a chair using your arms (e.g., wheelchair, bedside chair)? 3  -NS     Climbing 3-5 steps with a railing? 2  -NS     To walk in hospital room? 3  -NS     AM-PAC 6 Clicks Score (PT) 18  -NS     Highest Level of Mobility Goal 6 --> Walk 10 steps or more  -NS       Row Name 11/16/24 1128 11/16/24 1016       Functional Assessment    Outcome Measure Options AM-PAC 6 Clicks Basic Mobility (PT)  -NS AM-PAC 6 Clicks Daily Activity (OT)  -LR              User Key  (r) = Recorded By, (t) = Taken By, (c) = Cosigned By      Initials Name Provider Type    Alisha Yates, PT Physical Therapist    LR Thais Rojo, OT Occupational Therapist                                 Physical Therapy Education       Title: PT OT SLP Therapies (In Progress)       Topic: Physical Therapy (In Progress)       Point: Mobility training (Done)       Learning Progress Summary            Patient Acceptance, E, VU by NS at 11/16/2024 1545                      Point: Home exercise program (Not Started)       Learner Progress:  Not documented in this visit.              Point: Body mechanics (Done)       Learning Progress Summary            Patient Acceptance, E, VU by NS at 11/16/2024 1545                      Point: Precautions (Done)       Learning Progress Summary            Patient Acceptance, E, VU by NS at 11/16/2024 1545                                      User Key       Initials Effective Dates Name Provider Type Discipline    NS 06/16/21 -  Alisha Kennedy, PT Physical Therapist PT                  PT Recommendation and Plan  Planned Therapy Interventions (PT): balance training, bed mobility training, gait training, home exercise program, neuromuscular re-education, patient/family education, strengthening, stair training, transfer training  Progress: no change  Outcome Evaluation: Patient presents with deficits in  strength, balance, and activity tolerance compared to baseline. Skilled IP PT services warranted to support return to independence. Anticipate that pt would benefit from IRF at d/c.     Time Calculation:   PT Evaluation Complexity  History, PT Evaluation Complexity: 3 or more personal factors and/or comorbidities  Examination of Body Systems (PT Eval Complexity): total of 4 or more elements  Clinical Presentation (PT Evaluation Complexity): stable  Clinical Decision Making (PT Evaluation Complexity): low complexity  Overall Complexity (PT Evaluation Complexity): low complexity     PT Charges       Row Name 11/16/24 1128             Time Calculation    Start Time 1128  -NS      PT Received On 11/16/24  -NS      PT Goal Re-Cert Due Date 11/26/24  -NS         Untimed Charges    PT Eval/Re-eval Minutes 47  -NS         Total Minutes    Untimed Charges Total Minutes 47  -NS       Total Minutes 47  -NS                User Key  (r) = Recorded By, (t) = Taken By, (c) = Cosigned By      Initials Name Provider Type    Alisha Yates, SAFIA Physical Therapist                  Therapy Charges for Today       Code Description Service Date Service Provider Modifiers Qty    [id number]  PT EVAL LOW COMPLEXITY 4 11/16/2024 Alisha Kennedy, PT GP 1            PT G-Codes  Outcome Measure Options: AM-PAC 6 Clicks Basic Mobility (PT)  AM-PAC 6 Clicks Score (PT): 18  AM-PAC 6 Clicks Score (OT): 20  PT Discharge Summary  Anticipated Discharge Disposition (PT): inpatient rehabilitation facility    Alisha Kennedy PT  11/16/2024

## 2024-11-16 NOTE — PLAN OF CARE
Goal Outcome Evaluation:  Plan of Care Reviewed With: patient        Progress: no change  Outcome Evaluation: OT eval completed. Pt presents below baseline with decreased activity tolerance, strength, and mild balance deficits. Pt was able to complete ADLs sink side with no complaints of fatigue during activity. HR fluctuating during functional mobility. RN notified and aware. Recommend IPOT POC and IRF at D/C.    Anticipated Discharge Disposition (OT): inpatient rehabilitation facility

## 2024-11-16 NOTE — PROGRESS NOTES
[facility] Medicine Services  PROGRESS NOTE    Patient Name: Marilyn Kunz  : 1945  MRN: [id number]    Date of Admission: 11/15/2024  Primary Care Physician: Peter Baca MD    Subjective   Subjective     CC:  Syncope, hyperglycemia    HPI:  Overnight patient went into A-fib.  No known history of A-fib.  Rate was controlled so no intervention necessary at this time.    Patient seen and examined this morning.  She reports feeling significantly better than yesterday.  She denies abdominal pain, fevers, chills.      Objective   Objective     Vital Signs:   Temp:  [97.6 °F (36.4 °C)-98.6 °F (37 °C)] 98.1 °F (36.7 °C)  Heart Rate:  [] 83  Resp:  [16-18] 17  BP: ()/(41-73) 109/56     Physical Exam  Constitutional:       General: She is not in acute distress.  Cardiovascular:      Rate and Rhythm: Normal rate. Rhythm irregular.      Heart sounds: Normal heart sounds.   Pulmonary:      Effort: Pulmonary effort is normal. No respiratory distress.      Breath sounds: Normal breath sounds.   Abdominal:      Palpations: Abdomen is soft.      Tenderness: There is no abdominal tenderness.   Musculoskeletal:      Right lower leg: No edema.      Left lower leg: No edema.   Neurological:      General: No focal deficit present.      Mental Status: She is alert. Mental status is at baseline.   Psychiatric:         Mood and Affect: Mood normal.         Thought Content: Thought content normal.          Results Reviewed:  LAB RESULTS:      Lab 24  1020 24  0339 24  0111 11/15/24  2034 11/15/24  1709   WBC  --   --  16.40*  --  22.24*   HEMOGLOBIN  --   --  11.0*  --  12.1   HEMATOCRIT  --   --  35.0  --  38.4   PLATELETS  --   --  161  --  172   NEUTROS ABS  --   --  13.51*  --  19.89*   IMMATURE GRANS (ABS)  --   --  0.06*  --  0.11*   LYMPHS ABS  --   --  1.01  --  0.57*   MONOS ABS  --   --  0.56  --  0.63   EOS ABS  --   --  1.25*  --  1.01*   MCV  --    --  95.9  --  95.5   PROCALCITONIN  --   --   --   --  5.37*   LACTATE 1.3 2.2* 2.5* 2.7* 2.6*   HSTROP T  --  46* 42*  --   --          Lab 11/16/24  0111 11/15/24  1709   SODIUM 139 134*   POTASSIUM 3.4* 3.9   CHLORIDE 106 97*   CO2 18.0* 21.0*   ANION GAP 15.0 16.0*   BUN 52* 50*   CREATININE 1.28* 1.54*   EGFR 42.7* 34.2*   GLUCOSE 251* 510*   CALCIUM 7.8* 8.8   MAGNESIUM 1.8  --    PHOSPHORUS 2.6  --    HEMOGLOBIN A1C  --  8.00*         Lab 11/15/24  1709   TOTAL PROTEIN 7.1   ALBUMIN 3.6   GLOBULIN 3.5   ALT (SGPT) 22   AST (SGOT) 41*   BILIRUBIN 0.9   ALK PHOS 71         Lab 11/16/24  0339 11/16/24  0111   HSTROP T 46* 42*                 Lab 11/15/24  1711   FIO2 21   CARBOXYHEMOGLOBIN (VENOUS) 0.2     Brief Urine Lab Results  (Last result in the past 365 days)        Color   Clarity   Blood   Leuk Est   Nitrite   Protein   CREAT   Urine HCG        11/15/24 1739 Dark Yellow   Cloudy   Negative   Moderate (2+)   Negative   30 mg/dL (1+)                   Microbiology Results Abnormal       None            CT Head Without Contrast    Result Date: 11/16/2024  CT HEAD WO CONTRAST Date of Exam: 11/16/2024 12:17 AM EST Indication: syncope, LOC. Comparison: 9/24/2024. Technique: Axial CT images were obtained of the head without contrast administration.  Automated exposure control and iterative construction methods were used. Findings: There is no evidence of hemorrhage. There is no mass effect or midline shift. There is no extracerebral collection. Ventricles are normal in size and configuration for patient's stated age.  Posterior fossa is within normal limits. Calvarium and skull base appear intact.   Visualized sinuses show no air fluid levels. Visualized orbits are unremarkable.     Impression: Impression: No acute intracranial process. Electronically Signed: Ana Mckay MD  11/16/2024 12:42 AM EST  Workstation ID: [id number]    XR Chest 1 View    Result Date: 11/15/2024  XR CHEST 1 VW Date of Exam: 11/15/2024  6:07 PM EST Indication: AMS Protocol Comparison: AP chest x-ray 9/23/2024, AP chest x-ray 9/30/2023, CT chest 7/20/2023, AP chest x-ray 3/2/2023. Findings: Cardiomediastinal contours are stable. Lungs are adequately expanded and appear clear. No pneumothorax or large pleural effusion is seen.     Impression: Impression: No evidence of acute cardiopulmonary abnormality or significant change. Electronically Signed: Sera Mercer  11/15/2024 6:34 PM EST  Workstation ID: [id number]     Results for orders placed during the hospital encounter of 11/15/24    Adult Transthoracic Echo Complete W/ Cont if Necessary Per Protocol    Interpretation Summary    Left ventricular systolic function is normal. Left ventricular ejection fraction appears to be 66 - 70%.    Left ventricular diastolic function was normal.    Left atrial volume is mildly increased.    Estimated right ventricular systolic pressure from tricuspid regurgitation is normal (<35 mmHg).    No significant change compared to 2023 echo      Current medications:  Scheduled Meds:aspirin, 81 mg, Oral, Daily  cefTRIAXone, 1,000 mg, Intravenous, Q24H  cetirizine, 10 mg, Oral, Daily  fluticasone, 2 spray, Each Nare, Daily  insulin glargine, 10 Units, Subcutaneous, Nightly  insulin lispro, 2-9 Units, Subcutaneous, 4x Daily With Meals & Nightly  levothyroxine, 75 mcg, Oral, Q AM  montelukast, 10 mg, Oral, Q PM  oxybutynin, 5 mg, Oral, BID  pravastatin, 20 mg, Oral, Q PM  [Held by provider] sacubitril-valsartan, 1 tablet, Oral, BID  sodium chloride, 10 mL, Intravenous, Q12H  terazosin, 2 mg, Oral, Nightly      Continuous Infusions:   PRN Meds:.  acetaminophen    senna-docusate sodium **AND** polyethylene glycol **AND** bisacodyl **AND** bisacodyl    dextrose    dextrose    glucagon (human recombinant)    melatonin    sodium chloride    sodium chloride    sodium chloride    Assessment & Plan   Assessment & Plan     Active Hospital Problems    Diagnosis  POA    **UTI (urinary  tract infection) [N39.0]  Yes    Type 1 diabetes mellitus with hyperglycemia [E10.65]  Yes    Syncope [R55]  Yes    Elevated serum creatinine [R79.89]  Yes    Sepsis [A41.9]  Yes    Closed fracture of left foot [S92.902A]  Yes    TIFFANIE (obstructive sleep apnea) [G47.33]  Yes    COPD (chronic obstructive pulmonary disease) [J44.9]  Yes    Elevated CK [R74.8]  Yes    Immunosuppression due to drug therapy [D84.821, Z79.899]  Not Applicable    Chronic diastolic congestive heart failure [I50.32]  Yes    History of stroke [Z86.73]  Not Applicable    Psoriatic arthritis [L40.50]  Yes    Factor V Leiden [D68.51]  Yes    Essential hypertension [I10]  Yes    CKD (chronic kidney disease) stage 3, GFR 30-59 ml/min [N18.30]  Yes    Hyperlipidemia LDL goal <70 [E78.5]  Yes    Hypothyroidism [E03.9]  Yes      Resolved Hospital Problems   No resolved problems to display.        Brief Hospital Course to date:  Marilyn Kunz is a 79 y.o. female with hypertension, hyperlipidemia, diastolic heart failure, somatic arthritis, demyelinating disease, type 1 diabetes, CKD stage III, prior CVA, COPD, hypothyroidism, TIFFANIE on CPAP, heterozygous factor V Leiden who presented to the ED with syncope and hyperglycemia.  Patient found to be septic likely from UTI.    Sepsis, improving  UTI  - Patient still with leukocytosis and elevated lactic acid at 2.2 this morning down from 2.7 yesterday  - Blood cultures no growth to date  - Urine culture no growth to date  - Will continue patient on IV fluids given elevated lactic acid and CK level  - Continue IV Rocephin  - Follow-up cultures    A-fib, new onset  - Seen on EKG overnight  - No known history of arrhythmia  - Rate controlled  - Likely secondary to sepsis above though more concerning in the setting of syncope at home  - Cardiology consulted  - Continue telemonitoring    Type 1 diabetes with hyperglycemia  - Glucose around 500 on arrival  - Now under better control  - Will continue glargine 10  units nightly with sliding scale, adjust as needed    Syncope  - CT head negative for acute abnormalities  - Troponins mildly elevated but flat  - EKG overnight showed A-fib as above  - Likely related to sepsis  - Repeat echo largely unremarkable    Elevated CK  -CK 1331 on arrival, likely secondary to syncope and being down for unknown amount of time  - Improved to 831  - 2 L of fluid in the ED  - Maintenance IV fluids at 100 an hour  - Repeat labs in the a.m.    ELMER  CKD stage III  - Baseline creatinine appears to be around 1  - On arrival creatinine 1.5, improved to 1.28 today  - Status post 2 L bolus in the ED  - ContinueIVF at 100-hour  - Hold nephrotoxic medications  - Recheck labs in the a.m.    Closed fracture left foot, POA  - Patient being treated as an outpatient by Ortho, currently in a boot    Hypothyroidism  - Continue levothyroxine    Diastolic CHF  Hyperlipidemia  Hypertension  History of CVA  -Continue statin  - Holding Bumex given hypotension and sepsis  - Assess volume status daily    COPD, not in exacerbation  TIFFANIE  -Patient currently not on any inhalers  - Continue CPAP nightly    Arthritis  Demyelinating disease  - following by Dr. Hernandez  -Patient med list lists Rinvoq and Imuran, pending med rec    Expected Discharge Location and Transportation: TBD  Expected Discharge   Expected Discharge Date: 11/17/2024; Expected Discharge Time:      VTE Prophylaxis:  Mechanical VTE prophylaxis orders are present.         AM-PAC 6 Clicks Score (PT): 23 (11/15/24 2225)    CODE STATUS:   Code Status and Medical Interventions: CPR (Attempt to Resuscitate); Full Support   Ordered at: 11/15/24 2115     Code Status (Patient has no pulse and is not breathing):    CPR (Attempt to Resuscitate)     Medical Interventions (Patient has pulse or is breathing):    Full Support       Norma Lo MD  11/16/24

## 2024-11-17 LAB
ANION GAP SERPL CALCULATED.3IONS-SCNC: 11 MMOL/L (ref 5–15)
BACTERIA SPEC AEROBE CULT: ABNORMAL
BUN SERPL-MCNC: 34 MG/DL (ref 8–23)
BUN/CREAT SERPL: 39.5 (ref 7–25)
CALCIUM SPEC-SCNC: 8 MG/DL (ref 8.6–10.5)
CHLORIDE SERPL-SCNC: 114 MMOL/L (ref 98–107)
CK SERPL-CCNC: 214 U/L (ref 20–180)
CO2 SERPL-SCNC: 21 MMOL/L (ref 22–29)
CREAT SERPL-MCNC: 0.86 MG/DL (ref 0.57–1)
DEPRECATED RDW RBC AUTO: 51.1 FL (ref 37–54)
EGFRCR SERPLBLD CKD-EPI 2021: 68.8 ML/MIN/1.73
ERYTHROCYTE [DISTWIDTH] IN BLOOD BY AUTOMATED COUNT: 14.6 % (ref 12.3–15.4)
GLUCOSE BLDC GLUCOMTR-MCNC: 156 MG/DL (ref 70–130)
GLUCOSE BLDC GLUCOMTR-MCNC: 183 MG/DL (ref 70–130)
GLUCOSE BLDC GLUCOMTR-MCNC: 292 MG/DL (ref 70–130)
GLUCOSE BLDC GLUCOMTR-MCNC: 99 MG/DL (ref 70–130)
GLUCOSE SERPL-MCNC: 136 MG/DL (ref 65–99)
HCT VFR BLD AUTO: 28.5 % (ref 34–46.6)
HGB BLD-MCNC: 9.1 G/DL (ref 12–15.9)
MAGNESIUM SERPL-MCNC: 1.9 MG/DL (ref 1.6–2.4)
MCH RBC QN AUTO: 30.4 PG (ref 26.6–33)
MCHC RBC AUTO-ENTMCNC: 31.9 G/DL (ref 31.5–35.7)
MCV RBC AUTO: 95.3 FL (ref 79–97)
PLATELET # BLD AUTO: 147 10*3/MM3 (ref 140–450)
PMV BLD AUTO: 12 FL (ref 6–12)
POTASSIUM SERPL-SCNC: 3.4 MMOL/L (ref 3.5–5.2)
POTASSIUM SERPL-SCNC: 4.8 MMOL/L (ref 3.5–5.2)
RBC # BLD AUTO: 2.99 10*6/MM3 (ref 3.77–5.28)
SODIUM SERPL-SCNC: 146 MMOL/L (ref 136–145)
WBC NRBC COR # BLD AUTO: 5.98 10*3/MM3 (ref 3.4–10.8)

## 2024-11-17 PROCEDURE — 25010000002 CEFTRIAXONE PER 250 MG: Performed by: NURSE PRACTITIONER

## 2024-11-17 PROCEDURE — 82550 ASSAY OF CK (CPK): CPT | Performed by: STUDENT IN AN ORGANIZED HEALTH CARE EDUCATION/TRAINING PROGRAM

## 2024-11-17 PROCEDURE — 99232 SBSQ HOSP IP/OBS MODERATE 35: CPT | Performed by: STUDENT IN AN ORGANIZED HEALTH CARE EDUCATION/TRAINING PROGRAM

## 2024-11-17 PROCEDURE — 99232 SBSQ HOSP IP/OBS MODERATE 35: CPT | Performed by: INTERNAL MEDICINE

## 2024-11-17 PROCEDURE — 84132 ASSAY OF SERUM POTASSIUM: CPT | Performed by: STUDENT IN AN ORGANIZED HEALTH CARE EDUCATION/TRAINING PROGRAM

## 2024-11-17 PROCEDURE — 85027 COMPLETE CBC AUTOMATED: CPT | Performed by: STUDENT IN AN ORGANIZED HEALTH CARE EDUCATION/TRAINING PROGRAM

## 2024-11-17 PROCEDURE — 83735 ASSAY OF MAGNESIUM: CPT | Performed by: STUDENT IN AN ORGANIZED HEALTH CARE EDUCATION/TRAINING PROGRAM

## 2024-11-17 PROCEDURE — 80048 BASIC METABOLIC PNL TOTAL CA: CPT | Performed by: STUDENT IN AN ORGANIZED HEALTH CARE EDUCATION/TRAINING PROGRAM

## 2024-11-17 PROCEDURE — 63710000001 INSULIN LISPRO (HUMAN) PER 5 UNITS: Performed by: NURSE PRACTITIONER

## 2024-11-17 PROCEDURE — 82948 REAGENT STRIP/BLOOD GLUCOSE: CPT

## 2024-11-17 PROCEDURE — 63710000001 INSULIN GLARGINE PER 5 UNITS: Performed by: NURSE PRACTITIONER

## 2024-11-17 RX ORDER — POTASSIUM CHLORIDE 1500 MG/1
40 TABLET, EXTENDED RELEASE ORAL EVERY 4 HOURS
Status: COMPLETED | OUTPATIENT
Start: 2024-11-17 | End: 2024-11-17

## 2024-11-17 RX ORDER — NEBIVOLOL 20 MG/1
20 TABLET ORAL DAILY
Status: DISCONTINUED | OUTPATIENT
Start: 2024-11-17 | End: 2024-11-18

## 2024-11-17 RX ADMIN — APIXABAN 2.5 MG: 2.5 TABLET, FILM COATED ORAL at 08:59

## 2024-11-17 RX ADMIN — LEVOTHYROXINE SODIUM 75 MCG: 0.07 TABLET ORAL at 06:08

## 2024-11-17 RX ADMIN — PRAVASTATIN SODIUM 20 MG: 20 TABLET ORAL at 17:17

## 2024-11-17 RX ADMIN — PREGABALIN 75 MG: 75 CAPSULE ORAL at 20:53

## 2024-11-17 RX ADMIN — NEBIVOLOL 20 MG: 20 TABLET ORAL at 08:59

## 2024-11-17 RX ADMIN — APIXABAN 2.5 MG: 2.5 TABLET, FILM COATED ORAL at 20:54

## 2024-11-17 RX ADMIN — TERAZOSIN HYDROCHLORIDE 2 MG: 2 CAPSULE ORAL at 20:54

## 2024-11-17 RX ADMIN — INSULIN GLARGINE 10 UNITS: 100 INJECTION, SOLUTION SUBCUTANEOUS at 21:34

## 2024-11-17 RX ADMIN — INSULIN LISPRO 6 UNITS: 100 INJECTION, SOLUTION INTRAVENOUS; SUBCUTANEOUS at 12:49

## 2024-11-17 RX ADMIN — Medication 10 ML: at 20:58

## 2024-11-17 RX ADMIN — POTASSIUM CHLORIDE 40 MEQ: 1500 TABLET, EXTENDED RELEASE ORAL at 12:48

## 2024-11-17 RX ADMIN — MONTELUKAST 10 MG: 10 TABLET, FILM COATED ORAL at 17:17

## 2024-11-17 RX ADMIN — CETIRIZINE HYDROCHLORIDE 10 MG: 10 TABLET, FILM COATED ORAL at 09:00

## 2024-11-17 RX ADMIN — FLUTICASONE PROPIONATE 2 SPRAY: 50 SPRAY, METERED NASAL at 09:00

## 2024-11-17 RX ADMIN — Medication 10 ML: at 09:01

## 2024-11-17 RX ADMIN — INSULIN LISPRO 2 UNITS: 100 INJECTION, SOLUTION INTRAVENOUS; SUBCUTANEOUS at 09:00

## 2024-11-17 RX ADMIN — POTASSIUM CHLORIDE 40 MEQ: 1500 TABLET, EXTENDED RELEASE ORAL at 17:17

## 2024-11-17 RX ADMIN — OXYBUTYNIN CHLORIDE 5 MG: 5 TABLET ORAL at 09:00

## 2024-11-17 RX ADMIN — INSULIN LISPRO 2 UNITS: 100 INJECTION, SOLUTION INTRAVENOUS; SUBCUTANEOUS at 17:16

## 2024-11-17 RX ADMIN — SODIUM CHLORIDE 1000 MG: 900 INJECTION INTRAVENOUS at 17:21

## 2024-11-17 RX ADMIN — OXYBUTYNIN CHLORIDE 5 MG: 5 TABLET ORAL at 20:53

## 2024-11-17 RX ADMIN — PREGABALIN 75 MG: 75 CAPSULE ORAL at 08:59

## 2024-11-17 NOTE — PROGRESS NOTES
[facility] Medicine Services  PROGRESS NOTE    Patient Name: Marilyn Kunz  : 1945  MRN: [id number]    Date of Admission: 11/15/2024  Primary Care Physician: Peter Baca MD    Subjective   Subjective     CC:  Syncope, hyperglycemia    HPI:  Patient seen and examined morning.  She reports feeling much better today.  No acute concerns at this time.      Objective   Objective     Vital Signs:   Temp:  [97.5 °F (36.4 °C)-97.9 °F (36.6 °C)] 97.8 °F (36.6 °C)  Heart Rate:  [77-95] 83  Resp:  [16-18] 18  BP: (120-165)/(53-72) 120/64     Physical Exam  Constitutional:       General: She is not in acute distress.  Cardiovascular:      Rate and Rhythm: Normal rate. Rhythm irregular.      Heart sounds: Normal heart sounds.   Pulmonary:      Effort: Pulmonary effort is normal. No respiratory distress.      Breath sounds: Normal breath sounds.   Abdominal:      Palpations: Abdomen is soft.      Tenderness: There is no abdominal tenderness.   Musculoskeletal:      Right lower leg: No edema.      Left lower leg: No edema.   Neurological:      General: No focal deficit present.      Mental Status: She is alert. Mental status is at baseline.   Psychiatric:         Mood and Affect: Mood normal.         Thought Content: Thought content normal.          Results Reviewed:  LAB RESULTS:      Lab 24  0614 24  1020 24  0339 24  0111 11/15/24  2034 11/15/24  1709   WBC 5.98  --   --  16.40*  --  22.24*   HEMOGLOBIN 9.1*  --   --  11.0*  --  12.1   HEMATOCRIT 28.5*  --   --  35.0  --  38.4   PLATELETS 147  --   --  161  --  172   NEUTROS ABS  --   --   --  13.51*  --  19.89*   IMMATURE GRANS (ABS)  --   --   --  0.06*  --  0.11*   LYMPHS ABS  --   --   --  1.01  --  0.57*   MONOS ABS  --   --   --  0.56  --  0.63   EOS ABS  --   --   --  1.25*  --  1.01*   MCV 95.3  --   --  95.9  --  95.5   PROCALCITONIN  --   --   --   --   --  5.37*   LACTATE  --  1.3 2.2* 2.5*  2.7* 2.6*   HSTROP T  --   --  46* 42*  --   --          Lab 11/17/24  0614 11/16/24  0111 11/15/24  1709   SODIUM 146* 139 134*   POTASSIUM 3.4* 3.4* 3.9   CHLORIDE 114* 106 97*   CO2 21.0* 18.0* 21.0*   ANION GAP 11.0 15.0 16.0*   BUN 34* 52* 50*   CREATININE 0.86 1.28* 1.54*   EGFR 68.8 42.7* 34.2*   GLUCOSE 136* 251* 510*   CALCIUM 8.0* 7.8* 8.8   MAGNESIUM 1.9 1.8  --    PHOSPHORUS  --  2.6  --    HEMOGLOBIN A1C  --   --  8.00*         Lab 11/15/24  1709   TOTAL PROTEIN 7.1   ALBUMIN 3.6   GLOBULIN 3.5   ALT (SGPT) 22   AST (SGOT) 41*   BILIRUBIN 0.9   ALK PHOS 71         Lab 11/16/24  0339 11/16/24  0111   HSTROP T 46* 42*                 Lab 11/15/24  1711   FIO2 21   CARBOXYHEMOGLOBIN (VENOUS) 0.2     Brief Urine Lab Results  (Last result in the past 365 days)        Color   Clarity   Blood   Leuk Est   Nitrite   Protein   CREAT   Urine HCG        11/15/24 1739 Dark Yellow   Cloudy   Negative   Moderate (2+)   Negative   30 mg/dL (1+)                   Microbiology Results Abnormal       Procedure Component Value - Date/Time    Urine Culture - Urine, Urine, Clean Catch [[id number]]  (Abnormal)  (Susceptibility) Collected: 11/15/24 1739    Lab Status: Final result Specimen: Urine, Clean Catch Updated: 11/17/24 0932     Urine Culture >100,000 CFU/mL Escherichia coli    Narrative:      Colonization of the urinary tract without infection is common. Treatment is discouraged unless the patient is symptomatic, pregnant, or undergoing an invasive urologic procedure.    Susceptibility        Escherichia coli      SUAD      Amoxicillin + Clavulanate Susceptible      Ampicillin Susceptible      Ampicillin + Sulbactam Susceptible      Cefepime Susceptible      Ceftazidime Susceptible      Ceftriaxone Susceptible      Gentamicin Susceptible      Levofloxacin Susceptible      Nitrofurantoin Susceptible      Piperacillin + Tazobactam Susceptible      Trimethoprim + Sulfamethoxazole Susceptible                                    CT Head Without Contrast    Result Date: 11/16/2024  CT HEAD WO CONTRAST Date of Exam: 11/16/2024 12:17 AM EST Indication: syncope, LOC. Comparison: 9/24/2024. Technique: Axial CT images were obtained of the head without contrast administration.  Automated exposure control and iterative construction methods were used. Findings: There is no evidence of hemorrhage. There is no mass effect or midline shift. There is no extracerebral collection. Ventricles are normal in size and configuration for patient's stated age.  Posterior fossa is within normal limits. Calvarium and skull base appear intact.   Visualized sinuses show no air fluid levels. Visualized orbits are unremarkable.     Impression: Impression: No acute intracranial process. Electronically Signed: Ana Mckay MD  11/16/2024 12:42 AM EST  Workstation ID: [id number]    XR Chest 1 View    Result Date: 11/15/2024  XR CHEST 1 VW Date of Exam: 11/15/2024 6:07 PM EST Indication: AMS Protocol Comparison: AP chest x-ray 9/23/2024, AP chest x-ray 9/30/2023, CT chest 7/20/2023, AP chest x-ray 3/2/2023. Findings: Cardiomediastinal contours are stable. Lungs are adequately expanded and appear clear. No pneumothorax or large pleural effusion is seen.     Impression: Impression: No evidence of acute cardiopulmonary abnormality or significant change. Electronically Signed: Sera Mercer  11/15/2024 6:34 PM EST  Workstation ID: [id number]     Results for orders placed during the hospital encounter of 11/15/24    Adult Transthoracic Echo Complete W/ Cont if Necessary Per Protocol    Interpretation Summary    Left ventricular systolic function is normal. Left ventricular ejection fraction appears to be 66 - 70%.    Left ventricular diastolic function was normal.    Left atrial volume is mildly increased.    Estimated right ventricular systolic pressure from tricuspid regurgitation is normal (<35 mmHg).    No significant change compared to 2023 echo      Current  medications:  Scheduled Meds:apixaban, 2.5 mg, Oral, Q12H  cefTRIAXone, 1,000 mg, Intravenous, Q24H  cetirizine, 10 mg, Oral, Daily  fluticasone, 2 spray, Each Nare, Daily  insulin glargine, 10 Units, Subcutaneous, Nightly  insulin lispro, 2-9 Units, Subcutaneous, 4x Daily With Meals & Nightly  levothyroxine, 75 mcg, Oral, Q AM  montelukast, 10 mg, Oral, Q PM  nebivolol, 20 mg, Oral, Daily  oxybutynin, 5 mg, Oral, BID  potassium chloride ER, 40 mEq, Oral, Q4H  pravastatin, 20 mg, Oral, Q PM  pregabalin, 75 mg, Oral, BID  [Held by provider] sacubitril-valsartan, 1 tablet, Oral, BID  sodium chloride, 10 mL, Intravenous, Q12H  terazosin, 2 mg, Oral, Nightly      Continuous Infusions:   PRN Meds:.  acetaminophen    senna-docusate sodium **AND** polyethylene glycol **AND** bisacodyl **AND** bisacodyl    Calcium Replacement - Follow Nurse / BPA Driven Protocol    dextrose    dextrose    glucagon (human recombinant)    Magnesium Standard Dose Replacement - Follow Nurse / BPA Driven Protocol    melatonin    Phosphorus Replacement - Follow Nurse / BPA Driven Protocol    Potassium Replacement - Follow Nurse / BPA Driven Protocol    sodium chloride    sodium chloride    sodium chloride    Assessment & Plan   Assessment & Plan     Active Hospital Problems    Diagnosis  POA    **UTI (urinary tract infection) [N39.0]  Yes    Type 1 diabetes mellitus with hyperglycemia [E10.65]  Yes    Syncope [R55]  Yes    Elevated serum creatinine [R79.89]  Yes    Sepsis [A41.9]  Yes    Closed fracture of left foot [S92.902A]  Yes    TIFFANIE (obstructive sleep apnea) [G47.33]  Yes    COPD (chronic obstructive pulmonary disease) [J44.9]  Yes    Elevated CK [R74.8]  Yes    Immunosuppression due to drug therapy [D84.821, Z79.899]  Not Applicable    Chronic diastolic congestive heart failure [I50.32]  Yes    History of stroke [Z86.73]  Not Applicable    Psoriatic arthritis [L40.50]  Yes    Factor V Leiden [D68.51]  Yes    Essential hypertension [I10]   Yes    CKD (chronic kidney disease) stage 3, GFR 30-59 ml/min [N18.30]  Yes    Hyperlipidemia LDL goal <70 [E78.5]  Yes    Hypothyroidism [E03.9]  Yes      Resolved Hospital Problems   No resolved problems to display.        Brief Hospital Course to date:  Marilyn Kunz is a 79 y.o. female with hypertension, hyperlipidemia, diastolic heart failure, somatic arthritis, demyelinating disease, type 1 diabetes, CKD stage III, prior CVA, COPD, hypothyroidism, TIFFANIE on CPAP, heterozygous factor V Leiden who presented to the ED with syncope and hyperglycemia.  Patient found to be septic likely from UTI.    Sepsis, improved  UTI  - Leukocytosis and elevated lactic acid resolved  - Blood cultures no growth to date  - Urine culture growing E. coli, susceptibilities pending  - Continue IV Rocephin, planning for 5 days of antibiotics  - Follow-up culture susceptibilities    A-fib, new onset  - Captured on EKG 11/15  - No known history of arrhythmia  - Rate controlled without any medications  - Likely secondary to sepsis above though more concerning in the setting of syncope at home  - Cardiology consulted, started patient on Eliquis  - Continue telemonitoring    Type 1 diabetes with hyperglycemia  - Glucose around 500 on arrival  - Now under better control  - Will continue glargine 10 units nightly with sliding scale, adjust as needed    Syncope  - CT head negative for acute abnormalities  - Troponins mildly elevated but flat  - EKG overnight showed A-fib as above  - Likely related to sepsis  - Repeat echo largely unremarkable    Elevated CK  -CK 1331 on arrival, likely secondary to syncope and being down for unknown amount of time  - Improved to 214  - 2 L of fluid in the ED  - Patient with adequate p.o. intake will discontinue IV fluids   - Repeat labs in the a.m.    ELMER  CKD stage III  - Baseline creatinine appears to be around 1  - On arrival creatinine 1.5, improved to 0.8  - Hold nephrotoxic medications  - Recheck labs  in the a.m.    Closed fracture left foot, POA  - Patient being treated as an outpatient by Ortho, currently in a boot    Hypothyroidism  - Continue levothyroxine    Diastolic CHF  Hyperlipidemia  Hypertension  History of CVA  -Continue statin  - Holding Bumex given hypotension and sepsis  - Assess volume status daily, restart when appropriate    COPD, not in exacerbation  TIFFANIE  -Patient currently not on any inhalers  - Continue CPAP nightly    Arthritis  Demyelinating disease  - following by Dr. Hernandez  -Patient med list lists Rinvoq and Imuran, pending med rec    Expected Discharge Location and Transportation: TBD  Expected Discharge   Expected Discharge Date: 11/17/2024; Expected Discharge Time:      VTE Prophylaxis:  Pharmacologic & mechanical VTE prophylaxis orders are present.         AM-PAC 6 Clicks Score (PT): 21 (11/17/24 0800)    CODE STATUS:   Code Status and Medical Interventions: CPR (Attempt to Resuscitate); Full Support   Ordered at: 11/15/24 2115     Code Status (Patient has no pulse and is not breathing):    CPR (Attempt to Resuscitate)     Medical Interventions (Patient has pulse or is breathing):    Full Support       Norma Lo MD  11/17/24

## 2024-11-17 NOTE — PROGRESS NOTES
[facility]  INPATIENT PROGRESS NOTE         [facility] 6A    11/17/2024      PATIENT IDENTIFICATION:   Name:  Marilyn Kunz      MRN:  [id number]     79 y.o.  female             Reason for visit: Atrial fibrillation      SUBJECTIVE:   Doing well today up in chair, no new complaints denies palpitations or shortness of breath.     OBJECTIVE:  Vitals:    11/17/24 0015 11/17/24 0246 11/17/24 0823 11/17/24 1138   BP: 122/53 136/56 129/60 120/64   BP Location: Left arm Left arm Left arm Left arm   Patient Position: Lying Lying Lying Lying   Pulse: 77 83     Resp: 18 18 18 18   Temp:  97.5 °F (36.4 °C) 97.9 °F (36.6 °C) 97.8 °F (36.6 °C)   TempSrc:  Oral Oral Oral   SpO2:       Weight:       Height:               Body mass index is 26.66 kg/m².    Intake/Output Summary (Last 24 hours) at 11/17/2024 1147  Last data filed at 11/17/2024 0800  Gross per 24 hour   Intake 1850 ml   Output --   Net 1850 ml       Telemetry: Personally reviewed, A-fib with controlled rate    Exam:  General Appearance:   well developed  well nourished  Neck:  thyroid not enlarged  supple  Respiratory:  no respiratory distress  normal breath sounds  no rales  Cardiovascular:  no jugular venous distention  Irregular irregular rhythm  apical impulse normal  S1 normal, S2 normal  no S3, no S4   no murmur  no rub, no thrill  carotid pulses normal; no bruit  pedal pulses normal  lower extremity edema: none    Skin:   warm, dry      Allergies   Allergen Reactions    Atorvastatin Other (See Comments)    Colesevelam Other (See Comments)    Cymbalta [Duloxetine Hcl] Other (See Comments)     Hyponatremia    Famotidine Other (See Comments)    Cephalexin Rash     Tolerated Ceftriaxone    Clindamycin Rash    Hygroton [Chlorthalidone] Rash     Facial rash    Lidocaine Other (See Comments)     Sores in throat    Penicillins Nausea And Vomiting, Rash and Other (See Comments)     Has tolerated  "ceftriaxone  TROUBLE BREATHING     Scheduled meds:       apixaban, 2.5 mg, Oral, Q12H  cefTRIAXone, 1,000 mg, Intravenous, Q24H  cetirizine, 10 mg, Oral, Daily  fluticasone, 2 spray, Each Nare, Daily  insulin glargine, 10 Units, Subcutaneous, Nightly  insulin lispro, 2-9 Units, Subcutaneous, 4x Daily With Meals & Nightly  levothyroxine, 75 mcg, Oral, Q AM  montelukast, 10 mg, Oral, Q PM  nebivolol, 20 mg, Oral, Daily  oxybutynin, 5 mg, Oral, BID  pravastatin, 20 mg, Oral, Q PM  pregabalin, 75 mg, Oral, BID  [Held by provider] sacubitril-valsartan, 1 tablet, Oral, BID  sodium chloride, 10 mL, Intravenous, Q12H  terazosin, 2 mg, Oral, Nightly      IV meds:                         Data Review:  Results from last 7 days   Lab Units 11/17/24  0614 11/16/24  0111 11/15/24  1709   SODIUM mmol/L 146* 139 134*   BUN mg/dL 34* 52* 50*   CREATININE mg/dL 0.86 1.28* 1.54*   GLUCOSE mg/dL 136* 251* 510*     Results from last 7 days   Lab Units 11/17/24  0614 11/16/24  0111 11/15/24  1709   WBC 10*3/mm3 5.98 16.40* 22.24*   HEMOGLOBIN g/dL 9.1* 11.0* 12.1         Results from last 7 days   Lab Units 11/15/24  1709   ALT (SGPT) U/L 22   AST (SGOT) U/L 41*     No results found for: \"DIGOXIN\"   Lab Results   Component Value Date    TSH 0.386 09/23/2024           Invalid input(s): \"LDLCALC\"    Estimated Creatinine Clearance: 44.5 mL/min (by C-G formula based on SCr of 0.86 mg/dL).        Imaging (last 24 hr):   I personally reviewed the most recent chest x-ray and other pertinent imaging studies.  Results for orders placed during the hospital encounter of 11/15/24    XR Chest 1 View    Narrative  XR CHEST 1 VW    Date of Exam: 11/15/2024 6:07 PM EST    Indication: AMS Protocol    Comparison: AP chest x-ray 9/23/2024, AP chest x-ray 9/30/2023, CT chest 7/20/2023, AP chest x-ray 3/2/2023.    Findings:  Cardiomediastinal contours are stable. Lungs are adequately expanded and appear clear. No pneumothorax or large pleural effusion is " seen.    Impression  Impression:  No evidence of acute cardiopulmonary abnormality or significant change.      Electronically Signed: Sera Mercer  11/15/2024 6:34 PM EST  Workstation ID: [id number]        Last ECHO:  Results for orders placed during the hospital encounter of 11/15/24    Adult Transthoracic Echo Complete W/ Cont if Necessary Per Protocol    Interpretation Summary    Left ventricular systolic function is normal. Left ventricular ejection fraction appears to be 66 - 70%.    Left ventricular diastolic function was normal.    Left atrial volume is mildly increased.    Estimated right ventricular systolic pressure from tricuspid regurgitation is normal (<35 mmHg).    No significant change compared to 2023 echo        PROBLEM LIST:     UTI (urinary tract infection)    Essential hypertension    Factor V Leiden    Psoriatic arthritis    Hypothyroidism    CKD (chronic kidney disease) stage 3, GFR 30-59 ml/min    Hyperlipidemia LDL goal <70    History of stroke    Chronic diastolic congestive heart failure    Immunosuppression due to drug therapy    Type 1 diabetes mellitus with hyperglycemia    Syncope    Elevated serum creatinine    Sepsis    Closed fracture of left foot    TIFFANIE (obstructive sleep apnea)    COPD (chronic obstructive pulmonary disease)    Elevated CK        ASSESSMENT/PLAN:  1. Atrial fibrillation   - new diagnosis 11/15 in setting of urosepsis and severe hyperglycemia  - rate control strategy, continue home dose Bystolic for rate control  - echo with normal EF, no significant abnormalities  - CHADsvASC= 6, discussed with hospitalist, agreed with switching aspirin to low-dose Eliquis 2.5 mg twice daily.  Using low-dose due to small body habitus, low weight, and age     2. Urosepsis  - per hospitalists   Antibiotics     3. Type I Diabetes  - per hospitalists     4. Hypertension   -Improving, continue Bystolic, hold other home meds    Stable from cardiology standpoint, we will sign off at this  time please call with questions, follow-up with primary cardiologist in 3 to 4 months.      Discussed with patient's nurse      Karlos Navarrete MD  11/17/2024    11:47 EST

## 2024-11-18 LAB
ANION GAP SERPL CALCULATED.3IONS-SCNC: 10 MMOL/L (ref 5–15)
BUN SERPL-MCNC: 18 MG/DL (ref 8–23)
BUN/CREAT SERPL: 25 (ref 7–25)
CA-I SERPL ISE-MCNC: 1.16 MMOL/L (ref 1.15–1.3)
CALCIUM SPEC-SCNC: 8.4 MG/DL (ref 8.6–10.5)
CHLORIDE SERPL-SCNC: 115 MMOL/L (ref 98–107)
CO2 SERPL-SCNC: 21 MMOL/L (ref 22–29)
CREAT SERPL-MCNC: 0.72 MG/DL (ref 0.57–1)
EGFRCR SERPLBLD CKD-EPI 2021: 85.2 ML/MIN/1.73
GLUCOSE BLDC GLUCOMTR-MCNC: 186 MG/DL (ref 70–130)
GLUCOSE BLDC GLUCOMTR-MCNC: 286 MG/DL (ref 70–130)
GLUCOSE BLDC GLUCOMTR-MCNC: 51 MG/DL (ref 70–130)
GLUCOSE BLDC GLUCOMTR-MCNC: 97 MG/DL (ref 70–130)
GLUCOSE SERPL-MCNC: 87 MG/DL (ref 65–99)
MAGNESIUM SERPL-MCNC: 2 MG/DL (ref 1.6–2.4)
PHOSPHATE SERPL-MCNC: 2.2 MG/DL (ref 2.5–4.5)
POTASSIUM SERPL-SCNC: 4.5 MMOL/L (ref 3.5–5.2)
SODIUM SERPL-SCNC: 146 MMOL/L (ref 136–145)

## 2024-11-18 PROCEDURE — 97116 GAIT TRAINING THERAPY: CPT

## 2024-11-18 PROCEDURE — 80048 BASIC METABOLIC PNL TOTAL CA: CPT | Performed by: STUDENT IN AN ORGANIZED HEALTH CARE EDUCATION/TRAINING PROGRAM

## 2024-11-18 PROCEDURE — 84100 ASSAY OF PHOSPHORUS: CPT | Performed by: STUDENT IN AN ORGANIZED HEALTH CARE EDUCATION/TRAINING PROGRAM

## 2024-11-18 PROCEDURE — 82330 ASSAY OF CALCIUM: CPT | Performed by: STUDENT IN AN ORGANIZED HEALTH CARE EDUCATION/TRAINING PROGRAM

## 2024-11-18 PROCEDURE — 82948 REAGENT STRIP/BLOOD GLUCOSE: CPT

## 2024-11-18 PROCEDURE — 63710000001 INSULIN LISPRO (HUMAN) PER 5 UNITS: Performed by: NURSE PRACTITIONER

## 2024-11-18 PROCEDURE — 97530 THERAPEUTIC ACTIVITIES: CPT

## 2024-11-18 PROCEDURE — 63710000001 INSULIN GLARGINE PER 5 UNITS: Performed by: STUDENT IN AN ORGANIZED HEALTH CARE EDUCATION/TRAINING PROGRAM

## 2024-11-18 PROCEDURE — 99232 SBSQ HOSP IP/OBS MODERATE 35: CPT | Performed by: STUDENT IN AN ORGANIZED HEALTH CARE EDUCATION/TRAINING PROGRAM

## 2024-11-18 PROCEDURE — 83735 ASSAY OF MAGNESIUM: CPT | Performed by: STUDENT IN AN ORGANIZED HEALTH CARE EDUCATION/TRAINING PROGRAM

## 2024-11-18 RX ORDER — CEFUROXIME AXETIL 250 MG/1
500 TABLET ORAL EVERY 12 HOURS SCHEDULED
Status: COMPLETED | OUTPATIENT
Start: 2024-11-18 | End: 2024-11-19

## 2024-11-18 RX ORDER — NEBIVOLOL 5 MG/1
10 TABLET ORAL DAILY
Status: DISCONTINUED | OUTPATIENT
Start: 2024-11-19 | End: 2024-11-18

## 2024-11-18 RX ORDER — NEBIVOLOL 5 MG/1
10 TABLET ORAL DAILY
Status: DISCONTINUED | OUTPATIENT
Start: 2024-11-18 | End: 2024-11-25 | Stop reason: HOSPADM

## 2024-11-18 RX ADMIN — OXYBUTYNIN CHLORIDE 5 MG: 5 TABLET ORAL at 21:05

## 2024-11-18 RX ADMIN — PREGABALIN 75 MG: 75 CAPSULE ORAL at 08:13

## 2024-11-18 RX ADMIN — CEFUROXIME AXETIL 500 MG: 250 TABLET ORAL at 21:05

## 2024-11-18 RX ADMIN — CETIRIZINE HYDROCHLORIDE 10 MG: 10 TABLET, FILM COATED ORAL at 08:14

## 2024-11-18 RX ADMIN — MONTELUKAST 10 MG: 10 TABLET, FILM COATED ORAL at 17:36

## 2024-11-18 RX ADMIN — INSULIN LISPRO 6 UNITS: 100 INJECTION, SOLUTION INTRAVENOUS; SUBCUTANEOUS at 12:02

## 2024-11-18 RX ADMIN — Medication 5 MG: at 21:05

## 2024-11-18 RX ADMIN — APIXABAN 2.5 MG: 2.5 TABLET, FILM COATED ORAL at 08:16

## 2024-11-18 RX ADMIN — OXYBUTYNIN CHLORIDE 5 MG: 5 TABLET ORAL at 08:17

## 2024-11-18 RX ADMIN — INSULIN GLARGINE 8 UNITS: 100 INJECTION, SOLUTION SUBCUTANEOUS at 21:06

## 2024-11-18 RX ADMIN — PRAVASTATIN SODIUM 20 MG: 20 TABLET ORAL at 17:36

## 2024-11-18 RX ADMIN — POTASSIUM & SODIUM PHOSPHATES POWDER PACK 280-160-250 MG 2 PACKET: 280-160-250 PACK at 12:02

## 2024-11-18 RX ADMIN — NEBIVOLOL 10 MG: 5 TABLET ORAL at 12:02

## 2024-11-18 RX ADMIN — INSULIN LISPRO 6 UNITS: 100 INJECTION, SOLUTION INTRAVENOUS; SUBCUTANEOUS at 17:36

## 2024-11-18 RX ADMIN — Medication 10 ML: at 11:14

## 2024-11-18 RX ADMIN — TERAZOSIN HYDROCHLORIDE 2 MG: 2 CAPSULE ORAL at 21:06

## 2024-11-18 RX ADMIN — CEFUROXIME AXETIL 500 MG: 250 TABLET ORAL at 11:01

## 2024-11-18 RX ADMIN — LEVOTHYROXINE SODIUM 75 MCG: 0.07 TABLET ORAL at 08:21

## 2024-11-18 RX ADMIN — APIXABAN 2.5 MG: 2.5 TABLET, FILM COATED ORAL at 21:06

## 2024-11-18 RX ADMIN — FLUTICASONE PROPIONATE 2 SPRAY: 50 SPRAY, METERED NASAL at 08:17

## 2024-11-18 RX ADMIN — PREGABALIN 75 MG: 75 CAPSULE ORAL at 21:05

## 2024-11-18 NOTE — PLAN OF CARE
Goal Outcome Evaluation:  Plan of Care Reviewed With: patient        Progress: improving  Outcome Evaluation: Pt was able to tolerate balance interventions and increased ambulation today. Education was provided as to safety with transfers and ambulation w FWW. Skilled IP PT services remain warranted to support return home to independent. Patient would still benefit from IRF if she is willing upon d/c.    Anticipated Discharge Disposition (PT): inpatient rehabilitation facility

## 2024-11-18 NOTE — CASE MANAGEMENT/SOCIAL WORK
Discharge Planning Assessment  [facility]     Patient Name: Marilyn Kunz  MRN: [id number]  Today's Date: 11/18/2024    Admit Date: 11/15/2024    Plan: discharge plan   Discharge Needs Assessment       Row Name 11/18/24 1542       Living Environment    People in Home alone    Current Living Arrangements home    Primary Care Provided by self    Provides Primary Care For no one    Family Caregiver if Needed other (see comments)  Taisha Kunz(sister in law), Santiago Gaona(nephew)    Quality of Family Relationships unable to assess    Able to Return to Prior Arrangements yes    Living Arrangement Comments I met with pt at bedside with permission regarding discharge plan. Pt resides in [facility] in a home alone       Transition Planning    Patient/Family Anticipates Transition to home    Patient/Family Anticipated Services at Transition     Transportation Anticipated family or friend will provide       Discharge Needs Assessment    Readmission Within the Last 30 Days no previous admission in last 30 days    Equipment Currently Used at Home glucometer;cane, quad;cane, straight;cpap;shower chair;walker, rolling;other (see comments)  Pt states she has crutches and mostly using crutches with a fracture in left foot    Concerns to be Addressed discharge planning    Equipment Needed After Discharge cane, straight;cane, quad;cpap;glucometer;walker, rolling;shower chair;other (see comments)  crutches    Outpatient/Agency/Support Group Needs other (see comments)  Pt goes to [facility] and [facility]    Discharge Coordination/Progress Pt confirms that she has [employer] Replacement Insurance with prescription coverage and uses  [facility] at [facility] in [place]. Pt has a history of diastolic heart failure, COPD, CVA and here with UTI(from provider note). Pt states she has a left foot fracture and has been using crutches for mobility. She also states she has been going to [facility] for physical  therapy and [facility]. Pt states she is independent with ADLs and still drives. CM will cont to follow for discharge needs.                   Discharge Plan       Row Name 11/18/24 6974       Plan    Plan discharge plan    Plan Comments Pt reports her discharge plan is home and cont to go to [facility] for PT. Cardiology has been consulted. CM will cont to follow    Final Discharge Disposition Code 01 - home or self-care                  Continued Care and Services - Admitted Since 11/15/2024    No active coordination exists for this encounter.       Selected Continued Care - Episodes Includes continued care and service providers with selected services from the active episodes listed below      Rheumatology - External Fill Episode start date: 5/31/2024   There are no active outsourced providers for this episode.                 Expected Discharge Date and Time       Expected Discharge Date Expected Discharge Time    Nov 19, 2024            Demographic Summary       Row Name 11/18/24 1536       General Information    General Information Comments PCP is GÉNESIS JERRY       Contact Information    Permission Granted to Share Info With     Contact Information Obtained for     Contact Information Comments Taisha Kunz(sislter in law) [phone] or Jimenez Henderson(nephew) [phone]                   Functional Status    No documentation.                  Psychosocial    No documentation.                  Abuse/Neglect    No documentation.                  Legal    No documentation.                  Substance Abuse    No documentation.                  Patient Forms    No documentation.                     Mery Correa RN

## 2024-11-18 NOTE — PROGRESS NOTES
Clinical Nutrition Assessment     Patient Name: Marilyn Kunz  YOB: 1945  MRN: [id number]  Date of Encounter: 11/18/24 12:41 EST  Admission date: 11/15/2024  Reason for Visit: MST score 2+, Unintentional weight loss, Reduced oral intake    Assessment   Nutrition Assessment   Admission Diagnosis:  UTI (urinary tract infection) [N39.0]    Problem List:    UTI (urinary tract infection)    Essential hypertension    Factor V Leiden    Psoriatic arthritis    Hypothyroidism    CKD (chronic kidney disease) stage 3, GFR 30-59 ml/min    Hyperlipidemia LDL goal <70    History of stroke    Chronic diastolic congestive heart failure    Immunosuppression due to drug therapy    Type 1 diabetes mellitus with hyperglycemia    Syncope    Elevated serum creatinine    Sepsis    Closed fracture of left foot    TIFFANIE (obstructive sleep apnea)    COPD (chronic obstructive pulmonary disease)    Elevated CK      PMH:   She  has a past medical history of Abnormal ECG (Check FPA Date or [facility] Records), Acute sinusitis (02/06/2023), Anemia, Asthma, CAP (community acquired pneumonia) (02/06/2023), CHF (congestive heart failure) (07/20/2023), Chronic kidney disease, 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), COPD (chronic obstructive pulmonary disease) (11/15/2024), CTS (carpal tunnel syndrome), Deep vein thrombosis (No on blood clots but have a blood clotting disorder called Leiden Factor 5), Demyelinating disease of central nervous system, 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    PSH:  She  has a past surgical history that includes Hand surgery (Left, 1987); Patent ductus arterious ligation; Cataract extraction (Bilateral); Esophagogastroduodenoscopy (N/A, 10/20/2020); Cardiac catheterization (1952 2 of them); Colonoscopy; Knee Arthroplasty; Replacement total knee (Left); and Knee surgery (Left).    Applicable Nutrition History:       Anthropometrics     Height: Height: 149.9 cm (59\")  Last Filed Weight: Weight: 59.9 kg (132 lb) (11/15/24 1641)  Method: Weight Method: Stated  BMI: BMI (Calculated): 26.6    UBW:  130lbs per pt report  Weight change: No significant changes   Weight       Weight (kg) Weight (lbs) Weight Method Visit Report   8/16/2023 54.885 kg  121 lb   --    9/5/2023 54.885 kg  121 lb   --    9/30/2023 55.339 kg  122 lb  Stated     6/5/2024 60.51 kg  133 lb 6.4 oz   --    9/6/2024 61.689 kg  136 lb  Stated     9/23/2024 61 kg  134 lb 7.7 oz  Stated     11/15/2024 59.875 kg  132 lb  Stated       RD took bed wt of 59kg/130lbs    Nutrition Focused Physical Exam    Date: 11/18    Pt does not meet criteria for malnutrition diagnosis, at this time.      Subjective   Reported/Observed/Food/Nutrition Related History:     Pt reports eating well PTA, states if she likes something she will eat it. Pt reports endocrinologist referred her to a dietitian to help pt w/choosing what to eat 2/2 CKD ~ 2 weeks ago. Pt states she lives alone, usually with batch cook a meal and eat it for multiple meals. Pt states she drinks ONS intermittently as a meal replacement. Pt states wt has been stable ~130lbs. Pt noted to have arthritis, states OT is bringing adaptive utensils. NKFA.     Current Nutrition Prescription   PO: Diet: Regular/House, Cardiac, Diabetic; Healthy Heart (2-3 Na+); Consistent Carbohydrate; Fluid Consistency: Thin (IDDSI 0)  Oral Nutrition " Supplement:   Intake:  54% x 6 meals    Assessment & Plan   Nutrition Diagnosis   Date:  11/18            Updated:    Problem No nutrition diagnosis at this time    Etiology    Signs/Symptoms    Status: New    Goal / Objectives:   Nutrition to support treatment and Establish PO      Nutrition Intervention      Follow treatment progress, Care plan reviewed, Encourage intake, Supplement provided    Ordered Boost GC w/dinner per pt preference    Monitoring/Evaluation:   Per protocol, PO intake, Supplement intake, Pertinent labs    Monica Caldwell, MS,RD,LD  Time Spent:30

## 2024-11-18 NOTE — THERAPY TREATMENT NOTE
Patient Name: Marilyn Kunz  : 1945    MRN: [id number]                              Today's Date: 2024       Admit Date: 11/15/2024    Visit Dx:     ICD-10-CM ICD-9-CM   1. Urinary tract infection without hematuria, site unspecified  N39.0 599.0   2. Generalized weakness  R53.1 780.79   3. Hyperglycemia  R73.9 790.29   4. Renal insufficiency  N28.9 593.9   5. Bandemia  D72.825 288.66     Patient Active Problem List   Diagnosis    Essential hypertension    Factor V Leiden    Psoriatic arthritis    At risk for venous thromboembolism (VTE)    Hypothyroidism    CKD (chronic kidney disease) stage 3, GFR 30-59 ml/min    Hyperlipidemia LDL goal <70    PDA (patent ductus arteriosus)    Demyelinating disease    Spondylolisthesis, grade 2    Type 1 diabetes mellitus    History of stroke    Demyelinating disease of central nervous system, unspecified    Chronic diastolic congestive heart failure    Peripheral polyneuropathy    Immunosuppression due to drug therapy    Erosive osteoarthritis    Cervical radiculopathy    Rash    UTI (urinary tract infection)    Type 1 diabetes mellitus with hyperglycemia    Syncope    Elevated serum creatinine    Sepsis    Closed fracture of left foot    TIFFANIE (obstructive sleep apnea)    COPD (chronic obstructive pulmonary disease)    Elevated CK     Past Medical History:   Diagnosis Date    Abnormal ECG Check FPA Date or [facility] Records    [facility]    Acute sinusitis 2023    Anemia     Asthma     CAP (community acquired pneumonia) 2023    CHF (congestive heart failure) 2023    Chronic kidney disease     pt told it was dx from Dr Baca and to not eat much protein    Congenital heart disease 's Patent 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    Surgery [facility] s close    COPD (chronic obstructive pulmonary disease) 11/15/2024    CTS (carpal tunnel syndrome)     Psoriatic arthritis hands could be    Deep vein thrombosis No on  blood clots but have a blood clotting disorder called Leiden Factor 5    Demyelinating disease of central nervous system, unspecified 03/09/2023    Diabetes     Impression: Patient states that her diabetes is not doing well.  I asked her to go ahead and try to talk to her endocrinokogist.  She is still having the lower extremity neuropathic pan, but not enough for me to administer medication.    Disease of thyroid gland     Diverticulitis of colon     Erosive osteoarthritis 05/30/2024    Factor 5 Leiden mutation, heterozygous 2012    Head injury     Hyperlipidemia     Hypertension     Medication monitoring encounter     Impression:  She has renal insufficiency.  She is just taking Tylenol.    Movement disorder 10/2021    knee replacement left knww    Murmur, cardiac     Neuropathy in diabetes     redness swelling legs    TIFFANIE (obstructive sleep apnea) 11/15/2024    Osteopenia     Story: Femoral Neck Impression: Up-ro-date on bone density scan.  Follow up bone density every 2 years.    Peripheral neuropathy 05/03/2022    hands, arms. 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  I went over her exercises and told her to get a shoe insert,    Psoriasis     Impression: No rash    Psoriatic arthritis     hands    Sleep apnea Always    diabetic do not sleep well up and down    Stroke had one don't know when    showed on MRI Brain    Syncope 11/15/2024    Type 1 diabetes      Past Surgical History:   Procedure Laterality Date    CARDIAC CATHETERIZATION  1952 2 of them    Patent Dictus operation    CATARACT EXTRACTION Bilateral     COLONOSCOPY      ENDOSCOPY N/A 10/20/2020    Procedure: ESOPHAGOGASTRODUODENOSCOPY;  Surgeon: Brunner, Mark I, MD;  Location: [facility];  Service: Gastroenterology;  Laterality: N/A;    HAND SURGERY Left 1987    no hardware    KNEE ARTHROPLASTY      KNEE SURGERY Left     PATENT DUCTUS ARTERIOUS LIGATION      REPLACEMENT TOTAL  KNEE Left       General Information       Row Name 11/18/24 1612          Physical Therapy Time and Intention    Document Type therapy note (daily note)  -ER     Mode of Treatment physical therapy  -ER       Row Name 11/18/24 1612          General Information    Patient Profile Reviewed yes  -ER     Existing Precautions/Restrictions fall;other (see comments)  wbat LLE boot  -ER       Row Name 11/18/24 1612          Cognition    Orientation Status (Cognition) oriented x 3  -ER       Row Name 11/18/24 1612          Safety Issues/Impairments Affecting Functional Mobility    Impairments Affecting Function (Mobility) balance;endurance/activity tolerance;strength  -ER               User Key  (r) = Recorded By, (t) = Taken By, (c) = Cosigned By      Initials Name Provider Type    ER Princess Wagner, PT Physical Therapist                   Mobility       Row Name 11/18/24 1613          Bed Mobility    Bed Mobility sit-supine;rolling right  -ER     Rolling Right [place] (Bed Mobility) modified independence  -ER     Supine-Sit [place] (Bed Mobility) standby assist  -ER     Assistive Device (Bed Mobility) bed rails;head of bed elevated  -ER       Row Name 11/18/24 1613          Bed-Chair Transfer    Bed-Chair [place] (Transfers) verbal cues;standby assist  -ER     Assistive Device (Bed-Chair Transfers) walker, front-wheeled  -ER       Row Name 11/18/24 1613          Sit-Stand Transfer    Sit-Stand [place] (Transfers) contact guard;verbal cues  -ER     Assistive Device (Sit-Stand Transfers) walker, front-wheeled  -ER       Row Name 11/18/24 1613          Gait/Stairs (Locomotion)    [place] Level (Gait) contact guard;standby assist  -ER     Assistive Device (Gait) walker, front-wheeled  -ER     Patient was able to Ambulate yes  -ER     Distance in Feet (Gait) 250  -ER     Deviations/Abnormal Patterns (Gait) sam decreased;gait speed decreased;stride length decreased  -ER     Left Sided Gait Deviations  weight shift ability decreased  -ER     Comment, (Gait/Stairs) pt was able to ambulate up and down morel today with FWW and no difficulty. she has a small instance of unsteadiness upon first getting up from sleeping but was encouraged to ease into movement slowly to prevent falls.  -ER               User Key  (r) = Recorded By, (t) = Taken By, (c) = Cosigned By      Initials Name Provider Type    ER Princess Wagner, PT Physical Therapist                   Obj/Interventions       Row Name 11/18/24 1620          Balance    Balance Interventions sitting;standing;sit to stand;supported;static;minimal challenge;dynamic;moderate challenge;occupation based/functional task;manual resistance applied during activity;eyes closed during activity;vision occluded activity  -ER     Comment, Balance perturbations w eyes closed and open in standing w no support, minimal movement noted  -ER               User Key  (r) = Recorded By, (t) = Taken By, (c) = Cosigned By      Initials Name Provider Type    ER Princess Wagner, PT Physical Therapist                   Goals/Plan    No documentation.                  Clinical Impression       Row Name 11/18/24 1621          Pain    Pretreatment Pain Rating 2/10  -ER     Posttreatment Pain Rating 2/10  -ER     Pain Location hand  -ER     Pain Side/Orientation bilateral;generalized  -ER     Pain Management Interventions activity modification encouraged  -ER     Response to Pain Interventions activity participation with tolerable pain  -ER       Row Name 11/18/24 1621          Plan of Care Review    Plan of Care Reviewed With patient  -ER     Progress improving  -ER     Outcome Evaluation Pt was able to tolerate balance interventions and increased ambulation today. Education was provided as to safety with transfers and ambulation w FWW. Skilled IP PT services remain warranted to support return home to independent. Patient would still benefit from IRF if she is willing upon d/c.  -ER       Row Name  11/18/24 1621          Vital Signs    O2 Delivery Pre Treatment room air  -ER     O2 Delivery Intra Treatment room air  -ER     O2 Delivery Post Treatment room air  -ER     Pre Patient Position Supine  -ER     Intra Patient Position Standing  -ER     Post Patient Position Sitting  -ER       Row Name 11/18/24 1621          Positioning and Restraints    Pre-Treatment Position in bed  -ER     Post Treatment Position chair  -ER     In Chair reclined;call light within reach;encouraged to call for assist;exit alarm on;waffle cushion  -ER               User Key  (r) = Recorded By, (t) = Taken By, (c) = Cosigned By      Initials Name Provider Type    Princess Chang PT Physical Therapist                   Outcome Measures       Row Name 11/18/24 1623 11/18/24 0800       How much help from another person do you currently need...    Turning from your back to your side while in flat bed without using bedrails? 4  -ER 4  -GH    Moving from lying on back to sitting on the side of a flat bed without bedrails? 4  -ER 4  -GH    Moving to and from a bed to a chair (including a wheelchair)? 3  -ER 4  -GH    Standing up from a chair using your arms (e.g., wheelchair, bedside chair)? 4  -ER 4  -GH    Climbing 3-5 steps with a railing? 3  -ER 3  -GH    To walk in hospital room? 4  -ER 4  -GH    AM-PAC 6 Clicks Score (PT) 22  -ER 23  -GH    Highest Level of Mobility Goal 7 --> Walk 25 feet or more  -ER 7 --> Walk 25 feet or more  -GH      Row Name 11/18/24 1623          Functional Assessment    Outcome Measure Options AM-PAC 6 Clicks Basic Mobility (PT)  -ER               User Key  (r) = Recorded By, (t) = Taken By, (c) = Cosigned By      Initials Name Provider Type    Princess Chang PT Physical Therapist    Africa Blanco, RN Registered Nurse                                 Physical Therapy Education       Title: PT OT SLP Therapies (In Progress)       Topic: Physical Therapy (Done)       Point: Mobility training (Done)        Learning Progress Summary            Patient Acceptance, E, VU by ER at 11/18/2024 1623    Comment: pt edu on transfers, FWW use, d/c planning, purpose of PT, need for sitting up    Acceptance, E, VU by NS at 11/16/2024 1545                      Point: Home exercise program (Done)       Learning Progress Summary            Patient Acceptance, E, VU by ER at 11/18/2024 1623    Comment: pt edu on transfers, FWW use, d/c planning, purpose of PT, need for sitting up                      Point: Body mechanics (Done)       Learning Progress Summary            Patient Acceptance, E, VU by ER at 11/18/2024 1623    Comment: pt edu on transfers, FWW use, d/c planning, purpose of PT, need for sitting up    Acceptance, E, VU by NS at 11/16/2024 1545                      Point: Precautions (Done)       Learning Progress Summary            Patient Acceptance, E, VU by ER at 11/18/2024 1623    Comment: pt edu on transfers, FWW use, d/c planning, purpose of PT, need for sitting up    Acceptance, E, VU by NS at 11/16/2024 1545                                      User Key       Initials Effective Dates Name Provider Type Discipline    NS 06/16/21 -  Alisha Kennedy, PT Physical Therapist PT    ER 11/04/24 -  Princess Wagner PT Physical Therapist PT                  PT Recommendation and Plan     Progress: improving  Outcome Evaluation: Pt was able to tolerate balance interventions and increased ambulation today. Education was provided as to safety with transfers and ambulation w FWW. Skilled IP PT services remain warranted to support return home to independent. Patient would still benefit from IRF if she is willing upon d/c.     Time Calculation:         PT Charges       Row Name 11/18/24 1624             Time Calculation    Start Time 1545  -ER      PT Received On 11/18/24  -ER      PT Goal Re-Cert Due Date 11/26/24  -ER         Timed Charges    15486 - Gait Training Minutes  10  -ER      37909 - PT Therapeutic Activity Minutes 15   -ER         Total Minutes    Timed Charges Total Minutes 25  -ER       Total Minutes 25  -ER                User Key  (r) = Recorded By, (t) = Taken By, (c) = Cosigned By      Initials Name Provider Type    ER Princess Wagner, PT Physical Therapist                  Therapy Charges for Today       Code Description Service Date Service Provider Modifiers Qty    [id number]  GAIT TRAINING EA 15 MIN 11/18/2024 Princess Wagner, PT GP 1    [id number]  PT THERAPEUTIC ACT EA 15 MIN 11/18/2024 Princess Wagner, PT GP 1            PT G-Codes  Outcome Measure Options: AM-PAC 6 Clicks Basic Mobility (PT)  AM-PAC 6 Clicks Score (PT): 22  AM-PAC 6 Clicks Score (OT): 20  PT Discharge Summary  Anticipated Discharge Disposition (PT): inpatient rehabilitation facility    Princess Wagner PT  11/18/2024

## 2024-11-19 LAB
ANION GAP SERPL CALCULATED.3IONS-SCNC: 10 MMOL/L (ref 5–15)
BUN SERPL-MCNC: 13 MG/DL (ref 8–23)
BUN/CREAT SERPL: 18.3 (ref 7–25)
CA-I SERPL ISE-MCNC: 1.1 MMOL/L (ref 1.15–1.3)
CALCIUM SPEC-SCNC: 7.9 MG/DL (ref 8.6–10.5)
CHLORIDE SERPL-SCNC: 110 MMOL/L (ref 98–107)
CO2 SERPL-SCNC: 22 MMOL/L (ref 22–29)
CREAT SERPL-MCNC: 0.71 MG/DL (ref 0.57–1)
EGFRCR SERPLBLD CKD-EPI 2021: 86.6 ML/MIN/1.73
GLUCOSE BLDC GLUCOMTR-MCNC: 138 MG/DL (ref 70–130)
GLUCOSE BLDC GLUCOMTR-MCNC: 195 MG/DL (ref 70–130)
GLUCOSE BLDC GLUCOMTR-MCNC: 224 MG/DL (ref 70–130)
GLUCOSE BLDC GLUCOMTR-MCNC: 96 MG/DL (ref 70–130)
GLUCOSE SERPL-MCNC: 267 MG/DL (ref 65–99)
MAGNESIUM SERPL-MCNC: 1.8 MG/DL (ref 1.6–2.4)
PHOSPHATE SERPL-MCNC: 3.6 MG/DL (ref 2.5–4.5)
POTASSIUM SERPL-SCNC: 4.4 MMOL/L (ref 3.5–5.2)
SODIUM SERPL-SCNC: 142 MMOL/L (ref 136–145)

## 2024-11-19 PROCEDURE — 84100 ASSAY OF PHOSPHORUS: CPT | Performed by: STUDENT IN AN ORGANIZED HEALTH CARE EDUCATION/TRAINING PROGRAM

## 2024-11-19 PROCEDURE — 97530 THERAPEUTIC ACTIVITIES: CPT

## 2024-11-19 PROCEDURE — 97535 SELF CARE MNGMENT TRAINING: CPT

## 2024-11-19 PROCEDURE — 83735 ASSAY OF MAGNESIUM: CPT | Performed by: STUDENT IN AN ORGANIZED HEALTH CARE EDUCATION/TRAINING PROGRAM

## 2024-11-19 PROCEDURE — 80048 BASIC METABOLIC PNL TOTAL CA: CPT | Performed by: STUDENT IN AN ORGANIZED HEALTH CARE EDUCATION/TRAINING PROGRAM

## 2024-11-19 PROCEDURE — 63710000001 INSULIN LISPRO (HUMAN) PER 5 UNITS: Performed by: NURSE PRACTITIONER

## 2024-11-19 PROCEDURE — 63710000001 INSULIN GLARGINE PER 5 UNITS: Performed by: STUDENT IN AN ORGANIZED HEALTH CARE EDUCATION/TRAINING PROGRAM

## 2024-11-19 PROCEDURE — 82330 ASSAY OF CALCIUM: CPT | Performed by: STUDENT IN AN ORGANIZED HEALTH CARE EDUCATION/TRAINING PROGRAM

## 2024-11-19 PROCEDURE — 82948 REAGENT STRIP/BLOOD GLUCOSE: CPT

## 2024-11-19 PROCEDURE — 99232 SBSQ HOSP IP/OBS MODERATE 35: CPT | Performed by: STUDENT IN AN ORGANIZED HEALTH CARE EDUCATION/TRAINING PROGRAM

## 2024-11-19 RX ADMIN — INSULIN LISPRO 4 UNITS: 100 INJECTION, SOLUTION INTRAVENOUS; SUBCUTANEOUS at 08:58

## 2024-11-19 RX ADMIN — LEVOTHYROXINE SODIUM 75 MCG: 0.07 TABLET ORAL at 08:58

## 2024-11-19 RX ADMIN — PREGABALIN 75 MG: 75 CAPSULE ORAL at 08:58

## 2024-11-19 RX ADMIN — PRAVASTATIN SODIUM 20 MG: 20 TABLET ORAL at 17:43

## 2024-11-19 RX ADMIN — INSULIN LISPRO 2 UNITS: 100 INJECTION, SOLUTION INTRAVENOUS; SUBCUTANEOUS at 12:44

## 2024-11-19 RX ADMIN — APIXABAN 2.5 MG: 2.5 TABLET, FILM COATED ORAL at 20:05

## 2024-11-19 RX ADMIN — PREGABALIN 75 MG: 75 CAPSULE ORAL at 20:05

## 2024-11-19 RX ADMIN — OXYBUTYNIN CHLORIDE 5 MG: 5 TABLET ORAL at 20:06

## 2024-11-19 RX ADMIN — Medication 10 ML: at 08:59

## 2024-11-19 RX ADMIN — TERAZOSIN HYDROCHLORIDE 2 MG: 2 CAPSULE ORAL at 20:05

## 2024-11-19 RX ADMIN — FLUTICASONE PROPIONATE 2 SPRAY: 50 SPRAY, METERED NASAL at 08:58

## 2024-11-19 RX ADMIN — INSULIN GLARGINE 8 UNITS: 100 INJECTION, SOLUTION SUBCUTANEOUS at 20:06

## 2024-11-19 RX ADMIN — CEFUROXIME AXETIL 500 MG: 250 TABLET ORAL at 08:57

## 2024-11-19 RX ADMIN — NEBIVOLOL 10 MG: 5 TABLET ORAL at 08:57

## 2024-11-19 RX ADMIN — CEFUROXIME AXETIL 500 MG: 250 TABLET ORAL at 20:05

## 2024-11-19 RX ADMIN — MONTELUKAST 10 MG: 10 TABLET, FILM COATED ORAL at 17:43

## 2024-11-19 RX ADMIN — APIXABAN 2.5 MG: 2.5 TABLET, FILM COATED ORAL at 08:58

## 2024-11-19 RX ADMIN — OXYBUTYNIN CHLORIDE 5 MG: 5 TABLET ORAL at 08:58

## 2024-11-19 RX ADMIN — CETIRIZINE HYDROCHLORIDE 10 MG: 10 TABLET, FILM COATED ORAL at 08:58

## 2024-11-19 RX ADMIN — Medication 10 ML: at 20:06

## 2024-11-19 NOTE — THERAPY TREATMENT NOTE
Patient Name: Marilyn Kunz  : 1945    MRN: [id number]                              Today's Date: 2024       Admit Date: 11/15/2024    Visit Dx:     ICD-10-CM ICD-9-CM   1. Urinary tract infection without hematuria, site unspecified  N39.0 599.0   2. Generalized weakness  R53.1 780.79   3. Hyperglycemia  R73.9 790.29   4. Renal insufficiency  N28.9 593.9   5. Bandemia  D72.825 288.66     Patient Active Problem List   Diagnosis    Essential hypertension    Factor V Leiden    Psoriatic arthritis    At risk for venous thromboembolism (VTE)    Hypothyroidism    CKD (chronic kidney disease) stage 3, GFR 30-59 ml/min    Hyperlipidemia LDL goal <70    PDA (patent ductus arteriosus)    Demyelinating disease    Spondylolisthesis, grade 2    Type 1 diabetes mellitus    History of stroke    Demyelinating disease of central nervous system, unspecified    Chronic diastolic congestive heart failure    Peripheral polyneuropathy    Immunosuppression due to drug therapy    Erosive osteoarthritis    Cervical radiculopathy    Rash    UTI (urinary tract infection)    Type 1 diabetes mellitus with hyperglycemia    Syncope    Elevated serum creatinine    Sepsis    Closed fracture of left foot    TIFFANIE (obstructive sleep apnea)    COPD (chronic obstructive pulmonary disease)    Elevated CK     Past Medical History:   Diagnosis Date    Abnormal ECG Check FPA Date or [place] Records    [facility]    Acute sinusitis 2023    Anemia     Asthma     CAP (community acquired pneumonia) 2023    CHF (congestive heart failure) 2023    Chronic kidney disease     pt told it was dx from Dr Baca and to not eat much protein    Congenital heart disease 's Patent 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    Surgery [facility] s close    COPD (chronic obstructive pulmonary disease) 11/15/2024    CTS (carpal tunnel syndrome)     Psoriatic arthritis hands could be    Deep vein thrombosis No on  blood clots but have a blood clotting disorder called Leiden Factor 5    Demyelinating disease of central nervous system, unspecified 03/09/2023    Diabetes     Impression: Patient states that her diabetes is not doing well.  I asked her to go ahead and try to talk to her endocrinokogist.  She is still having the lower extremity neuropathic pan, but not enough for me to administer medication.    Disease of thyroid gland     Diverticulitis of colon     Erosive osteoarthritis 05/30/2024    Factor 5 Leiden mutation, heterozygous 2012    Head injury     Hyperlipidemia     Hypertension     Medication monitoring encounter     Impression:  She has renal insufficiency.  She is just taking Tylenol.    Movement disorder 10/2021    knee replacement left knww    Murmur, cardiac     Neuropathy in diabetes     redness swelling legs    TIFFANIE (obstructive sleep apnea) 11/15/2024    Osteopenia     Story: Femoral Neck Impression: Up-ro-date on bone density scan.  Follow up bone density every 2 years.    Peripheral neuropathy 05/03/2022    hands, arms. 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  I went over her exercises and told her to get a shoe insert,    Psoriasis     Impression: No rash    Psoriatic arthritis     hands    Sleep apnea Always    diabetic do not sleep well up and down    Stroke had one don't know when    showed on MRI Brain    Syncope 11/15/2024    Type 1 diabetes      Past Surgical History:   Procedure Laterality Date    CARDIAC CATHETERIZATION  1952 2 of them    Patent Dictus operation    CATARACT EXTRACTION Bilateral     COLONOSCOPY      ENDOSCOPY N/A 10/20/2020    Procedure: ESOPHAGOGASTRODUODENOSCOPY;  Surgeon: Brunner, Mark I, MD;  Location: [facility] ENDOSCOPY;  Service: Gastroenterology;  Laterality: N/A;    HAND SURGERY Left 1987    no hardware    KNEE ARTHROPLASTY      KNEE SURGERY Left     PATENT DUCTUS ARTERIOUS LIGATION      REPLACEMENT TOTAL  KNEE Left       General Information       Row Name 11/19/24 1107          OT Time and Intention    Subjective Information complains of;pain;fatigue  -YELENA     Document Type therapy note (daily note)  -YELENA     Mode of Treatment individual therapy;occupational therapy  -YELENA     Patient Effort good  -YELENA     Symptoms Noted During/After Treatment none  -YELENA       Row Name 11/19/24 1107          General Information    Patient Profile Reviewed yes  -YELENA     Existing Precautions/Restrictions fall;other (see comments)  WBAT  -YELENA     Barriers to Rehab none identified  -YELENA       Row Name 11/19/24 1107          Cognition    Orientation Status (Cognition) oriented x 3  -YELENA       Row Name 11/19/24 1107          Safety Issues/Impairments Affecting Functional Mobility    Impairments Affecting Function (Mobility) balance;endurance/activity tolerance;strength  -YELENA               User Key  (r) = Recorded By, (t) = Taken By, (c) = Cosigned By      Initials Name Provider Type    Rosalba Matson, OT Occupational Therapist                     Mobility/ADL's       Row Name 11/19/24 1107          Bed Mobility    Supine-Sit [place] (Bed Mobility) standby assist  -     Assistive Device (Bed Mobility) bed rails;head of bed elevated  -       Row Name 11/19/24 1107          Transfers    Transfers sit-stand transfer;stand-sit transfer;toilet transfer  -YELENA     Comment, (Transfers) pt. continues to need intermittent cues for hand placement  -       Row Name 11/19/24 1107          Sit-Stand Transfer    Sit-Stand [place] (Transfers) contact guard;verbal cues  -     Assistive Device (Sit-Stand Transfers) walker, front-wheeled  -YELENA       Row Name 11/19/24 1107          Stand-Sit Transfer    Stand-Sit [place] (Transfers) contact guard  -     Assistive Device (Stand-Sit Transfers) walker, front-wheeled  -YELENA       Row Name 11/19/24 1107          Functional Mobility    Functional Mobility- Ind. Level contact guard assist  -YLEENA      Functional Mobility- Device walker, front-wheeled  -     Functional Mobility-Distance (Feet) --  greater than household distance  -     Functional Mobility- Comment pt. at times wants to  walker, especially with turns  -       Row Name 11/19/24 1107          Activities of Daily Living    BADL Assessment/Intervention lower body dressing;grooming;upper body dressing  -       Row Name 11/19/24 1107          Mobility    Extremity Weight-bearing Status left lower extremity  -YELENA     Left Lower Extremity (Weight-bearing Status) weight-bearing as tolerated (WBAT)  -       Row Name 11/19/24 1107          Lower Body Dressing Assessment/Training    Position (Lower Body Dressing) edge of bed sitting  -YELENA     Comment, (Lower Body Dressing) pt. showed she could doff and jesus undergarment over boot and off, declined further due to having underwear on  -[facility] Name 11/19/24 1107          Grooming Assessment/Training    [place] Level (Grooming) hair care, combing/brushing;oral care regimen;wash face, hands;standby assist  -YELENA     Position (Grooming) sink side;unsupported standing  -[facility] Name 11/19/24 1107          Upper Body Dressing Assessment/Training    [place] Level (Upper Body Dressing) don;pajama/robe;moderate assist (50% patient effort)  -YELENA     Position (Upper Body Dressing) unsupported standing  -YELENA     Comment, (Upper Body Dressing) pt. had difficulty navigating snaps on sleeves and getting robe around back  -YELENA               User Key  (r) = Recorded By, (t) = Taken By, (c) = Cosigned By      Initials Name Provider Type    Rosalba Matson, OT Occupational Therapist                   Obj/Interventions       [facility] Name 11/19/24 1110          Shoulder (Therapeutic Exercise)    Shoulder (Therapeutic Exercise) AROM (active range of motion)  -     Shoulder AROM (Therapeutic Exercise) bilateral;flexion;extension;aBduction;aDduction;horizontal aBduction/aDduction;sitting;10 repetitions   -YELENA       Row Name 11/19/24 1110          Elbow/Forearm (Therapeutic Exercise)    Elbow/Forearm (Therapeutic Exercise) strengthening exercise  -YELENA     Elbow/Forearm Strengthening (Therapeutic Exercise) bilateral;flexion;extension;sitting;10 repetitions  mild resistance given, cues for accuracy  -YELENA       Row Name 11/19/24 1110          Motor Skills    Therapeutic Exercise shoulder;elbow/forearm  -       Row Name 11/19/24 1110          Balance    Static Sitting Balance supervision  -YELENA     Dynamic Sitting Balance standby assist  -YELENA     Position, Sitting Balance unsupported;sitting edge of bed  -YELENA     Sit to Stand Dynamic Balance contact guard;1-person assist  -YELENA     Static Standing Balance standby assist  -YELENA     Dynamic Standing Balance standby assist  grooming, UBD  -YELENA     Position/Device Used, Standing Balance unsupported  -YELENA     Balance Interventions sit to stand;occupation based/functional task  -YELENA               User Key  (r) = Recorded By, (t) = Taken By, (c) = Cosigned By      Initials Name Provider Type    Rosalba Matson, OT Occupational Therapist                   Goals/Plan       Row Name 11/19/24 1115          Transfer Goal 1 (OT)    Progress/Outcome (Transfer Goal 1, OT) goal partially met;goal ongoing  -       Row Name 11/19/24 1115          Toileting Goal 1 (OT)    Progress/Outcome (Toileting Goal 1, OT) continuing progress toward goal;goal ongoing  -YELENA               User Key  (r) = Recorded By, (t) = Taken By, (c) = Cosigned By      Initials Name Provider Type    Rosalba Matson, OT Occupational Therapist                   Clinical Impression       Row Name 11/19/24 1112          Pain Assessment    Pretreatment Pain Rating 7/10  -YELENA     Posttreatment Pain Rating 7/10  -YELENA     Pain Location hand  -YELENA     Pain Side/Orientation bilateral  -YELENA     Pain Management Interventions cold applied  -YELENA     Response to Pain Interventions no change per patient report;activity participation with  tolerable pain  -YELENA     Pre/Posttreatment Pain Comment per pt. she only gets 2 Tylenol twice and day and aready had it today  -YELENA       Row Name 11/19/24 1112          Plan of Care Review    Plan of Care Reviewed With patient  -YELENA     Progress improving  -YELENA     Outcome Evaluation Pt. was able to progress with BADL task and transfer independence today.  Overall improvement in endurance.  Pt. verbalizing concerns over getting groceries and cooking for herself due to fatigue level.  Continue OT POC.  -YELENA       Row Name 11/19/24 1112          Therapy Assessment/Plan (OT)    Therapy Frequency (OT) daily  -YELENA       Row Name 11/19/24 1112          Therapy Plan Review/Discharge Plan (OT)    Anticipated Discharge Disposition (OT) inpatient rehabilitation facility  -YELENA       Row Name 11/19/24 1112          Vital Signs    Pre Systolic BP Rehab 115  -YELENA     Pre Treatment Diastolic BP 70  -YELENA     Post Systolic BP Rehab 116  -YELENA     Post Treatment Diastolic BP 51  -YELENA     Pretreatment Heart Rate (beats/min) 63  -YELENA     Posttreatment Heart Rate (beats/min) 71  -YELENA     O2 Delivery Pre Treatment room air  -YELENA     O2 Delivery Intra Treatment room air  -YELENA     O2 Delivery Post Treatment room air  -YELENA     Pre Patient Position Supine  -YELENA     Intra Patient Position Standing  -YELENA     Post Patient Position Sitting  -YELENA       Row Name 11/19/24 1112          Positioning and Restraints    Pre-Treatment Position in bed  -YELENA     Post Treatment Position chair  -YELENA     In Chair notified nsg;reclined;call light within reach;encouraged to call for assist;exit alarm on;legs elevated;waffle cushion  -YELENA               User Key  (r) = Recorded By, (t) = Taken By, (c) = Cosigned By      Initials Name Provider Type    Rosalba Matson, OT Occupational Therapist                   Outcome Measures       Row Name 11/19/24 1115          How much help from another is currently needed...    Putting on and taking off regular lower body clothing? 4  -YELENA      Bathing (including washing, rinsing, and drying) 3  -YELENA     Toileting (which includes using toilet bed pan or urinal) 3  -YELENA     Putting on and taking off regular upper body clothing 4  -YELENA     Taking care of personal grooming (such as brushing teeth) 3  -YELENA     Eating meals 4  -YELENA     AM-PAC 6 Clicks Score (OT) 21  -YELENA       Row Name 11/19/24 1115          Functional Assessment    Outcome Measure Options AM-PAC 6 Clicks Daily Activity (OT)  -YELENA               User Key  (r) = Recorded By, (t) = Taken By, (c) = Cosigned By      Initials Name Provider Type    Rosalba Matson, OT Occupational Therapist                    Occupational Therapy Education       Title: PT OT SLP Therapies (In Progress)       Topic: Occupational Therapy (In Progress)       Point: ADL training (Done)       Description:   Instruct learner(s) on proper safety adaptation and remediation techniques during self care or transfers.   Instruct in proper use of assistive devices.                  Learning Progress Summary            Patient Acceptance, E,D, VU,NR by YELENA at 11/19/2024 1115    Comment: UE and LE TE, transfer safety, progression to goals    Acceptance, E, NR by LR at 11/16/2024 0835                      Point: Home exercise program (Done)       Description:   Instruct learner(s) on appropriate technique for monitoring, assisting and/or progressing therapeutic exercises/activities.                  Learning Progress Summary            Patient Acceptance, E,D, VU,NR by YELENA at 11/19/2024 1115    Comment: UE and LE TE, transfer safety, progression to goals                      Point: Precautions (Done)       Description:   Instruct learner(s) on prescribed precautions during self-care and functional transfers.                  Learning Progress Summary            Patient Acceptance, E,D, VU,NR by YELENA at 11/19/2024 1115    Comment: UE and LE TE, transfer safety, progression to goals    Acceptance, E, NR by LR at 11/16/2024 0835                       Point: Body mechanics (In Progress)       Description:   Instruct learner(s) on proper positioning and spine alignment during self-care, functional mobility activities and/or exercises.                  Learning Progress Summary            Patient Acceptance, E, NR by LR at 11/16/2024 0835                                      User Key       Initials Effective Dates Name Provider Type Discipline    YELENA 07/11/23 -  Rosalba Hernandez OT Occupational Therapist OT    LR 10/09/24 -  Thais Rojo OT Occupational Therapist OT                  OT Recommendation and Plan  Therapy Frequency (OT): daily  Plan of Care Review  Plan of Care Reviewed With: patient  Progress: improving  Outcome Evaluation: Pt. was able to progress with BADL task and transfer independence today.  Overall improvement in endurance.  Pt. verbalizing concerns over getting groceries and cooking for herself due to fatigue level.  Continue OT POC.     Time Calculation:         Time Calculation- OT       Row Name 11/19/24 1116             Time Calculation- OT    OT Start Time 1028  -YELENA      OT Received On 11/19/24  -YELENA      OT Goal Re-Cert Due Date 11/26/24  -YELENA         Timed Charges    39760 - OT Therapeutic Exercise Minutes 8  -YELENA      54566 - OT Therapeutic Activity Minutes 13  -YELENA      32784 - OT Self Care/Mgmt Minutes 15  -YELENA         Total Minutes    Timed Charges Total Minutes 36  -YELENA       Total Minutes 36  -YELENA                User Key  (r) = Recorded By, (t) = Taken By, (c) = Cosigned By      Initials Name Provider Type    Rosalba Matson OT Occupational Therapist                  Therapy Charges for Today       Code Description Service Date Service Provider Modifiers Qty    40007545531 HC OT THERAPEUTIC ACT EA 15 MIN 11/19/2024 Rosalba Hernandez OT GO 1    65212255478 HC OT SELF CARE/MGMT/TRAIN EA 15 MIN 11/19/2024 Rosalba Hernandez OT GO 1                 Rosalba Hernandez OT  11/19/2024

## 2024-11-19 NOTE — PROGRESS NOTES
[facility]  PROGRESS NOTE    Patient Name: Marilyn Kunz  : 1945  MRN: [id number]    Date of Admission: 11/15/2024  Primary Care Physician: Peter Baca MD    Subjective   Subjective     CC:  Syncope, hyperglycemia    HPI:  Patient seen and examined morning.  She reports feeling well.  After speaking with family she is interested in going to inpatient rehab.    Objective   Objective     Vital Signs:   Temp:  [97.5 °F (36.4 °C)-98.5 °F (36.9 °C)] 97.5 °F (36.4 °C)  Heart Rate:  [60-75] 71  Resp:  [16-18] 18  BP: (115-144)/(51-89) 116/51     Physical Exam  Constitutional:       General: She is not in acute distress.  Cardiovascular:      Rate and Rhythm: Normal rate. Rhythm irregular.      Heart sounds: Normal heart sounds.   Pulmonary:      Effort: Pulmonary effort is normal. No respiratory distress.      Breath sounds: Normal breath sounds.   Abdominal:      Palpations: Abdomen is soft.      Tenderness: There is no abdominal tenderness.   Musculoskeletal:      Right lower leg: No edema.      Left lower leg: No edema.   Neurological:      General: No focal deficit present.      Mental Status: She is alert. Mental status is at baseline.   Psychiatric:         Mood and Affect: Mood normal.         Thought Content: Thought content normal.          Results Reviewed:  LAB RESULTS:      Lab 24  0614 24  1020 24  0339 24  0111 11/15/24  2034 11/15/24  1709   WBC 5.98  --   --  16.40*  --  22.24*   HEMOGLOBIN 9.1*  --   --  11.0*  --  12.1   HEMATOCRIT 28.5*  --   --  35.0  --  38.4   PLATELETS 147  --   --  161  --  172   NEUTROS ABS  --   --   --  13.51*  --  19.89*   IMMATURE GRANS (ABS)  --   --   --  0.06*  --  0.11*   LYMPHS ABS  --   --   --  1.01  --  0.57*   MONOS ABS  --   --   --  0.56  --  0.63   EOS ABS  --   --   --  1.25*  --  1.01*   MCV 95.3  --   --  95.9  --  95.5   PROCALCITONIN  --   --   --   --   --  5.37*    LACTATE  --  1.3 2.2* 2.5* 2.7* 2.6*   HSTROP T  --   --  46* 42*  --   --          Lab 11/19/24  0513 11/18/24  0832 11/17/24  2158 11/17/24  0614 11/16/24  0111 11/15/24  1709   SODIUM 142 146*  --  146* 139 134*   POTASSIUM 4.4 4.5 4.8 3.4* 3.4* 3.9   CHLORIDE 110* 115*  --  114* 106 97*   CO2 22.0 21.0*  --  21.0* 18.0* 21.0*   ANION GAP 10.0 10.0  --  11.0 15.0 16.0*   BUN 13 18  --  34* 52* 50*   CREATININE 0.71 0.72  --  0.86 1.28* 1.54*   EGFR 86.6 85.2  --  68.8 42.7* 34.2*   GLUCOSE 267* 87  --  136* 251* 510*   CALCIUM 7.9* 8.4*  --  8.0* 7.8* 8.8   IONIZED CALCIUM 1.10* 1.16  --   --   --   --    MAGNESIUM 1.8 2.0  --  1.9 1.8  --    PHOSPHORUS 3.6 2.2*  --   --  2.6  --    HEMOGLOBIN A1C  --   --   --   --   --  8.00*         Lab 11/15/24  1709   TOTAL PROTEIN 7.1   ALBUMIN 3.6   GLOBULIN 3.5   ALT (SGPT) 22   AST (SGOT) 41*   BILIRUBIN 0.9   ALK PHOS 71         Lab 11/16/24  0339 11/16/24  0111   HSTROP T 46* 42*                 Lab 11/15/24  1711   FIO2 21   CARBOXYHEMOGLOBIN (VENOUS) 0.2     Brief Urine Lab Results  (Last result in the past 365 days)        Color   Clarity   Blood   Leuk Est   Nitrite   Protein   CREAT   Urine HCG        11/15/24 1739 Dark Yellow   Cloudy   Negative   Moderate (2+)   Negative   30 mg/dL (1+)                   Microbiology Results Abnormal       Procedure Component Value - Date/Time    Urine Culture - Urine, Urine, Clean Catch [[id number]]  (Abnormal)  (Susceptibility) Collected: 11/15/24 1739    Lab Status: Final result Specimen: Urine, Clean Catch Updated: 11/17/24 0932     Urine Culture >100,000 CFU/mL Escherichia coli    Narrative:      Colonization of the urinary tract without infection is common. Treatment is discouraged unless the patient is symptomatic, pregnant, or undergoing an invasive urologic procedure.    Susceptibility        Escherichia coli      SUAD      Amoxicillin + Clavulanate Susceptible      Ampicillin Susceptible      Ampicillin + Sulbactam  Susceptible      Cefepime Susceptible      Ceftazidime Susceptible      Ceftriaxone Susceptible      Gentamicin Susceptible      Levofloxacin Susceptible      Nitrofurantoin Susceptible      Piperacillin + Tazobactam Susceptible      Trimethoprim + Sulfamethoxazole Susceptible                                   No radiology results from the last 24 hrs    Results for orders placed during the hospital encounter of 11/15/24    Adult Transthoracic Echo Complete W/ Cont if Necessary Per Protocol    Interpretation Summary    Left ventricular systolic function is normal. Left ventricular ejection fraction appears to be 66 - 70%.    Left ventricular diastolic function was normal.    Left atrial volume is mildly increased.    Estimated right ventricular systolic pressure from tricuspid regurgitation is normal (<35 mmHg).    No significant change compared to 2023 echo      Current medications:  Scheduled Meds:apixaban, 2.5 mg, Oral, Q12H  cefuroxime, 500 mg, Oral, Q12H  cetirizine, 10 mg, Oral, Daily  fluticasone, 2 spray, Each Nare, Daily  insulin glargine, 8 Units, Subcutaneous, Nightly  insulin lispro, 2-9 Units, Subcutaneous, 4x Daily With Meals & Nightly  levothyroxine, 75 mcg, Oral, Q AM  montelukast, 10 mg, Oral, Q PM  nebivolol, 10 mg, Oral, Daily  oxybutynin, 5 mg, Oral, BID  pravastatin, 20 mg, Oral, Q PM  pregabalin, 75 mg, Oral, BID  [Held by provider] sacubitril-valsartan, 1 tablet, Oral, BID  sodium chloride, 10 mL, Intravenous, Q12H  terazosin, 2 mg, Oral, Nightly      Continuous Infusions:   PRN Meds:.  acetaminophen    senna-docusate sodium **AND** polyethylene glycol **AND** bisacodyl **AND** bisacodyl    Calcium Replacement - Follow Nurse / BPA Driven Protocol    dextrose    dextrose    glucagon (human recombinant)    Magnesium Standard Dose Replacement - Follow Nurse / BPA Driven Protocol    melatonin    Phosphorus Replacement - Follow Nurse / BPA Driven Protocol    Potassium Replacement - Follow Nurse /  BPA Driven Protocol    sodium chloride    sodium chloride    sodium chloride    Assessment & Plan   Assessment & Plan     Active Hospital Problems    Diagnosis  POA    **UTI (urinary tract infection) [N39.0]  Yes    Type 1 diabetes mellitus with hyperglycemia [E10.65]  Yes    Syncope [R55]  Yes    Elevated serum creatinine [R79.89]  Yes    Sepsis [A41.9]  Yes    Closed fracture of left foot [S92.902A]  Yes    TIFFANIE (obstructive sleep apnea) [G47.33]  Yes    COPD (chronic obstructive pulmonary disease) [J44.9]  Yes    Elevated CK [R74.8]  Yes    Immunosuppression due to drug therapy [D84.821, Z79.899]  Not Applicable    Chronic diastolic congestive heart failure [I50.32]  Yes    History of stroke [Z86.73]  Not Applicable    Psoriatic arthritis [L40.50]  Yes    Factor V Leiden [D68.51]  Yes    Essential hypertension [I10]  Yes    CKD (chronic kidney disease) stage 3, GFR 30-59 ml/min [N18.30]  Yes    Hyperlipidemia LDL goal <70 [E78.5]  Yes    Hypothyroidism [E03.9]  Yes      Resolved Hospital Problems   No resolved problems to display.        Brief Hospital Course to date:  Marilyn Kunz is a 79 y.o. female with hypertension, hyperlipidemia, diastolic heart failure, somatic arthritis, demyelinating disease, type 1 diabetes, CKD stage III, prior CVA, COPD, hypothyroidism, TIFFANIE on CPAP, heterozygous factor V Leiden who presented to the ED with syncope and hyperglycemia.  Patient found to be septic likely from UTI.    Sepsis, resolved  UTI  - pt met sever sepsis criteria causing organ dysfunction on arrival due to ELMER. ELMER, Leukocytosis and elevated lactic acid resolved  - Blood cultures no growth to date  - Urine culture growing E. coli, pan susceptible  -IV Rocephin switched to p.o. Ceftin, plan for 5 days of therapy    A-fib, new onset  Bradycardia, asymptomatic  - A-fib captured on EKG 11/15  - No known history of arrhythmia  - Rate controlled without any medications  - Likely secondary to sepsis above though more  concerning in the setting of syncope at home  - Cardiology consulted, started patient on Eliquis  - Continue telemonitoring  - Nebivolol decreased to 10 mg daily (this was a home medication). Bradycardia improved     Type 1 diabetes with hyperglycemia  Hypoglycemia  - Glucose around 500 on arrival, now patient hypoglycemic on fasting glucose this AM  - continue glargine to 8 units nightly with sliding scale, adjust as needed    Syncope  - CT head negative for acute abnormalities  - Troponins mildly elevated but flat  - EKG showed A-fib as above  - Likely related to sepsis  - Repeat echo largely unremarkable  -PT OT recommended inpatient rehab    Elevated CK-improved  -CK 1331 on arrival, likely secondary to syncope and being down for unknown amount of time  - Improved to 214  - 2 L of fluid in the ED  - Patient with adequate p.o. intake will discontinue IV fluids   - Repeat labs in the a.m.    ELMER-resolved  CKD stage III  - Baseline creatinine appears to be around 1  - On arrival creatinine 1.5, improved to 0.8  - Hold nephrotoxic medications  - Recheck labs in the a.m.    Closed fracture left foot, POA  - Patient being treated as an outpatient by Ortho, currently in a boot    Hypothyroidism  - Continue levothyroxine    Diastolic CHF  Hyperlipidemia  Hypertension  History of CVA  -Continue statin  - Holding Bumex, will reassess patient's volume status tomorrow.  She takes 0.5 every other day    COPD, not in exacerbation  TIFFANIE  -Patient currently not on any inhalers  - Continue CPAP nightly    Arthritis  Demyelinating disease  - following by Dr. Hernandez  -Patient med list lists Rinvoq and Imuran, pending med rec    Expected Discharge Location and Transportation: Inpatient rehab, pending placement   Expected Discharge   Expected Discharge Date: 11/19/2024; Expected Discharge Time:      VTE Prophylaxis:  Pharmacologic & mechanical VTE prophylaxis orders are present.         AM-PAC 6 Clicks Score (PT): 22 (11/18/24  1623)    CODE STATUS:   Code Status and Medical Interventions: CPR (Attempt to Resuscitate); Full Support   Ordered at: 11/15/24 2115     Code Status (Patient has no pulse and is not breathing):    CPR (Attempt to Resuscitate)     Medical Interventions (Patient has pulse or is breathing):    Full Support       Norma Lo MD  11/19/24

## 2024-11-19 NOTE — CASE MANAGEMENT/SOCIAL WORK
Continued Stay Note  [facility]     Patient Name: Marilyn Kunz  MRN: [id number]  Today's Date: 11/19/2024    Admit Date: 11/15/2024    Plan: discharge plan   Discharge Plan       Row Name 11/19/24 1515       Plan    Plan discharge plan    Plan Comments PT/OT is recommending inpatient rehab and pt is agreeable. Pt provided with a list of facilities from [facility]. Per pt request, referrals made to [facility] and [facility]. CM will cont to follow    Final Discharge Disposition Code 03 - skilled nursing facility (SNF)                   Discharge Codes    No documentation.                 Expected Discharge Date and Time       Expected Discharge Date Expected Discharge Time    Nov 19, 2024               Mery Correa RN

## 2024-11-19 NOTE — PROGRESS NOTES
"Enter Query Response Below      Query Response: Patient treated/monitored for the following:     Severe sepsis causing acute organ dysfunction, as evidenced by acute kidney injury (ELMER) and A-Fib               If applicable, please update the problem list.     Patient: Marilyn Kunz        : 1945  Account: [id number]           Admit Date:         How to Respond to this query:       a. Click New Note     b. Answer query within the yellow box.                c. Update the Problem List, if applicable.      If you have any questions about this query contact me at: [email or web address]     ,     Risk Factors: 79-year-old female with a history of \"HTN, HLD, diastolic CHF, psoriatic arthritis, demyelinating disease, T1DM, CKD Stage III, prior CVA, COPD, TIFFANIE on CPAP, heterozygous factor V Leiden\" per H&P.  Clinical Indicators: Presented on 11/15 with syncope and hyperglycemia. Progress note () lists, \"Sepsis,\" \"ELMER\" and \"A-Fib, new onset\" under brief hospital course to date and states, A-fib \" - Likely secondary to sepsis above though more concerning in the setting of syncope at home.\"   Treatment: Cardiology consult, telemonitoring, IV antibiotics, IV fluids    Please clarify if patient treated/monitored for the following:     Severe sepsis causing acute organ dysfunction, as evidenced by acute kidney injury (ELMER) and A-Fib  Acute kidney injury (ELMER) due to urinary tract infection (UTI)  Other (please specify) ___________  Unable to determine    By submitting this query, we are merely seeking further clarification of documentation to accurately reflect all conditions that you are monitoring, evaluating, treating or that extend the hospitalization or utilize additional resources of care. Please utilize your independent clinical judgment when addressing the question(s) above.     This query and your response, once completed, will be entered into the legal medical " record.    Sincerely,  Stacie Bailey RN  [employer]

## 2024-11-19 NOTE — PLAN OF CARE
Goal Outcome Evaluation:  Plan of Care Reviewed With: patient        Progress: improving  Outcome Evaluation: Pt. was able to progress with BADL task and transfer independence today.  Overall improvement in endurance.  Pt. verbalizing concerns over getting groceries and cooking for herself due to fatigue level.  Continue OT POC.    Anticipated Discharge Disposition (OT): inpatient rehabilitation facility

## 2024-11-19 NOTE — CONSULTS
" Discussed and taught patient about type 1 diabetes self-management, risk factors, and importance of blood glucose control to reduce complications. Target blood glucose readings and A1c goals per ADA were reviewed. Reviewed with patient current A1c 8.0 and discussed its significance. Signs, symptoms, and treatment of hyperglycemia and hypoglycemia were discussed. Lifestyle changes such as physical activity with MD approval and healthy eating were encouraged. Stressed the importance of strict blood sugar control after surgery to prevent complications such as infection and to promote healing of incision. Encouraged pt to monitor blood sugar at home 3+  times per day and to call PCP if blood sugar is trending high. Encouraged to keep record of blood glucose readings to take to follow up appointment with PCP. Provided patient with copy of [employer] \"What is Diabetes\" handout, \"Blood Glucose Goals\" handout, and \"What is A1c\" handout. 30 minutes in the care and education of this patient.  "

## 2024-11-20 LAB
ANION GAP SERPL CALCULATED.3IONS-SCNC: 11 MMOL/L (ref 5–15)
BACTERIA SPEC AEROBE CULT: NORMAL
BACTERIA SPEC AEROBE CULT: NORMAL
BUN SERPL-MCNC: 10 MG/DL (ref 8–23)
BUN/CREAT SERPL: 13.7 (ref 7–25)
CA-I SERPL ISE-MCNC: 1.1 MMOL/L (ref 1.15–1.3)
CALCIUM SPEC-SCNC: 7.9 MG/DL (ref 8.6–10.5)
CHLORIDE SERPL-SCNC: 108 MMOL/L (ref 98–107)
CO2 SERPL-SCNC: 23 MMOL/L (ref 22–29)
CREAT SERPL-MCNC: 0.73 MG/DL (ref 0.57–1)
EGFRCR SERPLBLD CKD-EPI 2021: 83.8 ML/MIN/1.73
GLUCOSE BLDC GLUCOMTR-MCNC: 144 MG/DL (ref 70–130)
GLUCOSE BLDC GLUCOMTR-MCNC: 190 MG/DL (ref 70–130)
GLUCOSE BLDC GLUCOMTR-MCNC: 277 MG/DL (ref 70–130)
GLUCOSE BLDC GLUCOMTR-MCNC: 287 MG/DL (ref 70–130)
GLUCOSE SERPL-MCNC: 190 MG/DL (ref 65–99)
MAGNESIUM SERPL-MCNC: 1.8 MG/DL (ref 1.6–2.4)
PHOSPHATE SERPL-MCNC: 3.2 MG/DL (ref 2.5–4.5)
POTASSIUM SERPL-SCNC: 4.3 MMOL/L (ref 3.5–5.2)
SODIUM SERPL-SCNC: 142 MMOL/L (ref 136–145)

## 2024-11-20 PROCEDURE — 84100 ASSAY OF PHOSPHORUS: CPT | Performed by: STUDENT IN AN ORGANIZED HEALTH CARE EDUCATION/TRAINING PROGRAM

## 2024-11-20 PROCEDURE — 97530 THERAPEUTIC ACTIVITIES: CPT

## 2024-11-20 PROCEDURE — 83735 ASSAY OF MAGNESIUM: CPT | Performed by: STUDENT IN AN ORGANIZED HEALTH CARE EDUCATION/TRAINING PROGRAM

## 2024-11-20 PROCEDURE — 82948 REAGENT STRIP/BLOOD GLUCOSE: CPT

## 2024-11-20 PROCEDURE — 97535 SELF CARE MNGMENT TRAINING: CPT

## 2024-11-20 PROCEDURE — 63710000001 INSULIN GLARGINE PER 5 UNITS: Performed by: STUDENT IN AN ORGANIZED HEALTH CARE EDUCATION/TRAINING PROGRAM

## 2024-11-20 PROCEDURE — 82330 ASSAY OF CALCIUM: CPT | Performed by: STUDENT IN AN ORGANIZED HEALTH CARE EDUCATION/TRAINING PROGRAM

## 2024-11-20 PROCEDURE — 80048 BASIC METABOLIC PNL TOTAL CA: CPT | Performed by: STUDENT IN AN ORGANIZED HEALTH CARE EDUCATION/TRAINING PROGRAM

## 2024-11-20 PROCEDURE — 63710000001 INSULIN LISPRO (HUMAN) PER 5 UNITS: Performed by: NURSE PRACTITIONER

## 2024-11-20 PROCEDURE — 99232 SBSQ HOSP IP/OBS MODERATE 35: CPT | Performed by: NURSE PRACTITIONER

## 2024-11-20 RX ADMIN — INSULIN LISPRO 2 UNITS: 100 INJECTION, SOLUTION INTRAVENOUS; SUBCUTANEOUS at 08:47

## 2024-11-20 RX ADMIN — INSULIN LISPRO 6 UNITS: 100 INJECTION, SOLUTION INTRAVENOUS; SUBCUTANEOUS at 11:48

## 2024-11-20 RX ADMIN — PREGABALIN 75 MG: 75 CAPSULE ORAL at 20:49

## 2024-11-20 RX ADMIN — INSULIN LISPRO 6 UNITS: 100 INJECTION, SOLUTION INTRAVENOUS; SUBCUTANEOUS at 21:18

## 2024-11-20 RX ADMIN — TERAZOSIN HYDROCHLORIDE 2 MG: 2 CAPSULE ORAL at 20:49

## 2024-11-20 RX ADMIN — LEVOTHYROXINE SODIUM 75 MCG: 0.07 TABLET ORAL at 06:24

## 2024-11-20 RX ADMIN — OXYBUTYNIN CHLORIDE 5 MG: 5 TABLET ORAL at 08:48

## 2024-11-20 RX ADMIN — OXYBUTYNIN CHLORIDE 5 MG: 5 TABLET ORAL at 20:49

## 2024-11-20 RX ADMIN — APIXABAN 2.5 MG: 2.5 TABLET, FILM COATED ORAL at 08:48

## 2024-11-20 RX ADMIN — NEBIVOLOL 10 MG: 5 TABLET ORAL at 08:48

## 2024-11-20 RX ADMIN — CETIRIZINE HYDROCHLORIDE 10 MG: 10 TABLET, FILM COATED ORAL at 08:47

## 2024-11-20 RX ADMIN — APIXABAN 2.5 MG: 2.5 TABLET, FILM COATED ORAL at 20:49

## 2024-11-20 RX ADMIN — FLUTICASONE PROPIONATE 2 SPRAY: 50 SPRAY, METERED NASAL at 08:49

## 2024-11-20 RX ADMIN — INSULIN GLARGINE 8 UNITS: 100 INJECTION, SOLUTION SUBCUTANEOUS at 20:49

## 2024-11-20 RX ADMIN — MONTELUKAST 10 MG: 10 TABLET, FILM COATED ORAL at 17:13

## 2024-11-20 RX ADMIN — PRAVASTATIN SODIUM 20 MG: 20 TABLET ORAL at 17:13

## 2024-11-20 RX ADMIN — Medication 10 ML: at 08:49

## 2024-11-20 RX ADMIN — PREGABALIN 75 MG: 75 CAPSULE ORAL at 08:48

## 2024-11-20 NOTE — PROGRESS NOTES
[facility] Medicine Services  PROGRESS NOTE    Patient Name: Marilyn Kunz  : 1945  MRN: [id number]    Date of Admission: 11/15/2024  Primary Care Physician: Peter Baca MD    Subjective   Subjective     CC:  Syncope, hyperglycemia    HPI:  Patient seen resting up in bed awake and alert.  No acute distress.  States she feels okay other than generally weak.  Chest pain or shortness of air.  Currently has walking boot on her left foot and denies pain.  Awaiting rehab.      Objective   Objective     Vital Signs:   Temp:  [97.4 °F (36.3 °C)-97.9 °F (36.6 °C)] 97.8 °F (36.6 °C)  Heart Rate:  [52-68] 62  Resp:  [18] 18  BP: (142-164)/(59-74) 161/72     Physical exam:  Constitutional: No acute distress, awake, alert.  Resting in bed.  No visitors at bedside.  HENT: NCAT, mucous membranes moist  Respiratory: Clear to auscultation bilaterally, respiratory effort normal   Cardiovascular: Irregular, no murmurs, rubs, or gallops  Gastrointestinal: Positive bowel sounds, soft, nontender, nondistended.  Musculoskeletal: No bilateral ankle edema.  GUNN spontaneously.  Left LE in walking boot.  Psychiatric: Appropriate affect, cooperative  Neurologic: Oriented x 3, strength symmetric in all extremities, Cranial Nerves grossly intact to confrontation, speech clear.  Follows commands.  Skin: No rashes.       Results Reviewed:  LAB RESULTS:      Lab 24  0614 24  1020 24  0339 24  0111 11/15/24  2034 11/15/24  1709   WBC 5.98  --   --  16.40*  --  22.24*   HEMOGLOBIN 9.1*  --   --  11.0*  --  12.1   HEMATOCRIT 28.5*  --   --  35.0  --  38.4   PLATELETS 147  --   --  161  --  172   NEUTROS ABS  --   --   --  13.51*  --  19.89*   IMMATURE GRANS (ABS)  --   --   --  0.06*  --  0.11*   LYMPHS ABS  --   --   --  1.01  --  0.57*   MONOS ABS  --   --   --  0.56  --  0.63   EOS ABS  --   --   --  1.25*  --  1.01*   MCV 95.3  --   --  95.9  --  95.5   PROCALCITONIN  --    --   --   --   --  5.37*   LACTATE  --  1.3 2.2* 2.5* 2.7* 2.6*   HSTROP T  --   --  46* 42*  --   --          Lab 11/20/24  0438 11/19/24  0513 11/18/24  0832 11/17/24  2158 11/17/24  0614 11/16/24  0111 11/15/24  1709   SODIUM 142 142 146*  --  146* 139 134*   POTASSIUM 4.3 4.4 4.5 4.8 3.4* 3.4* 3.9   CHLORIDE 108* 110* 115*  --  114* 106 97*   CO2 23.0 22.0 21.0*  --  21.0* 18.0* 21.0*   ANION GAP 11.0 10.0 10.0  --  11.0 15.0 16.0*   BUN 10 13 18  --  34* 52* 50*   CREATININE 0.73 0.71 0.72  --  0.86 1.28* 1.54*   EGFR 83.8 86.6 85.2  --  68.8 42.7* 34.2*   GLUCOSE 190* 267* 87  --  136* 251* 510*   CALCIUM 7.9* 7.9* 8.4*  --  8.0* 7.8* 8.8   IONIZED CALCIUM 1.10* 1.10* 1.16  --   --   --   --    MAGNESIUM 1.8 1.8 2.0  --  1.9 1.8  --    PHOSPHORUS 3.2 3.6 2.2*  --   --  2.6  --    HEMOGLOBIN A1C  --   --   --   --   --   --  8.00*         Lab 11/15/24  1709   TOTAL PROTEIN 7.1   ALBUMIN 3.6   GLOBULIN 3.5   ALT (SGPT) 22   AST (SGOT) 41*   BILIRUBIN 0.9   ALK PHOS 71         Lab 11/16/24  0339 11/16/24  0111   HSTROP T 46* 42*                 Lab 11/15/24  1711   FIO2 21   CARBOXYHEMOGLOBIN (VENOUS) 0.2     Brief Urine Lab Results  (Last result in the past 365 days)        Color   Clarity   Blood   Leuk Est   Nitrite   Protein   CREAT   Urine HCG        11/15/24 1739 Dark Yellow   Cloudy   Negative   Moderate (2+)   Negative   30 mg/dL (1+)                   Microbiology Results Abnormal       Procedure Component Value - Date/Time    Urine Culture - Urine, Urine, Clean Catch [[id number]]  (Abnormal)  (Susceptibility) Collected: 11/15/24 1075    Lab Status: Final result Specimen: Urine, Clean Catch Updated: 11/17/24 0932     Urine Culture >100,000 CFU/mL Escherichia coli    Narrative:      Colonization of the urinary tract without infection is common. Treatment is discouraged unless the patient is symptomatic, pregnant, or undergoing an invasive urologic procedure.    Susceptibility        Escherichia coli       SUAD      Amoxicillin + Clavulanate Susceptible      Ampicillin Susceptible      Ampicillin + Sulbactam Susceptible      Cefepime Susceptible      Ceftazidime Susceptible      Ceftriaxone Susceptible      Gentamicin Susceptible      Levofloxacin Susceptible      Nitrofurantoin Susceptible      Piperacillin + Tazobactam Susceptible      Trimethoprim + Sulfamethoxazole Susceptible                                   No radiology results from the last 24 hrs    Results for orders placed during the hospital encounter of 11/15/24    Adult Transthoracic Echo Complete W/ Cont if Necessary Per Protocol    Interpretation Summary    Left ventricular systolic function is normal. Left ventricular ejection fraction appears to be 66 - 70%.    Left ventricular diastolic function was normal.    Left atrial volume is mildly increased.    Estimated right ventricular systolic pressure from tricuspid regurgitation is normal (<35 mmHg).    No significant change compared to 2023 echo      Current medications:  Scheduled Meds:apixaban, 2.5 mg, Oral, Q12H  cetirizine, 10 mg, Oral, Daily  fluticasone, 2 spray, Each Nare, Daily  insulin glargine, 8 Units, Subcutaneous, Nightly  insulin lispro, 2-9 Units, Subcutaneous, 4x Daily With Meals & Nightly  levothyroxine, 75 mcg, Oral, Q AM  montelukast, 10 mg, Oral, Q PM  nebivolol, 10 mg, Oral, Daily  oxybutynin, 5 mg, Oral, BID  pravastatin, 20 mg, Oral, Q PM  pregabalin, 75 mg, Oral, BID  [Held by provider] sacubitril-valsartan, 1 tablet, Oral, BID  sodium chloride, 10 mL, Intravenous, Q12H  terazosin, 2 mg, Oral, Nightly      Continuous Infusions:   PRN Meds:.  acetaminophen    senna-docusate sodium **AND** polyethylene glycol **AND** bisacodyl **AND** bisacodyl    Calcium Replacement - Follow Nurse / BPA Driven Protocol    dextrose    dextrose    glucagon (human recombinant)    Magnesium Standard Dose Replacement - Follow Nurse / BPA Driven Protocol    melatonin    Phosphorus Replacement - Follow  Nurse / BPA Driven Protocol    Potassium Replacement - Follow Nurse / BPA Driven Protocol    sodium chloride    sodium chloride    sodium chloride    Assessment & Plan   Assessment & Plan     Active Hospital Problems    Diagnosis  POA    **UTI (urinary tract infection) [N39.0]  Yes    Type 1 diabetes mellitus with hyperglycemia [E10.65]  Yes    Syncope [R55]  Yes    Elevated serum creatinine [R79.89]  Yes    Sepsis [A41.9]  Yes    Closed fracture of left foot [S92.902A]  Yes    TIFFANIE (obstructive sleep apnea) [G47.33]  Yes    COPD (chronic obstructive pulmonary disease) [J44.9]  Yes    Elevated CK [R74.8]  Yes    Immunosuppression due to drug therapy [D84.821, Z79.899]  Not Applicable    Chronic diastolic congestive heart failure [I50.32]  Yes    History of stroke [Z86.73]  Not Applicable    Psoriatic arthritis [L40.50]  Yes    Factor V Leiden [D68.51]  Yes    Essential hypertension [I10]  Yes    CKD (chronic kidney disease) stage 3, GFR 30-59 ml/min [N18.30]  Yes    Hyperlipidemia LDL goal <70 [E78.5]  Yes    Hypothyroidism [E03.9]  Yes      Resolved Hospital Problems   No resolved problems to display.        Brief Hospital Course to date:  Marilyn Kunz is a 79 y.o. female with hypertension, hyperlipidemia, diastolic heart failure, somatic arthritis, demyelinating disease, type 1 diabetes, CKD stage III, prior CVA, COPD, hypothyroidism, TIFFANIE on CPAP, heterozygous factor V Leiden who presented to the ED with syncope and hyperglycemia.  Patient found to be septic likely from UTI.    This patient's problems and plans were partially entered by my partner and updated as appropriate by me 11/20/24.    Assessment/Plan:  Pt is new to me today     Sepsis, resolved  UTI  - pt met sever sepsis criteria causing organ dysfunction on arrival due to ELMER. ELMER, Leukocytosis and elevated lactic acid resolved  - Blood cultures with no growth at 4 days.  - Urine culture growing E. coli, pan susceptible  -IV Rocephin switched to  p.o. Ceftin, s/p course.  Completed.    A-fib, new onset  Bradycardia, asymptomatic  - A-fib captured on EKG 11/15  - No known history of arrhythmia  - Rate controlled without any medications  - Likely secondary to sepsis above though more concerning in the setting of syncope at home  - Cardiology consulted, started patient on Eliquis  - Continue telemonitoring  - Nebivolol decreased to 10 mg daily (this was a home medication). Bradycardia improved.  Stable.    Type 1 diabetes with hyperglycemia  Hypoglycemia  - Glucose around 500 on arrival, now patient hypoglycemic on fasting glucose yesterday.  Has since been running 96-2 24.  - continue glargine to 8 units nightly with sliding scale, adjust as needed  -- Patient uses her Dexcom.  She wishes to hold off on any further insulin adjustment today.    Syncope  - CT head negative for acute abnormalities  - Troponins mildly elevated but flat  - EKG showed A-fib as above  - Likely related to sepsis  - Repeat echo largely unremarkable  -PT OT recommended inpatient rehab.  Patient agreeable.  Awaiting placement.    Elevated CK-improved  -CK 1331 on arrival, likely secondary to syncope and being down for unknown amount of time  - Improved to 214  - 2 L of fluid in the ED  - Patient with adequate p.o. intake will discontinue IV fluids     ELMER-resolved  CKD stage III  - Baseline creatinine appears to be around 1  - On arrival creatinine 1.5, improved to 0.73  - Holding nephrotoxic medications    Closed fracture left foot, POA  - Patient being treated as an outpatient by Ortho, currently in a walking boot.  No current pain.    Hypothyroidism  - Continue levothyroxine    Diastolic CHF  Hyperlipidemia  Hypertension  History of CVA  -Continue statin  - Holding Bumex, will reassess patient's volume status. She takes 0.5 every other day.  Stable.  BP slowly increasing.  Lungs clear.  May need to resume tomorrow.    COPD, not in exacerbation  TIFFANIE  -Patient currently not on any  inhalers  - Continue CPAP nightly    Arthritis  Demyelinating disease  - following by Dr. Hernandez  -Patient med list lists Rinvoq and Imuran  -- Follow-up with Dr. Bashir on discharge    Expected Discharge Location and Transportation: Inpatient rehab, pending placement   Expected Discharge   Expected Discharge Date: 11/21/2024; Expected Discharge Time:      VTE Prophylaxis:  Pharmacologic & mechanical VTE prophylaxis orders are present.         AM-PAC 6 Clicks Score (PT): 22 (11/20/24 1121)    CODE STATUS:   Code Status and Medical Interventions: CPR (Attempt to Resuscitate); Full Support   Ordered at: 11/15/24 2113     Code Status (Patient has no pulse and is not breathing):    CPR (Attempt to Resuscitate)     Medical Interventions (Patient has pulse or is breathing):    Full Support       Sheila Burgess, APRN  11/20/24

## 2024-11-20 NOTE — CASE MANAGEMENT/SOCIAL WORK
Continued Stay Note   Kay     Patient Name: Marilyn Kunz  MRN: [id number]  Today's Date: 11/20/2024    Admit Date: 11/15/2024    Plan: discharge plan   Discharge Plan       Row Name 11/20/24 1403       Plan    Plan discharge plan    Plan Comments I met with pt at bedside regarding discharge plan and explained that insurance may not approve for her to go to a skilled nursing facility for rehab due to distance pt is able to ambulate. Pt states she wants CM to try anyway. IMM letter discussed with pt and signed. CM is waiting to hear back from Sofiya with [facility]    Final Discharge Disposition Code 03 - skilled nursing facility (SNF)      Row Name 11/20/24 1402       Plan    Final Discharge Disposition Code 03 - skilled nursing facility (SNF)                   Discharge Codes    No documentation.                 Expected Discharge Date and Time       Expected Discharge Date Expected Discharge Time    Nov 21, 2024               Mery Correa RN

## 2024-11-20 NOTE — THERAPY TREATMENT NOTE
Patient Name: Marilyn Kunz  : 1945    MRN: [id number]                              Today's Date: 2024       Admit Date: 11/15/2024    Visit Dx:     ICD-10-CM ICD-9-CM   1. Urinary tract infection without hematuria, site unspecified  N39.0 599.0   2. Generalized weakness  R53.1 780.79   3. Hyperglycemia  R73.9 790.29   4. Renal insufficiency  N28.9 593.9   5. Bandemia  D72.825 288.66     Patient Active Problem List   Diagnosis    Essential hypertension    Factor V Leiden    Psoriatic arthritis    At risk for venous thromboembolism (VTE)    Hypothyroidism    CKD (chronic kidney disease) stage 3, GFR 30-59 ml/min    Hyperlipidemia LDL goal <70    PDA (patent ductus arteriosus)    Demyelinating disease    Spondylolisthesis, grade 2    Type 1 diabetes mellitus    History of stroke    Demyelinating disease of central nervous system, unspecified    Chronic diastolic congestive heart failure    Peripheral polyneuropathy    Immunosuppression due to drug therapy    Erosive osteoarthritis    Cervical radiculopathy    Rash    UTI (urinary tract infection)    Type 1 diabetes mellitus with hyperglycemia    Syncope    Elevated serum creatinine    Sepsis    Closed fracture of left foot    TIFFANIE (obstructive sleep apnea)    COPD (chronic obstructive pulmonary disease)    Elevated CK     Past Medical History:   Diagnosis Date    Abnormal ECG Check FPA Date or [facility]    [facility]    Acute sinusitis 2023    Anemia     Asthma     CAP (community acquired pneumonia) 2023    CHF (congestive heart failure) 2023    Chronic kidney disease     pt told it was dx from Dr Baca and to not eat much protein    Congenital heart disease 's Patent 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    Surgery [facility] s close    COPD (chronic obstructive pulmonary disease) 11/15/2024    CTS (carpal tunnel syndrome)     Psoriatic arthritis hands could be    Deep vein thrombosis No on  blood clots but have a blood clotting disorder called Leiden Factor 5    Demyelinating disease of central nervous system, unspecified 03/09/2023    Diabetes     Impression: Patient states that her diabetes is not doing well.  I asked her to go ahead and try to talk to her endocrinokogist.  She is still having the lower extremity neuropathic pan, but not enough for me to administer medication.    Disease of thyroid gland     Diverticulitis of colon     Erosive osteoarthritis 05/30/2024    Factor 5 Leiden mutation, heterozygous 2012    Head injury     Hyperlipidemia     Hypertension     Medication monitoring encounter     Impression:  She has renal insufficiency.  She is just taking Tylenol.    Movement disorder 10/2021    knee replacement left knww    Murmur, cardiac     Neuropathy in diabetes     redness swelling legs    TIFFANIE (obstructive sleep apnea) 11/15/2024    Osteopenia     Story: Femoral Neck Impression: Up-ro-date on bone density scan.  Follow up bone density every 2 years.    Peripheral neuropathy 05/03/2022    hands, arms. 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  I went over her exercises and told her to get a shoe insert,    Psoriasis     Impression: No rash    Psoriatic arthritis     hands    Sleep apnea Always    diabetic do not sleep well up and down    Stroke had one don't know when    showed on MRI Brain    Syncope 11/15/2024    Type 1 diabetes      Past Surgical History:   Procedure Laterality Date    CARDIAC CATHETERIZATION  1952 2 of them    Patent Dictus operation    CATARACT EXTRACTION Bilateral     COLONOSCOPY      ENDOSCOPY N/A 10/20/2020    Procedure: ESOPHAGOGASTRODUODENOSCOPY;  Surgeon: Brunner, Mark I, MD;  Location: [facility] ENDOSCOPY;  Service: Gastroenterology;  Laterality: N/A;    HAND SURGERY Left 1987    no hardware    KNEE ARTHROPLASTY      KNEE SURGERY Left     PATENT DUCTUS ARTERIOUS LIGATION      REPLACEMENT TOTAL  KNEE Left       General Information       Row Name 11/20/24 1438          OT Time and Intention    Subjective Information no complaints  -YELENA     Document Type therapy note (daily note)  -YELENA     Mode of Treatment individual therapy;occupational therapy  -YELENA     Patient Effort excellent  -YELENA     Symptoms Noted During/After Treatment none  -YELENA       Row Name 11/20/24 1438          General Information    Patient Profile Reviewed yes  -YELENA     Existing Precautions/Restrictions fall;other (see comments)  Closed fracture left foot, WBAT in boot  -YELENA     Barriers to Rehab medically complex  -YELENA       Row Name 11/20/24 1438          Cognition    Orientation Status (Cognition) oriented x 4  -       Row Name 11/20/24 1438          Safety Issues/Impairments Affecting Functional Mobility    Impairments Affecting Function (Mobility) endurance/activity tolerance;balance;strength  -               User Key  (r) = Recorded By, (t) = Taken By, (c) = Cosigned By      Initials Name Provider Type    Rosalba Matson, OT Occupational Therapist                     Mobility/ADL's       Row Name 11/20/24 1439          Bed Mobility    Comment, (Bed Mobility) UIC on arrival  -       Row Name 11/20/24 1439          Transfers    Transfers sit-stand transfer;stand-sit transfer;toilet transfer  -       Row Name 11/20/24 1439          Sit-Stand Transfer    Sit-Stand [place] (Transfers) standby assist  -       Row Name 11/20/24 1439          Stand-Sit Transfer    Stand-Sit [place] (Transfers) standby assist  -       Row Name 11/20/24 1439          Toilet Transfer    Type (Toilet Transfer) sit-stand;stand-sit  -     [place] Level (Toilet Transfer) standby assist  -     Assistive Device (Toilet Transfer) commode;other (see comments)  grab bar  -       Row Name 11/20/24 1439          Functional Mobility    Functional Mobility- Ind. Level contact guard assist  progressing to SBA  -YELENA     Functional Mobility-Distance  (Feet) --  around in room several laps  -     Functional Mobility- Safety Issues step length decreased  -       Row Name 11/20/24 1439          Activities of Daily Living    BADL Assessment/Intervention lower body dressing;grooming;toileting  -[facility] Name 11/20/24 1439          Mobility    Extremity Weight-bearing Status left lower extremity  -     Left Lower Extremity (Weight-bearing Status) weight-bearing as tolerated (WBAT)  -       Row Name 11/20/24 1439          Lower Body Dressing Assessment/Training    [place] Level (Lower Body Dressing) doff;don;socks;shoes/slippers;standby assist;set up  -YELENA     Position (Lower Body Dressing) unsupported sitting  -YELENA     Comment, (Lower Body Dressing) doffed gipper sock, donned standard sock and shoe  -[facility] Name 11/20/24 1439          Grooming Assessment/Training    [place] Level (Grooming) hair care, combing/brushing;oral care regimen;wash face, hands;standby assist  -YELENA     Position (Grooming) unsupported standing;sink side  -[facility] Name 11/20/24 1439          Toileting Assessment/Training    [place] Level (Toileting) adjust/manage clothing;perform perineal hygiene;supervision  -     Assistive Devices (Toileting) commode;grab bar/safety frame  -YELENA     Position (Toileting) unsupported standing;unsupported sitting  -YELENA               User Key  (r) = Recorded By, (t) = Taken By, (c) = Cosigned By      Initials Name Provider Type    YELENA Rosalba Hernandez OT Occupational Therapist                   Obj/Interventions       [facility] Name 11/20/24 1441          Balance    Static Sitting Balance independent  -YELENA     Dynamic Sitting Balance standby assist  -YELENA     Position, Sitting Balance unsupported;sitting in chair  -YELENA     Sit to Stand Dynamic Balance standby assist  -YELENA     Static Standing Balance standby assist  -YELENA     Dynamic Standing Balance standby assist  -YELENA     Position/Device Used, Standing Balance unsupported  -YELENA     Balance  Interventions sit to stand;occupation based/functional task  -YELENA     Comment, Balance pt. was able to straighten blanket and spread on bed with fair accuracy and fold clothing/sheet at EOB and place on top self of closet SBA  -YELENA               User Key  (r) = Recorded By, (t) = Taken By, (c) = Cosigned By      Initials Name Provider Type    Rosalba Matson OT Occupational Therapist                   Goals/Plan       Row Name 11/20/24 3709          Transfer Goal 1 (OT)    Progress/Outcome (Transfer Goal 1, OT) goal met  -YELENA       Row Name 11/20/24 1440          Toileting Goal 1 (OT)    Progress/Outcome (Toileting Goal 1, OT) goal met  SBA given, but pt. completed fully without assist  -YELENA       Row Name 11/20/24 4281          Strength Goal 1 (OT)    Strength Goal 1 (OT) Pt. will complete 10 reps of each UE TE strengthening and endurance building to support  IADL independence.  -YELENA     Time Frame (Strength Goal 1, OT) short term goal (STG);5 days  -YELENA     Progress/Outcome (Strength Goal 1, OT) new goal  -YELENA               User Key  (r) = Recorded By, (t) = Taken By, (c) = Cosigned By      Initials Name Provider Type    Rosalba Matson, OT Occupational Therapist                   Clinical Impression       Row Name 11/20/24 0280          Pain Assessment    Pretreatment Pain Rating 0/10 - no pain  -YELENA     Posttreatment Pain Rating 0/10 - no pain  -YELENA       Row Name 11/20/24 9852          Plan of Care Review    Plan of Care Reviewed With patient  -YELENA     Progress improving  -YELENA     Outcome Evaluation Patient demonstrated improved balance today not needing walker use and able to carry items in room.  Pt.'s shoe placed on independently post setup to help balance with boot. Pt. demonstrated improved activity tolerance performing BADL and IADL tasks without a rest periods.  Pt. agreeable that with improvement she is ready for home with OP therapy with medically ready to discharge.  -YELENA       Row Name 11/20/24 9809           Therapy Plan Review/Discharge Plan (OT)    Anticipated Discharge Disposition (OT) home with assist;home with outpatient therapy services  -       Row Name 11/20/24 1442          Vital Signs    Pre Systolic BP Rehab 161  recent BP  -YELENA     Pre Treatment Diastolic BP 72  -YELENA     Post Systolic BP Rehab 144  -YELENA     Post Treatment Diastolic BP 65  -YELENA     Pretreatment Heart Rate (beats/min) 62  -YELENA     Posttreatment Heart Rate (beats/min) 59  -YELENA     O2 Delivery Pre Treatment room air  -YELENA     O2 Delivery Intra Treatment room air  -YELENA     O2 Delivery Post Treatment room air  -YELENA     Pre Patient Position Sitting  -YELENA     Intra Patient Position Standing  -YELENA     Post Patient Position Sitting  -YELENA       Row Name 11/20/24 1442          Positioning and Restraints    Pre-Treatment Position sitting in chair/recliner  -YELENA     Post Treatment Position chair  -YELENA     In Chair notified nsg;reclined;call light within reach;encouraged to call for assist;exit alarm on;legs elevated  -YELENA               User Key  (r) = Recorded By, (t) = Taken By, (c) = Cosigned By      Initials Name Provider Type    Rosalba Matson, OT Occupational Therapist                   Outcome Measures       Row Name 11/20/24 1446          How much help from another is currently needed...    Putting on and taking off regular lower body clothing? 4  -YELENA     Bathing (including washing, rinsing, and drying) 3  -YELENA     Toileting (which includes using toilet bed pan or urinal) 4  -YELENA     Putting on and taking off regular upper body clothing 4  -YELENA     Taking care of personal grooming (such as brushing teeth) 4  -YELENA     Eating meals 4  -YELENA     AM-PAC 6 Clicks Score (OT) 23  -YELENA       Row Name 11/20/24 1121          How much help from another person do you currently need...    Turning from your back to your side while in flat bed without using bedrails? 4  -ML     Moving from lying on back to sitting on the side of a flat bed without bedrails? 4  -ML     Moving  to and from a bed to a chair (including a wheelchair)? 4  -ML     Standing up from a chair using your arms (e.g., wheelchair, bedside chair)? 4  -ML     Climbing 3-5 steps with a railing? 3  -ML     To walk in hospital room? 3  -ML     AM-PAC 6 Clicks Score (PT) 22  -ML     Highest Level of Mobility Goal 7 --> Walk 25 feet or more  -ML       Row Name 11/20/24 1446 11/20/24 1121       Functional Assessment    Outcome Measure Options AM-PAC 6 Clicks Daily Activity (OT)  -YELENA AM-PAC 6 Clicks Basic Mobility (PT)  -ML              User Key  (r) = Recorded By, (t) = Taken By, (c) = Cosigned By      Initials Name Provider Type    Rosalba Matson, OT Occupational Therapist    Gail Cordero Physical Therapist                    Occupational Therapy Education       Title: PT OT SLP Therapies (In Progress)       Topic: Occupational Therapy (In Progress)       Point: ADL training (Done)       Description:   Instruct learner(s) on proper safety adaptation and remediation techniques during self care or transfers.   Instruct in proper use of assistive devices.                  Learning Progress Summary            Patient Acceptance, E, VU by YELENA at 11/20/2024 1447    Comment: much improved endurance and balance, shoe use to balance out boot wear, OP recommendation    Acceptance, E,D, VU,NR by YELENA at 11/19/2024 1115    Comment: UE and LE TE, transfer safety, progression to goals    Acceptance, E, NR by LR at 11/16/2024 0835                      Point: Home exercise program (Done)       Description:   Instruct learner(s) on appropriate technique for monitoring, assisting and/or progressing therapeutic exercises/activities.                  Learning Progress Summary            Patient Acceptance, E,D, VU,NR by YELENA at 11/19/2024 1115    Comment: UE and LE TE, transfer safety, progression to goals                      Point: Precautions (Done)       Description:   Instruct learner(s) on prescribed precautions during self-care and  functional transfers.                  Learning Progress Summary            Patient Acceptance, E,D, VU,NR by YELENA at 11/19/2024 1115    Comment: UE and LE TE, transfer safety, progression to goals    Acceptance, E, NR by LR at 11/16/2024 0835                      Point: Body mechanics (In Progress)       Description:   Instruct learner(s) on proper positioning and spine alignment during self-care, functional mobility activities and/or exercises.                  Learning Progress Summary            Patient Acceptance, E, NR by LR at 11/16/2024 0835                                      User Key       Initials Effective Dates Name Provider Type Discipline     07/11/23 -  Rosalba Hernandez, OT Occupational Therapist OT    LR 10/09/24 -  Thais Rojo, OT Occupational Therapist OT                  OT Recommendation and Plan  Therapy Frequency (OT): daily  Plan of Care Review  Plan of Care Reviewed With: patient  Progress: improving  Outcome Evaluation: Patient demonstrated improved balance today not needing walker use and able to carry items in room.  Pt.'s shoe placed on independently post setup to help balance with boot. Pt. demonstrated improved activity tolerance performing BADL and IADL tasks without a rest periods.  Pt. agreeable that with improvement she is ready for home with OP therapy with medically ready to discharge.     Time Calculation:         Time Calculation- OT       Row Name 11/20/24 1447             Time Calculation- OT    OT Start Time 1306  -YELENA      OT Received On 11/20/24  -YELENA      OT Goal Re-Cert Due Date 11/26/24  -YELENA         Timed Charges    65225 - OT Therapeutic Activity Minutes 6  -YELENA      12558 - OT Self Care/Mgmt Minutes 21  -YELENA         Total Minutes    Timed Charges Total Minutes 27  -YELENA       Total Minutes 27  -YELENA                User Key  (r) = Recorded By, (t) = Taken By, (c) = Cosigned By      Initials Name Provider Type    Rosalba Matson, OT Occupational Therapist                   Therapy Charges for Today       Code Description Service Date Service Provider Modifiers Qty    85125327576 HC OT THERAPEUTIC ACT EA 15 MIN 11/19/2024 Rosalba Hernandez, OT GO 1    02561455314 HC OT SELF CARE/MGMT/TRAIN EA 15 MIN 11/19/2024 Rosalba Hernandez, OT GO 1    88211111459 HC OT THERAPEUTIC ACT EA 15 MIN 11/20/2024 Rosalba Hernandez, OT GO 1    71704808893 HC OT SELF CARE/MGMT/TRAIN EA 15 MIN 11/20/2024 Rosalba Hernandez, OT GO 1                 Rosalba Hrenandez OT  11/20/2024

## 2024-11-20 NOTE — CASE MANAGEMENT/SOCIAL WORK
Continued Stay Note   Day     Patient Name: Marilyn Kunz  MRN: [id number]  Today's Date: 11/20/2024    Admit Date: 11/15/2024    Plan: discharge plan   Discharge Plan       Row Name 11/20/24 1403       Plan    Plan discharge plan    Plan Comments I met with pt at bedside regarding discharge plan and explained that insurance may not approve for her to go to a skilled nursing facility for rehab due to distance pt is able to ambulate. Pt states she wants CM to try anyway. IMM letter discussed with pt and signed. CM is waiting to hear back from Sofiya with [employer]    Addendum: Sofiya(liaison for [employer] is reviewing for [facility]itation) but asked if there is alternative to the medication Rinvoq as it is expensive. I have messaged [email or web address] will cont to follow    Final Discharge Disposition Code 03 - skilled nursing facility (SNF)      Row Name 11/20/24 1402       Plan    Final Discharge Disposition Code 03 - skilled nursing facility (SNF)                   Discharge Codes    No documentation.                 Expected Discharge Date and Time       Expected Discharge Date Expected Discharge Time    Nov 21, 2024               Mery Correa RN

## 2024-11-20 NOTE — PLAN OF CARE
Goal Outcome Evaluation:  Plan of Care Reviewed With: patient        Progress: improving  Outcome Evaluation: Patient ambulates with RW and CGA progressing to SBA. Patient educated on discharged planning and falls prevention. Patient continues to present below baseline for mobility and would continue to benefit from skilled PT to address strength, balance and activity tolerance deficits.    Anticipated Discharge Disposition (PT): home with outpatient therapy services, home with assist

## 2024-11-20 NOTE — PLAN OF CARE
Goal Outcome Evaluation:  Plan of Care Reviewed With: patient        Progress: improving  Outcome Evaluation: Patient demonstrated improved balance today not needing walker use and able to carry items in room.  Pt.'s shoe placed on independently post setup to help balance with boot. Pt. demonstrated improved activity tolerance performing BADL and IADL tasks without a rest periods.  Pt. agreeable that with improvement she is ready for home with OP therapy with medically ready to discharge.    Anticipated Discharge Disposition (OT): home with assist, home with outpatient therapy services

## 2024-11-20 NOTE — THERAPY TREATMENT NOTE
Patient Name: Marilyn Kunz  : 1945    MRN: [id number]                              Today's Date: 2024       Admit Date: 11/15/2024    Visit Dx:     ICD-10-CM ICD-9-CM   1. Urinary tract infection without hematuria, site unspecified  N39.0 599.0   2. Generalized weakness  R53.1 780.79   3. Hyperglycemia  R73.9 790.29   4. Renal insufficiency  N28.9 593.9   5. Bandemia  D72.825 288.66     Patient Active Problem List   Diagnosis    Essential hypertension    Factor V Leiden    Psoriatic arthritis    At risk for venous thromboembolism (VTE)    Hypothyroidism    CKD (chronic kidney disease) stage 3, GFR 30-59 ml/min    Hyperlipidemia LDL goal <70    PDA (patent ductus arteriosus)    Demyelinating disease    Spondylolisthesis, grade 2    Type 1 diabetes mellitus    History of stroke    Demyelinating disease of central nervous system, unspecified    Chronic diastolic congestive heart failure    Peripheral polyneuropathy    Immunosuppression due to drug therapy    Erosive osteoarthritis    Cervical radiculopathy    Rash    UTI (urinary tract infection)    Type 1 diabetes mellitus with hyperglycemia    Syncope    Elevated serum creatinine    Sepsis    Closed fracture of left foot    TIFFANIE (obstructive sleep apnea)    COPD (chronic obstructive pulmonary disease)    Elevated CK     Past Medical History:   Diagnosis Date    Abnormal ECG Check FPA Date or [facility]    [facility]    Acute sinusitis 2023    Anemia     Asthma     CAP (community acquired pneumonia) 2023    CHF (congestive heart failure) 2023    Chronic kidney disease     pt told it was dx from Dr Baca and to not eat much protein    Congenital heart disease 's Patent 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    Surgery [facility] s close    COPD (chronic obstructive pulmonary disease) 11/15/2024    CTS (carpal tunnel syndrome)     Psoriatic arthritis hands could be    Deep vein thrombosis No on  blood clots but have a blood clotting disorder called Leiden Factor 5    Demyelinating disease of central nervous system, unspecified 03/09/2023    Diabetes     Impression: Patient states that her diabetes is not doing well.  I asked her to go ahead and try to talk to her endocrinokogist.  She is still having the lower extremity neuropathic pan, but not enough for me to administer medication.    Disease of thyroid gland     Diverticulitis of colon     Erosive osteoarthritis 05/30/2024    Factor 5 Leiden mutation, heterozygous 2012    Head injury     Hyperlipidemia     Hypertension     Medication monitoring encounter     Impression:  She has renal insufficiency.  She is just taking Tylenol.    Movement disorder 10/2021    knee replacement left knww    Murmur, cardiac     Neuropathy in diabetes     redness swelling legs    TIFFANIE (obstructive sleep apnea) 11/15/2024    Osteopenia     Story: Femoral Neck Impression: Up-ro-date on bone density scan.  Follow up bone density every 2 years.    Peripheral neuropathy 05/03/2022    hands, arms. 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  I went over her exercises and told her to get a shoe insert,    Psoriasis     Impression: No rash    Psoriatic arthritis     hands    Sleep apnea Always    diabetic do not sleep well up and down    Stroke had one don't know when    showed on MRI Brain    Syncope 11/15/2024    Type 1 diabetes      Past Surgical History:   Procedure Laterality Date    CARDIAC CATHETERIZATION  1952 2 of them    Patent Dictus operation    CATARACT EXTRACTION Bilateral     COLONOSCOPY      ENDOSCOPY N/A 10/20/2020    Procedure: ESOPHAGOGASTRODUODENOSCOPY;  Surgeon: Brunner, Mark I, MD;  Location: [facility] ENDOSCOPY;  Service: Gastroenterology;  Laterality: N/A;    HAND SURGERY Left 1987    no hardware    KNEE ARTHROPLASTY      KNEE SURGERY Left     PATENT DUCTUS ARTERIOUS LIGATION      REPLACEMENT TOTAL  KNEE Left       General Information       Row Name 11/20/24 1113          Physical Therapy Time and Intention    Document Type therapy note (daily note)  -ML     Mode of Treatment physical therapy  -ML       Row Name 11/20/24 1113          General Information    Patient Profile Reviewed yes  -ML     Existing Precautions/Restrictions fall;other (see comments)  Closed fracture left foot, WBAT in boot  -ML     Barriers to Rehab medically complex  -ML       Row Name 11/20/24 1113          Cognition    Orientation Status (Cognition) oriented x 4  -ML       Row Name 11/20/24 1113          Safety Issues/Impairments Affecting Functional Mobility    Safety Issues Affecting Function (Mobility) insight into deficits/self-awareness;safety precaution awareness  -ML     Impairments Affecting Function (Mobility) balance;endurance/activity tolerance;strength  -ML               User Key  (r) = Recorded By, (t) = Taken By, (c) = Cosigned By      Initials Name Provider Type    ML Gail Shields Physical Therapist                   Mobility       Row Name 11/20/24 1116          Bed Mobility    Bed Mobility supine-sit  -ML     Supine-Sit [place] (Bed Mobility) modified independence  -ML     Assistive Device (Bed Mobility) head of bed elevated  -ML       Row Name 11/20/24 1116          Sit-Stand Transfer    Sit-Stand [place] (Transfers) standby assist  -ML     Assistive Device (Sit-Stand Transfers) walker, front-wheeled  -ML       Row Name 11/20/24 1116          Gait/Stairs (Locomotion)    [place] Level (Gait) contact guard;standby assist  initially CGA progressing to SBA  -ML     Assistive Device (Gait) walker, front-wheeled  -ML     Distance in Feet (Gait) 110  -ML     Deviations/Abnormal Patterns (Gait) sam decreased;gait speed decreased;stride length decreased  -ML     Left Sided Gait Deviations weight shift ability decreased  -ML       Row Name 11/20/24 1116          Mobility    Extremity Weight-bearing Status left  lower extremity  -ML     Left Lower Extremity (Weight-bearing Status) weight-bearing as tolerated (WBAT)  -ML               User Key  (r) = Recorded By, (t) = Taken By, (c) = Cosigned By      Initials Name Provider Type    Gail Cordero Physical Therapist                   Obj/Interventions       Row Name 11/20/24 1117          Balance    Balance Assessment sitting static balance;sitting dynamic balance;standing static balance;standing dynamic balance  -ML     Static Sitting Balance independent  -ML     Dynamic Sitting Balance standby assist  -ML     Position, Sitting Balance unsupported;sitting edge of bed  -ML     Static Standing Balance standby assist  -ML     Dynamic Standing Balance standby assist  -ML     Position/Device Used, Standing Balance supported;walker, front-wheeled  -ML     Balance Interventions sitting;standing;sit to stand;supported;occupation based/functional task  -ML               User Key  (r) = Recorded By, (t) = Taken By, (c) = Cosigned By      Initials Name Provider Type    Gail Cordero Physical Therapist                   Goals/Plan    No documentation.                  Clinical Impression       Row Name 11/20/24 1117          Pain    Pretreatment Pain Rating 0/10 - no pain  -ML     Posttreatment Pain Rating 0/10 - no pain  -ML       Row Name 11/20/24 1117          Plan of Care Review    Plan of Care Reviewed With patient  -ML     Progress improving  -ML     Outcome Evaluation Patient ambulates with RW and CGA progressing to SBA. Patient educated on discharged planning and falls prevention. Patient continues to present below baseline for mobility and would continue to benefit from skilled PT to address strength, balance and activity tolerance deficits.  -ML       Row Name 11/20/24 1117          Positioning and Restraints    Pre-Treatment Position in bed  -ML     Post Treatment Position chair  -ML     In Chair notified nsg;reclined;call light within reach;encouraged to call for  assist;exit alarm on;legs elevated  -ML               User Key  (r) = Recorded By, (t) = Taken By, (c) = Cosigned By      Initials Name Provider Type    Gail Cordero Physical Therapist                   Outcome Measures       Row Name 11/20/24 1121          How much help from another person do you currently need...    Turning from your back to your side while in flat bed without using bedrails? 4  -ML     Moving from lying on back to sitting on the side of a flat bed without bedrails? 4  -ML     Moving to and from a bed to a chair (including a wheelchair)? 4  -ML     Standing up from a chair using your arms (e.g., wheelchair, bedside chair)? 4  -ML     Climbing 3-5 steps with a railing? 3  -ML     To walk in hospital room? 3  -ML     AM-PAC 6 Clicks Score (PT) 22  -ML     Highest Level of Mobility Goal 7 --> Walk 25 feet or more  -ML       Row Name 11/20/24 1446 11/20/24 1121       Functional Assessment    Outcome Measure Options AM-PAC 6 Clicks Daily Activity (OT)  -YELENA AM-PAC 6 Clicks Basic Mobility (PT)  -              User Key  (r) = Recorded By, (t) = Taken By, (c) = Cosigned By      Initials Name Provider Type    Rosalba Matson, OT Occupational Therapist    Gail Cordero Physical Therapist                                 Physical Therapy Education       Title: PT OT SLP Therapies (In Progress)       Topic: Physical Therapy (Done)       Point: Mobility training (Done)       Learning Progress Summary            Patient Acceptance, E, VU,NR by  at 11/20/2024 1314    Acceptance, E, VU,NR by  at 11/20/2024 1121    Acceptance, E, VU by ER at 11/18/2024 1623    Comment: pt edu on transfers, FWW use, d/c planning, purpose of PT, need for sitting up    Acceptance, E, VU by NS at 11/16/2024 1545                      Point: Home exercise program (Done)       Learning Progress Summary            Patient Acceptance, E, VU by ER at 11/18/2024 1623    Comment: pt edu on transfers, FWW use, d/c planning, purpose  of PT, need for sitting up                      Point: Body mechanics (Done)       Learning Progress Summary            Patient Acceptance, E, VU,NR by ML at 11/20/2024 1314    Acceptance, E, VU,NR by ML at 11/20/2024 1121    Acceptance, E, VU by ER at 11/18/2024 1623    Comment: pt edu on transfers, FWW use, d/c planning, purpose of PT, need for sitting up    Acceptance, E, VU by NS at 11/16/2024 1545                      Point: Precautions (Done)       Learning Progress Summary            Patient Acceptance, E, VU,NR by ML at 11/20/2024 1314    Acceptance, E, VU,NR by ML at 11/20/2024 1121    Acceptance, E, VU by ER at 11/18/2024 1623    Comment: pt edu on transfers, FWW use, d/c planning, purpose of PT, need for sitting up    Acceptance, E, VU by NS at 11/16/2024 1545                                      User Key       Initials Effective Dates Name Provider Type Discipline    NS 06/16/21 -  Alisha Kennedy, PT Physical Therapist PT     04/22/21 -  Gail Shields Physical Therapist PT    ER 11/04/24 -  Princess Wagner PT Physical Therapist PT                  PT Recommendation and Plan     Progress: improving  Outcome Evaluation: Patient ambulates with RW and CGA progressing to SBA. Patient educated on discharged planning and falls prevention. Patient continues to present below baseline for mobility and would continue to benefit from skilled PT to address strength, balance and activity tolerance deficits.     Time Calculation:         PT Charges       Row Name 11/20/24 1315             Time Calculation    Start Time 1032  -ML      PT Received On 11/20/24  -ML         Timed Charges    37911 - PT Therapeutic Activity Minutes 25  -ML         Total Minutes    Timed Charges Total Minutes 25  -ML       Total Minutes 25  -ML                User Key  (r) = Recorded By, (t) = Taken By, (c) = Cosigned By      Initials Name Provider Type     Gail Shields Physical Therapist                  Therapy Charges for Today        Code Description Service Date Service Provider Modifiers Qty    86494000937 HC PT THERAPEUTIC ACT EA 15 MIN 11/20/2024 Gail Shields GP 2            PT G-Codes  Outcome Measure Options: AM-PAC 6 Clicks Daily Activity (OT)  AM-PAC 6 Clicks Score (PT): 22  AM-PAC 6 Clicks Score (OT): 23  PT Discharge Summary  Anticipated Discharge Disposition (PT): home with outpatient therapy services, home with assist    Gail Shields  11/20/2024

## 2024-11-21 LAB
DEPRECATED RDW RBC AUTO: 48.8 FL (ref 37–54)
ERYTHROCYTE [DISTWIDTH] IN BLOOD BY AUTOMATED COUNT: 14.5 % (ref 12.3–15.4)
GLUCOSE BLDC GLUCOMTR-MCNC: 134 MG/DL (ref 70–130)
GLUCOSE BLDC GLUCOMTR-MCNC: 248 MG/DL (ref 70–130)
GLUCOSE BLDC GLUCOMTR-MCNC: 305 MG/DL (ref 70–130)
GLUCOSE BLDC GLUCOMTR-MCNC: 358 MG/DL (ref 70–130)
GLUCOSE BLDC GLUCOMTR-MCNC: 462 MG/DL (ref 70–130)
GLUCOSE BLDC GLUCOMTR-MCNC: 466 MG/DL (ref 70–130)
GLUCOSE BLDC GLUCOMTR-MCNC: 474 MG/DL (ref 70–130)
GLUCOSE BLDC GLUCOMTR-MCNC: 490 MG/DL (ref 70–130)
HCT VFR BLD AUTO: 28.9 % (ref 34–46.6)
HGB BLD-MCNC: 9.3 G/DL (ref 12–15.9)
MCH RBC QN AUTO: 30 PG (ref 26.6–33)
MCHC RBC AUTO-ENTMCNC: 32.2 G/DL (ref 31.5–35.7)
MCV RBC AUTO: 93.2 FL (ref 79–97)
PLATELET # BLD AUTO: 183 10*3/MM3 (ref 140–450)
PMV BLD AUTO: 11.6 FL (ref 6–12)
RBC # BLD AUTO: 3.1 10*6/MM3 (ref 3.77–5.28)
WBC NRBC COR # BLD AUTO: 10.35 10*3/MM3 (ref 3.4–10.8)

## 2024-11-21 PROCEDURE — 63710000001 INSULIN GLARGINE PER 5 UNITS: Performed by: FAMILY MEDICINE

## 2024-11-21 PROCEDURE — 63710000001 INSULIN LISPRO (HUMAN) PER 5 UNITS: Performed by: FAMILY MEDICINE

## 2024-11-21 PROCEDURE — 97535 SELF CARE MNGMENT TRAINING: CPT

## 2024-11-21 PROCEDURE — 85027 COMPLETE CBC AUTOMATED: CPT | Performed by: NURSE PRACTITIONER

## 2024-11-21 PROCEDURE — 63710000001 INSULIN LISPRO (HUMAN) PER 5 UNITS: Performed by: NURSE PRACTITIONER

## 2024-11-21 PROCEDURE — 99232 SBSQ HOSP IP/OBS MODERATE 35: CPT | Performed by: FAMILY MEDICINE

## 2024-11-21 PROCEDURE — 97530 THERAPEUTIC ACTIVITIES: CPT

## 2024-11-21 PROCEDURE — 82948 REAGENT STRIP/BLOOD GLUCOSE: CPT

## 2024-11-21 RX ORDER — INSULIN LISPRO 100 [IU]/ML
3 INJECTION, SOLUTION INTRAVENOUS; SUBCUTANEOUS ONCE
Status: COMPLETED | OUTPATIENT
Start: 2024-11-21 | End: 2024-11-21

## 2024-11-21 RX ORDER — INSULIN LISPRO 100 [IU]/ML
3-14 INJECTION, SOLUTION INTRAVENOUS; SUBCUTANEOUS
Status: DISCONTINUED | OUTPATIENT
Start: 2024-11-21 | End: 2024-11-25 | Stop reason: HOSPADM

## 2024-11-21 RX ADMIN — CETIRIZINE HYDROCHLORIDE 10 MG: 10 TABLET, FILM COATED ORAL at 10:07

## 2024-11-21 RX ADMIN — NEBIVOLOL 10 MG: 5 TABLET ORAL at 10:06

## 2024-11-21 RX ADMIN — PREGABALIN 75 MG: 75 CAPSULE ORAL at 10:06

## 2024-11-21 RX ADMIN — INSULIN LISPRO 12 UNITS: 100 INJECTION, SOLUTION INTRAVENOUS; SUBCUTANEOUS at 18:28

## 2024-11-21 RX ADMIN — PRAVASTATIN SODIUM 20 MG: 20 TABLET ORAL at 17:19

## 2024-11-21 RX ADMIN — Medication 10 ML: at 21:52

## 2024-11-21 RX ADMIN — INSULIN LISPRO 9 UNITS: 100 INJECTION, SOLUTION INTRAVENOUS; SUBCUTANEOUS at 15:26

## 2024-11-21 RX ADMIN — APIXABAN 2.5 MG: 2.5 TABLET, FILM COATED ORAL at 10:06

## 2024-11-21 RX ADMIN — TERAZOSIN HYDROCHLORIDE 2 MG: 2 CAPSULE ORAL at 21:52

## 2024-11-21 RX ADMIN — APIXABAN 2.5 MG: 2.5 TABLET, FILM COATED ORAL at 21:52

## 2024-11-21 RX ADMIN — INSULIN LISPRO 3 UNITS: 100 INJECTION, SOLUTION INTRAVENOUS; SUBCUTANEOUS at 15:26

## 2024-11-21 RX ADMIN — OXYBUTYNIN CHLORIDE 5 MG: 5 TABLET ORAL at 21:52

## 2024-11-21 RX ADMIN — FLUTICASONE PROPIONATE 2 SPRAY: 50 SPRAY, METERED NASAL at 10:10

## 2024-11-21 RX ADMIN — Medication 10 ML: at 18:31

## 2024-11-21 RX ADMIN — INSULIN GLARGINE 10 UNITS: 100 INJECTION, SOLUTION SUBCUTANEOUS at 18:30

## 2024-11-21 RX ADMIN — PREGABALIN 75 MG: 75 CAPSULE ORAL at 21:52

## 2024-11-21 RX ADMIN — Medication 5 MG: at 21:52

## 2024-11-21 RX ADMIN — OXYBUTYNIN CHLORIDE 5 MG: 5 TABLET ORAL at 10:06

## 2024-11-21 RX ADMIN — LEVOTHYROXINE SODIUM 75 MCG: 0.07 TABLET ORAL at 05:21

## 2024-11-21 RX ADMIN — MONTELUKAST 10 MG: 10 TABLET, FILM COATED ORAL at 17:19

## 2024-11-21 NOTE — CASE MANAGEMENT/SOCIAL WORK
Continued Stay Note  [facility]     Patient Name: Marilyn Kunz  MRN: [id number]  Today's Date: 11/21/2024    Admit Date: 11/15/2024    Plan: discharge plan/medication   Discharge Plan       Row Name 11/21/24 1139       Plan    Plan discharge plan/medication    Plan Comments Per Sofiya(liason for [place]) there was concern as the cost of medication Rinvoq is expensive and the facility could not take her on that med. I spoke with pharmacist and there is no generic form for Rinvoq. I then spoke with pt and pt reports she is no longer on that medication(Rinvoq)and gets a steroid injection every 90 days, not due until December. Per Sofiya, it would need to state in progress note that pt can resume steroid injections after  rehab completed and Dr Kiser(hospitalist) is aware. Pt will still need insurance approval. Sofiya also plans to talk with pt concerning an auto liability insurance that needs to be cleared  from pt's insurance as it is showing up over pts [employer]. CM will cont to follow    Final Discharge Disposition Code 03 - skilled nursing facility (SNF)                   Discharge Codes    No documentation.                 Expected Discharge Date and Time       Expected Discharge Date Expected Discharge Time    Nov 21, 2024               Mery Correa RN

## 2024-11-21 NOTE — PLAN OF CARE
Goal Outcome Evaluation:  Plan of Care Reviewed With: patient        Progress: improving  Outcome Evaluation: Pt continues to present below baseline with decreased activity tolerance, strength, and safety awareness. Pt very motivated and willing to work with therapy. Recommend IPOT POC and home with home health/ home with assist at D/C for best functional outcome.    Anticipated Discharge Disposition (OT): home with assist, home with outpatient therapy services

## 2024-11-21 NOTE — THERAPY TREATMENT NOTE
Patient Name: Marilyn Kunz  : 1945    MRN: [id number]                              Today's Date: 2024       Admit Date: 11/15/2024    Visit Dx:     ICD-10-CM ICD-9-CM   1. Urinary tract infection without hematuria, site unspecified  N39.0 599.0   2. Generalized weakness  R53.1 780.79   3. Hyperglycemia  R73.9 790.29   4. Renal insufficiency  N28.9 593.9   5. Bandemia  D72.825 288.66     Patient Active Problem List   Diagnosis    Essential hypertension    Factor V Leiden    Psoriatic arthritis    At risk for venous thromboembolism (VTE)    Hypothyroidism    CKD (chronic kidney disease) stage 3, GFR 30-59 ml/min    Hyperlipidemia LDL goal <70    PDA (patent ductus arteriosus)    Demyelinating disease    Spondylolisthesis, grade 2    Type 1 diabetes mellitus    History of stroke    Demyelinating disease of central nervous system, unspecified    Chronic diastolic congestive heart failure    Peripheral polyneuropathy    Immunosuppression due to drug therapy    Erosive osteoarthritis    Cervical radiculopathy    Rash    UTI (urinary tract infection)    Type 1 diabetes mellitus with hyperglycemia    Syncope    Elevated serum creatinine    Sepsis    Closed fracture of left foot    TIFFANIE (obstructive sleep apnea)    COPD (chronic obstructive pulmonary disease)    Elevated CK     Past Medical History:   Diagnosis Date    Abnormal ECG Check [facility]PA Date or [facility]    [facility]    Acute sinusitis 2023    Anemia     Asthma     CAP (community acquired pneumonia) 2023    CHF (congestive heart failure) 2023    Chronic kidney disease     pt told it was dx from Dr Baca and to not eat much protein    Congenital heart disease 's Patent 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    Surgery [facility] s close    COPD (chronic obstructive pulmonary disease) 11/15/2024    CTS (carpal tunnel syndrome)     Psoriatic arthritis hands could be    Deep vein thrombosis No on  blood clots but have a blood clotting disorder called Leiden Factor 5    Demyelinating disease of central nervous system, unspecified 03/09/2023    Diabetes     Impression: Patient states that her diabetes is not doing well.  I asked her to go ahead and try to talk to her endocrinokogist.  She is still having the lower extremity neuropathic pan, but not enough for me to administer medication.    Disease of thyroid gland     Diverticulitis of colon     Erosive osteoarthritis 05/30/2024    Factor 5 Leiden mutation, heterozygous 2012    Head injury     Hyperlipidemia     Hypertension     Medication monitoring encounter     Impression:  She has renal insufficiency.  She is just taking Tylenol.    Movement disorder 10/2021    knee replacement left knww    Murmur, cardiac     Neuropathy in diabetes     redness swelling legs    TIFFANIE (obstructive sleep apnea) 11/15/2024    Osteopenia     Story: Femoral Neck Impression: Up-ro-date on bone density scan.  Follow up bone density every 2 years.    Peripheral neuropathy 05/03/2022    hands, arms. 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  I went over her exercises and told her to get a shoe insert,    Psoriasis     Impression: No rash    Psoriatic arthritis     hands    Sleep apnea Always    diabetic do not sleep well up and down    Stroke had one don't know when    showed on MRI Brain    Syncope 11/15/2024    Type 1 diabetes      Past Surgical History:   Procedure Laterality Date    CARDIAC CATHETERIZATION  1952 2 of them    Patent Dictus operation    CATARACT EXTRACTION Bilateral     COLONOSCOPY      ENDOSCOPY N/A 10/20/2020    Procedure: ESOPHAGOGASTRODUODENOSCOPY;  Surgeon: Brunner, Mark I, MD;  Location: [facility] ENDOSCOPY;  Service: Gastroenterology;  Laterality: N/A;    HAND SURGERY Left 1987    no hardware    KNEE ARTHROPLASTY      KNEE SURGERY Left     PATENT DUCTUS ARTERIOUS LIGATION      REPLACEMENT TOTAL  KNEE Left       General Information       Row Name 11/21/24 1530          OT Time and Intention    Document Type therapy note (daily note)  -LR     Mode of Treatment occupational therapy  -LR       Row Name 11/21/24 1530          General Information    Patient Profile Reviewed yes  -LR     Existing Precautions/Restrictions fall;other (see comments)  Closed fracture L foot, WBAT in boot  -LR       Row Name 11/21/24 1530          Cognition    Orientation Status (Cognition) oriented x 4  -LR       Row Name 11/21/24 1530          Safety Issues/Impairments Affecting Functional Mobility    Safety Issues Affecting Function (Mobility) safety precaution awareness;safety precautions follow-through/compliance;insight into deficits/self-awareness;impulsivity;awareness of need for assistance  -LR     Impairments Affecting Function (Mobility) endurance/activity tolerance;balance;strength  -LR               User Key  (r) = Recorded By, (t) = Taken By, (c) = Cosigned By      Initials Name Provider Type    LR Thais Rojo, OT Occupational Therapist                     Mobility/ADL's       Row Name 11/21/24 1531          Bed Mobility    Bed Mobility supine-sit  -LR     Supine-Sit [place] (Bed Mobility) modified independence  -     Assistive Device (Bed Mobility) head of bed elevated  -LR       Row Name 11/21/24 1531          Transfers    Transfers sit-stand transfer;stand-sit transfer;toilet transfer;bed-chair transfer  -LR       Row Name 11/21/24 1531          Bed-Chair Transfer    Bed-Chair [place] (Transfers) verbal cues;standby assist  -LR     Assistive Device (Bed-Chair Transfers) walker, front-wheeled  -LR       Row Name 11/21/24 1531          Sit-Stand Transfer    Sit-Stand [place] (Transfers) standby assist  -LR     Assistive Device (Sit-Stand Transfers) walker, front-wheeled  -LR       Row Name 11/21/24 1531          Stand-Sit Transfer    Stand-Sit [place] (Transfers) standby assist  -LR      Assistive Device (Stand-Sit Transfers) walker, front-wheeled  -LR       Row Name 11/21/24 1531          Toilet Transfer    Type (Toilet Transfer) sit-stand;stand-sit  -LR     [place] Level (Toilet Transfer) standby assist  -LR     Assistive Device (Toilet Transfer) commode;other (see comments)  grab bar  -       Row Name 11/21/24 1531          Functional Mobility    Functional Mobility- Ind. Level contact guard assist  -LR     Functional Mobility- Device walker, front-wheeled  -LR     Functional Mobility-Distance (Feet) --  >HH distance  -LR     Functional Mobility- Comment VC for safety awareness with wx.  -LR     Patient was able to Ambulate yes  -LR       Row Name 11/21/24 1531          Activities of Daily Living    BADL Assessment/Intervention upper body dressing;lower body dressing;grooming;toileting  -       Row Name 11/21/24 1531          Mobility    Extremity Weight-bearing Status left lower extremity  -LR     Left Lower Extremity (Weight-bearing Status) weight-bearing as tolerated (WBAT);other (see comments)  in boot  -LR       Row Name 11/21/24 1531          Lower Body Dressing Assessment/Training    [place] Level (Lower Body Dressing) doff;don;socks;shoes/slippers;standby assist;set up  -LR     Position (Lower Body Dressing) unsupported sitting  -       Row Name 11/21/24 1531          Grooming Assessment/Training    [place] Level (Grooming) hair care, combing/brushing;oral care regimen;wash face, hands;set up  -LR     Position (Grooming) unsupported standing;sink side  -       Row Name 11/21/24 1531          Toileting Assessment/Training    [place] Level (Toileting) adjust/manage clothing;perform perineal hygiene;supervision  -LR     Assistive Devices (Toileting) commode;grab bar/safety frame  -LR     Position (Toileting) unsupported standing;unsupported sitting  -       Row Name 11/21/24 1531          Upper Body Dressing Assessment/Training    [place] Level (Upper Body  Dressing) don;pajama/robe;minimum assist (75% patient effort)  -LR     Position (Upper Body Dressing) unsupported sitting  -LR               User Key  (r) = Recorded By, (t) = Taken By, (c) = Cosigned By      Initials Name Provider Type    Thais Lynn OT Occupational Therapist                   Obj/Interventions       Row Name 11/21/24 1534          Balance    Balance Assessment sitting static balance;sitting dynamic balance;sit to stand dynamic balance;standing static balance;standing dynamic balance  -LR     Static Sitting Balance independent  -LR     Dynamic Sitting Balance supervision  -LR     Position, Sitting Balance unsupported;sitting edge of bed;supported;sitting in chair  -LR     Sit to Stand Dynamic Balance standby assist  -LR     Static Standing Balance standby assist  -LR     Dynamic Standing Balance contact guard  -LR     Position/Device Used, Standing Balance supported;walker, front-wheeled  -LR     Balance Interventions sitting;standing;supported;sit to stand;static;dynamic;occupation based/functional task  -LR               User Key  (r) = Recorded By, (t) = Taken By, (c) = Cosigned By      Initials Name Provider Type    Thais Lynn OT Occupational Therapist                   Goals/Plan    No documentation.                  Clinical Impression       Row Name 11/21/24 1534          Pain Assessment    Pretreatment Pain Rating 0/10 - no pain  -LR     Posttreatment Pain Rating 0/10 - no pain  -LR       Row Name 11/21/24 1534          Plan of Care Review    Plan of Care Reviewed With patient  -LR     Progress improving  -LR     Outcome Evaluation Pt continues to present below baseline with decreased activity tolerance, strength, and safety awareness. Pt very motivated and willing to work with therapy. Recommend IPOT POC and home with home health/ home with assist at D/C for best functional outcome.  -LR       Row Name 11/21/24 1534          Therapy Plan Review/Discharge Plan (OT)     Anticipated Discharge Disposition (OT) home with assist;home with outpatient therapy services  -LR       Row Name 11/21/24 1534          Vital Signs    Pre Systolic BP Rehab 162  -LR     Pre Treatment Diastolic BP 61  -LR     Intra Systolic BP Rehab 158  -LR     Intra Treatment Diastolic BP 68  -LR     Post Systolic BP Rehab 149  -LR     Post Treatment Diastolic BP 90  -LR     Pretreatment Heart Rate (beats/min) 65  -LR     Intratreatment Heart Rate (beats/min) 73  -LR     Posttreatment Heart Rate (beats/min) 66  -LR     Pre SpO2 (%) 96  -LR     O2 Delivery Pre Treatment nasal cannula  21/2 LNC. 2 for mobility  -LR     O2 Delivery Intra Treatment nasal cannula  -LR     Post SpO2 (%) 95  -LR     O2 Delivery Post Treatment nasal cannula  -LR     Pre Patient Position Supine  -LR     Intra Patient Position Standing  -LR     Post Patient Position Sitting  -LR       Row Name 11/21/24 1534          Positioning and Restraints    Pre-Treatment Position in bed  -LR     Post Treatment Position chair  -LR     In Chair notified nsg;reclined;call light within reach;encouraged to call for assist;exit alarm on;waffle cushion;legs elevated  -LR               User Key  (r) = Recorded By, (t) = Taken By, (c) = Cosigned By      Initials Name Provider Type    LR Thais Rojo, OT Occupational Therapist                   Outcome Measures       Row Name 11/21/24 1538          How much help from another is currently needed...    Putting on and taking off regular lower body clothing? 4  -LR     Bathing (including washing, rinsing, and drying) 3  -LR     Toileting (which includes using toilet bed pan or urinal) 4  -LR     Putting on and taking off regular upper body clothing 4  -LR     Taking care of personal grooming (such as brushing teeth) 4  -LR     Eating meals 4  -LR     AM-PAC 6 Clicks Score (OT) 23  -LR       Row Name 11/21/24 1538          Functional Assessment    Outcome Measure Options AM-PAC 6 Clicks Daily Activity (OT)  -LR                User Key  (r) = Recorded By, (t) = Taken By, (c) = Cosigned By      Initials Name Provider Type    Thais Lynn, OT Occupational Therapist                    Occupational Therapy Education       Title: PT OT SLP Therapies (In Progress)       Topic: Occupational Therapy (In Progress)       Point: ADL training (In Progress)       Description:   Instruct learner(s) on proper safety adaptation and remediation techniques during self care or transfers.   Instruct in proper use of assistive devices.                  Learning Progress Summary            Patient Acceptance, E, NR by LR at 11/21/2024 1340    Acceptance, E, VU by YELENA at 11/20/2024 1447    Comment: much improved endurance and balance, shoe use to balance out boot wear, OP recommendation    Acceptance, E,D, VU,NR by YELENA at 11/19/2024 1115    Comment: UE and LE TE, transfer safety, progression to goals    Acceptance, E, NR by LR at 11/16/2024 0835                      Point: Home exercise program (Done)       Description:   Instruct learner(s) on appropriate technique for monitoring, assisting and/or progressing therapeutic exercises/activities.                  Learning Progress Summary            Patient Acceptance, E,D, VU,NR by YELENA at 11/19/2024 1115    Comment: UE and LE TE, transfer safety, progression to goals                      Point: Precautions (In Progress)       Description:   Instruct learner(s) on prescribed precautions during self-care and functional transfers.                  Learning Progress Summary            Patient Acceptance, E, NR by LR at 11/21/2024 1340    Acceptance, E,D, VU,NR by YELENA at 11/19/2024 1115    Comment: UE and LE TE, transfer safety, progression to goals    Acceptance, E, NR by LR at 11/16/2024 0835                      Point: Body mechanics (In Progress)       Description:   Instruct learner(s) on proper positioning and spine alignment during self-care, functional mobility activities and/or exercises.                   Learning Progress Summary            Patient Acceptance, E, NR by LR at 11/21/2024 1340    Acceptance, E, NR by LR at 11/16/2024 0835                                      User Key       Initials Effective Dates Name Provider Type Discipline    YELENA 07/11/23 -  Rosalba Hernandez, OT Occupational Therapist OT    LR 10/09/24 -  Thais Rojo, OT Occupational Therapist OT                  OT Recommendation and Plan  Planned Therapy Interventions (OT): activity tolerance training, patient/caregiver education/training, adaptive equipment training, BADL retraining, occupation/activity based interventions, strengthening exercise, ROM/therapeutic exercise, functional balance retraining, IADL retraining, transfer/mobility retraining, passive ROM/stretching  Therapy Frequency (OT): daily  Plan of Care Review  Plan of Care Reviewed With: patient  Progress: improving  Outcome Evaluation: Pt continues to present below baseline with decreased activity tolerance, strength, and safety awareness. Pt very motivated and willing to work with therapy. Recommend IPOT POC and home with home health/ home with assist at D/C for best functional outcome.     Time Calculation:   Evaluation Complexity (OT)  Review Occupational Profile/Medical/Therapy History Complexity: brief/low complexity  Assessment, Occupational Performance/Identification of Deficit Complexity: 1-3 performance deficits  Clinical Decision Making Complexity (OT): problem focused assessment/low complexity  Overall Complexity of Evaluation (OT): low complexity     Time Calculation- OT       Row Name 11/21/24 1539             Time Calculation- OT    OT Start Time 1340  -LR      OT Received On 11/21/24  -LR      OT Goal Re-Cert Due Date 11/26/24  -LR         Timed Charges    11608 - OT Therapeutic Activity Minutes 24  -LR      74839 - OT Self Care/Mgmt Minutes 24  -LR         Total Minutes    Timed Charges Total Minutes 48  -LR       Total Minutes 48  -LR                 User Key  (r) = Recorded By, (t) = Taken By, (c) = Cosigned By      Initials Name Provider Type     Thais Rojo, OT Occupational Therapist                  Therapy Charges for Today       Code Description Service Date Service Provider Modifiers Qty    [id number]  OT THERAPEUTIC ACT EA 15 MIN 11/21/2024 Thais Rojo, OT GO 2    [id number]  OT SELF CARE/MGMT/TRAIN EA 15 MIN 11/21/2024 Thais Rojo, OT GO 1                 Thais Rojo OT  11/21/2024

## 2024-11-21 NOTE — PROGRESS NOTES
[facility] Medicine Services  PROGRESS NOTE    Patient Name: Marilyn Kunz  : 1945  MRN: [id number]    Date of Admission: 11/15/2024  Primary Care Physician: Peter Baca MD    Subjective   Subjective     CC:  Syncope, hyperglycemia    HPI:  Patient is a 79-year-old female seen and examined by me this a.m., she is resting comfortably in bed and denies any new acute complaints or problems.  She reports overall still feeling weak and is hopeful for possible rehab.  No other acute changes or problems overnight      Objective   Objective     Vital Signs:   Temp:  [97.9 °F (36.6 °C)-98.6 °F (37 °C)] 98.6 °F (37 °C)  Heart Rate:  [65-69] 66  Resp:  [16-18] 16  BP: (135-169)/(54-72) 162/61  Flow (L/min) (Oxygen Therapy):  [2] 2     Physical exam:  Constitutional: No acute distress, awake, alert.  Resting in bed.  On 2L NC, frail and elderly appearing   HENT: NCAT, mucous membranes moist  Respiratory: Clear to auscultation bilaterally, respiratory effort normal   Cardiovascular: Irregular, no murmurs, rubs, or gallops  Gastrointestinal: Positive bowel sounds, soft, nontender, nondistended.  Musculoskeletal: No bilateral ankle edema.  GUNN spontaneously.  Left LE in walking boot.  Psychiatric: Appropriate affect, cooperative  Neurologic: Oriented x 3, strength symmetric in all extremities, Cranial Nerves grossly intact to confrontation, speech clear.  Follows commands.  Skin: No rashes.       Results Reviewed:  LAB RESULTS:      Lab 24  0428 24  0614 24  1020 24  0339 24  0111 11/15/24  2034 11/15/24  1709   WBC 10.35 5.98  --   --  16.40*  --  22.24*   HEMOGLOBIN 9.3* 9.1*  --   --  11.0*  --  12.1   HEMATOCRIT 28.9* 28.5*  --   --  35.0  --  38.4   PLATELETS 183 147  --   --  161  --  172   NEUTROS ABS  --   --   --   --  13.51*  --  19.89*   IMMATURE GRANS (ABS)  --   --   --   --  0.06*  --  0.11*   LYMPHS ABS  --   --   --   --  1.01  --   0.57*   MONOS ABS  --   --   --   --  0.56  --  0.63   EOS ABS  --   --   --   --  1.25*  --  1.01*   MCV 93.2 95.3  --   --  95.9  --  95.5   PROCALCITONIN  --   --   --   --   --   --  5.37*   LACTATE  --   --  1.3 2.2* 2.5* 2.7* 2.6*   HSTROP T  --   --   --  46* 42*  --   --          Lab 11/20/24  0438 11/19/24  0513 11/18/24  0832 11/17/24  2158 11/17/24  0614 11/16/24  0111 11/15/24  1709   SODIUM 142 142 146*  --  146* 139 134*   POTASSIUM 4.3 4.4 4.5 4.8 3.4* 3.4* 3.9   CHLORIDE 108* 110* 115*  --  114* 106 97*   CO2 23.0 22.0 21.0*  --  21.0* 18.0* 21.0*   ANION GAP 11.0 10.0 10.0  --  11.0 15.0 16.0*   BUN 10 13 18  --  34* 52* 50*   CREATININE 0.73 0.71 0.72  --  0.86 1.28* 1.54*   EGFR 83.8 86.6 85.2  --  68.8 42.7* 34.2*   GLUCOSE 190* 267* 87  --  136* 251* 510*   CALCIUM 7.9* 7.9* 8.4*  --  8.0* 7.8* 8.8   IONIZED CALCIUM 1.10* 1.10* 1.16  --   --   --   --    MAGNESIUM 1.8 1.8 2.0  --  1.9 1.8  --    PHOSPHORUS 3.2 3.6 2.2*  --   --  2.6  --    HEMOGLOBIN A1C  --   --   --   --   --   --  8.00*         Lab 11/15/24  1709   TOTAL PROTEIN 7.1   ALBUMIN 3.6   GLOBULIN 3.5   ALT (SGPT) 22   AST (SGOT) 41*   BILIRUBIN 0.9   ALK PHOS 71         Lab 11/16/24  0339 11/16/24  0111   HSTROP T 46* 42*                 Lab 11/15/24  1711   FIO2 21   CARBOXYHEMOGLOBIN (VENOUS) 0.2     Brief Urine Lab Results  (Last result in the past 365 days)        Color   Clarity   Blood   Leuk Est   Nitrite   Protein   CREAT   Urine HCG        11/15/24 1739 Dark Yellow   Cloudy   Negative   Moderate (2+)   Negative   30 mg/dL (1+)                   Microbiology Results Abnormal       Procedure Component Value - Date/Time    Urine Culture - Urine, Urine, Clean Catch [[id number]]  (Abnormal)  (Susceptibility) Collected: 11/15/24 1739    Lab Status: Final result Specimen: Urine, Clean Catch Updated: 11/17/24 0932     Urine Culture >100,000 CFU/mL Escherichia coli    Narrative:      Colonization of the urinary tract without  infection is common. Treatment is discouraged unless the patient is symptomatic, pregnant, or undergoing an invasive urologic procedure.    Susceptibility        Escherichia coli      SUAD      Amoxicillin + Clavulanate Susceptible      Ampicillin Susceptible      Ampicillin + Sulbactam Susceptible      Cefepime Susceptible      Ceftazidime Susceptible      Ceftriaxone Susceptible      Gentamicin Susceptible      Levofloxacin Susceptible      Nitrofurantoin Susceptible      Piperacillin + Tazobactam Susceptible      Trimethoprim + Sulfamethoxazole Susceptible                                   No radiology results from the last 24 hrs    Results for orders placed during the hospital encounter of 11/15/24    Adult Transthoracic Echo Complete W/ Cont if Necessary Per Protocol    Interpretation Summary    Left ventricular systolic function is normal. Left ventricular ejection fraction appears to be 66 - 70%.    Left ventricular diastolic function was normal.    Left atrial volume is mildly increased.    Estimated right ventricular systolic pressure from tricuspid regurgitation is normal (<35 mmHg).    No significant change compared to 2023 echo      Current medications:  Scheduled Meds:apixaban, 2.5 mg, Oral, Q12H  cetirizine, 10 mg, Oral, Daily  fluticasone, 2 spray, Each Nare, Daily  insulin glargine, 8 Units, Subcutaneous, Nightly  insulin lispro, 2-9 Units, Subcutaneous, 4x Daily With Meals & Nightly  levothyroxine, 75 mcg, Oral, Q AM  montelukast, 10 mg, Oral, Q PM  nebivolol, 10 mg, Oral, Daily  oxybutynin, 5 mg, Oral, BID  pravastatin, 20 mg, Oral, Q PM  pregabalin, 75 mg, Oral, BID  [Held by provider] sacubitril-valsartan, 1 tablet, Oral, BID  sodium chloride, 10 mL, Intravenous, Q12H  terazosin, 2 mg, Oral, Nightly      Continuous Infusions:   PRN Meds:.  acetaminophen    senna-docusate sodium **AND** polyethylene glycol **AND** bisacodyl **AND** bisacodyl    Calcium Replacement - Follow Nurse / BPA Driven  Protocol    dextrose    dextrose    glucagon (human recombinant)    Magnesium Standard Dose Replacement - Follow Nurse / BPA Driven Protocol    melatonin    Phosphorus Replacement - Follow Nurse / BPA Driven Protocol    Potassium Replacement - Follow Nurse / BPA Driven Protocol    sodium chloride    sodium chloride    sodium chloride    Assessment & Plan   Assessment & Plan     Active Hospital Problems    Diagnosis  POA    **UTI (urinary tract infection) [N39.0]  Yes    Type 1 diabetes mellitus with hyperglycemia [E10.65]  Yes    Syncope [R55]  Yes    Elevated serum creatinine [R79.89]  Yes    Sepsis [A41.9]  Yes    Closed fracture of left foot [S92.902A]  Yes    TIFFANIE (obstructive sleep apnea) [G47.33]  Yes    COPD (chronic obstructive pulmonary disease) [J44.9]  Yes    Elevated CK [R74.8]  Yes    Immunosuppression due to drug therapy [D84.821, Z79.899]  Not Applicable    Chronic diastolic congestive heart failure [I50.32]  Yes    History of stroke [Z86.73]  Not Applicable    Psoriatic arthritis [L40.50]  Yes    Factor V Leiden [D68.51]  Yes    Essential hypertension [I10]  Yes    CKD (chronic kidney disease) stage 3, GFR 30-59 ml/min [N18.30]  Yes    Hyperlipidemia LDL goal <70 [E78.5]  Yes    Hypothyroidism [E03.9]  Yes      Resolved Hospital Problems   No resolved problems to display.        Brief Hospital Course to date:  Marilyn Kunz is a 79 y.o. female with hypertension, hyperlipidemia, diastolic heart failure, somatic arthritis, demyelinating disease, type 1 diabetes, CKD stage III, prior CVA, COPD, hypothyroidism, TIFFANIE on CPAP, heterozygous factor V Leiden who presented to the ED with syncope and hyperglycemia.  Patient found to be septic likely from UTI.    This patient's problems and plans were partially entered by my partner and updated as appropriate by me 11/21/24. Patient transferred to my services on the AM of 11/21, pending hopeful rehab placement.     Assessment/Plan:    Sepsis, resolved  UTI  -  pt met sever sepsis criteria causing organ dysfunction on arrival due to ELMER. ELMER, Leukocytosis and elevated lactic acid resolved  - Blood cultures with no growth  x 5 days   - Urine culture growing E. coli, pan susceptible  -IV Rocephin switched to p.o. Ceftin, s/p course.  Completed.    A-fib, new onset  Bradycardia, asymptomatic  - A-fib captured on EKG 11/15  - No known history of arrhythmia  - Rate controlled without any medications  - Likely secondary to sepsis above though more concerning in the setting of syncope at home  - Cardiology consulted, started patient on Eliquis  - Continue telemonitoring  - Nebivolol decreased to 10 mg daily (this was a home medication). Bradycardia improved.  Stable.    Type 1 diabetes with hyperglycemia  Hypoglycemia  - Glucose around 500 on arrival, now patient hypoglycemic on fasting glucose yesterday.  Has since been running 96-2 24.  - continue glargine to 8 units nightly with sliding scale, adjust as needed  -- Patient uses her Dexcom.  She wishes to hold off on any further insulin adjustment today.    Syncope  - CT head negative for acute abnormalities  - Troponins mildly elevated but flat  - EKG showed A-fib as above  - Likely related to sepsis  - Repeat echo largely unremarkable  -PT OT recommended inpatient rehab.  Patient agreeable.  Awaiting placement.    Elevated CK-improved  -CK 1331 on arrival, likely secondary to syncope and being down for unknown amount of time  - Improved to 214  - 2 L of fluid in the ED  - Patient with adequate p.o. intake will discontinue IV fluids     ELMER-resolved  CKD stage III  - Baseline creatinine appears to be around 1  - On arrival creatinine 1.5, improved to 0.73  - Holding nephrotoxic medications    Closed fracture left foot, POA  - Patient being treated as an outpatient by Ortho, currently in a walking boot.  No current pain.  - Likely outpatient follow up after hopeful rehab     Hypothyroidism  - Continue levothyroxine    Diastolic  CHF  Hyperlipidemia  Hypertension  History of CVA  -Continue statin  - Holding Bumex, will reassess patient's volume status. She takes 0.5 every other day.  Stable.  BP slowly increasing.  Lungs clear.  May need to resume tomorrow.    COPD, not in exacerbation  TIFFANIE  -Patient currently not on any inhalers  - Continue CPAP nightly    Arthritis  Demyelinating disease  - following by Dr. Hernandez  - Clarified, patient is no longer on Rinvoq   - Patient follows up for steroid injections but will not require again until following rehab and is due in December   -- Follow-up with Dr. Hernandez on discharge    Expected Discharge Location and Transportation: Inpatient rehab, pending placement   Expected Discharge   Expected Discharge Date: 11/21/2024; Expected Discharge Time:      VTE Prophylaxis:  Pharmacologic & mechanical VTE prophylaxis orders are present.         AM-PAC 6 Clicks Score (PT): 22 (11/20/24 1121)    CODE STATUS:   Code Status and Medical Interventions: CPR (Attempt to Resuscitate); Full Support   Ordered at: 11/15/24 2060     Code Status (Patient has no pulse and is not breathing):    CPR (Attempt to Resuscitate)     Medical Interventions (Patient has pulse or is breathing):    Full Support       HAY Kiser, DO  11/21/24

## 2024-11-22 LAB
ALBUMIN SERPL-MCNC: 2.7 G/DL (ref 3.5–5.2)
ALBUMIN/GLOB SERPL: 1 G/DL
ALP SERPL-CCNC: 56 U/L (ref 39–117)
ALT SERPL W P-5'-P-CCNC: 10 U/L (ref 1–33)
ANION GAP SERPL CALCULATED.3IONS-SCNC: 12 MMOL/L (ref 5–15)
AST SERPL-CCNC: 11 U/L (ref 1–32)
BASOPHILS # BLD AUTO: 0.03 10*3/MM3 (ref 0–0.2)
BASOPHILS NFR BLD AUTO: 0.3 % (ref 0–1.5)
BILIRUB SERPL-MCNC: 0.6 MG/DL (ref 0–1.2)
BUN SERPL-MCNC: 13 MG/DL (ref 8–23)
BUN/CREAT SERPL: 18.1 (ref 7–25)
CALCIUM SPEC-SCNC: 8.1 MG/DL (ref 8.6–10.5)
CHLORIDE SERPL-SCNC: 105 MMOL/L (ref 98–107)
CO2 SERPL-SCNC: 23 MMOL/L (ref 22–29)
CREAT SERPL-MCNC: 0.72 MG/DL (ref 0.57–1)
DEPRECATED RDW RBC AUTO: 50.1 FL (ref 37–54)
EGFRCR SERPLBLD CKD-EPI 2021: 85.2 ML/MIN/1.73
EOSINOPHIL # BLD AUTO: 0.54 10*3/MM3 (ref 0–0.4)
EOSINOPHIL NFR BLD AUTO: 5.6 % (ref 0.3–6.2)
ERYTHROCYTE [DISTWIDTH] IN BLOOD BY AUTOMATED COUNT: 14.5 % (ref 12.3–15.4)
GLOBULIN UR ELPH-MCNC: 2.6 GM/DL
GLUCOSE BLDC GLUCOMTR-MCNC: 106 MG/DL (ref 70–130)
GLUCOSE BLDC GLUCOMTR-MCNC: 115 MG/DL (ref 70–130)
GLUCOSE BLDC GLUCOMTR-MCNC: 266 MG/DL (ref 70–130)
GLUCOSE BLDC GLUCOMTR-MCNC: 288 MG/DL (ref 70–130)
GLUCOSE BLDC GLUCOMTR-MCNC: 79 MG/DL (ref 70–130)
GLUCOSE SERPL-MCNC: 51 MG/DL (ref 65–99)
HCT VFR BLD AUTO: 31.2 % (ref 34–46.6)
HGB BLD-MCNC: 9.8 G/DL (ref 12–15.9)
IMM GRANULOCYTES # BLD AUTO: 0.1 10*3/MM3 (ref 0–0.05)
IMM GRANULOCYTES NFR BLD AUTO: 1 % (ref 0–0.5)
LYMPHOCYTES # BLD AUTO: 1.61 10*3/MM3 (ref 0.7–3.1)
LYMPHOCYTES NFR BLD AUTO: 16.8 % (ref 19.6–45.3)
MAGNESIUM SERPL-MCNC: 2 MG/DL (ref 1.6–2.4)
MCH RBC QN AUTO: 30 PG (ref 26.6–33)
MCHC RBC AUTO-ENTMCNC: 31.4 G/DL (ref 31.5–35.7)
MCV RBC AUTO: 95.4 FL (ref 79–97)
MONOCYTES # BLD AUTO: 0.94 10*3/MM3 (ref 0.1–0.9)
MONOCYTES NFR BLD AUTO: 9.8 % (ref 5–12)
NEUTROPHILS NFR BLD AUTO: 6.37 10*3/MM3 (ref 1.7–7)
NEUTROPHILS NFR BLD AUTO: 66.5 % (ref 42.7–76)
NRBC BLD AUTO-RTO: 0 /100 WBC (ref 0–0.2)
PLATELET # BLD AUTO: 196 10*3/MM3 (ref 140–450)
PMV BLD AUTO: 12.2 FL (ref 6–12)
POTASSIUM SERPL-SCNC: 4.2 MMOL/L (ref 3.5–5.2)
PROT SERPL-MCNC: 5.3 G/DL (ref 6–8.5)
RBC # BLD AUTO: 3.27 10*6/MM3 (ref 3.77–5.28)
SODIUM SERPL-SCNC: 140 MMOL/L (ref 136–145)
WBC NRBC COR # BLD AUTO: 9.59 10*3/MM3 (ref 3.4–10.8)

## 2024-11-22 PROCEDURE — 97535 SELF CARE MNGMENT TRAINING: CPT

## 2024-11-22 PROCEDURE — 97110 THERAPEUTIC EXERCISES: CPT

## 2024-11-22 PROCEDURE — 99232 SBSQ HOSP IP/OBS MODERATE 35: CPT | Performed by: FAMILY MEDICINE

## 2024-11-22 PROCEDURE — 63710000001 INSULIN LISPRO (HUMAN) PER 5 UNITS: Performed by: FAMILY MEDICINE

## 2024-11-22 PROCEDURE — 80053 COMPREHEN METABOLIC PANEL: CPT | Performed by: FAMILY MEDICINE

## 2024-11-22 PROCEDURE — 97530 THERAPEUTIC ACTIVITIES: CPT

## 2024-11-22 PROCEDURE — 82948 REAGENT STRIP/BLOOD GLUCOSE: CPT

## 2024-11-22 PROCEDURE — 83735 ASSAY OF MAGNESIUM: CPT | Performed by: FAMILY MEDICINE

## 2024-11-22 PROCEDURE — 85025 COMPLETE CBC W/AUTO DIFF WBC: CPT | Performed by: FAMILY MEDICINE

## 2024-11-22 RX ADMIN — MONTELUKAST 10 MG: 10 TABLET, FILM COATED ORAL at 17:14

## 2024-11-22 RX ADMIN — APIXABAN 2.5 MG: 2.5 TABLET, FILM COATED ORAL at 21:02

## 2024-11-22 RX ADMIN — TERAZOSIN HYDROCHLORIDE 2 MG: 2 CAPSULE ORAL at 21:02

## 2024-11-22 RX ADMIN — PRAVASTATIN SODIUM 20 MG: 20 TABLET ORAL at 17:14

## 2024-11-22 RX ADMIN — Medication 10 ML: at 21:02

## 2024-11-22 RX ADMIN — PREGABALIN 75 MG: 75 CAPSULE ORAL at 09:10

## 2024-11-22 RX ADMIN — INSULIN LISPRO 8 UNITS: 100 INJECTION, SOLUTION INTRAVENOUS; SUBCUTANEOUS at 17:14

## 2024-11-22 RX ADMIN — LEVOTHYROXINE SODIUM 75 MCG: 0.07 TABLET ORAL at 06:10

## 2024-11-22 RX ADMIN — CETIRIZINE HYDROCHLORIDE 10 MG: 10 TABLET, FILM COATED ORAL at 09:10

## 2024-11-22 RX ADMIN — INSULIN LISPRO 8 UNITS: 100 INJECTION, SOLUTION INTRAVENOUS; SUBCUTANEOUS at 12:31

## 2024-11-22 RX ADMIN — OXYBUTYNIN CHLORIDE 5 MG: 5 TABLET ORAL at 21:02

## 2024-11-22 RX ADMIN — NEBIVOLOL 10 MG: 5 TABLET ORAL at 10:56

## 2024-11-22 RX ADMIN — PREGABALIN 75 MG: 75 CAPSULE ORAL at 21:02

## 2024-11-22 RX ADMIN — Medication 10 ML: at 09:10

## 2024-11-22 RX ADMIN — OXYBUTYNIN CHLORIDE 5 MG: 5 TABLET ORAL at 09:10

## 2024-11-22 RX ADMIN — APIXABAN 2.5 MG: 2.5 TABLET, FILM COATED ORAL at 09:10

## 2024-11-22 RX ADMIN — FLUTICASONE PROPIONATE 2 SPRAY: 50 SPRAY, METERED NASAL at 09:10

## 2024-11-22 NOTE — THERAPY TREATMENT NOTE
Patient Name: Marilyn Kunz  : 1945    MRN: [id number]                              Today's Date: 2024       Admit Date: 11/15/2024    Visit Dx:     ICD-10-CM ICD-9-CM   1. Urinary tract infection without hematuria, site unspecified  N39.0 599.0   2. Generalized weakness  R53.1 780.79   3. Hyperglycemia  R73.9 790.29   4. Renal insufficiency  N28.9 593.9   5. Bandemia  D72.825 288.66     Patient Active Problem List   Diagnosis    Essential hypertension    Factor V Leiden    Psoriatic arthritis    At risk for venous thromboembolism (VTE)    Hypothyroidism    CKD (chronic kidney disease) stage 3, GFR 30-59 ml/min    Hyperlipidemia LDL goal <70    PDA (patent ductus arteriosus)    Demyelinating disease    Spondylolisthesis, grade 2    Type 1 diabetes mellitus    History of stroke    Demyelinating disease of central nervous system, unspecified    Chronic diastolic congestive heart failure    Peripheral polyneuropathy    Immunosuppression due to drug therapy    Erosive osteoarthritis    Cervical radiculopathy    Rash    UTI (urinary tract infection)    Type 1 diabetes mellitus with hyperglycemia    Syncope    Elevated serum creatinine    Sepsis    Closed fracture of left foot    TIFFANIE (obstructive sleep apnea)    COPD (chronic obstructive pulmonary disease)    Elevated CK     Past Medical History:   Diagnosis Date    Abnormal ECG Check FPA Date or [facility] [employer]    [facility]    Acute sinusitis 2023    Anemia     Asthma     CAP (community acquired pneumonia) 2023    CHF (congestive heart failure) 2023    Chronic kidney disease     pt told it was dx from Dr Baca and to not eat much protein    Congenital heart disease 's Patent 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    Surgery [facility] s close    COPD (chronic obstructive pulmonary disease) 11/15/2024    CTS (carpal tunnel syndrome)     Psoriatic arthritis hands could be    Deep vein thrombosis No on  blood clots but have a blood clotting disorder called Leiden Factor 5    Demyelinating disease of central nervous system, unspecified 03/09/2023    Diabetes     Impression: Patient states that her diabetes is not doing well.  I asked her to go ahead and try to talk to her endocrinokogist.  She is still having the lower extremity neuropathic pan, but not enough for me to administer medication.    Disease of thyroid gland     Diverticulitis of colon     Erosive osteoarthritis 05/30/2024    Factor 5 Leiden mutation, heterozygous 2012    Head injury     Hyperlipidemia     Hypertension     Medication monitoring encounter     Impression:  She has renal insufficiency.  She is just taking Tylenol.    Movement disorder 10/2021    knee replacement left knww    Murmur, cardiac     Neuropathy in diabetes     redness swelling legs    TIFFANIE (obstructive sleep apnea) 11/15/2024    Osteopenia     Story: Femoral Neck Impression: Up-ro-date on bone density scan.  Follow up bone density every 2 years.    Peripheral neuropathy 05/03/2022    hands, arms. 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  I went over her exercises and told her to get a shoe insert,    Psoriasis     Impression: No rash    Psoriatic arthritis     hands    Sleep apnea Always    diabetic do not sleep well up and down    Stroke had one don't know when    showed on MRI Brain    Syncope 11/15/2024    Type 1 diabetes      Past Surgical History:   Procedure Laterality Date    CARDIAC CATHETERIZATION  1952 2 of them    Patent Dictus operation    CATARACT EXTRACTION Bilateral     COLONOSCOPY      ENDOSCOPY N/A 10/20/2020    Procedure: ESOPHAGOGASTRODUODENOSCOPY;  Surgeon: Brunner, Mark I, MD;  Location: [facility] ENDOSCOPY;  Service: Gastroenterology;  Laterality: N/A;    HAND SURGERY Left 1987    no hardware    KNEE ARTHROPLASTY      KNEE SURGERY Left     PATENT DUCTUS ARTERIOUS LIGATION      REPLACEMENT TOTAL  KNEE Left       General Information       Row Name 11/22/24 1554          OT Time and Intention    Document Type therapy note (daily note)  -     Mode of Treatment occupational therapy  -       Row Name 11/22/24 1557          General Information    Patient Profile Reviewed yes  -     Existing Precautions/Restrictions fall;other (see comments)  LLE WBAT w/ boot  -     Barriers to Rehab none identified  -       Row Name 11/22/24 1554          Cognition    Orientation Status (Cognition) oriented x 4  -       Row Name 11/22/24 1551          Safety Issues/Impairments Affecting Functional Mobility    Safety Issues Affecting Function (Mobility) safety precaution awareness;safety precautions follow-through/compliance;insight into deficits/self-awareness  -     Impairments Affecting Function (Mobility) endurance/activity tolerance;balance  -               User Key  (r) = Recorded By, (t) = Taken By, (c) = Cosigned By      Initials Name Provider Type     Zahida Jane OT Occupational Therapist                     Mobility/ADL's       Row Name 11/22/24 3874          Bed Mobility    Sit-Supine [place] (Bed Mobility) modified [facility]     Assistive Device (Bed Mobility) head of bed elevated;bed rails  -       Row Name 11/22/24 1554          Sit-Stand Transfer    Sit-Stand [place] (Transfers) modified [facility]     Assistive Device (Sit-Stand Transfers) walker, front-wheeled  -       Row Name 11/22/24 7844          Stand-Sit Transfer    Stand-Sit [place] (Transfers) modified [facility]     Assistive Device (Stand-Sit Transfers) walker, front-wheeled  -       Row Name 11/22/24 1724          Toilet Transfer    [place] Level (Toilet Transfer) modified [facility]     Assistive Device (Toilet Transfer) commode;grab bars/safety frame;walker, front-wheeled  -       Row Name 11/22/24 3962          Functional Mobility    Functional Mobility- Ind. Level contact  guard assist  -     Functional Mobility- Device walker, front-wheeled  -     Functional Mobility-Distance (Feet) --  HH distances  -[facility] Name 11/22/24 1554          Activities of Daily Living    BADL Assessment/Intervention toileting  -[facility] Name 11/22/24 1554          Mobility    Extremity Weight-bearing Status left lower extremity  -     Left Lower Extremity (Weight-bearing Status) weight-bearing as tolerated (WBAT)  -[facility] Name 11/22/24 1554          Lower Body Dressing Assessment/Training    Comment, (Lower Body Dressing) Pt demo figure 4 to adjust L sock while seated EOB  -[facility] Name 11/22/24 1554          Toileting Assessment/Training    [place] Level (Toileting) adjust/manage clothing;perform perineal hygiene;standby assist  -     Assistive Devices (Toileting) commode;grab bar/safety frame  -     Position (Toileting) unsupported standing;unsupported sitting  -               User Key  (r) = Recorded By, (t) = Taken By, (c) = Cosigned By      Initials Name Provider Type    Zahida Randolph OT Occupational Therapist                   Obj/Interventions       [facility] Name 11/22/24 1556          Balance    Static Sitting Balance independent  -     Dynamic Sitting Balance independent  -     Position, Sitting Balance unsupported;sitting in chair  -     Static Standing Balance standby assist  -     Dynamic Standing Balance standby assist  -     Position/Device Used, Standing Balance supported  -     Balance Interventions sitting;standing;sit to stand;dynamic;static;supported;occupation based/functional task  -               User Key  (r) = Recorded By, (t) = Taken By, (c) = Cosigned By      Initials Name Provider Type    Zahida Randolph OT Occupational Therapist                   Goals/Plan    No documentation.                  Clinical Impression       [facility] Name 11/22/24 1556          Pain Assessment    Pretreatment Pain Rating 0/10 - no pain  -      Posttreatment Pain Rating 0/10 - no pain  -       Row Name 11/22/24 1556          Plan of Care Review    Plan of Care Reviewed With patient  -     Progress improving  -     Outcome Evaluation Pt presents to OT tx session w/ good effort. Pt able to demo all mobility and self-care teddy- CGA. CGA for more complex dynamic balance. D/c rec is home w/ assist as needed.  -       Row Name 11/22/24 1556          Therapy Plan Review/Discharge Plan (OT)    Anticipated Discharge Disposition (OT) home with assist  -       Row Name 11/22/24 1556          Vital Signs    Pre Patient Position Sitting  -     Intra Patient Position Standing  -KL     Post Patient Position Supine  -       Row Name 11/22/24 1556          Positioning and Restraints    Pre-Treatment Position sitting in chair/recliner  -KL     Post Treatment Position bed  -KL     In Bed notified nsg;fowlers;call light within reach;encouraged to call for assist;exit alarm on;side rails up x3  -KL               User Key  (r) = Recorded By, (t) = Taken By, (c) = Cosigned By      Initials Name Provider Type    Zahida Randolph, OT Occupational Therapist                   Outcome Measures       Row Name 11/22/24 1558          How much help from another is currently needed...    Putting on and taking off regular lower body clothing? 4  -KL     Bathing (including washing, rinsing, and drying) 4  -KL     Toileting (which includes using toilet bed pan or urinal) 4  -KL     Putting on and taking off regular upper body clothing 4  -KL     Taking care of personal grooming (such as brushing teeth) 4  -KL     Eating meals 4  -KL     AM-PAC 6 Clicks Score (OT) 24  -KL       Row Name 11/22/24 1153 11/22/24 0800       How much help from another person do you currently need...    Turning from your back to your side while in flat bed without using bedrails? 4  -ER 4  -SP    Moving from lying on back to sitting on the side of a flat bed without bedrails? 4  -ER 4  -SP    Moving to  and from a bed to a chair (including a wheelchair)? 3  -ER 3  -SP    Standing up from a chair using your arms (e.g., wheelchair, bedside chair)? 4  -ER 4  -SP    Climbing 3-5 steps with a railing? 3  -ER 3  -SP    To walk in hospital room? 4  -ER 4  -SP    AM-PAC 6 Clicks Score (PT) 22  -ER 22  -SP    Highest Level of Mobility Goal 7 --> Walk 25 feet or more  -ER 7 --> Walk 25 feet or more  -SP      Row Name 11/22/24 1558 11/22/24 1153       Functional Assessment    Outcome Measure Options AM-PAC 6 Clicks Daily Activity (OT)  -KL AM-PAC 6 Clicks Basic Mobility (PT)  -ER              User Key  (r) = Recorded By, (t) = Taken By, (c) = Cosigned By      Initials Name Provider Type    ER Princess Wagner, PT Physical Therapist    Lyly Patino, RN Registered Nurse    Zahida Randolph, OT Occupational Therapist                    Occupational Therapy Education       Title: PT OT SLP Therapies (Done)       Topic: Occupational Therapy (Done)       Point: ADL training (Done)       Description:   Instruct learner(s) on proper safety adaptation and remediation techniques during self care or transfers.   Instruct in proper use of assistive devices.                  Learning Progress Summary            Patient Acceptance, E,H, VU by GENO at 11/22/2024 1558    Acceptance, E, NR by SOLA at 11/21/2024 1340    Acceptance, E, VU by YELENA at 11/20/2024 1447    Comment: much improved endurance and balance, shoe use to balance out boot wear, OP recommendation    Acceptance, E,D, VU,NR by YELENA at 11/19/2024 1115    Comment: UE and LE TE, transfer safety, progression to goals    Acceptance, E, NR by LR at 11/16/2024 0835                      Point: Home exercise program (Done)       Description:   Instruct learner(s) on appropriate technique for monitoring, assisting and/or progressing therapeutic exercises/activities.                  Learning Progress Summary            Patient Acceptance, E,D, VU,NR by YELENA at 11/19/2024 1115    Comment: UE  and LE TE, transfer safety, progression to goals                      Point: Precautions (Done)       Description:   Instruct learner(s) on prescribed precautions during self-care and functional transfers.                  Learning Progress Summary            Patient Acceptance, E,H, VU by  at 11/22/2024 1558    Acceptance, E, NR by LR at 11/21/2024 1340    Acceptance, E,D, VU,NR by YELENA at 11/19/2024 1115    Comment: UE and LE TE, transfer safety, progression to goals    Acceptance, E, NR by LR at 11/16/2024 0835                      Point: Body mechanics (Done)       Description:   Instruct learner(s) on proper positioning and spine alignment during self-care, functional mobility activities and/or exercises.                  Learning Progress Summary            Patient Acceptance, E,H, VU by  at 11/22/2024 1558    Acceptance, E, NR by LR at 11/21/2024 1340    Acceptance, E, NR by LR at 11/16/2024 0835                                      User Key       Initials Effective Dates Name Provider Type Discipline     07/11/23 -  Rosalba Hernandez, OT Occupational Therapist OT     02/05/24 -  Zahida Jane OT Occupational Therapist OT     10/09/24 -  Thais Rojo, OT Occupational Therapist OT                  OT Recommendation and Plan     Plan of Care Review  Plan of Care Reviewed With: patient  Progress: improving  Outcome Evaluation: Pt presents to OT tx session w/ good effort. Pt able to demo all mobility and self-care teddy- CGA. CGA for more complex dynamic balance. D/c rec is home w/ assist as needed.     Time Calculation:         Time Calculation- OT       Row Name 11/22/24 1558             Time Calculation- OT    OT Start Time 1425  -KL      OT Received On 11/22/24  -KL         Timed Charges    07632 - OT Therapeutic Activity Minutes 25  -KL      23838 - OT Self Care/Mgmt Minutes 15  -KL         Total Minutes    Timed Charges Total Minutes 40  -KL       Total Minutes 40  -KL                User Key  (r)  = Recorded By, (t) = Taken By, (c) = Cosigned By      Initials Name Provider Type    Zahida Randolph OT Occupational Therapist                  Therapy Charges for Today       Code Description Service Date Service Provider Modifiers Qty    [id number]  OT THERAPEUTIC ACT EA 15 MIN 11/22/2024 Zahida Jane OT GO 2    [id number]  OT SELF CARE/MGMT/TRAIN EA 15 MIN 11/22/2024 Zahida Jane OT GO 1                 Zahida Jane OT  11/22/2024

## 2024-11-22 NOTE — PROGRESS NOTES
[facility] Medicine Services  PROGRESS NOTE    Patient Name: Marilyn Kunz  : 1945  MRN: [id number]    Date of Admission: 11/15/2024  Primary Care Physician: Peter Baca MD    Subjective   Subjective     CC:  Syncope, hyperglycemia    HPI:  Patient is a 79-year-old female seen and examined by me this a.m., she is resting comfortably in bed.  Patient did have some mild hypoglycemia this morning but now resolved and decreased her insulin regimen as she was very hyperglycemic yesterday.  No new acute complaints or problems and pending hopeful rehab placement      Objective   Objective     Vital Signs:   Temp:  [97.6 °F (36.4 °C)-98.7 °F (37.1 °C)] 98 °F (36.7 °C)  Heart Rate:  [51-71] 69  Resp:  [16-18] 16  BP: (108-157)/(46-66) 157/63  Flow (L/min) (Oxygen Therapy):  [2] 2     Physical exam:  Constitutional: No acute distress, awake, alert.  Resting in bed.  On 2L NC, frail and elderly appearing   HENT: NCAT, mucous membranes moist  Respiratory: Clear to auscultation bilaterally, respiratory effort normal   Cardiovascular: Irregular, no murmurs, rubs, or gallops  Gastrointestinal: Positive bowel sounds, soft, nontender, nondistended.  Musculoskeletal: No bilateral ankle edema.  GUNN spontaneously.  Left LE in walking boot.  Psychiatric: Appropriate affect, cooperative  Neurologic: Oriented x 3, strength symmetric in all extremities, Cranial Nerves grossly intact to confrontation, speech clear.  Follows commands.  Skin: No rashes.       Results Reviewed:  LAB RESULTS:      Lab 24  0359 24  0428 24  0614 24  1020 24  0339 24  0111 11/15/24  2034 11/15/24  1709   WBC 9.59 10.35 5.98  --   --  16.40*  --  22.24*   HEMOGLOBIN 9.8* 9.3* 9.1*  --   --  11.0*  --  12.1   HEMATOCRIT 31.2* 28.9* 28.5*  --   --  35.0  --  38.4   PLATELETS 196 183 147  --   --  161  --  172   NEUTROS ABS 6.37  --   --   --   --  13.51*  --  19.89*   IMMATURE  GRANS (ABS) 0.10*  --   --   --   --  0.06*  --  0.11*   LYMPHS ABS 1.61  --   --   --   --  1.01  --  0.57*   MONOS ABS 0.94*  --   --   --   --  0.56  --  0.63   EOS ABS 0.54*  --   --   --   --  1.25*  --  1.01*   MCV 95.4 93.2 95.3  --   --  95.9  --  95.5   PROCALCITONIN  --   --   --   --   --   --   --  5.37*   LACTATE  --   --   --  1.3 2.2* 2.5* 2.7* 2.6*   HSTROP T  --   --   --   --  46* 42*  --   --          Lab 11/22/24  0359 11/20/24  0438 11/19/24  0513 11/18/24  0832 11/17/24  2158 11/17/24  0614 11/16/24  0111 11/15/24  1709 11/15/24  1709   SODIUM 140 142 142 146*  --  146* 139  --  134*   POTASSIUM 4.2 4.3 4.4 4.5 4.8 3.4* 3.4*  --  3.9   CHLORIDE 105 108* 110* 115*  --  114* 106  --  97*   CO2 23.0 23.0 22.0 21.0*  --  21.0* 18.0*  --  21.0*   ANION GAP 12.0 11.0 10.0 10.0  --  11.0 15.0  --  16.0*   BUN 13 10 13 18  --  34* 52*  --  50*   CREATININE 0.72 0.73 0.71 0.72  --  0.86 1.28*  --  1.54*   EGFR 85.2 83.8 86.6 85.2  --  68.8 42.7*  --  34.2*   GLUCOSE 51* 190* 267* 87  --  136* 251*  --  510*   CALCIUM 8.1* 7.9* 7.9* 8.4*  --  8.0* 7.8*  --  8.8   IONIZED CALCIUM  --  1.10* 1.10* 1.16  --   --   --   --   --    MAGNESIUM 2.0 1.8 1.8 2.0  --  1.9 1.8   < >  --    PHOSPHORUS  --  3.2 3.6 2.2*  --   --  2.6  --   --    HEMOGLOBIN A1C  --   --   --   --   --   --   --   --  8.00*    < > = values in this interval not displayed.         Lab 11/22/24  0359 11/15/24  1709   TOTAL PROTEIN 5.3* 7.1   ALBUMIN 2.7* 3.6   GLOBULIN 2.6 3.5   ALT (SGPT) 10 22   AST (SGOT) 11 41*   BILIRUBIN 0.6 0.9   ALK PHOS 56 71         Lab 11/16/24  0339 11/16/24  0111   HSTROP T 46* 42*                 Lab 11/15/24  1711   FIO2 21   CARBOXYHEMOGLOBIN (VENOUS) 0.2     Brief Urine Lab Results  (Last result in the past 365 days)        Color   Clarity   Blood   Leuk Est   Nitrite   Protein   CREAT   Urine HCG        11/15/24 1739 Dark Yellow   Cloudy   Negative   Moderate (2+)   Negative   30 mg/dL (1+)                    Microbiology Results Abnormal       Procedure Component Value - Date/Time    Urine Culture - Urine, Urine, Clean Catch [[id number]]  (Abnormal)  (Susceptibility) Collected: 11/15/24 8882    Lab Status: Final result Specimen: Urine, Clean Catch Updated: 11/17/24 0932     Urine Culture >100,000 CFU/mL Escherichia coli    Narrative:      Colonization of the urinary tract without infection is common. Treatment is discouraged unless the patient is symptomatic, pregnant, or undergoing an invasive urologic procedure.    Susceptibility        Escherichia coli      SUAD      Amoxicillin + Clavulanate Susceptible      Ampicillin Susceptible      Ampicillin + Sulbactam Susceptible      Cefepime Susceptible      Ceftazidime Susceptible      Ceftriaxone Susceptible      Gentamicin Susceptible      Levofloxacin Susceptible      Nitrofurantoin Susceptible      Piperacillin + Tazobactam Susceptible      Trimethoprim + Sulfamethoxazole Susceptible                                   No radiology results from the last 24 hrs    Results for orders placed during the hospital encounter of 11/15/24    Adult Transthoracic Echo Complete W/ Cont if Necessary Per Protocol    Interpretation Summary    Left ventricular systolic function is normal. Left ventricular ejection fraction appears to be 66 - 70%.    Left ventricular diastolic function was normal.    Left atrial volume is mildly increased.    Estimated right ventricular systolic pressure from tricuspid regurgitation is normal (<35 mmHg).    No significant change compared to 2023 echo      Current medications:  Scheduled Meds:apixaban, 2.5 mg, Oral, Q12H  cetirizine, 10 mg, Oral, Daily  fluticasone, 2 spray, Each Nare, Daily  insulin glargine, 10 Units, Subcutaneous, Nightly  insulin lispro, 3-14 Units, Subcutaneous, 4x Daily AC & at Bedtime  levothyroxine, 75 mcg, Oral, Q AM  montelukast, 10 mg, Oral, Q PM  nebivolol, 10 mg, Oral, Daily  oxybutynin, 5 mg, Oral, BID  pravastatin, 20  mg, Oral, Q PM  pregabalin, 75 mg, Oral, BID  [Held by provider] sacubitril-valsartan, 1 tablet, Oral, BID  sodium chloride, 10 mL, Intravenous, Q12H  terazosin, 2 mg, Oral, Nightly      Continuous Infusions:   PRN Meds:.  acetaminophen    senna-docusate sodium **AND** polyethylene glycol **AND** bisacodyl **AND** bisacodyl    Calcium Replacement - Follow Nurse / BPA Driven Protocol    dextrose    dextrose    glucagon (human recombinant)    Magnesium Standard Dose Replacement - Follow Nurse / BPA Driven Protocol    melatonin    Phosphorus Replacement - Follow Nurse / BPA Driven Protocol    Potassium Replacement - Follow Nurse / BPA Driven Protocol    sodium chloride    sodium chloride    sodium chloride    Assessment & Plan   Assessment & Plan     Active Hospital Problems    Diagnosis  POA    **UTI (urinary tract infection) [N39.0]  Yes    Type 1 diabetes mellitus with hyperglycemia [E10.65]  Yes    Syncope [R55]  Yes    Elevated serum creatinine [R79.89]  Yes    Sepsis [A41.9]  Yes    Closed fracture of left foot [S92.902A]  Yes    TIFFANIE (obstructive sleep apnea) [G47.33]  Yes    COPD (chronic obstructive pulmonary disease) [J44.9]  Yes    Elevated CK [R74.8]  Yes    Immunosuppression due to drug therapy [D84.821, Z79.899]  Not Applicable    Chronic diastolic congestive heart failure [I50.32]  Yes    History of stroke [Z86.73]  Not Applicable    Psoriatic arthritis [L40.50]  Yes    Factor V Leiden [D68.51]  Yes    Essential hypertension [I10]  Yes    CKD (chronic kidney disease) stage 3, GFR 30-59 ml/min [N18.30]  Yes    Hyperlipidemia LDL goal <70 [E78.5]  Yes    Hypothyroidism [E03.9]  Yes      Resolved Hospital Problems   No resolved problems to display.        Brief Hospital Course to date:  Marilyn Kunz is a 79 y.o. female with hypertension, hyperlipidemia, diastolic heart failure, somatic arthritis, demyelinating disease, type 1 diabetes, CKD stage III, prior CVA, COPD, hypothyroidism, TIFFANIE on CPAP,  heterozygous factor V Leiden who presented to the ED with syncope and hyperglycemia.  Patient found to be septic likely from UTI.    This patient's problems and plans were partially entered by my partner and updated as appropriate by me 11/22/24. Patient transferred to my services on the AM of 11/21, pending hopeful rehab placement. She is pending possible precert to [facility], continue to follow at this time.     Assessment/Plan:    Sepsis, resolved  UTI  - pt met sever sepsis criteria causing organ dysfunction on arrival due to ELMER. ELMER, Leukocytosis and elevated lactic acid resolved  - Blood cultures with no growth  x 5 days   - Urine culture growing E. coli, pan susceptible  -IV Rocephin switched to p.o. Ceftin, s/p course.  Completed.    A-fib, new onset  Bradycardia, asymptomatic  - A-fib captured on EKG 11/15  - No known history of arrhythmia  - Rate controlled without any medications  - Likely secondary to sepsis above though more concerning in the setting of syncope at home  - Cardiology consulted, started patient on Eliquis  - Continue telemonitoring  - Nebivolol decreased to 10 mg daily (this was a home medication). Bradycardia improved.  Stable.    Type 1 diabetes with hyperglycemia  Hypoglycemia  - Glucose around 500 on arrival, now patient hypoglycemic on fasting glucose yesterday.  Has since been running 96-2 24.  - continue glargine to 10 units nightly with sliding scale, adjust as needed  -- Continue to follow, decreased her Lantus back to daily after some hypoglycemia on 11/21, continue to follow     Syncope  - CT head negative for acute abnormalities  - Troponins mildly elevated but flat  - EKG showed A-fib as above  - Likely related to sepsis  - Repeat echo largely unremarkable  -PT OT recommended inpatient rehab.  Patient agreeable.  Awaiting placement.    Elevated CK-improved  -CK 1331 on arrival, likely secondary to syncope and being down for unknown amount of time  - Improved to 214  - 2 L of  fluid in the ED  - Patient with adequate p.o. intake will discontinue IV fluids     ELMER-resolved  CKD stage III  - Baseline creatinine appears to be around 1  - On arrival creatinine 1.5, improved to 0.73  - Holding nephrotoxic medications    Closed fracture left foot, POA  - Patient being treated as an outpatient by Ortho, currently in a walking boot.  No current pain.  - Likely outpatient follow up after hopeful rehab     Hypothyroidism  - Continue levothyroxine    Diastolic CHF  Hyperlipidemia  Hypertension  History of CVA  -Continue statin  - Holding Bumex, will reassess patient's volume status. She takes 0.5 every other day.  Stable.  BP slowly increasing.  Lungs clear.  May need to resume tomorrow.    COPD, not in exacerbation  TIFFANIE  -Patient currently not on any inhalers  - Continue CPAP nightly    Arthritis  Demyelinating disease  - following by Dr. Hernandez  - Clarified, patient is no longer on Rinvoq   - Patient follows up for steroid injections but will not require again until following rehab and is due in December   -- Follow-up with Dr. Hernandez on discharge    Expected Discharge Location and Transportation: Inpatient rehab, ? [facility] pending insurance preauth   Expected Discharge   Expected Discharge Date: 11/21/2024; Expected Discharge Time:      VTE Prophylaxis:  Pharmacologic & mechanical VTE prophylaxis orders are present.         AM-PAC 6 Clicks Score (PT): 22 (11/22/24 1153)    CODE STATUS:   Code Status and Medical Interventions: CPR (Attempt to Resuscitate); Full Support   Ordered at: 11/15/24 8599     Code Status (Patient has no pulse and is not breathing):    CPR (Attempt to Resuscitate)     Medical Interventions (Patient has pulse or is breathing):    Full Support       HAY Kiser, DO  11/22/24

## 2024-11-22 NOTE — CASE MANAGEMENT/SOCIAL WORK
Continued Stay Note  [facility]     Patient Name: Marilyn Kunz  MRN: [id number]  Today's Date: 11/22/2024    Admit Date: 11/15/2024    Plan: discharge plan   Discharge Plan       Row Name 11/22/24 1516       Plan    Plan discharge plan    Plan Comments Per serafin Maria still pending. CM will need to follow up with weekend [facility] liaison. If approved over weekend, will need w/c transport.  IMM letter discussed and signed. CM will cont to follow    Final Discharge Disposition Code 03 - skilled nursing facility (SNF)                   Discharge Codes    No documentation.                 Expected Discharge Date and Time       Expected Discharge Date Expected Discharge Time    Nov 26, 2024               Mery Correa RN

## 2024-11-22 NOTE — PLAN OF CARE
Goal Outcome Evaluation:  Plan of Care Reviewed With: patient        Progress: improving  Outcome Evaluation: Pt presents to OT tx session w/ good effort. Pt able to demo all mobility and self-care teddy- CGA. CGA for more complex dynamic balance. D/c rec is home w/ assist as needed.    Anticipated Discharge Disposition (OT): home with assist

## 2024-11-22 NOTE — DISCHARGE PLACEMENT REQUEST
"Marilyn Kunz (79 y.o. Female)       Date of Birth   1945    Social Security Number       Address   [address] [place] [id number]    Home Phone   [phone]    MRN   [id number]       Advent   Spiritism    Marital Status   Single                            Admission Date   11/15/24    Admission Type   Emergency    Admitting Provider   JOSE Kiser DO    Attending Provider   JOSE Kiser DO    Department, Room/Bed   [facility] 6A, N609/1       Discharge Date       Discharge Disposition       Discharge Destination                                 Attending Provider: JOSE Kiser DO    Allergies: Atorvastatin, Colesevelam, Cymbalta [Duloxetine Hcl], Famotidine, Cephalexin, Clindamycin, Hygroton [Chlorthalidone], Lidocaine, Penicillins    Isolation: None   Infection: None   Code Status: CPR    Ht: 149.9 cm (59\")   Wt: 59.9 kg (132 lb)    Admission Cmt: None   Principal Problem: UTI (urinary tract infection) [N39.0]                   Active Insurance as of 11/15/2024       Primary Coverage       Payor Plan Insurance Group Employer/Plan Group    HUMANA MEDICARE REPLACEMENT HUMANA MED ADV HMO [id number]       Payor Plan Address Payor Plan Phone Number Payor Plan Fax Number Effective Dates    [address] [address] [phone]  2023 - None Entered    [facility] [id number]         Subscriber Name Subscriber Birth Date Member ID       MARILYN KUNZ 1945 [id number]                     Emergency Contacts        (Rel.) Home Phone Work Phone Mobile Phone    KunzTaisha (Relative) [phone] -- [phone]    JOSUE BOND (Relative) [phone]                 History & Physical        Sara Blankenship MD at 11/15/24 2115              [facility] Medicine Services  HISTORY AND PHYSICAL    Patient Name: Marilyn Kunz  : 1945  MRN: [id number]  Primary Care Physician: Peter Baca, " MD  Date of admission: 11/15/2024    Subjective  Subjective     Chief Complaint:  Syncope, hyperglycemia     HPI:  Marilyn Kunz is a 79 y.o. female w/ a hx of HTN, HLD, diastolic CHF, psoriatic arthritis, demyelinating disease (on Imuran- followed by Dr. Hernandez), T1DM, CKD Stage III, prior CVA, COPD, hypothyroidism, TIFFANIE on CPAP, heterozygous factor V Leiden who presented to the ED w/ c/o syncope and hyperglycemia.   Pt reports that she has not felt well for several weeks 2/2 persistent hyperglycemia. Pt was seen by her PCP yesterday and had labs collected. Pt remembers being home last night and at some point going to use the restroom but then is unable to recall what happened after until she heard her phone ringing today around 3:30pm. Pt reports that her PCP's office was calling her b/c her glucose on labs yesterday was >1000. Pt was advised to come to the ED for further evaluation. Pt reports that she was on her bathroom floor when she woke to her phone ringing today. She vaguely recalls intermittently waking for brief seconds b/c she remembers her cat checking on her at some point throughout the day. Pt is unsure how long she was on the floor or exactly what happened prior to ending up on the floor.   Pt evaluated in the ED. Glucose 510 initially. UA c/w UTI. WBC 22.24, lactic acid 2.6, procal 5.37. Pt admitted to the hospital medicine service for further evaluation.     Review of Systems   Constitutional:  Positive for fatigue. Negative for chills, fever and unexpected weight change.   HENT: Negative.  Negative for congestion, rhinorrhea, sinus pressure, sinus pain and trouble swallowing.    Eyes: Negative.  Negative for visual disturbance.   Respiratory: Negative.  Negative for cough and shortness of breath.    Cardiovascular: Negative.  Negative for chest pain, palpitations and leg swelling.   Gastrointestinal: Negative.  Negative for abdominal distention, abdominal pain, constipation, diarrhea, nausea  and vomiting.   Endocrine:        Persistent hyperglycemia for last several weeks.    Genitourinary:  Positive for decreased urine volume and urgency. Negative for difficulty urinating, dysuria, flank pain and frequency.   Musculoskeletal: Negative.  Negative for arthralgias and myalgias.   Skin: Negative.  Negative for wound.   Allergic/Immunologic: Positive for immunocompromised state.   Neurological:  Positive for syncope. Negative for dizziness, light-headedness and headaches.   Hematological: Negative.  Does not bruise/bleed easily.   Psychiatric/Behavioral: Negative.  Negative for confusion.    All other systems reviewed and are negative.     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 1950s close    COPD (chronic obstructive pulmonary disease) 11/15/2024    CTS (carpal tunnel syndrome)     Psoriatic arthritis hands could be    Deep vein thrombosis No on blood clots but have a blood clotting disorder called Leiden Factor 5    Demyelinating disease of central nervous system, unspecified 03/09/2023    Diabetes     Impression: Patient states that her diabetes is not doing well.  I asked her to go ahead and try to talk to her endocrinokogist.  She is still having the lower extremity neuropathic pan, but not enough for me to administer medication.    Disease of thyroid gland     Diverticulitis of colon     Erosive osteoarthritis 05/30/2024    Factor 5 Leiden mutation, heterozygous 2012    Head injury     Hyperlipidemia     Hypertension     Medication monitoring encounter      Impression:  She has renal insufficiency.  She is just taking Tylenol.    Movement disorder 10/2021    knee replacement left knww    Murmur, cardiac     Neuropathy in diabetes     redness swelling legs    TIFFANIE (obstructive sleep apnea) 11/15/2024    Osteopenia     Story: Femoral Neck Impression: Up-ro-date on bone density scan.  Follow up bone density every 2 years.    Peripheral neuropathy 05/03/2022    hands, arms. 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  I went over her exercises and told her to get a shoe insert,    Psoriasis     Impression: No rash    Psoriatic arthritis     hands    Sleep apnea Always    diabetic do not sleep well up and down    Stroke had one don't know when    showed on MRI Brain    Syncope 11/15/2024    Type 1 diabetes      Past Surgical History:   Procedure Laterality Date    CARDIAC CATHETERIZATION  1952 2 of them    Patent Dictus operation    CATARACT EXTRACTION Bilateral     COLONOSCOPY      ENDOSCOPY N/A 10/20/2020    Procedure: ESOPHAGOGASTRODUODENOSCOPY;  Surgeon: Brunner, Mark I, MD;  Location: [facility] ENDOSCOPY;  Service: Gastroenterology;  Laterality: N/A;    HAND SURGERY Left 1987    no hardware    KNEE ARTHROPLASTY      KNEE SURGERY Left     PATENT DUCTUS ARTERIOUS LIGATION      REPLACEMENT TOTAL KNEE Left      Family History: family history includes Cancer in her father, mother, and sister; Colon cancer in her sister; Diabetes in her brother and sister; Lung cancer in her father; Pancreatic cancer in her mother; Stroke in her mother.     Social History:  reports that she has never smoked. She has never been exposed to tobacco smoke. She has never used smokeless tobacco. She reports that she does not drink alcohol and does not use drugs.  Social History     Social History Narrative    Independent with adl's.  Single.  Never .  No children.  Lives alone.  Niece-in-law assist with care as needed.   No home  oxygen, HH or DME used currently      Medications:  Fluticasone Propionate (Inhal), Insulin Pen Needle, Upadacitinib ER, acetaminophen, aspirin, azaTHIOprine, bumetanide, fluticasone, hydrALAZINE, insulin NPH, insulin aspart, insulin degludec, irbesartan-hydrochlorothiazide, levocetirizine, levothyroxine, metoprolol succinate XL, montelukast, nebivolol, nystatin, oxybutynin, pravastatin, pregabalin, sacubitril-valsartan, and terazosin    Allergies   Allergen Reactions    Atorvastatin Other (See Comments)    Colesevelam Other (See Comments)    Cymbalta [Duloxetine Hcl] Other (See Comments)     Hyponatremia    Famotidine Other (See Comments)    Cephalexin Rash     Tolerated Ceftriaxone    Clindamycin Rash    Hygroton [Chlorthalidone] Rash     Facial rash    Lidocaine Other (See Comments)     Sores in throat    Penicillins Nausea And Vomiting, Rash and Other (See Comments)     Has tolerated ceftriaxone  TROUBLE BREATHING     Objective  Objective     Vital Signs:   Temp:  [98.2 °F (36.8 °C)-98.6 °F (37 °C)] 98.6 °F (37 °C)  Heart Rate:  [] 86  Resp:  [17-18] 17  BP: (118-168)/(55-73) 118/55    Physical Exam     Constitutional: Awake, alert; non-toxic appearing   Eyes: PERRLA, sclerae anicteric, no conjunctival injection  HENT: NCAT, mucous membranes dry   Neck: Supple, no thyromegaly, no lymphadenopathy, trachea midline  Respiratory: Clear to auscultation bilaterally, nonlabored respirations   Cardiovascular: RRR, no murmurs, rubs, or gallops, RLE without edema; LLE in boot   Gastrointestinal: Positive bowel sounds, soft, nontender, nondistended  Musculoskeletal: Normal ROM bilaterally (LLE/foot in boot)  Psychiatric: Appropriate affect, cooperative  Neurologic: Oriented x 3, strength symmetric in all extremities, speech clear  Skin: No rashes, lesions or wounds     Result Review:  I have personally reviewed the results from the time of this admission to 11/15/2024 21:45 EST and agree with these findings:  [x]   Laboratory list / accordion  []  Microbiology  [x]  Radiology  []  EKG/Telemetry   []  Cardiology/Vascular   []  Pathology  [x]  Old records    LAB RESULTS:      Lab 11/15/24  2034 11/15/24  1709   WBC  --  22.24*   HEMOGLOBIN  --  12.1   HEMATOCRIT  --  38.4   PLATELETS  --  172   NEUTROS ABS  --  19.89*   IMMATURE GRANS (ABS)  --  0.11*   LYMPHS ABS  --  0.57*   MONOS ABS  --  0.63   EOS ABS  --  1.01*   MCV  --  95.5   PROCALCITONIN  --  5.37*   LACTATE 2.7* 2.6*   CK TOTAL  --  1,331*         Lab 11/15/24  1709   SODIUM 134*   POTASSIUM 3.9   CHLORIDE 97*   CO2 21.0*   ANION GAP 16.0*   BUN 50*   CREATININE 1.54*   EGFR 34.2*   GLUCOSE 510*   CALCIUM 8.8   HEMOGLOBIN A1C 8.00*         Lab 11/15/24  1709   TOTAL PROTEIN 7.1   ALBUMIN 3.6   GLOBULIN 3.5   ALT (SGPT) 22   AST (SGOT) 41*   BILIRUBIN 0.9   ALK PHOS 71                     Lab 11/15/24  1711   FIO2 21   CARBOXYHEMOGLOBIN (VENOUS) 0.2     Brief Urine Lab Results  (Last result in the past 365 days)        Color   Clarity   Blood   Leuk Est   Nitrite   Protein   CREAT   Urine HCG        11/15/24 1739 Dark Yellow   Cloudy   Negative   Moderate (2+)   Negative   30 mg/dL (1+)                 Microbiology Results (last 10 days)       ** No results found for the last 240 hours. **          XR Chest 1 View    Result Date: 11/15/2024  XR CHEST 1 VW Date of Exam: 11/15/2024 6:07 PM EST Indication: AMS Protocol Comparison: AP chest x-ray 9/23/2024, AP chest x-ray 9/30/2023, CT chest 7/20/2023, AP chest x-ray 3/2/2023. Findings: Cardiomediastinal contours are stable. Lungs are adequately expanded and appear clear. No pneumothorax or large pleural effusion is seen.     Impression: Impression: No evidence of acute cardiopulmonary abnormality or significant change. Electronically Signed: Sera Mercer  11/15/2024 6:34 PM EST  Workstation ID: [id number]     Results for orders placed in visit on 06/04/24    Echocardiogram Scan    Assessment & Plan  Assessment & Plan       " UTI (urinary tract infection)    Essential hypertension    Factor V Leiden    Psoriatic arthritis    Hypothyroidism    CKD (chronic kidney disease) stage 3, GFR 30-59 ml/min    Hyperlipidemia LDL goal <70    History of stroke    Chronic diastolic congestive heart failure    Immunosuppression due to drug therapy    Type 1 diabetes mellitus with hyperglycemia    Syncope    Elevated serum creatinine    Sepsis    Closed fracture of left foot    TIFFANIE (obstructive sleep apnea)    COPD (chronic obstructive pulmonary disease)    Elevated CK    Marilyn Kunz is a 79 y.o. female w/ a hx of HTN, HLD, diastolic CHF, psoriatic arthritis, demyelinating disease (on Imuran- followed by Dr. Hernandez), T1DM, CKD Stage III, prior CVA, COPD, hypothyroidism, TIFFANIE on CPAP, heterozygous factor V Leiden who presented to the ED w/ c/o syncope and hyperglycemia.     **Sepsis 2/2 UTI   -meets sepsis criteria based on HR 112bpm, WBC 22.24, procal 5.37, lactic acid 2.6, + source per UA  -UA c/w UTI; urine cx pending   -prior urine cx \"mixed emerson\"  -blood cx pending   -IV Rocephin  -s/p 1 liter NS bolus given in ED; repeat 1 liter bolus now   -continue NS @ 100ml/hr  -bladder scan   -symptom mgt    **T1DM w/ hyperglycemia   -glucose 510 in ED  -s/p regular insulin 10 units  -hem A1c 8%  -VBG stable   -s/p 1 liter NS bolus; repeat bolus now and continue NS @ 100ml/hour  -Lantus 10 units nightly   -holding routine novolog for now   -FSBG q ac/hs w/ MDSS  -diabetes educator consult in am   -pt follows w/ [facility]     **Syncope   -pt recalls being home Thursday night and going to use the restroom- then woke today on the bathroom floor to her phone ringing ~3:30pm  -EKG pending, troponin pending   -CT Head wo pending   -IVF per above  -recent ECHO 6/4/24: EF >70%  -orthostatic Bps   -fall precautions/bed alarm   -consider carotid duplex, repeat ECHO     **Elevated CK   -CK 1,331   -sp 2 liters NS bolus   -continue NS @ " 100ml/hour   -repeat labs in am     **Elevated creatinine   **CKD Stage III  -previous creatinine 1.02 on   -current creatinine 1.54 w/ eGFR 34  -UA c/w UTI   -s/p 2 liters NS bolus   -continue NS @ 100ml/hour  -hold nephrotoxic meds for now   -repeat labs in am     **Closed fracture of left foot   -24- XRAY: Acute nondisplaced fracture involving proximal fifth metatarsal without intra-articular extension or joint malalignment.   -pt currently on boot and following w/ Ortho     **Diastolic CHF   **HTN, HLD   **Hx of CVA   -ECHO 2024 w/ EF >70%  -EKG pending   -awaiting med list update (plan to hold diuretics for now)  -monitor I/Os     **COPD; stable   **TIFFANIE   -CXR unremarkable   -CPAP nightly     **Psoriatic arthritis   **Demyelinating disease   -previously on Imuran- followed by Dr. Hernandez for demyelinating disease   -awaiting med list completion     DVT prophylaxis:  Mechanical     CODE STATUS:    Code Status (Patient has no pulse and is not breathing): CPR (Attempt to Resuscitate)  Medical Interventions (Patient has pulse or is breathing): Full Support    Expected DischargeExpected Discharge Date: 2024; Expected Discharge Time:     This note has been completed as part of a split-shared workflow.     Signature:Electronically signed by JAGDISH Norris, 11/15/24, 9:46 PM EST.    Time spent: 55 minutes     Patient seen and examined,  Agree with above,  Sara Blankenship MD 24 00:31 EST             Electronically signed by aSra Blankenship MD at 24 0031          Physician Progress Notes (most recent note)        JOSE Kiser DO at 24 1040              [facility] Medicine Services  PROGRESS NOTE    Patient Name: Marilyn Kunz  : 1945  MRN: [id number]    Date of Admission: 11/15/2024  Primary Care Physician: Peter Baca MD    Subjective   Subjective     CC:  Syncope, hyperglycemia    HPI:  Patient is a 79-year-old female seen  and examined by me this a.m., she is resting comfortably in bed and denies any new acute complaints or problems.  She reports overall still feeling weak and is hopeful for possible rehab.  No other acute changes or problems overnight      Objective   Objective     Vital Signs:   Temp:  [97.9 °F (36.6 °C)-98.6 °F (37 °C)] 98.6 °F (37 °C)  Heart Rate:  [65-69] 66  Resp:  [16-18] 16  BP: (135-169)/(54-72) 162/61  Flow (L/min) (Oxygen Therapy):  [2] 2     Physical exam:  Constitutional: No acute distress, awake, alert.  Resting in bed.  On 2L NC, frail and elderly appearing   HENT: NCAT, mucous membranes moist  Respiratory: Clear to auscultation bilaterally, respiratory effort normal   Cardiovascular: Irregular, no murmurs, rubs, or gallops  Gastrointestinal: Positive bowel sounds, soft, nontender, nondistended.  Musculoskeletal: No bilateral ankle edema.  GUNN spontaneously.  Left LE in walking boot.  Psychiatric: Appropriate affect, cooperative  Neurologic: Oriented x 3, strength symmetric in all extremities, Cranial Nerves grossly intact to confrontation, speech clear.  Follows commands.  Skin: No rashes.       Results Reviewed:  LAB RESULTS:      Lab 11/21/24  0428 11/17/24  0614 11/16/24  1020 11/16/24  0339 11/16/24  0111 11/15/24  2034 11/15/24  1709   WBC 10.35 5.98  --   --  16.40*  --  22.24*   HEMOGLOBIN 9.3* 9.1*  --   --  11.0*  --  12.1   HEMATOCRIT 28.9* 28.5*  --   --  35.0  --  38.4   PLATELETS 183 147  --   --  161  --  172   NEUTROS ABS  --   --   --   --  13.51*  --  19.89*   IMMATURE GRANS (ABS)  --   --   --   --  0.06*  --  0.11*   LYMPHS ABS  --   --   --   --  1.01  --  0.57*   MONOS ABS  --   --   --   --  0.56  --  0.63   EOS ABS  --   --   --   --  1.25*  --  1.01*   MCV 93.2 95.3  --   --  95.9  --  95.5   PROCALCITONIN  --   --   --   --   --   --  5.37*   LACTATE  --   --  1.3 2.2* 2.5* 2.7* 2.6*   HSTROP T  --   --   --  46* 42*  --   --          Lab 11/20/24  0438 11/19/24  0513  11/18/24  0832 11/17/24  2158 11/17/24  0614 11/16/24  0111 11/15/24  1709   SODIUM 142 142 146*  --  146* 139 134*   POTASSIUM 4.3 4.4 4.5 4.8 3.4* 3.4* 3.9   CHLORIDE 108* 110* 115*  --  114* 106 97*   CO2 23.0 22.0 21.0*  --  21.0* 18.0* 21.0*   ANION GAP 11.0 10.0 10.0  --  11.0 15.0 16.0*   BUN 10 13 18  --  34* 52* 50*   CREATININE 0.73 0.71 0.72  --  0.86 1.28* 1.54*   EGFR 83.8 86.6 85.2  --  68.8 42.7* 34.2*   GLUCOSE 190* 267* 87  --  136* 251* 510*   CALCIUM 7.9* 7.9* 8.4*  --  8.0* 7.8* 8.8   IONIZED CALCIUM 1.10* 1.10* 1.16  --   --   --   --    MAGNESIUM 1.8 1.8 2.0  --  1.9 1.8  --    PHOSPHORUS 3.2 3.6 2.2*  --   --  2.6  --    HEMOGLOBIN A1C  --   --   --   --   --   --  8.00*         Lab 11/15/24  1709   TOTAL PROTEIN 7.1   ALBUMIN 3.6   GLOBULIN 3.5   ALT (SGPT) 22   AST (SGOT) 41*   BILIRUBIN 0.9   ALK PHOS 71         Lab 11/16/24  0339 11/16/24  0111   HSTROP T 46* 42*                 Lab 11/15/24  1711   FIO2 21   CARBOXYHEMOGLOBIN (VENOUS) 0.2     Brief Urine Lab Results  (Last result in the past 365 days)        Color   Clarity   Blood   Leuk Est   Nitrite   Protein   CREAT   Urine HCG        11/15/24 1739 Dark Yellow   Cloudy   Negative   Moderate (2+)   Negative   30 mg/dL (1+)                   Microbiology Results Abnormal       Procedure Component Value - Date/Time    Urine Culture - Urine, Urine, Clean Catch [[id number]]  (Abnormal)  (Susceptibility) Collected: 11/15/24 1739    Lab Status: Final result Specimen: Urine, Clean Catch Updated: 11/17/24 0932     Urine Culture >100,000 CFU/mL Escherichia coli    Narrative:      Colonization of the urinary tract without infection is common. Treatment is discouraged unless the patient is symptomatic, pregnant, or undergoing an invasive urologic procedure.    Susceptibility        Escherichia coli      SUAD      Amoxicillin + Clavulanate Susceptible      Ampicillin Susceptible      Ampicillin + Sulbactam Susceptible      Cefepime Susceptible       Ceftazidime Susceptible      Ceftriaxone Susceptible      Gentamicin Susceptible      Levofloxacin Susceptible      Nitrofurantoin Susceptible      Piperacillin + Tazobactam Susceptible      Trimethoprim + Sulfamethoxazole Susceptible                                   No radiology results from the last 24 hrs    Results for orders placed during the hospital encounter of 11/15/24    Adult Transthoracic Echo Complete W/ Cont if Necessary Per Protocol    Interpretation Summary    Left ventricular systolic function is normal. Left ventricular ejection fraction appears to be 66 - 70%.    Left ventricular diastolic function was normal.    Left atrial volume is mildly increased.    Estimated right ventricular systolic pressure from tricuspid regurgitation is normal (<35 mmHg).    No significant change compared to 2023 echo      Current medications:  Scheduled Meds:apixaban, 2.5 mg, Oral, Q12H  cetirizine, 10 mg, Oral, Daily  fluticasone, 2 spray, Each Nare, Daily  insulin glargine, 8 Units, Subcutaneous, Nightly  insulin lispro, 2-9 Units, Subcutaneous, 4x Daily With Meals & Nightly  levothyroxine, 75 mcg, Oral, Q AM  montelukast, 10 mg, Oral, Q PM  nebivolol, 10 mg, Oral, Daily  oxybutynin, 5 mg, Oral, BID  pravastatin, 20 mg, Oral, Q PM  pregabalin, 75 mg, Oral, BID  [Held by provider] sacubitril-valsartan, 1 tablet, Oral, BID  sodium chloride, 10 mL, Intravenous, Q12H  terazosin, 2 mg, Oral, Nightly      Continuous Infusions:   PRN Meds:.  acetaminophen    senna-docusate sodium **AND** polyethylene glycol **AND** bisacodyl **AND** bisacodyl    Calcium Replacement - Follow Nurse / BPA Driven Protocol    dextrose    dextrose    glucagon (human recombinant)    Magnesium Standard Dose Replacement - Follow Nurse / BPA Driven Protocol    melatonin    Phosphorus Replacement - Follow Nurse / BPA Driven Protocol    Potassium Replacement - Follow Nurse / BPA Driven Protocol    sodium chloride    sodium chloride    sodium  chloride    Assessment & Plan   Assessment & Plan     Active Hospital Problems    Diagnosis  POA    **UTI (urinary tract infection) [N39.0]  Yes    Type 1 diabetes mellitus with hyperglycemia [E10.65]  Yes    Syncope [R55]  Yes    Elevated serum creatinine [R79.89]  Yes    Sepsis [A41.9]  Yes    Closed fracture of left foot [S92.902A]  Yes    TIFFANIE (obstructive sleep apnea) [G47.33]  Yes    COPD (chronic obstructive pulmonary disease) [J44.9]  Yes    Elevated CK [R74.8]  Yes    Immunosuppression due to drug therapy [D84.821, Z79.899]  Not Applicable    Chronic diastolic congestive heart failure [I50.32]  Yes    History of stroke [Z86.73]  Not Applicable    Psoriatic arthritis [L40.50]  Yes    Factor V Leiden [D68.51]  Yes    Essential hypertension [I10]  Yes    CKD (chronic kidney disease) stage 3, GFR 30-59 ml/min [N18.30]  Yes    Hyperlipidemia LDL goal <70 [E78.5]  Yes    Hypothyroidism [E03.9]  Yes      Resolved Hospital Problems   No resolved problems to display.        Brief Hospital Course to date:  Marilyn Kunz is a 79 y.o. female with hypertension, hyperlipidemia, diastolic heart failure, somatic arthritis, demyelinating disease, type 1 diabetes, CKD stage III, prior CVA, COPD, hypothyroidism, TIFFANIE on CPAP, heterozygous factor V Leiden who presented to the ED with syncope and hyperglycemia.  Patient found to be septic likely from UTI.    This patient's problems and plans were partially entered by my partner and updated as appropriate by me 11/21/24. Patient transferred to my services on the AM of 11/21, pending hopeful rehab placement.     Assessment/Plan:    Sepsis, resolved  UTI  - pt met sever sepsis criteria causing organ dysfunction on arrival due to ELMER. ELMER, Leukocytosis and elevated lactic acid resolved  - Blood cultures with no growth  x 5 days   - Urine culture growing E. coli, pan susceptible  -IV Rocephin switched to p.o. Ceftin, s/p course.  Completed.    A-fib, new onset  Bradycardia,  asymptomatic  - A-fib captured on EKG 11/15  - No known history of arrhythmia  - Rate controlled without any medications  - Likely secondary to sepsis above though more concerning in the setting of syncope at home  - Cardiology consulted, started patient on Eliquis  - Continue telemonitoring  - Nebivolol decreased to 10 mg daily (this was a home medication). Bradycardia improved.  Stable.    Type 1 diabetes with hyperglycemia  Hypoglycemia  - Glucose around 500 on arrival, now patient hypoglycemic on fasting glucose yesterday.  Has since been running 96-2 24.  - continue glargine to 8 units nightly with sliding scale, adjust as needed  -- Patient uses her Dexcom.  She wishes to hold off on any further insulin adjustment today.    Syncope  - CT head negative for acute abnormalities  - Troponins mildly elevated but flat  - EKG showed A-fib as above  - Likely related to sepsis  - Repeat echo largely unremarkable  -PT OT recommended inpatient rehab.  Patient agreeable.  Awaiting placement.    Elevated CK-improved  -CK 1331 on arrival, likely secondary to syncope and being down for unknown amount of time  - Improved to 214  - 2 L of fluid in the ED  - Patient with adequate p.o. intake will discontinue IV fluids     ELMER-resolved  CKD stage III  - Baseline creatinine appears to be around 1  - On arrival creatinine 1.5, improved to 0.73  - Holding nephrotoxic medications    Closed fracture left foot, POA  - Patient being treated as an outpatient by Ortho, currently in a walking boot.  No current pain.  - Likely outpatient follow up after hopeful rehab     Hypothyroidism  - Continue levothyroxine    Diastolic CHF  Hyperlipidemia  Hypertension  History of CVA  -Continue statin  - Holding Bumex, will reassess patient's volume status. She takes 0.5 every other day.  Stable.  BP slowly increasing.  Lungs clear.  May need to resume tomorrow.    COPD, not in exacerbation  TIFFANIE  -Patient currently not on any inhalers  - Continue  CPAP nightly    Arthritis  Demyelinating disease  - following by Dr. Hernandez  - Clarified, patient is no longer on Rinvoq   - Patient follows up for steroid injections but will not require again until following rehab and is due in December   -- Follow-up with Dr. Hernandez on discharge    Expected Discharge Location and Transportation: Inpatient rehab, pending placement   Expected Discharge   Expected Discharge Date: 2024; Expected Discharge Time:      VTE Prophylaxis:  Pharmacologic & mechanical VTE prophylaxis orders are present.         AM-PAC 6 Clicks Score (PT): 22 (24 1121)    CODE STATUS:   Code Status and Medical Interventions: CPR (Attempt to Resuscitate); Full Support   Ordered at: 11/15/24 2115     Code Status (Patient has no pulse and is not breathing):    CPR (Attempt to Resuscitate)     Medical Interventions (Patient has pulse or is breathing):    Full Support       HAY Kiser DO  24        Electronically signed by JOSE Kiser DO at 24 1341          Physical Therapy Notes (most recent note)        Gail Shields at 24 1032  Version 2 of 2         Patient Name: Marilyn Kunz  : 1945    MRN: [id number]                              Today's Date: 2024       Admit Date: 11/15/2024    Visit Dx:     ICD-10-CM ICD-9-CM   1. Urinary tract infection without hematuria, site unspecified  N39.0 599.0   2. Generalized weakness  R53.1 780.79   3. Hyperglycemia  R73.9 790.29   4. Renal insufficiency  N28.9 593.9   5. Bandemia  D72.825 288.66     Patient Active Problem List   Diagnosis    Essential hypertension    Factor V Leiden    Psoriatic arthritis    At risk for venous thromboembolism (VTE)    Hypothyroidism    CKD (chronic kidney disease) stage 3, GFR 30-59 ml/min    Hyperlipidemia LDL goal <70    PDA (patent ductus arteriosus)    Demyelinating disease    Spondylolisthesis, grade 2    Type 1 diabetes mellitus    History of stroke    Demyelinating disease of  central nervous system, unspecified    Chronic diastolic congestive heart failure    Peripheral polyneuropathy    Immunosuppression due to drug therapy    Erosive osteoarthritis    Cervical radiculopathy    Rash    UTI (urinary tract infection)    Type 1 diabetes mellitus with hyperglycemia    Syncope    Elevated serum creatinine    Sepsis    Closed fracture of left foot    TIFFANIE (obstructive sleep apnea)    COPD (chronic obstructive pulmonary disease)    Elevated CK     Past Medical History:   Diagnosis Date    Abnormal ECG Check [facility] Date or [facility]    [facility]    Acute sinusitis 02/06/2023    Anemia     Asthma     CAP (community acquired pneumonia) 02/06/2023    CHF (congestive heart failure) 07/20/2023    Chronic kidney disease     pt told it was dx from Dr Baca and to not eat much protein    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  I asked her to go ahead and try to talk to her endocrinokogist.  She is still having the lower extremity neuropathic pan, but not enough for me to administer medication.    Disease of thyroid gland     Diverticulitis of colon     Erosive osteoarthritis 05/30/2024    Factor 5 Leiden mutation, heterozygous 2012    Head injury     Hyperlipidemia     Hypertension     Medication monitoring encounter     Impression:  She has renal insufficiency.  She is just taking Tylenol.    Movement disorder 10/2021    knee replacement left knww    Murmur, cardiac     Neuropathy in diabetes     redness swelling legs    TIFFANIE  (obstructive sleep apnea) 11/15/2024    Osteopenia     Story: Femoral Neck Impression: Up-ro-date on bone density scan.  Follow up bone density every 2 years.    Peripheral neuropathy 05/03/2022    hands, arms. 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  I went over her exercises and told her to get a shoe insert,    Psoriasis     Impression: No rash    Psoriatic arthritis     hands    Sleep apnea Always    diabetic do not sleep well up and down    Stroke had one don't know when    showed on MRI Brain    Syncope 11/15/2024    Type 1 diabetes      Past Surgical History:   Procedure Laterality Date    CARDIAC CATHETERIZATION  1952 2 of them    Patent Dictus operation    CATARACT EXTRACTION Bilateral     COLONOSCOPY      ENDOSCOPY N/A 10/20/2020    Procedure: ESOPHAGOGASTRODUODENOSCOPY;  Surgeon: Brunner, Mark I, MD;  Location: [facility] ENDOSCOPY;  Service: Gastroenterology;  Laterality: N/A;    HAND SURGERY Left 1987    no hardware    KNEE ARTHROPLASTY      KNEE SURGERY Left     PATENT DUCTUS ARTERIOUS LIGATION      REPLACEMENT TOTAL KNEE Left       General Information       Row Name 11/20/24 1113          Physical Therapy Time and Intention    Document Type therapy note (daily note)  -ML     Mode of Treatment physical therapy  -ML       Row Name 11/20/24 1113          General Information    Patient Profile Reviewed yes  -ML     Existing Precautions/Restrictions fall;other (see comments)  Closed fracture left foot, WBAT in boot  -ML     Barriers to Rehab medically complex  -ML       Row Name 11/20/24 1113          Cognition    Orientation Status (Cognition) oriented x 4  -ML       Row Name 11/20/24 1113          Safety Issues/Impairments Affecting Functional Mobility    Safety Issues Affecting Function (Mobility) insight into deficits/self-awareness;safety precaution awareness  -ML     Impairments Affecting Function (Mobility) balance;endurance/activity  tolerance;strength  -ML               User Key  (r) = Recorded By, (t) = Taken By, (c) = Cosigned By      Initials Name Provider Type    ML Gail Shields Physical Therapist                   Mobility       Row Name 11/20/24 1116          Bed Mobility    Bed Mobility supine-sit  -ML     Supine-Sit [place] (Bed Mobility) modified independence  -ML     Assistive Device (Bed Mobility) head of bed elevated  -ML       Row Name 11/20/24 1116          Sit-Stand Transfer    Sit-Stand [place] (Transfers) standby assist  -ML     Assistive Device (Sit-Stand Transfers) walker, front-wheeled  -ML       Row Name 11/20/24 1116          Gait/Stairs (Locomotion)    [place] Level (Gait) contact guard;standby assist  initially CGA progressing to SBA  -ML     Assistive Device (Gait) walker, front-wheeled  -ML     Distance in Feet (Gait) 110  -ML     Deviations/Abnormal Patterns (Gait) sam decreased;gait speed decreased;stride length decreased  -ML     Left Sided Gait Deviations weight shift ability decreased  -ML       Row Name 11/20/24 1116          Mobility    Extremity Weight-bearing Status left lower extremity  -ML     Left Lower Extremity (Weight-bearing Status) weight-bearing as tolerated (WBAT)  -ML               User Key  (r) = Recorded By, (t) = Taken By, (c) = Cosigned By      Initials Name Provider Type    ML Gail Shields Physical Therapist                   Obj/Interventions       Row Name 11/20/24 1117          Balance    Balance Assessment sitting static balance;sitting dynamic balance;standing static balance;standing dynamic balance  -ML     Static Sitting Balance independent  -ML     Dynamic Sitting Balance standby assist  -ML     Position, Sitting Balance unsupported;sitting edge of bed  -ML     Static Standing Balance standby assist  -ML     Dynamic Standing Balance standby assist  -ML     Position/Device Used, Standing Balance supported;walker, front-wheeled  -ML     Balance Interventions  sitting;standing;sit to stand;supported;occupation based/functional task  -ML               User Key  (r) = Recorded By, (t) = Taken By, (c) = Cosigned By      Initials Name Provider Type    Gail Cordero Physical Therapist                   Goals/Plan    No documentation.                  Clinical Impression       Row Name 11/20/24 1117          Pain    Pretreatment Pain Rating 0/10 - no pain  -ML     Posttreatment Pain Rating 0/10 - no pain  -ML       Row Name 11/20/24 1117          Plan of Care Review    Plan of Care Reviewed With patient  -ML     Progress improving  -ML     Outcome Evaluation Patient ambulates with RW and CGA progressing to SBA. Patient educated on discharged planning and falls prevention. Patient continues to present below baseline for mobility and would continue to benefit from skilled PT to address strength, balance and activity tolerance deficits.  -ML       Row Name 11/20/24 1117          Positioning and Restraints    Pre-Treatment Position in bed  -ML     Post Treatment Position chair  -ML     In Chair notified nsg;reclined;call light within reach;encouraged to call for assist;exit alarm on;legs elevated  -ML               User Key  (r) = Recorded By, (t) = Taken By, (c) = Cosigned By      Initials Name Provider Type    Gail Cordero Physical Therapist                   Outcome Measures       Row Name 11/20/24 1121          How much help from another person do you currently need...    Turning from your back to your side while in flat bed without using bedrails? 4  -ML     Moving from lying on back to sitting on the side of a flat bed without bedrails? 4  -ML     Moving to and from a bed to a chair (including a wheelchair)? 4  -ML     Standing up from a chair using your arms (e.g., wheelchair, bedside chair)? 4  -ML     Climbing 3-5 steps with a railing? 3  -ML     To walk in hospital room? 3  -ML     AM-PAC 6 Clicks Score (PT) 22  -ML     Highest Level of Mobility Goal 7 --> Walk 25  feet or more  -       Row Name 11/20/24 1446 11/20/24 1121       Functional Assessment    Outcome Measure Options AM-PAC 6 Clicks Daily Activity (OT)  -YELENA AM-PAC 6 Clicks Basic Mobility (PT)  -              User Key  (r) = Recorded By, (t) = Taken By, (c) = Cosigned By      Initials Name Provider Type    Rosabla Matson, OT Occupational Therapist    Gail Cordero Physical Therapist                                 Physical Therapy Education       Title: PT OT SLP Therapies (In Progress)       Topic: Physical Therapy (Done)       Point: Mobility training (Done)       Learning Progress Summary            Patient Acceptance, E, VU,NR by ML at 11/20/2024 1314    Acceptance, E, VU,NR by ML at 11/20/2024 1121    Acceptance, E, VU by ER at 11/18/2024 1623    Comment: pt edu on transfers, FWW use, d/c planning, purpose of PT, need for sitting up    Acceptance, E, VU by NS at 11/16/2024 1545                      Point: Home exercise program (Done)       Learning Progress Summary            Patient Acceptance, E, VU by ER at 11/18/2024 1623    Comment: pt edu on transfers, FWW use, d/c planning, purpose of PT, need for sitting up                      Point: Body mechanics (Done)       Learning Progress Summary            Patient Acceptance, E, VU,NR by ML at 11/20/2024 1314    Acceptance, E, VU,NR by ML at 11/20/2024 1121    Acceptance, E, VU by ER at 11/18/2024 1623    Comment: pt edu on transfers, FWW use, d/c planning, purpose of PT, need for sitting up    Acceptance, E, VU by NS at 11/16/2024 1545                      Point: Precautions (Done)       Learning Progress Summary            Patient Acceptance, E, VU,NR by ML at 11/20/2024 1314    Acceptance, E, VU,NR by ML at 11/20/2024 1121    Acceptance, E, VU by ER at 11/18/2024 1623    Comment: pt edu on transfers, FWW use, d/c planning, purpose of PT, need for sitting up    Acceptance, E, VU by NS at 11/16/2024 1545                                      User Key        Initials Effective Dates Name Provider Type Discipline    NS 21 -  Alisha Kennedy, PT Physical Therapist PT     21 -  Gail Shields Physical Therapist PT    ER 24 -  Princess Wagner PT Physical Therapist PT                  PT Recommendation and Plan     Progress: improving  Outcome Evaluation: Patient ambulates with RW and CGA progressing to SBA. Patient educated on discharged planning and falls prevention. Patient continues to present below baseline for mobility and would continue to benefit from skilled PT to address strength, balance and activity tolerance deficits.     Time Calculation:         PT Charges       Row Name 24 1315             Time Calculation    Start Time 1032  -ML      PT Received On 24  -ML         Timed Charges    64601 - PT Therapeutic Activity Minutes 25  -ML         Total Minutes    Timed Charges Total Minutes 25  -ML       Total Minutes 25  -ML                User Key  (r) = Recorded By, (t) = Taken By, (c) = Cosigned By      Initials Name Provider Type     Gail Shields Physical Therapist                  Therapy Charges for Today       Code Description Service Date Service Provider Modifiers Qty    97120734064 HC PT THERAPEUTIC ACT EA 15 MIN 2024 Gail Shields GP 2            PT G-Codes  Outcome Measure Options: AM-PAC 6 Clicks Daily Activity (OT)  AM-PAC 6 Clicks Score (PT): 22  AM-PAC 6 Clicks Score (OT): 23  PT Discharge Summary  Anticipated Discharge Disposition (PT): home with outpatient therapy services, home with assist    Gail Shields  2024      Electronically signed by Gail Shields at 24 1604       Gail Shields at 24 1032  Version 1 of 2         Patient Name: Marilyn Kunz  : 1945    MRN: [id number]                              Today's Date: 2024       Admit Date: 11/15/2024    Visit Dx:     ICD-10-CM ICD-9-CM   1. Urinary tract infection without hematuria, site unspecified  N39.0 599.0   2. Generalized  weakness  R53.1 780.79   3. Hyperglycemia  R73.9 790.29   4. Renal insufficiency  N28.9 593.9   5. Bandemia  D72.825 288.66     Patient Active Problem List   Diagnosis    Essential hypertension    Factor V Leiden    Psoriatic arthritis    At risk for venous thromboembolism (VTE)    Hypothyroidism    CKD (chronic kidney disease) stage 3, GFR 30-59 ml/min    Hyperlipidemia LDL goal <70    PDA (patent ductus arteriosus)    Demyelinating disease    Spondylolisthesis, grade 2    Type 1 diabetes mellitus    History of stroke    Demyelinating disease of central nervous system, unspecified    Chronic diastolic congestive heart failure    Peripheral polyneuropathy    Immunosuppression due to drug therapy    Erosive osteoarthritis    Cervical radiculopathy    Rash    UTI (urinary tract infection)    Type 1 diabetes mellitus with hyperglycemia    Syncope    Elevated serum creatinine    Sepsis    Closed fracture of left foot    TIFFANIE (obstructive sleep apnea)    COPD (chronic obstructive pulmonary disease)    Elevated CK     Past Medical History:   Diagnosis Date    Abnormal ECG Check [facility] Date or [facility] Records    [facility]    Acute sinusitis 02/06/2023    Anemia     Asthma     CAP (community acquired pneumonia) 02/06/2023    CHF (congestive heart failure) 07/20/2023    Chronic kidney disease     pt told it was dx from Dr Baca and to not eat much protein    Congenital heart disease 1950's Patent 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    Surgery [facility] 1950s close    COPD (chronic obstructive pulmonary disease) 11/15/2024    CTS (carpal tunnel syndrome)     Psoriatic arthritis hands could be    Deep vein thrombosis No on blood clots but have a blood clotting disorder called Leiden Factor 5    Demyelinating disease of central nervous system, unspecified 03/09/2023    Diabetes     Impression: Patient states that her diabetes is not doing well.  I asked her to go ahead and try to talk to her  endocrinokogist.  She is still having the lower extremity neuropathic pan, but not enough for me to administer medication.    Disease of thyroid gland     Diverticulitis of colon     Erosive osteoarthritis 05/30/2024    Factor 5 Leiden mutation, heterozygous 2012    Head injury     Hyperlipidemia     Hypertension     Medication monitoring encounter     Impression:  She has renal insufficiency.  She is just taking Tylenol.    Movement disorder 10/2021    knee replacement left knww    Murmur, cardiac     Neuropathy in diabetes     redness swelling legs    TIFFANIE (obstructive sleep apnea) 11/15/2024    Osteopenia     Story: Femoral Neck Impression: Up-ro-date on bone density scan.  Follow up bone density every 2 years.    Peripheral neuropathy 05/03/2022    hands, arms. 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  I went over her exercises and told her to get a shoe insert,    Psoriasis     Impression: No rash    Psoriatic arthritis     hands    Sleep apnea Always    diabetic do not sleep well up and down    Stroke had one don't know when    showed on MRI Brain    Syncope 11/15/2024    Type 1 diabetes      Past Surgical History:   Procedure Laterality Date    CARDIAC CATHETERIZATION  1952 2 of them    Patent Dictus operation    CATARACT EXTRACTION Bilateral     COLONOSCOPY      ENDOSCOPY N/A 10/20/2020    Procedure: ESOPHAGOGASTRODUODENOSCOPY;  Surgeon: Brunner, Mark I, MD;  Location: [facility] ENDOSCOPY;  Service: Gastroenterology;  Laterality: N/A;    HAND SURGERY Left 1987    no hardware    KNEE ARTHROPLASTY      KNEE SURGERY Left     PATENT DUCTUS ARTERIOUS LIGATION      REPLACEMENT TOTAL KNEE Left       General Information       Row Name 11/20/24 1113          Physical Therapy Time and Intention    Document Type therapy note (daily note)  -ML     Mode of Treatment physical therapy  -ML       Row Name 11/20/24 1113          General Information    Patient  Profile Reviewed yes  -ML     Existing Precautions/Restrictions fall;other (see comments)  Closed fracture left foot, WBAT in boot  -ML     Barriers to Rehab medically complex  -ML       Row Name 11/20/24 1113          Cognition    Orientation Status (Cognition) oriented x 4  -ML       Row Name 11/20/24 1113          Safety Issues/Impairments Affecting Functional Mobility    Safety Issues Affecting Function (Mobility) insight into deficits/self-awareness;safety precaution awareness  -ML     Impairments Affecting Function (Mobility) balance;endurance/activity tolerance;strength  -ML               User Key  (r) = Recorded By, (t) = Taken By, (c) = Cosigned By      Initials Name Provider Type    ML Gail Shields Physical Therapist                   Mobility       Row Name 11/20/24 1116          Bed Mobility    Bed Mobility supine-sit  -ML     Supine-Sit [place] (Bed Mobility) modified independence  -ML     Assistive Device (Bed Mobility) head of bed elevated  -ML       Row Name 11/20/24 1116          Sit-Stand Transfer    Sit-Stand [place] (Transfers) standby assist  -ML     Assistive Device (Sit-Stand Transfers) walker, front-wheeled  -ML       Row Name 11/20/24 1116          Gait/Stairs (Locomotion)    [place] Level (Gait) contact guard;standby assist  initially CGA progressing to SBA  -ML     Assistive Device (Gait) walker, front-wheeled  -ML     Distance in Feet (Gait) 110  -ML     Deviations/Abnormal Patterns (Gait) sam decreased;gait speed decreased;stride length decreased  -ML     Left Sided Gait Deviations weight shift ability decreased  -ML       Row Name 11/20/24 1116          Mobility    Extremity Weight-bearing Status left lower extremity  -ML     Left Lower Extremity (Weight-bearing Status) weight-bearing as tolerated (WBAT)  -ML               User Key  (r) = Recorded By, (t) = Taken By, (c) = Cosigned By      Initials Name Provider Type    ML Gail Shields Physical Therapist                    Obj/Interventions       Row Name 11/20/24 1117          Balance    Balance Assessment sitting static balance;sitting dynamic balance;standing static balance;standing dynamic balance  -ML     Static Sitting Balance independent  -ML     Dynamic Sitting Balance standby assist  -ML     Position, Sitting Balance unsupported;sitting edge of bed  -ML     Static Standing Balance standby assist  -ML     Dynamic Standing Balance standby assist  -ML     Position/Device Used, Standing Balance supported;walker, front-wheeled  -ML     Balance Interventions sitting;standing;sit to stand;supported;occupation based/functional task  -ML               User Key  (r) = Recorded By, (t) = Taken By, (c) = Cosigned By      Initials Name Provider Type    ML Gail Shields Physical Therapist                   Goals/Plan    No documentation.                  Clinical Impression       [facility] Name 11/20/24 1117          Pain    Pretreatment Pain Rating 0/10 - no pain  -ML     Posttreatment Pain Rating 0/10 - no pain  -ML       [facility] Name 11/20/24 1117          Plan of Care Review    Plan of Care Reviewed With patient  -ML     Progress improving  -ML     Outcome Evaluation Patient ambulates with RW and CGA progressing to SBA. Patient educated on discharged planning and falls prevention. Patient continues to present below baseline for mobility and would continue to benefit from skilled PT to address strength, balance and activity tolerance deficits.  -ML       Row Name 11/20/24 1117          Positioning and Restraints    Pre-Treatment Position in bed  -ML     Post Treatment Position chair  -ML     In Chair notified nsg;reclined;call light within reach;encouraged to call for assist;exit alarm on;legs elevated  -ML               User Key  (r) = Recorded By, (t) = Taken By, (c) = Cosigned By      Initials Name Provider Type    Gail Cordero Physical Therapist                   Outcome Measures       Row Name 11/20/24 1121          How much help from  another person do you currently need...    Turning from your back to your side while in flat bed without using bedrails? 4  -ML     Moving from lying on back to sitting on the side of a flat bed without bedrails? 4  -ML     Moving to and from a bed to a chair (including a wheelchair)? 4  -ML     Standing up from a chair using your arms (e.g., wheelchair, bedside chair)? 4  -ML     Climbing 3-5 steps with a railing? 3  -ML     To walk in hospital room? 3  -ML     AM-PAC 6 Clicks Score (PT) 22  -ML     Highest Level of Mobility Goal 7 --> Walk 25 feet or more  -ML       Row Name 11/20/24 1121          Functional Assessment    Outcome Measure Options AM-PAC 6 Clicks Basic Mobility (PT)  -ML               User Key  (r) = Recorded By, (t) = Taken By, (c) = Cosigned By      Initials Name Provider Type    Gail Cordero Physical Therapist                                 Physical Therapy Education       Title: PT OT SLP Therapies (In Progress)       Topic: Physical Therapy (Done)       Point: Mobility training (Done)       Learning Progress Summary            Patient Acceptance, E, VU,NR by ML at 11/20/2024 1314    Acceptance, E, VU,NR by  at 11/20/2024 1121    Acceptance, E, VU by ER at 11/18/2024 1623    Comment: pt edu on transfers, FWW use, d/c planning, purpose of PT, need for sitting up    Acceptance, E, VU by NS at 11/16/2024 1545                      Point: Home exercise program (Done)       Learning Progress Summary            Patient Acceptance, E, VU by ER at 11/18/2024 1623    Comment: pt edu on transfers, FWW use, d/c planning, purpose of PT, need for sitting up                      Point: Body mechanics (Done)       Learning Progress Summary            Patient Acceptance, E, VU,NR by ML at 11/20/2024 1314    Acceptance, E, VU,NR by  at 11/20/2024 1121    Acceptance, E, VU by ER at 11/18/2024 1623    Comment: pt edu on transfers, FWW use, d/c planning, purpose of PT, need for sitting up    Acceptance, E,  VU by NS at 11/16/2024 1545                      Point: Precautions (Done)       Learning Progress Summary            Patient Acceptance, E, VU,NR by  at 11/20/2024 1314    Acceptance, E, VU,NR by  at 11/20/2024 1121    Acceptance, E, VU by ER at 11/18/2024 1623    Comment: pt edu on transfers, FWW use, d/c planning, purpose of PT, need for sitting up    Acceptance, E, VU by NS at 11/16/2024 1545                                      User Key       Initials Effective Dates Name Provider Type Discipline    NS 06/16/21 -  Alisha Kennedy, PT Physical Therapist PT     04/22/21 -  Gail Shields Physical Therapist PT    ER 11/04/24 -  Princess Wagner PT Physical Therapist PT                  PT Recommendation and Plan     Progress: improving  Outcome Evaluation: Patient ambulates with RW and CGA progressing to SBA. Patient educated on discharged planning and falls prevention. Patient continues to present below baseline for mobility and would continue to benefit from skilled PT to address strength, balance and activity tolerance deficits.     Time Calculation:         PT Charges       Row Name 11/20/24 1315             Time Calculation    Start Time 1032  -ML      PT Received On 11/20/24  -ML         Timed Charges    13239 - PT Therapeutic Activity Minutes 25  -ML         Total Minutes    Timed Charges Total Minutes 25  -ML       Total Minutes 25  -ML                User Key  (r) = Recorded By, (t) = Taken By, (c) = Cosigned By      Initials Name Provider Type     Gail Shields Physical Therapist                  Therapy Charges for Today       Code Description Service Date Service Provider Modifiers Qty    65022811501 HC PT THERAPEUTIC ACT EA 15 MIN 11/20/2024 Gail Shields GP 2            PT G-Codes  Outcome Measure Options: AM-PAC 6 Clicks Basic Mobility (PT)  AM-PAC 6 Clicks Score (PT): 22  AM-PAC 6 Clicks Score (OT): 21       Gail Shields  11/20/2024      Electronically signed by Gail Shields at 11/20/24 3180           Occupational Therapy Notes (most recent note)        Thais Rojo, OT at 24 1340          Patient Name: Marilyn Kunz  : 1945    MRN: [id number]                              Today's Date: 2024       Admit Date: 11/15/2024    Visit Dx:     ICD-10-CM ICD-9-CM   1. Urinary tract infection without hematuria, site unspecified  N39.0 599.0   2. Generalized weakness  R53.1 780.79   3. Hyperglycemia  R73.9 790.29   4. Renal insufficiency  N28.9 593.9   5. Bandemia  D72.825 288.66     Patient Active Problem List   Diagnosis    Essential hypertension    Factor V Leiden    Psoriatic arthritis    At risk for venous thromboembolism (VTE)    Hypothyroidism    CKD (chronic kidney disease) stage 3, GFR 30-59 ml/min    Hyperlipidemia LDL goal <70    PDA (patent ductus arteriosus)    Demyelinating disease    Spondylolisthesis, grade 2    Type 1 diabetes mellitus    History of stroke    Demyelinating disease of central nervous system, unspecified    Chronic diastolic congestive heart failure    Peripheral polyneuropathy    Immunosuppression due to drug therapy    Erosive osteoarthritis    Cervical radiculopathy    Rash    UTI (urinary tract infection)    Type 1 diabetes mellitus with hyperglycemia    Syncope    Elevated serum creatinine    Sepsis    Closed fracture of left foot    TIFFANIE (obstructive sleep apnea)    COPD (chronic obstructive pulmonary disease)    Elevated CK     Past Medical History:   Diagnosis Date    Abnormal ECG Check A Date or [facility] Records    Br[facility]    Acute sinusitis 2023    Anemia     Asthma     CAP (community acquired pneumonia) 2023    CHF (congestive heart failure) 2023    Chronic kidney disease     pt told it was dx from Dr Baca and to not eat much protein    Congenital heart disease 's Patent 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    Surgery [facility] s close    COPD (chronic obstructive pulmonary disease) 11/15/2024     CTS (carpal tunnel syndrome)     Psoriatic arthritis hands could be    Deep vein thrombosis No on blood clots but have a blood clotting disorder called Leiden Factor 5    Demyelinating disease of central nervous system, unspecified 03/09/2023    Diabetes     Impression: Patient states that her diabetes is not doing well.  I asked her to go ahead and try to talk to her endocrinokogist.  She is still having the lower extremity neuropathic pan, but not enough for me to administer medication.    Disease of thyroid gland     Diverticulitis of colon     Erosive osteoarthritis 05/30/2024    Factor 5 Leiden mutation, heterozygous 2012    Head injury     Hyperlipidemia     Hypertension     Medication monitoring encounter     Impression:  She has renal insufficiency.  She is just taking Tylenol.    Movement disorder 10/2021    knee replacement left knww    Murmur, cardiac     Neuropathy in diabetes     redness swelling legs    TIFFANIE (obstructive sleep apnea) 11/15/2024    Osteopenia     Story: Femoral Neck Impression: Up-ro-date on bone density scan.  Follow up bone density every 2 years.    Peripheral neuropathy 05/03/2022    hands, arms. 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  I went over her exercises and told her to get a shoe insert,    Psoriasis     Impression: No rash    Psoriatic arthritis     hands    Sleep apnea Always    diabetic do not sleep well up and down    Stroke had one don't know when    showed on MRI Brain    Syncope 11/15/2024    Type 1 diabetes      Past Surgical History:   Procedure Laterality Date    CARDIAC CATHETERIZATION  1952 2 of them    Patent Dictus operation    CATARACT EXTRACTION Bilateral     COLONOSCOPY      ENDOSCOPY N/A 10/20/2020    Procedure: ESOPHAGOGASTRODUODENOSCOPY;  Surgeon: Brunner, Mark I, MD;  Location: [facility] ENDOSCOPY;  Service: Gastroenterology;  Laterality: N/A;    HAND SURGERY Left 1987    no hardware     KNEE ARTHROPLASTY      KNEE SURGERY Left     PATENT DUCTUS ARTERIOUS LIGATION      REPLACEMENT TOTAL KNEE Left       General Information       Row Name 11/21/24 1530          OT Time and Intention    Document Type therapy note (daily note)  -LR     Mode of Treatment occupational therapy  -LR       Row Name 11/21/24 1530          General Information    Patient Profile Reviewed yes  -LR     Existing Precautions/Restrictions fall;other (see comments)  Closed fracture L foot, WBAT in boot  -LR       Row Name 11/21/24 1530          Cognition    Orientation Status (Cognition) oriented x 4  -LR       Row Name 11/21/24 1530          Safety Issues/Impairments Affecting Functional Mobility    Safety Issues Affecting Function (Mobility) safety precaution awareness;safety precautions follow-through/compliance;insight into deficits/self-awareness;impulsivity;awareness of need for assistance  -LR     Impairments Affecting Function (Mobility) endurance/activity tolerance;balance;strength  -LR               User Key  (r) = Recorded By, (t) = Taken By, (c) = Cosigned By      Initials Name Provider Type    LR Thais Rojo, FRANCO Occupational Therapist                     Mobility/ADL's       Row Name 11/21/24 1531          Bed Mobility    Bed Mobility supine-sit  -LR     Supine-Sit [place] (Bed Mobility) modified independence  -LR     Assistive Device (Bed Mobility) head of bed elevated  -LR       Row Name 11/21/24 1531          Transfers    Transfers sit-stand transfer;stand-sit transfer;toilet transfer;bed-chair transfer  -LR       Row Name 11/21/24 1531          Bed-Chair Transfer    Bed-Chair [place] (Transfers) verbal cues;standby assist  -LR     Assistive Device (Bed-Chair Transfers) walker, front-wheeled  -LR       Row Name 11/21/24 1531          Sit-Stand Transfer    Sit-Stand [place] (Transfers) standby assist  -LR     Assistive Device (Sit-Stand Transfers) walker, front-wheeled  -LR       Row Name 11/21/24  1531          Stand-Sit Transfer    Stand-Sit [place] (Transfers) standby assist  -LR     Assistive Device (Stand-Sit Transfers) walker, front-wheeled  -LR       Row Name 11/21/24 1531          Toilet Transfer    Type (Toilet Transfer) sit-stand;stand-sit  -LR     [place] Level (Toilet Transfer) standby assist  -LR     Assistive Device (Toilet Transfer) commode;other (see comments)  grab bar  -       Row Name 11/21/24 1531          Functional Mobility    Functional Mobility- Ind. Level contact guard assist  -LR     Functional Mobility- Device walker, front-wheeled  -LR     Functional Mobility-Distance (Feet) --  >HH distance  -LR     Functional Mobility- Comment VC for safety awareness with wx.  -LR     Patient was able to Ambulate yes  -       Row Name 11/21/24 1531          Activities of Daily Living    BADL Assessment/Intervention upper body dressing;lower body dressing;grooming;toileting  -       Row Name 11/21/24 1531          Mobility    Extremity Weight-bearing Status left lower extremity  -LR     Left Lower Extremity (Weight-bearing Status) weight-bearing as tolerated (WBAT);other (see comments)  in boot  -LR       Row Name 11/21/24 1531          Lower Body Dressing Assessment/Training    [place] Level (Lower Body Dressing) doff;don;socks;shoes/slippers;standby assist;set up  -LR     Position (Lower Body Dressing) unsupported sitting  -       Row Name 11/21/24 1531          Grooming Assessment/Training    [place] Level (Grooming) hair care, combing/brushing;oral care regimen;wash face, hands;set up  -LR     Position (Grooming) unsupported standing;sink side  -       Row Name 11/21/24 1531          Toileting Assessment/Training    [place] Level (Toileting) adjust/manage clothing;perform perineal hygiene;supervision  -     Assistive Devices (Toileting) commode;grab bar/safety frame  -LR     Position (Toileting) unsupported standing;unsupported sitting  -       Row Name  11/21/24 1531          Upper Body Dressing Assessment/Training    [place] Level (Upper Body Dressing) don;pajama/robe;minimum assist (75% patient effort)  -LR     Position (Upper Body Dressing) unsupported sitting  -LR               User Key  (r) = Recorded By, (t) = Taken By, (c) = Cosigned By      Initials Name Provider Type    Thais Lynn OT Occupational Therapist                   Obj/Interventions       Row Name 11/21/24 1534          Balance    Balance Assessment sitting static balance;sitting dynamic balance;sit to stand dynamic balance;standing static balance;standing dynamic balance  -LR     Static Sitting Balance independent  -LR     Dynamic Sitting Balance supervision  -LR     Position, Sitting Balance unsupported;sitting edge of bed;supported;sitting in chair  -LR     Sit to Stand Dynamic Balance standby assist  -LR     Static Standing Balance standby assist  -LR     Dynamic Standing Balance contact guard  -LR     Position/Device Used, Standing Balance supported;walker, front-wheeled  -LR     Balance Interventions sitting;standing;supported;sit to stand;static;dynamic;occupation based/functional task  -LR               User Key  (r) = Recorded By, (t) = Taken By, (c) = Cosigned By      Initials Name Provider Type    LR Thais Rojo, FRANCO Occupational Therapist                   Goals/Plan    No documentation.                  Clinical Impression       Row Name 11/21/24 1534          Pain Assessment    Pretreatment Pain Rating 0/10 - no pain  -LR     Posttreatment Pain Rating 0/10 - no pain  -LR       Row Name 11/21/24 1534          Plan of Care Review    Plan of Care Reviewed With patient  -LR     Progress improving  -LR     Outcome Evaluation Pt continues to present below baseline with decreased activity tolerance, strength, and safety awareness. Pt very motivated and willing to work with therapy. Recommend IPOT POC and home with home health/ home with assist at D/C for best functional  outcome.  -LR       Row Name 11/21/24 1534          Therapy Plan Review/Discharge Plan (OT)    Anticipated Discharge Disposition (OT) home with assist;home with outpatient therapy services  -LR       Row Name 11/21/24 1534          Vital Signs    Pre Systolic BP Rehab 162  -LR     Pre Treatment Diastolic BP 61  -LR     Intra Systolic BP Rehab 158  -LR     Intra Treatment Diastolic BP 68  -LR     Post Systolic BP Rehab 149  -LR     Post Treatment Diastolic BP 90  -LR     Pretreatment Heart Rate (beats/min) 65  -LR     Intratreatment Heart Rate (beats/min) 73  -LR     Posttreatment Heart Rate (beats/min) 66  -LR     Pre SpO2 (%) 96  -LR     O2 Delivery Pre Treatment nasal cannula  21/2 LNC. 2 for mobility  -LR     O2 Delivery Intra Treatment nasal cannula  -LR     Post SpO2 (%) 95  -LR     O2 Delivery Post Treatment nasal cannula  -LR     Pre Patient Position Supine  -LR     Intra Patient Position Standing  -LR     Post Patient Position Sitting  -LR       Row Name 11/21/24 1534          Positioning and Restraints    Pre-Treatment Position in bed  -LR     Post Treatment Position chair  -LR     In Chair notified nsg;reclined;call light within reach;encouraged to call for assist;exit alarm on;waffle cushion;legs elevated  -LR               User Key  (r) = Recorded By, (t) = Taken By, (c) = Cosigned By      Initials Name Provider Type    LR Thais Rojo, OT Occupational Therapist                   Outcome Measures       Row Name 11/21/24 1538          How much help from another is currently needed...    Putting on and taking off regular lower body clothing? 4  -LR     Bathing (including washing, rinsing, and drying) 3  -LR     Toileting (which includes using toilet bed pan or urinal) 4  -LR     Putting on and taking off regular upper body clothing 4  -LR     Taking care of personal grooming (such as brushing teeth) 4  -LR     Eating meals 4  -LR     AM-PAC 6 Clicks Score (OT) 23  -LR       Row Name 11/21/24 1536           Functional Assessment    Outcome Measure Options AM-PAC 6 Clicks Daily Activity (OT)  -LR               User Key  (r) = Recorded By, (t) = Taken By, (c) = Cosigned By      Initials Name Provider Type    Thais Lynn OT Occupational Therapist                    Occupational Therapy Education       Title: PT OT SLP Therapies (In Progress)       Topic: Occupational Therapy (In Progress)       Point: ADL training (In Progress)       Description:   Instruct learner(s) on proper safety adaptation and remediation techniques during self care or transfers.   Instruct in proper use of assistive devices.                  Learning Progress Summary            Patient Acceptance, E, NR by LR at 11/21/2024 1340    Acceptance, E, VU by YELENA at 11/20/2024 1447    Comment: much improved endurance and balance, shoe use to balance out boot wear, OP recommendation    Acceptance, E,D, VU,NR by YELENA at 11/19/2024 1115    Comment: UE and LE TE, transfer safety, progression to goals    Acceptance, E, NR by LR at 11/16/2024 0835                      Point: Home exercise program (Done)       Description:   Instruct learner(s) on appropriate technique for monitoring, assisting and/or progressing therapeutic exercises/activities.                  Learning Progress Summary            Patient Acceptance, E,D, VU,NR by YELENA at 11/19/2024 1115    Comment: UE and LE TE, transfer safety, progression to goals                      Point: Precautions (In Progress)       Description:   Instruct learner(s) on prescribed precautions during self-care and functional transfers.                  Learning Progress Summary            Patient Acceptance, E, NR by LR at 11/21/2024 1340    Acceptance, E,D, VU,NR by YELENA at 11/19/2024 1115    Comment: UE and LE TE, transfer safety, progression to goals    Acceptance, E, NR by LR at 11/16/2024 0835                      Point: Body mechanics (In Progress)       Description:   Instruct learner(s) on proper  positioning and spine alignment during self-care, functional mobility activities and/or exercises.                  Learning Progress Summary            Patient Acceptance, E, NR by LR at 11/21/2024 1340    Acceptance, E, NR by LR at 11/16/2024 0835                                      User Key       Initials Effective Dates Name Provider Type Discipline    YELENA 07/11/23 -  Rosalba Hernandez, OT Occupational Therapist OT    LR 10/09/24 -  Thais Rojo, OT Occupational Therapist OT                  OT Recommendation and Plan  Planned Therapy Interventions (OT): activity tolerance training, patient/caregiver education/training, adaptive equipment training, BADL retraining, occupation/activity based interventions, strengthening exercise, ROM/therapeutic exercise, functional balance retraining, IADL retraining, transfer/mobility retraining, passive ROM/stretching  Therapy Frequency (OT): daily  Plan of Care Review  Plan of Care Reviewed With: patient  Progress: improving  Outcome Evaluation: Pt continues to present below baseline with decreased activity tolerance, strength, and safety awareness. Pt very motivated and willing to work with therapy. Recommend IPOT POC and home with home health/ home with assist at D/C for best functional outcome.     Time Calculation:   Evaluation Complexity (OT)  Review Occupational Profile/Medical/Therapy History Complexity: brief/low complexity  Assessment, Occupational Performance/Identification of Deficit Complexity: 1-3 performance deficits  Clinical Decision Making Complexity (OT): problem focused assessment/low complexity  Overall Complexity of Evaluation (OT): low complexity     Time Calculation- OT       Row Name 11/21/24 1539             Time Calculation- OT    OT Start Time 1340  -LR      OT Received On 11/21/24  -LR      OT Goal Re-Cert Due Date 11/26/24  -LR         Timed Charges    40467 - OT Therapeutic Activity Minutes 24 -LR      20277 - OT Self Care/Mgmt Minutes 24   -LR         Total Minutes    Timed Charges Total Minutes 48  -LR       Total Minutes 48  -LR                User Key  (r) = Recorded By, (t) = Taken By, (c) = Cosigned By      Initials Name Provider Type    LR Thais Rojo OT Occupational Therapist                  Therapy Charges for Today       Code Description Service Date Service Provider Modifiers Qty    21251416349  OT THERAPEUTIC ACT EA 15 MIN 11/21/2024 Thais Rojo OT GO 2    23066094596  OT SELF CARE/MGMT/TRAIN EA 15 MIN 11/21/2024 Thais Rojo OT GO 1                 Thais Rojo OT  11/21/2024    Electronically signed by Thais Rojo OT at 11/21/24 3197

## 2024-11-22 NOTE — CASE MANAGEMENT/SOCIAL WORK
Continued Stay Note  [facility]     Patient Name: Marilyn Kunz  MRN: [id number]  Today's Date: 11/22/2024    Admit Date: 11/15/2024    Plan: discharge plan   Discharge Plan       Row Name 11/22/24 1045       Plan    Plan discharge plan    Plan Comments Per Sofiya none of the [facility] have a bed until next week. Per Sofiya(liason for [facility]) [facility] [place] [facility] beds. I also spoke with April(liason for [facility]) and [facility] can now offer bed on subacute, pending insurance approval. I emailed SNF referral team to start precert. CM will cont to follow    Final Discharge Disposition Code 03 - skilled nursing facility (SNF)                   Discharge Codes    No documentation.                 Expected Discharge Date and Time       Expected Discharge Date Expected Discharge Time    Nov 21, 2024               Mery Correa RN

## 2024-11-22 NOTE — THERAPY TREATMENT NOTE
Patient Name: Marilyn Kunz  : 1945    MRN: [id number]                              Today's Date: 2024       Admit Date: 11/15/2024    Visit Dx:     ICD-10-CM ICD-9-CM   1. Urinary tract infection without hematuria, site unspecified  N39.0 599.0   2. Generalized weakness  R53.1 780.79   3. Hyperglycemia  R73.9 790.29   4. Renal insufficiency  N28.9 593.9   5. Bandemia  D72.825 288.66     Patient Active Problem List   Diagnosis    Essential hypertension    Factor V Leiden    Psoriatic arthritis    At risk for venous thromboembolism (VTE)    Hypothyroidism    CKD (chronic kidney disease) stage 3, GFR 30-59 ml/min    Hyperlipidemia LDL goal <70    PDA (patent ductus arteriosus)    Demyelinating disease    Spondylolisthesis, grade 2    Type 1 diabetes mellitus    History of stroke    Demyelinating disease of central nervous system, unspecified    Chronic diastolic congestive heart failure    Peripheral polyneuropathy    Immunosuppression due to drug therapy    Erosive osteoarthritis    Cervical radiculopathy    Rash    UTI (urinary tract infection)    Type 1 diabetes mellitus with hyperglycemia    Syncope    Elevated serum creatinine    Sepsis    Closed fracture of left foot    TIFFANIE (obstructive sleep apnea)    COPD (chronic obstructive pulmonary disease)    Elevated CK     Past Medical History:   Diagnosis Date    Abnormal ECG Check FPA Date or [facility] Records    [facility]    Acute sinusitis 2023    Anemia     Asthma     CAP (community acquired pneumonia) 2023    CHF (congestive heart failure) 2023    Chronic kidney disease     pt told it was dx from Dr Baca and to not eat much protein    Congenital heart disease 's Patent 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    Surgery [facility] s close    COPD (chronic obstructive pulmonary disease) 11/15/2024    CTS (carpal tunnel syndrome)     Psoriatic arthritis hands could be    Deep vein thrombosis No on  blood clots but have a blood clotting disorder called Leiden Factor 5    Demyelinating disease of central nervous system, unspecified 03/09/2023    Diabetes     Impression: Patient states that her diabetes is not doing well.  I asked her to go ahead and try to talk to her endocrinokogist.  She is still having the lower extremity neuropathic pan, but not enough for me to administer medication.    Disease of thyroid gland     Diverticulitis of colon     Erosive osteoarthritis 05/30/2024    Factor 5 Leiden mutation, heterozygous 2012    Head injury     Hyperlipidemia     Hypertension     Medication monitoring encounter     Impression:  She has renal insufficiency.  She is just taking Tylenol.    Movement disorder 10/2021    knee replacement left knww    Murmur, cardiac     Neuropathy in diabetes     redness swelling legs    TIFFANIE (obstructive sleep apnea) 11/15/2024    Osteopenia     Story: Femoral Neck Impression: Up-ro-date on bone density scan.  Follow up bone density every 2 years.    Peripheral neuropathy 05/03/2022    hands, arms. 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  I went over her exercises and told her to get a shoe insert,    Psoriasis     Impression: No rash    Psoriatic arthritis     hands    Sleep apnea Always    diabetic do not sleep well up and down    Stroke had one don't know when    showed on MRI Brain    Syncope 11/15/2024    Type 1 diabetes      Past Surgical History:   Procedure Laterality Date    CARDIAC CATHETERIZATION  1952 2 of them    Patent Dictus operation    CATARACT EXTRACTION Bilateral     COLONOSCOPY      ENDOSCOPY N/A 10/20/2020    Procedure: ESOPHAGOGASTRODUODENOSCOPY;  Surgeon: Brunner, Mark I, MD;  Location: [facility] ENDOSCOPY;  Service: Gastroenterology;  Laterality: N/A;    HAND SURGERY Left 1987    no hardware    KNEE ARTHROPLASTY      KNEE SURGERY Left     PATENT DUCTUS ARTERIOUS LIGATION      REPLACEMENT TOTAL  KNEE Left       General Information       Row Name 11/22/24 1146          Physical Therapy Time and Intention    Document Type therapy note (daily note)  -ER     Mode of Treatment physical therapy  -ER       Row Name 11/22/24 1146          General Information    Patient Profile Reviewed yes  -ER     Existing Precautions/Restrictions fall;other (see comments)  L WBAT in boot  -ER       Row Name 11/22/24 1146          Cognition    Orientation Status (Cognition) oriented x 4  -ER       Row Name 11/22/24 1146          Safety Issues/Impairments Affecting Functional Mobility    Impairments Affecting Function (Mobility) endurance/activity tolerance;balance;strength  -ER               User Key  (r) = Recorded By, (t) = Taken By, (c) = Cosigned By      Initials Name Provider Type    ER Princess Wagner, PT Physical Therapist                   Mobility       Row Name 11/22/24 1147          Bed Mobility    Bed Mobility supine-sit;rolling right  -ER     Rolling Right [place] (Bed Mobility) modified independence  -ER     Supine-Sit [place] (Bed Mobility) modified independence  -ER     Assistive Device (Bed Mobility) head of bed elevated;bed rails  -ER       Row Name 11/22/24 1147          Bed-Chair Transfer    Bed-Chair [place] (Transfers) verbal cues;standby assist  -ER     Assistive Device (Bed-Chair Transfers) walker, front-wheeled  -ER       Row Name 11/22/24 1147          Sit-Stand Transfer    Sit-Stand [place] (Transfers) standby assist  -ER     Assistive Device (Sit-Stand Transfers) walker, front-wheeled  -ER     Comment, (Sit-Stand Transfer) 2x  -ER       Row Name 11/22/24 1147          Gait/Stairs (Locomotion)    [place] Level (Gait) contact guard  -ER     Assistive Device (Gait) walker, front-wheeled  -ER     Patient was able to Ambulate yes  -ER     Distance in Feet (Gait) 5  -ER     Deviations/Abnormal Patterns (Gait) sam decreased;gait speed decreased;stride length decreased  -ER     Left  Sided Gait Deviations weight shift ability decreased  -ER     Comment, (Gait/Stairs) decreased walking today due to more time for exercise  -ER       Row Name 11/22/24 1147          Mobility    Extremity Weight-bearing Status left lower extremity  -ER     Left Lower Extremity (Weight-bearing Status) weight-bearing as tolerated (WBAT);other (see comments)  -ER               User Key  (r) = Recorded By, (t) = Taken By, (c) = Cosigned By      Initials Name Provider Type    ER Princess Wagner, PT Physical Therapist                   Obj/Interventions       Row Name 11/22/24 1149          Motor Skills    Therapeutic Exercise shoulder;hip;knee  -ER       Row Name 11/22/24 1149          Shoulder (Therapeutic Exercise)    Shoulder (Therapeutic Exercise) AROM (active range of motion)  -ER     Shoulder AROM (Therapeutic Exercise) bilateral;flexion;extension;aBduction;aDduction;external rotation;internal rotation;horizontal aBduction/aDduction;sitting;5 repetitions  -ER       Row Name 11/22/24 1149          Hip (Therapeutic Exercise)    Hip (Therapeutic Exercise) strengthening exercise  -ER     Hip Strengthening (Therapeutic Exercise) bilateral;marching while seated;10 repetitions;sitting  -ER       Row Name 11/22/24 1149          Knee (Therapeutic Exercise)    Knee (Therapeutic Exercise) strengthening exercise  -ER     Knee Strengthening (Therapeutic Exercise) bilateral;LAQ (long arc quad);10 repetitions;sitting  -ER       Row Name 11/22/24 1149          Balance    Balance Interventions sitting;standing;sit to stand;supported;static;dynamic;minimal challenge;moderate challenge;core stability exercise;weight shifting activity;trunk training exercise;UE activity with balance activity  -ER               User Key  (r) = Recorded By, (t) = Taken By, (c) = Cosigned By      Initials Name Provider Type    ER Princess Wagner, PT Physical Therapist                   Goals/Plan    No documentation.                  Clinical Impression        Row Name 11/22/24 1150          Pain    Pretreatment Pain Rating 0/10 - no pain  -ER     Posttreatment Pain Rating 0/10 - no pain  -ER       Row Name 11/22/24 1150          Plan of Care Review    Plan of Care Reviewed With patient  -ER     Progress improving  -ER     Outcome Evaluation Pt demonstrated good balance with activities today. More time spent on balance training in sitting with functional leans and TE. She remains below functional mobility baseline for ambulation. Recommend continuing IP POC for PT and home with assist and OP PT upon d/c.  -ER       Row Name 11/22/24 1150          Vital Signs    Pre SpO2 (%) 97  -ER     O2 Delivery Pre Treatment nasal cannula  -ER     Intra SpO2 (%) 94  -ER     O2 Delivery Intra Treatment room air  -ER     Post SpO2 (%) 96  -ER     O2 Delivery Post Treatment room air  -ER     Pre Patient Position Supine  -ER     Intra Patient Position Standing  -ER     Post Patient Position Sitting  -ER       Row Name 11/22/24 1150          Positioning and Restraints    Pre-Treatment Position in bed  -ER     Post Treatment Position chair  -ER     In Chair reclined;call light within reach;encouraged to call for assist;exit alarm on;with other staff;waffle cushion  -ER               User Key  (r) = Recorded By, (t) = Taken By, (c) = Cosigned By      Initials Name Provider Type    ER Princess Wagner, PT Physical Therapist                   Outcome Measures       Row Name 11/22/24 1153          How much help from another person do you currently need...    Turning from your back to your side while in flat bed without using bedrails? 4  -ER     Moving from lying on back to sitting on the side of a flat bed without bedrails? 4  -ER     Moving to and from a bed to a chair (including a wheelchair)? 3  -ER     Standing up from a chair using your arms (e.g., wheelchair, bedside chair)? 4  -ER     Climbing 3-5 steps with a railing? 3  -ER     To walk in hospital room? 4  -ER     AM-PAC 6 Clicks  Score (PT) 22  -ER     Highest Level of Mobility Goal 7 --> Walk 25 feet or more  -ER       Row Name 11/22/24 1153          Functional Assessment    Outcome Measure Options AM-PAC 6 Clicks Basic Mobility (PT)  -ER               User Key  (r) = Recorded By, (t) = Taken By, (c) = Cosigned By      Initials Name Provider Type    ER Princess Wagner, PT Physical Therapist                                 Physical Therapy Education       Title: PT OT SLP Therapies (In Progress)       Topic: Physical Therapy (Done)       Point: Mobility training (Done)       Learning Progress Summary            Patient Acceptance, E, VU by ER at 11/22/2024 1153    Comment: d/c planning, safety instructions    Acceptance, E, VU,NR by ML at 11/20/2024 1314    Acceptance, E, VU,NR by ML at 11/20/2024 1121    Acceptance, E, VU by ER at 11/18/2024 1623    Comment: pt edu on transfers, FWW use, d/c planning, purpose of PT, need for sitting up    Acceptance, E, VU by NS at 11/16/2024 1545                      Point: Home exercise program (Done)       Learning Progress Summary            Patient Acceptance, E, VU by ER at 11/22/2024 1153    Comment: d/c planning, safety instructions    Acceptance, E, VU by ER at 11/18/2024 1623    Comment: pt edu on transfers, FWW use, d/c planning, purpose of PT, need for sitting up                      Point: Body mechanics (Done)       Learning Progress Summary            Patient Acceptance, E, VU by ER at 11/22/2024 1153    Comment: d/c planning, safety instructions    Acceptance, E, VU,NR by ML at 11/20/2024 1314    Acceptance, E, VU,NR by ML at 11/20/2024 1121    Acceptance, E, VU by ER at 11/18/2024 1623    Comment: pt edu on transfers, FWW use, d/c planning, purpose of PT, need for sitting up    Acceptance, E, VU by NS at 11/16/2024 1545                      Point: Precautions (Done)       Learning Progress Summary            Patient Acceptance, E, VU by ER at 11/22/2024 1153    Comment: d/c planning, safety  instructions    Acceptance, E, VU,NR by ML at 11/20/2024 1314    Acceptance, E, VU,NR by ML at 11/20/2024 1121    Acceptance, E, VU by ER at 11/18/2024 1623    Comment: pt edu on transfers, FWW use, d/c planning, purpose of PT, need for sitting up    Acceptance, E, VU by NS at 11/16/2024 1545                                      User Key       Initials Effective Dates Name Provider Type Discipline    NS 06/16/21 -  Alisha Kennedy, PT Physical Therapist PT    ML 04/22/21 -  Gail Shields Physical Therapist PT    ER 11/04/24 -  Princess Wagner PT Physical Therapist PT                  PT Recommendation and Plan     Progress: improving  Outcome Evaluation: Pt demonstrated good balance with activities today. More time spent on balance training in sitting with functional leans and TE. She remains below functional mobility baseline for ambulation. Recommend continuing IP POC for PT and home with assist and OP PT upon d/c.     Time Calculation:         PT Charges       Row Name 11/22/24 1153             Time Calculation    Start Time 1122  -ER      PT Received On 11/22/24  -ER      PT Goal Re-Cert Due Date 11/26/24  -ER         Timed Charges    33294 - PT Therapeutic Exercise Minutes 15  -ER      18752 - PT Therapeutic Activity Minutes 6  -ER         Total Minutes    Timed Charges Total Minutes 21  -ER       Total Minutes 21  -ER                User Key  (r) = Recorded By, (t) = Taken By, (c) = Cosigned By      Initials Name Provider Type    ER Princess Wagner, PT Physical Therapist                  Therapy Charges for Today       Code Description Service Date Service Provider Modifiers Qty    63750265732 HC PT THER PROC EA 15 MIN 11/22/2024 Princess Wagner PT GP 1            PT G-Codes  Outcome Measure Options: AM-PAC 6 Clicks Basic Mobility (PT)  AM-PAC 6 Clicks Score (PT): 22  AM-PAC 6 Clicks Score (OT): 23  PT Discharge Summary  Anticipated Discharge Disposition (PT): home with outpatient therapy services, home with  assist    Princess Wagner, PT  11/22/2024

## 2024-11-22 NOTE — PLAN OF CARE
Goal Outcome Evaluation:  Plan of Care Reviewed With: patient        Progress: improving  Outcome Evaluation: Pt demonstrated good balance with activities today. More time spent on balance training in sitting with functional leans and TE. She remains below functional mobility baseline for ambulation. Recommend continuing IP POC for PT and home with assist and OP PT upon d/c.    Anticipated Discharge Disposition (PT): home with outpatient therapy services, home with assist

## 2024-11-22 NOTE — PLAN OF CARE
Goal Outcome Evaluation:            Pt a&ox4. VSS. RA. No complaints of pain or SOA. Currently in the bed with alarm set. Call light in reach. POC ongoing.

## 2024-11-23 LAB
ALBUMIN SERPL-MCNC: 3 G/DL (ref 3.5–5.2)
ALBUMIN/GLOB SERPL: 1.1 G/DL
ALP SERPL-CCNC: 61 U/L (ref 39–117)
ALT SERPL W P-5'-P-CCNC: 11 U/L (ref 1–33)
ANION GAP SERPL CALCULATED.3IONS-SCNC: 10 MMOL/L (ref 5–15)
AST SERPL-CCNC: 15 U/L (ref 1–32)
BASOPHILS # BLD AUTO: 0.04 10*3/MM3 (ref 0–0.2)
BASOPHILS NFR BLD AUTO: 0.5 % (ref 0–1.5)
BILIRUB SERPL-MCNC: 0.7 MG/DL (ref 0–1.2)
BUN SERPL-MCNC: 15 MG/DL (ref 8–23)
BUN/CREAT SERPL: 17.9 (ref 7–25)
CALCIUM SPEC-SCNC: 8.1 MG/DL (ref 8.6–10.5)
CHLORIDE SERPL-SCNC: 106 MMOL/L (ref 98–107)
CO2 SERPL-SCNC: 25 MMOL/L (ref 22–29)
CREAT SERPL-MCNC: 0.84 MG/DL (ref 0.57–1)
DEPRECATED RDW RBC AUTO: 49.5 FL (ref 37–54)
EGFRCR SERPLBLD CKD-EPI 2021: 70.8 ML/MIN/1.73
EOSINOPHIL # BLD AUTO: 1.48 10*3/MM3 (ref 0–0.4)
EOSINOPHIL NFR BLD AUTO: 17.6 % (ref 0.3–6.2)
ERYTHROCYTE [DISTWIDTH] IN BLOOD BY AUTOMATED COUNT: 14.5 % (ref 12.3–15.4)
GLOBULIN UR ELPH-MCNC: 2.7 GM/DL
GLUCOSE BLDC GLUCOMTR-MCNC: 195 MG/DL (ref 70–130)
GLUCOSE BLDC GLUCOMTR-MCNC: 213 MG/DL (ref 70–130)
GLUCOSE BLDC GLUCOMTR-MCNC: 225 MG/DL (ref 70–130)
GLUCOSE BLDC GLUCOMTR-MCNC: 96 MG/DL (ref 70–130)
GLUCOSE SERPL-MCNC: 102 MG/DL (ref 65–99)
HCT VFR BLD AUTO: 29.5 % (ref 34–46.6)
HGB BLD-MCNC: 9.4 G/DL (ref 12–15.9)
IMM GRANULOCYTES # BLD AUTO: 0.03 10*3/MM3 (ref 0–0.05)
IMM GRANULOCYTES NFR BLD AUTO: 0.4 % (ref 0–0.5)
LYMPHOCYTES # BLD AUTO: 2.63 10*3/MM3 (ref 0.7–3.1)
LYMPHOCYTES NFR BLD AUTO: 31.2 % (ref 19.6–45.3)
MAGNESIUM SERPL-MCNC: 1.9 MG/DL (ref 1.6–2.4)
MCH RBC QN AUTO: 29.9 PG (ref 26.6–33)
MCHC RBC AUTO-ENTMCNC: 31.9 G/DL (ref 31.5–35.7)
MCV RBC AUTO: 93.9 FL (ref 79–97)
MONOCYTES # BLD AUTO: 0.74 10*3/MM3 (ref 0.1–0.9)
MONOCYTES NFR BLD AUTO: 8.8 % (ref 5–12)
NEUTROPHILS NFR BLD AUTO: 3.51 10*3/MM3 (ref 1.7–7)
NEUTROPHILS NFR BLD AUTO: 41.5 % (ref 42.7–76)
NRBC BLD AUTO-RTO: 0 /100 WBC (ref 0–0.2)
PLATELET # BLD AUTO: 213 10*3/MM3 (ref 140–450)
PMV BLD AUTO: 12.4 FL (ref 6–12)
POTASSIUM SERPL-SCNC: 4.1 MMOL/L (ref 3.5–5.2)
PROT SERPL-MCNC: 5.7 G/DL (ref 6–8.5)
RBC # BLD AUTO: 3.14 10*6/MM3 (ref 3.77–5.28)
SODIUM SERPL-SCNC: 141 MMOL/L (ref 136–145)
WBC NRBC COR # BLD AUTO: 8.43 10*3/MM3 (ref 3.4–10.8)

## 2024-11-23 PROCEDURE — 82948 REAGENT STRIP/BLOOD GLUCOSE: CPT

## 2024-11-23 PROCEDURE — 80053 COMPREHEN METABOLIC PANEL: CPT | Performed by: FAMILY MEDICINE

## 2024-11-23 PROCEDURE — 99232 SBSQ HOSP IP/OBS MODERATE 35: CPT

## 2024-11-23 PROCEDURE — 83735 ASSAY OF MAGNESIUM: CPT | Performed by: FAMILY MEDICINE

## 2024-11-23 PROCEDURE — 63710000001 INSULIN LISPRO (HUMAN) PER 5 UNITS: Performed by: FAMILY MEDICINE

## 2024-11-23 PROCEDURE — 85025 COMPLETE CBC W/AUTO DIFF WBC: CPT | Performed by: FAMILY MEDICINE

## 2024-11-23 PROCEDURE — 63710000001 INSULIN GLARGINE PER 5 UNITS: Performed by: FAMILY MEDICINE

## 2024-11-23 RX ADMIN — INSULIN GLARGINE 10 UNITS: 100 INJECTION, SOLUTION SUBCUTANEOUS at 21:46

## 2024-11-23 RX ADMIN — OXYBUTYNIN CHLORIDE 5 MG: 5 TABLET ORAL at 21:46

## 2024-11-23 RX ADMIN — MONTELUKAST 10 MG: 10 TABLET, FILM COATED ORAL at 17:10

## 2024-11-23 RX ADMIN — Medication 10 ML: at 21:46

## 2024-11-23 RX ADMIN — Medication 10 ML: at 08:36

## 2024-11-23 RX ADMIN — INSULIN LISPRO 5 UNITS: 100 INJECTION, SOLUTION INTRAVENOUS; SUBCUTANEOUS at 21:46

## 2024-11-23 RX ADMIN — INSULIN LISPRO 5 UNITS: 100 INJECTION, SOLUTION INTRAVENOUS; SUBCUTANEOUS at 12:36

## 2024-11-23 RX ADMIN — APIXABAN 2.5 MG: 2.5 TABLET, FILM COATED ORAL at 08:33

## 2024-11-23 RX ADMIN — OXYBUTYNIN CHLORIDE 5 MG: 5 TABLET ORAL at 08:33

## 2024-11-23 RX ADMIN — PRAVASTATIN SODIUM 20 MG: 20 TABLET ORAL at 17:10

## 2024-11-23 RX ADMIN — LEVOTHYROXINE SODIUM 75 MCG: 0.07 TABLET ORAL at 06:52

## 2024-11-23 RX ADMIN — NEBIVOLOL 10 MG: 5 TABLET ORAL at 08:33

## 2024-11-23 RX ADMIN — FLUTICASONE PROPIONATE 2 SPRAY: 50 SPRAY, METERED NASAL at 08:36

## 2024-11-23 RX ADMIN — CETIRIZINE HYDROCHLORIDE 10 MG: 10 TABLET, FILM COATED ORAL at 08:33

## 2024-11-23 RX ADMIN — SACUBITRIL AND VALSARTAN 1 TABLET: 97; 103 TABLET, FILM COATED ORAL at 21:46

## 2024-11-23 RX ADMIN — INSULIN LISPRO 3 UNITS: 100 INJECTION, SOLUTION INTRAVENOUS; SUBCUTANEOUS at 17:10

## 2024-11-23 RX ADMIN — PREGABALIN 75 MG: 75 CAPSULE ORAL at 08:33

## 2024-11-23 RX ADMIN — TERAZOSIN HYDROCHLORIDE 2 MG: 2 CAPSULE ORAL at 21:46

## 2024-11-23 RX ADMIN — PREGABALIN 75 MG: 75 CAPSULE ORAL at 21:46

## 2024-11-23 RX ADMIN — APIXABAN 2.5 MG: 2.5 TABLET, FILM COATED ORAL at 21:46

## 2024-11-23 NOTE — PLAN OF CARE
Goal Outcome Evaluation:            Pt a&ox4. VSS. RA. Pt awaiting placement. Currently in the bed, call light in reach. POC ongoing.

## 2024-11-23 NOTE — PROGRESS NOTES
[facility] Medicine Services  PROGRESS NOTE    Patient Name: Marilyn Kunz  : 1945  MRN: [id number]    Date of Admission: 11/15/2024  Primary Care Physician: Peter Baca MD    Subjective   Subjective     CC:  F/u Syncope, hyperglycemia    HPI:  Patient reports she is ready to know where she will be placed. She talks about her siblings and memories with her family. No acute complaints. No family at bedside. Plan discussed with nursing.      Objective   Objective     Vital Signs:   Temp:  [98 °F (36.7 °C)-98.4 °F (36.9 °C)] 98.4 °F (36.9 °C)  Heart Rate:  [58-71] 65  Resp:  [16-18] 16  BP: (136-157)/(55-67) 142/67  Flow (L/min) (Oxygen Therapy):  [2] 2     Physical exam:  Constitutional: Elderly appearing female sitting up in bed in NAD  HENT: NCAT, mucous membranes moist  Respiratory: Clear to auscultation bilaterally, respiratory effort normal on 2L NC  Cardiovascular: Irregular, no murmurs, rubs, or gallops, no bilateral ankle edema  Gastrointestinal: Positive bowel sounds, soft, nontender, nondistended.  Musculoskeletal: GUNN spontaneously, left LE out of walking boot  Psychiatric: Appropriate affect, cooperative  Neurologic: Alert and oriented, speech clear  Skin: No rashes on exposed surfaces      Results Reviewed:  LAB RESULTS:      Lab 24  0431 24  0359 24  0428 24  0614 24  1020   WBC 8.43 9.59 10.35 5.98  --    HEMOGLOBIN 9.4* 9.8* 9.3* 9.1*  --    HEMATOCRIT 29.5* 31.2* 28.9* 28.5*  --    PLATELETS 213 196 183 147  --    NEUTROS ABS 3.51 6.37  --   --   --    IMMATURE GRANS (ABS) 0.03 0.10*  --   --   --    LYMPHS ABS 2.63 1.61  --   --   --    MONOS ABS 0.74 0.94*  --   --   --    EOS ABS 1.48* 0.54*  --   --   --    MCV 93.9 95.4 93.2 95.3  --    LACTATE  --   --   --   --  1.3         Lab 24  0431 24  0359 24  0438 24  0513 24  0832   SODIUM 141 140 142 142 146*   POTASSIUM 4.1 4.2 4.3 4.4 4.5    CHLORIDE 106 105 108* 110* 115*   CO2 25.0 23.0 23.0 22.0 21.0*   ANION GAP 10.0 12.0 11.0 10.0 10.0   BUN 15 13 10 13 18   CREATININE 0.84 0.72 0.73 0.71 0.72   EGFR 70.8 85.2 83.8 86.6 85.2   GLUCOSE 102* 51* 190* 267* 87   CALCIUM 8.1* 8.1* 7.9* 7.9* 8.4*   IONIZED CALCIUM  --   --  1.10* 1.10* 1.16   MAGNESIUM 1.9 2.0 1.8 1.8 2.0   PHOSPHORUS  --   --  3.2 3.6 2.2*         Lab 11/23/24  0431 11/22/24  0359   TOTAL PROTEIN 5.7* 5.3*   ALBUMIN 3.0* 2.7*   GLOBULIN 2.7 2.6   ALT (SGPT) 11 10   AST (SGOT) 15 11   BILIRUBIN 0.7 0.6   ALK PHOS 61 56                         Brief Urine Lab Results  (Last result in the past 365 days)        Color   Clarity   Blood   Leuk Est   Nitrite   Protein   CREAT   Urine HCG        11/15/24 1739 Dark Yellow   Cloudy   Negative   Moderate (2+)   Negative   30 mg/dL (1+)                   Microbiology Results Abnormal       Procedure Component Value - Date/Time    Urine Culture - Urine, Urine, Clean Catch [[id number]]  (Abnormal)  (Susceptibility) Collected: 11/15/24 1739    Lab Status: Final result Specimen: Urine, Clean Catch Updated: 11/17/24 0932     Urine Culture >100,000 CFU/mL Escherichia coli    Narrative:      Colonization of the urinary tract without infection is common. Treatment is discouraged unless the patient is symptomatic, pregnant, or undergoing an invasive urologic procedure.    Susceptibility        Escherichia coli      SUAD      Amoxicillin + Clavulanate Susceptible      Ampicillin Susceptible      Ampicillin + Sulbactam Susceptible      Cefepime Susceptible      Ceftazidime Susceptible      Ceftriaxone Susceptible      Gentamicin Susceptible      Levofloxacin Susceptible      Nitrofurantoin Susceptible      Piperacillin + Tazobactam Susceptible      Trimethoprim + Sulfamethoxazole Susceptible                                   No radiology results from the last 24 hrs    Results for orders placed during the hospital encounter of 11/15/24    Adult Transthoracic  Echo Complete W/ Cont if Necessary Per Protocol    Interpretation Summary    Left ventricular systolic function is normal. Left ventricular ejection fraction appears to be 66 - 70%.    Left ventricular diastolic function was normal.    Left atrial volume is mildly increased.    Estimated right ventricular systolic pressure from tricuspid regurgitation is normal (<35 mmHg).    No significant change compared to 2023 echo      Current medications:  Scheduled Meds:apixaban, 2.5 mg, Oral, Q12H  cetirizine, 10 mg, Oral, Daily  fluticasone, 2 spray, Each Nare, Daily  insulin glargine, 10 Units, Subcutaneous, Nightly  insulin lispro, 3-14 Units, Subcutaneous, 4x Daily AC & at Bedtime  levothyroxine, 75 mcg, Oral, Q AM  montelukast, 10 mg, Oral, Q PM  nebivolol, 10 mg, Oral, Daily  oxybutynin, 5 mg, Oral, BID  pravastatin, 20 mg, Oral, Q PM  pregabalin, 75 mg, Oral, BID  [Held by provider] sacubitril-valsartan, 1 tablet, Oral, BID  sodium chloride, 10 mL, Intravenous, Q12H  terazosin, 2 mg, Oral, Nightly      Continuous Infusions:   PRN Meds:.  acetaminophen    senna-docusate sodium **AND** polyethylene glycol **AND** bisacodyl **AND** bisacodyl    Calcium Replacement - Follow Nurse / BPA Driven Protocol    dextrose    dextrose    glucagon (human recombinant)    Magnesium Standard Dose Replacement - Follow Nurse / BPA Driven Protocol    melatonin    Phosphorus Replacement - Follow Nurse / BPA Driven Protocol    Potassium Replacement - Follow Nurse / BPA Driven Protocol    sodium chloride    sodium chloride    sodium chloride    Assessment & Plan   Assessment & Plan     Active Hospital Problems    Diagnosis  POA    **UTI (urinary tract infection) [N39.0]  Yes    Type 1 diabetes mellitus with hyperglycemia [E10.65]  Yes    Syncope [R55]  Yes    Elevated serum creatinine [R79.89]  Yes    Sepsis [A41.9]  Yes    Closed fracture of left foot [S92.902A]  Yes    TIFFANIE (obstructive sleep apnea) [G47.33]  Yes    COPD (chronic  obstructive pulmonary disease) [J44.9]  Yes    Elevated CK [R74.8]  Yes    Immunosuppression due to drug therapy [D84.821, Z79.899]  Not Applicable    Chronic diastolic congestive heart failure [I50.32]  Yes    History of stroke [Z86.73]  Not Applicable    Psoriatic arthritis [L40.50]  Yes    Factor V Leiden [D68.51]  Yes    Essential hypertension [I10]  Yes    CKD (chronic kidney disease) stage 3, GFR 30-59 ml/min [N18.30]  Yes    Hyperlipidemia LDL goal <70 [E78.5]  Yes    Hypothyroidism [E03.9]  Yes      Resolved Hospital Problems   No resolved problems to display.        Brief Hospital Course to date:  Marilyn Kunz is a 79 y.o. female with hypertension, hyperlipidemia, diastolic heart failure, somatic arthritis, demyelinating disease, type 1 diabetes, CKD stage III, prior CVA, COPD, hypothyroidism, TIFFANIE on CPAP, heterozygous factor V Leiden who presented to the ED with syncope and hyperglycemia.  Patient found to be septic likely from UTI.    This patient's problems and plans were partially entered by my partner and updated as appropriate by me 11/23/24. Patient transferred [place] [facility] [place] the AM of 11/21, pending hopeful rehab placement. She is pending possible precert to [facility], continue to follow at this time.     Sepsis, resolved  UTI  - Pt met severe sepsis criteria causing organ dysfunction on arrival due to ELMER. ELMER, leukocytosis and elevated lactic acid resolved  - Blood cultures with no growth  x 5 days   - Urine culture growing E. coli, pan susceptible  - IV Rocephin switched to p.o. Ceftin, s/p course    A-fib, new onset  Bradycardia, asymptomatic  - A-fib captured on EKG 11/15  - No known history of arrhythmia  - Rate controlled without any medications  - Likely secondary to sepsis above though more concerning in the setting of syncope at home  - Cardiology consulted, started patient on Eliquis  - Continue telemonitoring  - Nebivolol decreased to 10 mg daily (this was a home medication). Bradycardia  improved.  Stable.    Type 1 diabetes with hyperglycemia  Hypoglycemia  - Glucose around 500 on arrival, now patient hypoglycemic on fasting glucose yesterday.  Has since been running   - Continue glargine to 10 units nightly with sliding scale, adjust as needed  - Continue to follow, decreased her Lantus back to daily after some hypoglycemia on 11/21, continue to follow     Syncope  - CT head negative for acute abnormalities  - Troponins mildly elevated but flat  - EKG showed A-fib as above  - Likely related to sepsis  - Repeat echo largely unremarkable  - PT/OT recommended inpatient rehab. Patient agreeable. Awaiting placement.    Elevated CK, improved  - CK 1331 on arrival, likely secondary to syncope and being down for unknown amount of time  - Improved to 214  - S/p IVF, patient with adequate p.o. intake    ELMER, resolved  CKD stage III  - Baseline creatinine appears to be around 1  - On arrival creatinine 1.5, improved to 0.84 this AM  - Resume Entresto    Closed fracture left foot, POA  - Patient being treated as an outpatient by Ortho, currently in a walking boot. No current pain.  - Likely outpatient follow up after hopeful rehab     Hypothyroidism  - Continue levothyroxine    Diastolic CHF  Hyperlipidemia  Hypertension  History of CVA  - Continue statin  - Holding Bumex, patient appears euvolemic today on exam. She takes 0.5 every other day. Stable. BP slowly increasing. Lungs clear. May need to resume soon.    COPD, not in exacerbation  TIFFANIE  - Patient currently not on any inhalers  - Continue CPAP nightly    Arthritis  Demyelinating disease  - Clarified, patient is no longer on Rinvoq   - Patient follows up for steroid injections but will not require again until following rehab and is due in December   - Follow-up with Dr. Hernandez on discharge    Expected Discharge Location and Transportation: Inpatient rehab, ? [facility] pending insurance preaut     Expected Discharge   Expected Discharge Date:  11/26/2024; Expected Discharge Time:      VTE Prophylaxis:  Pharmacologic & mechanical VTE prophylaxis orders are present.         AM-PAC 6 Clicks Score (PT): 22 (11/22/24 2102)    CODE STATUS:   Code Status and Medical Interventions: CPR (Attempt to Resuscitate); Full Support   Ordered at: 11/15/24 2115     Code Status (Patient has no pulse and is not breathing):    CPR (Attempt to Resuscitate)     Medical Interventions (Patient has pulse or is breathing):    Full Support       Marisela Hope PA-C  11/23/24

## 2024-11-23 NOTE — CASE MANAGEMENT/SOCIAL WORK
Continued Stay Note  [facility]     Patient Name: Marilyn Kunz  MRN: [id number]  Today's Date: 11/23/2024    Admit Date: 11/15/2024    Plan: inpatient rehab [facility]   Discharge Plan       Row Name 11/23/24 0821       Plan    Plan inpatient rehab [facility]    Plan Comments I spoke w/weekend [facility] liaison, pre cert still pending.    Final Discharge Disposition Code 03 - skilled nursing facility (SNF)                   Discharge Codes    No documentation.                 Expected Discharge Date and Time       Expected Discharge Date Expected Discharge Time    Nov 26, 2024               Samuel Malloy RN

## 2024-11-24 LAB
ALBUMIN SERPL-MCNC: 3 G/DL (ref 3.5–5.2)
ALBUMIN/GLOB SERPL: 1.2 G/DL
ALP SERPL-CCNC: 65 U/L (ref 39–117)
ALT SERPL W P-5'-P-CCNC: 9 U/L (ref 1–33)
ANION GAP SERPL CALCULATED.3IONS-SCNC: 9 MMOL/L (ref 5–15)
AST SERPL-CCNC: 12 U/L (ref 1–32)
BASOPHILS # BLD AUTO: 0.05 10*3/MM3 (ref 0–0.2)
BASOPHILS NFR BLD AUTO: 0.7 % (ref 0–1.5)
BILIRUB SERPL-MCNC: 0.5 MG/DL (ref 0–1.2)
BUN SERPL-MCNC: 14 MG/DL (ref 8–23)
BUN/CREAT SERPL: 18.9 (ref 7–25)
CALCIUM SPEC-SCNC: 8.5 MG/DL (ref 8.6–10.5)
CHLORIDE SERPL-SCNC: 105 MMOL/L (ref 98–107)
CO2 SERPL-SCNC: 25 MMOL/L (ref 22–29)
CREAT SERPL-MCNC: 0.74 MG/DL (ref 0.57–1)
DEPRECATED RDW RBC AUTO: 50.2 FL (ref 37–54)
EGFRCR SERPLBLD CKD-EPI 2021: 82.4 ML/MIN/1.73
EOSINOPHIL # BLD AUTO: 0.99 10*3/MM3 (ref 0–0.4)
EOSINOPHIL NFR BLD AUTO: 14.3 % (ref 0.3–6.2)
ERYTHROCYTE [DISTWIDTH] IN BLOOD BY AUTOMATED COUNT: 14.6 % (ref 12.3–15.4)
GLOBULIN UR ELPH-MCNC: 2.5 GM/DL
GLUCOSE BLDC GLUCOMTR-MCNC: 165 MG/DL (ref 70–130)
GLUCOSE BLDC GLUCOMTR-MCNC: 200 MG/DL (ref 70–130)
GLUCOSE BLDC GLUCOMTR-MCNC: 220 MG/DL (ref 70–130)
GLUCOSE BLDC GLUCOMTR-MCNC: 256 MG/DL (ref 70–130)
GLUCOSE BLDC GLUCOMTR-MCNC: 287 MG/DL (ref 70–130)
GLUCOSE BLDC GLUCOMTR-MCNC: 90 MG/DL (ref 70–130)
GLUCOSE SERPL-MCNC: 208 MG/DL (ref 65–99)
HCT VFR BLD AUTO: 29.8 % (ref 34–46.6)
HGB BLD-MCNC: 9.5 G/DL (ref 12–15.9)
IMM GRANULOCYTES # BLD AUTO: 0.02 10*3/MM3 (ref 0–0.05)
IMM GRANULOCYTES NFR BLD AUTO: 0.3 % (ref 0–0.5)
LYMPHOCYTES # BLD AUTO: 2.09 10*3/MM3 (ref 0.7–3.1)
LYMPHOCYTES NFR BLD AUTO: 30.2 % (ref 19.6–45.3)
MAGNESIUM SERPL-MCNC: 2 MG/DL (ref 1.6–2.4)
MCH RBC QN AUTO: 30.2 PG (ref 26.6–33)
MCHC RBC AUTO-ENTMCNC: 31.9 G/DL (ref 31.5–35.7)
MCV RBC AUTO: 94.6 FL (ref 79–97)
MONOCYTES # BLD AUTO: 0.59 10*3/MM3 (ref 0.1–0.9)
MONOCYTES NFR BLD AUTO: 8.5 % (ref 5–12)
NEUTROPHILS NFR BLD AUTO: 3.19 10*3/MM3 (ref 1.7–7)
NEUTROPHILS NFR BLD AUTO: 46 % (ref 42.7–76)
NRBC BLD AUTO-RTO: 0 /100 WBC (ref 0–0.2)
PLATELET # BLD AUTO: 213 10*3/MM3 (ref 140–450)
PMV BLD AUTO: 12.4 FL (ref 6–12)
POTASSIUM SERPL-SCNC: 4.2 MMOL/L (ref 3.5–5.2)
PROT SERPL-MCNC: 5.5 G/DL (ref 6–8.5)
RBC # BLD AUTO: 3.15 10*6/MM3 (ref 3.77–5.28)
SODIUM SERPL-SCNC: 139 MMOL/L (ref 136–145)
WBC NRBC COR # BLD AUTO: 6.93 10*3/MM3 (ref 3.4–10.8)

## 2024-11-24 PROCEDURE — 97530 THERAPEUTIC ACTIVITIES: CPT

## 2024-11-24 PROCEDURE — 94799 UNLISTED PULMONARY SVC/PX: CPT

## 2024-11-24 PROCEDURE — 85025 COMPLETE CBC W/AUTO DIFF WBC: CPT

## 2024-11-24 PROCEDURE — 82948 REAGENT STRIP/BLOOD GLUCOSE: CPT

## 2024-11-24 PROCEDURE — 63710000001 INSULIN LISPRO (HUMAN) PER 5 UNITS: Performed by: FAMILY MEDICINE

## 2024-11-24 PROCEDURE — 97116 GAIT TRAINING THERAPY: CPT

## 2024-11-24 PROCEDURE — 63710000001 INSULIN GLARGINE PER 5 UNITS: Performed by: FAMILY MEDICINE

## 2024-11-24 PROCEDURE — 94640 AIRWAY INHALATION TREATMENT: CPT

## 2024-11-24 PROCEDURE — 94664 DEMO&/EVAL PT USE INHALER: CPT

## 2024-11-24 PROCEDURE — 99232 SBSQ HOSP IP/OBS MODERATE 35: CPT

## 2024-11-24 PROCEDURE — 80053 COMPREHEN METABOLIC PANEL: CPT

## 2024-11-24 PROCEDURE — 83735 ASSAY OF MAGNESIUM: CPT | Performed by: FAMILY MEDICINE

## 2024-11-24 RX ORDER — IPRATROPIUM BROMIDE AND ALBUTEROL SULFATE 2.5; .5 MG/3ML; MG/3ML
3 SOLUTION RESPIRATORY (INHALATION) EVERY 4 HOURS PRN
Status: DISCONTINUED | OUTPATIENT
Start: 2024-11-24 | End: 2024-11-25 | Stop reason: HOSPADM

## 2024-11-24 RX ADMIN — FLUTICASONE PROPIONATE 2 SPRAY: 50 SPRAY, METERED NASAL at 09:09

## 2024-11-24 RX ADMIN — IPRATROPIUM BROMIDE AND ALBUTEROL SULFATE 3 ML: 2.5; .5 SOLUTION RESPIRATORY (INHALATION) at 07:04

## 2024-11-24 RX ADMIN — TERAZOSIN HYDROCHLORIDE 2 MG: 2 CAPSULE ORAL at 20:55

## 2024-11-24 RX ADMIN — INSULIN LISPRO 5 UNITS: 100 INJECTION, SOLUTION INTRAVENOUS; SUBCUTANEOUS at 20:53

## 2024-11-24 RX ADMIN — APIXABAN 2.5 MG: 2.5 TABLET, FILM COATED ORAL at 20:55

## 2024-11-24 RX ADMIN — SENNOSIDES AND DOCUSATE SODIUM 2 TABLET: 50; 8.6 TABLET ORAL at 21:02

## 2024-11-24 RX ADMIN — PREGABALIN 75 MG: 75 CAPSULE ORAL at 20:55

## 2024-11-24 RX ADMIN — SACUBITRIL AND VALSARTAN 2 TABLET: 49; 51 TABLET, FILM COATED ORAL at 20:55

## 2024-11-24 RX ADMIN — Medication 10 ML: at 09:02

## 2024-11-24 RX ADMIN — PRAVASTATIN SODIUM 20 MG: 20 TABLET ORAL at 18:03

## 2024-11-24 RX ADMIN — OXYBUTYNIN CHLORIDE 5 MG: 5 TABLET ORAL at 09:01

## 2024-11-24 RX ADMIN — Medication 10 ML: at 21:02

## 2024-11-24 RX ADMIN — SACUBITRIL AND VALSARTAN 2 TABLET: 49; 51 TABLET, FILM COATED ORAL at 12:56

## 2024-11-24 RX ADMIN — INSULIN GLARGINE 10 UNITS: 100 INJECTION, SOLUTION SUBCUTANEOUS at 20:53

## 2024-11-24 RX ADMIN — MONTELUKAST 10 MG: 10 TABLET, FILM COATED ORAL at 18:03

## 2024-11-24 RX ADMIN — INSULIN LISPRO 8 UNITS: 100 INJECTION, SOLUTION INTRAVENOUS; SUBCUTANEOUS at 12:56

## 2024-11-24 RX ADMIN — INSULIN LISPRO 8 UNITS: 100 INJECTION, SOLUTION INTRAVENOUS; SUBCUTANEOUS at 09:06

## 2024-11-24 RX ADMIN — PREGABALIN 75 MG: 75 CAPSULE ORAL at 09:01

## 2024-11-24 RX ADMIN — CETIRIZINE HYDROCHLORIDE 10 MG: 10 TABLET, FILM COATED ORAL at 09:01

## 2024-11-24 RX ADMIN — OXYBUTYNIN CHLORIDE 5 MG: 5 TABLET ORAL at 20:55

## 2024-11-24 RX ADMIN — INSULIN LISPRO 5 UNITS: 100 INJECTION, SOLUTION INTRAVENOUS; SUBCUTANEOUS at 18:03

## 2024-11-24 RX ADMIN — LEVOTHYROXINE SODIUM 75 MCG: 0.07 TABLET ORAL at 06:23

## 2024-11-24 RX ADMIN — APIXABAN 2.5 MG: 2.5 TABLET, FILM COATED ORAL at 09:01

## 2024-11-24 NOTE — PLAN OF CARE
Goal Outcome Evaluation:  Plan of Care Reviewed With: patient        Progress: improving  Outcome Evaluation: Slept most of the night on 2LNC. Roomair while awake. SR/SB on tele. No c/o pain or discomfort. No concerns at this time. Cont current POC

## 2024-11-24 NOTE — PROGRESS NOTES
[facility] Medicine Services  PROGRESS NOTE    Patient Name: Marilyn Kunz  : 1945  MRN: [id number]    Date of Admission: 11/15/2024  Primary Care Physician: Peter aBca MD    Subjective   Subjective     CC:  F/u Syncope, hyperglycemia    HPI:  Patient reports she needed a nebulizer treatment this AM, feels better now. She states she has a history of asthma and from time to time uses breathing treatments at home. She is awaiting word from [facility] about her pre-cert.       Objective   Objective     Vital Signs:   Temp:  [97.7 °F (36.5 °C)-98.2 °F (36.8 °C)] 97.8 °F (36.6 °C)  Heart Rate:  [54-68] 54  Resp:  [16-18] 16  BP: (134-163)/(63-72) 134/64  Flow (L/min) (Oxygen Therapy):  [2] 2     Physical exam:  Constitutional: Elderly appearing female sitting up in bed in NAD  HENT: NCAT, mucous membranes moist  Respiratory: Clear to auscultation bilaterally, respiratory effort normal on 2L NC  Cardiovascular: Irregular, no murmurs, rubs, or gallops, no bilateral ankle edema  Gastrointestinal: Positive bowel sounds, soft, nontender, nondistended.  Musculoskeletal: GUNN spontaneously, left LE out of walking boot  Psychiatric: Appropriate affect, cooperative  Neurologic: Alert and oriented, speech clear  Skin: No rashes on exposed surfaces      Results Reviewed:  LAB RESULTS:      Lab 24  0452 24  0431 24  0359 24  0428   WBC 6.93 8.43 9.59 10.35   HEMOGLOBIN 9.5* 9.4* 9.8* 9.3*   HEMATOCRIT 29.8* 29.5* 31.2* 28.9*   PLATELETS 213 213 196 183   NEUTROS ABS 3.19 3.51 6.37  --    IMMATURE GRANS (ABS) 0.02 0.03 0.10*  --    LYMPHS ABS 2.09 2.63 1.61  --    MONOS ABS 0.59 0.74 0.94*  --    EOS ABS 0.99* 1.48* 0.54*  --    MCV 94.6 93.9 95.4 93.2         Lab 24  0431 24  0359 24  0438 24  0513 24  0832   SODIUM 141 140 142 142 146*   POTASSIUM 4.1 4.2 4.3 4.4 4.5   CHLORIDE 106 105 108* 110* 115*   CO2 25.0 23.0 23.0 22.0 21.0*    ANION GAP 10.0 12.0 11.0 10.0 10.0   BUN 15 13 10 13 18   CREATININE 0.84 0.72 0.73 0.71 0.72   EGFR 70.8 85.2 83.8 86.6 85.2   GLUCOSE 102* 51* 190* 267* 87   CALCIUM 8.1* 8.1* 7.9* 7.9* 8.4*   IONIZED CALCIUM  --   --  1.10* 1.10* 1.16   MAGNESIUM 1.9 2.0 1.8 1.8 2.0   PHOSPHORUS  --   --  3.2 3.6 2.2*         Lab 11/23/24  0431 11/22/24  0359   TOTAL PROTEIN 5.7* 5.3*   ALBUMIN 3.0* 2.7*   GLOBULIN 2.7 2.6   ALT (SGPT) 11 10   AST (SGOT) 15 11   BILIRUBIN 0.7 0.6   ALK PHOS 61 56                         Brief Urine Lab Results  (Last result in the past 365 days)        Color   Clarity   Blood   Leuk Est   Nitrite   Protein   CREAT   Urine HCG        11/15/24 1739 Dark Yellow   Cloudy   Negative   Moderate (2+)   Negative   30 mg/dL (1+)                   Microbiology Results Abnormal       Procedure Component Value - Date/Time    Urine Culture - Urine, Urine, Clean Catch [[id number]]  (Abnormal)  (Susceptibility) Collected: 11/15/24 1739    Lab Status: Final result Specimen: Urine, Clean Catch Updated: 11/17/24 0932     Urine Culture >100,000 CFU/mL Escherichia coli    Narrative:      Colonization of the urinary tract without infection is common. Treatment is discouraged unless the patient is symptomatic, pregnant, or undergoing an invasive urologic procedure.    Susceptibility        Escherichia coli      SUAD      Amoxicillin + Clavulanate Susceptible      Ampicillin Susceptible      Ampicillin + Sulbactam Susceptible      Cefepime Susceptible      Ceftazidime Susceptible      Ceftriaxone Susceptible      Gentamicin Susceptible      Levofloxacin Susceptible      Nitrofurantoin Susceptible      Piperacillin + Tazobactam Susceptible      Trimethoprim + Sulfamethoxazole Susceptible                                   No radiology results from the last 24 hrs    Results for orders placed during the hospital encounter of 11/15/24    Adult Transthoracic Echo Complete W/ Cont if Necessary Per Protocol    Interpretation  Summary    Left ventricular systolic function is normal. Left ventricular ejection fraction appears to be 66 - 70%.    Left ventricular diastolic function was normal.    Left atrial volume is mildly increased.    Estimated right ventricular systolic pressure from tricuspid regurgitation is normal (<35 mmHg).    No significant change compared to 2023 echo      Current medications:  Scheduled Meds:apixaban, 2.5 mg, Oral, Q12H  cetirizine, 10 mg, Oral, Daily  fluticasone, 2 spray, Each Nare, Daily  insulin glargine, 10 Units, Subcutaneous, Nightly  insulin lispro, 3-14 Units, Subcutaneous, 4x Daily AC & at Bedtime  levothyroxine, 75 mcg, Oral, Q AM  montelukast, 10 mg, Oral, Q PM  nebivolol, 10 mg, Oral, Daily  oxybutynin, 5 mg, Oral, BID  pravastatin, 20 mg, Oral, Q PM  pregabalin, 75 mg, Oral, BID  sacubitril-valsartan, 1 tablet, Oral, BID  sodium chloride, 10 mL, Intravenous, Q12H  terazosin, 2 mg, Oral, Nightly      Continuous Infusions:   PRN Meds:.  acetaminophen    senna-docusate sodium **AND** polyethylene glycol **AND** bisacodyl **AND** bisacodyl    Calcium Replacement - Follow Nurse / BPA Driven Protocol    dextrose    dextrose    glucagon (human recombinant)    ipratropium-albuterol    Magnesium Standard Dose Replacement - Follow Nurse / BPA Driven Protocol    melatonin    Phosphorus Replacement - Follow Nurse / BPA Driven Protocol    Potassium Replacement - Follow Nurse / BPA Driven Protocol    sodium chloride    sodium chloride    Assessment & Plan   Assessment & Plan     Active Hospital Problems    Diagnosis  POA    **UTI (urinary tract infection) [N39.0]  Yes    Type 1 diabetes mellitus with hyperglycemia [E10.65]  Yes    Syncope [R55]  Yes    Elevated serum creatinine [R79.89]  Yes    Sepsis [A41.9]  Yes    Closed fracture of left foot [S92.902A]  Yes    TIFFANIE (obstructive sleep apnea) [G47.33]  Yes    COPD (chronic obstructive pulmonary disease) [J44.9]  Yes    Elevated CK [R74.8]  Yes     Immunosuppression due to drug therapy [D84.821, Z79.899]  Not Applicable    Chronic diastolic congestive heart failure [I50.32]  Yes    History of stroke [Z86.73]  Not Applicable    Psoriatic arthritis [L40.50]  Yes    Factor V Leiden [D68.51]  Yes    Essential hypertension [I10]  Yes    CKD (chronic kidney disease) stage 3, GFR 30-59 ml/min [N18.30]  Yes    Hyperlipidemia LDL goal <70 [E78.5]  Yes    Hypothyroidism [E03.9]  Yes      Resolved Hospital Problems   No resolved problems to display.        Brief Hospital Course to date:  Marilyn Kunz is a 79 y.o. female with hypertension, hyperlipidemia, diastolic heart failure, somatic arthritis, demyelinating disease, type 1 diabetes, CKD stage III, prior CVA, COPD, hypothyroidism, TIFFANIE on CPAP, heterozygous factor V Leiden who presented to the ED with syncope and hyperglycemia.  Patient found to be septic likely from UTI.    This patient's problems and plans were partially entered by my partner and updated as appropriate by me 11/24/24. Patient transferred to hospital medicine service on the AM of 11/21, pending hopeful rehab placement. She is pending possible precert to [facility], continue to follow at this time.     Sepsis, resolved  UTI  - Pt met severe sepsis criteria causing organ dysfunction on arrival due to ELMER. ELMER, leukocytosis and elevated lactic acid resolved  - Blood cultures with no growth x 5 days   - Urine culture growing E. coli, pan susceptible  - IV Rocephin switched to p.o. Ceftin, s/p course    A-fib, new onset  Bradycardia, asymptomatic  - A-fib captured on EKG 11/15  - No known history of arrhythmia  - Rate controlled without any medications  - Likely 2/2 sepsis above though more concerning in the setting of syncope at home  - Cardiology consulted, started patient on Eliquis  - Continue telemonitoring  - Nebivolol decreased to 10 mg daily (this was a home medication). Bradycardia improved. Stable.    Type 1 diabetes with  hyperglycemia  Hypoglycemia  - Glucose around 500 on arrival, now patient hypoglycemic on fasting glucose 11/22/24.  Has since been running   - Continue glargine to 10 units nightly with SSI, adjust as needed    Syncope  - CT head negative for acute abnormalities  - Troponins mildly elevated but flat  - EKG showed A-fib as above  - Likely related to sepsis  - Repeat echo largely unremarkable  - PT/OT recommended inpatient rehab. Patient agreeable. Awaiting placement    Elevated CK, improved  - CK 1331 on arrival, likely secondary to syncope and being down for unknown amount of time  - Improved to 214 on 11/17/24  - S/p IVF, patient with adequate p.o. intake    ELMER, resolved  CKD stage III  - Baseline creatinine appears to be around 1  - On arrival creatinine 1.5, improved to 0.74 this AM  - Resume Entresto    Closed fracture left foot, POA  - Patient being treated as an outpatient by Ortho, currently in a walking boot. No current pain.  - Likely outpatient follow up after hopeful rehab     Hypothyroidism  - Continue levothyroxine    Diastolic CHF  Hyperlipidemia  Hypertension  History of CVA  - Continue statin  - Holding Bumex, patient appears euvolemic today on exam. She takes 0.5 every other day. Stable. BP slowly increasing. Lungs clear. May need to resume soon.    COPD, not in exacerbation  TIFFANIE  - Patient currently not on any inhalers  - Continue CPAP nightly    Arthritis  Demyelinating disease  - Clarified, patient is no longer on Rinvoq   - Patient follows up for steroid injections but will not require again until following rehab and is due in December   - Follow-up with Dr. Hernandez on discharge    Expected Discharge Location and Transportation: Inpatient rehab, ? [facility] pending insurance pre cert    Expected Discharge   Expected Discharge Date: 11/26/2024; Expected Discharge Time:      VTE Prophylaxis:  Pharmacologic & mechanical VTE prophylaxis orders are present.         AM-PAC 6 Clicks Score (PT): 22  (11/23/24 2010)    CODE STATUS:   Code Status and Medical Interventions: CPR (Attempt to Resuscitate); Full Support   Ordered at: 11/15/24 2115     Code Status (Patient has no pulse and is not breathing):    CPR (Attempt to Resuscitate)     Medical Interventions (Patient has pulse or is breathing):    Full Support       Marisela Hope PA-C  11/24/24

## 2024-11-24 NOTE — THERAPY TREATMENT NOTE
Patient Name: Marilyn Kunz  : 1945    MRN: [id number]                              Today's Date: 2024       Admit Date: 11/15/2024    Visit Dx:     ICD-10-CM ICD-9-CM   1. Urinary tract infection without hematuria, site unspecified  N39.0 599.0   2. Generalized weakness  R53.1 780.79   3. Hyperglycemia  R73.9 790.29   4. Renal insufficiency  N28.9 593.9   5. Bandemia  D72.825 288.66     Patient Active Problem List   Diagnosis    Essential hypertension    Factor V Leiden    Psoriatic arthritis    At risk for venous thromboembolism (VTE)    Hypothyroidism    CKD (chronic kidney disease) stage 3, GFR 30-59 ml/min    Hyperlipidemia LDL goal <70    PDA (patent ductus arteriosus)    Demyelinating disease    Spondylolisthesis, grade 2    Type 1 diabetes mellitus    History of stroke    Demyelinating disease of central nervous system, unspecified    Chronic diastolic congestive heart failure    Peripheral polyneuropathy    Immunosuppression due to drug therapy    Erosive osteoarthritis    Cervical radiculopathy    Rash    UTI (urinary tract infection)    Type 1 diabetes mellitus with hyperglycemia    Syncope    Elevated serum creatinine    Sepsis    Closed fracture of left foot    TIFFANIE (obstructive sleep apnea)    COPD (chronic obstructive pulmonary disease)    Elevated CK     Past Medical History:   Diagnosis Date    Abnormal ECG Check FPA Date or [facility]    [facility]    Acute sinusitis 2023    Anemia     Asthma     CAP (community acquired pneumonia) 2023    CHF (congestive heart failure) 2023    Chronic kidney disease     pt told it was dx from Dr Baca and to not eat much protein    Congenital heart disease 's Patent 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    [facility]  close    COPD (chronic obstructive pulmonary disease) 11/15/2024    CTS (carpal tunnel syndrome)     Psoriatic arthritis hands could be    Deep vein thrombosis No on  blood clots but have a blood clotting disorder called Leiden Factor 5    Demyelinating disease of central nervous system, unspecified 03/09/2023    Diabetes     Impression: Patient states that her diabetes is not doing well.  I asked her to go ahead and try to talk to her endocrinokogist.  She is still having the lower extremity neuropathic pan, but not enough for me to administer medication.    Disease of thyroid gland     Diverticulitis of colon     Erosive osteoarthritis 05/30/2024    Factor 5 Leiden mutation, heterozygous 2012    Head injury     Hyperlipidemia     Hypertension     Medication monitoring encounter     Impression:  She has renal insufficiency.  She is just taking Tylenol.    Movement disorder 10/2021    knee replacement left knww    Murmur, cardiac     Neuropathy in diabetes     redness swelling legs    TIFFANIE (obstructive sleep apnea) 11/15/2024    Osteopenia     Story: Femoral Neck Impression: Up-ro-date on bone density scan.  Follow up bone density every 2 years.    Peripheral neuropathy 05/03/2022    hands, arms. 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  I went over her exercises and told her to get a shoe insert,    Psoriasis     Impression: No rash    Psoriatic arthritis     hands    Sleep apnea Always    diabetic do not sleep well up and down    Stroke had one don't know when    showed on MRI Brain    Syncope 11/15/2024    Type 1 diabetes      Past Surgical History:   Procedure Laterality Date    CARDIAC CATHETERIZATION  1952 2 of them    Patent Dictus operation    CATARACT EXTRACTION Bilateral     COLONOSCOPY      ENDOSCOPY N/A 10/20/2020    Procedure: ESOPHAGOGASTRODUODENOSCOPY;  Surgeon: Brunner, Mark I, MD;  Location: [facility] ENDOSCOPY;  Service: Gastroenterology;  Laterality: N/A;    HAND SURGERY Left 1987    no hardware    KNEE ARTHROPLASTY      KNEE SURGERY Left     PATENT DUCTUS ARTERIOUS LIGATION      REPLACEMENT TOTAL  KNEE Left       General Information       Row Name 11/24/24 1523          Physical Therapy Time and Intention    Mode of Treatment physical therapy  -LS       Row Name 11/24/24 1523          General Information    Existing Precautions/Restrictions fall;other (see comments)  LLE WBAT with boot; L foot fx  -LS       Row Name 11/24/24 1523          Cognition    Orientation Status (Cognition) oriented x 4  -LS       Row Name 11/24/24 1523          Safety Issues/Impairments Affecting Functional Mobility    Impairments Affecting Function (Mobility) endurance/activity tolerance;balance;strength  -LS               User Key  (r) = Recorded By, (t) = Taken By, (c) = Cosigned By      Initials Name Provider Type    LS Sera Tucker, PT Physical Therapist                   Mobility       Row Name 11/24/24 1524          Bed Mobility    Comment, (Bed Mobility) UIC  -       Row Name 11/24/24 1524          Sit-Stand Transfer    Sit-Stand [place] (Transfers) supervision  -LS     Assistive Device (Sit-Stand Transfers) walker, front-wheeled  -LS     Comment, (Sit-Stand Transfer) from recliner and from toilet  -LS       Row Name 11/24/24 1524          Gait/Stairs (Locomotion)    [place] Level (Gait) supervision  -LS     Assistive Device (Gait) walker, front-wheeled  -LS     Patient was able to Ambulate yes  -LS     Distance in Feet (Gait) 140  -LS     Deviations/Abnormal Patterns (Gait) sam decreased;gait speed decreased;stride length decreased  -LS     Left Sided Gait Deviations weight shift ability decreased  -LS     Comment, (Gait/Stairs) VC for keeping RW close to body.  -LS               User Key  (r) = Recorded By, (t) = Taken By, (c) = Cosigned By      Initials Name Provider Type    Sera Cruz, PT Physical Therapist                   Obj/Interventions       Row Name 11/24/24 1524          Motor Skills    Therapeutic Exercise knee;ankle  -       Row Name 11/24/24 1524          Knee (Therapeutic Exercise)     Knee Strengthening (Therapeutic Exercise) bilateral;marching while seated;LAQ (long arc quad);sitting;10 repetitions  -       Row Name 11/24/24 1524          Ankle (Therapeutic Exercise)    Ankle (Therapeutic Exercise) AROM (active range of motion)  -     Ankle AROM (Therapeutic Exercise) right;dorsiflexion;10 repetitions;sitting  -       Row Name 11/24/24 1524          Balance    Static Sitting Balance independent  -LS     Position, Sitting Balance unsupported;sitting in chair  -LS     Static Standing Balance supervision  -LS     Position/Device Used, Standing Balance supported  -               User Key  (r) = Recorded By, (t) = Taken By, (c) = Cosigned By      Initials Name Provider Type    LS Sera Tucker, PT Physical Therapist                   Goals/Plan    No documentation.                  Clinical Impression       Row Name 11/24/24 1525          Pain    Pretreatment Pain Rating 0/10 - no pain  -LS     Posttreatment Pain Rating 0/10 - no pain  -       Row Name 11/24/24 1525          Plan of Care Review    Plan of Care Reviewed With patient  -LS     Progress improving  -     Outcome Evaluation Pt demonstrated increased indep with STS transfer and ability to progress forward ambulation distance (140 ft with, RW, supervision, L boot intact). Gave good effort with seated ther ex. Will cont PT POC.  -       Row Name 11/24/24 1525          Vital Signs    Pre Systolic BP Rehab 148  -LS     Pre Treatment Diastolic BP 57  -LS     Pretreatment Heart Rate (beats/min) 59  -LS     Posttreatment Heart Rate (beats/min) 61  -LS     O2 Delivery Pre Treatment room air  -LS     O2 Delivery Intra Treatment room air  -LS     Post SpO2 (%) 96  -LS     O2 Delivery Post Treatment room air  -LS     Pre Patient Position Sitting  -LS     Intra Patient Position Standing  -LS     Post Patient Position Sitting  -LS       Row Name 11/24/24 1524          Positioning and Restraints    Pre-Treatment Position sitting in  chair/recliner  -LS     Post Treatment Position chair  -LS     In Chair notified nsg;reclined;call light within reach;encouraged to call for assist;exit alarm on;waffle cushion;legs elevated  -LS               User Key  (r) = Recorded By, (t) = Taken By, (c) = Cosigned By      Initials Name Provider Type    Sera Cruz, PT Physical Therapist                   Outcome Measures       Row Name 11/24/24 1529          How much help from another person do you currently need...    Turning from your back to your side while in flat bed without using bedrails? 4  -LS     Moving from lying on back to sitting on the side of a flat bed without bedrails? 3  -LS     Moving to and from a bed to a chair (including a wheelchair)? 3  -LS     Standing up from a chair using your arms (e.g., wheelchair, bedside chair)? 3  -LS     Climbing 3-5 steps with a railing? 3  -LS     To walk in hospital room? 3  -LS     AM-PAC 6 Clicks Score (PT) 19  -LS     Highest Level of Mobility Goal 6 --> Walk 10 steps or more  -               User Key  (r) = Recorded By, (t) = Taken By, (c) = Cosigned By      Initials Name Provider Type    Sera Cruz, PT Physical Therapist                                 Physical Therapy Education       Title: PT OT SLP Therapies (Done)       Topic: Physical Therapy (Done)       Point: Mobility training (Done)       Learning Progress Summary            Patient Acceptance, E,D, VU,NR by LS at 11/24/2024 1529    Acceptance, E, VU by ER at 11/22/2024 1153    Comment: d/c planning, safety instructions    Acceptance, E, VU,NR by ML at 11/20/2024 1314    Acceptance, E, VU,NR by ML at 11/20/2024 1121    Acceptance, E, VU by ER at 11/18/2024 1623    Comment: pt edu on transfers, FWW use, d/c planning, purpose of PT, need for sitting up    Acceptance, E, VU by NS at 11/16/2024 1545                      Point: Home exercise program (Done)       Learning Progress Summary            Patient Acceptance, E,D, VU,NR by LS  at 11/24/2024 1529    Acceptance, E, VU by ER at 11/22/2024 1153    Comment: d/c planning, safety instructions    Acceptance, E, VU by ER at 11/18/2024 1623    Comment: pt edu on transfers, FWW use, d/c planning, purpose of PT, need for sitting up                      Point: Body mechanics (Done)       Learning Progress Summary            Patient Acceptance, E,D, VU,NR by LS at 11/24/2024 1529    Acceptance, E, VU by ER at 11/22/2024 1153    Comment: d/c planning, safety instructions    Acceptance, E, VU,NR by ML at 11/20/2024 1314    Acceptance, E, VU,NR by ML at 11/20/2024 1121    Acceptance, E, VU by ER at 11/18/2024 1623    Comment: pt edu on transfers, FWW use, d/c planning, purpose of PT, need for sitting up    Acceptance, E, VU by NS at 11/16/2024 1545                      Point: Precautions (Done)       Learning Progress Summary            Patient Acceptance, E,D, VU,NR by LS at 11/24/2024 1529    Acceptance, E, VU by ER at 11/22/2024 1153    Comment: d/c planning, safety instructions    Acceptance, E, VU,NR by ML at 11/20/2024 1314    Acceptance, E, VU,NR by ML at 11/20/2024 1121    Acceptance, E, VU by ER at 11/18/2024 1623    Comment: pt edu on transfers, FWW use, d/c planning, purpose of PT, need for sitting up    Acceptance, E, VU by NS at 11/16/2024 1545                                      User Key       Initials Effective Dates Name Provider Type Discipline     02/03/23 -  Sera Tucker, PT Physical Therapist PT    NS 06/16/21 -  Alisha Kennedy, PT Physical Therapist PT    ML 04/22/21 -  Gail Shields Physical Therapist PT    ER 11/04/24 -  Princess Wagner, PT Physical Therapist PT                  PT Recommendation and Plan     Progress: improving  Outcome Evaluation: Pt demonstrated increased indep with STS transfer and ability to progress forward ambulation distance (140 ft with, RW, supervision, L boot intact). Gave good effort with seated ther ex. Will cont PT POC.     Time Calculation:          PT Charges       Row Name 11/24/24 1530             Time Calculation    Start Time 1441  -LS      PT Received On 11/24/24  -LS         Timed Charges    20990 - PT Therapeutic Exercise Minutes 5  -LS      61854 - Gait Training Minutes  12  -LS      80817 - PT Therapeutic Activity Minutes 15  -LS         Total Minutes    Timed Charges Total Minutes 32  -LS       Total Minutes 32  -LS                User Key  (r) = Recorded By, (t) = Taken By, (c) = Cosigned By      Initials Name Provider Type     Sera Tucker, PT Physical Therapist                  Therapy Charges for Today       Code Description Service Date Service Provider Modifiers Qty    44534757626 HC GAIT TRAINING EA 15 MIN 11/24/2024 Sera Tucker, PT GP 1    54272752251 HC PT THERAPEUTIC ACT EA 15 MIN 11/24/2024 Sera Tucker, PT GP 1            PT G-Codes  Outcome Measure Options: AM-PAC 6 Clicks Daily Activity (OT)  AM-PAC 6 Clicks Score (PT): 19  AM-PAC 6 Clicks Score (OT): 24       Sera Tucker, PT  11/24/2024

## 2024-11-24 NOTE — PLAN OF CARE
Goal Outcome Evaluation:  Plan of Care Reviewed With: patient        Progress: improving  Outcome Evaluation: Pt demonstrated increased indep with STS transfer and ability to progress forward ambulation distance (140 ft with, RW, supervision, L boot intact). Gave good effort with seated ther ex. Will cont PT POC.

## 2024-11-25 ENCOUNTER — READMISSION MANAGEMENT (OUTPATIENT)
Dept: CALL CENTER | Facility: HOSPITAL | Age: 79
End: 2024-11-25
Payer: MEDICARE

## 2024-11-25 VITALS
BODY MASS INDEX: 26.61 KG/M2 | HEART RATE: 73 BPM | HEIGHT: 59 IN | SYSTOLIC BLOOD PRESSURE: 140 MMHG | RESPIRATION RATE: 18 BRPM | OXYGEN SATURATION: 100 % | WEIGHT: 132 LBS | DIASTOLIC BLOOD PRESSURE: 61 MMHG | TEMPERATURE: 97.7 F

## 2024-11-25 LAB
ALBUMIN SERPL-MCNC: 3 G/DL (ref 3.5–5.2)
ALBUMIN/GLOB SERPL: 1.1 G/DL
ALP SERPL-CCNC: 68 U/L (ref 39–117)
ALT SERPL W P-5'-P-CCNC: 10 U/L (ref 1–33)
ANION GAP SERPL CALCULATED.3IONS-SCNC: 11 MMOL/L (ref 5–15)
AST SERPL-CCNC: 20 U/L (ref 1–32)
BASOPHILS # BLD AUTO: 0.04 10*3/MM3 (ref 0–0.2)
BASOPHILS NFR BLD AUTO: 0.6 % (ref 0–1.5)
BILIRUB SERPL-MCNC: 0.4 MG/DL (ref 0–1.2)
BUN SERPL-MCNC: 9 MG/DL (ref 8–23)
BUN/CREAT SERPL: 11.3 (ref 7–25)
CALCIUM SPEC-SCNC: 8.5 MG/DL (ref 8.6–10.5)
CHLORIDE SERPL-SCNC: 105 MMOL/L (ref 98–107)
CO2 SERPL-SCNC: 26 MMOL/L (ref 22–29)
CREAT SERPL-MCNC: 0.8 MG/DL (ref 0.57–1)
DEPRECATED RDW RBC AUTO: 48.5 FL (ref 37–54)
EGFRCR SERPLBLD CKD-EPI 2021: 75.1 ML/MIN/1.73
EOSINOPHIL # BLD AUTO: 0.88 10*3/MM3 (ref 0–0.4)
EOSINOPHIL NFR BLD AUTO: 13.9 % (ref 0.3–6.2)
ERYTHROCYTE [DISTWIDTH] IN BLOOD BY AUTOMATED COUNT: 14.3 % (ref 12.3–15.4)
GLOBULIN UR ELPH-MCNC: 2.7 GM/DL
GLUCOSE BLDC GLUCOMTR-MCNC: 124 MG/DL (ref 70–130)
GLUCOSE BLDC GLUCOMTR-MCNC: 252 MG/DL (ref 70–130)
GLUCOSE SERPL-MCNC: 102 MG/DL (ref 65–99)
HCT VFR BLD AUTO: 32.2 % (ref 34–46.6)
HGB BLD-MCNC: 10.2 G/DL (ref 12–15.9)
IMM GRANULOCYTES # BLD AUTO: 0.01 10*3/MM3 (ref 0–0.05)
IMM GRANULOCYTES NFR BLD AUTO: 0.2 % (ref 0–0.5)
LYMPHOCYTES # BLD AUTO: 2.13 10*3/MM3 (ref 0.7–3.1)
LYMPHOCYTES NFR BLD AUTO: 33.6 % (ref 19.6–45.3)
MCH RBC QN AUTO: 29.7 PG (ref 26.6–33)
MCHC RBC AUTO-ENTMCNC: 31.7 G/DL (ref 31.5–35.7)
MCV RBC AUTO: 93.9 FL (ref 79–97)
MONOCYTES # BLD AUTO: 0.61 10*3/MM3 (ref 0.1–0.9)
MONOCYTES NFR BLD AUTO: 9.6 % (ref 5–12)
NEUTROPHILS NFR BLD AUTO: 2.67 10*3/MM3 (ref 1.7–7)
NEUTROPHILS NFR BLD AUTO: 42.1 % (ref 42.7–76)
NRBC BLD AUTO-RTO: 0 /100 WBC (ref 0–0.2)
PLATELET # BLD AUTO: 216 10*3/MM3 (ref 140–450)
PMV BLD AUTO: 12.1 FL (ref 6–12)
POTASSIUM SERPL-SCNC: 3.9 MMOL/L (ref 3.5–5.2)
PROT SERPL-MCNC: 5.7 G/DL (ref 6–8.5)
RBC # BLD AUTO: 3.43 10*6/MM3 (ref 3.77–5.28)
SODIUM SERPL-SCNC: 142 MMOL/L (ref 136–145)
WBC NRBC COR # BLD AUTO: 6.34 10*3/MM3 (ref 3.4–10.8)

## 2024-11-25 PROCEDURE — 82948 REAGENT STRIP/BLOOD GLUCOSE: CPT

## 2024-11-25 PROCEDURE — 97530 THERAPEUTIC ACTIVITIES: CPT

## 2024-11-25 PROCEDURE — 85025 COMPLETE CBC W/AUTO DIFF WBC: CPT

## 2024-11-25 PROCEDURE — 80053 COMPREHEN METABOLIC PANEL: CPT

## 2024-11-25 PROCEDURE — 97535 SELF CARE MNGMENT TRAINING: CPT

## 2024-11-25 PROCEDURE — 99239 HOSP IP/OBS DSCHRG MGMT >30: CPT

## 2024-11-25 RX ORDER — NEBIVOLOL 10 MG/1
10 TABLET ORAL DAILY
Qty: 30 TABLET | Refills: 0 | Status: SHIPPED | OUTPATIENT
Start: 2024-11-26 | End: 2024-12-26

## 2024-11-25 RX ADMIN — NEBIVOLOL 10 MG: 5 TABLET ORAL at 08:33

## 2024-11-25 RX ADMIN — Medication 10 ML: at 08:33

## 2024-11-25 RX ADMIN — PREGABALIN 75 MG: 75 CAPSULE ORAL at 08:33

## 2024-11-25 RX ADMIN — SACUBITRIL AND VALSARTAN 2 TABLET: 49; 51 TABLET, FILM COATED ORAL at 08:33

## 2024-11-25 RX ADMIN — FLUTICASONE PROPIONATE 2 SPRAY: 50 SPRAY, METERED NASAL at 08:33

## 2024-11-25 RX ADMIN — APIXABAN 2.5 MG: 2.5 TABLET, FILM COATED ORAL at 08:33

## 2024-11-25 RX ADMIN — OXYBUTYNIN CHLORIDE 5 MG: 5 TABLET ORAL at 08:33

## 2024-11-25 RX ADMIN — LEVOTHYROXINE SODIUM 75 MCG: 0.07 TABLET ORAL at 05:25

## 2024-11-25 RX ADMIN — CETIRIZINE HYDROCHLORIDE 10 MG: 10 TABLET, FILM COATED ORAL at 08:33

## 2024-11-25 NOTE — THERAPY TREATMENT NOTE
Patient Name: Marilyn Kunz  : 1945    MRN: [id number]                              Today's Date: 2024       Admit Date: 11/15/2024    Visit Dx:     ICD-10-CM ICD-9-CM   1. Urinary tract infection without hematuria, site unspecified  N39.0 599.0   2. Generalized weakness  R53.1 780.79   3. Hyperglycemia  R73.9 790.29   4. Renal insufficiency  N28.9 593.9   5. Bandemia  D72.825 288.66     Patient Active Problem List   Diagnosis    Essential hypertension    Factor V Leiden    Psoriatic arthritis    At risk for venous thromboembolism (VTE)    Hypothyroidism    CKD (chronic kidney disease) stage 3, GFR 30-59 ml/min    Hyperlipidemia LDL goal <70    PDA (patent ductus arteriosus)    Demyelinating disease    Spondylolisthesis, grade 2    Type 1 diabetes mellitus    History of stroke    Demyelinating disease of central nervous system, unspecified    Chronic diastolic congestive heart failure    Peripheral polyneuropathy    Immunosuppression due to drug therapy    Erosive osteoarthritis    Cervical radiculopathy    Rash    UTI (urinary tract infection)    Type 1 diabetes mellitus with hyperglycemia    Syncope    Elevated serum creatinine    Sepsis    Closed fracture of left foot    TIFFANIE (obstructive sleep apnea)    COPD (chronic obstructive pulmonary disease)    Elevated CK     Past Medical History:   Diagnosis Date    Abnormal ECG Check FPA Date or [facility]    [facility]    Acute sinusitis 2023    Anemia     Asthma     CAP (community acquired pneumonia) 2023    CHF (congestive heart failure) 2023    Chronic kidney disease     pt told it was dx from Dr Baca and to not eat much protein    Congenital heart disease 's Patent 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    Surgery [facility]  close    COPD (chronic obstructive pulmonary disease) 11/15/2024    CTS (carpal tunnel syndrome)     Psoriatic arthritis hands could be    Deep vein thrombosis No on  blood clots but have a blood clotting disorder called Leiden Factor 5    Demyelinating disease of central nervous system, unspecified 03/09/2023    Diabetes     Impression: Patient states that her diabetes is not doing well.  I asked her to go ahead and try to talk to her endocrinokogist.  She is still having the lower extremity neuropathic pan, but not enough for me to administer medication.    Disease of thyroid gland     Diverticulitis of colon     Erosive osteoarthritis 05/30/2024    Factor 5 Leiden mutation, heterozygous 2012    Head injury     Hyperlipidemia     Hypertension     Medication monitoring encounter     Impression:  She has renal insufficiency.  She is just taking Tylenol.    Movement disorder 10/2021    knee replacement left knww    Murmur, cardiac     Neuropathy in diabetes     redness swelling legs    TIFFANIE (obstructive sleep apnea) 11/15/2024    Osteopenia     Story: Femoral Neck Impression: Up-ro-date on bone density scan.  Follow up bone density every 2 years.    Peripheral neuropathy 05/03/2022    hands, arms. 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  I went over her exercises and told her to get a shoe insert,    Psoriasis     Impression: No rash    Psoriatic arthritis     hands    Sleep apnea Always    diabetic do not sleep well up and down    Stroke had one don't know when    showed on MRI Brain    Syncope 11/15/2024    Type 1 diabetes      Past Surgical History:   Procedure Laterality Date    CARDIAC CATHETERIZATION  1952 2 of them    Patent Dictus operation    CATARACT EXTRACTION Bilateral     COLONOSCOPY      ENDOSCOPY N/A 10/20/2020    Procedure: ESOPHAGOGASTRODUODENOSCOPY;  Surgeon: Brunner, Mark I, MD;  [facility];  Service: Gastroenterology;  Laterality: N/A;    HAND SURGERY Left 1987    no hardware    KNEE ARTHROPLASTY      KNEE SURGERY Left     PATENT DUCTUS ARTERIOUS LIGATION      REPLACEMENT TOTAL  KNEE Left       General Information       Row Name 11/25/24 1019          OT Time and Intention    Document Type therapy note (daily note)  -KF     Mode of Treatment occupational therapy;individual therapy  -       Row Name 11/25/24 1019          General Information    Patient Profile Reviewed yes  -KF     Existing Precautions/Restrictions fall;other (see comments)  LLE WBAT with boot due to foot fx  -KF     Barriers to Rehab none identified  -       Row Name 11/25/24 1019          Cognition    Orientation Status (Cognition) oriented x 4  -       Row Name 11/25/24 1019          Safety Issues/Impairments Affecting Functional Mobility    Safety Issues Affecting Function (Mobility) safety precaution awareness  -KF     Impairments Affecting Function (Mobility) endurance/activity tolerance;balance;strength  -KF               User Key  (r) = Recorded By, (t) = Taken By, (c) = Cosigned By      Initials Name Provider Type    KF Betzy Frank, OT Occupational Therapist                     Mobility/ADL's       Row Name 11/25/24 1019          Bed Mobility    Bed Mobility supine-sit  -     Supine-Sit [place] (Bed Mobility) modified independence  -     Assistive Device (Bed Mobility) bed rails;head of bed elevated  -       Row Name 11/25/24 1019          Transfers    Transfers sit-stand transfer;stand-sit transfer;toilet transfer  -     Comment, (Transfers) Good safety awareness demonstrated today  -       Row Name 11/25/24 1019          Bed-Chair Transfer    Bed-Chair [place] (Transfers) standby assist  -     Assistive Device (Bed-Chair Transfers) walker, front-wheeled  -       Row Name 11/25/24 1019          Sit-Stand Transfer    Sit-Stand [place] (Transfers) supervision  -     Assistive Device (Sit-Stand Transfers) walker, front-wheeled  -     Comment, (Sit-Stand Transfer) STS x1 from the EOB, x1 from the commode  -       Row Name 11/25/24 1019          Stand-Sit Transfer     Stand-Sit [place] (Transfers) supervision  -KF     Assistive Device (Stand-Sit Transfers) walker, front-wheeled  -KF       Row Name 11/25/24 1019          Toilet Transfer    Type (Toilet Transfer) sit-stand;stand-sit  -KF     [place] Level (Toilet Transfer) supervision  -KF     Assistive Device (Toilet Transfer) commode;grab bars/safety frame;walker, front-wheeled  -KF       Row Name 11/25/24 1019          Functional Mobility    Functional Mobility- Ind. Level standby assist  -KF     Functional Mobility- Device walker, front-wheeled  -KF     Functional Mobility-Distance (Feet) --  >household distance  -KF     Functional Mobility- Comment Pt ambulated to/from the bathroom with CGA, no AD. Pt ambulated an additional HH distance in the hallway using a RWx with supervision. No overt LOB or knee buckling noted.  -       Row Name 11/25/24 1019          Activities of Daily Living    BADL Assessment/Intervention lower body dressing;grooming;toileting  -       Row Name 11/25/24 1019          Lower Body Dressing Assessment/Training    [place] Level (Lower Body Dressing) don;socks;shoes/slippers;set up;standby assist  -KF     Position (Lower Body Dressing) edge of bed sitting  -KF     Comment, (Lower Body Dressing) Pt doffed/donned socks and donned L boot with set up and SBA.  -       Row Name 11/25/24 1019          Grooming Assessment/Training    [place] Level (Grooming) hair care, combing/brushing;oral care regimen;wash face, hands;set up;supervision  -KF     Position (Grooming) unsupported standing;sink side  -       Row Name 11/25/24 1019          Toileting Assessment/Training    [place] Level (Toileting) adjust/manage clothing;perform perineal hygiene;supervision  -KF     Assistive Devices (Toileting) commode;grab bar/safety frame  -KF     Position (Toileting) unsupported standing;unsupported sitting  -KF               User Key  (r) = Recorded By, (t) = Taken By, (c) = Cosigned By       Initials Name Provider Type    Betzy Hernández OT Occupational Therapist                   Obj/Interventions       Row Name 11/25/24 1021          Balance    Balance Assessment sitting static balance;sitting dynamic balance;sit to stand dynamic balance;standing static balance;standing dynamic balance  -KF     Static Sitting Balance supervision  -KF     Dynamic Sitting Balance standby assist  -KF     Position, Sitting Balance unsupported;sitting edge of bed  -KF     Sit to Stand Dynamic Balance standby assist  -KF     Static Standing Balance supervision  -KF     Dynamic Standing Balance standby assist  -KF     Position/Device Used, Standing Balance supported  -KF     Balance Interventions sitting;standing;sit to stand;supported;static;dynamic  -KF               User Key  (r) = Recorded By, (t) = Taken By, (c) = Cosigned By      Initials Name Provider Type    Betzy Hernández OT Occupational Therapist                   Goals/Plan    No documentation.                  Clinical Impression       Row Name 11/25/24 1022          Pain Assessment    Pretreatment Pain Rating 0/10 - no pain  -KF     Posttreatment Pain Rating 0/10 - no pain  -KF       Row Name 11/25/24 1022          Plan of Care Review    Plan of Care Reviewed With patient  -KF     Progress improving  -KF     Outcome Evaluation Pt with continued good participation and progress toward goals during OT session. The pt ambulated to/from the bathroom with CGA, no AD. Toileting and standing sink side grooming ADLs completed with supervision/SBA. Pt ambulated an additional household distance in the hallway using a RWx with supervision. Continue to recommend a d/c home with assist when medically ready.  -       Row Name 11/25/24 1022          Therapy Assessment/Plan (OT)    Rehab Potential (OT) good  -KF     Criteria for Skilled Therapeutic Interventions Met (OT) yes;skilled treatment is necessary  -KF     Therapy Frequency (OT) daily  -KF       Row Name  11/25/24 1022          Therapy Plan Review/Discharge Plan (OT)    Anticipated Discharge Disposition (OT) home with assist  -KF       Row Name 11/25/24 1022          Vital Signs    Pre Systolic BP Rehab 149  -KF     Pre Treatment Diastolic BP 79  -KF     Pretreatment Heart Rate (beats/min) 69  -KF     Pre SpO2 (%) 95  -KF     O2 Delivery Pre Treatment room air  -KF     Pre Patient Position Supine  -KF     Intra Patient Position Standing  -KF     Post Patient Position Sitting  -KF       Row Name 11/25/24 1022          Positioning and Restraints    Pre-Treatment Position in bed  -KF     Post Treatment Position chair  -KF     In Chair notified nsg;reclined;call light within reach;encouraged to call for assist;exit alarm on;waffle cushion;legs elevated  -KF               User Key  (r) = Recorded By, (t) = Taken By, (c) = Cosigned By      Initials Name Provider Type    KF Betzy Frank, FRANCO Occupational Therapist                   Outcome Measures       Row Name 11/25/24 1024          How much help from another is currently needed...    Putting on and taking off regular lower body clothing? 3  -KF     Bathing (including washing, rinsing, and drying) 3  -KF     Toileting (which includes using toilet bed pan or urinal) 4  -KF     Putting on and taking off regular upper body clothing 4  -KF     Taking care of personal grooming (such as brushing teeth) 4  -KF     Eating meals 4  -KF     AM-PAC 6 Clicks Score (OT) 22  -KF       Row Name 11/25/24 0845          How much help from another person do you currently need...    Turning from your back to your side while in flat bed without using bedrails? 4  -LR     Moving from lying on back to sitting on the side of a flat bed without bedrails? 4  -LR     Moving to and from a bed to a chair (including a wheelchair)? 3  -LR     Standing up from a chair using your arms (e.g., wheelchair, bedside chair)? 4  -LR     Climbing 3-5 steps with a railing? 3  -LR     To walk in hospital room?  3  -LR     AM-PAC 6 Clicks Score (PT) 21  -LR     Highest Level of Mobility Goal 6 --> Walk 10 steps or more  -LR       Row Name 11/25/24 1024          Functional Assessment    Outcome Measure Options AM-PAC 6 Clicks Daily Activity (OT)  -               User Key  (r) = Recorded By, (t) = Taken By, (c) = Cosigned By      Initials Name Provider Type    LR Layla Damian, RN Registered Nurse    Betzy Hernández OT Occupational Therapist                    Occupational Therapy Education       Title: PT OT SLP Therapies (Done)       Topic: Occupational Therapy (Done)       Point: ADL training (Done)       Description:   Instruct learner(s) on proper safety adaptation and remediation techniques during self care or transfers.   Instruct in proper use of assistive devices.                  Learning Progress Summary            Patient Acceptance, E,TB, VU,DU by  at 11/25/2024 0844    Acceptance, E,H, VU by GENO at 11/22/2024 1558    Acceptance, E, NR by LR at 11/21/2024 1340    Acceptance, E, VU by YELENA at 11/20/2024 1447    Comment: much improved endurance and balance, shoe use to balance out boot wear, OP recommendation    Acceptance, E,D, VU,NR by YELENA at 11/19/2024 1115    Comment: UE and LE TE, transfer safety, progression to goals    Acceptance, E, NR by LR at 11/16/2024 0835                      Point: Home exercise program (Done)       Description:   Instruct learner(s) on appropriate technique for monitoring, assisting and/or progressing therapeutic exercises/activities.                  Learning Progress Summary            Patient Acceptance, E,D, VU,NR by YELENA at 11/19/2024 1115    Comment: UE and LE TE, transfer safety, progression to goals                      Point: Precautions (Done)       Description:   Instruct learner(s) on prescribed precautions during self-care and functional transfers.                  Learning Progress Summary            Patient Acceptance, E,TB, VU,DU by  at 11/25/2024 0844     Acceptance, E,H, VU by KL at 11/22/2024 1558    Acceptance, E, NR by LR at 11/21/2024 1340    Acceptance, E,D, VU,NR by YELENA at 11/19/2024 1115    Comment: UHEAVEN and FRANSISCO TE, transfer safety, progression to goals    Acceptance, E, NR by LR at 11/16/2024 0835                      Point: Body mechanics (Done)       Description:   Instruct learner(s) on proper positioning and spine alignment during self-care, functional mobility activities and/or exercises.                  Learning Progress Summary            Patient Acceptance, E,TB, VU,DU by  at 11/25/2024 0844    Acceptance, E,H, VU by  at 11/22/2024 1558    Acceptance, E, NR by LR at 11/21/2024 1340    Acceptance, E, NR by LR at 11/16/2024 0835                                      User Key       Initials Effective Dates Name Provider Type Discipline    YELENA 07/11/23 -  Rosalba Hernandez OT Occupational Therapist OT     08/09/23 -  Betzy Frank OT Occupational Therapist OT     02/05/24 -  Zahida Jane OT Occupational Therapist OT     10/09/24 -  Thais Rojo, OT Occupational Therapist OT                  OT Recommendation and Plan  Therapy Frequency (OT): daily  Plan of Care Review  Plan of Care Reviewed With: patient  Progress: improving  Outcome Evaluation: Pt with continued good participation and progress toward goals during OT session. The pt ambulated to/from the bathroom with CGA, no AD. Toileting and standing sink side grooming ADLs completed with supervision/SBA. Pt ambulated an additional household distance in the hallway using a RWx with supervision. Continue to recommend a d/c home with assist when medically ready.     Time Calculation:         Time Calculation- OT       Row Name 11/25/24 1024             Time Calculation- OT    OT Start Time 0844 -KF      OT Received On 11/25/24  -KF      OT Goal Re-Cert Due Date 11/26/24 -KF         Timed Charges    19785 - OT Therapeutic Activity Minutes 10  -KF      33475 - OT Self Care/Mgmt Minutes 15   -KF         Total Minutes    Timed Charges Total Minutes 25  -KF       Total Minutes 25  -KF                User Key  (r) = Recorded By, (t) = Taken By, (c) = Cosigned By      Initials Name Provider Type    Betzy Hernández OT Occupational Therapist                  Therapy Charges for Today       Code Description Service Date Service Provider Modifiers Qty    [id number]  OT THERAPEUTIC ACT EA 15 MIN 11/25/2024 Betzy Frank OT GO 1    [id number]  OT SELF CARE/MGMT/TRAIN EA 15 MIN 11/25/2024 Betzy Frank OT GO 1                 Betzy Frank OT  11/25/2024

## 2024-11-25 NOTE — PLAN OF CARE
Goal Outcome Evaluation:  Plan of Care Reviewed With: patient        Progress: improving  Outcome Evaluation: Pt with continued good participation and progress toward goals during OT session. The pt ambulated to/from the bathroom with CGA, no AD. Toileting and standing sink side grooming ADLs completed with supervision/SBA. Pt ambulated an additional household distance in the hallway using a RWx with supervision. Continue to recommend a d/c home with assist when medically ready.    Anticipated Discharge Disposition (OT): home with assist

## 2024-11-25 NOTE — PLAN OF CARE
Goal Outcome Evaluation:              Outcome Evaluation: Pt is A&O with VSS, NSR to sinus naima in the 40s-50s on the monitor, and on RA to 2L NC when asleep. She slept well last night. There are no complaints at this time.

## 2024-11-25 NOTE — DISCHARGE PLACEMENT REQUEST
"Case Management  [phone]    Marilyn Amezcua (79 y.o. Female)       Date of Birth   1945    Social Security Number       Address   [address] [facility] [address]    Home Phone   [phone]    MRN   [id number]       Catholic   Taoist    Marital Status   Single                            Admission Date   11/15/24    Admission Type   Emergency    Admitting Provider   Norma Lo MD    Attending Provider   Norma Lo MD    Department, Room/Bed   [facility] 6A, N609/1       Discharge Date       Discharge Disposition       Discharge Destination                                 Attending Provider: Norma oL MD    Allergies: Atorvastatin, Colesevelam, Cymbalta [Duloxetine Hcl], Famotidine, Cephalexin, Clindamycin, Hygroton [Chlorthalidone], Lidocaine, Penicillins    Isolation: None   Infection: None   Code Status: CPR    Ht: 149.9 cm (59\")   Wt: 59.9 kg (132 lb)    Admission Cmt: None   Principal Problem: UTI (urinary tract infection) [N39.0]                   Active Insurance as of 11/15/2024       Primary Coverage       Payor Plan Insurance Group Employer/Plan Group    HUMANA MEDICARE REPLACEMENT HUMANA MED ADV HMO [id number]       Payor Plan Address Payor Plan Phone Number Payor Plan Fax Number Effective Dates    [address] [address] [phone]  1/1/2023 - None Entered    [facility] [id number]         Subscriber Name Subscriber Birth Date Member ID       MARILYN AMEZCUA 1945 [id number]                     Emergency Contacts        (Rel.) Home Phone Work Phone Mobile Phone    AmezcuaTaisha smith (Relative) [phone] -- [phone]    JOSUE BOND (Relative) [phone]                 History & Physical        Sara Blankenship MD at 11/15/24 2115              [facility]  HISTORY AND PHYSICAL    Patient Name: Marilyn Amor " Joaquina  : 1945  MRN: [id number]  Primary Care Physician: Peter Baca MD  Date of admission: 11/15/2024    Subjective  Subjective     Chief Complaint:  Syncope, hyperglycemia     HPI:  Marilyn Kunz is a 79 y.o. female w/ a hx of HTN, HLD, diastolic CHF, psoriatic arthritis, demyelinating disease (on Imuran- followed by Dr. Heranndez), T1DM, CKD Stage III, prior CVA, COPD, hypothyroidism, TIFFANIE on CPAP, heterozygous factor V Leiden who presented to the ED w/ c/o syncope and hyperglycemia.   Pt reports that she has not felt well for several weeks 2/2 persistent hyperglycemia. Pt was seen by her PCP yesterday and had labs collected. Pt remembers being home last night and at some point going to use the restroom but then is unable to recall what happened after until she heard her phone ringing today around 3:30pm. Pt reports that her PCP's office was calling her b/c her glucose on labs yesterday was >1000. Pt was advised to come to the ED for further evaluation. Pt reports that she was on her bathroom floor when she woke to her phone ringing today. She vaguely recalls intermittently waking for brief seconds b/c she remembers her cat checking on her at some point throughout the day. Pt is unsure how long she was on the floor or exactly what happened prior to ending up on the floor.   Pt evaluated in the ED. Glucose 510 initially. UA c/w UTI. WBC 22.24, lactic acid 2.6, procal 5.37. Pt admitted to the hospital medicine service for further evaluation.     Review of Systems   Constitutional:  Positive for fatigue. Negative for chills, fever and unexpected weight change.   HENT: Negative.  Negative for congestion, rhinorrhea, sinus pressure, sinus pain and trouble swallowing.    Eyes: Negative.  Negative for visual disturbance.   Respiratory: Negative.  Negative for cough and shortness of breath.    Cardiovascular: Negative.  Negative for chest pain, palpitations and leg swelling.   Gastrointestinal:  Negative.  Negative for abdominal distention, abdominal pain, constipation, diarrhea, nausea and vomiting.   Endocrine:        Persistent hyperglycemia for last several weeks.    Genitourinary:  Positive for decreased urine volume and urgency. Negative for difficulty urinating, dysuria, flank pain and frequency.   Musculoskeletal: Negative.  Negative for arthralgias and myalgias.   Skin: Negative.  Negative for wound.   Allergic/Immunologic: Positive for immunocompromised state.   Neurological:  Positive for syncope. Negative for dizziness, light-headedness and headaches.   Hematological: Negative.  Does not bruise/bleed easily.   Psychiatric/Behavioral: Negative.  Negative for confusion.    All other systems reviewed and are negative.     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  I asked her to go ahead and try to talk to her endocrinokogist.  She is still having the lower extremity neuropathic pan, but not enough for me to administer medication.    Disease of thyroid gland     Diverticulitis of colon     Erosive osteoarthritis 05/30/2024    Factor 5 Leiden mutation, heterozygous 2012     Head injury     Hyperlipidemia     Hypertension     Medication monitoring encounter     Impression:  She has renal insufficiency.  She is just taking Tylenol.    Movement disorder 10/2021    knee replacement left knww    Murmur, cardiac     Neuropathy in diabetes     redness swelling legs    TIFFANIE (obstructive sleep apnea) 11/15/2024    Osteopenia     Story: Femoral Neck Impression: Up-ro-date on bone density scan.  Follow up bone density every 2 years.    Peripheral neuropathy 05/03/2022    hands, arms. 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  I went over her exercises and told her to get a shoe insert,    Psoriasis     Impression: No rash    Psoriatic arthritis     hands    Sleep apnea Always    diabetic do not sleep well up and down    Stroke had one don't know when    showed on MRI Brain    Syncope 11/15/2024    Type 1 diabetes      Past Surgical History:   Procedure Laterality Date    CARDIAC CATHETERIZATION  1952 2 of them    Patent Dictus operation    CATARACT EXTRACTION Bilateral     COLONOSCOPY      ENDOSCOPY N/A 10/20/2020    Procedure: ESOPHAGOGASTRODUODENOSCOPY;  Surgeon: Brunner, Mark I, MD;  Location: [facility] ENDOSCOPY;  Service: Gastroenterology;  Laterality: N/A;    HAND SURGERY Left 1987    no hardware    KNEE ARTHROPLASTY      KNEE SURGERY Left     PATENT DUCTUS ARTERIOUS LIGATION      REPLACEMENT TOTAL KNEE Left      Family History: family history includes Cancer in her father, mother, and sister; Colon cancer in her sister; Diabetes in her brother and sister; Lung cancer in her father; Pancreatic cancer in her mother; Stroke in her mother.     Social History:  reports that she has never smoked. She has never been exposed to tobacco smoke. She has never used smokeless tobacco. She reports that she does not drink alcohol and does not use drugs.  Social History     Social History Narrative    Independent with adl's.  Single.  Never  .  No children.  Lives alone.  Niece-in-law assist with care as needed.   No home oxygen, HH or DME used currently      Medications:  Fluticasone Propionate (Inhal), Insulin Pen Needle, Upadacitinib ER, acetaminophen, aspirin, azaTHIOprine, bumetanide, fluticasone, hydrALAZINE, insulin NPH, insulin aspart, insulin degludec, irbesartan-hydrochlorothiazide, levocetirizine, levothyroxine, metoprolol succinate XL, montelukast, nebivolol, nystatin, oxybutynin, pravastatin, pregabalin, sacubitril-valsartan, and terazosin    Allergies   Allergen Reactions    Atorvastatin Other (See Comments)    Colesevelam Other (See Comments)    Cymbalta [Duloxetine Hcl] Other (See Comments)     Hyponatremia    Famotidine Other (See Comments)    Cephalexin Rash     Tolerated Ceftriaxone    Clindamycin Rash    Hygroton [Chlorthalidone] Rash     Facial rash    Lidocaine Other (See Comments)     Sores in throat    Penicillins Nausea And Vomiting, Rash and Other (See Comments)     Has tolerated ceftriaxone  TROUBLE BREATHING     Objective  Objective     Vital Signs:   Temp:  [98.2 °F (36.8 °C)-98.6 °F (37 °C)] 98.6 °F (37 °C)  Heart Rate:  [] 86  Resp:  [17-18] 17  BP: (118-168)/(55-73) 118/55    Physical Exam     Constitutional: Awake, alert; non-toxic appearing   Eyes: PERRLA, sclerae anicteric, no conjunctival injection  HENT: NCAT, mucous membranes dry   Neck: Supple, no thyromegaly, no lymphadenopathy, trachea midline  Respiratory: Clear to auscultation bilaterally, nonlabored respirations   Cardiovascular: RRR, no murmurs, rubs, or gallops, RLE without edema; LLE in boot   Gastrointestinal: Positive bowel sounds, soft, nontender, nondistended  Musculoskeletal: Normal ROM bilaterally (LLE/foot in boot)  Psychiatric: Appropriate affect, cooperative  Neurologic: Oriented x 3, strength symmetric in all extremities, speech clear  Skin: No rashes, lesions or wounds     Result Review:  I have personally reviewed the results from  the time of this admission to 11/15/2024 21:45 EST and agree with these findings:  [x]  Laboratory list / accordion  []  Microbiology  [x]  Radiology  []  EKG/Telemetry   []  Cardiology/Vascular   []  Pathology  [x]  Old records    LAB RESULTS:      Lab 11/15/24  2034 11/15/24  1709   WBC  --  22.24*   HEMOGLOBIN  --  12.1   HEMATOCRIT  --  38.4   PLATELETS  --  172   NEUTROS ABS  --  19.89*   IMMATURE GRANS (ABS)  --  0.11*   LYMPHS ABS  --  0.57*   MONOS ABS  --  0.63   EOS ABS  --  1.01*   MCV  --  95.5   PROCALCITONIN  --  5.37*   LACTATE 2.7* 2.6*   CK TOTAL  --  1,331*         Lab 11/15/24  1709   SODIUM 134*   POTASSIUM 3.9   CHLORIDE 97*   CO2 21.0*   ANION GAP 16.0*   BUN 50*   CREATININE 1.54*   EGFR 34.2*   GLUCOSE 510*   CALCIUM 8.8   HEMOGLOBIN A1C 8.00*         Lab 11/15/24  1709   TOTAL PROTEIN 7.1   ALBUMIN 3.6   GLOBULIN 3.5   ALT (SGPT) 22   AST (SGOT) 41*   BILIRUBIN 0.9   ALK PHOS 71                     Lab 11/15/24  1711   FIO2 21   CARBOXYHEMOGLOBIN (VENOUS) 0.2     Brief Urine Lab Results  (Last result in the past 365 days)        Color   Clarity   Blood   Leuk Est   Nitrite   Protein   CREAT   Urine HCG        11/15/24 1739 Dark Yellow   Cloudy   Negative   Moderate (2+)   Negative   30 mg/dL (1+)                 Microbiology Results (last 10 days)       ** No results found for the last 240 hours. **          XR Chest 1 View    Result Date: 11/15/2024  XR CHEST 1 VW Date of Exam: 11/15/2024 6:07 PM EST Indication: AMS Protocol Comparison: AP chest x-ray 9/23/2024, [facility] chest x-ray 9/30/2023, CT chest 7/20/2023, AP chest x-ray 3/2/2023. Findings: Cardiomediastinal contours are stable. Lungs are adequately expanded and appear clear. No pneumothorax or large pleural effusion is seen.     Impression: Impression: No evidence of acute cardiopulmonary abnormality or significant change. Electronically Signed: Sera Mercer  11/15/2024 6:34 PM EST  Workstation ID: [id number]     Results for orders placed  "in visit on 06/04/24    Echocardiogram Scan    Assessment & Plan  Assessment & Plan       UTI (urinary tract infection)    Essential hypertension    Factor V Leiden    Psoriatic arthritis    Hypothyroidism    CKD (chronic kidney disease) stage 3, GFR 30-59 ml/min    Hyperlipidemia LDL goal <70    History of stroke    Chronic diastolic congestive heart failure    Immunosuppression due to drug therapy    Type 1 diabetes mellitus with hyperglycemia    Syncope    Elevated serum creatinine    Sepsis    Closed fracture of left foot    TIFFANIE (obstructive sleep apnea)    COPD (chronic obstructive pulmonary disease)    Elevated CK    Marilyn Kunz is a 79 y.o. female w/ a hx of HTN, HLD, diastolic CHF, psoriatic arthritis, demyelinating disease (on Imuran- followed by Dr. Hernandez), T1DM, CKD Stage III, prior CVA, COPD, hypothyroidism, TIFFANIE on CPAP, heterozygous factor V Leiden who presented to the ED w/ c/o syncope and hyperglycemia.     **Sepsis 2/2 UTI   -meets sepsis criteria based on HR 112bpm, WBC 22.24, procal 5.37, lactic acid 2.6, + source per UA  -UA c/w UTI; urine cx pending   -prior urine cx \"mixed emerson\"  -blood cx pending   -IV Rocephin  -s/p 1 liter NS bolus given in ED; repeat 1 liter bolus now   -continue NS @ 100ml/hr  -bladder scan   -symptom mgt    **T1DM w/ hyperglycemia   -glucose 510 in ED  -s/p regular insulin 10 units  -hem A1c 8%  -VBG stable   -s/p 1 liter NS bolus; repeat bolus now and continue NS @ 100ml/hour  -Lantus 10 units nightly   -holding routine novolog for now   -FSBG q ac/hs w/ MDSS  -diabetes educator consult in am   -[facility]     **Syncope   -pt recalls being home Thursday night and going to use the restroom- then woke today on the bathroom floor to her phone ringing ~3:30pm  -EKG pending, troponin pending   -CT Head wo pending   -IVF per above  -recent ECHO 6/4/24: EF >70%  -orthostatic Bps   -fall precautions/bed alarm   -consider carotid duplex, " repeat ECHO     **Elevated CK   -CK 1,331   -sp 2 liters NS bolus   -continue NS @ 100ml/hour   -repeat labs in am     **Elevated creatinine   **CKD Stage III  -previous creatinine 1.02 on   -current creatinine 1.54 w/ eGFR 34  -UA c/w UTI   -s/p 2 liters NS bolus   -continue NS @ 100ml/hour  -hold nephrotoxic meds for now   -repeat labs in am     **Closed fracture of left foot   -24- XRAY: Acute nondisplaced fracture involving proximal fifth metatarsal without intra-articular extension or joint malalignment.   -pt currently on boot and following w/ Ortho     **Diastolic CHF   **HTN, HLD   **Hx of CVA   -ECHO 2024 w/ EF >70%  -EKG pending   -awaiting med list update (plan to hold diuretics for now)  -monitor I/Os     **COPD; stable   **TIFFANIE   -CXR unremarkable   -CPAP nightly     **Psoriatic arthritis   **Demyelinating disease   -previously on Imuran- followed by Dr. Hernandez for demyelinating disease   -awaiting med list completion     DVT prophylaxis:  Mechanical     CODE STATUS:    Code Status (Patient has no pulse and is not breathing): CPR (Attempt to Resuscitate)  Medical Interventions (Patient has pulse or is breathing): Full Support    Expected DischargeExpected Discharge Date: 2024; Expected Discharge Time:     This note has been completed as part of a split-shared workflow.     Signature:Electronically signed by JAGDISH Norris, 11/15/24, 9:46 PM EST.    Time spent: 55 minutes     Patient seen and examined,  Agree with above,  Sara Blankenship MD 24 00:31 EST             Electronically signed by Sara Blankenship MD at 24 0031          Physical Therapy Notes (most recent note)        Sera Tucker, PT at 24 1441  Version 1 of 1         Patient Name: Marilyn Kunz  : 1945    MRN: [id number]                              Today's Date: 2024       Admit Date: 11/15/2024    Visit Dx:     ICD-10-CM ICD-9-CM   1. Urinary tract infection without hematuria,  site unspecified  N39.0 599.0   2. Generalized weakness  R53.1 780.79   3. Hyperglycemia  R73.9 790.29   4. Renal insufficiency  N28.9 593.9   5. Bandemia  D72.825 288.66     Patient Active Problem List   Diagnosis    Essential hypertension    Factor V Leiden    Psoriatic arthritis    At risk for venous thromboembolism (VTE)    Hypothyroidism    CKD (chronic kidney disease) stage 3, GFR 30-59 ml/min    Hyperlipidemia LDL goal <70    PDA (patent ductus arteriosus)    Demyelinating disease    Spondylolisthesis, grade 2    Type 1 diabetes mellitus    History of stroke    Demyelinating disease of central nervous system, unspecified    Chronic diastolic congestive heart failure    Peripheral polyneuropathy    Immunosuppression due to drug therapy    Erosive osteoarthritis    Cervical radiculopathy    Rash    UTI (urinary tract infection)    Type 1 diabetes mellitus with hyperglycemia    Syncope    Elevated serum creatinine    Sepsis    Closed fracture of left foot    TIFFANIE (obstructive sleep apnea)    COPD (chronic obstructive pulmonary disease)    Elevated CK     Past Medical History:   Diagnosis Date    Abnormal ECG Check [facility] Date or [facility]    [facility]    Acute sinusitis 02/06/2023    Anemia     Asthma     CAP (community acquired pneumonia) 02/06/2023    CHF (congestive heart failure) 07/20/2023    Chronic kidney disease     pt told it was dx from Dr Baca and to not eat much protein    Congenital heart disease 1950's Patent 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    Surgery [facility] 1950s close    COPD (chronic obstructive pulmonary disease) 11/15/2024    CTS (carpal tunnel syndrome)     Psoriatic arthritis hands could be    Deep vein thrombosis No on blood clots but have a blood clotting disorder called Leiden Factor 5    Demyelinating disease of central nervous system, unspecified 03/09/2023    Diabetes     Impression: Patient states that her diabetes is not doing well.  I asked  her to go ahead and try to talk to her endocrinokogist.  She is still having the lower extremity neuropathic pan, but not enough for me to administer medication.    Disease of thyroid gland     Diverticulitis of colon     Erosive osteoarthritis 05/30/2024    Factor 5 Leiden mutation, heterozygous 2012    Head injury     Hyperlipidemia     Hypertension     Medication monitoring encounter     Impression:  She has renal insufficiency.  She is just taking Tylenol.    Movement disorder 10/2021    knee replacement left knww    Murmur, cardiac     Neuropathy in diabetes     redness swelling legs    TIFFANIE (obstructive sleep apnea) 11/15/2024    Osteopenia     Story: Femoral Neck Impression: Up-ro-date on bone density scan.  Follow up bone density every 2 years.    Peripheral neuropathy 05/03/2022    hands, arms. 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  I went over her exercises and told her to get a shoe insert,    Psoriasis     Impression: No rash    Psoriatic arthritis     hands    Sleep apnea Always    diabetic do not sleep well up and down    Stroke had one don't know when    showed on MRI Brain    Syncope 11/15/2024    Type 1 diabetes      Past Surgical History:   Procedure Laterality Date    CARDIAC CATHETERIZATION  1952 2 of them    Patent Dictus operation    CATARACT EXTRACTION Bilateral     COLONOSCOPY      ENDOSCOPY N/A 10/20/2020    Procedure: ESOPHAGOGASTRODUODENOSCOPY;  Surgeon: Brunner, Mark I, MD;  [facility];  Service: Gastroenterology;  Laterality: N/A;    HAND SURGERY Left 1987    no hardware    KNEE ARTHROPLASTY      KNEE SURGERY Left     PATENT DUCTUS ARTERIOUS LIGATION      REPLACEMENT TOTAL KNEE Left       General Information       Row Name 11/24/24 1523          Physical Therapy Time and Intention    Mode of Treatment physical therapy  -       Row Name 11/24/24 1523          General Information    Existing  Precautions/Restrictions fall;other (see comments)  LLE WBAT with boot; L foot fx  -       Row Name 11/24/24 1523          Cognition    Orientation Status (Cognition) oriented x 4  -       Row Name 11/24/24 1523          Safety Issues/Impairments Affecting Functional Mobility    Impairments Affecting Function (Mobility) endurance/activity tolerance;balance;strength  -               User Key  (r) = Recorded By, (t) = Taken By, (c) = Cosigned By      Initials Name Provider Type     Sera Tucker, PT Physical Therapist                   Mobility       Row Name 11/24/24 1524          Bed Mobility    Comment, (Bed Mobility) UIC  -       Row Name 11/24/24 1524          Sit-Stand Transfer    Sit-Stand [place] (Transfers) supervision  -     Assistive Device (Sit-Stand Transfers) walker, front-wheeled  -LS     Comment, (Sit-Stand Transfer) from recliner and from toilet  -       Row Name 11/24/24 1524          Gait/Stairs (Locomotion)    [place] Level (Gait) supervision  -LS     Assistive Device (Gait) walker, front-wheeled  -LS     Patient was able to Ambulate yes  -LS     Distance in Feet (Gait) 140  -LS     Deviations/Abnormal Patterns (Gait) sam decreased;gait speed decreased;stride length decreased  -LS     Left Sided Gait Deviations weight shift ability decreased  -LS     Comment, (Gait/Stairs) VC for keeping RW close to body.  -LS               User Key  (r) = Recorded By, (t) = Taken By, (c) = Cosigned By      Initials Name Provider Type     Sera Tucker, PT Physical Therapist                   Obj/Interventions       Row Name 11/24/24 1524          Motor Skills    Therapeutic Exercise knee;ankle  -       Row Name 11/24/24 1524          Knee (Therapeutic Exercise)    Knee Strengthening (Therapeutic Exercise) bilateral;marching while seated;LAQ (long arc quad);sitting;10 repetitions  -       Row Name 11/24/24 1524          Ankle (Therapeutic Exercise)    Ankle (Therapeutic Exercise)  AROM (active range of motion)  -LS     Ankle AROM (Therapeutic Exercise) right;dorsiflexion;10 repetitions;sitting  -LS       Row Name 11/24/24 1524          Balance    Static Sitting Balance independent  -LS     Position, Sitting Balance unsupported;sitting in chair  -LS     Static Standing Balance supervision  -LS     Position/Device Used, Standing Balance supported  -LS               User Key  (r) = Recorded By, (t) = Taken By, (c) = Cosigned By      Initials Name Provider Type    LS Sera Tucker, PT Physical Therapist                   Goals/Plan    No documentation.                  Clinical Impression       Row Name 11/24/24 1525          Pain    Pretreatment Pain Rating 0/10 - no pain  -LS     Posttreatment Pain Rating 0/10 - no pain  -LS       Row Name 11/24/24 1525          Plan of Care Review    Plan of Care Reviewed With patient  -LS     Progress improving  -     Outcome Evaluation Pt demonstrated increased indep with STS transfer and ability to progress forward ambulation distance (140 ft with, RW, supervision, L boot intact). Gave good effort with seated ther ex. Will cont PT POC.  -LS       Row Name 11/24/24 1525          Vital Signs    Pre Systolic BP Rehab 148  -LS     Pre Treatment Diastolic BP 57  -LS     Pretreatment Heart Rate (beats/min) 59  -LS     Posttreatment Heart Rate (beats/min) 61  -LS     O2 Delivery Pre Treatment room air  -LS     O2 Delivery Intra Treatment room air  -LS     Post SpO2 (%) 96  -LS     O2 Delivery Post Treatment room air  -LS     Pre Patient Position Sitting  -LS     Intra Patient Position Standing  -LS     Post Patient Position Sitting  -LS       Row Name 11/24/24 1525          Positioning and Restraints    Pre-Treatment Position sitting in chair/recliner  -LS     Post Treatment Position chair  -LS     In Chair notified nsg;reclined;call light within reach;encouraged to call for assist;exit alarm on;waffle cushion;legs elevated  -LS               User Key  (r) =  Recorded By, (t) = Taken By, (c) = Cosigned By      Initials Name Provider Type    Sera Cruz, PT Physical Therapist                   Outcome Measures       Row Name 11/24/24 1529          How much help from another person do you currently need...    Turning from your back to your side while in flat bed without using bedrails? 4  -LS     Moving from lying on back to sitting on the side of a flat bed without bedrails? 3  -LS     Moving to and from a bed to a chair (including a wheelchair)? 3  -LS     Standing up from a chair using your arms (e.g., wheelchair, bedside chair)? 3  -LS     Climbing 3-5 steps with a railing? 3  -LS     To walk in hospital room? 3  -LS     AM-PAC 6 Clicks Score (PT) 19  -LS     Highest Level of Mobility Goal 6 --> Walk 10 steps or more  -LS               User Key  (r) = Recorded By, (t) = Taken By, (c) = Cosigned By      Initials Name Provider Type    Sera Cruz PT Physical Therapist                                 Physical Therapy Education       Title: PT OT SLP Therapies (Done)       Topic: Physical Therapy (Done)       Point: Mobility training (Done)       Learning Progress Summary            Patient Acceptance, E,D, VU,NR by LS at 11/24/2024 1529    Acceptance, E, VU by ER at 11/22/2024 1153    Comment: d/c planning, safety instructions    Acceptance, E, VU,NR by ML at 11/20/2024 1314    Acceptance, E, VU,NR by ML at 11/20/2024 1121    Acceptance, E, VU by ER at 11/18/2024 1623    Comment: pt edu on transfers, FWW use, d/c planning, purpose of PT, need for sitting up    Acceptance, E, VU by NS at 11/16/2024 1545                      Point: Home exercise program (Done)       Learning Progress Summary            Patient Acceptance, E,D, VU,NR by LS at 11/24/2024 1529    Acceptance, E, VU by ER at 11/22/2024 1153    Comment: d/c planning, safety instructions    Acceptance, E, VU by ER at 11/18/2024 1623    Comment: pt edu on transfers, FWW use, d/c planning, purpose of  PT, need for sitting up                      Point: Body mechanics (Done)       Learning Progress Summary            Patient Acceptance, E,D, VU,NR by LS at 11/24/2024 1529    Acceptance, E, VU by ER at 11/22/2024 1153    Comment: d/c planning, safety instructions    Acceptance, E, VU,NR by ML at 11/20/2024 1314    Acceptance, E, VU,NR by ML at 11/20/2024 1121    Acceptance, E, VU by ER at 11/18/2024 1623    Comment: pt edu on transfers, FWW use, d/c planning, purpose of PT, need for sitting up    Acceptance, E, VU by NS at 11/16/2024 1545                      Point: Precautions (Done)       Learning Progress Summary            Patient Acceptance, E,D, VU,NR by LS at 11/24/2024 1529    Acceptance, E, VU by ER at 11/22/2024 1153    Comment: d/c planning, safety instructions    Acceptance, E, VU,NR by ML at 11/20/2024 1314    Acceptance, E, VU,NR by ML at 11/20/2024 1121    Acceptance, E, VU by ER at 11/18/2024 1623    Comment: pt edu on transfers, FWW use, d/c planning, purpose of PT, need for sitting up    Acceptance, E, VU by NS at 11/16/2024 1545                                      User Key       Initials Effective Dates Name Provider Type Discipline     02/03/23 -  Sera Tucker, PT Physical Therapist PT    NS 06/16/21 -  Alisha Kennedy, PT Physical Therapist PT     04/22/21 -  Gail Shields Physical Therapist PT    ER 11/04/24 -  Princess Wagner, PT Physical Therapist PT                  PT Recommendation and Plan     Progress: improving  Outcome Evaluation: Pt demonstrated increased indep with STS transfer and ability to progress forward ambulation distance (140 ft with, RW, supervision, L boot intact). Gave good effort with seated ther ex. Will cont PT POC.     Time Calculation:         PT Charges       Row Name 11/24/24 1530             Time Calculation    Start Time 1441  -LS      PT Received On 11/24/24  -         Timed Charges    37470 - PT Therapeutic Exercise Minutes 5  -LS      05421 - Gait  Training Minutes  12  -LS      35783 - PT Therapeutic Activity Minutes 15  -LS         Total Minutes    Timed Charges Total Minutes 32  -LS       Total Minutes 32  -LS                User Key  (r) = Recorded By, (t) = Taken By, (c) = Cosigned By      Initials Name Provider Type    Sera Cruz, PT Physical Therapist                  Therapy Charges for Today       Code Description Service Date Service Provider Modifiers Qty    88258111395 HC GAIT TRAINING EA 15 MIN 2024 Sera Tucker, PT GP 1    21991217081 HC PT THERAPEUTIC ACT EA 15 MIN 2024 Sera Tucker, PT GP 1            PT G-Codes  Outcome Measure Options: AM-PAC 6 Clicks Daily Activity (OT)  AM-PAC 6 Clicks Score (PT): 19  AM-PAC 6 Clicks Score (OT): 24       Sera Tucker PT  2024      Electronically signed by Sera Tucker, PT at 24 1531          Occupational Therapy Notes (most recent note)        Betzy Frank, OT at 24 0844          Patient Name: Marilyn Kunz  : 1945    MRN: [id number]                              Today's Date: 2024       Admit Date: 11/15/2024    Visit Dx:     ICD-10-CM ICD-9-CM   1. Urinary tract infection without hematuria, site unspecified  N39.0 599.0   2. Generalized weakness  R53.1 780.79   3. Hyperglycemia  R73.9 790.29   4. Renal insufficiency  N28.9 593.9   5. Bandemia  D72.825 288.66     Patient Active Problem List   Diagnosis    Essential hypertension    Factor V Leiden    Psoriatic arthritis    At risk for venous thromboembolism (VTE)    Hypothyroidism    CKD (chronic kidney disease) stage 3, GFR 30-59 ml/min    Hyperlipidemia LDL goal <70    PDA (patent ductus arteriosus)    Demyelinating disease    Spondylolisthesis, grade 2    Type 1 diabetes mellitus    History of stroke    Demyelinating disease of central nervous system, unspecified    Chronic diastolic congestive heart failure    Peripheral polyneuropathy    Immunosuppression due to drug therapy    Erosive  osteoarthritis    Cervical radiculopathy    Rash    UTI (urinary tract infection)    Type 1 diabetes mellitus with hyperglycemia    Syncope    Elevated serum creatinine    Sepsis    Closed fracture of left foot    TIFFANIE (obstructive sleep apnea)    COPD (chronic obstructive pulmonary disease)    Elevated CK     Past Medical History:   Diagnosis Date    Abnormal ECG Check FPA Date or [facility]    [facility]    Acute sinusitis 02/06/2023    Anemia     Asthma     CAP (community acquired pneumonia) 02/06/2023    CHF (congestive heart failure) 07/20/2023    Chronic kidney disease     pt told it was dx from Dr Baca and to not eat much protein    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  I asked her to go ahead and try to talk to her endocrinokogist.  She is still having the lower extremity neuropathic pan, but not enough for me to administer medication.    Disease of thyroid gland     Diverticulitis of colon     Erosive osteoarthritis 05/30/2024    Factor 5 Leiden mutation, heterozygous 2012    Head injury     Hyperlipidemia     Hypertension     Medication monitoring encounter     Impression:  She has renal insufficiency.  She is just taking Tylenol.    Movement disorder 10/2021    knee replacement left knww    Murmur, cardiac     Neuropathy in diabetes     redness swelling legs    TIFFANIE (obstructive sleep apnea) 11/15/2024    Osteopenia     Story: Femoral Neck Impression: Up-ro-date on bone density scan.  Follow up bone density every 2 years.     Peripheral neuropathy 05/03/2022    hands, arms. 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  I went over her exercises and told her to get a shoe insert,    Psoriasis     Impression: No rash    Psoriatic arthritis     hands    Sleep apnea Always    diabetic do not sleep well up and down    Stroke had one don't know when    showed on MRI Brain    Syncope 11/15/2024    Type 1 diabetes      Past Surgical History:   Procedure Laterality Date    CARDIAC CATHETERIZATION  1952 2 of them    Patent Dictus operation    CATARACT EXTRACTION Bilateral     COLONOSCOPY      ENDOSCOPY N/A 10/20/2020    Procedure: ESOPHAGOGASTRODUODENOSCOPY;  Surgeon: Brunner, Mark I, MD;  Location: [facility] ENDOS[facility];  Service: Gastroenterology;  Laterality: N/A;    HAND SURGERY Left 1987    no hardware    KNEE ARTHROPLASTY      KNEE SURGERY Left     PATENT DUCTUS ARTERIOUS LIGATION      REPLACEMENT TOTAL KNEE Left       General Information       Row Name 11/25/24 1019          OT Time and Intention    Document Type therapy note (daily note)  -KF     Mode of Treatment occupational therapy;individual therapy  -KF       Row Name 11/25/24 1019          General Information    Patient Profile Reviewed yes  -KF     Existing Precautions/Restrictions fall;other (see comments)  LLE WBAT with boot due to foot fx  -KF     Barriers to Rehab none identified  -KF       Row Name 11/25/24 1019          Cognition    Orientation Status (Cognition) oriented x 4  -KF       Row Name 11/25/24 1019          Safety Issues/Impairments Affecting Functional Mobility    Safety Issues Affecting Function (Mobility) safety precaution awareness  -KF     Impairments Affecting Function (Mobility) endurance/activity tolerance;balance;strength  -KF               User Key  (r) = Recorded By, (t) = Taken By, (c) = Cosigned By      Initials Name Provider Type    KF Betzy Frank, FRANCO Occupational Therapist                      Mobility/ADL's       Row Name 11/25/24 1019          Bed Mobility    Bed Mobility supine-sit  -     Supine-Sit [place] (Bed Mobility) modified independence  -     Assistive Device (Bed Mobility) bed rails;head of bed elevated  -       Row Name 11/25/24 1019          Transfers    Transfers sit-stand transfer;stand-sit transfer;toilet transfer  -     Comment, (Transfers) Good safety awareness demonstrated today  -       Row Name 11/25/24 1019          Bed-Chair Transfer    Bed-Chair [place] (Transfers) standby assist  -     Assistive Device (Bed-Chair Transfers) walker, front-wheeled  -       Row Name 11/25/24 1019          Sit-Stand Transfer    Sit-Stand [place] (Transfers) supervision  -     Assistive Device (Sit-Stand Transfers) walker, front-wheeled  -     Comment, (Sit-Stand Transfer) STS x1 from the EOB, x1 from the commode  -       Row Name 11/25/24 1019          Stand-Sit Transfer    Stand-Sit [place] (Transfers) supervision  -     Assistive Device (Stand-Sit Transfers) walker, front-wheeled  -       Row Name 11/25/24 1019          Toilet Transfer    Type (Toilet Transfer) sit-stand;stand-sit  -     [place] Level (Toilet Transfer) supervision  -     Assistive Device (Toilet Transfer) commode;grab bars/safety frame;walker, front-wheeled  -       Row Name 11/25/24 1019          Functional Mobility    Functional Mobility- Ind. Level standby assist  -     Functional Mobility- Device walker, front-wheeled  -     Functional Mobility-Distance (Feet) --  >household distance  -     Functional Mobility- Comment Pt ambulated to/from the bathroom with CGA, no AD. Pt ambulated an additional HH distance in the hallway using a RWx with supervision. No overt LOB or knee buckling noted.  -       Row Name 11/25/24 1019          Activities of Daily Living    BADL Assessment/Intervention lower body dressing;grooming;toileting  -       Row Name 11/25/24 1019           Lower Body Dressing Assessment/Training    [place] Level (Lower Body Dressing) don;socks;shoes/slippers;set up;standby assist  -KF     Position (Lower Body Dressing) edge of bed sitting  -KF     Comment, (Lower Body Dressing) Pt doffed/donned socks and donned L boot with set up and SBA.  -       Row Name 11/25/24 1019          Grooming Assessment/Training    [place] Level (Grooming) hair care, combing/brushing;oral care regimen;wash face, hands;set up;supervision  -KF     Position (Grooming) unsupported standing;sink side  -KF       Row Name 11/25/24 1019          Toileting Assessment/Training    [place] Level (Toileting) adjust/manage clothing;perform perineal hygiene;supervision  -KF     Assistive Devices (Toileting) commode;grab bar/safety frame  -KF     Position (Toileting) unsupported standing;unsupported sitting  -KF               User Key  (r) = Recorded By, (t) = Taken By, (c) = Cosigned By      Initials Name Provider Type    Betzy Hernández OT Occupational Therapist                   Obj/Interventions       Row Name 11/25/24 1021          Balance    Balance Assessment sitting static balance;sitting dynamic balance;sit to stand dynamic balance;standing static balance;standing dynamic balance  -KF     Static Sitting Balance supervision  -KF     Dynamic Sitting Balance standby assist  -KF     Position, Sitting Balance unsupported;sitting edge of bed  -KF     Sit to Stand Dynamic Balance standby assist  -KF     Static Standing Balance supervision  -KF     Dynamic Standing Balance standby assist  -KF     Position/Device Used, Standing Balance supported  -KF     Balance Interventions sitting;standing;sit to stand;supported;static;dynamic  -KF               User Key  (r) = Recorded By, (t) = Taken By, (c) = Cosigned By      Initials Name Provider Type    Betzy Hernández OT Occupational Therapist                   Goals/Plan    No documentation.                  Clinical Impression        Row Name 11/25/24 1022          Pain Assessment    Pretreatment Pain Rating 0/10 - no pain  -KF     Posttreatment Pain Rating 0/10 - no pain  -KF       Row Name 11/25/24 1022          Plan of Care Review    Plan of Care Reviewed With patient  -KF     Progress improving  -KF     Outcome Evaluation Pt with continued good participation and progress toward goals during OT session. The pt ambulated to/from the bathroom with CGA, no AD. Toileting and standing sink side grooming ADLs completed with supervision/SBA. Pt ambulated an additional household distance in the hallway using a RWx with supervision. Continue to recommend a d/c home with assist when medically ready.  -       Row Name 11/25/24 1022          Therapy Assessment/Plan (OT)    Rehab Potential (OT) good  -KF     Criteria for Skilled Therapeutic Interventions Met (OT) yes;skilled treatment is necessary  -KF     Therapy Frequency (OT) daily  -KF       [facility] Name 11/25/24 1022          Therapy Plan Review/Discharge Plan (OT)    Anticipated Discharge Disposition (OT) home with assist  -[facility] Name 11/25/24 1022          Vital Signs    Pre Systolic BP Rehab 149  -KF     Pre Treatment Diastolic BP 79  -KF     Pretreatment Heart Rate (beats/min) 69  -KF     Pre SpO2 (%) 95  -KF     O2 Delivery Pre Treatment room air  -KF     Pre Patient Position Supine  -KF     Intra Patient Position Standing  -KF     Post Patient Position Sitting  -KF       [facility] Name 11/25/24 1022          Positioning and Restraints    Pre-Treatment Position in bed  -KF     Post Treatment Position chair  -KF     In Chair notified nsg;reclined;call light within reach;encouraged to call for assist;exit alarm on;waffle cushion;legs elevated  -KF               User Key  (r) = Recorded By, (t) = Taken By, (c) = Cosigned By      Initials Name Provider Type    Betzy Hernández, FRANCO Occupational Therapist                   Outcome Measures       Row Name 11/25/24 1024          How much help from  another is currently needed...    Putting on and taking off regular lower body clothing? 3  -KF     Bathing (including washing, rinsing, and drying) 3  -KF     Toileting (which includes using toilet bed pan or urinal) 4  -KF     Putting on and taking off regular upper body clothing 4  -KF     Taking care of personal grooming (such as brushing teeth) 4  -KF     Eating meals 4  -KF     AM-PAC 6 Clicks Score (OT) 22  -KF       Row Name 11/25/24 0845          How much help from another person do you currently need...    Turning from your back to your side while in flat bed without using bedrails? 4  -LR     Moving from lying on back to sitting on the side of a flat bed without bedrails? 4  -LR     Moving to and from a bed to a chair (including a wheelchair)? 3  -LR     Standing up from a chair using your arms (e.g., wheelchair, bedside chair)? 4  -LR     Climbing 3-5 steps with a railing? 3  -LR     To walk in hospital room? 3  -LR     AM-PAC 6 Clicks Score (PT) 21  -LR     Highest Level of Mobility Goal 6 --> Walk 10 steps or more  -LR       Row Name 11/25/24 1024          Functional Assessment    Outcome Measure Options AM-PAC 6 Clicks Daily Activity (OT)  -KF               User Key  (r) = Recorded By, (t) = Taken By, (c) = Cosigned By      Initials Name Provider Type    LR Layla Damian, RN Registered Nurse    Betzy Hernández OT Occupational Therapist                    Occupational Therapy Education       Title: PT OT SLP Therapies (Done)       Topic: Occupational Therapy (Done)       Point: ADL training (Done)       Description:   Instruct learner(s) on proper safety adaptation and remediation techniques during self care or transfers.   Instruct in proper use of assistive devices.                  Learning Progress Summary            Patient Acceptance, E,TB, VU,DU by KF at 11/25/2024 0844    Acceptance, E,H, VU by GENO at 11/22/2024 1558    Acceptance, E, NR by SOLA at 11/21/2024 1340    Acceptance, E, VU by YELENA  at 11/20/2024 1447    Comment: much improved endurance and balance, shoe use to balance out boot wear, OP recommendation    Acceptance, E,D, VU,NR by YELENA at 11/19/2024 1115    Comment: UE and LE TE, transfer safety, progression to goals    Acceptance, E, NR by LR at 11/16/2024 0835                      Point: Home exercise program (Done)       Description:   Instruct learner(s) on appropriate technique for monitoring, assisting and/or progressing therapeutic exercises/activities.                  Learning Progress Summary            Patient Acceptance, E,D, VU,NR by YELENA at 11/19/2024 1115    Comment: UE and LE TE, transfer safety, progression to goals                      Point: Precautions (Done)       Description:   Instruct learner(s) on prescribed precautions during self-care and functional transfers.                  Learning Progress Summary            Patient Acceptance, E,TB, VU,DU by KF at 11/25/2024 0844    Acceptance, E,H, VU by KL at 11/22/2024 1558    Acceptance, E, NR by LR at 11/21/2024 1340    Acceptance, E,D, VU,NR by YELENA at 11/19/2024 1115    Comment: UE and LE TE, transfer safety, progression to goals    Acceptance, E, NR by LR at 11/16/2024 0835                      Point: Body mechanics (Done)       Description:   Instruct learner(s) on proper positioning and spine alignment during self-care, functional mobility activities and/or exercises.                  Learning Progress Summary            Patient Acceptance, E,TB, VU,DU by KF at 11/25/2024 0844    Acceptance, E,H, VU by  at 11/22/2024 1558    Acceptance, E, NR by LR at 11/21/2024 1340    Acceptance, E, NR by LR at 11/16/2024 0835                                      User Key       Initials Effective Dates Name Provider Type Discipline    YELENA 07/11/23 -  Rosalba Hernandez, OT Occupational Therapist OT    KF 08/09/23 -  Betzy Frank OT Occupational Therapist OT    GENO 02/05/24 -  Zahida Jane OT Occupational Therapist OT    LR 10/09/24 -   Thais Rojo, FRANCO Occupational Therapist OT                  OT Recommendation and Plan  Therapy Frequency (OT): daily  Plan of Care Review  Plan of Care Reviewed With: patient  Progress: improving  Outcome Evaluation: Pt with continued good participation and progress toward goals during OT session. The pt ambulated to/from the bathroom with CGA, no AD. Toileting and standing sink side grooming ADLs completed with supervision/SBA. Pt ambulated an additional household distance in the hallway using a RWx with supervision. Continue to recommend a d/c home with assist when medically ready.     Time Calculation:         Time Calculation- OT       Row Name 24 1024             Time Calculation- OT    OT Start Time 0844  -KF      OT Received On 24  -KF      OT Goal Re-Cert Due Date 24  -KF         Timed Charges    29049 - OT Therapeutic Activity Minutes 10  -KF      78236 - OT Self Care/Mgmt Minutes 15  -KF         Total Minutes    Timed Charges Total Minutes 25  -KF       Total Minutes 25  -KF                User Key  (r) = Recorded By, (t) = Taken By, (c) = Cosigned By      Initials Name Provider Type    KF Betzy Frank OT Occupational Therapist                  Therapy Charges for Today       Code Description Service Date Service Provider Modifiers Qty    38651655866 HC OT THERAPEUTIC ACT EA 15 MIN 2024 Betzy Frank OT GO 1    33821147149 HC OT SELF CARE/MGMT/TRAIN EA 15 MIN 2024 Betzy Frank OT GO 1                 Betzy Frank OT  2024    Electronically signed by Betzy Frank OT at 24 [facility]        [address]  [address] [facility] [address]  Phone:  [phone]  Fax:  [phone] Date: 2024      Ambulatory Referral to Physical Therapy for Evaluation & Treatment     Patient:  Marilyn Kunz MRN:  [id number]   Ken [address]OWNLEE TRACE  [facility] [address] :  1945  SSN:    Phone: [phone] Sex:  F       INSURANCE PAYOR PLAN GROUP # SUBSCRIBER ID   Primary:    [employer]ARE REPLACEMENT [id number]      Referring Provider Information:  KALANI GOMES Phone: [phone] Fax: [phone]       Referral Information:   # Visits:  1 Referral Type: Physical Therapy [AE1]   Urgency:  Routine Referral Reason: Specialty Services Required   Start Date: Nov 25, 2024 End Date:  To be determined by Insurer   Diagnosis: Urinary tract infection without hematuria, site unspecified (N39.0 [ICD-10-CM] 599.0 [ICD-9-CM])  Generalized weakness (R53.1 [ICD-10-CM] 780.79 [ICD-9-CM])  Hyperglycemia (R73.9 [ICD-10-CM] 790.29 [ICD-9-CM])  Renal insufficiency (N28.9 [ICD-10-CM] 593.9 [ICD-9-CM])  Bandemia (D72.825 [ICD-10-CM] 288.66 [ICD-9-CM])  Essential hypertension (I10 [ICD-10-CM] 401.9 [ICD-9-CM])  Hyperlipidemia LDL goal <70 (E78.5 [ICD-10-CM] 272.4 [ICD-9-CM])  Demyelinating disease of central nervous system, unspecified (G37.9 [ICD-10-CM] 341.9 [ICD-9-CM])  Chronic diastolic congestive heart failure (I50.32 [ICD-10-CM] 428.32,428.0 [ICD-9-CM])  Closed fracture of left foot, sequela (S92.902S [ICD-10-CM] 905.4 [ICD-9-CM])  Pulmonary emphysema, unspecified emphysema type (J43.9 [ICD-10-CM] 492.8 [ICD-9-CM])      Refer to Dept:   Refer to Provider:   Refer to Provider Phone:   Refer to Facility:    Closed fracture left foot  - Patient being treated as an outpatient by Ortho, currently in a walking boot.  Specialty needed: Evaluate and treat  Specialty needed: Ortho  Exercises: Stretching  Exercises: ROM  Exercises: Strengthening  Follow-up needed: Yes     This document serves as a request of services and does not constitute Insurance authorization or approval of services.  To determine eligibility, please contact the members Insurance carrier to verify and review coverage.     If you have medical questions regarding this request for services. Please contact [address] at [phone] during  normal business hours.        Authorizing Provider:Marisela Hope PA-C  Authorizing Provider's NPI: [id number]  Order Entered By: Mery Aquino RN 11/25/2024 11:08 AM     Electronically signed by: Marisela Hope PA-C 11/25/2024 11:08 AM

## 2024-11-25 NOTE — PROGRESS NOTES
Clinical Nutrition Assessment     Patient Name: Marilyn Kunz  YOB: 1945  MRN: [id number]  Date of Encounter: 11/25/24 09:31 EST  Admission date: 11/15/2024  Reason for Visit: Follow-up protocol    Assessment   Nutrition Assessment   Admission Diagnosis:  UTI (urinary tract infection) [N39.0]    Problem List:    UTI (urinary tract infection)    Essential hypertension    Factor V Leiden    Psoriatic arthritis    Hypothyroidism    CKD (chronic kidney disease) stage 3, GFR 30-59 ml/min    Hyperlipidemia LDL goal <70    History of stroke    Chronic diastolic congestive heart failure    Immunosuppression due to drug therapy    Type 1 diabetes mellitus with hyperglycemia    Syncope    Elevated serum creatinine    Sepsis    Closed fracture of left foot    TIFFANIE (obstructive sleep apnea)    COPD (chronic obstructive pulmonary disease)    Elevated CK      PMH:   She  has a past medical history of Abnormal ECG (Check FPA Date or [facility] Records), Acute sinusitis (02/06/2023), Anemia, Asthma, CAP (community acquired pneumonia) (02/06/2023), CHF (congestive heart failure) (07/20/2023), Chronic kidney disease, 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), COPD (chronic obstructive pulmonary disease) (11/15/2024), CTS (carpal tunnel syndrome), Deep vein thrombosis (No on blood clots but have a blood clotting disorder called Leiden Factor 5), Demyelinating disease of central nervous system, unspecified (03/09/2023), Diabetes, Disease of thyroid gland, Diverticulitis of colon, Erosive osteoarthritis (05/30/2024), Factor 5 Leiden mutation, heterozygous (2012), Head injury, Hyperlipidemia, Hypertension, Medication monitoring encounter, Movement disorder (10/2021), Murmur, cardiac, Neuropathy in diabetes, TIFFANIE (obstructive sleep apnea) (11/15/2024), Osteopenia, Peripheral neuropathy (05/03/2022), Pill esophagitis (02/06/2023), Plantar fasciitis, Psoriasis, Psoriatic arthritis, Sleep  "apnea (Always), Stroke (had one don't know when), Syncope (11/15/2024), and Type 1 diabetes.    PSH:  She  has a past surgical history that includes Hand surgery (Left, 1987); Patent ductus arterious ligation; Cataract extraction (Bilateral); Esophagogastroduodenoscopy (N/A, 10/20/2020); Cardiac catheterization (1952 2 of them); Colonoscopy; Knee Arthroplasty; Replacement total knee (Left); and Knee surgery (Left).    Applicable Nutrition History:       Anthropometrics     Height: Height: 149.9 cm (59\")  Last Filed Weight: Weight: 59.9 kg (132 lb) (11/15/24 1641)  Method: Weight Method: Stated  BMI: BMI (Calculated): 26.6    UBW:  130lbs per pt report  Weight change: No significant changes   Weight       Weight (kg) Weight (lbs) Weight Method Visit Report   8/16/2023 54.885 kg  121 lb   --    9/5/2023 54.885 kg  121 lb   --    9/30/2023 55.339 kg  122 lb  Stated     6/5/2024 60.51 kg  133 lb 6.4 oz   --    9/6/2024 61.689 kg  136 lb  Stated     9/23/2024 61 kg  134 lb 7.7 oz  Stated     11/15/2024 59.875 kg  132 lb  Stated       RD took bed wt of 59kg/130lbs    Nutrition Focused Physical Exam    Date: 11/18    Pt does not meet criteria for malnutrition diagnosis, at this time.      Subjective   Reported/Observed/Food/Nutrition Related History:     11/18  Pt reports eating well PTA, states if she likes something she will eat it. Pt reports endocrinologist referred her to a dietitian to help pt w/choosing what to eat 2/2 CKD ~ 2 weeks ago. Pt states she lives alone, usually with batch cook a meal and eat it for multiple meals. Pt states she drinks ONS intermittently as a meal replacement. Pt states wt has been stable ~130lbs. Pt noted to have arthritis, states OT is bringing adaptive utensils. NKFA.     Current Nutrition Prescription   PO: Diet: Regular/House, Cardiac, Diabetic; Healthy Heart (2-3 Na+); Consistent Carbohydrate; Fluid Consistency: Thin (IDDSI 0)  Oral Nutrition Supplement:   Intake: 75% x 14 " meals    Assessment & Plan   Nutrition Diagnosis   Date:  11/18            Updated:    Problem No nutrition diagnosis at this time    Etiology    Signs/Symptoms    Status: New    Goal / Objectives:   Nutrition to support treatment and Maintain intake      Nutrition Intervention      Follow treatment progress, Care plan reviewed, Encourage intake, Supplement provided    Pt noted to have adequate PO intake. RD will sign off at this time. If note that PO intake decrease to avg of <50%, please consult RD to re-evaluate interventions in place.   Boost GC w/dinner per pt preference    Monitoring/Evaluation:   Per protocol, PO intake, Supplement intake, Pertinent labs    Monica Caldwell, MS,RD,LD  Time Spent:15

## 2024-11-25 NOTE — CASE MANAGEMENT/SOCIAL WORK
Case Management Discharge Note      Final Note: I have met [facility] Ms. Kunz at the bedside to discuss today's discharge and PT/OT recommendations for home with assist.  She requests a referral to OPPT at [facility].  CM will fax this referral to [facility].  Ms. Kunz wishes to schedule her own appt.  [facility] contact info has been placed in AVS.  Ms. Kunz states that her niece and brother will provide assistance and transportation as needed.  She denies additional discharge needs.              Therapy       Service Provider Services Address Phone Fax Patient Preferred    [facility] [address] [facility] [address] [phone] --                Rheumatology - External Fill Episode start date: 5/31/2024   There are no active outsourced providers for this episode.                      Final Discharge Disposition Code: 01 - home or self-care

## 2024-11-25 NOTE — DISCHARGE SUMMARY
[facility] Medicine Services  DISCHARGE SUMMARY    Patient Name: Marilyn Kunz  : 1945  MRN: [id number]    Date of Admission: 11/15/2024  5:40 PM  Date of Discharge:  2024  Primary Care Physician: Peter Baca MD    Consults       Date and Time Order Name Status Description    2024 11:47 AM Inpatient Cardiology Consult Completed             Hospital Course     Presenting Problem: Syncope, hyperglycemia    Active Hospital Problems    Diagnosis  POA    **UTI (urinary tract infection) [N39.0]  Yes    Type 1 diabetes mellitus with hyperglycemia [E10.65]  Yes    Syncope [R55]  Yes    Elevated serum creatinine [R79.89]  Yes    Sepsis [A41.9]  Yes    Closed fracture of left foot [S92.902A]  Yes    TIFFANIE (obstructive sleep apnea) [G47.33]  Yes    COPD (chronic obstructive pulmonary disease) [J44.9]  Yes    Elevated CK [R74.8]  Yes    Immunosuppression due to drug therapy [D84.821, Z79.899]  Not Applicable    Chronic diastolic congestive heart failure [I50.32]  Yes    History of stroke [Z86.73]  Not Applicable    Psoriatic arthritis [L40.50]  Yes    Factor V Leiden [D68.51]  Yes    Essential hypertension [I10]  Yes    CKD (chronic kidney disease) stage 3, GFR 30-59 ml/min [N18.30]  Yes    Hyperlipidemia LDL goal <70 [E78.5]  Yes    Hypothyroidism [E03.9]  Yes      Resolved Hospital Problems   No resolved problems to display.          Hospital Course:  Marilyn Kunz is a 79 y.o. female with personal medical history significant for hypertension, hyperlipidemia, diastolic heart failure, somatic arthritis, demyelinating disease, type 1 diabetes, CKD stage III, prior CVA, COPD, hypothyroidism, TIFFANIE on CPAP, heterozygous factor V Leiden who presented to the ED with syncope and hyperglycemia. Patient found to be septic likely from UTI.     Sepsis, resolved  UTI, resolved  - Pt met severe sepsis criteria causing organ dysfunction on arrival due to ELMER. ELMER,  leukocytosis and elevated lactic acid resolved  - Blood cultures with no growth x 5 days   - Urine culture with E. coli  - S/p IV Rocephin and P.O. Ceftin     A-fib, new onset, rate controlled  Bradycardia, asymptomatic  - A-fib captured on EKG 11/15, no known history of arrhythmia  - Likely 2/2 sepsis above though more concerning in the setting of syncope at home  - Cardiology started patient on Eliquis, follow up outpatient with Cardiology  - Nebivolol decreased to 10 mg daily     Type 1 diabetes with hyperglycemia  Hypoglycemia  - Glucose around 500 on arrival, now patient hypoglycemic on fasting glucose 11/22/24.  Has since been running    - Continue home meds for now, follow up with PCP in 1 week for adjustments     Syncope  - CT head negative for acute abnormalities  - Troponins mildly elevated but flat  - EKG showed A-fib as above  - Likely related to sepsis  - Repeat echo largely unremarkable    Closed fracture left foot, POA  - Patient being treated as an outpatient by Ortho, currently in a walking boot. No current pain.  - Recommend outpatient PT, previously followed with [facility] [place]    Arthritis  Demyelinating disease  - Clarified, patient is no longer on Rinvoq   - Patient follows up for steroid injections but will not require again until December   - Follow-up with Dr. Hernandez on discharge    Elevated CK, improved  ELMER on CKD stage III, resolved    Discharge Follow Up Recommendations for outpatient labs/diagnostics:  - Follow up with Cardiology for new Afib  - Follow up with Dr. Hernandez   - Follow up with PCP for diabetes med management    Day of Discharge     HPI:   Patient reports she feels well and is optimistic about PT's updated recs to go home. Patient will arrange transportation home. No acute complaints. No family at bedside.     Review of Systems  Gen- No fevers, chills  CV- No chest pain, palpitations  Resp- No cough, dyspnea  GI- No N/V/D, abd pain     Vital Signs:   Temp:  [97.3  °F (36.3 °C)-98.5 °F (36.9 °C)] 97.7 °F (36.5 °C)  Heart Rate:  [47-73] 73  Resp:  [16-18] 18  BP: (121-165)/(57-91) 140/61  Flow (L/min) (Oxygen Therapy):  [2] 2      Physical Exam:  Constitutional: Elderly appearing female sitting up in bedside chair in NAD  HENT: NCAT, mucous membranes moist  Respiratory: Clear to auscultation bilaterally, respiratory effort normal on 2L NC  Cardiovascular: Irregular, no murmurs, rubs, or gallops, no bilateral ankle edema  Gastrointestinal: Positive bowel sounds, soft, nontender, nondistended.  Musculoskeletal: GUNN spontaneously, left LE out of walking boot  Psychiatric: Appropriate affect, cooperative  Neurologic: Alert and oriented, speech clear  Skin: No rashes on exposed surfaces    Pertinent  and/or Most Recent Results     LAB RESULTS:      Lab 11/25/24 0401 11/24/24 0452 11/23/24 0431 11/22/24 0359 11/21/24  0428   WBC 6.34 6.93 8.43 9.59 10.35   HEMOGLOBIN 10.2* 9.5* 9.4* 9.8* 9.3*   HEMATOCRIT 32.2* 29.8* 29.5* 31.2* 28.9*   PLATELETS 216 213 213 196 183   NEUTROS ABS 2.67 3.19 3.51 6.37  --    IMMATURE GRANS (ABS) 0.01 0.02 0.03 0.10*  --    LYMPHS ABS 2.13 2.09 2.63 1.61  --    MONOS ABS 0.61 0.59 0.74 0.94*  --    EOS ABS 0.88* 0.99* 1.48* 0.54*  --    MCV 93.9 94.6 93.9 95.4 93.2         Lab 11/25/24  0401 11/24/24 0452 11/23/24 0431 11/22/24 0359 11/20/24 0438 11/19/24  0513   SODIUM 142 139 141 140 142 142   POTASSIUM 3.9 4.2 4.1 4.2 4.3 4.4   CHLORIDE 105 105 106 105 108* 110*   CO2 26.0 25.0 25.0 23.0 23.0 22.0   ANION GAP 11.0 9.0 10.0 12.0 11.0 10.0   BUN 9 14 15 13 10 13   CREATININE 0.80 0.74 0.84 0.72 0.73 0.71   EGFR 75.1 82.4 70.8 85.2 83.8 86.6   GLUCOSE 102* 208* 102* 51* 190* 267*   CALCIUM 8.5* 8.5* 8.1* 8.1* 7.9* 7.9*   IONIZED CALCIUM  --   --   --   --  1.10* 1.10*   MAGNESIUM  --  2.0 1.9 2.0 1.8 1.8   PHOSPHORUS  --   --   --   --  3.2 3.6         Lab 11/25/24  0401 11/24/24  0452 11/23/24  0431 11/22/24  0359   TOTAL PROTEIN 5.7* 5.5* 5.7*  5.3*   ALBUMIN 3.0* 3.0* 3.0* 2.7*   GLOBULIN 2.7 2.5 2.7 2.6   ALT (SGPT) 10 9 11 10   AST (SGOT) 20 12 15 11   BILIRUBIN 0.4 0.5 0.7 0.6   ALK PHOS 68 65 61 56                     Brief Urine Lab Results  (Last result in the past 365 days)        Color   Clarity   Blood   Leuk Est   Nitrite   Protein   CREAT   Urine HCG        11/15/24 1739 Dark Yellow   Cloudy   Negative   Moderate (2+)   Negative   30 mg/dL (1+)                 Microbiology Results (last 10 days)       Procedure Component Value - Date/Time    Blood Culture - Blood, Arm, Left [[id number]]  (Normal) Collected: 11/15/24 1815    Lab Status: Final result Specimen: Blood from Arm, Left Updated: 11/20/24 1900     Blood Culture No growth at 5 days    Blood Culture - Blood, Arm, Right [[id number]]  (Normal) Collected: 11/15/24 1810    Lab Status: Final result Specimen: Blood from Arm, Right Updated: 11/20/24 1900     Blood Culture No growth at 5 days    Urine Culture - Urine, Urine, Clean Catch [[id number]]  (Abnormal)  (Susceptibility) Collected: 11/15/24 1739    Lab Status: Final result Specimen: Urine, Clean Catch Updated: 11/17/24 0932     Urine Culture >100,000 CFU/mL Escherichia coli    Narrative:      Colonization of the urinary tract without infection is common. Treatment is discouraged unless the patient is symptomatic, pregnant, or undergoing an invasive urologic procedure.    Susceptibility        Escherichia coli      SUAD      Amoxicillin + Clavulanate Susceptible      Ampicillin Susceptible      Ampicillin + Sulbactam Susceptible      Cefepime Susceptible      Ceftazidime Susceptible      Ceftriaxone Susceptible      Gentamicin Susceptible      Levofloxacin Susceptible      Nitrofurantoin Susceptible      Piperacillin + Tazobactam Susceptible      Trimethoprim + Sulfamethoxazole Susceptible                                   CT Head Without Contrast    Result Date: 11/16/2024  CT HEAD WO CONTRAST Date of Exam: 11/16/2024 12:17 AM EST Indication:  syncope, LOC. Comparison: 9/24/2024. Technique: Axial CT images were obtained of the head without contrast administration.  Automated exposure control and iterative construction methods were used. Findings: There is no evidence of hemorrhage. There is no mass effect or midline shift. There is no extracerebral collection. Ventricles are normal in size and configuration for patient's stated age.  Posterior fossa is within normal limits. Calvarium and skull base appear intact.   Visualized sinuses show no air fluid levels. Visualized orbits are unremarkable.     Impression: No acute intracranial process. Electronically Signed: Ana Mckay MD  11/16/2024 12:42 AM EST  Workstation ID: [id number]    XR Chest 1 View    Result Date: 11/15/2024  XR CHEST 1 VW Date of Exam: 11/15/2024 6:07 PM EST Indication: AMS Protocol Comparison: AP chest x-ray 9/23/2024, AP chest x-ray 9/30/2023, CT chest 7/20/2023, AP chest x-ray 3/2/2023. Findings: Cardiomediastinal contours are stable. Lungs are adequately expanded and appear clear. No pneumothorax or large pleural effusion is seen.     Impression: No evidence of acute cardiopulmonary abnormality or significant change. Electronically Signed: Sera Mercer  11/15/2024 6:34 PM EST  Workstation ID: [id number]     Results for orders placed during the hospital encounter of 07/20/23    Duplex Carotid Ultrasound CAR    Interpretation Summary    Right internal carotid artery demonstrates a less than 50% stenosis.    Left internal carotid artery demonstrates a less than 50% stenosis.    Vertebral artery flow antegrade bilaterally      Results for orders placed during the hospital encounter of 07/20/23    Duplex Carotid Ultrasound CAR    Interpretation Summary    Right internal carotid artery demonstrates a less than 50% stenosis.    Left internal carotid artery demonstrates a less than 50% stenosis.    Vertebral artery flow antegrade bilaterally      Results for orders placed during the hospital  encounter of 11/15/24    Adult Transthoracic Echo Complete W/ Cont if Necessary Per Protocol    Interpretation Summary    Left ventricular systolic function is normal. Left ventricular ejection fraction appears to be 66 - 70%.    Left ventricular diastolic function was normal.    Left atrial volume is mildly increased.    Estimated right ventricular systolic pressure from tricuspid regurgitation is normal (<35 mmHg).    No significant change compared to 2023 echo      Plan for Follow-up of Pending Labs/Results:     Discharge Details        Discharge Medications        New Medications        Instructions Start Date   apixaban 2.5 MG tablet tablet  Commonly known as: ELIQUIS   2.5 mg, Oral, Every 12 Hours Scheduled             Changes to Medications        Instructions Start Date   nebivolol 10 MG tablet  Commonly known as: BYSTOLIC  What changed:   medication strength  how much to take   10 mg, Oral, Daily   Start Date: November 26, 2024     Tresiba FlexTouch 100 UNIT/ML solution pen-injector injection  Generic drug: insulin degludec  What changed: additional instructions   Inject 19 Units under the skin into the appropriate area as directed Every Night.             Continue These Medications        Instructions Start Date   acetaminophen 500 MG tablet  Commonly known as: TYLENOL   2 tablets      azaTHIOprine 50 MG tablet  Commonly known as: IMURAN   TAKE 2 TABLETS TWICE DAILY      bumetanide 0.5 MG tablet  Commonly known as: BUMEX   0.5 mg, Oral, Every Other Day      Droplet Pen Needles 32G X 4 MM misc  Generic drug: Insulin Pen Needle       Entresto  MG tablet  Generic drug: sacubitril-valsartan   1 tablet, Oral, 2 Times Daily      fluticasone 50 MCG/ACT nasal spray  Commonly known as: FLONASE   2 sprays, Each Nare, Daily, Shake well before using.       insulin aspart 100 UNIT/ML solution pen-injector sc pen  Commonly known as: novoLOG FLEXPEN   3 Times Daily With Meals      insulin  UNIT/ML  injection  Commonly known as: humuLIN N,novoLIN N   Inject  under the skin into the appropriate area as directed.      levocetirizine 5 MG tablet  Commonly known as: XYZAL   5 mg, Every Evening      levothyroxine 75 MCG tablet  Commonly known as: SYNTHROID, LEVOTHROID   75 mcg, Daily      montelukast 10 MG tablet  Commonly known as: SINGULAIR   10 mg, Oral, Every Evening      nystatin 455723 UNIT/GM powder  Commonly known as: MYCOSTATIN   Topical, 3 Times Daily      oxybutynin 5 MG tablet  Commonly known as: DITROPAN   5 mg, 2 Times Daily      pravastatin 20 MG tablet  Commonly known as: PRAVACHOL   20 mg, Oral, Every Evening      pregabalin 75 MG capsule  Commonly known as: LYRICA   75 mg, 2 Times Daily      Rinvoq 15 MG tablet sustained-release 24 hour  Generic drug: Upadacitinib ER   15 mg, Oral, Daily      terazosin 2 MG capsule  Commonly known as: HYTRIN   2 mg, Oral, Nightly             Stop These Medications      aspirin 81 MG EC tablet     Flovent Diskus 50 MCG/ACT diskus inhaler  Generic drug: Fluticasone Propionate (Inhal)     hydrALAZINE 25 MG tablet  Commonly known as: APRESOLINE     irbesartan-hydrochlorothiazide 300-12.5 MG tablet  Commonly known as: AVALIDE     metoprolol succinate XL 25 MG 24 hr tablet  Commonly known as: TOPROL-XL              Allergies   Allergen Reactions    Atorvastatin Other (See Comments)    Colesevelam Other (See Comments)    Cymbalta [Duloxetine Hcl] Other (See Comments)     Hyponatremia    Famotidine Other (See Comments)    Cephalexin Rash     Tolerated Ceftriaxone    Clindamycin Rash    Hygroton [Chlorthalidone] Rash     Facial rash    Lidocaine Other (See Comments)     Sores in throat    Penicillins Nausea And Vomiting, Rash and Other (See Comments)     Has tolerated ceftriaxone  TROUBLE BREATHING         Discharge Disposition:  Home or Self Care    Diet:  Hospital:  Diet Order   Procedures    Diet: Regular/House, Cardiac, Diabetic; Healthy Heart (2-3 Na+); Consistent  Carbohydrate; Fluid Consistency: Thin (IDDSI 0)       Diet Instructions       Diet: Cardiac Diets, Diabetic Diets; Healthy Heart (2-3 Na+); Regular (IDDSI 7); Thin (IDDSI 0); Consistent Carbohydrate      Discharge Diet:  Cardiac Diets  Diabetic Diets       Cardiac Diet: Healthy Heart (2-3 Na+)    Texture: Regular (IDDSI 7)    Fluid Consistency: Thin (IDDSI 0)    Diabetic Diet: Consistent Carbohydrate             Activity:  Activity Instructions       Up WIth Assist              Restrictions or Other Recommendations:       CODE STATUS:    Code Status and Medical Interventions: CPR (Attempt to Resuscitate); Full Support   Ordered at: 11/15/24 2115     Code Status (Patient has no pulse and is not breathing):    CPR (Attempt to Resuscitate)     Medical Interventions (Patient has pulse or is breathing):    Full Support       Future Appointments   Date Time Provider Department Center   12/16/2024  1:20 PM [facility]       Additional Instructions for the Follow-ups that You Need to Schedule       Ambulatory Referral to Physical Therapy for Evaluation & Treatment   As directed      Closed fracture left foot  - Patient being treated as an outpatient by Ortho, currently in a walking boot.    Order Comments: Closed fracture left foot - Patient being treated as an outpatient by Ortho, currently in a walking boot.    Specialty needed: Evaluate and treat Ortho   Exercises: Stretching ROM Strengthening   Follow-up needed: Yes        Discharge Follow-up with PCP   As directed       Currently Documented PCP:    Peter Baca MD    PCP Phone Number:    [phone]     Follow Up Details: Follow up in 1 week                      Marisela Hope PA-C  11/25/24      Time Spent on Discharge:  I spent  32  minutes on this discharge activity which included: face-to-face encounter with the patient, reviewing the data in the system, coordination of the care with the nursing staff as well as  consultants, documentation, and entering orders.

## 2024-11-26 ENCOUNTER — TELEPHONE (OUTPATIENT)
Dept: CARDIOLOGY | Facility: CLINIC | Age: 79
End: 2024-11-26
Payer: MEDICARE

## 2024-11-26 NOTE — TELEPHONE ENCOUNTER
Name: Marilyn Kunz Zuleyma    Relationship: Self    Best Callback Number: [phone]     Incoming call to the Hub, requesting to  Cancel their HFU appointment on 12/27/24.     Per Hub workflow, further review is needed before the task can be completed.    Result of Call: Please cancel this appointment

## 2024-11-26 NOTE — TELEPHONE ENCOUNTER
Spoke to patient. HFU on 12/27/24 w/ NSK cancelled as pt states she will follow up with her [facility] Cardiologist.

## 2024-11-26 NOTE — OUTREACH NOTE
Prep Survey      Flowsheet Row Responses   [facility] facility patient discharged from? [place]   Is LACE score < 7 ? No   Eligibility Readm Mgmt   Discharge diagnosis UTI (urinary tract infection), Sepsi   Does the patient have one of the following disease processes/diagnoses(primary or secondary)? Sepsis   Prep survey completed? Yes            Heather MARQUEZ - Registered Nurse

## 2024-12-03 ENCOUNTER — READMISSION MANAGEMENT (OUTPATIENT)
Dept: CALL CENTER | Facility: HOSPITAL | Age: 79
End: 2024-12-03
Payer: MEDICARE

## 2024-12-03 NOTE — OUTREACH NOTE
Sepsis Week 1 Survey      Flowsheet Row Responses   [facility] patient discharged from? [place]   Does the patient have one of the following disease processes/diagnoses(primary or secondary)? Sepsis   Week 1 attempt successful? Yes   Call start time 1532   Unsuccessful attempts Attempt 1   Call end time 1548   Medication alerts for this patient ELIQUIS   Meds reviewed with patient/caregiver? Yes   Is the patient having any side effects they believe may be caused by any medication additions or changes? No   Is the patient taking all medications as directed (includes completed medication regime)? Yes   Does the patient have a primary care provider?  Yes   Does the patient have an appointment with their PCP within 7 days of discharge? Greater than 7 days   Nursing Interventions Verified appointment date/time/provider  [12/2/24]   Has the patient kept scheduled appointments due by today? Yes   Comments pt went to seen hand specialist today, had injection and was instructed to wear brace.  pt also has urology appt upcoming   Has home health visited the patient within 72 hours of discharge? N/A   Home health comments Pt is attending OP P.T.   Psychosocial issues? No   Did the patient receive a copy of their discharge instructions? Yes   Nursing interventions Reviewed instructions with patient   What is the patient's perception of their health status since discharge? Same  [Denies ongoing s/s infeciton but reports she is tired, fatigued and hopeful to regain strength with P.T.  She is aware to monitor for s/s infection, reviewed with her.  Pt is aware to monitor for issues with her BG, taking meds as ordered.]   Nursing interventions Nurse provided patient education   Is the patient/caregiver able to teach back TIME? T emperature - higher or lower than normal, I nfection - may have signs and symptoms of an infection, M ental Decline - confused, sleepy, difficult to arouse   Nursing interventions Nurse provided  patient education   Is patient/caregiver able to teach back steps to recovery at home? Set small, achievable goals for return to baseline health, Rest and regain strength   Is the patient/caregiver able to teach back signs and symptoms of worsening condition: Fever, Altered mental status(confusion/coma)   Is the patient/caregiver able to teach back the hierarchy of who to call/visit for symptoms/problems? PCP, Specialist, Home health nurse, Urgent Care, ED, 911 Yes  [provided NCC number to pt]   Week 1 call completed? Yes   Call end time 3014            VIJAY GARCIA - Registered Nurse

## 2024-12-11 ENCOUNTER — READMISSION MANAGEMENT (OUTPATIENT)
Dept: CALL CENTER | Facility: HOSPITAL | Age: 79
End: 2024-12-11
Payer: MEDICARE

## 2024-12-11 NOTE — OUTREACH NOTE
Sepsis Week 2 Survey      Flowsheet Row Responses   [facility] patient discharged from? [place]   Does the patient have one of the following disease processes/diagnoses(primary or secondary)? Sepsis   Week 2 attempt successful? Yes   Call start time 1346   Call end time 1405   Discharge diagnosis UTI (urinary tract infection), Sepsi   Meds reviewed with patient/caregiver? Yes   Is the patient having any side effects they believe may be caused by any medication additions or changes? No   Does the patient have all medications related to this admission filled (includes all antibiotics, inhalers, nebulizers,steroids,etc.) Yes   Is the patient taking all medications as directed (includes completed medication regime)? Yes   Does the patient have a primary care provider?  Yes   Does the patient have an appointment with their PCP within 7 days of discharge? Greater than 7 days   What is preventing the patient from scheduling follow up appointments within 7 days of discharge? Earlier appointment not available   Nursing Interventions Verified appointment date/time/provider   Has the patient kept scheduled appointments due by today? N/A   Has home health visited the patient within 72 hours of discharge? N/A   Psychosocial issues? No   Did the patient receive a copy of their discharge instructions? Yes   Nursing interventions Reviewed instructions with patient   What is the patient's perception of their health status since discharge? Improving   Nursing interventions Nurse provided patient education   Is the patient/caregiver able to teach back TIME? T emperature - higher or lower than normal, I nfection - may have signs and symptoms of an infection, M ental Decline - confused, sleepy, difficult to arouse, E xtremely Ill - severe pain, discomfort, shortness of breath   Nursing interventions Nurse provided reassurance to patient, Nurse provided patient education   Is patient/caregiver able to teach back steps to recovery  at home? Set small, achievable goals for return to baseline health, Rest and regain strength, Eat a balanced diet, Exercise as tolerated   Is the patient/caregiver able to teach back signs and symptoms of worsening condition: Fever, Hyperthermia, Rapid heart rate (>90), Shortness of breath/rapid respiratory rate, Altered mental status(confusion/coma), Edema   Is the patient/caregiver able to teach back the hierarchy of who to call/visit for symptoms/problems? PCP, Specialist, Home health nurse, Urgent Care, ED, 911 Yes   Additional teach back comments states glucoses stable   Week 2 call completed? Yes   Call end time 1405            Cristin ROGERS - Registered Nurse

## 2024-12-12 ENCOUNTER — APPOINTMENT (OUTPATIENT)
Dept: GENERAL RADIOLOGY | Facility: HOSPITAL | Age: 79
DRG: 280 | End: 2024-12-12
Payer: MEDICARE

## 2024-12-12 ENCOUNTER — APPOINTMENT (OUTPATIENT)
Dept: CT IMAGING | Facility: HOSPITAL | Age: 79
DRG: 280 | End: 2024-12-12
Payer: MEDICARE

## 2024-12-12 ENCOUNTER — HOSPITAL ENCOUNTER (INPATIENT)
Facility: HOSPITAL | Age: 79
LOS: 2 days | Discharge: HOME | DRG: 280 | End: 2024-12-15
Attending: EMERGENCY MEDICINE | Admitting: INTERNAL MEDICINE
Payer: MEDICARE

## 2024-12-12 DIAGNOSIS — J44.1 COPD EXACERBATION: ICD-10-CM

## 2024-12-12 DIAGNOSIS — I50.33 ACUTE ON CHRONIC HEART FAILURE WITH PRESERVED EJECTION FRACTION: ICD-10-CM

## 2024-12-12 DIAGNOSIS — I50.32 CHRONIC DIASTOLIC CONGESTIVE HEART FAILURE: ICD-10-CM

## 2024-12-12 DIAGNOSIS — L40.50 PSORIATIC ARTHRITIS: ICD-10-CM

## 2024-12-12 DIAGNOSIS — G47.33 OSA (OBSTRUCTIVE SLEEP APNEA): ICD-10-CM

## 2024-12-12 DIAGNOSIS — G37.9 DEMYELINATING DISEASE: ICD-10-CM

## 2024-12-12 DIAGNOSIS — J96.01 ACUTE RESPIRATORY FAILURE WITH HYPOXIA: Primary | ICD-10-CM

## 2024-12-12 DIAGNOSIS — E78.5 HYPERLIPIDEMIA LDL GOAL <70: ICD-10-CM

## 2024-12-12 DIAGNOSIS — G37.9 DEMYELINATING DISEASE OF CENTRAL NERVOUS SYSTEM, UNSPECIFIED: ICD-10-CM

## 2024-12-12 DIAGNOSIS — D84.821 IMMUNOSUPPRESSION DUE TO DRUG THERAPY: ICD-10-CM

## 2024-12-12 DIAGNOSIS — M54.12 CERVICAL RADICULOPATHY: ICD-10-CM

## 2024-12-12 DIAGNOSIS — J90 BILATERAL PLEURAL EFFUSION: ICD-10-CM

## 2024-12-12 DIAGNOSIS — E10.65 TYPE 1 DIABETES MELLITUS WITH HYPERGLYCEMIA: ICD-10-CM

## 2024-12-12 DIAGNOSIS — R21 RASH: ICD-10-CM

## 2024-12-12 DIAGNOSIS — J81.0 ACUTE PULMONARY EDEMA: ICD-10-CM

## 2024-12-12 DIAGNOSIS — R79.89 ELEVATED SERUM CREATININE: ICD-10-CM

## 2024-12-12 DIAGNOSIS — I10 ESSENTIAL HYPERTENSION: ICD-10-CM

## 2024-12-12 DIAGNOSIS — Z86.73 HISTORY OF STROKE: ICD-10-CM

## 2024-12-12 DIAGNOSIS — Z91.89 AT RISK FOR VENOUS THROMBOEMBOLISM (VTE): ICD-10-CM

## 2024-12-12 DIAGNOSIS — I50.9 ACUTE ON CHRONIC CONGESTIVE HEART FAILURE, UNSPECIFIED HEART FAILURE TYPE: ICD-10-CM

## 2024-12-12 DIAGNOSIS — R74.8 ELEVATED CK: ICD-10-CM

## 2024-12-12 DIAGNOSIS — Z79.899 IMMUNOSUPPRESSION DUE TO DRUG THERAPY: ICD-10-CM

## 2024-12-12 DIAGNOSIS — D68.51 FACTOR V LEIDEN: ICD-10-CM

## 2024-12-12 DIAGNOSIS — M43.10 SPONDYLOLISTHESIS, GRADE 2: ICD-10-CM

## 2024-12-12 DIAGNOSIS — G62.9 PERIPHERAL POLYNEUROPATHY: ICD-10-CM

## 2024-12-12 DIAGNOSIS — Q25.0 PDA (PATENT DUCTUS ARTERIOSUS): ICD-10-CM

## 2024-12-12 DIAGNOSIS — M15.4 EROSIVE OSTEOARTHRITIS: ICD-10-CM

## 2024-12-12 LAB
ALBUMIN SERPL-MCNC: 4 G/DL (ref 3.5–5.2)
ALBUMIN/GLOB SERPL: 1.4 G/DL
ALP SERPL-CCNC: 87 U/L (ref 39–117)
ALT SERPL W P-5'-P-CCNC: 24 U/L (ref 1–33)
ANION GAP SERPL CALCULATED.3IONS-SCNC: 11 MMOL/L (ref 5–15)
ARTERIAL PATENCY WRIST A: ABNORMAL
AST SERPL-CCNC: 30 U/L (ref 1–32)
ATMOSPHERIC PRESS: ABNORMAL MM[HG]
BASE EXCESS BLDA CALC-SCNC: 2.9 MMOL/L (ref 0–2)
BASOPHILS # BLD AUTO: 0.05 10*3/MM3 (ref 0–0.2)
BASOPHILS NFR BLD AUTO: 0.4 % (ref 0–1.5)
BDY SITE: ABNORMAL
BILIRUB SERPL-MCNC: 0.8 MG/DL (ref 0–1.2)
BODY TEMPERATURE: 37
BUN SERPL-MCNC: 29 MG/DL (ref 8–23)
BUN/CREAT SERPL: 33.3 (ref 7–25)
CALCIUM SPEC-SCNC: 9 MG/DL (ref 8.6–10.5)
CHLORIDE SERPL-SCNC: 109 MMOL/L (ref 98–107)
CO2 BLDA-SCNC: 29.8 MMOL/L (ref 22–33)
CO2 SERPL-SCNC: 28 MMOL/L (ref 22–29)
COHGB MFR BLD: 0.7 % (ref 0–2)
CREAT SERPL-MCNC: 0.87 MG/DL (ref 0.57–1)
DEPRECATED RDW RBC AUTO: 62.5 FL (ref 37–54)
EGFRCR SERPLBLD CKD-EPI 2021: 67.9 ML/MIN/1.73
EOSINOPHIL # BLD AUTO: 0.36 10*3/MM3 (ref 0–0.4)
EOSINOPHIL NFR BLD AUTO: 3.2 % (ref 0.3–6.2)
EPAP: 0
ERYTHROCYTE [DISTWIDTH] IN BLOOD BY AUTOMATED COUNT: 17.7 % (ref 12.3–15.4)
FLUAV RNA RESP QL NAA+PROBE: NOT DETECTED
FLUBV RNA RESP QL NAA+PROBE: NOT DETECTED
GEN 5 1HR TROPONIN T REFLEX: 59 NG/L
GLOBULIN UR ELPH-MCNC: 2.8 GM/DL
GLUCOSE SERPL-MCNC: 81 MG/DL (ref 65–99)
HCO3 BLDA-SCNC: 28.4 MMOL/L (ref 20–26)
HCT VFR BLD AUTO: 34.9 % (ref 34–46.6)
HCT VFR BLD CALC: 33.2 % (ref 38–51)
HGB BLD-MCNC: 10.8 G/DL (ref 12–15.9)
HGB BLDA-MCNC: 10.8 G/DL (ref 14–18)
HOLD SPECIMEN: NORMAL
IMM GRANULOCYTES # BLD AUTO: 0.05 10*3/MM3 (ref 0–0.05)
IMM GRANULOCYTES NFR BLD AUTO: 0.4 % (ref 0–0.5)
INHALED O2 CONCENTRATION: 44 %
IPAP: 0
LYMPHOCYTES # BLD AUTO: 1.42 10*3/MM3 (ref 0.7–3.1)
LYMPHOCYTES NFR BLD AUTO: 12.6 % (ref 19.6–45.3)
MCH RBC QN AUTO: 30.2 PG (ref 26.6–33)
MCHC RBC AUTO-ENTMCNC: 30.9 G/DL (ref 31.5–35.7)
MCV RBC AUTO: 97.5 FL (ref 79–97)
METHGB BLD QL: 0.3 % (ref 0–1.5)
MODALITY: ABNORMAL
MONOCYTES # BLD AUTO: 0.75 10*3/MM3 (ref 0.1–0.9)
MONOCYTES NFR BLD AUTO: 6.7 % (ref 5–12)
NEUTROPHILS NFR BLD AUTO: 76.7 % (ref 42.7–76)
NEUTROPHILS NFR BLD AUTO: 8.62 10*3/MM3 (ref 1.7–7)
NRBC BLD AUTO-RTO: 0 /100 WBC (ref 0–0.2)
NT-PROBNP SERPL-MCNC: 7086 PG/ML (ref 0–1800)
OXYHGB MFR BLDV: 91.2 % (ref 94–99)
PAW @ PEAK INSP FLOW SETTING VENT: 0 CMH2O
PCO2 BLDA: 46.4 MM HG (ref 35–45)
PCO2 TEMP ADJ BLD: 46.4 MM HG (ref 35–45)
PH BLDA: 7.39 PH UNITS (ref 7.35–7.45)
PH, TEMP CORRECTED: 7.39 PH UNITS
PLATELET # BLD AUTO: 144 10*3/MM3 (ref 140–450)
PMV BLD AUTO: 12.8 FL (ref 6–12)
PO2 BLDA: 68.4 MM HG (ref 83–108)
PO2 TEMP ADJ BLD: 68.4 MM HG (ref 83–108)
POTASSIUM SERPL-SCNC: 3.7 MMOL/L (ref 3.5–5.2)
PROT SERPL-MCNC: 6.8 G/DL (ref 6–8.5)
QT INTERVAL: 428 MS
QTC INTERVAL: 465 MS
RBC # BLD AUTO: 3.58 10*6/MM3 (ref 3.77–5.28)
SARS-COV-2 RNA RESP QL NAA+PROBE: NOT DETECTED
SODIUM SERPL-SCNC: 148 MMOL/L (ref 136–145)
TOTAL RATE: 0 BREATHS/MINUTE
TROPONIN T % DELTA: 26 %
TROPONIN T NUMERIC DELTA: 12 NG/L
TROPONIN T SERPL HS-MCNC: 47 NG/L
VENTILATOR MODE: ABNORMAL
WBC NRBC COR # BLD AUTO: 11.25 10*3/MM3 (ref 3.4–10.8)
WHOLE BLOOD HOLD COAG: NORMAL
WHOLE BLOOD HOLD SPECIMEN: NORMAL

## 2024-12-12 PROCEDURE — 25010000002 FUROSEMIDE PER 20 MG: Performed by: EMERGENCY MEDICINE

## 2024-12-12 PROCEDURE — 83050 HGB METHEMOGLOBIN QUAN: CPT

## 2024-12-12 PROCEDURE — G0378 HOSPITAL OBSERVATION PER HR: HCPCS

## 2024-12-12 PROCEDURE — 99223 1ST HOSP IP/OBS HIGH 75: CPT | Performed by: INTERNAL MEDICINE

## 2024-12-12 PROCEDURE — 94799 UNLISTED PULMONARY SVC/PX: CPT

## 2024-12-12 PROCEDURE — 84484 ASSAY OF TROPONIN QUANT: CPT | Performed by: EMERGENCY MEDICINE

## 2024-12-12 PROCEDURE — 25510000001 IOPAMIDOL PER 1 ML: Performed by: EMERGENCY MEDICINE

## 2024-12-12 PROCEDURE — 71045 X-RAY EXAM CHEST 1 VIEW: CPT

## 2024-12-12 PROCEDURE — 71275 CT ANGIOGRAPHY CHEST: CPT

## 2024-12-12 PROCEDURE — 85025 COMPLETE CBC W/AUTO DIFF WBC: CPT | Performed by: EMERGENCY MEDICINE

## 2024-12-12 PROCEDURE — 36600 WITHDRAWAL OF ARTERIAL BLOOD: CPT

## 2024-12-12 PROCEDURE — 87636 SARSCOV2 & INF A&B AMP PRB: CPT | Performed by: EMERGENCY MEDICINE

## 2024-12-12 PROCEDURE — 82805 BLOOD GASES W/O2 SATURATION: CPT

## 2024-12-12 PROCEDURE — 93005 ELECTROCARDIOGRAM TRACING: CPT | Performed by: EMERGENCY MEDICINE

## 2024-12-12 PROCEDURE — 93005 ELECTROCARDIOGRAM TRACING: CPT

## 2024-12-12 PROCEDURE — 80053 COMPREHEN METABOLIC PANEL: CPT | Performed by: EMERGENCY MEDICINE

## 2024-12-12 PROCEDURE — 99285 EMERGENCY DEPT VISIT HI MDM: CPT

## 2024-12-12 PROCEDURE — 36415 COLL VENOUS BLD VENIPUNCTURE: CPT

## 2024-12-12 PROCEDURE — 83880 ASSAY OF NATRIURETIC PEPTIDE: CPT | Performed by: EMERGENCY MEDICINE

## 2024-12-12 PROCEDURE — 82375 ASSAY CARBOXYHB QUANT: CPT

## 2024-12-12 PROCEDURE — 94640 AIRWAY INHALATION TREATMENT: CPT

## 2024-12-12 RX ORDER — SODIUM CHLORIDE 0.9 % (FLUSH) 0.9 %
10 SYRINGE (ML) INJECTION AS NEEDED
Status: DISCONTINUED | OUTPATIENT
Start: 2024-12-12 | End: 2024-12-15 | Stop reason: HOSPADM

## 2024-12-12 RX ORDER — IPRATROPIUM BROMIDE AND ALBUTEROL SULFATE 2.5; .5 MG/3ML; MG/3ML
3 SOLUTION RESPIRATORY (INHALATION) ONCE
Status: COMPLETED | OUTPATIENT
Start: 2024-12-12 | End: 2024-12-12

## 2024-12-12 RX ORDER — FUROSEMIDE 10 MG/ML
80 INJECTION INTRAMUSCULAR; INTRAVENOUS ONCE
Status: COMPLETED | OUTPATIENT
Start: 2024-12-12 | End: 2024-12-12

## 2024-12-12 RX ORDER — METHYLPREDNISOLONE SODIUM SUCCINATE 40 MG/ML
40 INJECTION, POWDER, LYOPHILIZED, FOR SOLUTION INTRAMUSCULAR; INTRAVENOUS EVERY 12 HOURS SCHEDULED
Status: DISCONTINUED | OUTPATIENT
Start: 2024-12-12 | End: 2024-12-14

## 2024-12-12 RX ORDER — IOPAMIDOL 755 MG/ML
100 INJECTION, SOLUTION INTRAVASCULAR
Status: COMPLETED | OUTPATIENT
Start: 2024-12-12 | End: 2024-12-12

## 2024-12-12 RX ADMIN — IOPAMIDOL 65 ML: 755 INJECTION, SOLUTION INTRAVENOUS at 21:55

## 2024-12-12 RX ADMIN — FUROSEMIDE 80 MG: 10 INJECTION, SOLUTION INTRAMUSCULAR; INTRAVENOUS at 22:26

## 2024-12-12 RX ADMIN — IPRATROPIUM BROMIDE AND ALBUTEROL SULFATE 3 ML: 2.5; .5 SOLUTION RESPIRATORY (INHALATION) at 21:24

## 2024-12-12 NOTE — Clinical Note
Level of Care: Telemetry [5]   Diagnosis: Acute exacerbation of CHF (congestive heart failure) [241366]   Admitting Physician: SHAWNEE SNYDER III [[id number]]   Attending Physician: SHAWNEE SNYDER III [[id number]]   Bed Request Comments: tele obs not cdu

## 2024-12-13 ENCOUNTER — READMISSION MANAGEMENT (OUTPATIENT)
Dept: CALL CENTER | Facility: HOSPITAL | Age: 79
End: 2024-12-13
Payer: MEDICARE

## 2024-12-13 LAB
ANION GAP SERPL CALCULATED.3IONS-SCNC: 13 MMOL/L (ref 5–15)
BASOPHILS # BLD AUTO: 0.01 10*3/MM3 (ref 0–0.2)
BASOPHILS NFR BLD AUTO: 0.1 % (ref 0–1.5)
BUN SERPL-MCNC: 33 MG/DL (ref 8–23)
BUN/CREAT SERPL: 35.5 (ref 7–25)
CALCIUM SPEC-SCNC: 8.5 MG/DL (ref 8.6–10.5)
CHLORIDE SERPL-SCNC: 104 MMOL/L (ref 98–107)
CO2 SERPL-SCNC: 26 MMOL/L (ref 22–29)
CREAT SERPL-MCNC: 0.93 MG/DL (ref 0.57–1)
DEPRECATED RDW RBC AUTO: 62.2 FL (ref 37–54)
EGFRCR SERPLBLD CKD-EPI 2021: 62.6 ML/MIN/1.73
EOSINOPHIL # BLD AUTO: 0.01 10*3/MM3 (ref 0–0.4)
EOSINOPHIL NFR BLD AUTO: 0.1 % (ref 0.3–6.2)
ERYTHROCYTE [DISTWIDTH] IN BLOOD BY AUTOMATED COUNT: 17.7 % (ref 12.3–15.4)
GLUCOSE BLDC GLUCOMTR-MCNC: 171 MG/DL (ref 70–130)
GLUCOSE BLDC GLUCOMTR-MCNC: 341 MG/DL (ref 70–130)
GLUCOSE BLDC GLUCOMTR-MCNC: 394 MG/DL (ref 70–130)
GLUCOSE BLDC GLUCOMTR-MCNC: 474 MG/DL (ref 70–130)
GLUCOSE BLDC GLUCOMTR-MCNC: 578 MG/DL (ref 70–130)
GLUCOSE BLDC GLUCOMTR-MCNC: 595 MG/DL (ref 70–130)
GLUCOSE SERPL-MCNC: 108 MG/DL (ref 65–99)
HCT VFR BLD AUTO: 30.7 % (ref 34–46.6)
HGB BLD-MCNC: 9.5 G/DL (ref 12–15.9)
IMM GRANULOCYTES # BLD AUTO: 0.07 10*3/MM3 (ref 0–0.05)
IMM GRANULOCYTES NFR BLD AUTO: 0.6 % (ref 0–0.5)
LYMPHOCYTES # BLD AUTO: 0.4 10*3/MM3 (ref 0.7–3.1)
LYMPHOCYTES NFR BLD AUTO: 3.7 % (ref 19.6–45.3)
MAGNESIUM SERPL-MCNC: 1.7 MG/DL (ref 1.6–2.4)
MCH RBC QN AUTO: 30.4 PG (ref 26.6–33)
MCHC RBC AUTO-ENTMCNC: 30.9 G/DL (ref 31.5–35.7)
MCV RBC AUTO: 98.1 FL (ref 79–97)
MONOCYTES # BLD AUTO: 0.5 10*3/MM3 (ref 0.1–0.9)
MONOCYTES NFR BLD AUTO: 4.6 % (ref 5–12)
NEUTROPHILS NFR BLD AUTO: 9.81 10*3/MM3 (ref 1.7–7)
NEUTROPHILS NFR BLD AUTO: 90.9 % (ref 42.7–76)
NRBC BLD AUTO-RTO: 0 /100 WBC (ref 0–0.2)
PLATELET # BLD AUTO: 120 10*3/MM3 (ref 140–450)
PMV BLD AUTO: 13 FL (ref 6–12)
POTASSIUM SERPL-SCNC: 3.5 MMOL/L (ref 3.5–5.2)
QT INTERVAL: 506 MS
QTC INTERVAL: 530 MS
RBC # BLD AUTO: 3.13 10*6/MM3 (ref 3.77–5.28)
SODIUM SERPL-SCNC: 143 MMOL/L (ref 136–145)
TROPONIN T SERPL HS-MCNC: 104 NG/L
WBC NRBC COR # BLD AUTO: 10.8 10*3/MM3 (ref 3.4–10.8)

## 2024-12-13 PROCEDURE — 83735 ASSAY OF MAGNESIUM: CPT | Performed by: NURSE PRACTITIONER

## 2024-12-13 PROCEDURE — 80048 BASIC METABOLIC PNL TOTAL CA: CPT | Performed by: NURSE PRACTITIONER

## 2024-12-13 PROCEDURE — 84484 ASSAY OF TROPONIN QUANT: CPT | Performed by: NURSE PRACTITIONER

## 2024-12-13 PROCEDURE — 94799 UNLISTED PULMONARY SVC/PX: CPT

## 2024-12-13 PROCEDURE — 94664 DEMO&/EVAL PT USE INHALER: CPT

## 2024-12-13 PROCEDURE — 63710000001 INSULIN LISPRO (HUMAN) PER 5 UNITS: Performed by: NURSE PRACTITIONER

## 2024-12-13 PROCEDURE — 82948 REAGENT STRIP/BLOOD GLUCOSE: CPT

## 2024-12-13 PROCEDURE — 97162 PT EVAL MOD COMPLEX 30 MIN: CPT

## 2024-12-13 PROCEDURE — 25010000002 FUROSEMIDE PER 20 MG: Performed by: INTERNAL MEDICINE

## 2024-12-13 PROCEDURE — 93005 ELECTROCARDIOGRAM TRACING: CPT | Performed by: NURSE PRACTITIONER

## 2024-12-13 PROCEDURE — 63710000001 INSULIN GLARGINE PER 5 UNITS: Performed by: INTERNAL MEDICINE

## 2024-12-13 PROCEDURE — 99232 SBSQ HOSP IP/OBS MODERATE 35: CPT | Performed by: INTERNAL MEDICINE

## 2024-12-13 PROCEDURE — 99222 1ST HOSP IP/OBS MODERATE 55: CPT | Performed by: INTERNAL MEDICINE

## 2024-12-13 PROCEDURE — 93010 ELECTROCARDIOGRAM REPORT: CPT | Performed by: INTERNAL MEDICINE

## 2024-12-13 PROCEDURE — 85025 COMPLETE CBC W/AUTO DIFF WBC: CPT | Performed by: NURSE PRACTITIONER

## 2024-12-13 PROCEDURE — 25010000002 METHYLPREDNISOLONE PER 40 MG: Performed by: NURSE PRACTITIONER

## 2024-12-13 RX ORDER — POLYETHYLENE GLYCOL 3350 17 G/17G
17 POWDER, FOR SOLUTION ORAL DAILY PRN
Status: DISCONTINUED | OUTPATIENT
Start: 2024-12-13 | End: 2024-12-15 | Stop reason: HOSPADM

## 2024-12-13 RX ORDER — MONTELUKAST SODIUM 10 MG/1
10 TABLET ORAL EVERY EVENING
Status: DISCONTINUED | OUTPATIENT
Start: 2024-12-13 | End: 2024-12-15 | Stop reason: HOSPADM

## 2024-12-13 RX ORDER — LEVOTHYROXINE SODIUM 75 UG/1
75 TABLET ORAL DAILY
Status: DISCONTINUED | OUTPATIENT
Start: 2024-12-13 | End: 2024-12-15 | Stop reason: HOSPADM

## 2024-12-13 RX ORDER — FLUTICASONE PROPIONATE 50 MCG
2 SPRAY, SUSPENSION (ML) NASAL DAILY
Status: DISCONTINUED | OUTPATIENT
Start: 2024-12-13 | End: 2024-12-15 | Stop reason: HOSPADM

## 2024-12-13 RX ORDER — INSULIN LISPRO 100 [IU]/ML
2-9 INJECTION, SOLUTION INTRAVENOUS; SUBCUTANEOUS
Status: DISCONTINUED | OUTPATIENT
Start: 2024-12-13 | End: 2024-12-15 | Stop reason: HOSPADM

## 2024-12-13 RX ORDER — TERAZOSIN 2 MG/1
2 CAPSULE ORAL NIGHTLY
Status: DISCONTINUED | OUTPATIENT
Start: 2024-12-13 | End: 2024-12-15 | Stop reason: HOSPADM

## 2024-12-13 RX ORDER — FUROSEMIDE 10 MG/ML
80 INJECTION INTRAMUSCULAR; INTRAVENOUS ONCE
Status: COMPLETED | OUTPATIENT
Start: 2024-12-13 | End: 2024-12-13

## 2024-12-13 RX ORDER — IPRATROPIUM BROMIDE AND ALBUTEROL SULFATE 2.5; .5 MG/3ML; MG/3ML
3 SOLUTION RESPIRATORY (INHALATION)
Status: DISCONTINUED | OUTPATIENT
Start: 2024-12-13 | End: 2024-12-15 | Stop reason: HOSPADM

## 2024-12-13 RX ORDER — NICOTINE POLACRILEX 4 MG
15 LOZENGE BUCCAL
Status: DISCONTINUED | OUTPATIENT
Start: 2024-12-13 | End: 2024-12-15 | Stop reason: HOSPADM

## 2024-12-13 RX ORDER — SODIUM CHLORIDE 0.9 % (FLUSH) 0.9 %
10 SYRINGE (ML) INJECTION AS NEEDED
Status: DISCONTINUED | OUTPATIENT
Start: 2024-12-13 | End: 2024-12-15 | Stop reason: HOSPADM

## 2024-12-13 RX ORDER — SODIUM CHLORIDE 9 MG/ML
40 INJECTION, SOLUTION INTRAVENOUS AS NEEDED
Status: DISCONTINUED | OUTPATIENT
Start: 2024-12-13 | End: 2024-12-15 | Stop reason: HOSPADM

## 2024-12-13 RX ORDER — NEBIVOLOL 10 MG/1
10 TABLET ORAL DAILY
Status: DISCONTINUED | OUTPATIENT
Start: 2024-12-13 | End: 2024-12-15 | Stop reason: HOSPADM

## 2024-12-13 RX ORDER — PRAVASTATIN SODIUM 20 MG
20 TABLET ORAL EVERY EVENING
Status: DISCONTINUED | OUTPATIENT
Start: 2024-12-13 | End: 2024-12-13

## 2024-12-13 RX ORDER — PREGABALIN 75 MG/1
75 CAPSULE ORAL 2 TIMES DAILY
Status: DISCONTINUED | OUTPATIENT
Start: 2024-12-13 | End: 2024-12-15 | Stop reason: HOSPADM

## 2024-12-13 RX ORDER — BISACODYL 5 MG/1
5 TABLET, DELAYED RELEASE ORAL DAILY PRN
Status: DISCONTINUED | OUTPATIENT
Start: 2024-12-13 | End: 2024-12-15 | Stop reason: HOSPADM

## 2024-12-13 RX ORDER — BISACODYL 10 MG
10 SUPPOSITORY, RECTAL RECTAL DAILY PRN
Status: DISCONTINUED | OUTPATIENT
Start: 2024-12-13 | End: 2024-12-15 | Stop reason: HOSPADM

## 2024-12-13 RX ORDER — MONTELUKAST SODIUM 10 MG/1
10 TABLET ORAL EVERY EVENING
Status: DISCONTINUED | OUTPATIENT
Start: 2024-12-13 | End: 2024-12-13

## 2024-12-13 RX ORDER — IBUPROFEN 600 MG/1
1 TABLET ORAL
Status: DISCONTINUED | OUTPATIENT
Start: 2024-12-13 | End: 2024-12-15 | Stop reason: HOSPADM

## 2024-12-13 RX ORDER — SODIUM CHLORIDE 0.9 % (FLUSH) 0.9 %
10 SYRINGE (ML) INJECTION EVERY 12 HOURS SCHEDULED
Status: DISCONTINUED | OUTPATIENT
Start: 2024-12-13 | End: 2024-12-15 | Stop reason: HOSPADM

## 2024-12-13 RX ORDER — AMOXICILLIN 250 MG
2 CAPSULE ORAL 2 TIMES DAILY PRN
Status: DISCONTINUED | OUTPATIENT
Start: 2024-12-13 | End: 2024-12-15 | Stop reason: HOSPADM

## 2024-12-13 RX ORDER — SPIRONOLACTONE 25 MG/1
25 TABLET ORAL DAILY
Status: DISCONTINUED | OUTPATIENT
Start: 2024-12-13 | End: 2024-12-15 | Stop reason: HOSPADM

## 2024-12-13 RX ORDER — PRAVASTATIN SODIUM 20 MG
20 TABLET ORAL EVERY EVENING
Status: DISCONTINUED | OUTPATIENT
Start: 2024-12-13 | End: 2024-12-15 | Stop reason: HOSPADM

## 2024-12-13 RX ORDER — ACETAMINOPHEN 325 MG/1
650 TABLET ORAL EVERY 4 HOURS PRN
Status: DISCONTINUED | OUTPATIENT
Start: 2024-12-13 | End: 2024-12-15 | Stop reason: HOSPADM

## 2024-12-13 RX ORDER — DEXTROSE MONOHYDRATE 25 G/50ML
25 INJECTION, SOLUTION INTRAVENOUS
Status: DISCONTINUED | OUTPATIENT
Start: 2024-12-13 | End: 2024-12-15 | Stop reason: HOSPADM

## 2024-12-13 RX ADMIN — PRAVASTATIN SODIUM 20 MG: 20 TABLET ORAL at 20:40

## 2024-12-13 RX ADMIN — PRAVASTATIN SODIUM 20 MG: 20 TABLET ORAL at 00:44

## 2024-12-13 RX ADMIN — APIXABAN 2.5 MG: 2.5 TABLET, FILM COATED ORAL at 08:30

## 2024-12-13 RX ADMIN — MONTELUKAST 10 MG: 10 TABLET, FILM COATED ORAL at 20:40

## 2024-12-13 RX ADMIN — PREGABALIN 75 MG: 75 CAPSULE ORAL at 20:29

## 2024-12-13 RX ADMIN — Medication 10 ML: at 00:45

## 2024-12-13 RX ADMIN — IPRATROPIUM BROMIDE AND ALBUTEROL SULFATE 3 ML: 2.5; .5 SOLUTION RESPIRATORY (INHALATION) at 17:01

## 2024-12-13 RX ADMIN — SPIRONOLACTONE 25 MG: 25 TABLET ORAL at 10:35

## 2024-12-13 RX ADMIN — IPRATROPIUM BROMIDE AND ALBUTEROL SULFATE 3 ML: 2.5; .5 SOLUTION RESPIRATORY (INHALATION) at 19:17

## 2024-12-13 RX ADMIN — TERAZOSIN HYDROCHLORIDE 2 MG: 2 CAPSULE ORAL at 20:40

## 2024-12-13 RX ADMIN — APIXABAN 2.5 MG: 2.5 TABLET, FILM COATED ORAL at 20:29

## 2024-12-13 RX ADMIN — IPRATROPIUM BROMIDE AND ALBUTEROL SULFATE 3 ML: 2.5; .5 SOLUTION RESPIRATORY (INHALATION) at 07:03

## 2024-12-13 RX ADMIN — INSULIN LISPRO 2 UNITS: 100 INJECTION, SOLUTION INTRAVENOUS; SUBCUTANEOUS at 08:19

## 2024-12-13 RX ADMIN — TERAZOSIN HYDROCHLORIDE 2 MG: 2 CAPSULE ORAL at 00:44

## 2024-12-13 RX ADMIN — SACUBITRIL AND VALSARTAN 1 TABLET: 97; 103 TABLET, FILM COATED ORAL at 03:41

## 2024-12-13 RX ADMIN — SACUBITRIL AND VALSARTAN 1 TABLET: 97; 103 TABLET, FILM COATED ORAL at 20:29

## 2024-12-13 RX ADMIN — METHYLPREDNISOLONE SODIUM SUCCINATE 40 MG: 40 INJECTION, POWDER, FOR SOLUTION INTRAMUSCULAR; INTRAVENOUS at 20:29

## 2024-12-13 RX ADMIN — FLUTICASONE PROPIONATE 2 SPRAY: 50 SPRAY, METERED NASAL at 10:17

## 2024-12-13 RX ADMIN — INSULIN LISPRO 9 UNITS: 100 INJECTION, SOLUTION INTRAVENOUS; SUBCUTANEOUS at 17:47

## 2024-12-13 RX ADMIN — IPRATROPIUM BROMIDE AND ALBUTEROL SULFATE 3 ML: 2.5; .5 SOLUTION RESPIRATORY (INHALATION) at 11:26

## 2024-12-13 RX ADMIN — INSULIN LISPRO 8 UNITS: 100 INJECTION, SOLUTION INTRAVENOUS; SUBCUTANEOUS at 11:39

## 2024-12-13 RX ADMIN — METHYLPREDNISOLONE SODIUM SUCCINATE 40 MG: 40 INJECTION, POWDER, FOR SOLUTION INTRAMUSCULAR; INTRAVENOUS at 08:19

## 2024-12-13 RX ADMIN — FUROSEMIDE 80 MG: 10 INJECTION, SOLUTION INTRAMUSCULAR; INTRAVENOUS at 10:34

## 2024-12-13 RX ADMIN — APIXABAN 2.5 MG: 2.5 TABLET, FILM COATED ORAL at 00:44

## 2024-12-13 RX ADMIN — PREGABALIN 75 MG: 75 CAPSULE ORAL at 08:29

## 2024-12-13 RX ADMIN — METHYLPREDNISOLONE SODIUM SUCCINATE 40 MG: 40 INJECTION, POWDER, FOR SOLUTION INTRAMUSCULAR; INTRAVENOUS at 00:06

## 2024-12-13 RX ADMIN — MONTELUKAST 10 MG: 10 TABLET, FILM COATED ORAL at 00:44

## 2024-12-13 RX ADMIN — Medication 10 ML: at 20:31

## 2024-12-13 RX ADMIN — INSULIN GLARGINE 10 UNITS: 100 INJECTION, SOLUTION SUBCUTANEOUS at 15:05

## 2024-12-13 RX ADMIN — INSULIN LISPRO 9 UNITS: 100 INJECTION, SOLUTION INTRAVENOUS; SUBCUTANEOUS at 20:29

## 2024-12-13 RX ADMIN — PREGABALIN 75 MG: 75 CAPSULE ORAL at 00:44

## 2024-12-13 RX ADMIN — SACUBITRIL AND VALSARTAN 1 TABLET: 97; 103 TABLET, FILM COATED ORAL at 08:30

## 2024-12-13 RX ADMIN — LEVOTHYROXINE SODIUM 75 MCG: 0.07 TABLET ORAL at 05:56

## 2024-12-13 NOTE — ED PROVIDER NOTES
[facility]    EMERGENCY DEPARTMENT ENCOUNTER      Pt Name: Marilyn Kunz  MRN: [id number]  YOB: 1945  Date of evaluation: 12/12/2024  Provider: Valentin Beebe MD    CHIEF COMPLAINT       Chief Complaint   Patient presents with    Shortness of Breath         HISTORY OF PRESENT ILLNESS   Marilyn Kunz is a 79 y.o. female who presents to the emergency department for evaluation of shortness of breath.  She indicates that the symptoms started today.  She has some pressure across her bilateral chest.  She has a mild cough.  No fevers or chills that she is aware of.  No substantial swelling of her upper or lower extremities.  She reports she was recently hospitalized for treatment of urinary tract infection.  She also reports she has a fracture of her left ankle.  She is uncertain about her past medical history    REVIEW OF SYSTEMS     ROS:  A chief complaint appropriate review of systems was completed and is negative except as noted in the HPI.      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  I asked her to go ahead and try to talk to her endocrinokogist.  She is still  having the lower extremity neuropathic pan, but not enough for me to administer medication.    Disease of thyroid gland     Diverticulitis of colon     Erosive osteoarthritis 05/30/2024    Factor 5 Leiden mutation, heterozygous 2012    Head injury     Hyperlipidemia     Hypertension     Medication monitoring encounter     Impression:  She has renal insufficiency.  She is just taking Tylenol.    Movement disorder 10/2021    knee replacement left knww    Murmur, cardiac     Neuropathy in diabetes     redness swelling legs    TIFFANIE (obstructive sleep apnea) 11/15/2024    Osteopenia     Story: Femoral Neck Impression: Up-ro-date on bone density scan.  Follow up bone density every 2 years.    Peripheral neuropathy 05/03/2022    hands, arms. 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  I went over her exercises and told her to get a shoe insert,    Psoriasis     Impression: No rash    Psoriatic arthritis     hands    Sleep apnea Always    diabetic do not sleep well up and down    Stroke had one don't know when    showed on MRI Brain    Syncope 11/15/2024    Type 1 diabetes          SURGICAL HISTORY       Past Surgical History:   Procedure Laterality Date    CARDIAC CATHETERIZATION  1952 2 of them    Patent Dictus operation    CATARACT EXTRACTION Bilateral     COLONOSCOPY      ENDOSCOPY N/A 10/20/2020    Procedure: ESOPHAGOGASTRODUODENOSCOPY;  Surgeon: Brunner, Mark I, MD;  Location: [facility] END[facility]COPY;  Service: Gastroenterology;  Laterality: N/A;    HAND SURGERY Left 1987    no hardware    KNEE ARTHROPLASTY      KNEE SURGERY Left     PATENT DUCTUS ARTERIOUS LIGATION      REPLACEMENT TOTAL KNEE Left          CURRENT MEDICATIONS       Current Facility-Administered Medications:     methylPREDNISolone sodium succinate (SOLU-Medrol) injection 40 mg, 40 mg, Intravenous, Q12H, Marilu Luque, APRN    sodium chloride 0.9 % flush 10 mL, 10 mL, Intravenous, PRN, Con[place],  MD Valentin    Current Outpatient Medications:     acetaminophen (TYLENOL) 500 MG tablet, Take 2 tablets by mouth. every 4 - 6 hours as needed not to exceed 8 tablets per 24hrs, Disp: , Rfl:     apixaban (ELIQUIS) 2.5 MG tablet tablet, Take 1 tablet by mouth Every 12 (Twelve) Hours for 30 days. Indications: Atrial Fibrillation, Disp: 60 tablet, Rfl: 0    azaTHIOprine (IMURAN) 50 MG tablet, TAKE 2 TABLETS TWICE DAILY, Disp: 360 tablet, Rfl: 3    bumetanide (BUMEX) 0.5 MG tablet, Take 1 tablet by mouth Every Other Day., Disp: , Rfl:     Droplet Pen Needles 32G X 4 MM misc, , Disp: , Rfl:     fluticasone (FLONASE) 50 MCG/ACT nasal spray, 2 sprays by Each Nare route Daily. Shake well before using., Disp: , Rfl:     insulin aspart (novoLOG FLEXPEN) 100 UNIT/ML solution pen-injector sc pen, Inject  under the skin into the appropriate area as directed 3 (Three) Times a Day With Meals. Sliding scale, Disp: , Rfl:     insulin NPH (humuLIN N,novoLIN N) 100 UNIT/ML injection, Inject  under the skin into the appropriate area as directed., Disp: , Rfl:     levocetirizine (XYZAL) 5 MG tablet, Take 1 tablet by mouth Every Evening., Disp: , Rfl:     levothyroxine (SYNTHROID, LEVOTHROID) 75 MCG tablet, Take 1 tablet by mouth Daily., Disp: , Rfl:     montelukast (SINGULAIR) 10 MG tablet, Take 1 tablet by mouth Every Evening., Disp: , Rfl:     nebivolol (BYSTOLIC) 10 MG tablet, Take 1 tablet by mouth Daily for 30 days., Disp: 30 tablet, Rfl: 0    nystatin (MYCOSTATIN) 429950 UNIT/GM powder, Apply  topically to the appropriate area as directed 3 (Three) Times a Day., Disp: 60 g, Rfl: 0    oxybutynin (DITROPAN) 5 MG tablet, Take 1 tablet by mouth 2 (Two) Times a Day., Disp: , Rfl:     pravastatin (PRAVACHOL) 20 MG tablet, TAKE 1 TABLET EVERY EVENING, Disp: 30 tablet, Rfl: 0    pregabalin (LYRICA) 75 MG capsule, Take 1 capsule by mouth 2 (Two) Times a Day., Disp: , Rfl:     sacubitril-valsartan (Entresto)  MG tablet, TAKE 1 TABLET BY  MOUTH TWICE DAILY, Disp: 180 tablet, Rfl: 1    terazosin (HYTRIN) 2 MG capsule, Take 1 capsule by mouth Every Night., Disp: 30 capsule, Rfl: 6    Tresiba FlexTouch 100 UNIT/ML solution pen-injector injection, Inject 19 Units under the skin into the appropriate area as directed Every Night. (Patient taking differently: Patient takes per sliding scale), Disp: , Rfl:     Upadacitinib ER (Rinvoq) 15 MG tablet sustained-release 24 hour, Take 1 tablet by mouth Daily., Disp: 90 tablet, Rfl: 3    ALLERGIES     Atorvastatin, Colesevelam, Cymbalta [duloxetine hcl], Famotidine, Cephalexin, Clindamycin, Hygroton [chlorthalidone], Lidocaine, and Penicillins    FAMILY HISTORY       Family History   Problem Relation Age of Onset    Cancer Mother     Pancreatic cancer Mother     Stroke Mother         Mini strokes    Cancer Father     Lung cancer Father     Cancer Sister     Colon cancer Sister     Diabetes Sister     Diabetes Brother     Breast cancer Neg Hx     Ovarian cancer Neg Hx           SOCIAL HISTORY       Social History     Socioeconomic History    Marital status: Single   Tobacco Use    Smoking status: Never     Passive exposure: Never    Smokeless tobacco: Never   Vaping Use    Vaping status: Never Used   Substance and Sexual Activity    Alcohol use: No    Drug use: No    Sexual activity: Not Currently     Partners: Male     Birth control/protection: Abstinence         PHYSICAL EXAM    (up to 7 for level 4, 8 or more for level 5)     Vitals:    12/12/24 2233 12/12/24 2242 12/12/24 2243 12/12/24 2300   BP: (!) 198/86   (!) 194/81   Pulse: 76 81 84 82   Resp:   22    Temp:       TempSrc:       SpO2: 92% 92% 93% 94%   Weight:       Height:           Physical Exam  Constitutional:       Appearance: She is ill-appearing.   HENT:      Head: Normocephalic and atraumatic.   Eyes:      Conjunctiva/sclera: Conjunctivae normal.      Pupils: Pupils are equal, round, and reactive to light.   Cardiovascular:      Rate and Rhythm:  Normal rate and regular rhythm.      Pulses: Normal pulses.      Heart sounds: No murmur heard.     No gallop.   Pulmonary:      Comments: Mildly increased work of breathing.  Crackles throughout, some expiratory wheezing  Abdominal:      General: Abdomen is flat. There is no distension.      Tenderness: There is no abdominal tenderness.   Musculoskeletal:         General: No swelling or deformity. Normal range of motion.      Right lower leg: No edema.      Left lower leg: No edema.      Comments: Left lower extremity in a boot.  There is no substantial swelling of the left lower extremity compared with the right   Skin:     General: Skin is warm and dry.      Capillary Refill: Capillary refill takes less than 2 seconds.   Neurological:      General: No focal deficit present.      Mental Status: She is alert and oriented to person, place, and time.   Psychiatric:         Mood and Affect: Mood normal.         Behavior: Behavior normal.            DIAGNOSTIC RESULTS     EKG: All EKGs are interpreted by the Emergency Department Physician who either signs or Co-signs this chart in the absence of a cardiologist.    ECG 12 Lead ED Triage Standing Order; SOA   Final Result   Test Reason : ED Triage Standing Order~   Blood Pressure :   */*   mmHG   Vent. Rate :  71 BPM     Atrial Rate :  75 BPM      P-R Int :   * ms          QRS Dur :  78 ms       QT Int : 428 ms       P-R-T Axes :  69  62  16 degrees     QTcB Int : 465 ms      Sinus rhythm with occasional premature ventricular complexes   Nonspecific ST abnormality   Abnormal ECG   When compared with ECG of 16-Nov-2024 05:12,   Sinus rhythm has replaced Atrial flutter   Nonspecific T wave abnormality has replaced inverted T waves in Inferior   leads   Nonspecific T wave abnormality no longer evident in Anterolateral leads   Confirmed by MD ARRINGTON KYLE ([id number]) on 12/12/2024 9:17:25 PM      Referred By: EDMD           Confirmed By: ROXY ARRINGTON MD            RADIOLOGY:    [x] Radiologist's Report Reviewed:  CT Angiogram Chest Pulmonary Embolism   Final Result   Impression:   1.No pulmonary embolus.   2.Extensive interstitial and groundglass opacities throughout each lung with small layering bilateral pleural effusions, right greater than left. The findings are most compatible with evolving edema/CHF            Electronically Signed: Samuel France DO     12/12/2024 10:07 PM EST     Workstation ID: [id number]      XR Chest 1 View   Final Result   Impression: Vascular congestion..         Electronically Signed: Charly Dash MD     12/12/2024 9:36 PM EST     Workstation ID: [id number]          I ordered and independently reviewed the above noted radiographic studies.        LABS:  I independently interpreted all laboratory studies conducted during this ED visit.  The results of these studies can be seen below and my independent interpretation in the ED course      EMERGENCY DEPARTMENT COURSE and DIFFERENTIAL DIAGNOSIS/MDM:   Vitals:  AS OF 23:58 EST    BP - (!) 194/81  HR - 82  TEMP - 98.3 °F (36.8 °C) (Oral)  O2 SATS - 94%        Discussion below represents my analysis of pertinent findings related to patient's condition, differential diagnosis, treatment plan and final disposition.      Differential diagnosis:  The differential diagnosis associated with the patient's presentation includes: Pulmonary embolism, pneumonia, CHF or COPD exacerbation, viral respiratory infection      Independent interpretations (ECG/rhythm strip/X-ray/US/CT scan): See ED course      Additional sources:  Discussed/obtained information from independent historians:   [] Spouse:   [] Parent:   [] Friend:   [x] EMS: Prehospital course by EMS   [] Other:    External record review:  11/25/2024 reviewed most recent discharge summary, patient was hospitalized for syncope, hyperglycemia, urinary tract infection with sepsis      Patient's care impacted by:   [x] Diabetes   [x] Hypertension   [] Coronary Artery  Disease   [] Cancer   [x] Other: COPD, CHF    Care significantly affected by Social Determinants of Health (housing and economic circumstances, unemployment)    [] Yes     [x] No   If yes, Patient's care significantly limited by  Social Determinants of Health including:    [] Inadequate housing    [] Low income    [] Alcoholism and drug addiction in family    [] Problems related to primary support group    [] Unemployment    [] Problems related to employment    [] Other Social Determinants of Health:     I considered prescription management with:    [] Pain medication:   [] Antiviral:   [] Antibiotic:   [] Other:    Additional orders considered but not ordered:  The following testing was considered but ultimately not selected: N/A    ED Course:    ED Course as of 12/12/24 2358   Thu Dec 12, 2024   2107 Twelve-lead ECG independently interpreted by myself demonstrates normal sinus rhythm, PVC noted, it appears to be a sinus rhythm though there is some baseline artifact limiting interpretation.  There is no ST segment elevation or depression.  There is a normal axis. [KB]   2206 Chest x-ray independently interpreted by myself along with CTA of the chest which was independently interpreted by myself demonstrate pulmonary edema, pleural effusions, no pulmonary embolism [KB]   2206 Laboratory workup independently interpreted by myself is notable for a substantially elevated proBNP, hypercarbia and hypoxemia on ABG [KB]      ED Course User Index  [KB] Valentin Beebe MD         Diagnostic lab and imaging studies were conducted in the emergency room.  Patient was given DuoNeb on arrival.    With identification of substantially elevated proBNP, pulmonary edema and effusions on imaging patient was started on diuresis 80 mg IV Lasix.  I believe the primary diagnosis is volume overload and congestive heart failure exacerbation with secondary COPD exacerbation.  Hypoxia she will require admission..  When she was reassessed she is  substantially improved with no increased work of breathing, normal respiratory rate.      PROCEDURES:  Procedures    CRITICAL CARE TIME        CONSULTS   Hospital medicine consulted for admission    FINAL IMPRESSION      1. Acute respiratory failure with hypoxia    2. Acute on chronic congestive heart failure, unspecified heart failure type    3. Acute pulmonary edema    4. Bilateral pleural effusion          DISPOSITION/PLAN     ED Disposition       ED Disposition   Decision to Admit    Condition   --    Comment   Level of Care: Telemetry [5]   Diagnosis: Acute exacerbation of CHF (congestive heart failure) [546792]   Admitting Physician: SHAWNEE SNYDER III [[id number]]   Attending Physician: SHAWNEE SNYDER III [[id number]]   Is patient appropriate for Inpatient Observation Unit?: No [0]   Bed Request Comments: tele obs not cdu                   Comment: Please note this report has been produced using speech recognition software.      Valentin Beebe MD  Attending Emergency Physician    Recent Results (from the past 24 hours)   ECG 12 Lead ED Triage Standing Order; SOA    Collection Time: 12/12/24  9:03 PM   Result Value Ref Range    QT Interval 428 ms    QTC Interval 465 ms   COVID-19 and FLU A/B PCR, 1 HR TAT - Swab, Nasopharynx    Collection Time: 12/12/24  9:19 PM    Specimen: Nasopharynx; Swab   Result Value Ref Range    COVID19 Not Detected Not Detected - Ref. Range    Influenza A PCR Not Detected Not Detected    Influenza B PCR Not Detected Not Detected   Comprehensive Metabolic Panel    Collection Time: 12/12/24  9:20 PM    Specimen: Blood   Result Value Ref Range    Glucose 81 65 - 99 mg/dL    BUN 29 (H) 8 - 23 mg/dL    Creatinine 0.87 0.57 - 1.00 mg/dL    Sodium 148 (H) 136 - 145 mmol/L    Potassium 3.7 3.5 - 5.2 mmol/L    Chloride 109 (H) 98 - 107 mmol/L    CO2 28.0 22.0 - 29.0 mmol/L    Calcium 9.0 8.6 - 10.5 mg/dL    Total Protein 6.8 6.0 - 8.5 g/dL    Albumin 4.0 3.5 - 5.2 g/dL    ALT (SGPT)  24 1 - 33 U/L    AST (SGOT) 30 1 - 32 U/L    Alkaline Phosphatase 87 39 - 117 U/L    Total Bilirubin 0.8 0.0 - 1.2 mg/dL    Globulin 2.8 gm/dL    A/G Ratio 1.4 g/dL    BUN/Creatinine Ratio 33.3 (H) 7.0 - 25.0    Anion Gap 11.0 5.0 - 15.0 mmol/L    eGFR 67.9 >60.0 mL/min/1.73   BNP    Collection Time: 12/12/24  9:20 PM    Specimen: Blood   Result Value Ref Range    proBNP 7,086.0 (H) 0.0 - 1,800.0 pg/mL   High Sensitivity Troponin T    Collection Time: 12/12/24  9:20 PM    Specimen: Blood   Result Value Ref Range    HS Troponin T 47 (H) <14 ng/L   Green Top (Gel)    Collection Time: 12/12/24  9:20 PM   Result Value Ref Range    Extra Tube Hold for add-ons.    Lavender Top    Collection Time: 12/12/24  9:20 PM   Result Value Ref Range    Extra Tube hold for add-on    Gold Top - SST    Collection Time: 12/12/24  9:20 PM   Result Value Ref Range    Extra Tube Hold for add-ons.    Gray Top    Collection Time: 12/12/24  9:20 PM   Result Value Ref Range    Extra Tube Hold for add-ons.    Light Blue Top    Collection Time: 12/12/24  9:20 PM   Result Value Ref Range    Extra Tube Hold for add-ons.    CBC Auto Differential    Collection Time: 12/12/24  9:20 PM    Specimen: Blood   Result Value Ref Range    WBC 11.25 (H) 3.40 - 10.80 10*3/mm3    RBC 3.58 (L) 3.77 - 5.28 10*6/mm3    Hemoglobin 10.8 (L) 12.0 - 15.9 g/dL    Hematocrit 34.9 34.0 - 46.6 %    MCV 97.5 (H) 79.0 - 97.0 fL    MCH 30.2 26.6 - 33.0 pg    MCHC 30.9 (L) 31.5 - 35.7 g/dL    RDW 17.7 (H) 12.3 - 15.4 %    RDW-SD 62.5 (H) 37.0 - 54.0 fl    MPV 12.8 (H) 6.0 - 12.0 fL    Platelets 144 140 - 450 10*3/mm3    Neutrophil % 76.7 (H) 42.7 - 76.0 %    Lymphocyte % 12.6 (L) 19.6 - 45.3 %    Monocyte % 6.7 5.0 - 12.0 %    Eosinophil % 3.2 0.3 - 6.2 %    Basophil % 0.4 0.0 - 1.5 %    Immature Grans % 0.4 0.0 - 0.5 %    Neutrophils, Absolute 8.62 (H) 1.70 - 7.00 10*3/mm3    Lymphocytes, Absolute 1.42 0.70 - 3.10 10*3/mm3    Monocytes, Absolute 0.75 0.10 - 0.90 10*3/mm3     Eosinophils, Absolute 0.36 0.00 - 0.40 10*3/mm3    Basophils, Absolute 0.05 0.00 - 0.20 10*3/mm3    Immature Grans, Absolute 0.05 0.00 - 0.05 10*3/mm3    nRBC 0.0 0.0 - 0.2 /100 WBC   Blood Gas, Arterial With Co-Ox    Collection Time: 12/12/24  9:38 PM    Specimen: Arterial Blood   Result Value Ref Range    Site Right Brachial     Kwame's Test N/A     pH, Arterial 7.395 7.350 - 7.450 pH units    pCO2, Arterial 46.4 (H) 35.0 - 45.0 mm Hg    pO2, Arterial 68.4 (L) 83.0 - 108.0 mm Hg    HCO3, Arterial 28.4 (H) 20.0 - 26.0 mmol/L    Base Excess, Arterial 2.9 (H) 0.0 - 2.0 mmol/L    Hemoglobin, Blood Gas 10.8 (L) 14 - 18 g/dL    Hematocrit, Blood Gas 33.2 (L) 38.0 - 51.0 %    Oxyhemoglobin 91.2 (L) 94 - 99 %    Methemoglobin 0.30 0.00 - 1.50 %    Carboxyhemoglobin 0.7 0 - 2 %    CO2 Content 29.8 22 - 33 mmol/L    Temperature 37.0     Barometric Pressure for Blood Gas      Modality Nasal Cannula     FIO2 44 %    Ventilator Mode      Rate 0 Breaths/minute    PIP 0 cmH2O    IPAP 0     EPAP 0     pH, Temp Corrected 7.395 pH Units    pCO2, Temperature Corrected 46.4 (H) 35 - 45 mm Hg    pO2, Temperature Corrected 68.4 (L) 83 - 108 mm Hg   High Sensitivity Troponin T 1Hr    Collection Time: 12/12/24 10:31 PM    Specimen: Blood   Result Value Ref Range    HS Troponin T 59 (C) <14 ng/L    Troponin T Delta 12 ng/L    Troponin T % Change 26 (C) Abnormal if >/= 20% %     Note: In addition to lab results from this visit, the labs listed above may include labs taken at another facility or during a different encounter within the last 24 hours. Please correlate lab times with ED admission and discharge times for further clarification of the services performed during this visit.                 Valentin Beebe MD  12/12/24 1113

## 2024-12-13 NOTE — PROGRESS NOTES
"    [facility] Medicine Services  PROGRESS NOTE    Patient Name: Marilyn Kunz  : 1945  MRN: [id number]    Date of Admission: 2024  Primary Care Physician: Peter Baca MD    Subjective   Subjective     CC:  SOA    HPI:  \"I feel a lot better than I was.\"        Objective   Objective     Vital Signs:   Temp:  [98.1 °F (36.7 °C)-98.3 °F (36.8 °C)] 98.1 °F (36.7 °C)  Heart Rate:  [66-87] 74  Resp:  [16-30] 17  BP: (120-205)/(45-88) 142/51  Flow (L/min) (Oxygen Therapy):  [2-15] 2     Physical Exam:  Constitutional: No acute distress, awake, alert  HENT: NCAT, mucous membranes moist  Respiratory: bibasilar crackles, respiratory effort normal on 4LNC  Cardiovascular: RRR, no murmurs, rubs, or gallops  Gastrointestinal: Positive bowel sounds, soft, nontender, nondistended  Musculoskeletal: 1+ pitting LE edema  Psychiatric: Appropriate affect, cooperative  Neurologic: Oriented x 3, GUNN, speech clear  Skin: No rashes      Results Reviewed:  LAB RESULTS:      Lab 24   WBC 10.80  --  11.25*   HEMOGLOBIN 9.5*  --  10.8*   HEMATOCRIT 30.7*  --  34.9   PLATELETS 120*  --  144   NEUTROS ABS 9.81*  --  8.62*   IMMATURE GRANS (ABS) 0.07*  --  0.05   LYMPHS ABS 0.40*  --  1.42   MONOS ABS 0.50  --  0.75   EOS ABS 0.01  --  0.36   MCV 98.1*  --  97.5*   HSTROP T 104* 59* 47*         Lab 24   SODIUM 143 148*   POTASSIUM 3.5 3.7   CHLORIDE 104 109*   CO2 26.0 28.0   ANION GAP 13.0 11.0   BUN 33* 29*   CREATININE 0.93 0.87   EGFR 62.6 67.9   GLUCOSE 108* 81   CALCIUM 8.5* 9.0   MAGNESIUM 1.7  --          Lab 24   TOTAL PROTEIN 6.8   ALBUMIN 4.0   GLOBULIN 2.8   ALT (SGPT) 24   AST (SGOT) 30   BILIRUBIN 0.8   ALK PHOS 87         Lab 24  0236 24  22324   PROBNP  --   --  7,086.0*   HSTROP T 104* 59* 47*                 Lab 24   PH, ARTERIAL 7.395   PCO2, ARTERIAL 46.4* "   PO2 ART 68.4*   FIO2 44   HCO3 ART 28.4*   BASE EXCESS ART 2.9*   CARBOXYHEMOGLOBIN 0.7     Brief Urine Lab Results  (Last result in the past 365 days)        Color   Clarity   Blood   Leuk Est   Nitrite   Protein   CREAT   Urine HCG        11/15/24 1739 Dark Yellow   Cloudy   Negative   Moderate (2+)   Negative   30 mg/dL (1+)                   Microbiology Results Abnormal       None            CT Angiogram Chest Pulmonary Embolism    Result Date: 12/12/2024  CT ANGIOGRAM CHEST PULMONARY EMBOLISM Date of Exam: 12/12/2024 9:41 PM EST Indication: chest pain, short of breath. Comparison: Chest radiograph from 12/12/2024 Technique: Axial CT images were obtained of the chest after the uneventful intravenous administration of 65 mL Isovue-370 utilizing pulmonary embolism protocol.  Reconstructed coronal and sagittal images were also obtained. Automated exposure control and  iterative construction methods were used. Findings: Pulmonary arteries are normal in caliber and well opacified without pulmonary embolus.  The thoracic aorta is normal in course and caliber. No pericardial effusions. There are extensive interstitial and groundglass opacities throughout each lung as well as small layering bilateral pleural effusions, right greater than left. There is loculated fluid within the left major fissure. The tracheobronchial tree is patent. No endobronchial lesions are identified. No abnormal bronchial wall thickening. No pneumothorax. No significant thoracic or axillary adenopathy. Limited images of the upper abdomen demonstrate no acute or clinically significant abnormality. No acute bony abnormality or aggressive focal osseous lesions.     Impression: Impression: 1.No pulmonary embolus. 2.Extensive interstitial and groundglass opacities throughout each lung with small layering bilateral pleural effusions, right greater than left. The findings are most compatible with evolving edema/CHF Electronically Signed: Samuel  DO Román  12/12/2024 10:07 PM EST  Workstation ID: [id number]    XR Chest 1 View    Result Date: 12/12/2024  XR CHEST 1 VW Date of Exam: 12/12/2024 9:10 PM EST Indication: SOA triage protocol Comparison: None available. Findings: No focal consolidation. Vascular congestion. No pneumothorax or pleural effusion. Cardiac size is normal. The visualized clavicles appear intact. No displaced rib fractures. The visualized upper abdomen is normal.     Impression: Impression: Vascular congestion.. Electronically Signed: Charly Dash MD  12/12/2024 9:36 PM EST  Workstation ID: [id number]     Results for orders placed during the hospital encounter of 11/15/24    Adult Transthoracic Echo Complete W/ Cont if Necessary Per Protocol    Interpretation Summary    Left ventricular systolic function is normal. Left ventricular ejection fraction appears to be 66 - 70%.    Left ventricular diastolic function was normal.    Left atrial volume is mildly increased.    Estimated right ventricular systolic pressure from tricuspid regurgitation is normal (<35 mmHg).    No significant change compared to 2023 echo      Current medications:  Scheduled Meds:apixaban, 2.5 mg, Oral, Q12H  fluticasone, 2 spray, Each Nare, Daily  insulin glargine, 10 Units, Subcutaneous, Daily  insulin lispro, 2-9 Units, Subcutaneous, 4x Daily AC & at Bedtime  ipratropium-albuterol, 3 mL, Nebulization, 4x Daily - RT  levothyroxine, 75 mcg, Oral, Daily  methylPREDNISolone sodium succinate, 40 mg, Intravenous, Q12H  montelukast, 10 mg, Oral, Q PM  nebivolol, 10 mg, Oral, Daily  [START ON 12/14/2024] pharmacy consult - MTM, , Not Applicable, Daily  pravastatin, 20 mg, Oral, Q PM  pregabalin, 75 mg, Oral, BID  sacubitril-valsartan, 1 tablet, Oral, BID  sodium chloride, 10 mL, Intravenous, Q12H  spironolactone, 25 mg, Oral, Daily  terazosin, 2 mg, Oral, Nightly      Continuous Infusions:   PRN Meds:.  acetaminophen    senna-docusate sodium **AND** polyethylene glycol  **AND** bisacodyl **AND** bisacodyl    dextrose    dextrose    glucagon (human recombinant)    sodium chloride    sodium chloride    sodium chloride    Assessment & Plan   Assessment & Plan     Active Hospital Problems    Diagnosis  POA    **Acute on chronic heart failure with preserved ejection fraction [I50.33]  Yes    Acute exacerbation of CHF (congestive heart failure) [I50.9]  Yes    COPD (chronic obstructive pulmonary disease) [J44.9]  Yes    Closed fracture of left foot [S92.902A]  Yes    TIFFANIE (obstructive sleep apnea) [G47.33]  Yes    Type 1 diabetes mellitus with hyperglycemia [E10.65]  Yes    History of stroke [Z86.73]  Not Applicable    Psoriatic arthritis [L40.50]  Yes    Essential hypertension [I10]  Yes    CKD (chronic kidney disease) stage 3, GFR 30-59 ml/min [N18.30]  Yes    Hyperlipidemia LDL goal <70 [E78.5]  Yes    Hypothyroidism [E03.9]  Yes      Resolved Hospital Problems   No resolved problems to display.        Brief Hospital Course to date:  Marilyn Kunz is a 79 y.o. female   with PMH significant for A-fib on Eliquis, HFpEF, CKD 3, TIFFANIE on CPAP, anemia, T1DM, demyelinating disease, factor V Leiden, CVA, COPD, who presents to the ED with complaint of shortness of breath who was found to have concern for acute on chronic HFpEF and COPD exacerbation.     Acute on chronic HFpEF  Type II NSTEMI  HTN  - Continue Entresto  - Continue Bystolic  - Cardiology following recs repeat IV diuresis today, added spironolactone.     COPD exacerbation  - Solu-Medrol 40 mg twice daily  - Scheduled DuoNebs  - Flonase daily  - Continue Singulair     Elevated troponin  - Patient denies current chest pain  - Trend troponin  - Trend EKG     T1DM  - FSBG ACHS  - SS insulin  --sugars creeping up.  Add lantus 10 units daily     PAF  Factor V Leiden  History CVA  History DVT  - Rate controlled  - Continue bystolic and Eliquis     Hypertension  - Continue terazosin  - Continue Bystolic     Psoriatic  arthritis  Demyelinating disease  - Patient no longer on Rinvoq  - Follows outpatient for steroid injections, follows with Dr. Hernandez        Expected Discharge Location and Transportation:   Expected Discharge   Expected Discharge Date: 12/14/2024; Expected Discharge Time:      VTE Prophylaxis:  Pharmacologic & mechanical VTE prophylaxis orders are present.         AM-PAC 6 Clicks Score (PT): 18 (12/13/24 0800)    CODE STATUS:   Code Status and Medical Interventions: CPR (Attempt to Resuscitate); Full Support   Ordered at: 12/12/24 4432     Code Status (Patient has no pulse and is not breathing):    CPR (Attempt to Resuscitate)     Medical Interventions (Patient has pulse or is breathing):    Full Support       Samuel Lin MD  12/13/24

## 2024-12-13 NOTE — CONSULTS
[facility] Cardiology  Consultation History and Physical  Marilyn Kunz  1945      PCP:   Peter Baca MD        Date of  Consultation: 12/13/2024 09:42 EST     Reason for Consultation: Flash pulmonary edema    Cardiology problem list:  1.  HTN  2.  Type I DM  3.  HLD  4.  Psoriatic arthritis  5.  Hypothyroidism  6.  DJD  7.  CKD 2  8.  Factor V Leiden  9.  Prior cerebellar infarct  10.  Surgery for patent ductus arteriosus age 11  11.  Demyelinating disease on azathioprine  12.  Hyponatremia  13.  Recently diagnosed atrial fibrillation             History of Present Illness:  Marilyn Kunz  Is a 79 y.o. female with medical history detailed above.  Patient was recently admitted in November with sepsis from urinary tract infection and during that stay developed atrial fibrillation/flutter.  She was initiated on Eliquis.  At discharge she returned home.  She does take a small dose of Bumex 0.5 mg every other day at home.  She is not on home oxygen.  She states over the last couple of days she started to feel more short of breath.  It became severe last night.  She also endorsed chest tightness last night.  EMS was called and she was initially satting in the upper 80s on room air.  Initial ER evaluation including chest x-ray and CT was consistent with diffuse pulmonary edema.  Patient was also significantly hypertensive upon presentation.  She received IV Lasix with improvement in O2 requirements at this morning her blood pressure is within normal limits.  Patient does not have chest pain at this time.  EKG demonstrates sinus rhythm.  Patient did have elevated troponin on presentation of 59 and 104.  This appears to be secondary to demand ischemia from her significant hypertension and hypoxia on presentation.  No ischemic changes on EKG.  Last ischemic evaluation was a within normal limits stress test 2022.  Patient has been previously evaluated for renal artery stenosis with a  duplex in 2022 which was also negative.  Echocardiogram completed 11/16/2024 showed an EF of 66 to 70% with mild left atrial enlargement.  No hemodynamically significant valve disease.      Patient Active Problem List    Diagnosis Date Noted    *Acute on chronic heart failure with preserved ejection fraction 07/21/2023    Acute exacerbation of CHF (congestive heart failure) 12/12/2024    UTI (urinary tract infection) 11/15/2024    Type 1 diabetes mellitus with hyperglycemia 11/15/2024    Syncope 11/15/2024    Elevated serum creatinine 11/15/2024    Sepsis 11/15/2024    Closed fracture of left foot 11/15/2024    TIFFANIE (obstructive sleep apnea) 11/15/2024    COPD (chronic obstructive pulmonary disease) 11/15/2024    Elevated CK 11/15/2024    Cervical radiculopathy 06/04/2024    Rash 06/04/2024    Peripheral polyneuropathy 05/30/2024    Immunosuppression due to drug therapy 05/30/2024    Erosive osteoarthritis 05/30/2024    Chronic diastolic congestive heart failure 07/24/2023    Demyelinating disease of central nervous system, unspecified 03/09/2023    Demyelinating disease 02/06/2023     Note Last Updated: 2/6/2023     Dr Hernandez      Spondylolisthesis, grade 2 02/06/2023     Note Last Updated: 2/6/2023     Followed by Specialist - stable - follow up as needed. Dr. Rojas, degenerative disc disease      Type 1 diabetes mellitus 02/06/2023     Note Last Updated: 7/23/2023     Followed by Dr. Reid      History of stroke 02/06/2023     Note Last Updated: 2/6/2023     Cont Asa, statin and control bp      PDA (patent ductus arteriosus) 09/12/2022    Psoriatic arthritis 05/05/2022    At risk for venous thromboembolism (VTE) 09/10/2021     Note Last Updated: 6/23/2022     Problem added by Discern Expert Rule: [id number]_VTERISKPROB_3      Factor V Leiden 08/16/2021    Essential hypertension 10/16/2020     Note Last Updated: 7/23/2023     Target blood pressure <130/80 mmHg      CKD (chronic kidney disease) stage 3, GFR 30-59 ml/min  06/16/2016    Hypothyroidism 09/16/2015    Hyperlipidemia LDL goal <70 09/16/2015       Allergies   Allergen Reactions    Atorvastatin Other (See Comments)    Colesevelam Other (See Comments)    Cymbalta [Duloxetine Hcl] Other (See Comments)     Hyponatremia    Famotidine Other (See Comments)    Cephalexin Rash     Tolerated Ceftriaxone    Clindamycin Rash    Hygroton [Chlorthalidone] Rash     Facial rash    Lidocaine Other (See Comments)     Sores in throat    Penicillins Nausea And Vomiting, Rash and Other (See Comments)     Has tolerated ceftriaxone  TROUBLE BREATHING       Social History     Socioeconomic History    Marital status: Single   Tobacco Use    Smoking status: Never     Passive exposure: Never    Smokeless tobacco: Never   Vaping Use    Vaping status: Never Used   Substance and Sexual Activity    Alcohol use: No    Drug use: No    Sexual activity: Not Currently     Partners: Male     Birth control/protection: Abstinence       Family History   Problem Relation Age of Onset    Cancer Mother     Pancreatic cancer Mother     Stroke Mother         Mini strokes    Cancer Father     Lung cancer Father     Cancer Sister     Colon cancer Sister     Diabetes Sister     Diabetes Brother     Breast cancer Neg Hx     Ovarian cancer Neg Hx        Current Medications:    Current Facility-Administered Medications:     acetaminophen (TYLENOL) tablet 650 mg, 650 mg, Oral, Q4H PRN, Marilu Luque APRN    apixaban (ELIQUIS) tablet 2.5 mg, 2.5 mg, Oral, Q12H, Marilu Luque APRN, 2.5 mg at 12/13/24 0830    sennosides-docusate (PERICOLACE) 8.6-50 MG per tablet 2 tablet, 2 tablet, Oral, BID PRN **AND** polyethylene glycol (MIRALAX) packet 17 g, 17 g, Oral, Daily PRN **AND** bisacodyl (DULCOLAX) EC tablet 5 mg, 5 mg, Oral, Daily PRN **AND** bisacodyl (DULCOLAX) suppository 10 mg, 10 mg, Rectal, Daily PRN, Marilu Luque APRN    dextrose (D50W) (25 g/50 mL) IV injection 25 g, 25 g, Intravenous, Q15 Min PRN, Rebel  JAGDISH Leija    dextrose (GLUTOSE) oral gel 15 g, 15 g, Oral, Q15 Min PRN, Marilu Luque APRN    fluticasone (FLONASE) 50 MCG/ACT nasal spray 2 spray, 2 spray, Each Nare, Daily, Marilu Luque APRN    furosemide (LASIX) injection 80 mg, 80 mg, Intravenous, Once, Yao Maya MD    glucagon (GLUCAGEN) injection 1 mg, 1 mg, Intramuscular, Q15 Min PRN, Marilu Luque APRN    Insulin Lispro (humaLOG) injection 2-9 Units, 2-9 Units, Subcutaneous, 4x Daily AC & at Bedtime, Marilu Luque APRN, 2 Units at 12/13/24 0819    ipratropium-albuterol (DUO-NEB) nebulizer solution 3 mL, 3 mL, Nebulization, 4x Daily - RT, Marilu Luque APRN, 3 mL at 12/13/24 0703    levothyroxine (SYNTHROID, LEVOTHROID) tablet 75 mcg, 75 mcg, Oral, Daily, Marilu Luque APRN, 75 mcg at 12/13/24 0556    methylPREDNISolone sodium succinate (SOLU-Medrol) injection 40 mg, 40 mg, Intravenous, Q12H, Marilu Luque APRN, 40 mg at 12/13/24 0819    montelukast (SINGULAIR) tablet 10 mg, 10 mg, Oral, Q PM, Marilu Luque APRN, 10 mg at 12/13/24 0044    nebivolol (BYSTOLIC) tablet 10 mg, 10 mg, Oral, Daily, Marilu Luque APRN    pravastatin (PRAVACHOL) tablet 20 mg, 20 mg, Oral, Q PM, Marilu Luque APRN, 20 mg at 12/13/24 0044    pregabalin (LYRICA) capsule 75 mg, 75 mg, Oral, BID, Marilu Luque APRN, 75 mg at 12/13/24 0829    sacubitril-valsartan (ENTRESTO)  MG tablet 1 tablet, 1 tablet, Oral, BID, Marilu Luque APRN, 1 tablet at 12/13/24 0830    sodium chloride 0.9 % flush 10 mL, 10 mL, Intravenous, PRN, Valentin Beebe MD    sodium chloride 0.9 % flush 10 mL, 10 mL, Intravenous, Q12H, Marilu Luque APRN, 10 mL at 12/13/24 0045    sodium chloride 0.9 % flush 10 mL, 10 mL, Intravenous, PRN, Marilu Luque, JAGDISH    sodium chloride 0.9 % infusion 40 mL, 40 mL, Intravenous, PRN, Marilu Luque, APRN    spironolactone (ALDACTONE) tablet 25 mg, 25 mg, Oral, Daily, Yao Maya MD     terazosin (HYTRIN) capsule 2 mg, 2 mg, Oral, Nightly, Marilu Luque, JAGDISH, 2 mg at 12/13/24 0044     Review of Systems   Cardiovascular:  Positive for chest pain, dyspnea on exertion and leg swelling.   Respiratory:  Positive for shortness of breath.        OBJECTIVE:  Vitals:    12/13/24 0400 12/13/24 0703 12/13/24 0819 12/13/24 0900   BP: 120/54 123/48 125/45    BP Location: Right arm Left arm     Patient Position: Lying Lying     Pulse: 66 71 84 78   Resp: 18 17     Temp: 98.2 °F (36.8 °C) 98.1 °F (36.7 °C)     TempSrc: Oral Oral     SpO2: 99% 98%  97%   Weight:       Height:         I/O last 3 completed shifts:  In: -   Out: 1650 [Urine:1650]  I/O this shift:  In: 240 [P.O.:240]  Out: -   Intake & Output (last 3 days)         12/10 0701 12/11 0700 12/11 0701 12/12 0700 12/12 0701 12/13 0700 12/13 0701 12/14 0700    P.O.    240    Total Intake(mL/kg)    240 (4.1)    Urine (mL/kg/hr)   1650     Total Output   1650     Net   -1650 +240                       Physical Exam  Constitutional:       Appearance: Normal appearance.   Cardiovascular:      Rate and Rhythm: Normal rate and regular rhythm.      Heart sounds: No murmur heard.  Pulmonary:      Effort: Pulmonary effort is normal.      Breath sounds: Rales (Bilateral) present.   Abdominal:      Palpations: Abdomen is soft.   Musculoskeletal:      Right lower leg: Edema present.      Left lower leg: Edema present.      Comments: 1+   Neurological:      Mental Status: She is alert.         Diagnostic Data:  Lab Results (last 24 hours)       Procedure Component Value Units Date/Time    POC Glucose Once [[id number]]  (Abnormal) Collected: 12/13/24 0705    Specimen: Blood Updated: 12/13/24 0706     Glucose 171 mg/dL     High Sensitivity Troponin T [[id number]]  (Abnormal) Collected: 12/13/24 0236    Specimen: Blood Updated: 12/13/24 0323     HS Troponin T 104 ng/L     Narrative:      High Sensitive Troponin T Reference Range:  <14.0 ng/L- Negative Female for  AMI  <22.0 ng/L- Negative Male for AMI  >=14 - Abnormal Female indicating possible myocardial injury.  >=22 - Abnormal Male indicating possible myocardial injury.   Clinicians would have to utilize clinical acumen, EKG, Troponin, and serial changes to determine if it is an Acute Myocardial Infarction or myocardial injury due to an underlying chronic condition.         Basic Metabolic Panel [[id number]]  (Abnormal) Collected: 12/13/24 0236    Specimen: Blood Updated: 12/13/24 0322     Glucose 108 mg/dL      BUN 33 mg/dL      Creatinine 0.93 mg/dL      Sodium 143 mmol/L      Potassium 3.5 mmol/L      Chloride 104 mmol/L      CO2 26.0 mmol/L      Calcium 8.5 mg/dL      BUN/Creatinine Ratio 35.5     Anion Gap 13.0 mmol/L      eGFR 62.6 mL/min/1.73     Narrative:      GFR Categories in Chronic Kidney Disease (CKD)      GFR Category          GFR (mL/min/1.73)    Interpretation  G1                     90 or greater         Normal or high (1)  G2                      60-89                Mild decrease (1)  G3a                   45-59                Mild to moderate decrease  G3b                   30-44                Moderate to severe decrease  G4                    15-29                Severe decrease  G5                    14 or less           Kidney failure          (1)In the absence of evidence of kidney disease, neither GFR category G1 or G2 fulfill the criteria for CKD.    eGFR calculation 2021 CKD-EPI creatinine equation, which does not include race as a factor    Magnesium [[id number]]  (Normal) Collected: 12/13/24 0236    Specimen: Blood Updated: 12/13/24 0322     Magnesium 1.7 mg/dL     CBC Auto Differential [[id number]]  (Abnormal) Collected: 12/13/24 0236    Specimen: Blood Updated: 12/13/24 0304     WBC 10.80 10*3/mm3      RBC 3.13 10*6/mm3      Hemoglobin 9.5 g/dL      Hematocrit 30.7 %      MCV 98.1 fL      MCH 30.4 pg      MCHC 30.9 g/dL      RDW 17.7 %      RDW-SD 62.2 fl      MPV 13.0 fL      Platelets 120  10*3/mm3      Neutrophil % 90.9 %      Lymphocyte % 3.7 %      Monocyte % 4.6 %      Eosinophil % 0.1 %      Basophil % 0.1 %      Immature Grans % 0.6 %      Neutrophils, Absolute 9.81 10*3/mm3      Lymphocytes, Absolute 0.40 10*3/mm3      Monocytes, Absolute 0.50 10*3/mm3      Eosinophils, Absolute 0.01 10*3/mm3      Basophils, Absolute 0.01 10*3/mm3      Immature Grans, Absolute 0.07 10*3/mm3      nRBC 0.0 /100 WBC     High Sensitivity Troponin T 1Hr [[id number]]  (Abnormal) Collected: 12/12/24 2231    Specimen: Blood Updated: 12/12/24 2303     HS Troponin T 59 ng/L      Troponin T Delta 12 ng/L      Troponin T % Change 26 %     Narrative:      High Sensitive Troponin T Reference Range:  <14.0 ng/L- Negative Female for AMI  <22.0 ng/L- Negative Male for AMI  >=14 - Abnormal Female indicating possible myocardial injury.  >=22 - Abnormal Male indicating possible myocardial injury.   Clinicians would have to utilize clinical acumen, EKG, Troponin, and serial changes to determine if it is an Acute Myocardial Infarction or myocardial injury due to an underlying chronic condition.         COVID PRE-OP / PRE-PROCEDURE SCREENING ORDER (NO ISOLATION) - Swab, Nasopharynx [[id number]]  (Normal) Collected: 12/12/24 2119    Specimen: Swab from Nasopharynx Updated: 12/12/24 2150    Narrative:      The following orders were created for panel order COVID PRE-OP / PRE-PROCEDURE SCREENING ORDER (NO ISOLATION) - Swab, Nasopharynx.  Procedure                               Abnormality         Status                     ---------                               -----------         ------                     COVID-19 and FLU A/B PCR...[[id number]]  Normal              Final result                 Please view results for these tests on the individual orders.    COVID-19 and FLU A/B PCR, 1 HR TAT - Swab, Nasopharynx [[id number]]  (Normal) Collected: 12/12/24 2119    Specimen: Swab from Nasopharynx Updated: 12/12/24 2150     COVID19 Not  Detected     Influenza A PCR Not Detected     Influenza B PCR Not Detected    Narrative:      Fact sheet for providers: https://www.fda.gov/media/320888/download    Fact sheet for patients: https://www.fda.gov/media/744917/download    Test performed by PCR.    Comprehensive Metabolic Panel [[id number]]  (Abnormal) Collected: 12/12/24 2120    Specimen: Blood Updated: 12/12/24 2150     Glucose 81 mg/dL      BUN 29 mg/dL      Creatinine 0.87 mg/dL      Sodium 148 mmol/L      Potassium 3.7 mmol/L      Chloride 109 mmol/L      CO2 28.0 mmol/L      Calcium 9.0 mg/dL      Total Protein 6.8 g/dL      Albumin 4.0 g/dL      ALT (SGPT) 24 U/L      AST (SGOT) 30 U/L      Alkaline Phosphatase 87 U/L      Total Bilirubin 0.8 mg/dL      Globulin 2.8 gm/dL      Comment: Calculated Result        A/G Ratio 1.4 g/dL      BUN/Creatinine Ratio 33.3     Anion Gap 11.0 mmol/L      eGFR 67.9 mL/min/1.73     Narrative:      GFR Categories in Chronic Kidney Disease (CKD)      GFR Category          GFR (mL/min/1.73)    Interpretation  G1                     90 or greater         Normal or high (1)  G2                      60-89                Mild decrease (1)  G3a                   45-59                Mild to moderate decrease  G3b                   30-44                Moderate to severe decrease  G4                    15-29                Severe decrease  G5                    14 or less           Kidney failure          (1)In the absence of evidence of kidney disease, neither GFR category G1 or G2 fulfill the criteria for CKD.    eGFR calculation 2021 CKD-EPI creatinine equation, which does not include race as a factor    BNP [[id number]]  (Abnormal) Collected: 12/12/24 2120    Specimen: Blood Updated: 12/12/24 2150     proBNP 7,086.0 pg/mL     Narrative:      This assay is used as an aid in the diagnosis of individuals suspected of having heart failure. It can be used as an aid in the diagnosis of acute decompensated heart failure (ADHF)  in patients presenting with signs and symptoms of ADHF to the emergency department (ED). In addition, NT-proBNP of <300 pg/mL indicates ADHF is not likely.    Age Range Result Interpretation  NT-proBNP Concentration (pg/mL:      <50             Positive            >450                   Gray                 300-450                    Negative             <300    50-75           Positive            >900                  Gray                300-900                  Negative            <300      >75             Positive            >1800                  Gray                300-1800                  Negative            <300    High Sensitivity Troponin T [[id number]]  (Abnormal) Collected: 12/12/24 2120    Specimen: Blood Updated: 12/12/24 2150     HS Troponin T 47 ng/L     Narrative:      High Sensitive Troponin T Reference Range:  <14.0 ng/L- Negative Female for AMI  <22.0 ng/L- Negative Male for AMI  >=14 - Abnormal Female indicating possible myocardial injury.  >=22 - Abnormal Male indicating possible myocardial injury.   Clinicians would have to utilize clinical acumen, EKG, Troponin, and serial changes to determine if it is an Acute Myocardial Infarction or myocardial injury due to an underlying chronic condition.         Blood Gas, Arterial With Co-Ox [[id number]]  (Abnormal) Collected: 12/12/24 2138    Specimen: Arterial Blood Updated: 12/12/24 2138     Site Right Brachial     Kwame's Test N/A     pH, Arterial 7.395 pH units      pCO2, Arterial 46.4 mm Hg      Comment: 83 Value above reference range        pO2, Arterial 68.4 mm Hg      Comment: 84 Value below reference range        HCO3, Arterial 28.4 mmol/L      Base Excess, Arterial 2.9 mmol/L      Hemoglobin, Blood Gas 10.8 g/dL      Comment: 84 Value below reference range        Hematocrit, Blood Gas 33.2 %      Oxyhemoglobin 91.2 %      Comment: 84 Value below reference range        Methemoglobin 0.30 %      Carboxyhemoglobin 0.7 %      CO2 Content 29.8  mmol/L      Temperature 37.0     Barometric Pressure for Blood Gas --     Comment: N/A        Modality Nasal Cannula     FIO2 44 %      Ventilator Mode --     Rate 0 Breaths/minute      PIP 0 cmH2O      Comment: Meter: [id number]     :  [id number]        IPAP 0     EPAP 0     pH, Temp Corrected 7.395 pH Units      pCO2, Temperature Corrected 46.4 mm Hg      pO2, Temperature Corrected 68.4 mm Hg     [place] Draw [[id number]] Collected: 12/12/24 2120    Specimen: Blood Updated: 12/12/24 2132    Narrative:      The following orders were created for panel order [place] Draw.  Procedure                               Abnormality         Status                     ---------                               -----------         ------                     Green Top (Gel)[[id number]]                                  Final result               Lavender Top[[id number]]                                     Final result               Gold Top - SST[[id number]]                                   Final result               Schmidt Top[[id number]]                                         Final result               Light Blue Top[[id number]]                                   Final result                 Please view results for these tests on the individual orders.    Green Top (Gel) [[id number]] Collected: 12/12/24 2120    Specimen: Blood Updated: 12/12/24 2132     Extra Tube Hold for add-ons.     Comment: Auto resulted.       Lavender Top [[id number]] Collected: 12/12/24 2120    Specimen: Blood Updated: 12/12/24 2132     Extra Tube hold for add-on     Comment: Auto resulted       Gold Top - SST [[id number]] Collected: 12/12/24 2120    Specimen: Blood Updated: 12/12/24 2132     Extra Tube Hold for add-ons.     Comment: Auto resulted.       Gray Top [[id number]] Collected: 12/12/24 2120    Specimen: Blood Updated: 12/12/24 2132     Extra Tube Hold for add-ons.     Comment: Auto resulted.       Light Blue Top [[id number]] Collected: 12/12/24  2120    Specimen: Blood Updated: 12/12/24 2132     Extra Tube Hold for add-ons.     Comment: Auto resulted       CBC & Differential [[id number]]  (Abnormal) Collected: 12/12/24 2120    Specimen: Blood Updated: 12/12/24 2130    Narrative:      The following orders were created for panel order CBC & Differential.  Procedure                               Abnormality         Status                     ---------                               -----------         ------                     CBC Auto Differential[[id number]]        Abnormal            Final result                 Please view results for these tests on the individual orders.    CBC Auto Differential [[id number]]  (Abnormal) Collected: 12/12/24 2120    Specimen: Blood Updated: 12/12/24 2130     WBC 11.25 10*3/mm3      RBC 3.58 10*6/mm3      Hemoglobin 10.8 g/dL      Hematocrit 34.9 %      MCV 97.5 fL      MCH 30.2 pg      MCHC 30.9 g/dL      RDW 17.7 %      RDW-SD 62.5 fl      MPV 12.8 fL      Platelets 144 10*3/mm3      Neutrophil % 76.7 %      Lymphocyte % 12.6 %      Monocyte % 6.7 %      Eosinophil % 3.2 %      Basophil % 0.4 %      Immature Grans % 0.4 %      Neutrophils, Absolute 8.62 10*3/mm3      Lymphocytes, Absolute 1.42 10*3/mm3      Monocytes, Absolute 0.75 10*3/mm3      Eosinophils, Absolute 0.36 10*3/mm3      Basophils, Absolute 0.05 10*3/mm3      Immature Grans, Absolute 0.05 10*3/mm3      nRBC 0.0 /100 WBC           ECG/EMG Results (last 24 hours)       Procedure Component Value Units Date/Time    ECG 12 Lead ED Triage Standing Order; SOA [[id number]] Collected: 12/12/24 2103     Updated: 12/12/24 2117     QT Interval 428 ms      QTC Interval 465 ms     Narrative:      Test Reason : ED Triage Standing Order~  Blood Pressure :   */*   mmHG  Vent. Rate :  71 BPM     Atrial Rate :  75 BPM     P-R Int :   * ms          QRS Dur :  78 ms      QT Int : 428 ms       P-R-T Axes :  69  62  16 degrees    QTcB Int : 465 ms    Sinus rhythm with occasional  premature ventricular complexes  Nonspecific ST abnormality  Abnormal ECG  When compared with ECG of 16-Nov-2024 05:12,  Sinus rhythm has replaced Atrial flutter  Nonspecific T wave abnormality has replaced inverted T waves in Inferior  leads  Nonspecific T wave abnormality no longer evident in Anterolateral leads  Confirmed by MD MURPHY, ROXY ([id number]) on 12/12/2024 9:17:25 PM    Referred By: EDMD           Confirmed By: ROXY ARRINGTON MD    ECG 12 Lead Dyspnea [[id number]] Collected: 12/13/24 0405     Updated: 12/13/24 0707     QT Interval 506 ms      QTC Interval 530 ms     Narrative:      Test Reason : Dyspnea  Blood Pressure :   */*   mmHG  Vent. Rate :  66 BPM     Atrial Rate :  66 BPM     P-R Int : 148 ms          QRS Dur :  72 ms      QT Int : 506 ms       P-R-T Axes :  70  54  16 degrees    QTcB Int : 530 ms    Normal sinus rhythm  Possible Left atrial enlargement  Prolonged QT  Abnormal ECG  When compared with ECG of 12-Dec-2024 21:03,  premature ventricular complexes are no longer present  Sinus rhythm is no longer with 2nd degree AV block (Mobitz I)  QT has lengthened    Referred By:            Confirmed By:               Acute on chronic heart failure with preserved ejection fraction    Essential hypertension    Psoriatic arthritis    Hypothyroidism    CKD (chronic kidney disease) stage 3, GFR 30-59 ml/min    Hyperlipidemia LDL goal <70    History of stroke    Type 1 diabetes mellitus with hyperglycemia    Closed fracture of left foot    TIFFANIE (obstructive sleep apnea)    COPD (chronic obstructive pulmonary disease)    Acute exacerbation of CHF (congestive heart failure)      Assessment/Plan:      Flash pulmonary edema  Potentially triggered by hypertensive urgency  Improving with IV diuresis.  Repeat Lasix 80 mg IV this morning  Start spironolactone 25 mg daily  Recent echocardiogram with preserved EF 66 to 70%, no hemodynamically significant valve disease  Hypertension  Pressure now controlled  Renal artery  duplex was negative 2022  Continue Entresto, Bystolic  Add spironolactone  Paroxysmal atrial fibrillation/flutter  Continue low-dose Eliquis given age/weight  Continue beta-blocker  In sinus rhythm  Type II non-STEMI  Troponin elevation secondary to significant hypertension hypoxia upon presentation.  No complaints of chest pain at this time  EKG without acute ischemic changes  Within normal limits stress test 2022  Type 1 diabetes  Management per hospitalist team  History of factor V Leiden  Anticoagulation with Eliquis      Repeat IV diuresis today.  Add spironolactone.  Will adjust antihypertensives as needed.  Blood pressure now controlled.  Defer ischemic evaluation at this time.  Appears consistent with type II non-STEMI.  Cardiology will continue to follow    Yao Maya MD [facility]

## 2024-12-13 NOTE — ED NOTES
Marilyn Kunz    Nursing Report ED to Floor:  Mental status: GCS 15  Ambulatory status: x1 assist, independent at baseline  Oxygen Therapy:  6LNC, does not wear at home  Cardiac Rhythm: NSR  Admitted from: home  Safety Concerns:  fall risk  Social Issues: none known  ED Room #:  9    ED Nurse Phone Extension - [phone] or may call [phone].      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 1950s close    COPD (chronic obstructive pulmonary disease) 11/15/2024    CTS (carpal tunnel syndrome)     Psoriatic arthritis hands could be    Deep vein thrombosis No on blood clots but have a blood clotting disorder called Leiden Factor 5    Demyelinating disease of central nervous system, unspecified 03/09/2023    Diabetes     Impression: Patient states that her diabetes is not doing well.  I asked her to go ahead and try to talk to her endocrinokogist.  She is still having the lower extremity neuropathic pan, but not enough for me to administer medication.    Disease of thyroid gland     Diverticulitis of colon     Erosive osteoarthritis 05/30/2024    Factor 5 Leiden mutation, heterozygous 2012    Head injury     Hyperlipidemia     Hypertension     Medication monitoring encounter     Impression:  She has renal insufficiency.  She is just taking Tylenol.    Movement disorder 10/2021    knee replacement left knww    Murmur, cardiac     Neuropathy in diabetes     redness swelling legs    TIFFANIE (obstructive sleep apnea)  11/15/2024    Osteopenia     Story: Femoral Neck Impression: Up-ro-date on bone density scan.  Follow up bone density every 2 years.    Peripheral neuropathy 05/03/2022    hands, arms. 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  I went over her exercises and told her to get a shoe insert,    Psoriasis     Impression: No rash    Psoriatic arthritis     hands    Sleep apnea Always    diabetic do not sleep well up and down    Stroke had one don't know when    showed on MRI Brain    Syncope 11/15/2024    Type 1 diabetes         Past Surgical History:  Past Surgical History:   Procedure Laterality Date    CARDIAC CATHETERIZATION  1952 2 of them    Patent Dictus operation    CATARACT EXTRACTION Bilateral     COLONOSCOPY      ENDOSCOPY N/A 10/20/2020    Procedure: ESOPHAGOGASTRODUODENOSCOPY;  Surgeon: Brunner, Mark I, MD;  Location: On license of [facility] ENDOSCOPY;  Service: Gastroenterology;  Laterality: N/A;    HAND SURGERY Left 1987    no hardware    KNEE ARTHROPLASTY      KNEE SURGERY Left     PATENT DUCTUS ARTERIOUS LIGATION      REPLACEMENT TOTAL KNEE Left         Admitting Doctor:   Jose A Pope III, DO    Consulting Provider(s):  Consults       Date and Time Order Name Status Description    11/16/2024 11:47 AM Inpatient Cardiology Consult Completed              Admitting Diagnosis:   The primary encounter diagnosis was Acute respiratory failure with hypoxia. Diagnoses of Acute on chronic congestive heart failure, unspecified heart failure type, Acute pulmonary edema, and Bilateral pleural effusion were also pertinent to this visit.    Most Recent Vitals:   Vitals:    12/12/24 2224 12/12/24 2233 12/12/24 2242 12/12/24 2243   BP: (!) 184/88 (!) 198/86     Pulse: 80 76 81 84   Resp:    22   Temp:       TempSrc:       SpO2: 92% 92% 92% 93%   Weight:       Height:           Active LDAs/IV Access:   Lines, Drains & Airways       Active LDAs       Name Placement  date Placement time Site Days    Peripheral IV Anterior;Right Forearm --  --  Forearm  --                    Labs (abnormal labs have a star):   Labs Reviewed   COMPREHENSIVE METABOLIC PANEL - Abnormal; Notable for the following components:       Result Value    BUN 29 (*)     Sodium 148 (*)     Chloride 109 (*)     BUN/Creatinine Ratio 33.3 (*)     All other components within normal limits    Narrative:     GFR Categories in Chronic Kidney Disease (CKD)      GFR Category          GFR (mL/min/1.73)    Interpretation  G1                     90 or greater         Normal or high (1)  G2                      60-89                Mild decrease (1)  G3a                   45-59                Mild to moderate decrease  G3b                   30-44                Moderate to severe decrease  G4                    15-29                Severe decrease  G5                    14 or less           Kidney failure          (1)In the absence of evidence of kidney disease, neither GFR category G1 or G2 fulfill the criteria for CKD.    eGFR calculation 2021 CKD-EPI creatinine equation, which does not include race as a factor   BNP (IN-HOUSE) - Abnormal; Notable for the following components:    proBNP 7,086.0 (*)     All other components within normal limits    Narrative:     This assay is used as an aid in the diagnosis of individuals suspected of having heart failure. It can be used as an aid in the diagnosis of acute decompensated heart failure (ADHF) in patients presenting with signs and symptoms of ADHF to the emergency department (ED). In addition, NT-proBNP of <300 pg/mL indicates ADHF is not likely.    Age Range Result Interpretation  NT-proBNP Concentration (pg/mL:      <50             Positive            >450                   Gray                 300-450                    Negative             <300    50-75           Positive            >900                  Gray                300-900                  Negative             <300      >75             Positive            >1800                  Gray                300-1800                  Negative            <300   TROPONIN - Abnormal; Notable for the following components:    HS Troponin T 47 (*)     All other components within normal limits    Narrative:     High Sensitive Troponin T Reference Range:  <14.0 ng/L- Negative Female for AMI  <22.0 ng/L- Negative Male for AMI  >=14 - Abnormal Female indicating possible myocardial injury.  >=22 - Abnormal Male indicating possible myocardial injury.   Clinicians would have to utilize clinical acumen, EKG, Troponin, and serial changes to determine if it is an Acute Myocardial Infarction or myocardial injury due to an underlying chronic condition.        CBC WITH AUTO DIFFERENTIAL - Abnormal; Notable for the following components:    WBC 11.25 (*)     RBC 3.58 (*)     Hemoglobin 10.8 (*)     MCV 97.5 (*)     MCHC 30.9 (*)     RDW 17.7 (*)     RDW-SD 62.5 (*)     MPV 12.8 (*)     Neutrophil % 76.7 (*)     Lymphocyte % 12.6 (*)     Neutrophils, Absolute 8.62 (*)     All other components within normal limits   BLOOD GAS, ARTERIAL W/CO-OXIMETRY - Abnormal; Notable for the following components:    pCO2, Arterial 46.4 (*)     pO2, Arterial 68.4 (*)     HCO3, Arterial 28.4 (*)     Base Excess, Arterial 2.9 (*)     Hemoglobin, Blood Gas 10.8 (*)     Hematocrit, Blood Gas 33.2 (*)     Oxyhemoglobin 91.2 (*)     pCO2, Temperature Corrected 46.4 (*)     pO2, Temperature Corrected 68.4 (*)     All other components within normal limits   HIGH SENSITIVITIY TROPONIN T 1HR - Abnormal; Notable for the following components:    HS Troponin T 59 (*)     Troponin T % Change 26 (*)     All other components within normal limits    Narrative:     High Sensitive Troponin T Reference Range:  <14.0 ng/L- Negative Female for AMI  <22.0 ng/L- Negative Male for AMI  >=14 - Abnormal Female indicating possible myocardial injury.  >=22 - Abnormal Male indicating possible  myocardial injury.   Clinicians would have to utilize clinical acumen, EKG, Troponin, and serial changes to determine if it is an Acute Myocardial Infarction or myocardial injury due to an underlying chronic condition.        COVID-19 AND FLU A/B, NP SWAB IN TRANSPORT MEDIA 1 HR TAT - Normal    Narrative:     Fact sheet for providers: https://www.fda.gov/media/640614/download    Fact sheet for patients: https://www.fda.gov/media/997137/download    Test performed by PCR.   COVID PRE-OP / PRE-PROCEDURE SCREENING ORDER (NO ISOLATION)    Narrative:     The following orders were created for panel order COVID PRE-OP / PRE-PROCEDURE SCREENING ORDER (NO ISOLATION) - Swab, Nasopharynx.  Procedure                               Abnormality         Status                     ---------                               -----------         ------                     COVID-19 and FLU A/B PCR...[[id number]]  Normal              Final result                 Please view results for these tests on the individual orders.   RAINBOW DRAW    Narrative:     The following orders were created for panel order [place] Draw.  Procedure                               Abnormality         Status                     ---------                               -----------         ------                     Green Top (Gel)[[id number]]                                  Final result               Lavender Top[[id number]]                                     Final result               Gold Top - SST[[id number]]                                   Final result               Schmidt Top[[id number]]                                         Final result               Light Blue Top[[id number]]                                   Final result                 Please view results for these tests on the individual orders.   BLOOD GAS, ARTERIAL   CBC AND DIFFERENTIAL    Narrative:     The following orders were created for panel order CBC & Differential.  Procedure                                Abnormality         Status                     ---------                               -----------         ------                     CBC Auto Differential[[id number]]        Abnormal            Final result                 Please view results for these tests on the individual orders.   GREEN TOP   LAVENDER TOP   GOLD TOP - SST   GRAY TOP   LIGHT BLUE TOP       Meds Given in ED:   Medications   sodium chloride 0.9 % flush 10 mL (has no administration in time range)   methylPREDNISolone sodium succinate (SOLU-Medrol) injection 40 mg (has no administration in time range)   ipratropium-albuterol (DUO-NEB) nebulizer solution 3 mL (3 mL Nebulization Given 12/12/24 2124)   furosemide (LASIX) injection 80 mg (80 mg Intravenous Given 12/12/24 2226)   iopamidol (ISOVUE-370) 76 % injection 100 mL (65 mL Intravenous Given 12/12/24 2155)           Last NIH score:                                                          Dysphagia screening results:  Patient Factors Component (Dysphagia:Stroke or Rule-out)  Best Eye Response: 4-->(E4) spontaneous (12/12/24 2104)  Best Motor Response: 6-->(M6) obeys commands (12/12/24 2104)  Best Verbal Response: 5-->(V5) oriented (12/12/24 2104)  [place] Coma Scale Score: 15 (12/12/24 2104)     [place] Coma Scale:  No data recorded     CIWA:        Restraint Type:            Isolation Status:  No active isolations         (1) 41-60 years

## 2024-12-13 NOTE — PLAN OF CARE
Goal Outcome Evaluation:  Plan of Care Reviewed With: patient, family        Progress: no change  Outcome Evaluation: PT initial eval completed. Pt presents below baseline with decreased endurance, SOA, and mild balance deficits. Ambulation of 130' with CGA and no AD was well tolerated. Further IPPT is warrented. PT rec d/c home with assist and continued OPPT when medically appropriate.    Anticipated Discharge Disposition (PT): home with assist, home with outpatient therapy services

## 2024-12-13 NOTE — PLAN OF CARE
Problem: Adult Inpatient Plan of Care  Goal: Optimal Comfort and Wellbeing  Intervention: Provide Person-Centered Care  Recent Flowsheet Documentation  Taken 12/13/2024 0030 by Lexii Mena RN  Trust Relationship/Rapport:   care explained   choices provided   emotional support provided   empathic listening provided   questions answered   questions encouraged   reassurance provided   thoughts/feelings acknowledged   Goal Outcome Evaluation:  Plan of Care Reviewed With: patient        Progress: improving  Outcome Evaluation: Pt alert and oriented x4. VSS on 4.5 L NC. Adequate UOP. SOA improving. Pt has rested well since admission to floor. Pills taken whole in applesauce. NSR on monitor.  No complaints of pain or nausea. No needs voiced at this time, call light and personal items within reach.

## 2024-12-13 NOTE — THERAPY EVALUATION
Patient Name: Marilyn Kunz  : 1945    MRN: [id number]                              Today's Date: 2024       Admit Date: 2024    Visit Dx:     ICD-10-CM ICD-9-CM   1. Acute respiratory failure with hypoxia  J96.01 518.81   2. Acute on chronic congestive heart failure, unspecified heart failure type  I50.9 428.0   3. Acute pulmonary edema  J81.0 518.4   4. Bilateral pleural effusion  J90 511.9   5. COPD exacerbation  J44.1 491.21     Patient Active Problem List   Diagnosis    Essential hypertension    Factor V Leiden    Psoriatic arthritis    At risk for venous thromboembolism (VTE)    Hypothyroidism    CKD (chronic kidney disease) stage 3, GFR 30-59 ml/min    Hyperlipidemia LDL goal <70    PDA (patent ductus arteriosus)    Demyelinating disease    Spondylolisthesis, grade 2    Type 1 diabetes mellitus    History of stroke    Demyelinating disease of central nervous system, unspecified    Acute on chronic heart failure with preserved ejection fraction    Chronic diastolic congestive heart failure    Peripheral polyneuropathy    Immunosuppression due to drug therapy    Erosive osteoarthritis    Cervical radiculopathy    Rash    UTI (urinary tract infection)    Type 1 diabetes mellitus with hyperglycemia    Syncope    Elevated serum creatinine    Sepsis    Closed fracture of left foot    TIFFANIE (obstructive sleep apnea)    COPD (chronic obstructive pulmonary disease)    Elevated CK    Acute exacerbation of CHF (congestive heart failure)     Past Medical History:   Diagnosis Date    Abnormal ECG Check A Date or [facility]    Brought to  from [facility]    Acute sinusitis 2023    Anemia     Asthma     CAP (community acquired pneumonia) 2023    CHF (congestive heart failure) 2023    Chronic kidney disease     pt told it was dx from Dr Baca and to not eat much protein    Congenital heart disease 's Patent 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    [facility]  [facility] close    COPD (chronic obstructive pulmonary disease) 11/15/2024    CTS (carpal tunnel syndrome)     Psoriatic arthritis hands could be    Deep vein thrombosis No on blood clots but have a blood clotting disorder called Leiden Factor 5    Demyelinating disease of central nervous system, unspecified 03/09/2023    Diabetes     Impression: Patient states that her diabetes is not doing well.  I asked her to go ahead and try to talk to her endocrinokogist.  She is still having the lower extremity neuropathic pan, but not enough for me to administer medication.    Disease of thyroid gland     Diverticulitis of colon     Erosive osteoarthritis 05/30/2024    Factor 5 Leiden mutation, heterozygous 2012    Head injury     Hyperlipidemia     Hypertension     Medication monitoring encounter     Impression:  She has renal insufficiency.  She is just taking Tylenol.    Movement disorder 10/2021    knee replacement left knww    Murmur, cardiac     Neuropathy in diabetes     redness swelling legs    TIFFANIE (obstructive sleep apnea) 11/15/2024    Osteopenia     Story: Femoral Neck Impression: Up-ro-date on bone density scan.  Follow up bone density every 2 years.    Peripheral neuropathy 05/03/2022    hands, arms. 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  I went over her exercises and told her to get a shoe insert,    Psoriasis     Impression: No rash    Psoriatic arthritis     hands    Sleep apnea Always    diabetic do not sleep well up and down    Stroke had one don't know when    showed on MRI Brain    Syncope 11/15/2024    Type 1 diabetes      Past Surgical History:   Procedure Laterality Date    CARDIAC CATHETERIZATION  1952 2 of them    Patent Dictus operation    CATARACT EXTRACTION Bilateral     COLONOSCOPY      ENDOSCOPY N/A 10/20/2020    Procedure: ESOPHAGOGASTRODUODENOSCOPY;  Surgeon: Brunner, Mark I, MD;  [facility];  Service:  Gastroenterology;  Laterality: N/A;    HAND SURGERY Left 1987    no hardware    KNEE ARTHROPLASTY      KNEE SURGERY Left     PATENT DUCTUS ARTERIOUS LIGATION      REPLACEMENT TOTAL KNEE Left       General Information       Row Name 12/13/24 1540          Physical Therapy Time and Intention    Document Type evaluation  -AB     Mode of Treatment physical therapy  -AB       Row Name 12/13/24 1540          General Information    Patient Profile Reviewed yes  -AB     Prior Level of Function independent:;all household mobility;community mobility;gait;transfer;bed mobility;ADL's;driving  Pt is current with OPPT. Does not use AD for mobility.  -AB     Existing Precautions/Restrictions fall;oxygen therapy device and L/min;other (see comments)  Hx of L foot fx and per pt WBAT in tall CAM boot  -AB     Barriers to Rehab medically complex  -AB       Row Name 12/13/24 1540          Living Environment    People in Home alone  -AB       Row Name 12/13/24 1540          Home Main Entrance    Number of Stairs, Main Entrance two  -AB     Stair Railings, Main Entrance railing on left side (ascending)  -AB       Row Name 12/13/24 1540          Stairs Within Home, Primary    Number of Stairs, Within Home, Primary none  -AB       Row Name 12/13/24 1540          Cognition    Orientation Status (Cognition) oriented x 4  -AB       Row Name 12/13/24 1540          Safety Issues/Impairments Affecting Functional Mobility    Safety Issues Affecting Function (Mobility) awareness of need for assistance;insight into deficits/self-awareness;safety precaution awareness  -AB     Impairments Affecting Function (Mobility) endurance/activity tolerance;strength;balance;shortness of breath  -AB               User Key  (r) = Recorded By, (t) = Taken By, (c) = Cosigned By      Initials Name Provider Type    AB Kim Vallejo, PT Physical Therapist                   Mobility       Row Name 12/13/24 1542          Bed Mobility    Bed Mobility  supine-sit;sit-supine;scooting/bridging  -AB     Scooting/Bridging [place] (Bed Mobility) standby assist  -AB     Supine-Sit [place] (Bed Mobility) standby assist  -AB     Sit-Supine [place] (Bed Mobility) standby assist  -AB     Assistive Device (Bed Mobility) head of bed elevated  -AB     Comment, (Bed Mobility) increased time/effort.  -AB       Row Name 12/13/24 1542          Transfers    Comment, (Transfers) CAM boot donned for mobility and doffed at end of session. Cues for hand placement and sequencing.  -AB       Row Name 12/13/24 1542          Sit-Stand Transfer    Sit-Stand [place] (Transfers) contact guard;1 person assist;verbal cues  -AB       Row Name 12/13/24 1542          Gait/Stairs (Locomotion)    [place] Level (Gait) contact guard;1 person assist;verbal cues  -AB     Patient was able to Ambulate yes  -AB     Distance in Feet (Gait) 130  -AB     Deviations/Abnormal Patterns (Gait) bilateral deviations;sam decreased;gait speed decreased;stride length decreased  -AB     [place] Level (Stairs) unable to assess  -AB     Comment, (Gait/Stairs) Pt ambulated with step through gait pattern at slowed sam with postural sway. Cues provided for forward gaze and PLB. Intermittent instability noted with pt able to self correct. She deferred walker use this session but would benefit from trial with SPC. Further activity limited by fatigue.  -AB               User Key  (r) = Recorded By, (t) = Taken By, (c) = Cosigned By      Initials Name Provider Type    AB Kim Vallejo, PT Physical Therapist                   Obj/Interventions       Row Name 12/13/24 1546          Range of Motion Comprehensive    General Range of Motion bilateral lower extremity ROM WNL  -AB       Row Name 12/13/24 1543          Strength Comprehensive (MMT)    General Manual Muscle Testing (MMT) Assessment lower extremity strength deficits identified  -AB     Comment, General Manual Muscle Testing (MMT)  Assessment BLE grossly 4/5  -AB       Row Name 12/13/24 1544          Balance    Balance Assessment sitting dynamic balance;standing static balance;standing dynamic balance;sitting static balance  -AB     Static Sitting Balance standby assist  -AB     Dynamic Sitting Balance standby assist  -AB     Position, Sitting Balance unsupported;sitting edge of bed  -AB     Static Standing Balance contact guard  -AB     Dynamic Standing Balance contact guard;1-person assist;verbal cues  -AB     Position/Device Used, Standing Balance unsupported  -AB     Balance Interventions sitting;standing;sit to stand;dynamic;static;occupation based/functional task  -AB     Comment, Balance Intermittent unsteadiness with pt able to self correct.  -AB       Row Name 12/13/24 1544          Sensory Assessment (Somatosensory)    Sensory Assessment (Somatosensory) LE sensation intact  -AB               User Key  (r) = Recorded By, (t) = Taken By, (c) = Cosigned By      Initials Name Provider Type    AB Kim Vallejo, PT Physical Therapist                   Goals/Plan       Row Name 12/13/24 1549          Bed Mobility Goal 1 (PT)    Activity/Assistive Device (Bed Mobility Goal 1, PT) sit to supine;supine to sit  -AB     [place] Level/Cues Needed (Bed Mobility Goal 1, PT) independent  -AB     Time Frame (Bed Mobility Goal 1, PT) short term goal (STG);5 days  -AB       Row Name 12/13/24 1549          Transfer Goal 1 (PT)    Activity/Assistive Device (Transfer Goal 1, PT) sit-to-stand/stand-to-sit;bed-to-chair/chair-to-bed  -AB     [place] Level/Cues Needed (Transfer Goal 1, PT) standby assist  -AB       Row Name 12/13/24 1540          Gait Training Goal 1 (PT)    Activity/Assistive Device (Gait Training Goal 1, PT) gait (walking locomotion);assistive device use;cane, straight  -AB     [place] Level (Gait Training Goal 1, PT) standby assist  -AB     Distance (Gait Training Goal 1, PT) 300  -AB     Time Frame (Gait Training Goal 1,  PT) long term goal (LTG);10 days  -AB       Row Name 12/13/24 1549          Stairs Goal 1 (PT)    Activity/Assistive Device (Stairs Goal 1, PT) ascending stairs;descending stairs;using handrail, left  -AB     [place] Level/Cues Needed (Stairs Goal 1, PT) contact guard required  -AB     Number of Stairs (Stairs Goal 1, PT) 2  -AB     Time Frame (Stairs Goal 1, PT) long term goal (LTG);10 days  -AB       Row Name 12/13/24 1549          Therapy Assessment/Plan (PT)    Planned Therapy Interventions (PT) balance training;bed mobility training;gait training;home exercise program;patient/family education;postural re-education;transfer training;stretching;strengthening;ROM (range of motion);stair training  -AB               User Key  (r) = Recorded By, (t) = Taken By, (c) = Cosigned By      Initials Name Provider Type    AB Kim Vallejo, PT Physical Therapist                   Clinical Impression       Row Name 12/13/24 1547          Pain    Pretreatment Pain Rating 0/10 - no pain  -AB     Posttreatment Pain Rating 0/10 - no pain  -AB       Row Name 12/13/24 1547          Plan of Care Review    Plan of Care Reviewed With patient;family  -AB     Progress no change  -AB     Outcome Evaluation PT initial eval completed. Pt presents below baseline with decreased endurance, SOA, and mild balance deficits. Ambulation of 130' with CGA and no AD was well tolerated. Further IPPT is warrented. PT rec d/c home with assist and continued OPPT when medically appropriate.  -AB       Row Name 12/13/24 1547          Therapy Assessment/Plan (PT)    Patient/Family Therapy Goals Statement (PT) go home  -AB     Rehab Potential (PT) good  -AB     Criteria for Skilled Interventions Met (PT) yes;meets criteria;skilled treatment is necessary  -AB     Therapy Frequency (PT) daily  -AB     Predicted Duration of Therapy Intervention (PT) 10 days  -AB       Row Name 12/13/24 1546          Vital Signs    Pre Systolic BP Rehab 142  -AB     Pre  Treatment Diastolic BP 51  -AB     Pretreatment Heart Rate (beats/min) 88  -AB     Posttreatment Heart Rate (beats/min) 87  -AB     Pre SpO2 (%) 96  -AB     O2 Delivery Pre Treatment nasal cannula  -AB     Intra SpO2 (%) 88   -AB     O2 Delivery Intra Treatment room air  -AB     Post SpO2 (%) 97  -AB     O2 Delivery Post Treatment nasal cannula  -AB     Pre Patient Position Supine  -AB     Intra Patient Position Standing  -AB     Post Patient Position Supine  -AB       Row Name 12/13/24 1547          Positioning and Restraints    Pre-Treatment Position in bed  -AB     Post Treatment Position bed  -AB     In Bed notified nsg;supine;call light within reach;encouraged to call for assist;exit alarm on;with family/caregiver  -AB               User Key  (r) = Recorded By, (t) = Taken By, (c) = Cosigned By      Initials Name Provider Type    Kim Wharton, PT Physical Therapist                   Outcome Measures       Row Name 12/13/24 1549 12/13/24 0800       How much help from another person do you currently need...    Turning from your back to your side while in flat bed without using bedrails? 4  -AB 4  -SH    Moving from lying on back to sitting on the side of a flat bed without bedrails? 3  -AB 3  -SH    Moving to and from a bed to a chair (including a wheelchair)? 3  -AB 3  -SH    Standing up from a chair using your arms (e.g., wheelchair, bedside chair)? 3  -AB 3  -SH    Climbing 3-5 steps with a railing? 2  -AB 2  -SH    To walk in hospital room? 3  -AB 3  -SH    AM-PAC 6 Clicks Score (PT) 18  -AB 18  -SH    Highest Level of Mobility Goal 6 --> Walk 10 steps or more  -AB 6 --> Walk 10 steps or more  -SH      Row Name 12/13/24 1549          Functional Assessment    Outcome Measure Options AM-PAC 6 Clicks Basic Mobility (PT)  -AB               User Key  (r) = Recorded By, (t) = Taken By, (c) = Cosigned By      Initials Name Provider Type    SH Sharon Castro RN Registered Nurse    Kim Wharton, PT  Physical Therapist                                 Physical Therapy Education       Title: PT OT SLP Therapies (In Progress)       Topic: Physical Therapy (In Progress)       Point: Mobility training (Done)       Learning Progress Summary            Patient Acceptance, E,D, VU,NR by AB at 12/13/2024 1550                      Point: Home exercise program (Not Started)       Learner Progress:  Not documented in this visit.              Point: Body mechanics (Done)       Learning Progress Summary            Patient Acceptance, E,D, VU,NR by AB at 12/13/2024 1550                      Point: Precautions (Done)       Learning Progress Summary            Patient Acceptance, E,D, VU,NR by AB at 12/13/2024 1550                                      User Key       Initials Effective Dates Name Provider Type Discipline    AB 09/22/22 -  Kim Vallejo, PT Physical Therapist PT                  PT Recommendation and Plan  Planned Therapy Interventions (PT): balance training, bed mobility training, gait training, home exercise program, patient/family education, postural re-education, transfer training, stretching, strengthening, ROM (range of motion), stair training  Progress: no change  Outcome Evaluation: PT initial eval completed. Pt presents below baseline with decreased endurance, SOA, and mild balance deficits. Ambulation of 130' with CGA and no AD was well tolerated. Further IPPT is warrented. PT rec d/c home with assist and continued OPPT when medically appropriate.     Time Calculation:   PT Evaluation Complexity  History, PT Evaluation Complexity: 1-2 personal factors and/or comorbidities  Examination of Body Systems (PT Eval Complexity): total of 3 or more elements  Clinical Presentation (PT Evaluation Complexity): evolving  Clinical Decision Making (PT Evaluation Complexity): moderate complexity  Overall Complexity (PT Evaluation Complexity): moderate complexity     PT Charges       Row Name 12/13/24 1639              Time Calculation    Start Time 1500  -AB      PT Received On 12/13/24  -AB      PT Goal Re-Cert Due Date 12/23/24  -AB         Untimed Charges    PT Eval/Re-eval Minutes 48  -AB         Total Minutes    Untimed Charges Total Minutes 48  -AB       Total Minutes 48  -AB                User Key  (r) = Recorded By, (t) = Taken By, (c) = Cosigned By      Initials Name Provider Type    AB Kim Vallejo, PT Physical Therapist                  Therapy Charges for Today       Code Description Service Date Service Provider Modifiers Qty    [id number] HC PT EVAL MOD COMPLEXITY 4 12/13/2024 Kim Vallejo, PT GP 1            PT G-Codes  Outcome Measure Options: AM-PAC 6 Clicks Basic Mobility (PT)  AM-PAC 6 Clicks Score (PT): 18  PT Discharge Summary  Anticipated Discharge Disposition (PT): home with assist, home with outpatient therapy services    Kim Vallejo PT  12/13/2024

## 2024-12-13 NOTE — CASE MANAGEMENT/SOCIAL WORK
Discharge Planning Assessment  [facility]     Patient Name: Marilyn Kunz  MRN: [id number]  Today's Date: 12/13/2024    Admit Date: 12/12/2024    Plan: home   Discharge Needs Assessment       Row Name 12/13/24 0758       Living Environment    People in Home alone    Current Living Arrangements home    Primary Care Provided by self       Transition Planning    Patient/Family Anticipates Transition to home       Discharge Needs Assessment    Readmission Within the Last 30 Days no previous admission in last 30 days    Equipment Currently Used at Home cpap;cane, straight;shower chair;walker, rolling;crutches    Concerns to be Addressed basic needs;discharge planning                   Discharge Plan       Row Name 12/13/24 0758       Plan    Plan home    Patient/Family in Agreement with Plan yes    Plan Comments I met with this patient bedside. She lives alone in [facility]. She is independent with activities of daily living and mobility with the use of a rolling walker, straight cane and crutches. She wears a boot on her left foot after a left foot fracture. She does use a cpap at home as well. She currently goes to O[facility] once weekly. She anticipates returning home after this hospitalization, and she will transport herself. Case management will follow.    Final Discharge Disposition Code 01 - home or self-care                  Continued Care and Services - Admitted Since 12/12/2024    No active coordination exists for this encounter.       Selected Continued Care - Episodes Includes continued care and service providers with selected services from the active episodes listed below      Rheumatology - External Fill Episode start date: 5/31/2024   There are no active outsourced providers for this episode.                 Selected Continued Care - Prior Encounters Includes continued care and service providers with selected services from prior encounters from 9/13/2024 to 12/13/2024      Discharged on  11/25/2024 Admission date: 11/15/2024 - Discharge disposition: Home or Self Care      Therapy       Service Provider Services Address Phone Fax Patient Preferred    [facility] [address] [place] [address] [phone] --                          Expected Discharge Date and Time       Expected Discharge Date Expected Discharge Time    Dec 14, 2024            Demographic Summary       Row Name 12/13/24 0757       General Information    General Information Comments I confirmed that Peter Baca is Ms Kunz's PCP and she has [employer] Medicare                   Functional Status       Row Name 12/13/24 0757       Functional Status, IADL    Medications independent    Meal Preparation independent    Housekeeping independent    Laundry independent    Shopping independent                   Psychosocial    No documentation.                  Abuse/Neglect    No documentation.                  Legal    No documentation.                  Substance Abuse    No documentation.                  Patient Forms    No documentation.                     Jaclyn Jackson RN

## 2024-12-13 NOTE — OUTREACH NOTE
Sepsis Week 3 Survey      Flowsheet Row Responses   [facility] facility patient discharged from? [place]   Does the patient have one of the following disease processes/diagnoses(primary or secondary)? Sepsis   Week 3 attempt successful? No   Unsuccessful attempts Attempt 1   Revoke Readmitted            Linda BRAGA - Registered Nurse

## 2024-12-13 NOTE — PLAN OF CARE
Goal Outcome Evaluation:           Progress: improving  Outcome Evaluation: VSS. A&O. Pt on 2L NC. Up with 1 staff assist and orthotic for L ankle fx. Ambulated w PT. Tolerating diet well, eating at least 50% of meals.   Problem: Adult Inpatient Plan of Care  Goal: Plan of Care Review  Outcome: Progressing  Flowsheets (Taken 12/13/2024 1847)  Progress: improving  Outcome Evaluation: VSS. A&O. Pt on 2L NC. Up with 1 staff assist and orthotic for L ankle fx.  Goal: Patient-Specific Goal (Individualized)  Outcome: Progressing  Goal: Absence of Hospital-Acquired Illness or Injury  Outcome: Progressing  Intervention: Identify and Manage Fall Risk  Recent Flowsheet Documentation  Taken 12/13/2024 1800 by Sharon Castro RN  Safety Promotion/Fall Prevention:   activity supervised   fall prevention program maintained   nonskid shoes/slippers when out of bed   safety round/check completed  Taken 12/13/2024 1600 by Sharon Castro RN  Safety Promotion/Fall Prevention:   activity supervised   fall prevention program maintained   nonskid shoes/slippers when out of bed   safety round/check completed  Taken 12/13/2024 1400 by Sharon Castro RN  Safety Promotion/Fall Prevention:   activity supervised   fall prevention program maintained   nonskid shoes/slippers when out of bed   safety round/check completed  Taken 12/13/2024 1200 by Sharon Castro RN  Safety Promotion/Fall Prevention:   activity supervised   fall prevention program maintained   nonskid shoes/slippers when out of bed   safety round/check completed  Taken 12/13/2024 1000 by Sharon Castro RN  Safety Promotion/Fall Prevention:   activity supervised   fall prevention program maintained   nonskid shoes/slippers when out of bed   safety round/check completed  Taken 12/13/2024 0800 by Sharon Castro RN  Safety Promotion/Fall Prevention:   activity supervised   fall prevention program maintained   nonskid shoes/slippers when out of bed   safety round/check  completed  Intervention: Prevent Skin Injury  Recent Flowsheet Documentation  Taken 12/13/2024 1800 by Sharon Castro RN  Body Position:   turned   left  Skin Protection:   incontinence pads utilized   silicone foam dressing in place  Taken 12/13/2024 1600 by Sharon Castro RN  Body Position: sitting up in bed  Skin Protection:   incontinence pads utilized   silicone foam dressing in place  Taken 12/13/2024 1400 by Sharon Castro RN  Body Position: sitting up in bed  Skin Protection:   incontinence pads utilized   silicone foam dressing in place  Taken 12/13/2024 1200 by Sharon Castro RN  Body Position: (up in chair) other (see comments)  Skin Protection:   incontinence pads utilized   silicone foam dressing in place  Taken 12/13/2024 1000 by Sharon Castro RN  Body Position:   turned   right  Skin Protection:   incontinence pads utilized   silicone foam dressing in place  Taken 12/13/2024 0800 by Sharon Castro RN  Skin Protection: (silicone dressings in place. Skin under assessed)   incontinence pads utilized   silicone foam dressing in place  Intervention: Prevent and Manage VTE (Venous Thromboembolism) Risk  Recent Flowsheet Documentation  Taken 12/13/2024 0800 by Sharon Castro RN  VTE Prevention/Management: (po eliquis) other (see comments)  Goal: Optimal Comfort and Wellbeing  Outcome: Progressing  Intervention: Provide Person-Centered Care  Recent Flowsheet Documentation  Taken 12/13/2024 0800 by Sharon Castro RN  Trust Relationship/Rapport:   care explained   thoughts/feelings acknowledged  Goal: Readiness for Transition of Care  Outcome: Progressing

## 2024-12-13 NOTE — H&P
"    [facility] Medicine Services  HISTORY AND PHYSICAL    Patient Name: Marilyn Kunz  : 1945  MRN: [id number]  Primary Care Physician: Peter Baca MD  Date of admission: 2024    Subjective   Subjective     Chief Complaint:  Shortness of breath     HPI:  Marilyn Kunz is a 79 y.o. female with PMH significant for A-fib on Eliquis, HFpEF, CKD 3, TIFFANIE on CPAP, anemia, T1DM, demyelinating disease, factor V Leiden, CVA, COPD, who presents to the ED with complaint of shortness of breath.  She states for the past couple of days she has felt \"funny\" in her chest describing it as heavy.  Today she became acutely short of breath.  She does not require supplemental oxygen use at home.  She is compliant with her CPAP at night.  She denies any known fever, nausea, vomiting, diarrhea.  She does admit to intermittent dry cough.  EMS was called for transport to the ED.  They reported oxygen saturation of 87% on room air with good recovery on 4 LNC.  Upon arrival to the ED, she continues to have shortness of breath.  She is currently requiring 6 L nasal cannula.  Labs are concerning for elevated troponin and elevated proBNP.  She also has mildly elevated sodium.  She has mild leukocytosis.  Her anemia is stable.  CXR is concerning for vascular congestion.  CTA chest is negative for PE but notes extensive interstitial and groundglass opacities throughout each lung with small layering bilateral pleural effusions right greater than left concerning for edema/CHF.  She will be admitted to hospital medicine for further evaluation.    Review of Systems   Constitutional:  Positive for activity change and appetite change. Negative for chills, diaphoresis, fatigue, fever and unexpected weight change.   HENT: Negative.  Negative for congestion, sore throat and trouble swallowing.    Eyes: Negative.  Negative for visual disturbance.   Respiratory:  Positive for cough, chest tightness, " shortness of breath and wheezing.    Cardiovascular: Negative.  Negative for chest pain, palpitations and leg swelling.   Gastrointestinal:  Positive for abdominal distention. Negative for abdominal pain, constipation, diarrhea, nausea and vomiting.   Endocrine: Negative.  Negative for polydipsia and polyphagia.   Genitourinary: Negative.  Negative for decreased urine volume, difficulty urinating, dysuria, frequency and urgency.   Musculoskeletal:  Positive for gait problem. Negative for arthralgias, back pain, myalgias and neck pain.   Skin: Negative.  Negative for color change, pallor, rash and wound.   Allergic/Immunologic: Negative for immunocompromised state.   Neurological:  Negative for dizziness, syncope, facial asymmetry, speech difficulty, weakness, light-headedness, numbness and headaches.   Hematological: Negative.  Does not bruise/bleed easily.   Psychiatric/Behavioral: Negative.  Negative for confusion. The patient is not nervous/anxious.           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  I asked her to go ahead and try to talk to her endocrinokogist.  She is still having  the lower extremity neuropathic pan, but not enough for me to administer medication.    Disease of thyroid gland     Diverticulitis of colon     Erosive osteoarthritis 05/30/2024    Factor 5 Leiden mutation, heterozygous 2012    Head injury     Hyperlipidemia     Hypertension     Medication monitoring encounter     Impression:  She has renal insufficiency.  She is just taking Tylenol.    Movement disorder 10/2021    knee replacement left knww    Murmur, cardiac     Neuropathy in diabetes     redness swelling legs    TIFFANIE (obstructive sleep apnea) 11/15/2024    Osteopenia     Story: Femoral Neck Impression: Up-ro-date on bone density scan.  Follow up bone density every 2 years.    Peripheral neuropathy 05/03/2022    hands, arms. 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  I went over her exercises and told her to get a shoe insert,    Psoriasis     Impression: No rash    Psoriatic arthritis     hands    Sleep apnea Always    diabetic do not sleep well up and down    Stroke had one don't know when    showed on MRI Brain    Syncope 11/15/2024    Type 1 diabetes          Past Surgical History:   Procedure Laterality Date    CARDIAC CATHETERIZATION  1952 2 of them    Patent Dictus operation    CATARACT EXTRACTION Bilateral     COLONOSCOPY      ENDOSCOPY N/A 10/20/2020    Procedure: ESOPHAGOGASTRODUODENOSCOPY;  Surgeon: Brunner, Mark I, MD;  Location: [facility] ENDOSCOPY;  Service: Gastroenterology;  Laterality: N/A;    HAND SURGERY Left 1987    no hardware    KNEE ARTHROPLASTY      KNEE SURGERY Left     PATENT DUCTUS ARTERIOUS LIGATION      REPLACEMENT TOTAL KNEE Left        Family History:  family history includes Cancer in her father, mother, and sister; Colon cancer in her sister; Diabetes in her brother and sister; Lung cancer in her father; Pancreatic cancer in her mother; Stroke in her mother.     Social History:  reports that she has never smoked. She has  never been exposed to tobacco smoke. She has never used smokeless tobacco. She reports that she does not drink alcohol and does not use drugs.  Social History     Social History Narrative    Independent with adl's.  Single.  Never .  No children.  Lives alone.  Niece-in-law assist with care as needed.   No home oxygen, HH or DME used currently        Medications:  Insulin Pen Needle, Upadacitinib ER, acetaminophen, apixaban, azaTHIOprine, bumetanide, fluticasone, insulin NPH, insulin aspart, insulin degludec, levocetirizine, levothyroxine, montelukast, nebivolol, nystatin, oxybutynin, pravastatin, pregabalin, sacubitril-valsartan, and terazosin    Allergies   Allergen Reactions    Atorvastatin Other (See Comments)    Colesevelam Other (See Comments)    Cymbalta [Duloxetine Hcl] Other (See Comments)     Hyponatremia    Famotidine Other (See Comments)    Cephalexin Rash     Tolerated Ceftriaxone    Clindamycin Rash    Hygroton [Chlorthalidone] Rash     Facial rash    Lidocaine Other (See Comments)     Sores in throat    Penicillins Nausea And Vomiting, Rash and Other (See Comments)     Has tolerated ceftriaxone  TROUBLE BREATHING       Objective   Objective     Vital Signs:   Temp:  [98.3 °F (36.8 °C)] 98.3 °F (36.8 °C)  Heart Rate:  [72-84] 82  Resp:  [18-30] 22  BP: (184-205)/(74-88) 194/81  Flow (L/min) (Oxygen Therapy):  [4-15] 6    Physical Exam   Constitutional: Awake, alert, ill appearing   Eyes: PERRLA, sclerae anicteric, no conjunctival injection  HENT: NCAT, mucous membranes moist  Neck: Supple, no thyromegaly, no lymphadenopathy, trachea midline  Respiratory: expiratory wheezing and crackles throughout., easily winded with conversation, currently on 6L NC, baseline RA   Cardiovascular: RRR, no murmurs, rubs, or gallops, palpable pedal pulses bilaterally  Gastrointestinal: Positive bowel sounds, soft, nontender, mild distention   Musculoskeletal: No bilateral ankle edema, no clubbing or cyanosis to  extremities  Psychiatric: Appropriate affect, cooperative  Neurologic: Oriented x 3, strength symmetric in all extremities, Cranial Nerves grossly intact to confrontation, speech clear  Skin: No rashes      Result Review:  I have personally reviewed the results from the time of this admission to 12/12/2024 23:22 EST and agree with these findings:  [x]  Laboratory list / accordion  [x]  Microbiology  [x]  Radiology  [x]  EKG/Telemetry   []  Cardiology/Vascular   []  Pathology  [x]  Old records  []  Other:  Most notable findings include:     LAB RESULTS:      Lab 12/12/24 2120   WBC 11.25*   HEMOGLOBIN 10.8*   HEMATOCRIT 34.9   PLATELETS 144   NEUTROS ABS 8.62*   IMMATURE GRANS (ABS) 0.05   LYMPHS ABS 1.42   MONOS ABS 0.75   EOS ABS 0.36   MCV 97.5*         Lab 12/12/24 2120   SODIUM 148*   POTASSIUM 3.7   CHLORIDE 109*   CO2 28.0   ANION GAP 11.0   BUN 29*   CREATININE 0.87   EGFR 67.9   GLUCOSE 81   CALCIUM 9.0         Lab 12/12/24 2120   TOTAL PROTEIN 6.8   ALBUMIN 4.0   GLOBULIN 2.8   ALT (SGPT) 24   AST (SGOT) 30   BILIRUBIN 0.8   ALK PHOS 87         Lab 12/12/24  2231 12/12/24 2120   PROBNP  --  7,086.0*   HSTROP T 59* 47*                 Lab 12/12/24 2138   PH, ARTERIAL 7.395   PCO2, ARTERIAL 46.4*   PO2 ART 68.4*   FIO2 44   HCO3 ART 28.4*   BASE EXCESS ART 2.9*   CARBOXYHEMOGLOBIN 0.7     Brief Urine Lab Results  (Last result in the past 365 days)        Color   Clarity   Blood   Leuk Est   Nitrite   Protein   CREAT   Urine HCG        11/15/24 1739 Dark Yellow   Cloudy   Negative   Moderate (2+)   Negative   30 mg/dL (1+)                 Microbiology Results (last 10 days)       Procedure Component Value - Date/Time    COVID PRE-OP / PRE-PROCEDURE SCREENING ORDER (NO ISOLATION) - Swab, Nasopharynx [[id number]]  (Normal) Collected: 12/12/24 2119    Lab Status: Final result Specimen: Swab from Nasopharynx Updated: 12/12/24 2150    Narrative:      The following orders were created for panel order COVID  PRE-OP / PRE-PROCEDURE SCREENING ORDER (NO ISOLATION) - Swab, Nasopharynx.  Procedure                               Abnormality         Status                     ---------                               -----------         ------                     COVID-19 and FLU A/B PCR...[[id number]]  Normal              Final result                 Please view results for these tests on the individual orders.    COVID-19 and FLU A/B PCR, 1 HR TAT - Swab, Nasopharynx [[id number]]  (Normal) Collected: 12/12/24 2119    Lab Status: Final result Specimen: Swab from Nasopharynx Updated: 12/12/24 2150     COVID19 Not Detected     Influenza A PCR Not Detected     Influenza B PCR Not Detected    Narrative:      Fact sheet for providers: https://www.fda.gov/media/778629/download    Fact sheet for patients: https://www.fda.gov/media/103206/download    Test performed by PCR.            CT Angiogram Chest Pulmonary Embolism    Result Date: 12/12/2024  CT ANGIOGRAM CHEST PULMONARY EMBOLISM Date of Exam: 12/12/2024 9:41 PM EST Indication: chest pain, short of breath. Comparison: Chest radiograph from 12/12/2024 Technique: Axial CT images were obtained of the chest after the uneventful intravenous administration of 65 mL Isovue-370 utilizing pulmonary embolism protocol.  Reconstructed coronal and sagittal images were also obtained. Automated exposure control and  iterative construction methods were used. Findings: Pulmonary arteries are normal in caliber and well opacified without pulmonary embolus.  The thoracic aorta is normal in course and caliber. No pericardial effusions. There are extensive interstitial and groundglass opacities throughout each lung as well as small layering bilateral pleural effusions, right greater than left. There is loculated fluid within the left major fissure. The tracheobronchial tree is patent. No endobronchial lesions are identified. No abnormal bronchial wall thickening. No pneumothorax. No significant thoracic  or axillary adenopathy. Limited images of the upper abdomen demonstrate no acute or clinically significant abnormality. No acute bony abnormality or aggressive focal osseous lesions.     Impression: Impression: 1.No pulmonary embolus. 2.Extensive interstitial and groundglass opacities throughout each lung with small layering bilateral pleural effusions, right greater than left. The findings are most compatible with evolving edema/CHF Electronically Signed: Samuel France DO  12/12/2024 10:07 PM EST  Workstation ID: [id number]    XR Chest 1 View    Result Date: 12/12/2024  XR CHEST 1 VW Date of Exam: 12/12/2024 9:10 PM EST Indication: SOA triage protocol Comparison: None available. Findings: No focal consolidation. Vascular congestion. No pneumothorax or pleural effusion. Cardiac size is normal. The visualized clavicles appear intact. No displaced rib fractures. The visualized upper abdomen is normal.     Impression: Impression: Vascular congestion.. Electronically Signed: Charly Dash MD  12/12/2024 9:36 PM EST  Workstation ID: [id number]     Results for orders placed during the hospital encounter of 11/15/24    Adult Transthoracic Echo Complete W/ Cont if Necessary Per Protocol    Interpretation Summary    Left ventricular systolic function is normal. Left ventricular ejection fraction appears to be 66 - 70%.    Left ventricular diastolic function was normal.    Left atrial volume is mildly increased.    Estimated right ventricular systolic pressure from tricuspid regurgitation is normal (<35 mmHg).    No significant change compared to 2023 echo      Assessment & Plan   Assessment & Plan       Acute on chronic heart failure with preserved ejection fraction    Essential hypertension    Psoriatic arthritis    Hypothyroidism    CKD (chronic kidney disease) stage 3, GFR 30-59 ml/min    Hyperlipidemia LDL goal <70    History of stroke    Type 1 diabetes mellitus with hyperglycemia    Closed fracture of left foot    TIFFANIE  (obstructive sleep apnea)    COPD (chronic obstructive pulmonary disease)    Acute exacerbation of CHF (congestive heart failure)    79 y.o. female with PMH significant for A-fib on Eliquis, HFpEF, CKD 3, TIFFANIE on CPAP, anemia, T1DM, demyelinating disease, factor V Leiden, CVA, COPD, who presents to the ED with complaint of shortness of breath who was found to have concern for acute on chronic HFpEF and COPD exacerbation.    Acute on chronic HFpEF  - Lasix 80 mg given in the ED, continue 20 mg twice daily for now  - Continue Entresto  - Continue Bystolic  - Cardiology consult in the a.m.    COPD exacerbation  - Solu-Medrol 40 mg twice daily  - Scheduled DuoNebs  - Flonase daily  - Continue Singulair    Elevated troponin  - Patient denies current chest pain  - Trend troponin  - Trend EKG    T1DM  - FSBG ACHS  - SS insulin    PAF  Factor V Leiden  History CVA  History DVT  - Rate controlled, continue valsartan  - Continue Eliquis    Hypertension  - Continue terazosin  - Continue Bystolic    Psoriatic arthritis  Demyelinating disease  - Patient no longer on Rinvoq  - Follows outpatient for steroid injections, follows with Dr. Hernandez    DVT prophylaxis: on Eliquis     CODE STATUS:    Code Status (Patient has no pulse and is not breathing): CPR (Attempt to Resuscitate)  Medical Interventions (Patient has pulse or is breathing): Full Support      Expected Discharge  Expected Discharge Date: 12/14/2024; Expected Discharge Time:       This note has been completed as part of a split-shared workflow.     Signature: Electronically signed by JAGDISH Hannah, 12/12/24, 11:19 PM EST.      Attending   Admission Attestation       I have performed an independent face-to-face diagnostic evaluation including performing an independent physical examination.  I approve of the documented plan of care above that was reviewed and developed with the advanced practice clinician (APC) and take responsibility for that plan along with its  associated risks.  I have updated the HPI as appropriate.    Brief HPI    79F states that for the last 2 days he has had a sensation of heaviness in the chest and earlier today (Thursday 12/12) she noticed increased shortness of breath.  She confirms very rare cough but denies any production, also denies any fever or chills.  She states she does not use any O2 at home but has required 4-6 L here to maintain satisfactory O2 saturations, according to ED physician.  She does have sleep apnea but she uses her CPAP nightly; she does not feel like this has helped her symptoms much, and she has a follow-up sleep study with her CPAP coming up in January.  She had imaging of her chest (chest x-ray and CTA chest) obtained in the ED, with radiology read mentioning findings most compatible with evolving edema/CHF.  On exam (see below) she does have crackles but also diffuse wheeze which sounds more like breath sounds C/W asthma or COPD.    Attending Physical Exam:  Temp:  [98.3 °F (36.8 °C)] 98.3 °F (36.8 °C)  Heart Rate:  [72-84] 82  Resp:  [18-30] 22  BP: (184-205)/(74-88) 194/81  Flow (L/min) (Oxygen Therapy):  [4-15] 6    Constitutional: Awake, alert, NAD, very pleasant.  Eyes: PERRLA, sclerae anicteric, no conjunctival injection  HENT: NCAT, mucous membranes moist  Neck: Supple, no thyromegaly, no lymphadenopathy, trachea midline  Respiratory: Basal crackles to auscultation bilaterally but also diffuse wheeze, nonlabored respirations   Cardiovascular: RRR, no murmurs, rubs, or gallops, palpable pedal pulses bilaterally  Gastrointestinal: Positive bowel sounds, soft, nontender, nondistended  Musculoskeletal: No bilateral ankle edema, no clubbing or cyanosis to extremities  Psychiatric: Appropriate affect, cooperative  Neurologic: Oriented x 3, strength symmetric in all extremities, Cranial Nerves grossly intact to confrontation, speech clear  Skin: No rashes, normal turgor.    Result Review:  I have personally reviewed the  results from the time of this admission to 12/12/2024 23:33 EST and agree with these findings:  [x]  Laboratory list / accordion  []  Microbiology  [x]  Radiology  [x]  EKG/Telemetry   []  Cardiology/Vascular   []  Pathology  []  Old records  []  Other:  Most notable findings include: Reviewed radiology report from CTA chest.  I reviewed chest x-ray which by my read shows increased interstitial markings out to the lateral aspect of each lung field, worse at the bases, likely indicating pulmonary edema.  I reviewed EKG which by my read shows sinus rhythm, ventricular rate approximately 75 bpm, normal axis, 1 PAC and 1 PVC during the study, nonspecific ST/T wave changes.    Assessment and Plan:    See assessment and plan documented by APC above and updated/edited by me as appropriate.      Total time spent: 43 minutes  Time spent includes time reviewing chart, face-to-face time, counseling patient/family/caregiver, ordering medications/tests/procedures, communicating with other health care professionals, documenting clinical information in the electronic health record, and coordination of care.       Jose A Pope,ELAN, DO  12/12/24

## 2024-12-13 NOTE — CONSULTS
Diabetes Education    Patient Name:  Marilyn Kunz  YOB: 1945  MRN: [id number]  Admit Date:  12/12/2024        Reviewed chart for diabetes education consult.  Noted history of Type 1 diabetes.  Noted taking insulin at home on sliding scale and at bedtime nightly.  Noted A1c is 8.0%.  Spoke with Ms. Kunz at the bedside this morning.  She gave me permission to speak with her about diabetes.  She states that she has had diabetes so long she does not remember when she was diagnosed.  She states that she follows up with Dr. Reid for diabetes care.  She states that her blood sugars were running high in the 400's and she states they are much better now averaging around 140.  She states that she wears a Freestyle Marie 2 CGM for monitoring and she states that sometimes it is inaccurate.  She states that she has 3 other fingerstick glucometers that she uses to verify her readings especially if she feels differently that he monitor states her blood sugar is.    Reviewed basic phsyiology.  Reviewed low blood sugar symptoms and how to treat at home.  She states that she buys glucola bottles from [employer] brand and glucose tabs to treat low blood sugar.  Reviewed high blood sugar also.  Reviewed importance of drinking water and exercise.  She states that she is not able to exercise, but she stated that she enjoys drawing art and painting a lot and she is able to do it despite dealing with arthritis.  She states that it is a good way for her to spend time.  She states that she often gives pieces away to providers or friends and family.  She stated that she has a lot of family members with diabetes.  She was given the What is Diabetes handout.  Explained to her that we are happy to be an additional resource for her related to caring for diabetes.  She was given our contact information.  Thank you for this referral.     Electronically signed by:  Mery Washington RN  12/13/24 13:31 EST

## 2024-12-14 LAB
ANION GAP SERPL CALCULATED.3IONS-SCNC: 12 MMOL/L (ref 5–15)
BUN SERPL-MCNC: 54 MG/DL (ref 8–23)
BUN/CREAT SERPL: 49.1 (ref 7–25)
CALCIUM SPEC-SCNC: 8.5 MG/DL (ref 8.6–10.5)
CHLORIDE SERPL-SCNC: 101 MMOL/L (ref 98–107)
CO2 SERPL-SCNC: 26 MMOL/L (ref 22–29)
CREAT SERPL-MCNC: 1.1 MG/DL (ref 0.57–1)
DEPRECATED RDW RBC AUTO: 63.3 FL (ref 37–54)
EGFRCR SERPLBLD CKD-EPI 2021: 51.2 ML/MIN/1.73
ERYTHROCYTE [DISTWIDTH] IN BLOOD BY AUTOMATED COUNT: 18.2 % (ref 12.3–15.4)
GLUCOSE BLDC GLUCOMTR-MCNC: 269 MG/DL (ref 70–130)
GLUCOSE BLDC GLUCOMTR-MCNC: 360 MG/DL (ref 70–130)
GLUCOSE BLDC GLUCOMTR-MCNC: 432 MG/DL (ref 70–130)
GLUCOSE BLDC GLUCOMTR-MCNC: 453 MG/DL (ref 70–130)
GLUCOSE BLDC GLUCOMTR-MCNC: 462 MG/DL (ref 70–130)
GLUCOSE BLDC GLUCOMTR-MCNC: 511 MG/DL (ref 70–130)
GLUCOSE BLDC GLUCOMTR-MCNC: 527 MG/DL (ref 70–130)
GLUCOSE SERPL-MCNC: 570 MG/DL (ref 65–99)
HCT VFR BLD AUTO: 27.7 % (ref 34–46.6)
HGB BLD-MCNC: 8.9 G/DL (ref 12–15.9)
MCH RBC QN AUTO: 31.1 PG (ref 26.6–33)
MCHC RBC AUTO-ENTMCNC: 32.1 G/DL (ref 31.5–35.7)
MCV RBC AUTO: 96.9 FL (ref 79–97)
PLATELET # BLD AUTO: 112 10*3/MM3 (ref 140–450)
PMV BLD AUTO: 14 FL (ref 6–12)
POTASSIUM SERPL-SCNC: 4.1 MMOL/L (ref 3.5–5.2)
RBC # BLD AUTO: 2.86 10*6/MM3 (ref 3.77–5.28)
SODIUM SERPL-SCNC: 139 MMOL/L (ref 136–145)
WBC NRBC COR # BLD AUTO: 16.2 10*3/MM3 (ref 3.4–10.8)

## 2024-12-14 PROCEDURE — 94761 N-INVAS EAR/PLS OXIMETRY MLT: CPT

## 2024-12-14 PROCEDURE — 85027 COMPLETE CBC AUTOMATED: CPT | Performed by: INTERNAL MEDICINE

## 2024-12-14 PROCEDURE — 63710000001 INSULIN LISPRO (HUMAN) PER 5 UNITS: Performed by: NURSE PRACTITIONER

## 2024-12-14 PROCEDURE — 99232 SBSQ HOSP IP/OBS MODERATE 35: CPT | Performed by: INTERNAL MEDICINE

## 2024-12-14 PROCEDURE — 94799 UNLISTED PULMONARY SVC/PX: CPT

## 2024-12-14 PROCEDURE — 63710000001 INSULIN GLARGINE PER 5 UNITS: Performed by: INTERNAL MEDICINE

## 2024-12-14 PROCEDURE — 25010000002 METHYLPREDNISOLONE PER 40 MG: Performed by: NURSE PRACTITIONER

## 2024-12-14 PROCEDURE — 63710000001 INSULIN LISPRO (HUMAN) PER 5 UNITS: Performed by: INTERNAL MEDICINE

## 2024-12-14 PROCEDURE — 94664 DEMO&/EVAL PT USE INHALER: CPT

## 2024-12-14 PROCEDURE — 82948 REAGENT STRIP/BLOOD GLUCOSE: CPT

## 2024-12-14 PROCEDURE — 80048 BASIC METABOLIC PNL TOTAL CA: CPT | Performed by: INTERNAL MEDICINE

## 2024-12-14 RX ORDER — ALBUTEROL SULFATE 90 UG/1
2 INHALANT RESPIRATORY (INHALATION) EVERY 4 HOURS PRN
Qty: 18 G | Refills: 0 | Status: SHIPPED | OUTPATIENT
Start: 2024-12-14

## 2024-12-14 RX ORDER — INSULIN LISPRO 100 [IU]/ML
10 INJECTION, SOLUTION INTRAVENOUS; SUBCUTANEOUS ONCE
Status: COMPLETED | OUTPATIENT
Start: 2024-12-14 | End: 2024-12-14

## 2024-12-14 RX ORDER — BUDESONIDE AND FORMOTEROL FUMARATE DIHYDRATE 80; 4.5 UG/1; UG/1
2 AEROSOL RESPIRATORY (INHALATION)
Qty: 10.2 G | Refills: 0 | Status: SHIPPED | OUTPATIENT
Start: 2024-12-14

## 2024-12-14 RX ORDER — SPIRONOLACTONE 25 MG/1
25 TABLET ORAL DAILY
Qty: 30 TABLET | Refills: 0 | Status: SHIPPED | OUTPATIENT
Start: 2024-12-15

## 2024-12-14 RX ADMIN — SACUBITRIL AND VALSARTAN 1 TABLET: 97; 103 TABLET, FILM COATED ORAL at 20:53

## 2024-12-14 RX ADMIN — INSULIN GLARGINE 15 UNITS: 100 INJECTION, SOLUTION SUBCUTANEOUS at 20:53

## 2024-12-14 RX ADMIN — NEBIBOLOL 10 MG: 10 TABLET ORAL at 08:54

## 2024-12-14 RX ADMIN — INSULIN LISPRO 9 UNITS: 100 INJECTION, SOLUTION INTRAVENOUS; SUBCUTANEOUS at 08:54

## 2024-12-14 RX ADMIN — IPRATROPIUM BROMIDE AND ALBUTEROL SULFATE 3 ML: 2.5; .5 SOLUTION RESPIRATORY (INHALATION) at 22:22

## 2024-12-14 RX ADMIN — PRAVASTATIN SODIUM 20 MG: 20 TABLET ORAL at 20:52

## 2024-12-14 RX ADMIN — PREGABALIN 75 MG: 75 CAPSULE ORAL at 08:55

## 2024-12-14 RX ADMIN — Medication 10 ML: at 20:58

## 2024-12-14 RX ADMIN — IPRATROPIUM BROMIDE AND ALBUTEROL SULFATE 3 ML: 2.5; .5 SOLUTION RESPIRATORY (INHALATION) at 09:09

## 2024-12-14 RX ADMIN — MONTELUKAST 10 MG: 10 TABLET, FILM COATED ORAL at 20:52

## 2024-12-14 RX ADMIN — INSULIN LISPRO 8 UNITS: 100 INJECTION, SOLUTION INTRAVENOUS; SUBCUTANEOUS at 16:50

## 2024-12-14 RX ADMIN — SPIRONOLACTONE 25 MG: 25 TABLET ORAL at 08:55

## 2024-12-14 RX ADMIN — IPRATROPIUM BROMIDE AND ALBUTEROL SULFATE 3 ML: 2.5; .5 SOLUTION RESPIRATORY (INHALATION) at 16:04

## 2024-12-14 RX ADMIN — APIXABAN 2.5 MG: 2.5 TABLET, FILM COATED ORAL at 20:52

## 2024-12-14 RX ADMIN — METHYLPREDNISOLONE SODIUM SUCCINATE 40 MG: 40 INJECTION, POWDER, FOR SOLUTION INTRAMUSCULAR; INTRAVENOUS at 08:55

## 2024-12-14 RX ADMIN — TERAZOSIN HYDROCHLORIDE 2 MG: 2 CAPSULE ORAL at 20:52

## 2024-12-14 RX ADMIN — LEVOTHYROXINE SODIUM 75 MCG: 0.07 TABLET ORAL at 05:35

## 2024-12-14 RX ADMIN — INSULIN LISPRO 6 UNITS: 100 INJECTION, SOLUTION INTRAVENOUS; SUBCUTANEOUS at 20:53

## 2024-12-14 RX ADMIN — FLUTICASONE PROPIONATE 2 SPRAY: 50 SPRAY, METERED NASAL at 08:55

## 2024-12-14 RX ADMIN — INSULIN LISPRO 9 UNITS: 100 INJECTION, SOLUTION INTRAVENOUS; SUBCUTANEOUS at 12:13

## 2024-12-14 RX ADMIN — SACUBITRIL AND VALSARTAN 1 TABLET: 97; 103 TABLET, FILM COATED ORAL at 08:55

## 2024-12-14 RX ADMIN — INSULIN GLARGINE 15 UNITS: 100 INJECTION, SOLUTION SUBCUTANEOUS at 08:54

## 2024-12-14 RX ADMIN — Medication 10 ML: at 08:56

## 2024-12-14 RX ADMIN — INSULIN LISPRO 10 UNITS: 100 INJECTION, SOLUTION INTRAVENOUS; SUBCUTANEOUS at 14:28

## 2024-12-14 RX ADMIN — APIXABAN 2.5 MG: 2.5 TABLET, FILM COATED ORAL at 08:54

## 2024-12-14 RX ADMIN — PREGABALIN 75 MG: 75 CAPSULE ORAL at 20:52

## 2024-12-14 NOTE — PLAN OF CARE
Problem: Adult Inpatient Plan of Care  Goal: Plan of Care Review  Outcome: Progressing  Flowsheets (Taken 12/14/2024 0447)  Progress: improving  Outcome Evaluation: A&Ox4. VSS on 2LNC. No complaints overnight. Cardiology following.  Plan of Care Reviewed With: patient  Goal: Patient-Specific Goal (Individualized)  Outcome: Progressing  Goal: Absence of Hospital-Acquired Illness or Injury  Outcome: Progressing  Intervention: Identify and Manage Fall Risk  Recent Flowsheet Documentation  Taken 12/14/2024 0400 by Mary Lou Kenney, RN  Safety Promotion/Fall Prevention:   assistive device/personal items within reach   clutter free environment maintained   fall prevention program maintained   gait belt   lighting adjusted   nonskid shoes/slippers when out of bed   room organization consistent   safety round/check completed   toileting scheduled  Taken 12/14/2024 0200 by Mary Lou Kenney, RN  Safety Promotion/Fall Prevention:   assistive device/personal items within reach   clutter free environment maintained   fall prevention program maintained   gait belt   lighting adjusted   nonskid shoes/slippers when out of bed   room organization consistent   safety round/check completed   toileting scheduled  Taken 12/14/2024 0000 by Mary Lou Kenney, RN  Safety Promotion/Fall Prevention:   assistive device/personal items within reach   clutter free environment maintained   fall prevention program maintained   gait belt   lighting adjusted   nonskid shoes/slippers when out of bed   room organization consistent   safety round/check completed   toileting scheduled  Taken 12/13/2024 2200 by Mary Lou Kenney, RN  Safety Promotion/Fall Prevention:   assistive device/personal items within reach   clutter free environment maintained   fall prevention program maintained   gait belt   lighting adjusted   nonskid shoes/slippers when out of bed   room organization consistent   safety round/check completed   toileting  scheduled  Taken 12/13/2024 2000 by MaryL ou Kenney RN  Safety Promotion/Fall Prevention:   assistive device/personal items within reach   clutter free environment maintained   fall prevention program maintained   gait belt   lighting adjusted   nonskid shoes/slippers when out of bed   room organization consistent   safety round/check completed   toileting scheduled  Intervention: Prevent Skin Injury  Recent Flowsheet Documentation  Taken 12/14/2024 0400 by Mary Lou Kenney RN  Body Position: position changed independently  Skin Protection:   incontinence pads utilized   protective footwear used   silicone foam dressing in place   transparent dressing maintained  Taken 12/14/2024 0200 by Mary Lou Kenney RN  Body Position: position changed independently  Skin Protection:   incontinence pads utilized   protective footwear used   silicone foam dressing in place   transparent dressing maintained  Taken 12/14/2024 0000 by Mary Lou Kenney RN  Body Position: position changed independently  Skin Protection:   incontinence pads utilized   protective footwear used   silicone foam dressing in place   transparent dressing maintained  Taken 12/13/2024 2200 by Mary Lou Kenney RN  Body Position: position changed independently  Skin Protection:   incontinence pads utilized   protective footwear used   silicone foam dressing in place   transparent dressing maintained  Taken 12/13/2024 2000 by Mary Lou Kenney RN  Body Position: position changed independently  Skin Protection:   incontinence pads utilized   protective footwear used   silicone foam dressing in place   transparent dressing maintained  Intervention: Prevent and Manage VTE (Venous Thromboembolism) Risk  Recent Flowsheet Documentation  Taken 12/14/2024 0400 by Mary Lou Kenney RN  VTE Prevention/Management: (see MAR) other (see comments)  Taken 12/14/2024 0200 by Mary Lou Kenney RN  VTE Prevention/Management: (see MAR) other (see  comments)  Taken 12/14/2024 0000 by Mary Lou Kenney RN  VTE Prevention/Management: (see MAR) other (see comments)  Taken 12/13/2024 2200 by Mary Lou Kenney RN  VTE Prevention/Management: (see MAR) other (see comments)  Taken 12/13/2024 2000 by Mary Lou Kenney RN  VTE Prevention/Management: (see MAR) other (see comments)  Intervention: Prevent Infection  Recent Flowsheet Documentation  Taken 12/14/2024 0400 by Mary Lou Kenney RN  Infection Prevention:   environmental surveillance performed   hand hygiene promoted   personal protective equipment utilized   rest/sleep promoted  Taken 12/14/2024 0200 by Mary Lou Kenney RN  Infection Prevention:   environmental surveillance performed   hand hygiene promoted   personal protective equipment utilized   rest/sleep promoted  Taken 12/14/2024 0000 by Mary Lou Kenney RN  Infection Prevention:   environmental surveillance performed   hand hygiene promoted   personal protective equipment utilized   rest/sleep promoted  Taken 12/13/2024 2200 by Mary Lou Kenney RN  Infection Prevention:   environmental surveillance performed   hand hygiene promoted   personal protective equipment utilized   rest/sleep promoted  Taken 12/13/2024 2000 by Mary Lou Kenney RN  Infection Prevention:   environmental surveillance performed   hand hygiene promoted   personal protective equipment utilized   rest/sleep promoted  Goal: Optimal Comfort and Wellbeing  Outcome: Progressing  Goal: Readiness for Transition of Care  Outcome: Progressing     Problem: Skin Injury Risk Increased  Goal: Skin Health and Integrity  Outcome: Progressing  Intervention: Optimize Skin Protection  Recent Flowsheet Documentation  Taken 12/14/2024 0400 by Mary Lou Kenney RN  Activity Management: activity encouraged  Pressure Reduction Techniques: frequent weight shift encouraged  Head of Bed (HOB) Positioning: HOB elevated  Pressure Reduction Devices:   foam padding utilized   positioning  supports utilized   pressure-redistributing mattress utilized  Skin Protection:   incontinence pads utilized   protective footwear used   silicone foam dressing in place   transparent dressing maintained  Taken 12/14/2024 0200 by Mary Lou Kenney RN  Activity Management: activity encouraged  Pressure Reduction Techniques: frequent weight shift encouraged  Head of Bed (HOB) Positioning: HOB elevated  Pressure Reduction Devices:   foam padding utilized   positioning supports utilized   pressure-redistributing mattress utilized  Skin Protection:   incontinence pads utilized   protective footwear used   silicone foam dressing in place   transparent dressing maintained  Taken 12/14/2024 0000 by Mary Lou Kenney RN  Activity Management: activity encouraged  Pressure Reduction Techniques: frequent weight shift encouraged  Head of Bed (HOB) Positioning: HOB elevated  Pressure Reduction Devices:   foam padding utilized   positioning supports utilized   pressure-redistributing mattress utilized  Skin Protection:   incontinence pads utilized   protective footwear used   silicone foam dressing in place   transparent dressing maintained  Taken 12/13/2024 2200 by Mary Lou Kenney RN  Activity Management: activity encouraged  Pressure Reduction Techniques: frequent weight shift encouraged  Head of Bed (HOB) Positioning: HOB elevated  Pressure Reduction Devices:   foam padding utilized   positioning supports utilized   pressure-redistributing mattress utilized  Skin Protection:   incontinence pads utilized   protective footwear used   silicone foam dressing in place   transparent dressing maintained  Taken 12/13/2024 2000 by Mary Lou Kenney RN  Activity Management:   activity encouraged   ambulated to bathroom  Pressure Reduction Techniques: frequent weight shift encouraged  Head of Bed (HOB) Positioning: HOB elevated  Pressure Reduction Devices:   foam padding utilized   positioning supports utilized    pressure-redistributing mattress utilized  Skin Protection:   incontinence pads utilized   protective footwear used   silicone foam dressing in place   transparent dressing maintained     Problem: Comorbidity Management  Goal: Maintenance of COPD Symptom Control  Outcome: Progressing  Intervention: Maintain COPD (Chronic Obstructive Pulmonary Disease) Symptom Control  Recent Flowsheet Documentation  Taken 12/14/2024 0400 by Mary Lou Kenney RN  Medication Review/Management: medications reviewed  Taken 12/14/2024 0200 by Mary Lou Kenney RN  Medication Review/Management: medications reviewed  Taken 12/14/2024 0000 by Mary Lou Kenney RN  Medication Review/Management: medications reviewed  Taken 12/13/2024 2200 by Mary Lou Kenney RN  Medication Review/Management: medications reviewed  Taken 12/13/2024 2000 by Mary Lou Kenney RN  Medication Review/Management: medications reviewed  Goal: Blood Glucose Level Within Target Range  Outcome: Progressing  Intervention: Monitor and Manage Glycemia  Recent Flowsheet Documentation  Taken 12/14/2024 0400 by Mary Lou Kenney RN  Medication Review/Management: medications reviewed  Taken 12/14/2024 0200 by Mary Lou Kenney RN  Medication Review/Management: medications reviewed  Taken 12/14/2024 0000 by Mary Lou Kenney RN  Medication Review/Management: medications reviewed  Taken 12/13/2024 2200 by Mary Lou Kenney RN  Medication Review/Management: medications reviewed  Taken 12/13/2024 2000 by Mary Lou Kenney RN  Medication Review/Management: medications reviewed  Goal: Blood Pressure in Desired Range  Outcome: Progressing  Intervention: Maintain Blood Pressure Management  Recent Flowsheet Documentation  Taken 12/14/2024 0400 by Mary Lou Kenney RN  Medication Review/Management: medications reviewed  Taken 12/14/2024 0200 by Mary Lou Kenney RN  Medication Review/Management: medications reviewed  Taken 12/14/2024 0000 by Mary Lou Kenney  RN  Medication Review/Management: medications reviewed  Taken 12/13/2024 2200 by Mary Lou Kenney RN  Medication Review/Management: medications reviewed  Taken 12/13/2024 2000 by Mary Lou Kenney RN  Medication Review/Management: medications reviewed     Problem: Noninvasive Ventilation Acute  Goal: Effective Unassisted Ventilation and Oxygenation  Outcome: Progressing     Problem: Fall Injury Risk  Goal: Absence of Fall and Fall-Related Injury  Outcome: Progressing  Intervention: Identify and Manage Contributors  Recent Flowsheet Documentation  Taken 12/14/2024 0400 by Mary Lou Kenney RN  Medication Review/Management: medications reviewed  Taken 12/14/2024 0200 by Mary Lou Kenney RN  Medication Review/Management: medications reviewed  Taken 12/14/2024 0000 by Mary Lou Kenney RN  Medication Review/Management: medications reviewed  Taken 12/13/2024 2200 by Mary Lou Kenney RN  Medication Review/Management: medications reviewed  Taken 12/13/2024 2000 by Mary Lou Kenney RN  Medication Review/Management: medications reviewed  Intervention: Promote Injury-Free Environment  Recent Flowsheet Documentation  Taken 12/14/2024 0400 by Mary Lou Kenney RN  Safety Promotion/Fall Prevention:   assistive device/personal items within reach   clutter free environment maintained   fall prevention program maintained   gait belt   lighting adjusted   nonskid shoes/slippers when out of bed   room organization consistent   safety round/check completed   toileting scheduled  Taken 12/14/2024 0200 by Mary Lou Kenney RN  Safety Promotion/Fall Prevention:   assistive device/personal items within reach   clutter free environment maintained   fall prevention program maintained   gait belt   lighting adjusted   nonskid shoes/slippers when out of bed   room organization consistent   safety round/check completed   toileting scheduled  Taken 12/14/2024 0000 by Mary Lou Kenney RN  Safety Promotion/Fall  Prevention:   assistive device/personal items within reach   clutter free environment maintained   fall prevention program maintained   gait belt   lighting adjusted   nonskid shoes/slippers when out of bed   room organization consistent   safety round/check completed   toileting scheduled  Taken 12/13/2024 2200 by Mary Lou Kenney, RN  Safety Promotion/Fall Prevention:   assistive device/personal items within reach   clutter free environment maintained   fall prevention program maintained   gait belt   lighting adjusted   nonskid shoes/slippers when out of bed   room organization consistent   safety round/check completed   toileting scheduled  Taken 12/13/2024 2000 by Mary Lou Kenney, RN  Safety Promotion/Fall Prevention:   assistive device/personal items within reach   clutter free environment maintained   fall prevention program maintained   gait belt   lighting adjusted   nonskid shoes/slippers when out of bed   room organization consistent   safety round/check completed   toileting scheduled   Goal Outcome Evaluation:  Plan of Care Reviewed With: patient        Progress: improving  Outcome Evaluation: A&Ox4. VSS on 2LNC. No complaints overnight. Cardiology following.

## 2024-12-14 NOTE — PROGRESS NOTES
"      Cardiac Electrophysiology Inpatient Follow Up Note         [facility]    Progress Note    INITIAL REASON FOR CONSULT: HTN    CHIEF COMPLAINT:  Chief Complaint   Patient presents with    Shortness of Breath     HTN    Subjective   Mrs. Marilyn Kunz denies vomiting, abdominal pain, fussiness, diarrhea, cough, and difficulty breathing.        Objective   VITAL SIGNS  /58 (BP Location: Right arm, Patient Position: Lying)   Pulse 68   Temp 97.7 °F (36.5 °C) (Oral)   Resp 18   Ht 157.5 cm (62\")   Wt 57.8 kg (127 lb 8 oz)   SpO2 95%   BMI 23.32 kg/m²   [unfilled]  Pulse Ox: SpO2  Av.9 %  Min: 94 %  Max: 99 %  Supplemental O2:       Admit Weight  Weight: 59 kg (130 lb)  Last 3 Weights  [unfilled]  Body mass index is 23.32 kg/m².    INTAKE/OUTPUT  I/O last 3 completed shifts:  In: 240 [P.O.:240]  Out:  [Urine:]    Intake/Output Summary (Last 24 hours) at 2024 0814  Last data filed at 2024 1800  Gross per 24 hour   Intake 240 ml   Output 400 ml   Net -160 ml       PHYSICAL EXAM  General appearance: awake, alert, oriented, moves all extremities  Lungs: no rhonchi, no wheezes, no rales  Heart: RRR  Abdomen: positive bowel sounds, no bruits, no masses  Extremities: warm and dry, no cyanosis, no clubbing    LABS  Results from last 7 days   Lab Units 24  0236 240   WBC 10*3/mm3 10.80 11.25*   HEMOGLOBIN g/dL 9.5* 10.8*   HEMATOCRIT % 30.7* 34.9   MCV fL 98.1* 97.5*   PLATELETS 10*3/mm3 120* 144         Results from last 7 days   Lab Units 24  0236 240   POTASSIUM mmol/L 3.5 3.7   CHLORIDE mmol/L 104 109*   CO2 mmol/L 26.0 28.0   BUN mg/dL 33* 29*   CREATININE mg/dL 0.93 0.87   GLUCOSE mg/dL 108* 81   CALCIUM mg/dL 8.5* 9.0   MAGNESIUM mg/dL 1.7  --              Results from last 7 days   Lab Units 24  0236   MAGNESIUM mg/dL 1.7                 No results found for: \"CHOL\", \"TRIG\", \"HDL\"    CURRENT " MEDICATIONS  apixaban, 2.5 mg, Oral, Q12H  fluticasone, 2 spray, Each Nare, Daily  insulin glargine, 15 Units, Subcutaneous, Q12H  insulin lispro, 2-9 Units, Subcutaneous, 4x Daily AC & at Bedtime  ipratropium-albuterol, 3 mL, Nebulization, 4x Daily - RT  levothyroxine, 75 mcg, Oral, Daily  methylPREDNISolone sodium succinate, 40 mg, Intravenous, Q12H  montelukast, 10 mg, Oral, Q PM  nebivolol, 10 mg, Oral, Daily  pharmacy consult - MTM, , Not Applicable, Daily  pravastatin, 20 mg, Oral, Q PM  pregabalin, 75 mg, Oral, BID  sacubitril-valsartan, 1 tablet, Oral, BID  sodium chloride, 10 mL, Intravenous, Q12H  spironolactone, 25 mg, Oral, Daily  terazosin, 2 mg, Oral, Nightly      CONTINUOUS INFUSIONS            Diagnosis Plan   1. HTN urgency Doing quite well now.    Normotensive    Home today ?   2. PAF anticoagulation     Body mass index is 23.32 kg/m².    I spent 35 minutes in consultation with this patient which included more than 65% of this time in direct face-to-face counseling, physical examination and discussion of my assessment and findings and this shared decision making with the patient.  The remainder of the time not spent face-to-face was performing one, some or all of the following actions: preparing to see the patient (e.g. reviewing tests, prior clinicians' notes, etc), ordering medications, tests or procedures, coordination of care, discussion of the plan with other healthcare providers, documenting clinical information in epic as well as independently interpreting results and communication of these results to the patient family and/or caregiver(s).  Please note that this explicitly excludes time spent on other separate billable services such as performing procedures or test interpretation, when applicable.        Avelino Gonzalez DO, FACC, RS  Cardiac Electrophysiologist  [facility] / [facility]

## 2024-12-14 NOTE — CASE MANAGEMENT/SOCIAL WORK
Continued Stay Note   [place]     Patient Name: Marilyn Kunz  MRN: [id number]  Today's Date: 12/14/2024    Admit Date: 12/12/2024    Plan: Home   Discharge Plan       Row Name 12/14/24 1152       Plan    Plan Home    Patient/Family in Agreement with Plan yes    Plan Comments Patient is discharging home today if medically ready. Per CM note, patient is current with [facility]. Attempted to notify Sheela with [facility] of patient's discharge and left voicemail. Resumption of care orders placed for OP PT.    Note update 1336: Spoke with Sheela with [facility]. Notified her of patient's discharge. Patient will be contacted with an appointment.     Final Discharge Disposition Code 01 - home or self-care                   Discharge Codes    No documentation.                 Expected Discharge Date and Time       Expected Discharge Date Expected Discharge Time    Dec 14, 2024               Layla Stanton RN

## 2024-12-15 ENCOUNTER — READMISSION MANAGEMENT (OUTPATIENT)
Dept: CALL CENTER | Facility: HOSPITAL | Age: 79
End: 2024-12-15
Payer: MEDICARE

## 2024-12-15 VITALS
RESPIRATION RATE: 18 BRPM | TEMPERATURE: 98.1 F | HEIGHT: 62 IN | OXYGEN SATURATION: 92 % | BODY MASS INDEX: 23.46 KG/M2 | SYSTOLIC BLOOD PRESSURE: 141 MMHG | HEART RATE: 69 BPM | WEIGHT: 127.5 LBS | DIASTOLIC BLOOD PRESSURE: 63 MMHG

## 2024-12-15 LAB
GLUCOSE BLDC GLUCOMTR-MCNC: 127 MG/DL (ref 70–130)
GLUCOSE BLDC GLUCOMTR-MCNC: 186 MG/DL (ref 70–130)

## 2024-12-15 PROCEDURE — 82948 REAGENT STRIP/BLOOD GLUCOSE: CPT

## 2024-12-15 PROCEDURE — 94799 UNLISTED PULMONARY SVC/PX: CPT

## 2024-12-15 PROCEDURE — 63710000001 INSULIN LISPRO (HUMAN) PER 5 UNITS: Performed by: NURSE PRACTITIONER

## 2024-12-15 PROCEDURE — 94664 DEMO&/EVAL PT USE INHALER: CPT

## 2024-12-15 PROCEDURE — 63710000001 INSULIN GLARGINE PER 5 UNITS: Performed by: INTERNAL MEDICINE

## 2024-12-15 PROCEDURE — 99239 HOSP IP/OBS DSCHRG MGMT >30: CPT | Performed by: INTERNAL MEDICINE

## 2024-12-15 RX ADMIN — Medication 10 ML: at 08:06

## 2024-12-15 RX ADMIN — PREGABALIN 75 MG: 75 CAPSULE ORAL at 08:05

## 2024-12-15 RX ADMIN — NEBIBOLOL 10 MG: 10 TABLET ORAL at 08:06

## 2024-12-15 RX ADMIN — IPRATROPIUM BROMIDE AND ALBUTEROL SULFATE 3 ML: 2.5; .5 SOLUTION RESPIRATORY (INHALATION) at 07:35

## 2024-12-15 RX ADMIN — SPIRONOLACTONE 25 MG: 25 TABLET ORAL at 08:06

## 2024-12-15 RX ADMIN — LEVOTHYROXINE SODIUM 75 MCG: 0.07 TABLET ORAL at 06:28

## 2024-12-15 RX ADMIN — INSULIN GLARGINE 15 UNITS: 100 INJECTION, SOLUTION SUBCUTANEOUS at 08:05

## 2024-12-15 RX ADMIN — APIXABAN 2.5 MG: 2.5 TABLET, FILM COATED ORAL at 08:06

## 2024-12-15 RX ADMIN — SACUBITRIL AND VALSARTAN 1 TABLET: 97; 103 TABLET, FILM COATED ORAL at 08:05

## 2024-12-15 RX ADMIN — FLUTICASONE PROPIONATE 2 SPRAY: 50 SPRAY, METERED NASAL at 08:07

## 2024-12-15 RX ADMIN — INSULIN LISPRO 2 UNITS: 100 INJECTION, SOLUTION INTRAVENOUS; SUBCUTANEOUS at 08:05

## 2024-12-15 NOTE — DISCHARGE SUMMARY
[facility] Medicine Services  DISCHARGE SUMMARY    Patient Name: Marilyn Kunz  : 1945  MRN: [id number]    Date of Admission: 2024  8:59 PM  Date of Discharge:  12/15/2024  Primary Care Physician: Peter Baca MD    Consults       Date and Time Order Name Status Description    2024 12:15 AM Inpatient Cardiology Consult Completed     2024 11:47 AM Inpatient Cardiology Consult Completed             Hospital Course     Presenting Problem:     Active Hospital Problems    Diagnosis  POA   • **Acute on chronic heart failure with preserved ejection fraction [I50.33]  Yes   • Acute exacerbation of CHF (congestive heart failure) [I50.9]  Yes   • COPD (chronic obstructive pulmonary disease) [J44.9]  Yes   • Closed fracture of left foot [S92.902A]  Yes   • TIFFANIE (obstructive sleep apnea) [G47.33]  Yes   • Type 1 diabetes mellitus with hyperglycemia [E10.65]  Yes   • History of stroke [Z86.73]  Not Applicable   • Psoriatic arthritis [L40.50]  Yes   • Essential hypertension [I10]  Yes   • CKD (chronic kidney disease) stage 3, GFR 30-59 ml/min [N18.30]  Yes   • Hyperlipidemia LDL goal <70 [E78.5]  Yes   • Hypothyroidism [E03.9]  Yes      Resolved Hospital Problems   No resolved problems to display.          Hospital Course:  Marilyn Kunz is a 79 y.o. female with PMH significant for A-fib on Eliquis, HFpEF, CKD 3, TIFFANIE on CPAP, anemia, T1DM, demyelinating disease, factor V Leiden, CVA, COPD, who presented to the ED with complaint of shortness of breath who was found to have concern for acute on chronic HFpEF and COPD exacerbation.     Acute on chronic HFpEF  Type II NSTEMI  HTN  - Continue Entresto  - Continue Bystolic  - Cardiology following s/p IV diuresis. added spironolactone.  Recs ok to d/c home.  ?flash pulm edema d/t HTN urgency so will continue home diuretics for now.  F/u with cardiology  --asked nursing to check resting RA sats if if <88% to have CM  "arrange home O2     COPD exacerbation  - s/p Solu-Medrol 40 mg twice daily and scheduled nebs here.  Much better will stop Steroids as exacerbating dm  - Flonase daily  - Continue Singulair  -- will add symbicort at home and albuterol inhaler prn     Elevated troponin  - Patient denies current chest pain  - Trend troponin  - Trend EKG     T1DM  - exacerbated by steroids. Kept overnight d/t hyperglycemia which resolved after stopped steroids.  Continue home insulin.  Call PCP with questions if FSBS remain greater than 200-300     PAF  Factor V Leiden  History CVA  History DVT  - Rate controlled  - Continue bystolic and Eliquis     Hypertension  - Continue terazosin  - Continue Bystolic     Psoriatic arthritis  Demyelinating disease  - Patient no longer on Rinvoq  - Follows outpatient for steroid injections, follows with Dr. Hernandez      Discharge Follow Up Recommendations for outpatient labs/diagnostics:   F/u with PCP in 1 week  F/u with cardiology per their rec    Day of Discharge     HPI:   Feels \"pretty good\" today.  Wants to go home.     Review of Systems  Gen- No fevers, chills  CV- No chest pain, palpitations  Resp- No cough, dyspnea  GI- No N/V/D, abd pain      Vital Signs:   Temp:  [97.9 °F (36.6 °C)-98.2 °F (36.8 °C)] 97.9 °F (36.6 °C)  Heart Rate:  [62-95] 70  Resp:  [18] 18  BP: (117-153)/(54-64) 153/64  Flow (L/min) (Oxygen Therapy):  [1] 1      Physical Exam:  Constitutional: No acute distress, awake, alert, sitting up in chair  HENT: NCAT, mucous membranes moist  Respiratory: Clear to auscultation bilaterally, respiratory effort normal on 1LNC  Cardiovascular: RRR, no murmurs, rubs, or gallops  Gastrointestinal: Positive bowel sounds, soft, nontender, nondistended  Musculoskeletal: No bilateral ankle edema  Psychiatric: Appropriate affect, cooperative  Neurologic: Oriented x 3, GUNN, speech clear  Skin: No rashes      Pertinent  and/or Most Recent Results     LAB RESULTS:      Lab 12/14/24  1015 " 12/13/24  0236 12/12/24 2120   WBC 16.20* 10.80 11.25*   HEMOGLOBIN 8.9* 9.5* 10.8*   HEMATOCRIT 27.7* 30.7* 34.9   PLATELETS 112* 120* 144   NEUTROS ABS  --  9.81* 8.62*   IMMATURE GRANS (ABS)  --  0.07* 0.05   LYMPHS ABS  --  0.40* 1.42   MONOS ABS  --  0.50 0.75   EOS ABS  --  0.01 0.36   MCV 96.9 98.1* 97.5*         Lab 12/14/24  1015 12/13/24  0236 12/12/24 2120   SODIUM 139 143 148*   POTASSIUM 4.1 3.5 3.7   CHLORIDE 101 104 109*   CO2 26.0 26.0 28.0   ANION GAP 12.0 13.0 11.0   BUN 54* 33* 29*   CREATININE 1.10* 0.93 0.87   EGFR 51.2* 62.6 67.9   GLUCOSE 570* 108* 81   CALCIUM 8.5* 8.5* 9.0   MAGNESIUM  --  1.7  --          Lab 12/12/24 2120   TOTAL PROTEIN 6.8   ALBUMIN 4.0   GLOBULIN 2.8   ALT (SGPT) 24   AST (SGOT) 30   BILIRUBIN 0.8   ALK PHOS 87         Lab 12/13/24  0236 12/12/24 2231 12/12/24 2120   PROBNP  --   --  7,086.0*   HSTROP T 104* 59* 47*                 Lab 12/12/24 2138   PH, ARTERIAL 7.395   PCO2, ARTERIAL 46.4*   PO2 ART 68.4*   FIO2 44   HCO3 ART 28.4*   BASE EXCESS ART 2.9*   CARBOXYHEMOGLOBIN 0.7     Brief Urine Lab Results  (Last result in the past 365 days)        Color   Clarity   Blood   Leuk Est   Nitrite   Protein   CREAT   Urine HCG        11/15/24 1739 Dark Yellow   Cloudy   Negative   Moderate (2+)   Negative   30 mg/dL (1+)                 Microbiology Results (last 10 days)       Procedure Component Value - Date/Time    COVID PRE-OP / PRE-PROCEDURE SCREENING ORDER (NO ISOLATION) - Swab, Nasopharynx [[id number]]  (Normal) Collected: 12/12/24 2119    Lab Status: Final result Specimen: Swab from Nasopharynx Updated: 12/12/24 2150    Narrative:      The following orders were created for panel order COVID PRE-OP / PRE-PROCEDURE SCREENING ORDER (NO ISOLATION) - Swab, Nasopharynx.  Procedure                               Abnormality         Status                     ---------                               -----------         ------                     COVID-19 and FLU A/B  PCR...[[id number]]  Normal              Final result                 Please view results for these tests on the individual orders.    COVID-19 and FLU A/B PCR, 1 HR TAT - Swab, Nasopharynx [[id number]]  (Normal) Collected: 12/12/24 2119    Lab Status: Final result Specimen: Swab from Nasopharynx Updated: 12/12/24 2150     COVID19 Not Detected     Influenza A PCR Not Detected     Influenza B PCR Not Detected    Narrative:      Fact sheet for providers: https://www.fda.gov/media/500815/download    Fact sheet for patients: https://www.fda.gov/media/790287/download    Test performed by PCR.            CT Angiogram Chest Pulmonary Embolism    Result Date: 12/12/2024  CT ANGIOGRAM CHEST PULMONARY EMBOLISM Date of Exam: 12/12/2024 9:41 PM EST Indication: chest pain, short of breath. Comparison: Chest radiograph from 12/12/2024 Technique: Axial CT images were obtained of the chest after the uneventful intravenous administration of 65 mL Isovue-370 utilizing pulmonary embolism protocol.  Reconstructed coronal and sagittal images were also obtained. Automated exposure control and  iterative construction methods were used. Findings: Pulmonary arteries are normal in caliber and well opacified without pulmonary embolus.  The thoracic aorta is normal in course and caliber. No pericardial effusions. There are extensive interstitial and groundglass opacities throughout each lung as well as small layering bilateral pleural effusions, right greater than left. There is loculated fluid within the left major fissure. The tracheobronchial tree is patent. No endobronchial lesions are identified. No abnormal bronchial wall thickening. No pneumothorax. No significant thoracic or axillary adenopathy. Limited images of the upper abdomen demonstrate no acute or clinically significant abnormality. No acute bony abnormality or aggressive focal osseous lesions.     Impression: 1.No pulmonary embolus. 2.Extensive interstitial and groundglass  opacities throughout each lung with small layering bilateral pleural effusions, right greater than left. The findings are most compatible with evolving edema/CHF Electronically Signed: Samuel France DO  12/12/2024 10:07 PM EST  Workstation ID: [id number]    XR Chest 1 View    Result Date: 12/12/2024  XR CHEST 1 VW Date of Exam: 12/12/2024 9:10 PM EST Indication: SOA triage protocol Comparison: None available. Findings: No focal consolidation. Vascular congestion. No pneumothorax or pleural effusion. Cardiac size is normal. The visualized clavicles appear intact. No displaced rib fractures. The visualized upper abdomen is normal.     Impression: Vascular congestion.. Electronically Signed: Charly Dash MD  12/12/2024 9:36 PM EST  Workstation ID: [id number]     Results for orders placed during the hospital encounter of 07/20/23    Duplex Carotid Ultrasound CAR    Interpretation Summary  •  Right internal carotid artery demonstrates a less than 50% stenosis.  •  Left internal carotid artery demonstrates a less than 50% stenosis.  •  Vertebral artery flow antegrade bilaterally      Results for orders placed during the hospital encounter of 07/20/23    Duplex Carotid Ultrasound CAR    Interpretation Summary  •  Right internal carotid artery demonstrates a less than 50% stenosis.  •  Left internal carotid artery demonstrates a less than 50% stenosis.  •  Vertebral artery flow antegrade bilaterally      Results for orders placed during the hospital encounter of 11/15/24    Adult Transthoracic Echo Complete W/ Cont if Necessary Per Protocol    Interpretation Summary  •  Left ventricular systolic function is normal. Left ventricular ejection fraction appears to be 66 - 70%.  •  Left ventricular diastolic function was normal.  •  Left atrial volume is mildly increased.  •  Estimated right ventricular systolic pressure from tricuspid regurgitation is normal (<35 mmHg).    No significant change compared to 2023 echo      Plan for  Follow-up of Pending Labs/Results:       Discharge Details        Discharge Medications        New Medications        Instructions Start Date   albuterol sulfate  (90 Base) MCG/ACT inhaler  Commonly known as: PROVENTIL HFA;VENTOLIN HFA;PROAIR HFA   2 puffs, Inhalation, Every 4 Hours PRN      budesonide-formoterol 80-4.5 MCG/ACT inhaler  Commonly known as: Symbicort   2 puffs, Inhalation, 2 Times Daily - RT      spironolactone 25 MG tablet  Commonly known as: ALDACTONE   25 mg, Oral, Daily             Changes to Medications        Instructions Start Date   Tresiba FlexTouch 100 UNIT/ML solution pen-injector injection  Generic drug: insulin degludec  What changed: additional instructions   Inject 19 Units under the skin into the appropriate area as directed Every Night.             Continue These Medications        Instructions Start Date   acetaminophen 500 MG tablet  Commonly known as: TYLENOL   2 tablets      apixaban 2.5 MG tablet tablet  Commonly known as: ELIQUIS   2.5 mg, Oral, Every 12 Hours Scheduled      azaTHIOprine 50 MG tablet  Commonly known as: IMURAN   TAKE 2 TABLETS TWICE DAILY      bumetanide 0.5 MG tablet  Commonly known as: BUMEX   0.5 mg, Oral, Every Other Day      Entresto  MG tablet  Generic drug: sacubitril-valsartan   1 tablet, Oral, 2 Times Daily      fluticasone 50 MCG/ACT nasal spray  Commonly known as: FLONASE   2 sprays, Each Nare, Daily, Shake well before using.       insulin aspart 100 UNIT/ML solution pen-injector sc pen  Commonly known as: novoLOG FLEXPEN   3 Times Daily With Meals      levocetirizine 5 MG tablet  Commonly known as: XYZAL   5 mg, Every Evening      levothyroxine 75 MCG tablet  Commonly known as: SYNTHROID, LEVOTHROID   75 mcg, Daily      montelukast 10 MG tablet  Commonly known as: SINGULAIR   10 mg, Oral, Every Evening      nebivolol 10 MG tablet  Commonly known as: BYSTOLIC   10 mg, Oral, Daily      nystatin 964049 UNIT/GM powder  Commonly known as:  MYCOSTATIN   Topical, 3 Times Daily      oxybutynin 5 MG tablet  Commonly known as: DITROPAN   5 mg, 2 Times Daily      pravastatin 20 MG tablet  Commonly known as: PRAVACHOL   20 mg, Oral, Every Evening      pregabalin 75 MG capsule  Commonly known as: LYRICA   75 mg, 2 Times Daily      Rinvoq 15 MG tablet sustained-release 24 hour  Generic drug: Upadacitinib ER   15 mg, Oral, Daily      terazosin 2 MG capsule  Commonly known as: HYTRIN   2 mg, Oral, Nightly             Stop These Medications      insulin  UNIT/ML injection  Commonly known as: humuLIN N,novoLIN N              Allergies   Allergen Reactions   • Atorvastatin Other (See Comments)   • Colesevelam Other (See Comments)   • Cymbalta [Duloxetine Hcl] Other (See Comments)     Hyponatremia   • Famotidine Other (See Comments)   • Cephalexin Rash     Tolerated Ceftriaxone   • Clindamycin Rash   • Hygroton [Chlorthalidone] Rash     Facial rash   • Lidocaine Other (See Comments)     Sores in throat   • Penicillins Nausea And Vomiting, Rash and Other (See Comments)     Has tolerated ceftriaxone  TROUBLE BREATHING         Discharge Disposition:  Home or Self Care    Diet:  Hospital:  Diet Order   Procedures   • Diet: Cardiac, Diabetic; Healthy Heart (2-3 Na+); Consistent Carbohydrate; Fluid Consistency: Thin (IDDSI 0)            Activity:      Restrictions or Other Recommendations:         CODE STATUS:    Code Status and Medical Interventions: CPR (Attempt to Resuscitate); Full Support   Ordered at: 12/12/24 3315     Code Status (Patient has no pulse and is not breathing):    CPR (Attempt to Resuscitate)     Medical Interventions (Patient has pulse or is breathing):    Full Support       Future Appointments   Date Time Provider Department Center   12/16/2024  1:20 PM [facility] [place]       Additional Instructions for the Follow-ups that You Need to Schedule       Ambulatory Referral to Physical Therapy for Evaluation & Treatment   As  directed      Follow-up needed: Yes        Discharge Follow-up with PCP   As directed       Currently Documented PCP:    Peter Baca MD    PCP Phone Number:    [phone]     Follow Up Details: with PCP in 1 week        Discharge Follow-up with Specified Provider: with cardiology   As directed      To: with cardiology   Follow Up Details: per their rec                      Samuel iLn MD  12/15/24      Time Spent on Discharge:  I spent  37  minutes on this discharge activity which included: face-to-face encounter with the patient, reviewing the data in the system, coordination of the care with the nursing staff as well as consultants, documentation, and entering orders.

## 2024-12-15 NOTE — CASE MANAGEMENT/SOCIAL WORK
Discharge Planning Assessment  [facility]     Patient Name: Marilyn Kunz  MRN: [id number]  Today's Date: 12/15/2024    Admit Date: 12/12/2024    Plan: Home   Discharge Needs Assessment    No documentation.                  Discharge Plan       Row Name 12/15/24 1029       Plan    Plan Home    Patient/Family in Agreement with Plan yes    Plan Comments CM notified by Ms. Kunz's RN that she needs home oxygen. CM spoke with Junaid with [employer] and gave referral. Portable tank will be delivered to patient's room prior to discharge.    Final Discharge Disposition Code 01 - home or self-care                  Continued Care and Services - Admitted Since 12/12/2024       Durable Medical Equipment Coordination complete.      Service Provider Request Status Services Address Phone Fax Patient Preferred    [facility] [address] [id number] [phone] --                  Selected Continued Care - Episodes Includes continued care and service providers with selected services from the active episodes listed below      Rheumatology - External Fill Episode start date: 5/31/2024   There are no active outsourced providers for this episode.                 Selected Continued Care - Prior Encounters Includes continued care and service providers with selected services from prior encounters from 9/13/2024 to 12/15/2024      Discharged on 11/25/2024 Admission date: 11/15/2024 - Discharge disposition: Home or Self Care      Therapy       Service Provider Services Address Phone Fax Patient Preferred    [facility] [address] [facility] [id number] [phone] --                          Expected Discharge Date and Time       Expected Discharge Date Expected Discharge Time    Dec 15, 2024            Demographic Summary    No documentation.                  Functional Status    No  documentation.                  Psychosocial    No documentation.                  Abuse/Neglect    No documentation.                  Legal    No documentation.                  Substance Abuse    No documentation.                  Patient Forms    No documentation.                     Layla Stanton RN

## 2024-12-15 NOTE — PLAN OF CARE
Problem: Adult Inpatient Plan of Care  Goal: Plan of Care Review  Outcome: Progressing  Flowsheets (Taken 12/14/2024 1950)  Progress: improving  Outcome Evaluation: VSS, now on RA at rest. Pt did desat to as low as 85% with ambulation but mostly stated at 90% while walking. Per Hospitalist, pt will require home 02 for activity at discharge.  Pt FSBG have been very elevated today. See MAR for insulin adjustments. Steroids have been completed. Possible discharge tomorrow.  Plan of Care Reviewed With: patient  Goal: Patient-Specific Goal (Individualized)  Outcome: Progressing  Goal: Absence of Hospital-Acquired Illness or Injury  Outcome: Progressing  Intervention: Identify and Manage Fall Risk  Recent Flowsheet Documentation  Taken 12/14/2024 1800 by Sharon Castro RN  Safety Promotion/Fall Prevention:   activity supervised   fall prevention program maintained   nonskid shoes/slippers when out of bed   safety round/check completed  Taken 12/14/2024 1600 by Sharon Castro RN  Safety Promotion/Fall Prevention:   activity supervised   fall prevention program maintained   nonskid shoes/slippers when out of bed   safety round/check completed  Taken 12/14/2024 1400 by Sharon Castro RN  Safety Promotion/Fall Prevention:   activity supervised   fall prevention program maintained   nonskid shoes/slippers when out of bed   safety round/check completed  Taken 12/14/2024 1200 by Sharon Castro RN  Safety Promotion/Fall Prevention:   activity supervised   fall prevention program maintained   nonskid shoes/slippers when out of bed   safety round/check completed  Taken 12/14/2024 1000 by Sharon Castro RN  Safety Promotion/Fall Prevention:   activity supervised   fall prevention program maintained   nonskid shoes/slippers when out of bed   safety round/check completed  Taken 12/14/2024 0800 by Sharon Castro RN  Safety Promotion/Fall Prevention:   activity supervised   fall prevention program maintained   nonskid shoes/slippers  when out of bed   safety round/check completed  Intervention: Prevent Skin Injury  Recent Flowsheet Documentation  Taken 12/14/2024 1800 by Sharon Castro RN  Body Position: position changed independently  Taken 12/14/2024 1600 by Sharon Castro RN  Body Position: position changed independently  Taken 12/14/2024 1400 by Sharon Castro RN  Body Position: position changed independently  Taken 12/14/2024 1200 by Sharon Castro RN  Body Position: position changed independently  Taken 12/14/2024 1000 by Sharon Castro RN  Body Position: position changed independently  Taken 12/14/2024 0800 by Sharon Castro RN  Body Position: position changed independently  Skin Protection: (skin under silicone dressings assessed)   incontinence pads utilized   silicone foam dressing in place  Intervention: Prevent and Manage VTE (Venous Thromboembolism) Risk  Recent Flowsheet Documentation  Taken 12/14/2024 0800 by Sharon Castro RN  VTE Prevention/Management: (pt on eliquis) other (see comments)  Goal: Optimal Comfort and Wellbeing  Outcome: Progressing  Intervention: Provide Person-Centered Care  Recent Flowsheet Documentation  Taken 12/14/2024 0800 by Sharon Castro RN  Trust Relationship/Rapport:   care explained   thoughts/feelings acknowledged  Goal: Readiness for Transition of Care  Outcome: Progressing   Goal Outcome Evaluation:  Plan of Care Reviewed With: patient        Progress: improving  Outcome Evaluation: VSS, now on RA at rest. Pt did desat to as low as 85% with ambulation but mostly stated at 90% while walking. Per Hospitalist, pt will require home 02 for activity at discharge.  Pt FSBG have been very elevated today. See MAR for insulin adjustments. Steroids have been completed. Possible discharge tomorrow.

## 2024-12-15 NOTE — PLAN OF CARE
Goal Outcome Evaluation:      -VSS, RA  -D/c home with brother, home oxygen provided

## 2024-12-16 ENCOUNTER — HOSPITAL ENCOUNTER (OUTPATIENT)
Dept: MAMMOGRAPHY | Facility: HOSPITAL | Age: 79
Discharge: HOME | End: 2024-12-16
Admitting: FAMILY MEDICINE
Payer: MEDICARE

## 2024-12-16 DIAGNOSIS — Z12.31 OTHER SCREENING MAMMOGRAM: ICD-10-CM

## 2024-12-16 PROCEDURE — 77067 SCR MAMMO BI INCL CAD: CPT

## 2024-12-16 PROCEDURE — 77063 BREAST TOMOSYNTHESIS BI: CPT

## 2024-12-16 NOTE — OUTREACH NOTE
Prep Survey      Flowsheet Row Responses   Congregational facility patient discharged from? [place]   Is LACE score < 7 ? No   Eligibility Readm Mgmt   Discharge diagnosis Acute on chronic heart failure with preserved ejection fraction   Does the patient have one of the following disease processes/diagnoses(primary or secondary)? CHF   Does the patient have Home health ordered? No   Is there a DME ordered? Yes   What DME was ordered? O2 2L NC ordered from [employer]   Prep survey completed? Yes            Natali ROGERS - Registered Nurse           Doxycycline Counseling:  Patient counseled regarding possible photosensitivity and increased risk for sunburn.  Patient instructed to avoid sunlight, if possible.  When exposed to sunlight, patients should wear protective clothing, sunglasses, and sunscreen.  The patient was instructed to call the office immediately if the following severe adverse effects occur:  hearing changes, easy bruising/bleeding, severe headache, or vision changes.  The patient verbalized understanding of the proper use and possible adverse effects of doxycycline.  All of the patient's questions and concerns were addressed.

## 2024-12-17 LAB
NCCN CRITERIA FLAG: NORMAL
TYRER CUZICK SCORE: 1.8

## 2024-12-18 ENCOUNTER — READMISSION MANAGEMENT (OUTPATIENT)
Dept: CALL CENTER | Facility: HOSPITAL | Age: 79
End: 2024-12-18
Payer: MEDICARE

## 2024-12-18 NOTE — OUTREACH NOTE
CHF Week 1 Survey      Flowsheet Row Responses   [facility] patient discharged from? [place]   Does the patient have one of the following disease processes/diagnoses(primary or secondary)? CHF   CHF Week 1 attempt successful? Yes   Call start time 1144   Call end time 1200   List who call center can speak with pt   Meds reviewed with patient/caregiver? Yes   Is the patient having any side effects they believe may be caused by any medication additions or changes? No   Does the patient have all medications ordered at discharge? Yes   Is the patient taking all medications as directed (includes completed medication regime)? Yes   Comments regarding appointments Pt to see pcp on 12-.   Does the patient have a primary care provider?  Yes   Does the patient have an appointment with their PCP within 7 days of discharge? Yes   Has the patient kept scheduled appointments due by today? N/A   Has home health visited the patient within 72 hours of discharge? N/A   What DME was ordered? Continuous 02 @ 2 L.   Has all DME been delivered? Yes   Pulse Ox monitoring Intermittent   Did the patient receive a copy of their discharge instructions? Yes   Nursing interventions Reviewed instructions with patient   What is the patient's perception of their health status since discharge? Improving   Is the patient/caregiver able to teach back the hierarchy of who to call/visit for symptoms/problems? PCP, Specialist, Home health nurse, Urgent Care, ED, 911 Yes   Is the patient able to teach back Heart Failure Zones? Yes   CHF Zone this Call Green Zone   Green Zone Patient reports doing well, No new or worsening shortness of breath   Green Zone Interventions Meds as directed    CHF Week 1 call completed? Yes   Is the patient interested in additional calls from an ambulatory ? No   Would this patient benefit from a Referral to Amb Social Work? No   Wrap up additional comments Pt lives alone. Pt has fx to foot in a  boot. Pt did  02 and continuous @ 2 L. Pt to see pcp next week. Pt checks 02 sats and bs often throughout the day.   Call end time 1200            Heather BELL - Registered Nurse

## 2024-12-20 PROCEDURE — 77063 BREAST TOMOSYNTHESIS BI: CPT | Performed by: RADIOLOGY

## 2024-12-20 PROCEDURE — 77067 SCR MAMMO BI INCL CAD: CPT | Performed by: RADIOLOGY

## 2025-01-16 ENCOUNTER — HOSPITAL ENCOUNTER (OUTPATIENT)
Dept: GENERAL RADIOLOGY | Facility: HOSPITAL | Age: 80
Discharge: HOME | End: 2025-01-16
Admitting: NURSE PRACTITIONER
Payer: MEDICARE

## 2025-01-16 ENCOUNTER — TRANSCRIBE ORDERS (OUTPATIENT)
Dept: GENERAL RADIOLOGY | Facility: HOSPITAL | Age: 80
End: 2025-01-16
Payer: MEDICARE

## 2025-01-16 DIAGNOSIS — M25.562 LEFT KNEE PAIN, UNSPECIFIED CHRONICITY: ICD-10-CM

## 2025-01-16 DIAGNOSIS — M25.562 LEFT KNEE PAIN, UNSPECIFIED CHRONICITY: Primary | ICD-10-CM

## 2025-01-16 PROCEDURE — 73562 X-RAY EXAM OF KNEE 3: CPT

## 2025-01-31 ENCOUNTER — HOSPITAL ENCOUNTER (EMERGENCY)
Facility: HOSPITAL | Age: 80
Discharge: HOME | End: 2025-01-31
Attending: EMERGENCY MEDICINE
Payer: MEDICARE

## 2025-01-31 VITALS
DIASTOLIC BLOOD PRESSURE: 61 MMHG | RESPIRATION RATE: 16 BRPM | TEMPERATURE: 98.5 F | OXYGEN SATURATION: 93 % | SYSTOLIC BLOOD PRESSURE: 135 MMHG | HEIGHT: 62 IN | BODY MASS INDEX: 23.45 KG/M2 | WEIGHT: 127.43 LBS | HEART RATE: 66 BPM

## 2025-01-31 DIAGNOSIS — N30.01 ACUTE CYSTITIS WITH HEMATURIA: ICD-10-CM

## 2025-01-31 DIAGNOSIS — R73.9 HYPERGLYCEMIA: Primary | ICD-10-CM

## 2025-01-31 DIAGNOSIS — Z86.79 HISTORY OF CHF (CONGESTIVE HEART FAILURE): ICD-10-CM

## 2025-01-31 LAB
ALBUMIN SERPL-MCNC: 4.1 G/DL (ref 3.5–5.2)
ALBUMIN/GLOB SERPL: 1.3 G/DL
ALP SERPL-CCNC: 116 U/L (ref 39–117)
ALT SERPL W P-5'-P-CCNC: 12 U/L (ref 1–33)
ANION GAP SERPL CALCULATED.3IONS-SCNC: 13 MMOL/L (ref 5–15)
AST SERPL-CCNC: 16 U/L (ref 1–32)
ATMOSPHERIC PRESS: ABNORMAL MM[HG]
B-OH-BUTYR SERPL-SCNC: 0.14 MMOL/L (ref 0.02–0.27)
BACTERIA UR QL AUTO: ABNORMAL /HPF
BASE EXCESS BLDV CALC-SCNC: 3.3 MMOL/L (ref -2–2)
BASOPHILS # BLD AUTO: 0.02 10*3/MM3 (ref 0–0.2)
BASOPHILS NFR BLD AUTO: 0.3 % (ref 0–1.5)
BDY SITE: ABNORMAL
BILIRUB SERPL-MCNC: 0.3 MG/DL (ref 0–1.2)
BILIRUB UR QL STRIP: NEGATIVE
BODY TEMPERATURE: 37
BUN SERPL-MCNC: 47 MG/DL (ref 8–23)
BUN/CREAT SERPL: 37.6 (ref 7–25)
CALCIUM SPEC-SCNC: 9.4 MG/DL (ref 8.6–10.5)
CHLORIDE SERPL-SCNC: 98 MMOL/L (ref 98–107)
CLARITY UR: ABNORMAL
CO2 BLDA-SCNC: 31.3 MMOL/L (ref 22–33)
CO2 SERPL-SCNC: 26 MMOL/L (ref 22–29)
COHGB MFR BLD: 0.8 %
COLOR UR: YELLOW
CREAT SERPL-MCNC: 1.25 MG/DL (ref 0.57–1)
DEPRECATED RDW RBC AUTO: 49.4 FL (ref 37–54)
EGFRCR SERPLBLD CKD-EPI 2021: 43.9 ML/MIN/1.73
EOSINOPHIL # BLD AUTO: 0.09 10*3/MM3 (ref 0–0.4)
EOSINOPHIL NFR BLD AUTO: 1.2 % (ref 0.3–6.2)
EPAP: 0
ERYTHROCYTE [DISTWIDTH] IN BLOOD BY AUTOMATED COUNT: 14 % (ref 12.3–15.4)
GLOBULIN UR ELPH-MCNC: 3.2 GM/DL
GLUCOSE BLDC GLUCOMTR-MCNC: 274 MG/DL (ref 70–130)
GLUCOSE BLDC GLUCOMTR-MCNC: 535 MG/DL (ref 70–130)
GLUCOSE SERPL-MCNC: 548 MG/DL (ref 65–99)
GLUCOSE UR STRIP-MCNC: ABNORMAL MG/DL
HCO3 BLDV-SCNC: 29.7 MMOL/L (ref 22–28)
HCT VFR BLD AUTO: 37.6 % (ref 34–46.6)
HGB BLD-MCNC: 12.1 G/DL (ref 12–15.9)
HGB BLDA-MCNC: 12.1 G/DL (ref 14–18)
HGB UR QL STRIP.AUTO: ABNORMAL
HYALINE CASTS UR QL AUTO: ABNORMAL /LPF
IMM GRANULOCYTES # BLD AUTO: 0.02 10*3/MM3 (ref 0–0.05)
IMM GRANULOCYTES NFR BLD AUTO: 0.3 % (ref 0–0.5)
INHALED O2 CONCENTRATION: 28 %
IPAP: 0
KETONES UR QL STRIP: NEGATIVE
LEUKOCYTE ESTERASE UR QL STRIP.AUTO: ABNORMAL
LYMPHOCYTES # BLD AUTO: 1.43 10*3/MM3 (ref 0.7–3.1)
LYMPHOCYTES NFR BLD AUTO: 19.3 % (ref 19.6–45.3)
MCH RBC QN AUTO: 30.8 PG (ref 26.6–33)
MCHC RBC AUTO-ENTMCNC: 32.2 G/DL (ref 31.5–35.7)
MCV RBC AUTO: 95.7 FL (ref 79–97)
METHGB BLD QL: 0.6 %
MODALITY: ABNORMAL
MONOCYTES # BLD AUTO: 0.45 10*3/MM3 (ref 0.1–0.9)
MONOCYTES NFR BLD AUTO: 6.1 % (ref 5–12)
NEUTROPHILS NFR BLD AUTO: 5.39 10*3/MM3 (ref 1.7–7)
NEUTROPHILS NFR BLD AUTO: 72.8 % (ref 42.7–76)
NITRITE UR QL STRIP: NEGATIVE
NRBC BLD AUTO-RTO: 0 /100 WBC (ref 0–0.2)
OXYHGB MFR BLDV: 73.4 %
PAW @ PEAK INSP FLOW SETTING VENT: 0 CMH2O
PCO2 BLDV: 52.2 MM HG (ref 41–51)
PH BLDV: 7.36 PH UNITS (ref 7.31–7.41)
PH UR STRIP.AUTO: 6 [PH] (ref 5–8)
PLATELET # BLD AUTO: 258 10*3/MM3 (ref 140–450)
PMV BLD AUTO: 12.9 FL (ref 6–12)
PO2 BLDV: 43.3 MM HG (ref 27–53)
POTASSIUM SERPL-SCNC: 4.1 MMOL/L (ref 3.5–5.2)
PROT SERPL-MCNC: 7.3 G/DL (ref 6–8.5)
PROT UR QL STRIP: ABNORMAL
RBC # BLD AUTO: 3.93 10*6/MM3 (ref 3.77–5.28)
RBC # UR STRIP: ABNORMAL /HPF
REF LAB TEST METHOD: ABNORMAL
SODIUM SERPL-SCNC: 137 MMOL/L (ref 136–145)
SP GR UR STRIP: 1.02 (ref 1–1.03)
SQUAMOUS #/AREA URNS HPF: ABNORMAL /HPF
TOTAL RATE: 0 BREATHS/MINUTE
UROBILINOGEN UR QL STRIP: ABNORMAL
WBC # UR STRIP: ABNORMAL /HPF
WBC NRBC COR # BLD AUTO: 7.4 10*3/MM3 (ref 3.4–10.8)

## 2025-01-31 PROCEDURE — 25810000003 SODIUM CHLORIDE 0.9 % SOLUTION: Performed by: EMERGENCY MEDICINE

## 2025-01-31 PROCEDURE — 82010 KETONE BODYS QUAN: CPT | Performed by: EMERGENCY MEDICINE

## 2025-01-31 PROCEDURE — 82805 BLOOD GASES W/O2 SATURATION: CPT

## 2025-01-31 PROCEDURE — 80053 COMPREHEN METABOLIC PANEL: CPT | Performed by: EMERGENCY MEDICINE

## 2025-01-31 PROCEDURE — 87086 URINE CULTURE/COLONY COUNT: CPT | Performed by: EMERGENCY MEDICINE

## 2025-01-31 PROCEDURE — 87088 URINE BACTERIA CULTURE: CPT | Performed by: EMERGENCY MEDICINE

## 2025-01-31 PROCEDURE — 99283 EMERGENCY DEPT VISIT LOW MDM: CPT

## 2025-01-31 PROCEDURE — 85025 COMPLETE CBC W/AUTO DIFF WBC: CPT | Performed by: EMERGENCY MEDICINE

## 2025-01-31 PROCEDURE — 96365 THER/PROPH/DIAG IV INF INIT: CPT

## 2025-01-31 PROCEDURE — 25010000002 CEFTRIAXONE PER 250 MG: Performed by: EMERGENCY MEDICINE

## 2025-01-31 PROCEDURE — 63710000001 INSULIN REGULAR HUMAN PER 5 UNITS: Performed by: EMERGENCY MEDICINE

## 2025-01-31 PROCEDURE — 82948 REAGENT STRIP/BLOOD GLUCOSE: CPT

## 2025-01-31 PROCEDURE — 81001 URINALYSIS AUTO W/SCOPE: CPT | Performed by: EMERGENCY MEDICINE

## 2025-01-31 PROCEDURE — 36415 COLL VENOUS BLD VENIPUNCTURE: CPT

## 2025-01-31 PROCEDURE — 87186 SC STD MICRODIL/AGAR DIL: CPT | Performed by: EMERGENCY MEDICINE

## 2025-01-31 RX ORDER — CEFUROXIME AXETIL 500 MG/1
500 TABLET ORAL 2 TIMES DAILY
Qty: 14 TABLET | Refills: 0 | Status: SHIPPED | OUTPATIENT
Start: 2025-01-31 | End: 2025-01-31

## 2025-01-31 RX ORDER — CEFUROXIME AXETIL 500 MG/1
500 TABLET ORAL 2 TIMES DAILY
Qty: 14 TABLET | Refills: 0 | Status: SHIPPED | OUTPATIENT
Start: 2025-01-31 | End: 2025-02-07

## 2025-01-31 RX ADMIN — SODIUM CHLORIDE 1000 MG: 900 INJECTION INTRAVENOUS at 18:29

## 2025-01-31 RX ADMIN — INSULIN HUMAN 10 UNITS: 100 INJECTION, SOLUTION PARENTERAL at 17:55

## 2025-01-31 RX ADMIN — SODIUM CHLORIDE 1000 ML: 9 INJECTION, SOLUTION INTRAVENOUS at 17:54

## 2025-01-31 NOTE — ED PROVIDER NOTES
[facility]    EMERGENCY DEPARTMENT ENCOUNTER      Pt Name: Marilyn Kunz  MRN: [id number]  YOB: 1945  Date of evaluation: 1/31/2025  Provider: Jin Kennedy MD    CHIEF COMPLAINT       Chief Complaint   Patient presents with    Hyperglycemia         HISTORY OF PRESENT ILLNESS   Marilyn Kunz is a 79 y.o. female who presents to the emergency department with hyperglycemia. Glucose was in the 500s and so came to ED. She has been compliant with meds. She otherwise feels well. Denies any chest pain, cough, fever, chills, abd pain, vomiting, diarrhea, urinary symptoms.      Nursing notes were reviewed.    REVIEW OF SYSTEMS     ROS:  A chief complaint appropriate review of systems was completed and is negative except as noted in the HPI.      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  I asked her to go ahead and try to talk to her endocrinokogist.  She is still having the lower extremity neuropathic pan, but not enough for me to administer medication.    Disease of thyroid gland     Diverticulitis of colon     Erosive osteoarthritis 05/30/2024    Factor 5 Leiden  mutation, heterozygous 2012    Head injury     Hyperlipidemia     Hypertension     Medication monitoring encounter     Impression:  She has renal insufficiency.  She is just taking Tylenol.    Movement disorder 10/2021    knee replacement left knww    Murmur, cardiac     Neuropathy in diabetes     redness swelling legs    TIFFANIE (obstructive sleep apnea) 11/15/2024    Osteopenia     Story: Femoral Neck Impression: Up-ro-date on bone density scan.  Follow up bone density every 2 years.    Peripheral neuropathy 05/03/2022    hands, arms. 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  I went over her exercises and told her to get a shoe insert,    Psoriasis     Impression: No rash    Psoriatic arthritis     hands    Sleep apnea Always    diabetic do not sleep well up and down    Stroke had one don't know when    showed on MRI Brain    Syncope 11/15/2024    Type 1 diabetes          SURGICAL HISTORY       Past Surgical History:   Procedure Laterality Date    CARDIAC CATHETERIZATION  1952 2 of them    Patent Dictus operation    CATARACT EXTRACTION Bilateral     COLONOSCOPY      ENDOSCOPY N/A 10/20/2020    Procedure: ESOPHAGOGASTRODUODENOSCOPY;  Surgeon: Brunner, Mark I, MD;  Location: [facility] ENDOSCOPY;  Service: Gastroenterology;  Laterality: N/A;    HAND SURGERY Left 1987    no hardware    KNEE ARTHROPLASTY      KNEE SURGERY Left     PATENT DUCTUS ARTERIOUS LIGATION      REPLACEMENT TOTAL KNEE Left          CURRENT MEDICATIONS     No current facility-administered medications for this encounter.    Current Outpatient Medications:     cefuroxime (CEFTIN) 500 MG tablet, Take 1 tablet by mouth 2 (Two) Times a Day for 7 days., Disp: 14 tablet, Rfl: 0    acetaminophen (TYLENOL) 500 MG tablet, Take 2 tablets by mouth. every 4 - 6 hours as needed not to exceed 8 tablets per 24hrs, Disp: , Rfl:     albuterol sulfate  (90 Base) MCG/ACT inhaler, Inhale 2 puffs Every 4  (Four) Hours As Needed for Wheezing., Disp: 18 g, Rfl: 0    azaTHIOprine (IMURAN) 50 MG tablet, TAKE 2 TABLETS TWICE DAILY, Disp: 360 tablet, Rfl: 3    budesonide-formoterol (Symbicort) 80-4.5 MCG/ACT inhaler, Inhale 2 puffs 2 (Two) Times a Day., Disp: 10.2 g, Rfl: 0    bumetanide (BUMEX) 0.5 MG tablet, Take 1 tablet by mouth Every Other Day., Disp: , Rfl:     fluticasone (FLONASE) 50 MCG/ACT nasal spray, 2 sprays by Each Nare route Daily. Shake well before using., Disp: , Rfl:     insulin aspart (novoLOG FLEXPEN) 100 UNIT/ML solution pen-injector sc pen, Inject  under the skin into the appropriate area as directed 3 (Three) Times a Day With Meals. Sliding scale, Disp: , Rfl:     levocetirizine (XYZAL) 5 MG tablet, Take 1 tablet by mouth Every Evening., Disp: , Rfl:     levothyroxine (SYNTHROID, LEVOTHROID) 75 MCG tablet, Take 1 tablet by mouth Daily., Disp: , Rfl:     montelukast (SINGULAIR) 10 MG tablet, Take 1 tablet by mouth Every Evening., Disp: , Rfl:     nebivolol (BYSTOLIC) 10 MG tablet, Take 1 tablet by mouth Daily for 30 days., Disp: 30 tablet, Rfl: 0    nystatin (MYCOSTATIN) 947923 UNIT/GM powder, Apply  topically to the appropriate area as directed 3 (Three) Times a Day., Disp: 60 g, Rfl: 0    oxybutynin (DITROPAN) 5 MG tablet, Take 1 tablet by mouth 2 (Two) Times a Day., Disp: , Rfl:     pravastatin (PRAVACHOL) 20 MG tablet, TAKE 1 TABLET EVERY EVENING, Disp: 30 tablet, Rfl: 0    pregabalin (LYRICA) 75 MG capsule, Take 1 capsule by mouth 2 (Two) Times a Day., Disp: , Rfl:     sacubitril-valsartan (Entresto)  MG tablet, TAKE 1 TABLET BY MOUTH TWICE DAILY, Disp: 180 tablet, Rfl: 1    spironolactone (ALDACTONE) 25 MG tablet, Take 1 tablet by mouth Daily., Disp: 30 tablet, Rfl: 0    terazosin (HYTRIN) 2 MG capsule, Take 1 capsule by mouth Every Night., Disp: 30 capsule, Rfl: 6    Tresiba FlexTouch 100 UNIT/ML solution pen-injector injection, Inject 19 Units under the skin into the appropriate area as  directed Every Night. (Patient taking differently: Patient takes per sliding scale), Disp: , Rfl:     Upadacitinib ER (Rinvoq) 15 MG tablet sustained-release 24 hour, Take 1 tablet by mouth Daily., Disp: 90 tablet, Rfl: 3    ALLERGIES     Atorvastatin, Colesevelam, Cymbalta [duloxetine hcl], Famotidine, Cephalexin, Clindamycin, Hygroton [chlorthalidone], Lidocaine, and Penicillins    FAMILY HISTORY       Family History   Problem Relation Age of Onset    Cancer Mother     Pancreatic cancer Mother     Stroke Mother         Mini strokes    Cancer Father     Lung cancer Father     Cancer Sister     Colon cancer Sister     Diabetes Sister     Diabetes Brother     Breast cancer Neg Hx     Ovarian cancer Neg Hx           SOCIAL HISTORY       Social History     Socioeconomic History    Marital status: Single   Tobacco Use    Smoking status: Never     Passive exposure: Never    Smokeless tobacco: Never   Vaping Use    Vaping status: Never Used   Substance and Sexual Activity    Alcohol use: No    Drug use: No    Sexual activity: Not Currently     Partners: Male     Birth control/protection: Abstinence         PHYSICAL EXAM    (up to 7 for level 4, 8 or more for level 5)     Vitals:    01/31/25 1900 01/31/25 1914 01/31/25 1919 01/31/25 1929   BP: 162/68 135/58  135/61   BP Location:       Patient Position:       Pulse: 68 66 70 66   Resp:       Temp:       TempSrc:       SpO2: 94% 95% 95% 93%   Weight:       Height:           General: Awake, alert, no acute distress.  HEENT: Conjunctivae normal.  Neck: Trachea midline.  Cardiac: Heart regular rate, rhythm, no murmurs, rubs, or gallops  Lungs: Lungs are clear to auscultation, there is no wheezing, rhonchi, or rales. There is no use of accessory muscles.  Chest wall: There is no tenderness to palpation over the chest wall or over ribs  Abdomen: Abdomen is soft, nontender, nondistended. There are no firm or pulsatile masses, no rebound rigidity or guarding.   Musculoskeletal: No  deformity.  Neuro: Alert and oriented x 4.  Dermatology: Skin is warm and dry  Psych: Mentation is grossly normal, cognition is grossly normal. Affect is appropriate.        DIAGNOSTIC RESULTS     EKG: All EKGs are interpreted by the Emergency Department Physician who either signs or Co-signs this chart in the absence of a cardiologist.    No orders to display         RADIOLOGY:   [x] Radiologist's Report Reviewed:  No orders to display       I ordered and independently reviewed the above noted radiographic studies.        LABS:    I have reviewed and interpreted all of the currently available lab results from this visit (if applicable):  Results for orders placed or performed during the hospital encounter of 01/31/25   POC Glucose Once    Collection Time: 01/31/25  5:28 PM    Specimen: Blood   Result Value Ref Range    Glucose 535 (C) 70 - 130 mg/dL   Blood Gas, Venous With Co-Ox    Collection Time: 01/31/25  5:36 PM    Specimen: Venous Blood   Result Value Ref Range    Site Nurse/Dr Draw     pH, Venous 7.363 7.310 - 7.410 pH Units    pCO2, Venous 52.2 (H) 41.0 - 51.0 mm Hg    pO2, Venous 43.3 27.0 - 53.0 mm Hg    HCO3, Venous 29.7 (H) 22.0 - 28.0 mmol/L    Base Excess, Venous 3.3 (H) -2.0 - 2.0 mmol/L    Hemoglobin, Blood Gas 12.1 (L) 14 - 18 g/dL    Oxyhemoglobin Venous 73.4 %    Methemoglobin Venous 0.6 %    Carboxyhemoglobin Venous 0.8 %    CO2 Content 31.3 22 - 33 mmol/L    Temperature 37.0     Barometric Pressure for Blood Gas      Modality Nasal Cannula     FIO2 28 %    Rate 0 Breaths/minute    PIP 0 cmH2O    IPAP 0     EPAP 0    Comprehensive Metabolic Panel    Collection Time: 01/31/25  5:37 PM    Specimen: Blood   Result Value Ref Range    Glucose 548 (C) 65 - 99 mg/dL    BUN 47 (H) 8 - 23 mg/dL    Creatinine 1.25 (H) 0.57 - 1.00 mg/dL    Sodium 137 136 - 145 mmol/L    Potassium 4.1 3.5 - 5.2 mmol/L    Chloride 98 98 - 107 mmol/L    CO2 26.0 22.0 - 29.0 mmol/L    Calcium 9.4 8.6 - 10.5 mg/dL    Total  Protein 7.3 6.0 - 8.5 g/dL    Albumin 4.1 3.5 - 5.2 g/dL    ALT (SGPT) 12 1 - 33 U/L    AST (SGOT) 16 1 - 32 U/L    Alkaline Phosphatase 116 39 - 117 U/L    Total Bilirubin 0.3 0.0 - 1.2 mg/dL    Globulin 3.2 gm/dL    A/G Ratio 1.3 g/dL    BUN/Creatinine Ratio 37.6 (H) 7.0 - 25.0    Anion Gap 13.0 5.0 - 15.0 mmol/L    eGFR 43.9 (L) >60.0 mL/min/1.73   Beta Hydroxybutyrate Quantitative    Collection Time: 01/31/25  5:37 PM    Specimen: Blood   Result Value Ref Range    Beta-Hydroxybutyrate Quant 0.137 0.020 - 0.270 mmol/L   CBC Auto Differential    Collection Time: 01/31/25  5:37 PM    Specimen: Blood   Result Value Ref Range    WBC 7.40 3.40 - 10.80 10*3/mm3    RBC 3.93 3.77 - 5.28 10*6/mm3    Hemoglobin 12.1 12.0 - 15.9 g/dL    Hematocrit 37.6 34.0 - 46.6 %    MCV 95.7 79.0 - 97.0 fL    MCH 30.8 26.6 - 33.0 pg    MCHC 32.2 31.5 - 35.7 g/dL    RDW 14.0 12.3 - 15.4 %    RDW-SD 49.4 37.0 - 54.0 fl    MPV 12.9 (H) 6.0 - 12.0 fL    Platelets 258 140 - 450 10*3/mm3    Neutrophil % 72.8 42.7 - 76.0 %    Lymphocyte % 19.3 (L) 19.6 - 45.3 %    Monocyte % 6.1 5.0 - 12.0 %    Eosinophil % 1.2 0.3 - 6.2 %    Basophil % 0.3 0.0 - 1.5 %    Immature Grans % 0.3 0.0 - 0.5 %    Neutrophils, Absolute 5.39 1.70 - 7.00 10*3/mm3    Lymphocytes, Absolute 1.43 0.70 - 3.10 10*3/mm3    Monocytes, Absolute 0.45 0.10 - 0.90 10*3/mm3    Eosinophils, Absolute 0.09 0.00 - 0.40 10*3/mm3    Basophils, Absolute 0.02 0.00 - 0.20 10*3/mm3    Immature Grans, Absolute 0.02 0.00 - 0.05 10*3/mm3    nRBC 0.0 0.0 - 0.2 /100 WBC   Urine Culture - Urine, Urine, Clean Catch    Collection Time: 01/31/25  5:43 PM    Specimen: Urine, Clean Catch   Result Value Ref Range    Urine Culture >100,000 CFU/mL Escherichia coli (A)        Susceptibility    Escherichia coli - SUAD     Amoxicillin + Clavulanate  Susceptible ug/ml     Ampicillin  Susceptible ug/ml     Ampicillin + Sulbactam  Susceptible ug/ml     Cefazolin (Urine)  Susceptible ug/ml     Cefepime   Susceptible ug/ml     Ceftazidime  Susceptible ug/ml     Ceftriaxone  Susceptible ug/ml     Gentamicin  Susceptible ug/ml     Levofloxacin  Susceptible ug/ml     Nitrofurantoin  Susceptible ug/ml     Piperacillin + Tazobactam  Susceptible ug/ml     Trimethoprim + Sulfamethoxazole  Susceptible ug/ml   Urinalysis With Microscopic If Indicated (No Culture) - Urine, Clean Catch    Collection Time: 01/31/25  5:43 PM    Specimen: Urine, Clean Catch   Result Value Ref Range    Color, UA Yellow Yellow, Straw    Appearance, UA Cloudy (A) Clear    pH, UA 6.0 5.0 - 8.0    Specific Gravity, UA 1.019 1.001 - 1.030    Glucose, UA >=1000 mg/dL (3+) (A) Negative    Ketones, UA Negative Negative    Bilirubin, UA Negative Negative    Blood, UA Moderate (2+) (A) Negative    Protein, UA 30 mg/dL (1+) (A) Negative    Leuk Esterase, UA Moderate (2+) (A) Negative    Nitrite, UA Negative Negative    Urobilinogen, UA 1.0 E.U./dL 0.2 - 1.0 E.U./dL   Urinalysis, Microscopic Only - Urine, Clean Catch    Collection Time: 01/31/25  5:43 PM    Specimen: Urine, Clean Catch   Result Value Ref Range    RBC, UA 21-50 (A) None Seen, 0-2 /HPF    WBC, UA Too Numerous to Count (A) None Seen, 0-2 /HPF    Bacteria, UA 4+ (A) None Seen, Trace /HPF    Squamous Epithelial Cells, UA 0-2 None Seen, 0-2 /HPF    Hyaline Casts, UA 0-6 0 - 6 /LPF    Methodology Automated Microscopy    POC Glucose Once    Collection Time: 01/31/25  7:00 PM    Specimen: Blood   Result Value Ref Range    Glucose 274 (H) 70 - 130 mg/dL        If labs were ordered, I independently reviewed the results and considered them in treating the patient.      EMERGENCY DEPARTMENT COURSE and DIFFERENTIAL DIAGNOSIS/MDM:   Vitals:  AS OF 11:45 EST    BP - 135/61  HR - 66  TEMP - 98.5 °F (36.9 °C) (Oral)  O2 SATS - 93%        Discussion below represents my analysis of pertinent findings related to patient's condition, differential diagnosis, treatment plan and final disposition.      Differential  diagnosis:  The differential diagnosis associated with the patient's presentation includes: hyperglycemia, DKA, HHS, dehydration, electrolyte derangement      Independent interpretations (ECG/rhythm strip/X-ray/US/CT scan): I independently interpreted the pt cardiac monitor which shows NSR      Additional sources:  Discussed/obtained information from independent historians:   [] Spouse:   [] Parent:   [] Friend:   [x] EMS: Report was taken from EMS - [facility] during transport   [] Other:  External (non-ED) record review:   [] Inpatient record:   [] Office record:   [] Outpatient record:   [] Prior Outpatient labs:   [] Prior Outpatient radiology:   [] Primary Care record:   [] Outside ED record:   [] Other:       Patient's care impacted by:   [x] Diabetes   [] Hypertension   [] Coronary Artery Disease   [] Cancer   [] Other:     Care significantly affected by Social Determinants of Health (housing and economic circumstances, unemployment)    [] Yes     [x] No   If yes, Patient's care significantly limited by  Social Determinants of Health including:    [] Inadequate housing    [] Low income    [] Alcoholism and drug addiction in family    [] Problems related to primary support group    [] Unemployment    [] Problems related to employment    [] Other Social Determinants of Health:       Consideration of admission/observation vs discharge: Considered admit, however pt well appearing, not septic, glucose controlled in the ED, not in DKA/HHS      I considered prescription management with:    [] Pain medication:   [] Antiviral:   [x] Antibiotic: given rocephin, rx ceftin for home   [] Other:      ED Course:    ED Course as of 02/03/25 1145   Fri Jan 31, 2025 1954 On reexamination, the patient galindo completely asymptomatic.  She is well-appearing and resting comfortably in bed with normal vital signs.  Her glucose has improved to the 200s.  There is no evidence of DKA and clinically no evidence of HHS.  Renal function appear  stable and there is no electrolyte derangement.  She does have some urinary tract infection which may be the the culprit for her difficult to control glucose.  I have given her a dose of Rocephin in the ED and will prescribe Ceftin for home.  She has no abdominal pain, flank pain, fever, nausea, vomiting, or leukocytosis to suggest systemic infection or pyelonephritis at this point.  Discussed return precautions if her glucose becomes too high and unmanageable again or if she begins to have any confusion, fever, vomiting, or any other concerning symptoms [NS]      ED Course User Index  [NS] Jin Kennedy MD           I had a discussion with the patient/family regarding diagnosis, diagnostic results, treatment plan, and medications.  The patient/family indicated understanding of these instructions.  I spent adequate time at the bedside preceding discharge necessary to personally discuss the aftercare instructions, giving patient education, providing explanations of the results of our evaluations/findings, and my decision making to assure that the patient/family understand the plan of care.  Time was allotted to answer questions at that time and throughout the ED course.  Emphasis was placed on timely follow-up after discharge.  I also discussed the potential for the development of an acute emergent condition requiring further evaluation, admission, or even surgical intervention. I discussed that we found nothing during the visit today indicating the need for further workup, admission, or the presence of an unstable medical condition.  I encouraged the patient to return to the emergency department immediately for ANY concerns, worsening, new complaints, or if symptoms persist and unable to seek follow-up in a timely fashion.  The patient/family expressed understanding and agreement with this plan.  The patient will follow-up with their PCP in 1-2 days for reevaluation.            PROCEDURES:  Procedures    CRITICAL CARE TIME        FINAL IMPRESSION      1. Hyperglycemia    2. Acute cystitis with hematuria    3. History of CHF (congestive heart failure)          DISPOSITION/PLAN     ED Disposition       ED Disposition   Discharge    Condition   Stable    Comment   --                 Comment: Please note this report has been produced using speech recognition software.      Jin Kennedy MD  Attending Emergency Physician             Jin Kennedy MD  02/03/25 3014

## 2025-02-01 NOTE — DISCHARGE INSTRUCTIONS
Please return to the emergency department if you are having significant difficulty managing her hyperglycemia at home.  Return if you have any confusion, fever, vomiting, or abdominal pain.  Please follow-up with your primary care provider as soon as you can for reevaluation

## 2025-02-02 LAB — BACTERIA SPEC AEROBE CULT: ABNORMAL

## 2025-03-19 ENCOUNTER — APPOINTMENT (OUTPATIENT)
Dept: GENERAL RADIOLOGY | Facility: HOSPITAL | Age: 80
DRG: 092 | End: 2025-03-19
Payer: MEDICARE

## 2025-03-19 ENCOUNTER — HOSPITAL ENCOUNTER (INPATIENT)
Facility: HOSPITAL | Age: 80
LOS: 1 days | Discharge: HOME | DRG: 092 | End: 2025-03-21
Attending: EMERGENCY MEDICINE | Admitting: INTERNAL MEDICINE
Payer: MEDICARE

## 2025-03-19 ENCOUNTER — APPOINTMENT (OUTPATIENT)
Dept: CT IMAGING | Facility: HOSPITAL | Age: 80
DRG: 092 | End: 2025-03-19
Payer: MEDICARE

## 2025-03-19 DIAGNOSIS — I48.0 PAROXYSMAL ATRIAL FIBRILLATION: ICD-10-CM

## 2025-03-19 DIAGNOSIS — Z86.39 HISTORY OF DIABETES MELLITUS: ICD-10-CM

## 2025-03-19 DIAGNOSIS — R41.841 COGNITIVE COMMUNICATION DEFICIT: ICD-10-CM

## 2025-03-19 DIAGNOSIS — R55 SYNCOPE, UNSPECIFIED SYNCOPE TYPE: ICD-10-CM

## 2025-03-19 DIAGNOSIS — R29.818 TRANSIENT NEUROLOGICAL SYMPTOMS: Primary | ICD-10-CM

## 2025-03-19 DIAGNOSIS — S60.00XA CONTUSION OF FINGER OF LEFT HAND, INITIAL ENCOUNTER: ICD-10-CM

## 2025-03-19 LAB
ALBUMIN SERPL-MCNC: 4.3 G/DL (ref 3.5–5.2)
ALBUMIN/GLOB SERPL: 1.4 G/DL
ALP SERPL-CCNC: 88 U/L (ref 39–117)
ALT SERPL W P-5'-P-CCNC: 14 U/L (ref 1–33)
ANION GAP SERPL CALCULATED.3IONS-SCNC: 14 MMOL/L (ref 5–15)
APAP SERPL-MCNC: <5 MCG/ML (ref 0–30)
AST SERPL-CCNC: 21 U/L (ref 1–32)
BASOPHILS # BLD AUTO: 0.02 10*3/MM3 (ref 0–0.2)
BASOPHILS NFR BLD AUTO: 0.2 % (ref 0–1.5)
BILIRUB SERPL-MCNC: 0.5 MG/DL (ref 0–1.2)
BUN BLDA-MCNC: 43 MG/DL (ref 8–26)
BUN SERPL-MCNC: 42 MG/DL (ref 8–23)
BUN/CREAT SERPL: 37.8 (ref 7–25)
CA-I BLDA-SCNC: 1.23 MMOL/L (ref 1.2–1.32)
CALCIUM SPEC-SCNC: 9.5 MG/DL (ref 8.6–10.5)
CHLORIDE BLDA-SCNC: 109 MMOL/L (ref 98–109)
CHLORIDE SERPL-SCNC: 106 MMOL/L (ref 98–107)
CK SERPL-CCNC: 133 U/L (ref 20–180)
CO2 BLDA-SCNC: 27 MMOL/L (ref 24–29)
CO2 SERPL-SCNC: 25 MMOL/L (ref 22–29)
CREAT BLDA-MCNC: 1.1 MG/DL (ref 0.6–1.3)
CREAT SERPL-MCNC: 1.11 MG/DL (ref 0.57–1)
DEPRECATED RDW RBC AUTO: 50.4 FL (ref 37–54)
EGFRCR SERPLBLD CKD-EPI 2021: 50.7 ML/MIN/1.73
EGFRCR SERPLBLD CKD-EPI 2021: 51.2 ML/MIN/1.73
EOSINOPHIL # BLD AUTO: 0.01 10*3/MM3 (ref 0–0.4)
EOSINOPHIL NFR BLD AUTO: 0.1 % (ref 0.3–6.2)
ERYTHROCYTE [DISTWIDTH] IN BLOOD BY AUTOMATED COUNT: 14.1 % (ref 12.3–15.4)
ETHANOL BLD-MCNC: <10 MG/DL (ref 0–10)
GEN 5 1HR TROPONIN T REFLEX: 20 NG/L
GLOBULIN UR ELPH-MCNC: 3.1 GM/DL
GLUCOSE BLDC GLUCOMTR-MCNC: 188 MG/DL (ref 70–130)
GLUCOSE BLDC GLUCOMTR-MCNC: 261 MG/DL (ref 70–130)
GLUCOSE BLDC GLUCOMTR-MCNC: 68 MG/DL (ref 70–130)
GLUCOSE SERPL-MCNC: 65 MG/DL (ref 65–99)
HCT VFR BLD AUTO: 42.7 % (ref 34–46.6)
HCT VFR BLDA CALC: 42 % (ref 38–51)
HGB BLD-MCNC: 13.1 G/DL (ref 12–15.9)
HGB BLDA-MCNC: 14.3 G/DL (ref 12–17)
IMM GRANULOCYTES # BLD AUTO: 0.03 10*3/MM3 (ref 0–0.05)
IMM GRANULOCYTES NFR BLD AUTO: 0.3 % (ref 0–0.5)
LYMPHOCYTES # BLD AUTO: 1.21 10*3/MM3 (ref 0.7–3.1)
LYMPHOCYTES NFR BLD AUTO: 10.7 % (ref 19.6–45.3)
MAGNESIUM SERPL-MCNC: 2.4 MG/DL (ref 1.6–2.4)
MCH RBC QN AUTO: 29.6 PG (ref 26.6–33)
MCHC RBC AUTO-ENTMCNC: 30.7 G/DL (ref 31.5–35.7)
MCV RBC AUTO: 96.4 FL (ref 79–97)
MONOCYTES # BLD AUTO: 0.61 10*3/MM3 (ref 0.1–0.9)
MONOCYTES NFR BLD AUTO: 5.4 % (ref 5–12)
NEUTROPHILS NFR BLD AUTO: 83.3 % (ref 42.7–76)
NEUTROPHILS NFR BLD AUTO: 9.46 10*3/MM3 (ref 1.7–7)
NRBC BLD AUTO-RTO: 0 /100 WBC (ref 0–0.2)
NT-PROBNP SERPL-MCNC: 575 PG/ML (ref 0–1800)
PLATELET # BLD AUTO: 144 10*3/MM3 (ref 140–450)
PMV BLD AUTO: 12.2 FL (ref 6–12)
POTASSIUM BLDA-SCNC: 4.5 MMOL/L (ref 3.5–4.9)
POTASSIUM SERPL-SCNC: 4.7 MMOL/L (ref 3.5–5.2)
PROT SERPL-MCNC: 7.4 G/DL (ref 6–8.5)
RBC # BLD AUTO: 4.43 10*6/MM3 (ref 3.77–5.28)
SALICYLATES SERPL-MCNC: <0.3 MG/DL
SODIUM BLD-SCNC: 146 MMOL/L (ref 138–146)
SODIUM SERPL-SCNC: 145 MMOL/L (ref 136–145)
T4 FREE SERPL-MCNC: 1.66 NG/DL (ref 0.92–1.68)
TROPONIN T % DELTA: 18
TROPONIN T NUMERIC DELTA: 3 NG/L
TROPONIN T SERPL HS-MCNC: 17 NG/L
TSH SERPL DL<=0.05 MIU/L-ACNC: 0.7 UIU/ML (ref 0.27–4.2)
WBC NRBC COR # BLD AUTO: 11.34 10*3/MM3 (ref 3.4–10.8)

## 2025-03-19 PROCEDURE — 93005 ELECTROCARDIOGRAM TRACING: CPT | Performed by: EMERGENCY MEDICINE

## 2025-03-19 PROCEDURE — 84484 ASSAY OF TROPONIN QUANT: CPT | Performed by: EMERGENCY MEDICINE

## 2025-03-19 PROCEDURE — 71045 X-RAY EXAM CHEST 1 VIEW: CPT

## 2025-03-19 PROCEDURE — 82550 ASSAY OF CK (CPK): CPT | Performed by: EMERGENCY MEDICINE

## 2025-03-19 PROCEDURE — 80179 DRUG ASSAY SALICYLATE: CPT | Performed by: EMERGENCY MEDICINE

## 2025-03-19 PROCEDURE — 83735 ASSAY OF MAGNESIUM: CPT | Performed by: EMERGENCY MEDICINE

## 2025-03-19 PROCEDURE — 82077 ASSAY SPEC XCP UR&BREATH IA: CPT | Performed by: EMERGENCY MEDICINE

## 2025-03-19 PROCEDURE — 99222 1ST HOSP IP/OBS MODERATE 55: CPT

## 2025-03-19 PROCEDURE — 73130 X-RAY EXAM OF HAND: CPT

## 2025-03-19 PROCEDURE — 70496 CT ANGIOGRAPHY HEAD: CPT

## 2025-03-19 PROCEDURE — 82948 REAGENT STRIP/BLOOD GLUCOSE: CPT

## 2025-03-19 PROCEDURE — 99291 CRITICAL CARE FIRST HOUR: CPT

## 2025-03-19 PROCEDURE — 85014 HEMATOCRIT: CPT

## 2025-03-19 PROCEDURE — 84439 ASSAY OF FREE THYROXINE: CPT | Performed by: EMERGENCY MEDICINE

## 2025-03-19 PROCEDURE — 36415 COLL VENOUS BLD VENIPUNCTURE: CPT

## 2025-03-19 PROCEDURE — 70450 CT HEAD/BRAIN W/O DYE: CPT

## 2025-03-19 PROCEDURE — 80143 DRUG ASSAY ACETAMINOPHEN: CPT | Performed by: EMERGENCY MEDICINE

## 2025-03-19 PROCEDURE — 99222 1ST HOSP IP/OBS MODERATE 55: CPT | Performed by: FAMILY MEDICINE

## 2025-03-19 PROCEDURE — G0378 HOSPITAL OBSERVATION PER HR: HCPCS

## 2025-03-19 PROCEDURE — 0042T HC CT CEREBRAL PERFUSION W/WO CONTRAST: CPT

## 2025-03-19 PROCEDURE — 84443 ASSAY THYROID STIM HORMONE: CPT | Performed by: EMERGENCY MEDICINE

## 2025-03-19 PROCEDURE — 83880 ASSAY OF NATRIURETIC PEPTIDE: CPT | Performed by: EMERGENCY MEDICINE

## 2025-03-19 PROCEDURE — 70498 CT ANGIOGRAPHY NECK: CPT

## 2025-03-19 PROCEDURE — 85025 COMPLETE CBC W/AUTO DIFF WBC: CPT | Performed by: EMERGENCY MEDICINE

## 2025-03-19 PROCEDURE — 80047 BASIC METABLC PNL IONIZED CA: CPT

## 2025-03-19 PROCEDURE — 25510000001 IOPAMIDOL PER 1 ML: Performed by: EMERGENCY MEDICINE

## 2025-03-19 PROCEDURE — 80053 COMPREHEN METABOLIC PANEL: CPT | Performed by: EMERGENCY MEDICINE

## 2025-03-19 RX ORDER — AMOXICILLIN 250 MG
2 CAPSULE ORAL 2 TIMES DAILY PRN
Status: DISCONTINUED | OUTPATIENT
Start: 2025-03-19 | End: 2025-03-21 | Stop reason: HOSPADM

## 2025-03-19 RX ORDER — SODIUM CHLORIDE 9 MG/ML
40 INJECTION, SOLUTION INTRAVENOUS AS NEEDED
Status: DISCONTINUED | OUTPATIENT
Start: 2025-03-19 | End: 2025-03-20

## 2025-03-19 RX ORDER — BISACODYL 5 MG/1
5 TABLET, DELAYED RELEASE ORAL DAILY PRN
Status: DISCONTINUED | OUTPATIENT
Start: 2025-03-19 | End: 2025-03-21 | Stop reason: HOSPADM

## 2025-03-19 RX ORDER — SODIUM CHLORIDE 0.9 % (FLUSH) 0.9 %
10 SYRINGE (ML) INJECTION AS NEEDED
Status: DISCONTINUED | OUTPATIENT
Start: 2025-03-19 | End: 2025-03-20

## 2025-03-19 RX ORDER — SODIUM CHLORIDE 0.9 % (FLUSH) 0.9 %
10 SYRINGE (ML) INJECTION EVERY 12 HOURS SCHEDULED
Status: DISCONTINUED | OUTPATIENT
Start: 2025-03-19 | End: 2025-03-20

## 2025-03-19 RX ORDER — SODIUM CHLORIDE 0.9 % (FLUSH) 0.9 %
10 SYRINGE (ML) INJECTION EVERY 12 HOURS SCHEDULED
Status: DISCONTINUED | OUTPATIENT
Start: 2025-03-19 | End: 2025-03-21 | Stop reason: HOSPADM

## 2025-03-19 RX ORDER — SODIUM CHLORIDE 0.9 % (FLUSH) 0.9 %
10 SYRINGE (ML) INJECTION AS NEEDED
Status: DISCONTINUED | OUTPATIENT
Start: 2025-03-19 | End: 2025-03-21 | Stop reason: HOSPADM

## 2025-03-19 RX ORDER — ONDANSETRON 2 MG/ML
4 INJECTION INTRAMUSCULAR; INTRAVENOUS EVERY 6 HOURS PRN
Status: DISCONTINUED | OUTPATIENT
Start: 2025-03-19 | End: 2025-03-21 | Stop reason: HOSPADM

## 2025-03-19 RX ORDER — SODIUM CHLORIDE 9 MG/ML
40 INJECTION, SOLUTION INTRAVENOUS AS NEEDED
Status: DISCONTINUED | OUTPATIENT
Start: 2025-03-19 | End: 2025-03-21 | Stop reason: HOSPADM

## 2025-03-19 RX ORDER — BISACODYL 10 MG
10 SUPPOSITORY, RECTAL RECTAL DAILY PRN
Status: DISCONTINUED | OUTPATIENT
Start: 2025-03-19 | End: 2025-03-21 | Stop reason: HOSPADM

## 2025-03-19 RX ORDER — IOPAMIDOL 755 MG/ML
115 INJECTION, SOLUTION INTRAVASCULAR
Status: COMPLETED | OUTPATIENT
Start: 2025-03-19 | End: 2025-03-19

## 2025-03-19 RX ORDER — POLYETHYLENE GLYCOL 3350 17 G/17G
17 POWDER, FOR SOLUTION ORAL DAILY PRN
Status: DISCONTINUED | OUTPATIENT
Start: 2025-03-19 | End: 2025-03-21 | Stop reason: HOSPADM

## 2025-03-19 RX ORDER — DEXTROSE MONOHYDRATE AND SODIUM CHLORIDE 5; .45 G/100ML; G/100ML
100 INJECTION, SOLUTION INTRAVENOUS CONTINUOUS
Status: DISCONTINUED | OUTPATIENT
Start: 2025-03-19 | End: 2025-03-20

## 2025-03-19 RX ORDER — DEXTROSE MONOHYDRATE 25 G/50ML
25 INJECTION, SOLUTION INTRAVENOUS ONCE
Status: COMPLETED | OUTPATIENT
Start: 2025-03-19 | End: 2025-03-19

## 2025-03-19 RX ADMIN — IOPAMIDOL 115 ML: 755 INJECTION, SOLUTION INTRAVENOUS at 17:16

## 2025-03-19 RX ADMIN — APIXABAN 2.5 MG: 2.5 TABLET, FILM COATED ORAL at 17:35

## 2025-03-19 RX ADMIN — DEXTROSE MONOHYDRATE 25 ML: 25 INJECTION, SOLUTION INTRAVENOUS at 17:30

## 2025-03-19 RX ADMIN — Medication 10 ML: at 21:49

## 2025-03-19 RX ADMIN — DEXTROSE AND SODIUM CHLORIDE 100 ML/HR: 5; 450 INJECTION, SOLUTION INTRAVENOUS at 22:37

## 2025-03-19 RX ADMIN — Medication 10 ML: at 21:48

## 2025-03-19 NOTE — CONSULTS
Stroke Consult Note    Patient Name: Marilyn Kunz   MRN: [id number]  Age: 79 y.o.  Sex: female  : 1945    Primary Care Physician: Peter Baca MD  Referring Physician:  Guille Jacobo MD    TIME STROKE TEAM CALLED: 0425 EST     TIME PATIENT SEEN: 0430 EST    Handedness: Right  Race:     Chief Complaint/Reason for Consultation: Transient left-sided weakness, transient slurred speech    HPI: Marilyn Kunz is a 78 yo female with PMH of demyelinating disease, remote history of stroke (possible chronic left cerebellar infarct), CHF, HTN, CKD stage III, HLD, factor V Leiden and type I diabetic who awoke today finding herself on the floor and unable to stand.  Last known well 2200 EST on 25 when patient went to bed feeling normal.  patient states he had to crawl to a telephone to call 911.  When EMS arrived they found the patient to have some left-sided weakness and slurred speech.  Patient is a type I diabetic, had blood glucose of 60.  EMS gave 15 units glucose.  On arrival blood glucose of 68.  Patient is currently on Eliquis 2.5 mg twice daily.  She reports no recent missed doses however was unable to take this medication this morning.  On exam, patient was alert and oriented x 3 however was slightly confused and often repetitive when telling how she fell out of bed to the floor and concern for her pet cat.  I find her memories of this morning's event unreliable.  She does not know what time she woke or how long she was on the floor.  she was able to answer NIH questions appropriately.  NIH 0 on exam with no focal weakness, no numbness or paresthesia, no dysarthria, no ataxia, no current visual deficit.  Patient does have some new bruising to elbows bilaterally and to left fourth digit .  Patient states this is from falling out of bed and having to crawl on floor . CTH without acute abnormality.  Possible chronic infarct of left cerebellum which is present on prior CT imaging and  MRIs.  CT perfusion without core infarct or ischemic tissue at risk.  No LVO or significant stenosis identified on CT angiogram head and neck.  Patient not a candidate for IV thrombolytic based on last known well.    Last Known Normal Date/Time: 2200 EST 03/18/2025    Review of Systems   Constitutional:  Negative for fatigue and fever.   HENT:  Negative for sinus pressure and sinus pain.    Eyes:  Negative for photophobia and visual disturbance.   Respiratory:  Negative for cough and shortness of breath.    Cardiovascular:  Negative for chest pain.   Gastrointestinal:  Negative for nausea and vomiting.   Genitourinary:  Positive for dysuria.   Neurological:  Negative for dizziness, facial asymmetry, speech difficulty, weakness, numbness and headaches.   Hematological:  Bruises/bleeds easily.   Psychiatric/Behavioral:  Positive for confusion. Negative for agitation.       Past Medical History:   Diagnosis Date    Abnormal ECG Check A Date or [facility] [employer] Records    Brought to [facility] from 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  I asked her to go ahead and try to talk to her endocrinokogist.  She is still having the lower extremity neuropathic pan, but not enough for me to administer medication.    Disease of thyroid gland      Diverticulitis of colon     Erosive osteoarthritis 05/30/2024    Factor 5 Leiden mutation, heterozygous 2012    Head injury     Hyperlipidemia     Hypertension     Medication monitoring encounter     Impression:  She has renal insufficiency.  She is just taking Tylenol.    Movement disorder 10/2021    knee replacement left knww    Murmur, cardiac     Neuropathy in diabetes     redness swelling legs    TIFFANIE (obstructive sleep apnea) 11/15/2024    Osteopenia     Story: Femoral Neck Impression: Up-ro-date on bone density scan.  Follow up bone density every 2 years.    Peripheral neuropathy 05/03/2022    hands, arms. 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  I went over her exercises and told her to get a shoe insert,    Psoriasis     Impression: No rash    Psoriatic arthritis     hands    Sleep apnea Always    diabetic do not sleep well up and down    Stroke had one don't know when    showed on MRI Brain    Syncope 11/15/2024    Type 1 diabetes      Past Surgical History:   Procedure Laterality Date    CARDIAC CATHETERIZATION  1952 2 of them    Patent Dictus operation    CATARACT EXTRACTION Bilateral     COLONOSCOPY      ENDOSCOPY N/A 10/20/2020    Procedure: ESOPHAGOGASTRODUODENOSCOPY;  Surgeon: Brunner, Mark I, MD;  Location: [facility] ENDOSCOPY;  Service: Gastroenterology;  Laterality: N/A;    HAND SURGERY Left 1987    no hardware    KNEE ARTHROPLASTY      KNEE SURGERY Left     PATENT DUCTUS ARTERIOUS LIGATION      REPLACEMENT TOTAL KNEE Left      Family History   Problem Relation Age of Onset    Cancer Mother     Pancreatic cancer Mother     Stroke Mother         Mini strokes    Cancer Father     Lung cancer Father     Cancer Sister     Colon cancer Sister     Diabetes Sister     Diabetes Brother     Breast cancer Neg Hx     Ovarian cancer Neg Hx      Social History     Socioeconomic History    Marital status: Single   Tobacco Use    Smoking status:  Never     Passive exposure: Never    Smokeless tobacco: Never   Vaping Use    Vaping status: Never Used   Substance and Sexual Activity    Alcohol use: No    Drug use: No    Sexual activity: Not Currently     Partners: Male     Birth control/protection: Abstinence     Allergies   Allergen Reactions    Atorvastatin Other (See Comments)    Colesevelam Other (See Comments)    Cymbalta [Duloxetine Hcl] Other (See Comments)     Hyponatremia    Famotidine Other (See Comments)    Cephalexin Rash     Tolerated Ceftriaxone    Clindamycin Rash    Hygroton [Chlorthalidone] Rash     Facial rash    Lidocaine Other (See Comments)     Sores in throat    Penicillins Nausea And Vomiting, Rash and Other (See Comments)     Has tolerated ceftriaxone  TROUBLE BREATHING     Prior to Admission medications    Medication Sig Start Date End Date Taking? Authorizing Provider   acetaminophen (TYLENOL) 500 MG tablet Take 2 tablets by mouth. every 4 - 6 hours as needed not to exceed 8 tablets per 24hrs    Lashay Gillette MD   albuterol sulfate  (90 Base) MCG/ACT inhaler Inhale 2 puffs Every 4 (Four) Hours As Needed for Wheezing. 12/14/24   Samuel Lin MD   azaTHIOprine (IMURAN) 50 MG tablet TAKE 2 TABLETS TWICE DAILY 8/3/23   Bryson Murrell, DNP, APRN   budesonide-formoterol (Symbicort) 80-4.5 MCG/ACT inhaler Inhale 2 puffs 2 (Two) Times a Day. 12/14/24   Samuel Lin MD   bumetanide (BUMEX) 0.5 MG tablet Take 1 tablet by mouth Every Other Day. 7/26/23   Brandee Long,    fluticasone (FLONASE) 50 MCG/ACT nasal spray 2 sprays by Each Nare route Daily. Shake well before using. 6/1/21   Lashay Gillette MD   insulin aspart (novoLOG FLEXPEN) 100 UNIT/ML solution pen-injector sc pen Inject  under the skin into the appropriate area as directed 3 (Three) Times a Day With Meals. Sliding scale    Lashay Gillette MD   levocetirizine (XYZAL) 5 MG tablet Take 1 tablet by mouth Every Evening.    Provider  MD Lashay   levothyroxine (SYNTHROID, LEVOTHROID) 75 MCG tablet Take 1 tablet by mouth Daily.    ProviderLashay MD   montelukast (SINGULAIR) 10 MG tablet Take 1 tablet by mouth Every Evening.    Lashay Gillette MD   nebivolol (BYSTOLIC) 10 MG tablet Take 1 tablet by mouth Daily for 30 days. 11/26/24 12/26/24  Marisela Hope PA-C   nystatin (MYCOSTATIN) 736369 UNIT/GM powder Apply  topically to the appropriate area as directed 3 (Three) Times a Day. 8/10/23   Sofiya Krause APRN   oxybutynin (DITROPAN) 5 MG tablet Take 1 tablet by mouth 2 (Two) Times a Day.    ProviderLashay MD   pravastatin (PRAVACHOL) 20 MG tablet TAKE 1 TABLET EVERY EVENING 12/8/23   Bryson Murrell DNP, APRN   pregabalin (LYRICA) 75 MG capsule Take 1 capsule by mouth 2 (Two) Times a Day.    ProviderLashay MD   sacubitril-valsartan (Entresto)  MG tablet TAKE 1 TABLET BY MOUTH TWICE DAILY 10/16/23   Yao Maya MD   spironolactone (ALDACTONE) 25 MG tablet Take 1 tablet by mouth Daily. 12/15/24   Samuel Lin MD   terazosin (HYTRIN) 2 MG capsule Take 1 capsule by mouth Every Night. 9/5/23   Yao Maya MD   Tresiba FlexTouch 100 UNIT/ML solution pen-injector injection Inject 19 Units under the skin into the appropriate area as directed Every Night.  Patient taking differently: Patient takes per sliding scale 3/9/23   Neha Almodovar DO   Upadacitinib ER (Rinvoq) 15 MG tablet sustained-release 24 hour Take 1 tablet by mouth Daily. 6/5/24   Anthony Edwards MD            Neurological Exam  Mental Status  Alert. Oriented to person, place and time. Oriented to person, place, and time. Speech is normal. Language is fluent with no aphasia.    Cranial Nerves  CN II: Visual fields full to confrontation.  CN III, IV, VI: Extraocular movements intact bilaterally. Normal lids and orbits bilaterally. Pupils equal round and reactive to light bilaterally.  CN V:  Right: Facial  sensation is normal.  Left: Facial sensation is normal on the left.  CN VII:  Right: There is no facial weakness.  Left: There is no facial weakness.    Motor  Normal muscle bulk throughout. No fasciculations present. Normal muscle tone.  At least 4/5 strength to all extremities.  Some general weakness but no drift appreciated.    Sensory  Light touch is normal in upper and lower extremities.     Coordination  Right: Finger-to-nose normal.Left: Finger-to-nose normal.    Gait    Deferred.      Physical Exam  Constitutional:       General: She is not in acute distress.  HENT:      Head: Normocephalic and atraumatic.      Mouth/Throat:      Pharynx: Oropharynx is clear.   Eyes:      General: Lids are normal.      Extraocular Movements: Extraocular movements intact.      Pupils: Pupils are equal, round, and reactive to light.   Cardiovascular:      Rate and Rhythm: Normal rate.   Pulmonary:      Effort: Pulmonary effort is normal. No respiratory distress.   Abdominal:      General: There is no distension.      Tenderness: There is no guarding.   Musculoskeletal:         General: Signs of injury present. Normal range of motion.      Right lower leg: No edema.      Left lower leg: No edema.      Comments: Patient has some pain and difficulty with flexing left fourth digit   Skin:     General: Skin is warm and dry.      Findings: Bruising and lesion present.      Comments: Some bruising to bilateral upper extremities observed.  Bruising to left fourth digit.  Small amount of dried blood to right nose appears to be where glasses have made a impression when patient fell   Neurological:      General: No focal deficit present.      Mental Status: She is alert and oriented to person, place, and time.      Cranial Nerves: No cranial nerve deficit.      Sensory: No sensory deficit.      Motor: No weakness.      Coordination: Coordination normal.   Psychiatric:         Speech: Speech normal.         Acute Stroke  Data    Thrombolytic Inclusion / Exclusion Criteria    Time: 16:57 EDT  Person Administering Scale: Fabian Gonzalez PA-C      YES NO INCLUSION CRITERIA CLASS I   [] [x]   Suspected diagnosis of acute ischemic stroke with measureable neurological deficit.  Low NIHSS with disabling stroke symptoms.   [] [x]   Onset of stroke symptoms < 3 hours before beginning treatment >/ 18 years old  Stroke symptom onset = time patient was last seen well or without symptoms (LKW)   [] []   Onset of symptoms between 3-4.5 hours: >/= 80 years old (safe Class IIa) with history of   both diabetes and prior CVA  (reasonable Class IIb) AND NIHSS </= 25  *If not eligible for IV Thrombolytic consider neuro intervention for LKW within 24 hours     YES NO EXCLUSION CRITERIA (CONTRAINDICATIONS) CLASS III EVIDENCE HARM   [] []   Blood pressure >185/110 medically refractory to IV medications   [] []   Active bleeding at a non-compressible site   [] []   Active intracranial hemorrhage (ICH)   [] []   Symptoms suggestive of subarachnoid hemorrhage (SAH)   [] []   GI bleed within 21 days   [] []   Ischemic stroke within 3 months   [] []   Severe head trauma within 3 months    [] []   Intracranial or intraspinal surgery within 3 months   [] []   Current GI malignancy   [] []   Intracranial neoplasm   [] []   Infective endocarditis   [] []   Aortic arch dissection   [] []   Active coagulopathy with  INR >1.7, platelets <100,000, PTT > 40 sec, PT > 15 sec   *For warfarin, administration can begin before blood tests resulted. Discontinue for above values.    [] []   Treatment dose* of LMWH (Lovenox) in last 24 hours  *prophylactic dosages are not a contraindication   [] []   Concurrent use of antiplatelet agents' glycoprotein inhibitors IIb/IIIa (Integrilin, etc.)   [] []   Thrombin or factor Xa inhibitors (Eliquis, Xarelto, Arixtra) taken in last 48 hours     YES NO CLASS II: AIS WITH THE FOLLOWING CONDITIONS -   TREATMENT RISKS SHOULD BE WEIGHED  AGAINST POSSIBLE BENEFITS.    [] []   Major trauma in last 14 days, recent major surgery in last 14 days, intracranial arterial dissection, giant unruptured and unsecured intracranial aneurysm, pericarditis     [] []   The risks and benefits have been discussed with the patient or family related to the administration of IV thrombolytic therapy for stroke symptoms.   [] []   I have discussed and reviewed the patient's case and imaging with the attending prior to IV thrombolytic therapy.   TIME N/A Time IV thrombolytic administered       Hospital Meds:  Scheduled-   Infusions- No current facility-administered medications for this encounter.     PRNs-     Functional Status Prior to Current Stroke/Salazar Score:   MODIFIED SALAZAR SCALE (to be assessed for each patient having history of stroke) []Stroke history but not assessed  []0: No symptoms at all  [x]1: No significant disability despite symptoms  []2: Slight disability  []3: Moderate disability  []4: Moderately severe disability  []5: Severe disability  []6: Death        NIH Stroke Scale  Time: 16:57 EDT  Person Administering Scale: Fabian Gonzalez PA-C    1a  Level of consciousness: 0=alert; keenly responsive   1b. LOC questions:  0=Answers both questions correctly   1c. LOC commands: 0=Performs both tasks correctly   2.  Best Gaze: 0=normal   3.  Visual: 0=No visual loss   4. Facial Palsy: 0=Normal symmetric movement   5a.  Motor left arm: 0=No drift, limb holds 90 (or 45) degrees for full 10 seconds   5b.  Motor right arm: 0=No drift, limb holds 90 (or 45) degrees for full 10 seconds   6a. motor left le=No drift, limb holds 90 (or 45) degrees for full 10 seconds   6b  Motor right le=No drift, limb holds 90 (or 45) degrees for full 10 seconds   7. Limb Ataxia: 0=Absent   8.  Sensory: 0=Normal; no sensory loss   9. Best Language:  0=No aphasia, normal   10. Dysarthria: 0=Normal   11. Extinction and Inattention: 0=No abnormality    Total:   0       Results  Reviewed:  I have personally reviewed current lab, radiology, and data and agree with results.    CT CEREBRAL PERFUSION WITH & WITHOUT CONTRAST  Result Date: 3/19/2025  Impression: No proximal large vessel occlusion or severe stenosis of the major arteries of the head and neck. No significant perfusion abnormality by RAPID criteria. Electronically Signed: Miguel A Tinajero  3/19/2025 5:35 PM EDT  Workstation ID: [id number]    CT Angiogram Head w AI Analysis of LVO  Result Date: 3/19/2025  Impression: No proximal large vessel occlusion or severe stenosis of the major arteries of the head and neck. No significant perfusion abnormality by RAPID criteria. Electronically Signed: Miguel A Tinajero  3/19/2025 5:35 PM EDT  Workstation ID: [id number]    CT Angiogram Neck  Result Date: 3/19/2025  Impression: No proximal large vessel occlusion or severe stenosis of the major arteries of the head and neck. No significant perfusion abnormality by RAPID criteria. Electronically Signed: Miguel A Tinajero  3/19/2025 5:35 PM EDT  Workstation ID: [id number]    CT Head Without Contrast Stroke Protocol  Result Date: 3/19/2025  Impression: 1.No acute intracranial abnormality is identified on CT. 2.Stable mild global volume loss, mild to moderate chronic small vessel ischemic change, and probable chronic infarct. Electronically Signed: Sera Mercer  3/19/2025 4:51 PM EDT  Workstation ID: [id number]     Lab Results   Component Value Date    GLUCOSE 65 03/19/2025    BUN 42 (H) 03/19/2025    CREATININE 1.11 (H) 03/19/2025     03/19/2025    K 4.7 03/19/2025     03/19/2025    CALCIUM 9.5 03/19/2025    PROTEINTOT 7.4 03/19/2025    ALBUMIN 4.3 03/19/2025    ALT 14 03/19/2025    AST 21 03/19/2025    ALKPHOS 88 03/19/2025    BILITOT 0.5 03/19/2025    GLOB 3.1 03/19/2025    AGRATIO 1.4 03/19/2025    BCR 37.8 (H) 03/19/2025    ANIONGAP 14.0 03/19/2025    EGFR 50.7 (L) 03/19/2025      WBC   Date Value Ref Range Status   03/19/2025 11.34 (H) 3.40 -  10.80 10*3/mm3 Final     RBC   Date Value Ref Range Status   03/19/2025 4.43 3.77 - 5.28 10*6/mm3 Final     Hemoglobin   Date Value Ref Range Status   03/19/2025 13.1 12.0 - 15.9 g/dL Final     Hematocrit   Date Value Ref Range Status   03/19/2025 42.7 34.0 - 46.6 % Final     MCV   Date Value Ref Range Status   03/19/2025 96.4 79.0 - 97.0 fL Final     MCH   Date Value Ref Range Status   03/19/2025 29.6 26.6 - 33.0 pg Final     MCHC   Date Value Ref Range Status   03/19/2025 30.7 (L) 31.5 - 35.7 g/dL Final     RDW   Date Value Ref Range Status   03/19/2025 14.1 12.3 - 15.4 % Final     RDW-SD   Date Value Ref Range Status   03/19/2025 50.4 37.0 - 54.0 fl Final     MPV   Date Value Ref Range Status   03/19/2025 12.2 (H) 6.0 - 12.0 fL Final     Platelets   Date Value Ref Range Status   03/19/2025 144 140 - 450 10*3/mm3 Final     Neutrophil %   Date Value Ref Range Status   03/19/2025 83.3 (H) 42.7 - 76.0 % Final     Lymphocyte %   Date Value Ref Range Status   03/19/2025 10.7 (L) 19.6 - 45.3 % Final     Monocyte %   Date Value Ref Range Status   03/19/2025 5.4 5.0 - 12.0 % Final     Eosinophil %   Date Value Ref Range Status   03/19/2025 0.1 (L) 0.3 - 6.2 % Final     Basophil %   Date Value Ref Range Status   03/19/2025 0.2 0.0 - 1.5 % Final     Immature Grans %   Date Value Ref Range Status   03/19/2025 0.3 0.0 - 0.5 % Final     Neutrophils, Absolute   Date Value Ref Range Status   03/19/2025 9.46 (H) 1.70 - 7.00 10*3/mm3 Final     Lymphocytes, Absolute   Date Value Ref Range Status   03/19/2025 1.21 0.70 - 3.10 10*3/mm3 Final     Monocytes, Absolute   Date Value Ref Range Status   03/19/2025 0.61 0.10 - 0.90 10*3/mm3 Final     Eosinophils, Absolute   Date Value Ref Range Status   03/19/2025 0.01 0.00 - 0.40 10*3/mm3 Final     Basophils, Absolute   Date Value Ref Range Status   03/19/2025 0.02 0.00 - 0.20 10*3/mm3 Final     Immature Grans, Absolute   Date Value Ref Range Status   03/19/2025 0.03 0.00 - 0.05 10*3/mm3  Final     [facility]   Date Value Ref Range Status   03/19/2025 0.0 0.0 - 0.2 /100 WBC Final        Results for orders placed during the hospital encounter of 11/15/24    Adult Transthoracic Echo Complete W/ Cont if Necessary Per Protocol    Interpretation Summary    Left ventricular systolic function is normal. Left ventricular ejection fraction appears to be 66 - 70%.    Left ventricular diastolic function was normal.    Left atrial volume is mildly increased.    Estimated right ventricular systolic pressure from tricuspid regurgitation is normal (<35 mmHg).    No significant change compared to 2023 echo    Assessment/Plan:  10 yo female with PMH of demyelinating disease, remote history of stroke (possible chronic left cerebellar infarct), CHF, HTN, CKD stage III, HLD, factor V Leiden and type I diabetic who awoke today finding herself on the floor and unable to stand.  Last known well 2200 EST on 03/18/25 when patient went to bed feeling normal. When EMS arrived they found the patient to have some left-sided weakness and slurred speech.  Patient is a type I diabetic, had blood glucose of 60.  NIH 0. CTH without acute abnormality.  Possible chronic infarct of left cerebellum which is present on prior CT imaging and MRIs.  CT perfusion without core infarct or ischemic tissue at risk.  No LVO or significant stenosis identified on CT angiogram head and neck.  Patient not a candidate for IV thrombolytic based on last known well.    Antiplatelet PTA: none  Anticoagulant PTA: Eliquis 2.5 mg twice daily      # Transient left sided weakness, slurred speech  -Differential includes suspected hypoglycemic event versus metabolic encephalopathy.  TIA/AIS can not be ruled out at this time.  Some possible initial radiculopathy related to fall or patient laying on left side.  -TIA/CVA order set without thrombolytic therapy has been initiated  -NPO until bedside nursing dysphagia screen completed, healthy cardiac diet 1 screenings  passed  -MRI brain pending  -TTE pending, recent TTE on 11/15/2024 with LVEF 66 to 70% and mildly increased left atrial volume.  Will obtain agitated saline with new TTE.  -Continue Eliquis 2.5 mg daily  -Activity as tolerated, fall risk precautions  -PT/OT/SLP evaluation  -Neurochecks per protocol, please call with any significant neurological decline  -Neurology stroke will follow    # Essential hypertension  -May normalize blood pressure at this time  -Please avoid any hypotension  -Further management per primary medical team    # Hyperlipidemia  -Lipid panel in AM, goal LDL less than 70  -Patient has atorvastatin listed as an allergy, may continue with home dose pravastatin 20 mg or trial of rosuvastatin while inpatient.      # Diabetes Mellitus type 1  -A1c in AM  -Maintain euglycemia, SSI  -Patient glucose 68 on admission, dextrose 25 mL given in ED  -Management per primary team    # Chronic kidney disease stage III  -Creatinine 1.11 and EGFR 50.7 on admission  -Patient was given contrast with CT angiograms and perfusion  -Continue to monitor kidney function and encourage hydration via H2O    Plan of care was discussed with patient, ED nursing staff and Dr. Jacobo.  Stroke neurology will continue to follow.  Please call with any questions or concerns.  Thank you for this consult.      Fabian Gonzalez PA-C  March 19, 2025  16:57 EDT

## 2025-03-19 NOTE — H&P
[facility]   HISTORY AND PHYSICAL    Patient Name: Marilyn Kunz  : 1945  MRN: [id number]  Primary Care Physician:  Peter Baca MD  Date of admission: 3/19/2025    Subjective   Subjective     Chief Complaint: left sided weakness     History of Present Illness  Marilyn Kunz is a 79-year-old female with past medical history of hypertension, hyperlipidemia, type 1 diabetes, CAD, CHF, CKD, CVA presents to the ED with complaints of hypoglycemia.  Patient states that tonight she woke up on the floor and does not know how long she was there.  She states that she was unable to put on her shoes or ambulate.  She was able to crawl to her home phone and call 911.  Daughter was at bedside and stated that this is the third episode in which she was found on the floor.  Patient does states that she is very compliant with her insulin but has noticed more lower glucose numbers in the a.m.  She also states that she has glucose tablets/juice on her nightstand in case her glucose is too low.  She also complains of left-sided weakness and was concerned due to her history of stroke.  She denies any fevers, chills, diaphoresis, chest pain, nausea, vomiting, dysuria, constipation or diarrhea.  EMS noted  on their arrival she was noted to be borderline hypoglycemic with a blood glucose of 60.  On their arrival the patient also had significant left-sided weakness and slurred speech.  She was given D50 and was brought to our facility to be evaluated.  By the time that she arrived here her weakness had resolved and speech was back to baseline.  In the ED she was given 25 mL dextrose.  Glucose was 65 then 188 now to 261.  She was admitted for further evaluation and management.  Stroke team was consulted for evaluation.        Review of Systems   Constitutional:  Negative for activity change, appetite change, chills, diaphoresis and fatigue.   HENT:  Negative for drooling, facial swelling, sneezing and sore  throat.    Respiratory:  Negative for cough and shortness of breath.    Cardiovascular:  Negative for chest pain.   Gastrointestinal:  Negative for abdominal pain, constipation, diarrhea and nausea.   Endocrine: Negative for polydipsia and polyphagia.   Musculoskeletal:  Negative for joint swelling.   Neurological:  Positive for weakness. Negative for dizziness, numbness and headaches.   Psychiatric/Behavioral:  Negative for agitation.         Personal History     Past Medical History:   Diagnosis Date    Abnormal ECG Check [facility] Date or [facility]    Brought to  from [facility]    Acute sinusitis 02/06/2023    Anemia     Asthma     CAP (community acquired pneumonia) 02/06/2023    CHF (congestive heart failure) 07/20/2023    Chronic kidney disease     pt told it was dx from Dr Baca and to not eat much protein    Congenital heart disease 1950's Patent 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    Surgery [facility] 1950s close    COPD (chronic obstructive pulmonary disease) 11/15/2024    CTS (carpal tunnel syndrome)     Psoriatic arthritis hands could be    Deep vein thrombosis No on blood clots but have a blood clotting disorder called Leiden Factor 5    Demyelinating disease of central nervous system, unspecified 03/09/2023    Diabetes     Impression: Patient states that her diabetes is not doing well.  I asked her to go ahead and try to talk to her endocrinokogist.  She is still having the lower extremity neuropathic pan, but not enough for me to administer medication.    Disease of thyroid gland     Diverticulitis of colon     Erosive osteoarthritis 05/30/2024    Factor 5 Leiden mutation, heterozygous 2012    Head injury     Hyperlipidemia     Hypertension     Medication monitoring encounter     Impression:  She has renal insufficiency.  She is just taking Tylenol.    Movement disorder 10/2021    knee replacement left knww    Murmur, cardiac     Neuropathy in diabetes     redness swelling legs    TIFFANIE  (obstructive sleep apnea) 11/15/2024    Osteopenia     Story: Femoral Neck Impression: Up-ro-date on bone density scan.  Follow up bone density every 2 years.    Peripheral neuropathy 05/03/2022    hands, arms. 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  I went over her exercises and told her to get a shoe insert,    Psoriasis     Impression: No rash    Psoriatic arthritis     hands    Sleep apnea Always    diabetic do not sleep well up and down    Stroke had one don't know when    showed on MRI Brain    Syncope 11/15/2024    Type 1 diabetes        Past Surgical History:   Procedure Laterality Date    CARDIAC CATHETERIZATION  1952 2 of them    Patent Dictus operation    CATARACT EXTRACTION Bilateral     COLONOSCOPY      ENDOSCOPY N/A 10/20/2020    Procedure: ESOPHAGOGASTRODUODENOSCOPY;  Surgeon: Brunner, Mark I, MD;  Location: [facility] ENDOSCOPY;  Service: Gastroenterology;  Laterality: N/A;    HAND SURGERY Left 1987    no hardware    KNEE ARTHROPLASTY      KNEE SURGERY Left     PATENT DUCTUS ARTERIOUS LIGATION      REPLACEMENT TOTAL KNEE Left        Family History: family history includes Cancer in her father, mother, and sister; Colon cancer in her sister; Diabetes in her brother and sister; Lung cancer in her father; Pancreatic cancer in her mother; Stroke in her mother. Otherwise pertinent FHx was reviewed and not pertinent to current issue.    Social History:  reports that she has never smoked. She has never been exposed to tobacco smoke. She has never used smokeless tobacco. She reports that she does not drink alcohol and does not use drugs.    Home Medications:  Upadacitinib ER, acetaminophen, albuterol sulfate HFA, azaTHIOprine, budesonide-formoterol, bumetanide, fluticasone, insulin aspart, insulin degludec, levocetirizine, levothyroxine, montelukast, nebivolol, nystatin, oxybutynin, pravastatin, pregabalin, sacubitril-valsartan, spironolactone,  and terazosin    Allergies:  Allergies   Allergen Reactions    Atorvastatin Other (See Comments)    Colesevelam Other (See Comments)    Cymbalta [Duloxetine Hcl] Other (See Comments)     Hyponatremia    Famotidine Other (See Comments)    Cephalexin Rash     Tolerated Ceftriaxone    Clindamycin Rash    Hygroton [Chlorthalidone] Rash     Facial rash    Lidocaine Other (See Comments)     Sores in throat    Penicillins Nausea And Vomiting, Rash and Other (See Comments)     Has tolerated ceftriaxone  TROUBLE BREATHING       Objective    Objective     Vitals:   Temp:  [98.5 °F (36.9 °C)] 98.5 °F (36.9 °C)  Heart Rate:  [77] 77  Resp:  [16] 16  BP: (178)/(89) 178/89    Physical Exam  Constitutional:       General: She is not in acute distress.     Appearance: She is not ill-appearing.   HENT:      Head: Normocephalic and atraumatic.      Nose: No rhinorrhea.      Mouth/Throat:      Mouth: Mucous membranes are moist.      Pharynx: Oropharynx is clear.   Eyes:      Extraocular Movements: Extraocular movements intact.      Pupils: Pupils are equal, round, and reactive to light.   Cardiovascular:      Rate and Rhythm: Normal rate and regular rhythm.      Heart sounds: No murmur heard.  Pulmonary:      Effort: Pulmonary effort is normal. No respiratory distress.      Breath sounds: Normal breath sounds. No wheezing.   Chest:      Chest wall: No tenderness.   Abdominal:      General: Abdomen is flat. Bowel sounds are normal. There is no distension.      Palpations: Abdomen is soft.      Tenderness: There is no abdominal tenderness. There is no guarding.   Musculoskeletal:         General: No swelling or tenderness.      Cervical back: Normal range of motion.   Skin:     General: Skin is warm and dry.   Neurological:      General: No focal deficit present.      Mental Status: She is alert and oriented to person, place, and time.   Psychiatric:         Mood and Affect: Mood normal.         Result Review    Result Review:  I have  personally reviewed the results from the time of this admission to 3/19/2025 19:31 EDT and agree with these findings:  []  Laboratory list / accordion  []  Microbiology  []  Radiology  []  EKG/Telemetry   []  Cardiology/Vascular   []  Pathology  []  Old records  []  Other:  Most notable findings include:      Assessment & Plan   Assessment / Plan     Brief Patient Summary:  Marilyn Kunz is a 79 y.o. female who presented to the ED with complaints of hypoglycemia and left-sided weakness.    Active Hospital Problems:  Active Hospital Problems    Diagnosis     **Transient neurological symptoms      Plan:   Generalized weakness 2/2 Hypoglycemia:  Chest x-ray showed no radiographic evidence of acute cardial pulm  UA pending  Admit to inpatient telemetry  Cardiac diet  Continue IV fluids of dextrose and half-normal saline until Glu >200 then stop   Consulted PT/OT for eval and treatment  Daily CBC and CMP, will continue to monitor  Will continue to monitor vitals    Strokelike symptoms/TIA:  CT head showed no acute intracranial abnormality.  Stable mild global volume loss mild to moderate chronic small vessel ischemic change and probable chronic infarct.  CTA neck showed no proximal large vessel occlusion or severe stenosis of the major arteries of the head and neck  Admit to IP/neuro  Diabetic diet  Bedside swallow exam  Frequent neuro checks, fall precautions  Continue with aspirin 325 mg daily  Order MRI brain, pending  Consulted PT/OT for eval and treatment  Consulted neurologist, recommendations welcome  Patient evaluated by teleneuro  Daily CBC and CMP, will continue to monitor  Will continue to monitor vitals    Left hand pain:  X-ray of left hand showed no acute fracture or malalignment  Continue with Tylenol as needed    Hyperglycemia secondary to DMII:  Order insulin sliding scale  Order hypoglycemia protocol  Order A1c  Hold home medications for now  Consult nutritionist for diabetic education  Daily CMP,  will continue to monitor    Hx of HTN:   Will continue home medications    Hx of CAD:   Will continue home medications     DVT prophylaxis: Lovenox  GI prophylaxis: Zofran, Protonix  CODE STATUS: CPR (full code)        VTE Prophylaxis:  Mechanical VTE prophylaxis orders are signed & held. Pharmacologic VTE prophylaxis orders are present.        CODE STATUS:    Code Status (Patient has no pulse and is not breathing): CPR (Attempt to Resuscitate)  Medical Interventions (Patient has pulse or is breathing): Full Support  Level Of Support Discussed With: Patient    Admission Status:  I believe this patient meets full code status.    Shu Hernandez MD

## 2025-03-20 ENCOUNTER — APPOINTMENT (OUTPATIENT)
Dept: CARDIOLOGY | Facility: HOSPITAL | Age: 80
DRG: 092 | End: 2025-03-20
Payer: MEDICARE

## 2025-03-20 ENCOUNTER — APPOINTMENT (OUTPATIENT)
Dept: MRI IMAGING | Facility: HOSPITAL | Age: 80
DRG: 092 | End: 2025-03-20
Payer: MEDICARE

## 2025-03-20 LAB
ALBUMIN SERPL-MCNC: 3.5 G/DL (ref 3.5–5.2)
ALBUMIN/GLOB SERPL: 1.5 G/DL
ALP SERPL-CCNC: 71 U/L (ref 39–117)
ALT SERPL W P-5'-P-CCNC: 12 U/L (ref 1–33)
AMPHET+METHAMPHET UR QL: NEGATIVE
AMPHETAMINES UR QL: NEGATIVE
ANION GAP SERPL CALCULATED.3IONS-SCNC: 10 MMOL/L (ref 5–15)
AORTIC DIMENSIONLESS INDEX: 0.65 (DI)
AST SERPL-CCNC: 16 U/L (ref 1–32)
AV MEAN PRESS GRAD SYS DOP V1V2: 5 MMHG
AV VMAX SYS DOP: 151 CM/SEC
BACTERIA UR QL AUTO: ABNORMAL /HPF
BARBITURATES UR QL SCN: NEGATIVE
BASOPHILS # BLD AUTO: 0.02 10*3/MM3 (ref 0–0.2)
BASOPHILS NFR BLD AUTO: 0.2 % (ref 0–1.5)
BENZODIAZ UR QL SCN: NEGATIVE
BH CV ECHO MEAS - AO MAX PG: 9.1 MMHG
BH CV ECHO MEAS - AO ROOT DIAM: 2 CM
BH CV ECHO MEAS - AO V2 VTI: 33.7 CM
BH CV ECHO MEAS - AVA(I,D): 1.48 CM2
BH CV ECHO MEAS - EDV(CUBED): 74.1 ML
BH CV ECHO MEAS - EDV(MOD-SP2): 68.6 ML
BH CV ECHO MEAS - EDV(MOD-SP4): 53.3 ML
BH CV ECHO MEAS - EF(MOD-SP2): 59.8 %
BH CV ECHO MEAS - EF(MOD-SP4): 68.7 %
BH CV ECHO MEAS - ESV(CUBED): 22 ML
BH CV ECHO MEAS - ESV(MOD-SP2): 27.6 ML
BH CV ECHO MEAS - ESV(MOD-SP4): 16.7 ML
BH CV ECHO MEAS - FS: 33.3 %
BH CV ECHO MEAS - IVS/LVPW: 1 CM
BH CV ECHO MEAS - IVSD: 0.8 CM
BH CV ECHO MEAS - LA DIMENSION: 3.5 CM
BH CV ECHO MEAS - LAT PEAK E' VEL: 14.1 CM/SEC
BH CV ECHO MEAS - LV MASS(C)D: 101.3 GRAMS
BH CV ECHO MEAS - LV MAX PG: 4.2 MMHG
BH CV ECHO MEAS - LV MEAN PG: 2 MMHG
BH CV ECHO MEAS - LV V1 MAX: 102 CM/SEC
BH CV ECHO MEAS - LV V1 VTI: 21.9 CM
BH CV ECHO MEAS - LVIDD: 4.2 CM
BH CV ECHO MEAS - LVIDS: 2.8 CM
BH CV ECHO MEAS - LVOT AREA: 2.27 CM2
BH CV ECHO MEAS - LVOT DIAM: 1.7 CM
BH CV ECHO MEAS - LVPWD: 0.8 CM
BH CV ECHO MEAS - MED PEAK E' VEL: 9.8 CM/SEC
BH CV ECHO MEAS - MV A MAX VEL: 73 CM/SEC
BH CV ECHO MEAS - MV DEC SLOPE: 343 CM/SEC2
BH CV ECHO MEAS - MV DEC TIME: 0.21 SEC
BH CV ECHO MEAS - MV E MAX VEL: 87.1 CM/SEC
BH CV ECHO MEAS - MV E/A: 1.19
BH CV ECHO MEAS - MV MAX PG: 4.8 MMHG
BH CV ECHO MEAS - MV MEAN PG: 1 MMHG
BH CV ECHO MEAS - MV P1/2T: 98.2 MSEC
BH CV ECHO MEAS - MV V2 VTI: 36.9 CM
BH CV ECHO MEAS - MVA(P1/2T): 2.24 CM2
BH CV ECHO MEAS - MVA(VTI): 1.35 CM2
BH CV ECHO MEAS - PA ACC TIME: 0.11 SEC
BH CV ECHO MEAS - PI END-D VEL: 138 CM/SEC
BH CV ECHO MEAS - RAP SYSTOLE: 3 MMHG
BH CV ECHO MEAS - RVSP: 22.9 MMHG
BH CV ECHO MEAS - SV(LVOT): 49.7 ML
BH CV ECHO MEAS - SV(MOD-SP2): 41 ML
BH CV ECHO MEAS - SV(MOD-SP4): 36.6 ML
BH CV ECHO MEAS - TAPSE (>1.6): 2.7 CM
BH CV ECHO MEAS - TR MAX PG: 19.9 MMHG
BH CV ECHO MEAS - TR MAX VEL: 223 CM/SEC
BH CV ECHO MEASUREMENTS AVERAGE E/E' RATIO: 7.29
BH CV ECHO SHUNT ASSESSMENT PERFORMED (HIDDEN SCRIPTING): 1
BH CV XLRA - RV BASE: 3 CM
BH CV XLRA - RV LENGTH: 5.8 CM
BH CV XLRA - RV MID: 2.8 CM
BH CV XLRA - TDI S': 13.7 CM/SEC
BILIRUB SERPL-MCNC: 0.6 MG/DL (ref 0–1.2)
BILIRUB UR QL STRIP: NEGATIVE
BUN SERPL-MCNC: 39 MG/DL (ref 8–23)
BUN/CREAT SERPL: 34.2 (ref 7–25)
BUPRENORPHINE SERPL-MCNC: NEGATIVE NG/ML
CALCIUM SPEC-SCNC: 8.5 MG/DL (ref 8.6–10.5)
CANNABINOIDS SERPL QL: NEGATIVE
CHLORIDE SERPL-SCNC: 104 MMOL/L (ref 98–107)
CHOLEST SERPL-MCNC: 135 MG/DL (ref 0–200)
CLARITY UR: CLEAR
CO2 SERPL-SCNC: 24 MMOL/L (ref 22–29)
COCAINE UR QL: NEGATIVE
COLOR UR: YELLOW
CREAT SERPL-MCNC: 1.14 MG/DL (ref 0.57–1)
DEPRECATED RDW RBC AUTO: 50.7 FL (ref 37–54)
EGFRCR SERPLBLD CKD-EPI 2021: 49.1 ML/MIN/1.73
EOSINOPHIL # BLD AUTO: 0.02 10*3/MM3 (ref 0–0.4)
EOSINOPHIL NFR BLD AUTO: 0.2 % (ref 0.3–6.2)
ERYTHROCYTE [DISTWIDTH] IN BLOOD BY AUTOMATED COUNT: 14.5 % (ref 12.3–15.4)
FENTANYL UR-MCNC: NEGATIVE NG/ML
GLOBULIN UR ELPH-MCNC: 2.4 GM/DL
GLUCOSE BLDC GLUCOMTR-MCNC: 262 MG/DL (ref 70–130)
GLUCOSE BLDC GLUCOMTR-MCNC: 296 MG/DL (ref 70–130)
GLUCOSE BLDC GLUCOMTR-MCNC: 351 MG/DL (ref 70–130)
GLUCOSE BLDC GLUCOMTR-MCNC: 385 MG/DL (ref 70–130)
GLUCOSE BLDC GLUCOMTR-MCNC: 425 MG/DL (ref 70–130)
GLUCOSE BLDC GLUCOMTR-MCNC: 451 MG/DL (ref 70–130)
GLUCOSE BLDC GLUCOMTR-MCNC: 85 MG/DL (ref 70–130)
GLUCOSE SERPL-MCNC: 397 MG/DL (ref 65–99)
GLUCOSE UR STRIP-MCNC: ABNORMAL MG/DL
HBA1C MFR BLD: 8.9 % (ref 4.8–5.6)
HCT VFR BLD AUTO: 34.8 % (ref 34–46.6)
HDLC SERPL-MCNC: 64 MG/DL (ref 40–60)
HGB BLD-MCNC: 10.8 G/DL (ref 12–15.9)
HGB UR QL STRIP.AUTO: NEGATIVE
HYALINE CASTS UR QL AUTO: ABNORMAL /LPF
IMM GRANULOCYTES # BLD AUTO: 0.03 10*3/MM3 (ref 0–0.05)
IMM GRANULOCYTES NFR BLD AUTO: 0.4 % (ref 0–0.5)
KETONES UR QL STRIP: NEGATIVE
LDLC SERPL CALC-MCNC: 61 MG/DL (ref 0–100)
LDLC/HDLC SERPL: 0.98 {RATIO}
LEFT ATRIUM VOLUME INDEX: 17.2 ML/M2
LEUKOCYTE ESTERASE UR QL STRIP.AUTO: ABNORMAL
LV EF BIPLANE MOD: 63.8 %
LYMPHOCYTES # BLD AUTO: 1.33 10*3/MM3 (ref 0.7–3.1)
LYMPHOCYTES NFR BLD AUTO: 16.6 % (ref 19.6–45.3)
MCH RBC QN AUTO: 29.7 PG (ref 26.6–33)
MCHC RBC AUTO-ENTMCNC: 31 G/DL (ref 31.5–35.7)
MCV RBC AUTO: 95.6 FL (ref 79–97)
METHADONE UR QL SCN: NEGATIVE
MONOCYTES # BLD AUTO: 0.54 10*3/MM3 (ref 0.1–0.9)
MONOCYTES NFR BLD AUTO: 6.7 % (ref 5–12)
NEUTROPHILS NFR BLD AUTO: 6.09 10*3/MM3 (ref 1.7–7)
NEUTROPHILS NFR BLD AUTO: 75.9 % (ref 42.7–76)
NITRITE UR QL STRIP: NEGATIVE
NRBC BLD AUTO-RTO: 0 /100 WBC (ref 0–0.2)
OPIATES UR QL: NEGATIVE
OXYCODONE UR QL SCN: NEGATIVE
PCP UR QL SCN: NEGATIVE
PH UR STRIP.AUTO: 6.5 [PH] (ref 5–8)
PLATELET # BLD AUTO: 131 10*3/MM3 (ref 140–450)
PMV BLD AUTO: 12.6 FL (ref 6–12)
POTASSIUM SERPL-SCNC: 5.4 MMOL/L (ref 3.5–5.2)
PROT SERPL-MCNC: 5.9 G/DL (ref 6–8.5)
PROT UR QL STRIP: NEGATIVE
QT INTERVAL: 472 MS
QTC INTERVAL: 447 MS
RBC # BLD AUTO: 3.64 10*6/MM3 (ref 3.77–5.28)
RBC # UR STRIP: ABNORMAL /HPF
REF LAB TEST METHOD: ABNORMAL
SODIUM SERPL-SCNC: 138 MMOL/L (ref 136–145)
SP GR UR STRIP: 1.03 (ref 1–1.03)
SQUAMOUS #/AREA URNS HPF: ABNORMAL /HPF
TRICYCLICS UR QL SCN: NEGATIVE
TRIGL SERPL-MCNC: 41 MG/DL (ref 0–150)
UROBILINOGEN UR QL STRIP: ABNORMAL
VLDLC SERPL-MCNC: 10 MG/DL (ref 5–40)
WBC # UR STRIP: ABNORMAL /HPF
WBC NRBC COR # BLD AUTO: 8.03 10*3/MM3 (ref 3.4–10.8)

## 2025-03-20 PROCEDURE — 63710000001 INSULIN GLARGINE PER 5 UNITS: Performed by: INTERNAL MEDICINE

## 2025-03-20 PROCEDURE — 99233 SBSQ HOSP IP/OBS HIGH 50: CPT | Performed by: STUDENT IN AN ORGANIZED HEALTH CARE EDUCATION/TRAINING PROGRAM

## 2025-03-20 PROCEDURE — 80053 COMPREHEN METABOLIC PANEL: CPT | Performed by: FAMILY MEDICINE

## 2025-03-20 PROCEDURE — 92523 SPEECH SOUND LANG COMPREHEN: CPT

## 2025-03-20 PROCEDURE — 97162 PT EVAL MOD COMPLEX 30 MIN: CPT

## 2025-03-20 PROCEDURE — 85025 COMPLETE CBC W/AUTO DIFF WBC: CPT | Performed by: FAMILY MEDICINE

## 2025-03-20 PROCEDURE — 81001 URINALYSIS AUTO W/SCOPE: CPT | Performed by: FAMILY MEDICINE

## 2025-03-20 PROCEDURE — 80061 LIPID PANEL: CPT

## 2025-03-20 PROCEDURE — 82948 REAGENT STRIP/BLOOD GLUCOSE: CPT

## 2025-03-20 PROCEDURE — 93306 TTE W/DOPPLER COMPLETE: CPT | Performed by: INTERNAL MEDICINE

## 2025-03-20 PROCEDURE — 99232 SBSQ HOSP IP/OBS MODERATE 35: CPT | Performed by: INTERNAL MEDICINE

## 2025-03-20 PROCEDURE — 80307 DRUG TEST PRSMV CHEM ANLYZR: CPT | Performed by: FAMILY MEDICINE

## 2025-03-20 PROCEDURE — 63710000001 INSULIN LISPRO (HUMAN) PER 5 UNITS: Performed by: FAMILY MEDICINE

## 2025-03-20 PROCEDURE — 97165 OT EVAL LOW COMPLEX 30 MIN: CPT

## 2025-03-20 PROCEDURE — 97530 THERAPEUTIC ACTIVITIES: CPT

## 2025-03-20 PROCEDURE — 93306 TTE W/DOPPLER COMPLETE: CPT

## 2025-03-20 PROCEDURE — 70551 MRI BRAIN STEM W/O DYE: CPT

## 2025-03-20 PROCEDURE — 83036 HEMOGLOBIN GLYCOSYLATED A1C: CPT | Performed by: FAMILY MEDICINE

## 2025-03-20 RX ORDER — DEXTROSE MONOHYDRATE 25 G/50ML
25 INJECTION, SOLUTION INTRAVENOUS
Status: DISCONTINUED | OUTPATIENT
Start: 2025-03-20 | End: 2025-03-21 | Stop reason: HOSPADM

## 2025-03-20 RX ORDER — NICOTINE POLACRILEX 4 MG
15 LOZENGE BUCCAL
Status: DISCONTINUED | OUTPATIENT
Start: 2025-03-20 | End: 2025-03-20 | Stop reason: SDUPTHER

## 2025-03-20 RX ORDER — IBUPROFEN 600 MG/1
1 TABLET ORAL
Status: DISCONTINUED | OUTPATIENT
Start: 2025-03-20 | End: 2025-03-20

## 2025-03-20 RX ORDER — INSULIN LISPRO 100 [IU]/ML
2-7 INJECTION, SOLUTION INTRAVENOUS; SUBCUTANEOUS
Status: DISCONTINUED | OUTPATIENT
Start: 2025-03-20 | End: 2025-03-21 | Stop reason: HOSPADM

## 2025-03-20 RX ORDER — INSULIN LISPRO 100 [IU]/ML
2-9 INJECTION, SOLUTION INTRAVENOUS; SUBCUTANEOUS
Status: DISCONTINUED | OUTPATIENT
Start: 2025-03-20 | End: 2025-03-20

## 2025-03-20 RX ORDER — LEVOTHYROXINE SODIUM 75 UG/1
75 TABLET ORAL DAILY
Status: DISCONTINUED | OUTPATIENT
Start: 2025-03-20 | End: 2025-03-21 | Stop reason: HOSPADM

## 2025-03-20 RX ORDER — CLONIDINE 0.2 MG/24H
1 PATCH, EXTENDED RELEASE TRANSDERMAL WEEKLY
Status: DISCONTINUED | OUTPATIENT
Start: 2025-03-20 | End: 2025-03-21 | Stop reason: HOSPADM

## 2025-03-20 RX ORDER — IBUPROFEN 600 MG/1
1 TABLET ORAL
Status: DISCONTINUED | OUTPATIENT
Start: 2025-03-20 | End: 2025-03-21 | Stop reason: HOSPADM

## 2025-03-20 RX ORDER — BUMETANIDE 0.5 MG/1
0.5 TABLET ORAL EVERY OTHER DAY
Status: DISCONTINUED | OUTPATIENT
Start: 2025-03-20 | End: 2025-03-21 | Stop reason: HOSPADM

## 2025-03-20 RX ORDER — SPIRONOLACTONE 25 MG/1
25 TABLET ORAL DAILY
Status: DISCONTINUED | OUTPATIENT
Start: 2025-03-20 | End: 2025-03-21 | Stop reason: HOSPADM

## 2025-03-20 RX ORDER — NEBIVOLOL 10 MG/1
10 TABLET ORAL DAILY
Status: DISCONTINUED | OUTPATIENT
Start: 2025-03-20 | End: 2025-03-21 | Stop reason: HOSPADM

## 2025-03-20 RX ORDER — NICOTINE POLACRILEX 4 MG
15 LOZENGE BUCCAL
Status: DISCONTINUED | OUTPATIENT
Start: 2025-03-20 | End: 2025-03-21 | Stop reason: HOSPADM

## 2025-03-20 RX ORDER — PRAVASTATIN SODIUM 20 MG
20 TABLET ORAL EVERY EVENING
Status: DISCONTINUED | OUTPATIENT
Start: 2025-03-20 | End: 2025-03-21 | Stop reason: HOSPADM

## 2025-03-20 RX ORDER — PREGABALIN 75 MG/1
75 CAPSULE ORAL 2 TIMES DAILY
Status: DISCONTINUED | OUTPATIENT
Start: 2025-03-20 | End: 2025-03-21 | Stop reason: HOSPADM

## 2025-03-20 RX ADMIN — INSULIN LISPRO 6 UNITS: 100 INJECTION, SOLUTION INTRAVENOUS; SUBCUTANEOUS at 11:57

## 2025-03-20 RX ADMIN — INSULIN GLARGINE 8 UNITS: 100 INJECTION, SOLUTION SUBCUTANEOUS at 11:06

## 2025-03-20 RX ADMIN — SACUBITRIL AND VALSARTAN 1 TABLET: 97; 103 TABLET, FILM COATED ORAL at 22:30

## 2025-03-20 RX ADMIN — PRAVASTATIN SODIUM 20 MG: 20 TABLET ORAL at 22:30

## 2025-03-20 RX ADMIN — SODIUM ZIRCONIUM CYCLOSILICATE 10 G: 10 POWDER, FOR SUSPENSION ORAL at 08:35

## 2025-03-20 RX ADMIN — PREGABALIN 75 MG: 75 CAPSULE ORAL at 22:30

## 2025-03-20 RX ADMIN — APIXABAN 2.5 MG: 2.5 TABLET, FILM COATED ORAL at 22:30

## 2025-03-20 RX ADMIN — SODIUM ZIRCONIUM CYCLOSILICATE 10 G: 10 POWDER, FOR SUSPENSION ORAL at 22:30

## 2025-03-20 RX ADMIN — SACUBITRIL AND VALSARTAN 1 TABLET: 97; 103 TABLET, FILM COATED ORAL at 08:35

## 2025-03-20 RX ADMIN — PREGABALIN 75 MG: 75 CAPSULE ORAL at 08:35

## 2025-03-20 RX ADMIN — LEVOTHYROXINE SODIUM 75 MCG: 0.07 TABLET ORAL at 05:51

## 2025-03-20 RX ADMIN — INSULIN LISPRO 8 UNITS: 100 INJECTION, SOLUTION INTRAVENOUS; SUBCUTANEOUS at 08:34

## 2025-03-20 RX ADMIN — APIXABAN 2.5 MG: 2.5 TABLET, FILM COATED ORAL at 08:35

## 2025-03-20 RX ADMIN — NEBIBOLOL 10 MG: 10 TABLET ORAL at 08:35

## 2025-03-20 RX ADMIN — Medication 10 ML: at 08:38

## 2025-03-20 RX ADMIN — BUMETANIDE 0.5 MG: 0.5 TABLET ORAL at 08:35

## 2025-03-20 RX ADMIN — Medication 10 ML: at 22:32

## 2025-03-20 RX ADMIN — CLONIDINE 1 PATCH: 0.2 PATCH TRANSDERMAL at 15:38

## 2025-03-20 NOTE — PROGRESS NOTES
[facility] Medicine Services  PROGRESS NOTE    Patient Name: Marilyn Kunz  : 1945  MRN: [id number]    Date of Admission: 3/19/2025  Primary Care Physician: Peter Baca MD    Subjective   Subjective     CC:  Syncopal spell & gen weakness    HPI:  Feels ok currently  No dyspnea  No chest pain  No palpitations      Objective   Objective     Vital Signs:   Temp:  [96.3 °F (35.7 °C)-98.5 °F (36.9 °C)] 97.5 °F (36.4 °C)  Heart Rate:  [51-77] 60  Resp:  [16-18] 18  BP: (143-183)/(51-98) 170/64     Physical Exam:  Constitutional:Alert, interactive, no distress  Psych:Normal/appropriate affect  HEENT:NCAT, oropharynx clear  Neck: neck supple, full range of motion  Neuro: Face symmetric, speech clear, equal , moves all extremities  Cardiac: RRR; No pretibial pitting edema  Resp: CTAB, normal effort  GI: abd soft, nontender  Skin: No extremity rash  Musculoskeletal/extremities: no cyanosis of extremities; no significant ankle edema      Results Reviewed:  LAB RESULTS:      Lab 25   WBC 8.03  --  11.34*  --    HEMOGLOBIN 10.8*  --  13.1  --    HEMOGLOBIN, POC  --   --   --  14.3   HEMATOCRIT 34.8  --  42.7  --    HEMATOCRIT POC  --   --   --  42   PLATELETS 131*  --  144  --    NEUTROS ABS 6.09  --  9.46*  --    IMMATURE GRANS (ABS) 0.03  --  0.03  --    LYMPHS ABS 1.33  --  1.21  --    MONOS ABS 0.54  --  0.61  --    EOS ABS 0.02  --  0.01  --    MCV 95.6  --  96.4  --    HSTROP T  --  20* 17*  --          Lab 25   SODIUM 138 145  --    POTASSIUM 5.4* 4.7  --    CHLORIDE 104 106  --    CO2 24.0 25.0  --    ANION GAP 10.0 14.0  --    BUN 39* 42*  --    CREATININE 1.14* 1.11* 1.10   EGFR 49.1* 50.7* 51.2*   GLUCOSE 397* 65  --    CALCIUM 8.5* 9.5  --    MAGNESIUM  --  2.4  --    HEMOGLOBIN A1C 8.90*  --   --    TSH  --  0.700  --          Lab 25  0314 25  6413    TOTAL PROTEIN 5.9* 7.4   ALBUMIN 3.5 4.3   GLOBULIN 2.4 3.1   ALT (SGPT) 12 14   AST (SGOT) 16 21   BILIRUBIN 0.6 0.5   ALK PHOS 71 88         Lab 03/19/25  2150 03/19/25  1707   PROBNP  --  575.0   HSTROP T 20* 17*         Lab 03/20/25  0314   CHOLESTEROL 135   LDL CHOL 61   HDL CHOL 64*   TRIGLYCERIDES 41             Brief Urine Lab Results  (Last result in the past 365 days)        Color   Clarity   Blood   Leuk Est   Nitrite   Protein   CREAT   Urine HCG        03/20/25 0545 Yellow   Clear   Negative   Trace   Negative   Negative                   Microbiology Results Abnormal       None            MRI Brain Without Contrast  Result Date: 3/20/2025  MRI BRAIN WO CONTRAST Date of Exam: 3/20/2025 4:02 AM EDT Indication: Stroke, follow up. fall, transient left weakness.  Comparison: CT head with angiography and perfusion 3/19/2025. Technique:  Routine multiplanar/multisequence sequence images of the brain were obtained without contrast administration. Findings: There is no diffusion restriction to suggest acute infarct. There is no evidence of acute or chronic intracranial hemorrhage. There are numerous scattered small round and patchy foci of subcortical and periventricular white matter FLAIR hyperintense signal abnormality. There are chronic infarcts in the left cerebellum. There are prominent perivascular spaces and age-appropriate generalized parenchymal volume loss. No mass effect or midline shift. No abnormal extra-axial collections.  The major vascular flow voids appear intact. The basal ganglia, brainstem and cerebellum appear within normal limits.  Calvarial and superficial soft tissue signal is within normal limits. There is evidence of prior orbital lens replacement. Mild plagiocephaly. There is a chronic microhemorrhage in the anterior left thalamus. The paranasal sinuses and the mastoid air cells appear well aerated.  Midline structures are intact.     Impression: Impression: 1.No acute intracranial  abnormality. 2.Mild chronic small vessel ischemic change and chronic left cerebellar infarcts. Electronically Signed: Yasir Guerrero MD  3/20/2025 4:29 AM EDT  Workstation ID: [id number]    XR Hand 3+ View Left  Result Date: 3/19/2025  XR HAND 3+ VW LEFT Date of Exam: 3/19/2025 5:31 PM EDT Indication: fall Comparison: Left hand radiograph 10/16/2024 Findings: No acute fracture or malalignment.  No focal soft tissue abnormalities appreciated. Similar severe polyarticular osteoarthrosis. Similar ankylosis at the interphalangeal joints which could reflect sequelae of erosive osteoarthritis.     Impression: Impression: No acute fracture or malalignment. Electronically Signed: Miguel A Tinajero  3/19/2025 6:04 PM EDT  Workstation ID: [id number]    XR Chest 1 View  Result Date: 3/19/2025  XR CHEST 1 VW Date of Exam: 3/19/2025 5:32 PM EDT Indication: stroke eval Comparison: Chest CT and chest radiograph 12/12/2024 Findings: Mediastinum: Cardiac silhouette appears unchanged and top normal in size Lungs: Bandlike opacities in the left mid to lower lobe, likely atelectasis. No focal consolidation appreciated. Pleura: No pleural effusion or pneumothorax. Bones and soft tissues: No acute, displaced fracture seen.     Impression: Impression: No radiographic evidence of an acute cardiopulmonary abnormality. Electronically Signed: Miguel A D Megancecy  3/19/2025 5:56 PM EDT  Workstation ID: [id number]    CT CEREBRAL PERFUSION WITH & WITHOUT CONTRAST  Result Date: 3/19/2025  CT CEREBRAL PERFUSION W WO CONTRAST, CT ANGIOGRAM HEAD W AI ANALYSIS OF LVO, CT ANGIOGRAM NECK Date of Exam: 3/19/2025 4:46 PM EDT Indication: fall.  Comparison: None available. Technique: Axial CT images of the brain were obtained prior to and after the administration of 115 mL Isovue-370. Core blood volume, core blood flow, mean transit time, and Tmax images were obtained utilizing the Rapid software protocol. A limited CT angiogram of the head was also performed to  measure the blood vessel density. The radiation dose reduction device was turned on for each scan per the ALARA (As Low as Reasonably Achievable) protocol. CTA of the head and neck was performed after the uneventful intravenous administration of 115 mL Isovue-370. Reconstructed coronal and sagittal images were also obtained. In addition, a 3-D volume rendered image was created for interpretation. Automated exposure control and iterative reconstruction methods were used.  Findings: HEAD CTA: Anterior circulation: No proximal large vessel occlusion or major stenosis. Posterior circulation: No proximal large vessel occlusion or major stenosis. Major dural venous sinuses: No evidence of dural venous sinus thrombosis within the limitation of angiographic contrast imaging. NECK CTA: Conventional, three-vessel, aortic arch. Normal contrast enhancement in the common carotid arteries from their origins to the bifurcation. No significant narrowing of the bilateral ICAs at their origins by NASCET criteria. Normal contrast enhancement of the internal carotid arteries from their origins to the proximal intradural portions.mild atherosclerosis at the supraclinoid ICAs. Normal enhancement in the vertebral arteries from their origins to their proximal intradural portions. The left vertebral artery is dominant. Patent subclavian arteries. Angiographic contrast timing precludes accurate assessement of jugular vein patency. SOFT TISSUE NECK: Evaluation of the oral cavity is degraded by dental hardware artifact. No exophytic lesion seen within the aerodigestive tract. No pathologic appearing lymph nodes by imaging criteria. The visualized glands appear unremarkable. No acute or aggressive appearing osseous or soft tissue process.Degenerative changes of the imaged spine. Biapical pleural-parenchymal scarring. Expiratory phase imaging with associated groundglass opacities. CT PERFUSION BRAIN:  The examination appears to be technically  adequate. Cerebral blood flow, blood volume, Tmax, and mean transit time maps are symmetric without large perfusion defect. CBF<30% volume: 0 mL Tmax>6sec volume: 0] mL Mismatch volume: 0 mL Mismatch ratio: None     Impression: Impression: No proximal large vessel occlusion or severe stenosis of the major arteries of the head and neck. No significant perfusion abnormality by RAPID criteria. Electronically Signed: Miguel A Tinajero  3/19/2025 5:35 PM EDT  Workstation ID: [id number]    CT Angiogram Head w AI Analysis of LVO  Result Date: 3/19/2025  CT CEREBRAL PERFUSION W WO CONTRAST, CT ANGIOGRAM HEAD W AI ANALYSIS OF LVO, CT ANGIOGRAM NECK Date of Exam: 3/19/2025 4:46 PM EDT Indication: fall.  Comparison: None available. Technique: Axial CT images of the brain were obtained prior to and after the administration of 115 mL Isovue-370. Core blood volume, core blood flow, mean transit time, and Tmax images were obtained utilizing the Rapid software protocol. A limited CT angiogram of the head was also performed to measure the blood vessel density. The radiation dose reduction device was turned on for each scan per the ALARA (As Low as Reasonably Achievable) protocol. CTA of the head and neck was performed after the uneventful intravenous administration of 115 mL Isovue-370. Reconstructed coronal and sagittal images were also obtained. In addition, a 3-D volume rendered image was created for interpretation. Automated exposure control and iterative reconstruction methods were used.  Findings: HEAD CTA: Anterior circulation: No proximal large vessel occlusion or major stenosis. Posterior circulation: No proximal large vessel occlusion or major stenosis. Major dural venous sinuses: No evidence of dural venous sinus thrombosis within the limitation of angiographic contrast imaging. NECK CTA: Conventional, three-vessel, aortic arch. Normal contrast enhancement in the common carotid arteries from their origins to the bifurcation.  No significant narrowing of the bilateral ICAs at their origins by NASCET criteria. Normal contrast enhancement of the internal carotid arteries from their origins to the proximal intradural portions.mild atherosclerosis at the supraclinoid ICAs. Normal enhancement in the vertebral arteries from their origins to their proximal intradural portions. The left vertebral artery is dominant. Patent subclavian arteries. Angiographic contrast timing precludes accurate assessement of jugular vein patency. SOFT TISSUE NECK: Evaluation of the oral cavity is degraded by dental hardware artifact. No exophytic lesion seen within the aerodigestive tract. No pathologic appearing lymph nodes by imaging criteria. The visualized glands appear unremarkable. No acute or aggressive appearing osseous or soft tissue process.Degenerative changes of the imaged spine. Biapical pleural-parenchymal scarring. Expiratory phase imaging with associated groundglass opacities. CT PERFUSION BRAIN:  The examination appears to be technically adequate. Cerebral blood flow, blood volume, Tmax, and mean transit time maps are symmetric without large perfusion defect. CBF<30% volume: 0 mL Tmax>6sec volume: 0] mL Mismatch volume: 0 mL Mismatch ratio: None     Impression: Impression: No proximal large vessel occlusion or severe stenosis of the major arteries of the head and neck. No significant perfusion abnormality by RAPID criteria. Electronically Signed: Miguel A Tinajero  3/19/2025 5:35 PM EDT  Workstation ID: [id number]    CT Angiogram Neck  Result Date: 3/19/2025  CT CEREBRAL PERFUSION W WO CONTRAST, CT ANGIOGRAM HEAD W AI ANALYSIS OF LVO, CT ANGIOGRAM NECK Date of Exam: 3/19/2025 4:46 PM EDT Indication: fall.  Comparison: None available. Technique: Axial CT images of the brain were obtained prior to and after the administration of 115 mL Isovue-370. Core blood volume, core blood flow, mean transit time, and Tmax images were obtained utilizing the Rapid  software protocol. A limited CT angiogram of the head was also performed to measure the blood vessel density. The radiation dose reduction device was turned on for each scan per the ALARA (As Low as Reasonably Achievable) protocol. CTA of the head and neck was performed after the uneventful intravenous administration of 115 mL Isovue-370. Reconstructed coronal and sagittal images were also obtained. In addition, a 3-D volume rendered image was created for interpretation. Automated exposure control and iterative reconstruction methods were used.  Findings: HEAD CTA: Anterior circulation: No proximal large vessel occlusion or major stenosis. Posterior circulation: No proximal large vessel occlusion or major stenosis. Major dural venous sinuses: No evidence of dural venous sinus thrombosis within the limitation of angiographic contrast imaging. NECK CTA: Conventional, three-vessel, aortic arch. Normal contrast enhancement in the common carotid arteries from their origins to the bifurcation. No significant narrowing of the bilateral ICAs at their origins by NASCET criteria. Normal contrast enhancement of the internal carotid arteries from their origins to the proximal intradural portions.mild atherosclerosis at the supraclinoid ICAs. Normal enhancement in the vertebral arteries from their origins to their proximal intradural portions. The left vertebral artery is dominant. Patent subclavian arteries. Angiographic contrast timing precludes accurate assessement of jugular vein patency. SOFT TISSUE NECK: Evaluation of the oral cavity is degraded by dental hardware artifact. No exophytic lesion seen within the aerodigestive tract. No pathologic appearing lymph nodes by imaging criteria. The visualized glands appear unremarkable. No acute or aggressive appearing osseous or soft tissue process.Degenerative changes of the imaged spine. Biapical pleural-parenchymal scarring. Expiratory phase imaging with associated groundglass  opacities. CT PERFUSION BRAIN:  The examination appears to be technically adequate. Cerebral blood flow, blood volume, Tmax, and mean transit time maps are symmetric without large perfusion defect. CBF<30% volume: 0 mL Tmax>6sec volume: 0] mL Mismatch volume: 0 mL Mismatch ratio: None     Impression: Impression: No proximal large vessel occlusion or severe stenosis of the major arteries of the head and neck. No significant perfusion abnormality by RAPID criteria. Electronically Signed: Miguel A Tinajero  3/19/2025 5:35 PM EDT  Workstation ID: [id number]    CT Head Without Contrast Stroke Protocol  Result Date: 3/19/2025  CT HEAD WO CONTRAST STROKE PROTOCOL Date of Exam: 3/19/2025 4:41 PM EDT Indication: fall Comparison: CT head 11/16/2024 Technique: Axial CT images were obtained of the head without contrast administration.  Reconstructed coronal images were also obtained. Automated exposure control and iterative construction methods were used. Scan Time: 1639 hours Results discussed with stroke navigator at 1648 hours. Findings: No acute intracranial hemorrhage, mass, or mass effect is seen. There is stable mild global volume loss. Mild to moderate periventricular and subcortical white matter hypodensities have not significantly changed. Although nonspecific, these are most commonly seen in the setting of chronic small vessel ischemic change. Probable chronic left cerebellar infarct is stable compared to 11/16/2024 CT. No fractures are seen. There is mild mucosal thickening in ethmoid air cells. Mastoid air cells are clear.     Impression: Impression: 1.No acute intracranial abnormality is identified on CT. 2.Stable mild global volume loss, mild to moderate chronic small vessel ischemic change, and probable chronic infarct. Electronically Signed: Sera Mercer  3/19/2025 4:51 PM EDT  Workstation ID: [id number]      Results for orders placed during the hospital encounter of 11/15/24    Adult Transthoracic Echo Complete W/  Cont if Necessary Per Protocol    Interpretation Summary    Left ventricular systolic function is normal. Left ventricular ejection fraction appears to be 66 - 70%.    Left ventricular diastolic function was normal.    Left atrial volume is mildly increased.    Estimated right ventricular systolic pressure from tricuspid regurgitation is normal (<35 mmHg).    No significant change compared to 2023 echo      Current medications:  Scheduled Meds:apixaban, 2.5 mg, Oral, Q12H  bumetanide, 0.5 mg, Oral, Every Other Day  cloNIDine, 1 patch, Transdermal, Weekly  insulin glargine, 8 Units, Subcutaneous, Daily  insulin lispro, 2-9 Units, Subcutaneous, 4x Daily AC & at Bedtime  levothyroxine, 75 mcg, Oral, Daily  nebivolol, 10 mg, Oral, Daily  pravastatin, 20 mg, Oral, Q PM  pregabalin, 75 mg, Oral, BID  sacubitril-valsartan, 1 tablet, Oral, BID  sodium chloride, 10 mL, Intravenous, Q12H  sodium zirconium cyclosilicate, 10 g, Oral, BID  [Held by provider] spironolactone, 25 mg, Oral, Daily      Continuous Infusions:   PRN Meds:.  senna-docusate sodium **AND** polyethylene glycol **AND** bisacodyl **AND** bisacodyl    Calcium Replacement - Follow Nurse / BPA Driven Protocol    dextrose    dextrose    glucagon (human recombinant)    Magnesium Standard Dose Replacement - Follow Nurse / BPA Driven Protocol    melatonin    ondansetron    Potassium Replacement - Follow Nurse / BPA Driven Protocol    sodium chloride    sodium chloride    Assessment & Plan   Assessment & Plan     Active Hospital Problems    Diagnosis  POA    **Transient neurological symptoms [R29.818]  Yes      Resolved Hospital Problems   No resolved problems to display.        Brief Hospital Course to date:  Marilyn Kunz is a 79 y.o. female w/ dm1 presented w/ syncopal spell, transient left weakness & slurred speech. Fsbs was 60 prior to arrival. Mri brain negative    Transient left sided weakness, slurred speech  Syncope  Parox Afib  -symptoms and recent  syncopal spells... hypoglycemia vs cardiogenic syncope vs seizures vs orthostasis  -mri brain negative  -u/a benign  -uds neg  -eeg pending  -echo pending  -monitor on telemetry  -orthostatics pending  -neuro-stroke following: f/u echo, eeg; recommends outpatient cardiac monitor (afib, monitor for bradycardic, dysrhythmia, etc); agrees w/ eliquis, statin  -pt/ot recommend inpt rehab    Hypoglycemia  DM1  -tresiba at home, ~20 units nightly  -glucose 60 by ems  -initially held insulin, got iv dextrose  -fsbs >400's. Initiated lantus 8 units daily, ssi (titrate prn), titrate prn    Hx hypercoagulability/DVT  -on eliquis    Chronic HFpEF  HTN  HL  -previous echo 11/2025: EF 66-70%  -nebivolol, bumex 0.5mg every other day, entresto, eliquis, statin; add back clonidine 0.2mg/day patch  -holding aldactone (hyperkalemia), consider nolding entresto if elevated potassium continues      Ckd 3 (baseline cr ~1.2)  Hyperkalemia  -potassium 5.4  -hold aldactone; lokelma x 2 doses; recheck in a.m.    Hypothyroidism  -tsh wnl  -continue levothyroxine    Am labs: cbc,bmp,mag (fsbs, echo, orthostatics)      Expected Discharge Location and Transportation: TBD (pt/ot recs snf)  Expected Discharge   Expected Discharge Date: 3/22/2025; Expected Discharge Time:      VTE Prophylaxis:  Pharmacologic & mechanical VTE prophylaxis orders are present.         AM-PAC 6 Clicks Score (PT): 16 (03/20/25 1006)    CODE STATUS:   Code Status and Medical Interventions: CPR (Attempt to Resuscitate); Full Support   Ordered at: 03/19/25 1931     Code Status (Patient has no pulse and is not breathing):    CPR (Attempt to Resuscitate)     Medical Interventions (Patient has pulse or is breathing):    Full Support     Level Of Support Discussed With:    Patient       Lucas Garcia MD  03/20/25

## 2025-03-20 NOTE — PLAN OF CARE
Goal Outcome Evaluation:  Plan of Care Reviewed With: patient        Progress: no change (Initial Eval)  Outcome Evaluation: Patient presenting below her functional baseline w/ mobility, transfers and ADLs. Pt limited by B hand pain this session. Pt requiring assist for transfers, HH dists of mobility and ADLs. Deficits warrant skilled OT services. Recommend SNF when medically appropriate for d/c.    Anticipated Discharge Disposition (OT): skilled nursing facility

## 2025-03-20 NOTE — CASE MANAGEMENT/SOCIAL WORK
Discharge Planning Assessment  [facility]     Patient Name: Marilyn Kunz  MRN: [id number]  Today's Date: 3/20/2025    Admit Date: 3/19/2025    Plan: IDP   Discharge Needs Assessment       Row Name 03/20/25 1229       Living Environment    People in Home alone    Current Living Arrangements home    Potentially Unsafe Housing Conditions none    In the past 12 months has the electric, gas, oil, or water company threatened to shut off services in your home? No    Primary Care Provided by self    Provides Primary Care For pet(s)    Family Caregiver if Needed sibling(s);other relative(s)    Family Caregiver Names Taisha Kunz Relative [phone]  JOSUE BOND Relative [phone]    Quality of Family Relationships supportive    Able to Return to Prior Arrangements yes       Resource/Environmental Concerns    Resource/Environmental Concerns none    Transportation Concerns none       Transportation Needs    In the past 12 months, has lack of transportation kept you from medical appointments or from getting medications? no    In the past 12 months, has lack of transportation kept you from meetings, work, or from getting things needed for daily living? No       Food Insecurity    Within the past 12 months, you worried that your food would run out before you got the money to buy more. Never true    Within the past 12 months, the food you bought just didn't last and you didn't have money to get more. Never true       Transition Planning    Patient/Family Anticipates Transition to home with help/services    Patient/Family Anticipated Services at Transition home health care    Transportation Anticipated family or friend will provide       Discharge Needs Assessment    Readmission Within the Last 30 Days no previous admission in last 30 days    Equipment Currently Used at Home cpap    Concerns to be Addressed discharge planning    Do you want help finding or keeping work or a  job? I do not need or want help    Do you want help with school or training? For example, starting or completing job training or getting a high school diploma, GED or equivalent No    Anticipated Changes Related to Illness inability to care for self    Discharge Facility/Level of Care Needs home with home health    Current Discharge Risk lives alone;chronically ill                   Discharge Plan       Row Name 03/20/25 1231       Plan    Plan IDP    Patient/Family in Agreement with Plan yes    Plan Comments I met with the patient at the bedside. Patient stated that she lives in [place] in her own home with her cat. She reported that she is independent at home and can drive but has not been driving with her recent syncopal episodes. She uses [facility] transport service as needed. She does not use DME to ambulate but does have a CPAP from [facility]. She reported that she does not have a living will. Insurance confirmed to be [employer] and her PCP is Peter Baca MD. Patient denied being current with home health but is current with outpatient PT at [facility]. She stated that she went to [facility] last summer and will never return to their facility and does not want to go to rehab at discharge. She informed me that her niece in law, Taisha, will likely be the one to transport her home at discharge. CM following.    Final Discharge Disposition Code 01 - home or self-care                  Selected Continued Care - Episodes Includes continued care and service providers with selected services from the active episodes listed below             Demographic Summary    No documentation.                  Functional Status       Row Name 03/20/25 1225       Functional Status    Usual Activity Tolerance moderate    Current Activity Tolerance other (see comments)  see therapy/RN notes       Physical Activity    On average, how many days per week do you engage in moderate to strenuous exercise  (like a brisk walk)? 0 days    On average, how many minutes do you engage in exercise at this level? 0 min    Number of minutes of exercise per week 0       Assessment of Health Literacy    How often do you have someone help you read hospital materials? Never    How often do you have problems learning about your medical condition because of difficulty understanding written information? Never    How often do you have a problem understanding what is told to you about your medical condition? Never    How confident are you filling out medical forms by yourself? Quite a bit    Health Literacy Good       Functional Status, IADL    Medications independent    Meal Preparation independent    Housekeeping independent    Laundry independent    Shopping assistive person    If for any reason you need help with day-to-day activities such as bathing, preparing meals, shopping, managing finances, etc., do you get the help you need? I get all the help I need       Mental Status    General Appearance WDL WDL       Mental Status Summary    Recent Changes in Mental Status/Cognitive Functioning no changes       Employment/    Employment Status retired                   Psychosocial    No documentation.                  Abuse/Neglect    No documentation.                  Legal    No documentation.                  Substance Abuse    No documentation.                  Patient Forms    No documentation.                     Flory Barnett RN

## 2025-03-20 NOTE — THERAPY EVALUATION
Patient Name: Marilyn Kunz  : 1945    MRN: [id number]                              Today's Date: 3/20/2025       Admit Date: 3/19/2025    Visit Dx:     ICD-10-CM ICD-9-CM   1. Transient neurological symptoms  R29.818 781.99   2. History of diabetes mellitus  Z86.39 V12.29   3. Contusion of finger of left hand, initial encounter  S60.00XA 923.3     Patient Active Problem List   Diagnosis    Essential hypertension    Factor V Leiden    Psoriatic arthritis    At risk for venous thromboembolism (VTE)    Hypothyroidism    CKD (chronic kidney disease) stage 3, GFR 30-59 ml/min    Hyperlipidemia LDL goal <70    PDA (patent ductus arteriosus)    Demyelinating disease    Spondylolisthesis, grade 2    Type 1 diabetes mellitus    History of stroke    Demyelinating disease of central nervous system, unspecified    Acute on chronic heart failure with preserved ejection fraction    Chronic diastolic congestive heart failure    Peripheral polyneuropathy    Immunosuppression due to drug therapy    Erosive osteoarthritis    Cervical radiculopathy    Rash    UTI (urinary tract infection)    Type 1 diabetes mellitus with hyperglycemia    Syncope    Elevated serum creatinine    Sepsis    Closed fracture of left foot    TIFFANIE (obstructive sleep apnea)    COPD (chronic obstructive pulmonary disease)    Elevated CK    Acute exacerbation of CHF (congestive heart failure)    Transient neurological symptoms     Past Medical History:   Diagnosis Date    Abnormal ECG Check A Date or [facility] Records    Brought to  from [facility]    Acute sinusitis 2023    Anemia     Asthma     CAP (community acquired pneumonia) 2023    CHF (congestive heart failure) 2023    Chronic kidney disease     pt told it was dx from Dr Baca and to not eat much protein    Congenital heart disease 's Patent 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    Surgery [facility]  close    COPD (chronic obstructive pulmonary disease)  11/15/2024    CTS (carpal tunnel syndrome)     Psoriatic arthritis hands could be    Deep vein thrombosis No on blood clots but have a blood clotting disorder called Leiden Factor 5    Demyelinating disease of central nervous system, unspecified 03/09/2023    Diabetes     Impression: Patient states that her diabetes is not doing well.  I asked her to go ahead and try to talk to her endocrinokogist.  She is still having the lower extremity neuropathic pan, but not enough for me to administer medication.    Disease of thyroid gland     Diverticulitis of colon     Erosive osteoarthritis 05/30/2024    Factor 5 Leiden mutation, heterozygous 2012    Head injury     Hyperlipidemia     Hypertension     Medication monitoring encounter     Impression:  She has renal insufficiency.  She is just taking Tylenol.    Movement disorder 10/2021    knee replacement left knww    Murmur, cardiac     Neuropathy in diabetes     redness swelling legs    TIFFANIE (obstructive sleep apnea) 11/15/2024    Osteopenia     Story: Femoral Neck Impression: Up-ro-date on bone density scan.  Follow up bone density every 2 years.    Peripheral neuropathy 05/03/2022    hands, arms. 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  I went over her exercises and told her to get a shoe insert,    Psoriasis     Impression: No rash    Psoriatic arthritis     hands    Sleep apnea Always    diabetic do not sleep well up and down    Stroke had one don't know when    showed on MRI Brain    Syncope 11/15/2024    Type 1 diabetes      Past Surgical History:   Procedure Laterality Date    CARDIAC CATHETERIZATION  1952 2 of them    Patent Dictus operation    CATARACT EXTRACTION Bilateral     COLONOSCOPY      ENDOSCOPY N/A 10/20/2020    Procedure: ESOPHAGOGASTRODUODENOSCOPY;  Surgeon: Brunner, Mark I, MD;  Location: [facility] ENDOSCOPY;  Service: Gastroenterology;  Laterality: N/A;    HAND SURGERY Left 1987    no  hardware    KNEE ARTHROPLASTY      KNEE SURGERY Left     PATENT DUCTUS ARTERIOUS LIGATION      REPLACEMENT TOTAL KNEE Left       General Information       Row Name 03/20/25 1028          OT Time and Intention    Document Type evaluation  -MR     Mode of Treatment occupational therapy  -MR       Row Name 03/20/25 1028          General Information    Patient Profile Reviewed yes  -MR     Prior Level of Function independent:;all household mobility;community mobility;ADL's;driving  -MR     Existing Precautions/Restrictions fall;other (see comments)  remote L cerebellar CVA  -MR     Barriers to Rehab medically complex;previous functional deficit;cognitive status  -MR       Row Name 03/20/25 1028          Living Environment    Current Living Arrangements home  -MR     People in Home alone  -MR       Row Name 03/20/25 1028          Home Main Entrance    Number of Stairs, Main Entrance two  -MR     Stair Railings, Main Entrance railing on right side (ascending)  -MR       Row Name 03/20/25 1028          Stairs Within Home, Primary    Number of Stairs, Within Home, Primary none  -MR       Row Name 03/20/25 1028          Cognition    Orientation Status (Cognition) oriented to;person;place;disoriented to;time  pt stated 1925 for year x 2  -MR       Row Name 03/20/25 1028          Safety Issues/Impairments Affecting Functional Mobility    Safety Issues Affecting Function (Mobility) insight into deficits/self-awareness;safety precaution awareness;safety precautions follow-through/compliance;awareness of need for assistance  -MR     Impairments Affecting Function (Mobility) balance;endurance/activity tolerance;cognition;pain;strength  -MR     Cognitive Impairments, Mobility Safety/Performance awareness, need for assistance;insight into deficits/self-awareness;problem-solving/reasoning;safety precaution awareness;safety precaution follow-through;sequencing abilities  -MR               User Key  (r) = Recorded By, (t) = Taken By,  (c) = Cosigned By      Initials Name Provider Type    Quita Castellano, OT Occupational Therapist                     Mobility/ADL's       Row Name 03/20/25 1029          Bed Mobility    Comment, (Bed Mobility) Pt received sitting in chair w/ PT  -MR       Row Name 03/20/25 1029          Transfers    Transfers sit-stand transfer  -MR       Row Name 03/20/25 1029          Sit-Stand Transfer    Sit-Stand [place] (Transfers) contact guard  -MR       Row Name 03/20/25 1029          Functional Mobility    Functional Mobility- Ind. Level minimum assist (75% patient effort);2 person assist required  -MR     Functional Mobility- Device other (see comments)  B HHA  -MR     Functional Mobility-Distance (Feet) --  HH distances  -MR       Row Name 03/20/25 1029          Activities of Daily Living    BADL Assessment/Intervention lower body dressing;feeding  -MR       Row Name 03/20/25 1029          Lower Body Dressing Assessment/Training    [place] Level (Lower Body Dressing) don;socks;maximum assist (25% patient effort)  -MR     Position (Lower Body Dressing) supported sitting  -MR       Row Name 03/20/25 1029          Self-Feeding Assessment/Training    [place] Level (Feeding) feeding skills  -MR     Assistive Devices (Feeding) built-up handle utensils  -MR     Position (Feeding) supported sitting  -MR     Comment, (Feeding) Pt limited w/ use of the R hand d/t IV, L hand w/ edema and bruising from fall.  -MR               User Key  (r) = Recorded By, (t) = Taken By, (c) = Cosigned By      Initials Name Provider Type    Quita Castellano, OT Occupational Therapist                   Obj/Interventions       Row Name 03/20/25 1032          Sensory Assessment (Somatosensory)    Sensory Assessment (Somatosensory) UE sensation intact  -MR     Sensory Subjective Reports numbness;tingling  -MR     Sensory Assessment From IV and edema  -MR       Row Name 03/20/25 1032          Vision Assessment/Intervention    Visual  Impairment/Limitations WFL  -MR       Row Name 03/20/25 1032          Range of Motion Comprehensive    General Range of Motion bilateral upper extremity ROM WFL  -MR       Row Name 03/20/25 1032          Strength Comprehensive (MMT)    Comment, General Manual Muscle Testing (MMT) Assessment BUE grossly 3+/5 as observed through functional tasks.  -MR       Row Name 03/20/25 1032          Balance    Balance Assessment sitting static balance;sitting dynamic balance;standing static balance;standing dynamic balance  -MR     Static Sitting Balance standby assist  -MR     Dynamic Sitting Balance contact guard  -MR     Position, Sitting Balance unsupported;sitting in chair  -MR     Static Standing Balance contact guard  -MR     Dynamic Standing Balance minimal assist;2-person assist  -MR     Position/Device Used, Standing Balance supported;other (see comments)  B HHA  -MR     Balance Interventions sitting;standing;sit to stand;supported;static;dynamic;occupation based/functional task  -MR               User Key  (r) = Recorded By, (t) = Taken By, (c) = Cosigned By      Initials Name Provider Type    MR Quita Castro, OT Occupational Therapist                   Goals/Plan       Row Name 03/20/25 1036          Bed Mobility Goal 1 (OT)    Activity/Assistive Device (Bed Mobility Goal 1, OT) sit to supine;supine to sit  -MR     [place] Level/Cues Needed (Bed Mobility Goal 1, OT) standby assist  -MR     Time Frame (Bed Mobility Goal 1, OT) short term goal (STG);3 days  -MR     Progress/Outcomes (Bed Mobility Goal 1, OT) new goal  -MR       Row Name 03/20/25 1036          Transfer Goal 1 (OT)    Activity/Assistive Device (Transfer Goal 1, OT) sit-to-stand/stand-to-sit;toilet  -MR     [place] Level/Cues Needed (Transfer Goal 1, OT) minimum assist (75% or more patient effort)  -MR     Time Frame (Transfer Goal 1, OT) long term goal (LTG);5 days  -MR     Progress/Outcome (Transfer Goal 1, OT) new goal  -       Row Name  03/20/25 1036          Dressing Goal 1 (OT)    Activity/Device (Dressing Goal 1, OT) lower body dressing  -MR     [place]/Cues Needed (Dressing Goal 1, OT) moderate assist (50-74% patient effort)  -MR     Time Frame (Dressing Goal 1, OT) long term goal (LTG);5 days  -MR     Progress/Outcome (Dressing Goal 1, OT) new goal  -MR Ybarra Name 03/20/25 1036          Therapy Assessment/Plan (OT)    Planned Therapy Interventions (OT) activity tolerance training;adaptive equipment training;BADL retraining;functional balance retraining;IADL retraining;transfer/mobility retraining;strengthening exercise;ROM/therapeutic exercise;patient/caregiver education/training;occupation/activity based interventions;passive ROM/stretching  -MR               User Key  (r) = Recorded By, (t) = Taken By, (c) = Cosigned By      Initials Name Provider Type    MR CastroQuita, OT Occupational Therapist                   Clinical Impression       Row Name 03/20/25 1033          Pain Assessment    Pain Location hand  -MR     Pain Side/Orientation bilateral  -MR     Pain Management Interventions nursing notified  -MR       Row Name 03/20/25 1033          Pain Scale: FACES Pre/Post-Treatment    Pain: FACES Scale, Pretreatment 6-->hurts even more  -MR     Posttreatment Pain Rating 6-->hurts even more  -MR       Row Name 03/20/25 1033          Plan of Care Review    Plan of Care Reviewed With patient  -MR     Progress no change  Initial Eval  -MR     Outcome Evaluation Patient presenting below her functional baseline w/ mobility, transfers and ADLs. Pt limited by B hand pain this session. Pt requiring assist for transfers, HH dists of mobility and ADLs. Deficits warrant skilled OT services. Recommend SNF when medically appropriate for d/c.  -MR       Row Name 03/20/25 1033          Therapy Assessment/Plan (OT)    Patient/Family Therapy Goal Statement (OT) Return to PLOF  -MR     Rehab Potential (OT) fair  -MR     Criteria for Skilled  Therapeutic Interventions Met (OT) yes;meets criteria;skilled treatment is necessary  -MR     Therapy Frequency (OT) daily  -MR     Predicted Duration of Therapy Intervention (OT) 5 days  -MR       Row Name 03/20/25 1033          Therapy Plan Review/Discharge Plan (OT)    Anticipated Discharge Disposition (OT) skilled nursing facility  -MR       Row Name 03/20/25 1033          Vital Signs    Pre Systolic BP Rehab 183  -MR     Pre Treatment Diastolic BP 65  -MR     Post Systolic BP Rehab 213  -MR     Post Treatment Diastolic BP 80  -MR     Pretreatment Heart Rate (beats/min) 61  -MR     Posttreatment Heart Rate (beats/min) 62  -MR     O2 Delivery Pre Treatment room air  -MR     O2 Delivery Intra Treatment room air  -MR     O2 Delivery Post Treatment room air  -MR       Row Name 03/20/25 1033          Positioning and Restraints    Pre-Treatment Position sitting in chair/recliner  -MR     Post Treatment Position chair  -MR     In Chair notified nsg;reclined;sitting;call light within reach;encouraged to call for assist;exit alarm on;waffle cushion;legs elevated  -MR               User Key  (r) = Recorded By, (t) = Taken By, (c) = Cosigned By      Initials Name Provider Type    MR Matthew Quita, OT Occupational Therapist                   Outcome Measures       Row Name 03/20/25 1037          How much help from another is currently needed...    Putting on and taking off regular lower body clothing? 2  -MR     Bathing (including washing, rinsing, and drying) 2  -MR     Toileting (which includes using toilet bed pan or urinal) 3  -MR     Putting on and taking off regular upper body clothing 3  -MR     Taking care of personal grooming (such as brushing teeth) 2  -MR     Eating meals 2  -MR     AM-PAC 6 Clicks Score (OT) 14  -MR       Row Name 03/20/25 1006          How much help from another person do you currently need...    Turning from your back to your side while in flat bed without using bedrails? 3  -KR     Moving  from lying on back to sitting on the side of a flat bed without bedrails? 3  -KR     Moving to and from a bed to a chair (including a wheelchair)? 3  -KR     Standing up from a chair using your arms (e.g., wheelchair, bedside chair)? 3  -KR     Climbing 3-5 steps with a railing? 2  -KR     To walk in hospital room? 2  -KR     AM-PAC 6 Clicks Score (PT) 16  -KR     Highest Level of Mobility Goal 5 --> Static standing  -KR       Row Name 03/20/25 1037 03/20/25 1006       Modified [place] Scale    Pre-Stroke Modified Salazar Scale 6 - Unable to determine (UTD) from the medical record documentation  -MR 6 - Unable to determine (UTD) from the medical record documentation  -KR    Modified [place] Scale 4 - Moderately severe disability.  Unable to walk without assistance, and unable to attend to own bodily needs without assistance.  -MR 4 - Moderately severe disability.  Unable to walk without assistance, and unable to attend to own bodily needs without assistance.  -KR      Row Name 03/20/25 1037 03/20/25 1006       Functional Assessment    Outcome Measure Options AM-PAC 6 Clicks Daily Activity (OT);Modified Salazar  -MR AM-PAC 6 Clicks Basic Mobility (PT);Modified Salazar  -KR              User Key  (r) = Recorded By, (t) = Taken By, (c) = Cosigned By      Initials Name Provider Type    Quita Castellano, OT Occupational Therapist    KR Jackelyn Mccormick, PT Physical Therapist                    Occupational Therapy Education       Title: PT OT SLP Therapies (In Progress)       Topic: Occupational Therapy (In Progress)       Point: ADL training (Done)       Learning Progress Summary            Patient Acceptance, E, VU by MR at 3/20/2025 1038                      Point: Precautions (Done)       Learning Progress Summary            Patient Acceptance, E, VU by MR at 3/20/2025 1038                      Point: Body mechanics (Done)       Learning Progress Summary            Patient Acceptance, E, VU by MR at 3/20/2025 1038                                       User Key       Initials Effective Dates Name Provider Type Discipline    MR 09/22/22 -  Quita Castro OT Occupational Therapist OT                  OT Recommendation and Plan  Planned Therapy Interventions (OT): activity tolerance training, adaptive equipment training, BADL retraining, functional balance retraining, IADL retraining, transfer/mobility retraining, strengthening exercise, ROM/therapeutic exercise, patient/caregiver education/training, occupation/activity based interventions, passive ROM/stretching  Therapy Frequency (OT): daily  Plan of Care Review  Plan of Care Reviewed With: patient  Progress: no change (Initial Eval)  Outcome Evaluation: Patient presenting below her functional baseline w/ mobility, transfers and ADLs. Pt limited by B hand pain this session. Pt requiring assist for transfers, HH dists of mobility and ADLs. Deficits warrant skilled OT services. Recommend SNF when medically appropriate for d/c.     Time Calculation:   Evaluation Complexity (OT)  Review Occupational Profile/Medical/Therapy History Complexity: brief/low complexity  Assessment, Occupational Performance/Identification of Deficit Complexity: 1-3 performance deficits  Clinical Decision Making Complexity (OT): problem focused assessment/low complexity  Overall Complexity of Evaluation (OT): low complexity     Time Calculation- OT       Row Name 03/20/25 1038             Time Calculation- OT    OT Start Time 0846  -MR      OT Received On 03/20/25  -MR      OT Goal Re-Cert Due Date 03/30/25  -MR         Untimed Charges    OT Eval/Re-eval Minutes 46  -MR         Total Minutes    Untimed Charges Total Minutes 46  -MR       Total Minutes 46  -MR                User Key  (r) = Recorded By, (t) = Taken By, (c) = Cosigned By      Initials Name Provider Type    MR Quita Castro OT Occupational Therapist                  Therapy Charges for Today       Code Description Service Date Service Provider  Modifiers Qty    [id number] HC OT EVAL LOW COMPLEXITY 4 3/20/2025 Quita Castro, FRANCO GO 1                 Quita Castro OT  3/20/2025

## 2025-03-20 NOTE — ED PROVIDER NOTES
Subjective   History of Present Illness  79-year-old female with multiple comorbidities presents via EMS for evaluation of altered mental status.  Of note, the patient tells me that she has a history of prior stroke but does not have any significant residual deficits.  She has a history of diabetes as well.  She does not take any sulfonylureas.  Her last known well was last night at bedtime.  Today, she remembers waking up laying on the floor.  Unknown downtime.  She was able to crawl to a phone and call EMS.  On their arrival, the patient was noted to be borderline hypoglycemic with a glucose of 60.  On EMS arrival the patient also had significant left-sided weakness and slurred speech.  She was given D50 and was brought to our facility to be evaluated.  By the time that she arrived here her weakness and speech issues had resolved.  She did not bite her tongue.  No urinary incontinence.  The patient denies any pain from her fall.      Review of Systems   Neurological:  Positive for speech difficulty and weakness.   All other systems reviewed and are negative.      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  I asked her to go ahead and try to talk to her endocrinokogist.  She is still having the lower extremity neuropathic pan, but not enough for me to administer medication.   • Disease of thyroid gland    • Diverticulitis of colon    • Erosive osteoarthritis 05/30/2024   • Factor 5 Leiden mutation, heterozygous 2012   • Head injury    • Hyperlipidemia    • Hypertension    • Medication monitoring encounter     Impression:  She has renal insufficiency.  She is just taking Tylenol.   • Movement disorder 10/2021    knee replacement left knww   • Murmur, cardiac    • Neuropathy in diabetes     redness swelling legs   • TIFFANIE (obstructive sleep apnea) 11/15/2024   • Osteopenia     Story: Femoral Neck Impression: Up-ro-date on bone density scan.  Follow up bone density every 2 years.   • Peripheral neuropathy 05/03/2022    hands, arms. shoulders, back   • Pill esophagitis 02/06/2023   • Plantar fasciitis     Impression: She has a recurrence.  I went over her exercises and told her to get a shoe insert,   • Psoriasis     Impression: No rash   • Psoriatic arthritis     hands   • Sleep apnea Always    diabetic do not sleep well up and down   • Stroke had one don't know when    showed on MRI Brain   • Syncope 11/15/2024   • Type 1 diabetes        Allergies   Allergen Reactions   • Atorvastatin Other (See Comments)   • Colesevelam Other (See Comments)   • Cymbalta [Duloxetine Hcl] Other (See Comments)     Hyponatremia   • Famotidine Other (See Comments)   • Cephalexin Rash     Tolerated Ceftriaxone   • Clindamycin Rash   • Hygroton [Chlorthalidone] Rash     Facial rash   • Lidocaine Other (See Comments)     Sores in throat   • Penicillins Nausea And Vomiting, Rash and Other (See Comments)     Has tolerated ceftriaxone  TROUBLE BREATHING       Past Surgical History:   Procedure Laterality Date   • CARDIAC CATHETERIZATION  1952 2 of them    Patent Dictus operation   • CATARACT EXTRACTION Bilateral    • COLONOSCOPY     •  ENDOSCOPY N/A 10/20/2020    Procedure: ESOPHAGOGASTRODUODENOSCOPY;  Surgeon: Brunner, Mark I, MD;  Location: [facility] ENDOSCOPY;  Service: Gastroenterology;  Laterality: N/A;   • HAND SURGERY Left 1987    no hardware   • KNEE ARTHROPLASTY     • KNEE SURGERY Left    • PATENT DUCTUS ARTERIOUS LIGATION     • REPLACEMENT TOTAL KNEE Left        Family History   Problem Relation Age of Onset   • Cancer Mother    • Pancreatic cancer Mother    • Stroke Mother         Mini strokes   • Cancer Father    • Lung cancer Father    • Cancer Sister    • Colon cancer Sister    • Diabetes Sister    • Diabetes Brother    • Breast cancer Neg Hx    • Ovarian cancer Neg Hx        Social History     Socioeconomic History   • Marital status: Single   Tobacco Use   • Smoking status: Never     Passive exposure: Never   • Smokeless tobacco: Never   Vaping Use   • Vaping status: Never Used   Substance and Sexual Activity   • Alcohol use: No   • Drug use: No   • Sexual activity: Not Currently     Partners: Male     Birth control/protection: Abstinence           Objective   Physical Exam  Vitals and nursing note reviewed.   Constitutional:       General: She is not in acute distress.     Appearance: She is well-developed. She is not diaphoretic.      Comments: Nontoxic-appearing female   HENT:      Head: Normocephalic and atraumatic.      Comments: No tongue laceration present  Eyes:      Pupils: Pupils are equal, round, and reactive to light.   Neck:      Comments: No meningeal signs or nuchal rigidity  Cardiovascular:      Rate and Rhythm: Normal rate and regular rhythm.      Heart sounds: Normal heart sounds. No murmur heard.     No friction rub. No gallop.   Pulmonary:      Effort: Pulmonary effort is normal. No respiratory distress.      Breath sounds: Normal breath sounds. No wheezing or rales.   Abdominal:      General: Bowel sounds are normal. There is no distension.      Palpations: Abdomen is soft. There is no mass.      Tenderness: There  is no abdominal tenderness. There is no guarding or rebound.      Comments: No focal abdominal tenderness, no peritoneal signs, no pain on proportion to exam   Musculoskeletal:         General: Normal range of motion.      Cervical back: Neck supple.      Comments: Circumferential bruising and soft tissue swelling noted to ring finger of left hand   Skin:     General: Skin is warm and dry.      Findings: No erythema or rash.   Neurological:      Mental Status: She is alert.      Comments: Awake, alert, and oriented x3, clear and fluent speech, no dysmetria, neurovascularly intact distally in all fours with bounding distal pulses and normal sensation, 5 out of 5 strength in all fours, no cranial nerve deficits noted with cranial nerves II through XII grossly intact   Psychiatric:         Mood and Affect: Mood normal.         Thought Content: Thought content normal.         Judgment: Judgment normal.         Critical Care    Performed by: Guille Jacobo MD  Authorized by: Guille Jacobo MD    Critical care provider statement:     Critical care time (minutes):  37    Critical care was necessary to treat or prevent imminent or life-threatening deterioration of the following conditions:  CNS failure or compromise    Critical care was time spent personally by me on the following activities:  Development of treatment plan with patient or surrogate, discussions with consultants, evaluation of patient's response to treatment, examination of patient, obtaining history from patient or surrogate, ordering and performing treatments and interventions, ordering and review of laboratory studies, ordering and review of radiographic studies, re-evaluation of patient's condition, pulse oximetry and review of old charts             ED Course  ED Course as of 03/19/25 2100   Wed Mar 19, 2025   1749 79-year-old female presents for evaluation via EMS for altered mental status.  Of note, the patient tells me that she has a  history of a prior stroke but does not have any significant residual deficits.  She has a history of diabetes.  She does not take any sulfonylureas.  Her last known well was last night at bedtime.  Today, she remembers waking up laying on the floor.  Unknown downtime.  She was able to crawl to a phone and call EMS and on their arrival she was noted to be borderline hypoglycemic with a blood glucose of 60.  On their arrival the patient also had significant left-sided weakness and slurred speech.  She was given D50 and was brought to our facility to be evaluated.  By the time that she arrived here her weakness had resolved and speech was back to baseline.  On arrival, the patient was placed in a hallway bed.  I evaluated her and initiated a code stroke.  She was taken directly to CT where I was met by our stroke navigator.  On exam, the patient's neurological deficits have completely resolved.  No focal neurological deficits noted at this time.  No tongue laceration present.  Differential diagnosis quite broad.  We will obtain labs and advanced imaging and will reassess following initial interventions. [DD]   1751 I personally and independently reviewed the patient's CT images and findings, and I am in agreement with the radiologist regarding CT interpretation--particularly there is no intracranial bleed present. [DD]   1751 NIH stroke scale of 0.  TNK considered but ultimately decided against given the patient's resolution of symptoms and overall clinical picture. [DD]   1751 I personally and independently reviewed the patient's CT images and findings, and I am in agreement with the radiologist regarding CT interpretation--particularly there is no vascular lesion amenable to neurointervention. [DD]   1751 Labs are bland/unrevealing.  Repeat glucose is 65.  Repeat D50 given. [DD]   1751 Additionally, the patient is complaining of pain and bruising to her left fourth finger from her fall. [DD]   1752 The patient was  evaluated by our stroke team.  They are recommending admission to the hospital and will continue to follow along and consult on the patient throughout her hospital stay.  NIHSS of 0. [DD]   1752 I discussed the patient's case with our hospitalist, Dr. Mcghee, the patient will be admitted under her care for further evaluation and treatment.  The patient is hemodynamically stable at this time and is aware/agreeable with the plan. [DD]   1818 I personally and independently viewed the patient's x-ray images myself, and I am in agreement with the radiologist's reading for final interpretation, particularly there is no fracture to left fourth digit.  No pneumonia noted.  No pneumothorax present.  No rib fracture present. [DD]      ED Course User Index  [DD] Guille Jacobo MD                                Total (NIH Stroke Scale): 0            Recent Results (from the past 24 hours)   POC CHEM 8    Collection Time: 03/19/25  5:06 PM    Specimen: Blood   Result Value Ref Range    Glucose 68 (L) 70 - 130 mg/dL    BUN 43 (H) 8 - 26 mg/dL    Creatinine 1.10 0.60 - 1.30 mg/dL    Sodium 146 138 - 146 mmol/L    POC Potassium 4.5 3.5 - 4.9 mmol/L    Chloride 109 98 - 109 mmol/L    Total CO2 27 24 - 29 mmol/L    Hemoglobin 14.3 12.0 - 17.0 g/dL    Hematocrit 42 38 - 51 %    Ionized Calcium 1.23 1.20 - 1.32 mmol/L    eGFR 51.2 (L) >60.0 mL/min/1.73   Comprehensive Metabolic Panel    Collection Time: 03/19/25  5:07 PM    Specimen: Arm, Right; Blood   Result Value Ref Range    Glucose 65 65 - 99 mg/dL    BUN 42 (H) 8 - 23 mg/dL    Creatinine 1.11 (H) 0.57 - 1.00 mg/dL    Sodium 145 136 - 145 mmol/L    Potassium 4.7 3.5 - 5.2 mmol/L    Chloride 106 98 - 107 mmol/L    CO2 25.0 22.0 - 29.0 mmol/L    Calcium 9.5 8.6 - 10.5 mg/dL    Total Protein 7.4 6.0 - 8.5 g/dL    Albumin 4.3 3.5 - 5.2 g/dL    ALT (SGPT) 14 1 - 33 U/L    AST (SGOT) 21 1 - 32 U/L    Alkaline Phosphatase 88 39 - 117 U/L    Total Bilirubin 0.5 0.0 - 1.2 mg/dL     Globulin 3.1 gm/dL    A/G Ratio 1.4 g/dL    BUN/Creatinine Ratio 37.8 (H) 7.0 - 25.0    Anion Gap 14.0 5.0 - 15.0 mmol/L    eGFR 50.7 (L) >60.0 mL/min/1.73   BNP    Collection Time: 03/19/25  5:07 PM    Specimen: Arm, Right; Blood   Result Value Ref Range    proBNP 575.0 0.0 - 1,800.0 pg/mL   High Sensitivity Troponin T    Collection Time: 03/19/25  5:07 PM    Specimen: Arm, Right; Blood   Result Value Ref Range    HS Troponin T 17 (H) <14 ng/L   T4, Free    Collection Time: 03/19/25  5:07 PM    Specimen: Arm, Right; Blood   Result Value Ref Range    Free T4 1.66 0.92 - 1.68 ng/dL   TSH    Collection Time: 03/19/25  5:07 PM    Specimen: Arm, Right; Blood   Result Value Ref Range    TSH 0.700 0.270 - 4.200 uIU/mL   Magnesium    Collection Time: 03/19/25  5:07 PM    Specimen: Arm, Right; Blood   Result Value Ref Range    Magnesium 2.4 1.6 - 2.4 mg/dL   CK    Collection Time: 03/19/25  5:07 PM    Specimen: Arm, Right; Blood   Result Value Ref Range    Creatine Kinase 133 20 - 180 U/L   Acetaminophen Level    Collection Time: 03/19/25  5:07 PM    Specimen: Arm, Right; Blood   Result Value Ref Range    Acetaminophen <5.0 0.0 - 30.0 mcg/mL   Ethanol    Collection Time: 03/19/25  5:07 PM    Specimen: Arm, Right; Blood   Result Value Ref Range    Ethanol <10 0 - 10 mg/dL   Salicylate Level    Collection Time: 03/19/25  5:07 PM    Specimen: Arm, Right; Blood   Result Value Ref Range    Salicylate <0.3 <=30.0 mg/dL   CBC Auto Differential    Collection Time: 03/19/25  5:07 PM    Specimen: Arm, Right; Blood   Result Value Ref Range    WBC 11.34 (H) 3.40 - 10.80 10*3/mm3    RBC 4.43 3.77 - 5.28 10*6/mm3    Hemoglobin 13.1 12.0 - 15.9 g/dL    Hematocrit 42.7 34.0 - 46.6 %    MCV 96.4 79.0 - 97.0 fL    MCH 29.6 26.6 - 33.0 pg    MCHC 30.7 (L) 31.5 - 35.7 g/dL    RDW 14.1 12.3 - 15.4 %    RDW-SD 50.4 37.0 - 54.0 fl    MPV 12.2 (H) 6.0 - 12.0 fL    Platelets 144 140 - 450 10*3/mm3    Neutrophil % 83.3 (H) 42.7 - 76.0 %     Lymphocyte % 10.7 (L) 19.6 - 45.3 %    Monocyte % 5.4 5.0 - 12.0 %    Eosinophil % 0.1 (L) 0.3 - 6.2 %    Basophil % 0.2 0.0 - 1.5 %    Immature Grans % 0.3 0.0 - 0.5 %    Neutrophils, Absolute 9.46 (H) 1.70 - 7.00 10*3/mm3    Lymphocytes, Absolute 1.21 0.70 - 3.10 10*3/mm3    Monocytes, Absolute 0.61 0.10 - 0.90 10*3/mm3    Eosinophils, Absolute 0.01 0.00 - 0.40 10*3/mm3    Basophils, Absolute 0.02 0.00 - 0.20 10*3/mm3    Immature Grans, Absolute 0.03 0.00 - 0.05 10*3/mm3    nRBC 0.0 0.0 - 0.2 /100 WBC   ECG 12 Lead Stroke Evaluation    Collection Time: 03/19/25  5:55 PM   Result Value Ref Range    QT Interval 472 ms    QTC Interval 447 ms   POC Glucose Once    Collection Time: 03/19/25  6:38 PM    Specimen: Blood   Result Value Ref Range    Glucose 188 (H) 70 - 130 mg/dL   POC Glucose Once    Collection Time: 03/19/25  8:47 PM    Specimen: Blood   Result Value Ref Range    Glucose 261 (H) 70 - 130 mg/dL     Note: In addition to lab results from this visit, the labs listed above may include labs taken at another facility or during a different encounter within the last 24 hours. Please correlate lab times with ED admission and discharge times for further clarification of the services performed during this visit.    XR Chest 1 View   Final Result   Impression:   No radiographic evidence of an acute cardiopulmonary abnormality.            Electronically Signed: Miguel A Tinajero     3/19/2025 5:56 PM EDT     Workstation ID: [id number]      XR Hand 3+ View Left   Final Result   Impression:   No acute fracture or malalignment.            Electronically Signed: Miugel A Tinajero     3/19/2025 6:04 PM EDT     Workstation ID: [id number]      CT CEREBRAL PERFUSION WITH & WITHOUT CONTRAST   Final Result   Impression:   No proximal large vessel occlusion or severe stenosis of the major arteries of the head and neck.      No significant perfusion abnormality by RAPID criteria.      Electronically Signed: Miguel A Tinajero     3/19/2025  5:35 PM EDT     Workstation ID: [id number]      CT Angiogram Head w AI Analysis of LVO   Final Result   Impression:   No proximal large vessel occlusion or severe stenosis of the major arteries of the head and neck.      No significant perfusion abnormality by RAPID criteria.      Electronically Signed: Miguel A Tinajero     3/19/2025 5:35 PM EDT     Workstation ID: [id number]      CT Angiogram Neck   Final Result   Impression:   No proximal large vessel occlusion or severe stenosis of the major arteries of the head and neck.      No significant perfusion abnormality by RAPID criteria.      Electronically Signed: Miguel A Tinajero     3/19/2025 5:35 PM EDT     Workstation ID: [id number]      CT Head Without Contrast Stroke Protocol   Final Result   Impression:   1.No acute intracranial abnormality is identified on CT.   2.Stable mild global volume loss, mild to moderate chronic small vessel ischemic change, and probable chronic infarct.            Electronically Signed: Sera Mercer     3/19/2025 4:51 PM EDT     Workstation ID: [id number]      MRI Brain Without Contrast    (Results Pending)     Vitals:    03/19/25 1721 03/1945 03/19/25 2020 03/19/25 2045   BP: 178/89 175/72     Pulse: 77 61 56    Resp:       Temp:    97.6 °F (36.4 °C)   TempSrc:    Oral   SpO2: 91% 100% 96%    Weight:       Height:         Medications   sodium chloride 0.9 % flush 10 mL (has no administration in time range)   sodium chloride 0.9 % flush 10 mL (has no administration in time range)   sodium chloride 0.9 % infusion 40 mL (has no administration in time range)   apixaban (ELIQUIS) tablet 2.5 mg (2.5 mg Oral Given 3/19/25 1735)   sodium chloride 0.9 % flush 10 mL (has no administration in time range)   sodium chloride 0.9 % flush 10 mL (has no administration in time range)   sodium chloride 0.9 % infusion 40 mL (has no administration in time range)   sennosides-docusate (PERICOLACE) 8.6-50 MG per tablet 2 tablet (has no administration in time  range)     And   polyethylene glycol (MIRALAX) packet 17 g (has no administration in time range)     And   bisacodyl (DULCOLAX) EC tablet 5 mg (has no administration in time range)     And   bisacodyl (DULCOLAX) suppository 10 mg (has no administration in time range)   Potassium Replacement - Follow Nurse / BPA Driven Protocol (has no administration in time range)   Magnesium Standard Dose Replacement - Follow Nurse / BPA Driven Protocol (has no administration in time range)   Calcium Replacement - Follow Nurse / BPA Driven Protocol (has no administration in time range)   ondansetron (ZOFRAN) injection 4 mg (has no administration in time range)   melatonin tablet 2.5 mg (has no administration in time range)   dextrose 5 % and sodium chloride 0.45 % infusion (has no administration in time range)   iopamidol (ISOVUE-370) 76 % injection 115 mL (115 mL Intravenous Given 3/19/25 1716)   dextrose (D50W) (25 g/50 mL) IV injection 25 mL (25 mL Intravenous Given 3/19/25 1730)     ECG/EMG Results (last 24 hours)       Procedure Component Value Units Date/Time    ECG 12 Lead Stroke Evaluation [[id number]] Collected: 03/19/25 1755     Updated: 03/19/25 1755     QT Interval 472 ms      QTC Interval 447 ms     Narrative:      Test Reason : Stroke Evaluation  Blood Pressure :   */*   mmHG  Vent. Rate :  54 BPM     Atrial Rate :  54 BPM     P-R Int : 160 ms          QRS Dur :  80 ms      QT Int : 472 ms       P-R-T Axes :  75  44  28 degrees    QTcB Int : 447 ms    Sinus bradycardia  Otherwise normal ECG  When compared with ECG of 13-Dec-2024 04:05,  QT has shortened    Referred By: EDMD           Confirmed By:           ECG 12 Lead Stroke Evaluation   Preliminary Result   Test Reason : Stroke Evaluation   Blood Pressure :   */*   mmHG   Vent. Rate :  54 BPM     Atrial Rate :  54 BPM      P-R Int : 160 ms          QRS Dur :  80 ms       QT Int : 472 ms       P-R-T Axes :  75  44  28 degrees     QTcB Int : 447 ms      Sinus  bradycardia   Otherwise normal ECG   When compared with ECG of 13-Dec-2024 04:05,   QT has shortened      Referred By: EDMD           Confirmed By:                     Medical Decision Making  Amount and/or Complexity of Data Reviewed  Labs: ordered.  Radiology: ordered.  ECG/medicine tests: ordered.    Risk  Prescription drug management.  Decision regarding hospitalization.        Final diagnoses:   Transient neurological symptoms   History of diabetes mellitus   Contusion of finger of left hand, initial encounter       ED Disposition  ED Disposition       ED Disposition   Decision to Admit    Condition   --    Comment   Level of Care: Telemetry [5]   Diagnosis: Transient neurological symptoms [3591100]   Is patient appropriate for Inpatient Observation Unit?: No [0]                 No follow-up provider specified.       Medication List      No changes were made to your prescriptions during this visit.            Guille Jacobo MD  03/19/25 2100

## 2025-03-20 NOTE — PLAN OF CARE
Goal Outcome Evaluation:           Progress: (P) improving       Anticipated Discharge Disposition (SLP): home with OP services    SLP Diagnosis: mild-moderate, cognitive-linguistic disorder (03/20/25 1005)

## 2025-03-20 NOTE — CONSULTS
"Diabetes Education    Patient Name:  Marilyn Kunz  YOB: 1945  MRN: [id number]  Admit Date:  3/19/2025        Consult received for diabetes education per stroke protocol. Chart reviewed. A1c is 8.9%. She sees [facility] for management of her Type 1 Diabetes. She takes Novolog and Tresiba. She wears a Marie 2. She described both episodes of passing out. She said she did not have symptoms of anything prior to the episodes. She was questioning if this was related to her blood sugar because she can \"walk around at 60.\" Her Marie did not alarm that she was low. She has low alarms set at 80. Suggested to increase the low alarm at 85. She does fingerstick when she does not agree with the alarm or symptoms. We discussed placement of the Marie and offered suggestions to reduce compression lows. Explained the connection between stroke an diabetes. Reviewed target BG goals per ADA. Hyperglycemia and hypoglycemia symptoms and treatment were discussed She keeps low blood sugar snacks by her bedside. Provided What is Diabetes, Taregt A1c and BG Goals. Provided our outpatient number for questions or concerns.   Patient does not qualify for the follow up stroke class based on the exclusion criteria of  follow up scheduled for Endocrinology on 3/24, BMI <25 (23.23)      Electronically signed by:  Aimee Gonzalez RN  03/20/25 13:14 EDT  "

## 2025-03-20 NOTE — PROGRESS NOTES
Stroke Progress Note       Chief Complaint: Syncopal episode    Subjective    Subjective     Subjective:  The patient is lying down in the bed in NAD.  No family were at the bedside. The patient stated that she had 2 episode where she passed out.  She had no symptoms that happen before these syncopal episodes such as feeling lightheaded or warm.  She states she would wake up and find herself down.  Both time she states she is waking up around 4:30 PM.  The patient lives by herself send no other source to describe these syncopal episodes.  This morning she is feeling okay and denies having any new stroke or strokelike symptoms.  She showed me her left hand that is bruised from yesterday's fall.  I discussed with her the imaging findings and what possibly can explain her symptoms.  All patient's questions and concerns were answered.    No other acute complains at this time    Review of Systems   Constitutional: No fatigue      Objective      Temp:  [96.3 °F (35.7 °C)-98.5 °F (36.9 °C)] 97.5 °F (36.4 °C)  Heart Rate:  [51-77] 53  Resp:  [16-18] 18  BP: (143-183)/(51-98) 170/75    Objective    GEN: lying in bed; in NAD  HENT: normocephalic, non-erythematous oropharynx  CV: no LE edema    NEURO:  Mental Status: A&O x 3, interactive, able to follow commands  Speech: Intact Articulation  CN 2-12:  II - PERRLA  III, IV, VI - EOMI  VII -no gross facial asymmetry  VIII - Auditory acuity intact  XII - Tongue protrudes midline    Motor: The patient can move all 4 extremities against gravity with no drift appreciated.  The patient had bruise over the inside of her left hand.  There is rheumatoid arthritis related changes of fingers on both hands.  Sensory: intact light touch throughout  Reflexes: negative Russ's sign BL  Coordination: no ataxia with finger-to-nose testing  Gait/Station: deferred     Results Review:    I reviewed the patient's new clinical results.    WBC   Date Value Ref Range Status   03/20/2025 8.03 3.40 -  10.80 10*3/mm3 Final     RBC   Date Value Ref Range Status   03/20/2025 3.64 (L) 3.77 - 5.28 10*6/mm3 Final     Hemoglobin   Date Value Ref Range Status   03/20/2025 10.8 (L) 12.0 - 15.9 g/dL Final     Hematocrit   Date Value Ref Range Status   03/20/2025 34.8 34.0 - 46.6 % Final     MCV   Date Value Ref Range Status   03/20/2025 95.6 79.0 - 97.0 fL Final     MCH   Date Value Ref Range Status   03/20/2025 29.7 26.6 - 33.0 pg Final     MCHC   Date Value Ref Range Status   03/20/2025 31.0 (L) 31.5 - 35.7 g/dL Final     RDW   Date Value Ref Range Status   03/20/2025 14.5 12.3 - 15.4 % Final     RDW-SD   Date Value Ref Range Status   03/20/2025 50.7 37.0 - 54.0 fl Final     MPV   Date Value Ref Range Status   03/20/2025 12.6 (H) 6.0 - 12.0 fL Final     Platelets   Date Value Ref Range Status   03/20/2025 131 (L) 140 - 450 10*3/mm3 Final     Neutrophil %   Date Value Ref Range Status   03/20/2025 75.9 42.7 - 76.0 % Final     Lymphocyte %   Date Value Ref Range Status   03/20/2025 16.6 (L) 19.6 - 45.3 % Final     Monocyte %   Date Value Ref Range Status   03/20/2025 6.7 5.0 - 12.0 % Final     Eosinophil %   Date Value Ref Range Status   03/20/2025 0.2 (L) 0.3 - 6.2 % Final     Basophil %   Date Value Ref Range Status   03/20/2025 0.2 0.0 - 1.5 % Final     Immature Grans %   Date Value Ref Range Status   03/20/2025 0.4 0.0 - 0.5 % Final     Neutrophils, Absolute   Date Value Ref Range Status   03/20/2025 6.09 1.70 - 7.00 10*3/mm3 Final     Lymphocytes, Absolute   Date Value Ref Range Status   03/20/2025 1.33 0.70 - 3.10 10*3/mm3 Final     Monocytes, Absolute   Date Value Ref Range Status   03/20/2025 0.54 0.10 - 0.90 10*3/mm3 Final     Eosinophils, Absolute   Date Value Ref Range Status   03/20/2025 0.02 0.00 - 0.40 10*3/mm3 Final     Basophils, Absolute   Date Value Ref Range Status   03/20/2025 0.02 0.00 - 0.20 10*3/mm3 Final     Immature Grans, Absolute   Date Value Ref Range Status   03/20/2025 0.03 0.00 - 0.05  10*3/mm3 Final     nRBC   Date Value Ref Range Status   03/20/2025 0.0 0.0 - 0.2 /100 WBC Final       Lab Results   Component Value Date    GLUCOSE 397 (H) 03/20/2025    BUN 39 (H) 03/20/2025    CREATININE 1.14 (H) 03/20/2025     03/20/2025    K 5.4 (H) 03/20/2025     03/20/2025    CALCIUM 8.5 (L) 03/20/2025    PROTEINTOT 5.9 (L) 03/20/2025    ALBUMIN 3.5 03/20/2025    ALT 12 03/20/2025    AST 16 03/20/2025    ALKPHOS 71 03/20/2025    BILITOT 0.6 03/20/2025    GLOB 2.4 03/20/2025    AGRATIO 1.5 03/20/2025    BCR 34.2 (H) 03/20/2025    ANIONGAP 10.0 03/20/2025    EGFR 49.1 (L) 03/20/2025     MRI Brain Without Contrast  Result Date: 3/20/2025  Impression: 1.No acute intracranial abnormality. 2.Mild chronic small vessel ischemic change and chronic left cerebellar infarcts. Electronically Signed: Yasir Guerrero MD  3/20/2025 4:29 AM EDT  Workstation ID: [id number]    XR Hand 3+ View Left  Result Date: 3/19/2025  Impression: No acute fracture or malalignment. Electronically Signed: Miguel A Tinajero  3/19/2025 6:04 PM EDT  Workstation ID: [id number]    XR Chest 1 View  Result Date: 3/19/2025  Impression: No radiographic evidence of an acute cardiopulmonary abnormality. Electronically Signed: Miguel A Tinajero  3/19/2025 5:56 PM EDT  Workstation ID: [id number]    CT CEREBRAL PERFUSION WITH & WITHOUT CONTRAST  Result Date: 3/19/2025  Impression: No proximal large vessel occlusion or severe stenosis of the major arteries of the head and neck. No significant perfusion abnormality by RAPID criteria. Electronically Signed: Miguel A Tinajero  3/19/2025 5:35 PM EDT  Workstation ID: [id number]    CT Angiogram Head w AI Analysis of LVO  Result Date: 3/19/2025  Impression: No proximal large vessel occlusion or severe stenosis of the major arteries of the head and neck. No significant perfusion abnormality by RAPID criteria. Electronically Signed: Miguel A Tinajero  3/19/2025 5:35 PM EDT  Workstation ID: [id number]    CT Angiogram  Neck  Result Date: 3/19/2025  Impression: No proximal large vessel occlusion or severe stenosis of the major arteries of the head and neck. No significant perfusion abnormality by RAPID criteria. Electronically Signed: Miguel A Tinajero  3/19/2025 5:35 PM EDT  Workstation ID: [id number]    CT Head Without Contrast Stroke Protocol  Result Date: 3/19/2025  Impression: 1.No acute intracranial abnormality is identified on CT. 2.Stable mild global volume loss, mild to moderate chronic small vessel ischemic change, and probable chronic infarct. Electronically Signed: Sera Mercer  3/19/2025 4:51 PM EDT  Workstation ID: [id number]    Results for orders placed during the hospital encounter of 11/15/24    Adult Transthoracic Echo Complete W/ Cont if Necessary Per Protocol    Interpretation Summary    Left ventricular systolic function is normal. Left ventricular ejection fraction appears to be 66 - 70%.    Left ventricular diastolic function was normal.    Left atrial volume is mildly increased.    Estimated right ventricular systolic pressure from tricuspid regurgitation is normal (<35 mmHg).    No significant change compared to 2023 echo      -CTH wo on 3/19/2025 images were personally reviewed and showed no acute ischemic or hemorrhagic stroke  -CTA of the head and neck images from 3/19/2025 were personally reviewed and showed no flow limiting stenosis or LVO  -MRI brain images from 3/20/2025 were personally reviewed and showed no ischemic or hemorrhagic stroke  -Transthoracic echocardiogram is pending. TTE on 11/15/2024 with LVEF 66 to 70% and mildly increased left atrial volume.  -A1c from 3/20/2025 was 8.9%  -LDL from 3/20/2025 was 61    Assessment/Plan   78 yo female with PMH of demyelinating disease, remote history of stroke (possible chronic left cerebellar infarct), CHF, HTN, CKD stage III, HLD, factor V Leiden and type I diabetic who awoke today finding herself on the floor and unable to stand.  Last known well 2200  EST on 03/18/25 when patient went to bed feeling normal. When EMS arrived they found the patient to have some left-sided weakness and slurred speech.  Patient is a type I diabetic, had blood glucose of 60.  NIH 0. CTH without acute abnormality.  Possible chronic infarct of left cerebellum which is present on prior CT imaging and MRIs.  CT perfusion without core infarct or ischemic tissue at risk.  No LVO or significant stenosis identified on CT angiogram head and neck.  Patient not a candidate for IV thrombolytic based on last known well.     Antiplatelet PTA: none  Anticoagulant PTA: Eliquis 2.5 mg twice daily        #Transient left sided weakness, slurred speech  -Differential includes suspected cardiogenic syncope versus seizures versus hypoglycemic event versus metabolic encephalopathy.    -CTH wo on 3/19/2025 images were personally reviewed and showed no acute ischemic or hemorrhagic stroke  -CTA of the head and neck images from 3/19/2025 were personally reviewed and showed no flow limiting stenosis or LVO  -MRI brain images from 3/20/2025 were personally reviewed and showed no ischemic or hemorrhagic stroke  -Transthoracic echocardiogram is pending. TTE on 11/15/2024 with LVEF 66 to 70% and mildly increased left atrial volume.  -A1c from 3/20/2025 was 8.9%  -LDL from 3/20/2025 was 61    Recommendations  -Recommend routine EEG to rule out any epileptiform activities  -Recommend long-term cardiac monitoring given the concern for bradycardia.  -TTE pending, recent TTE on 11/15/2024 with LVEF 66 to 70% and mildly increased left atrial volume.  Will obtain agitated saline with new TTE.  -Continue Eliquis 2.5 mg daily  -Continue home pravastatin 20 mg for secondary stroke prevention.  Target LDL level of less than 70.  On target during this admission  -Target blood pressure goals of normotension.  Avoid hypotension.  Check orthostatic vitals  -Activity as tolerated, fall risk precautions  -PT/OT/SLP  evaluation  -Neurochecks per protocol, please call with any significant neurological decline  -The patient can follow-up with the neurology clinic as an outpatient     #Essential hypertension  -As detailed above     #Hyperlipidemia  -Continue home pravastatin 20 mg for secondary stroke prevention.  Target LDL level of less than 70.  On target during this admission     #Diabetes Mellitus type 1  -Patient glucose 68 on admission, dextrose 25 mL given in ED  -Management per primary team     #Chronic kidney disease stage III  -Creatinine 1.11 and EGFR 50.7 on admission  -Patient was given contrast with CT angiograms and perfusion  -Continue to monitor kidney function and encourage hydration via H2O      Stroke will continue to follow. Please call for any further questions or concerns  =================================  Jaren Bueno MD, Msc, PhD  Vascular Neurologist  [facility]

## 2025-03-20 NOTE — PLAN OF CARE
Goal Outcome Evaluation:           Progress: (P) improving       Anticipated Discharge Disposition (SLP): (P) home with OP services    SLP Diagnosis: (P) mild-moderate, cognitive-linguistic disorder (03/20/25 1005)

## 2025-03-20 NOTE — THERAPY EVALUATION
Patient Name: Marilyn Kunz  : 1945    MRN: [id number]                              Today's Date: 3/20/2025       Admit Date: 3/19/2025    Visit Dx:     ICD-10-CM ICD-9-CM   1. Transient neurological symptoms  R29.818 781.99   2. History of diabetes mellitus  Z86.39 V12.29   3. Contusion of finger of left hand, initial encounter  S60.00XA 923.3     Patient Active Problem List   Diagnosis    Essential hypertension    Factor V Leiden    Psoriatic arthritis    At risk for venous thromboembolism (VTE)    Hypothyroidism    CKD (chronic kidney disease) stage 3, GFR 30-59 ml/min    Hyperlipidemia LDL goal <70    PDA (patent ductus arteriosus)    Demyelinating disease    Spondylolisthesis, grade 2    Type 1 diabetes mellitus    History of stroke    Demyelinating disease of central nervous system, unspecified    Acute on chronic heart failure with preserved ejection fraction    Chronic diastolic congestive heart failure    Peripheral polyneuropathy    Immunosuppression due to drug therapy    Erosive osteoarthritis    Cervical radiculopathy    Rash    UTI (urinary tract infection)    Type 1 diabetes mellitus with hyperglycemia    Syncope    Elevated serum creatinine    Sepsis    Closed fracture of left foot    TIFFANIE (obstructive sleep apnea)    COPD (chronic obstructive pulmonary disease)    Elevated CK    Acute exacerbation of CHF (congestive heart failure)    Transient neurological symptoms     Past Medical History:   Diagnosis Date    Abnormal ECG Check A Date or [facility] Records    Brought to  from [facility]    Acute sinusitis 2023    Anemia     Asthma     CAP (community acquired pneumonia) 2023    CHF (congestive heart failure) 2023    Chronic kidney disease     pt told it was dx from Dr Baca and to not eat much protein    Congenital heart disease 's Patent 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    Surgery [facility]  close    COPD (chronic obstructive pulmonary disease)  11/15/2024    CTS (carpal tunnel syndrome)     Psoriatic arthritis hands could be    Deep vein thrombosis No on blood clots but have a blood clotting disorder called Leiden Factor 5    Demyelinating disease of central nervous system, unspecified 03/09/2023    Diabetes     Impression: Patient states that her diabetes is not doing well.  I asked her to go ahead and try to talk to her endocrinokogist.  She is still having the lower extremity neuropathic pan, but not enough for me to administer medication.    Disease of thyroid gland     Diverticulitis of colon     Erosive osteoarthritis 05/30/2024    Factor 5 Leiden mutation, heterozygous 2012    Head injury     Hyperlipidemia     Hypertension     Medication monitoring encounter     Impression:  She has renal insufficiency.  She is just taking Tylenol.    Movement disorder 10/2021    knee replacement left knww    Murmur, cardiac     Neuropathy in diabetes     redness swelling legs    TIFFANIE (obstructive sleep apnea) 11/15/2024    Osteopenia     Story: Femoral Neck Impression: Up-ro-date on bone density scan.  Follow up bone density every 2 years.    Peripheral neuropathy 05/03/2022    hands, arms. 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  I went over her exercises and told her to get a shoe insert,    Psoriasis     Impression: No rash    Psoriatic arthritis     hands    Sleep apnea Always    diabetic do not sleep well up and down    Stroke had one don't know when    showed on MRI Brain    Syncope 11/15/2024    Type 1 diabetes      Past Surgical History:   Procedure Laterality Date    CARDIAC CATHETERIZATION  1952 2 of them    Patent Dictus operation    CATARACT EXTRACTION Bilateral     COLONOSCOPY      ENDOSCOPY N/A 10/20/2020    Procedure: ESOPHAGOGASTRODUODENOSCOPY;  Surgeon: Brunner, Mark I, MD;  Location: [facility] ENDOSCOPY;  Service: Gastroenterology;  Laterality: N/A;    HAND SURGERY Left 1987    no  hardware    KNEE ARTHROPLASTY      KNEE SURGERY Left     PATENT DUCTUS ARTERIOUS LIGATION      REPLACEMENT TOTAL KNEE Left       General Information       Row Name 03/20/25 0959          Physical Therapy Time and Intention    Document Type evaluation  -KR     Mode of Treatment physical therapy  -KR       Row Name 03/20/25 0959          General Information    Patient Profile Reviewed yes  -KR     Prior Level of Function independent:;all household mobility;community mobility;ADL's;driving  -KR     Existing Precautions/Restrictions fall;other (see comments)  remote L cerebellar CVA  -KR     Barriers to Rehab medically complex;previous functional deficit;cognitive status  -KR       Row Name 03/20/25 0959          Living Environment    Current Living Arrangements home  -KR     People in Home alone  -KR       Row Name 03/20/25 0959          Home Main Entrance    Number of Stairs, Main Entrance two  -KR     Stair Railings, Main Entrance railing on right side (ascending)  -KR       Row Name 03/20/25 0959          Stairs Within Home, Primary    Number of Stairs, Within Home, Primary none  -KR       Row Name 03/20/25 0959          Cognition    Orientation Status (Cognition) oriented to;person;place;disoriented to;time  pt stated 1925 for year x 2  -KR       Row Name 03/20/25 0959          Safety Issues/Impairments Affecting Functional Mobility    Safety Issues Affecting Function (Mobility) insight into deficits/self-awareness;safety precaution awareness;safety precautions follow-through/compliance;judgment;awareness of need for assistance  -KR     Impairments Affecting Function (Mobility) balance;endurance/activity tolerance;cognition;pain;strength  -KR     Cognitive Impairments, Mobility Safety/Performance awareness, need for assistance;insight into deficits/self-awareness;problem-solving/reasoning;judgment;sequencing abilities;safety precaution follow-through;safety precaution awareness  -KR               User Key  (r) =  Recorded By, (t) = Taken By, (c) = Cosigned By      Initials Name Provider Type    Jackelyn Miguel PT Physical Therapist                   Mobility       Row Name 03/20/25 1000          Bed Mobility    Bed Mobility supine-sit  -KR     Supine-Sit Joaquin (Bed Mobility) standby assist  -KR     Assistive Device (Bed Mobility) head of bed elevated  -KR     Comment, (Bed Mobility) increased time and effort required d/t B hand pain  -KR       Row Name 03/20/25 1000          Bed-Chair Transfer    Bed-Chair Joaquin (Transfers) contact guard  -KR     Comment, (Bed-Chair Transfer) steps to chair  -KR       Row Name 03/20/25 1000          Sit-Stand Transfer    Sit-Stand Joaquin (Transfers) contact guard  -KR     Comment, (Sit-Stand Transfer) 1x from bed, 1x from chair  -KR       Row Name 03/20/25 1000          Gait/Stairs (Locomotion)    Joaquin Level (Gait) minimum assist (75% patient effort);2 person assist  -KR     Assistive Device (Gait) other (see comments)  B HHA  -KR     Distance in Feet (Gait) 30  30 x 2  -KR     Deviations/Abnormal Patterns (Gait) sam decreased;gait speed decreased;scissoring;stride length decreased;weight shifting decreased;bilateral deviations;left sided deviations  -KR     Bilateral Gait Deviations forward flexed posture  -KR     Left Sided Gait Deviations lateral trunk flexion  -KR     Comment, (Gait/Stairs) pt with numerous instances of scissoring gait and overall unstable requiring minAx2. Pt with poor insight into deficits while ambulating and required frequent cueing for upright posture and forward gaze. Farther distance limited by safety concerns.  -KR               User Key  (r) = Recorded By, (t) = Taken By, (c) = Cosigned By      Initials Name Provider Type    Jackelyn Miguel PT Physical Therapist                   Obj/Interventions       Row Name 03/20/25 1002          Range of Motion Comprehensive    General Range of Motion no range of motion deficits  identified  -KR       Row Name 03/20/25 1002          Strength Comprehensive (MMT)    General Manual Muscle Testing (MMT) Assessment lower extremity strength deficits identified  -KR     Comment, General Manual Muscle Testing (MMT) Assessment BLEs grossly 4/5  -KR       Row Name 03/20/25 1002          Balance    Balance Assessment sitting static balance;sitting dynamic balance;standing static balance;standing dynamic balance  -KR     Static Sitting Balance standby assist  -KR     Dynamic Sitting Balance contact guard  -KR     Position, Sitting Balance unsupported;sitting in chair  -KR     Static Standing Balance contact guard  -KR     Dynamic Standing Balance minimal assist;2-person assist;verbal cues;non-verbal cues (demo/gesture)  -KR     Position/Device Used, Standing Balance supported;other (see comments)  B HHA  -KR     Balance Interventions sitting;standing;sit to stand;supported;static;dynamic  -KR       Row Name 03/20/25 1002          Sensory Assessment (Somatosensory)    Sensory Assessment (Somatosensory) LE sensation intact  -KR               User Key  (r) = Recorded By, (t) = Taken By, (c) = Cosigned By      Initials Name Provider Type    KR Jackelyn Mccormick, PT Physical Therapist                   Goals/Plan       Row Name 03/20/25 1005          Bed Mobility Goal 1 (PT)    Activity/Assistive Device (Bed Mobility Goal 1, PT) bed mobility activities, all  -KR     [place] Level/Cues Needed (Bed Mobility Goal 1, PT) independent  -KR     Time Frame (Bed Mobility Goal 1, PT) short term goal (STG);1 week  -KR       Row Name 03/20/25 1005          Transfer Goal 1 (PT)    Activity/Assistive Device (Transfer Goal 1, PT) sit-to-stand/stand-to-sit;bed-to-chair/chair-to-bed  -KR     [place] Level/Cues Needed (Transfer Goal 1, PT) standby assist  -KR     Time Frame (Transfer Goal 1, PT) long term goal (LTG);2 weeks  -KR       Row Name 03/20/25 1005          Gait Training Goal 1 (PT)    Activity/Assistive  Device (Gait Training Goal 1, PT) gait (walking locomotion);improve balance and speed;increase endurance/gait distance;other (see comments)  LRAD  -KR     [place] Level (Gait Training Goal 1, PT) standby assist  -KR     Distance (Gait Training Goal 1, PT) 150  -KR     Time Frame (Gait Training Goal 1, PT) long term goal (LTG);2 weeks  -KR       Row Name 03/20/25 1005          Stairs Goal 1 (PT)    Activity/Assistive Device (Stairs Goal 1, PT) stairs, all skills;using handrail, right  -KR     [place] Level/Cues Needed (Stairs Goal 1, PT) standby assist  -KR     Number of Stairs (Stairs Goal 1, PT) 2  -KR     Time Frame (Stairs Goal 1, PT) long term goal (LTG);2 weeks  -KR       Row Name 03/20/25 1005          Therapy Assessment/Plan (PT)    Planned Therapy Interventions (PT) balance training;bed mobility training;gait training;home exercise program;neuromuscular re-education;patient/family education;postural re-education;transfer training;stretching;strengthening;stair training;ROM (range of motion);motor coordination training  -KR               User Key  (r) = Recorded By, (t) = Taken By, (c) = Cosigned By      Initials Name Provider Type    KR Jackelyn Mccormick, PT Physical Therapist                   Clinical Impression       Row Name 03/20/25 1002          Pain    Pain Location hand  -KR     Pain Side/Orientation bilateral  -KR     Pain Management Interventions nursing notified;positioning techniques utilized  -KR     Response to Pain Interventions activity participation with increased pain  -KR     Additional Documentation Pain Scale: FACES Pre/Post-Treatment (Group)  -KR       Row Name 03/20/25 1002          Pain Scale: FACES Pre/Post-Treatment    Pain: FACES Scale, Pretreatment 6-->hurts even more  -KR     Posttreatment Pain Rating 6-->hurts even more  -KR       Row Name 03/20/25 1002          Plan of Care Review    Plan of Care Reviewed With patient  -KR     Outcome Evaluation Pt presents with KWAN woo  pain impacting all aspects of mobility, as well as strength, balance, endurance, safety awareness, and insight into deficits below baseline contributing to bed mobility, transfer, and ambulation deficits. Increased time required for all mobility tasks 2/2 pt perseverative on IV pain and cold food throughout evaluation, as well as tangential in conversation requiring frequent redirection to task. Pt will benefit from PT to address aforementioned deficits. Pt is unsafe to return home at this point, PT rec SNF upon dc.  -KR       Row Name 03/20/25 1002          Therapy Assessment/Plan (PT)    Patient/Family Therapy Goals Statement (PT) to go home  -KR     Rehab Potential (PT) fair  -KR     Criteria for Skilled Interventions Met (PT) skilled treatment is necessary;yes  -KR     Therapy Frequency (PT) daily  -KR     Predicted Duration of Therapy Intervention (PT) 2 weeks  -KR       Row Name 03/20/25 1002          Vital Signs    Pre Systolic BP Rehab 183  -KR     Pre Treatment Diastolic BP 65  -KR     Post Systolic BP Rehab 213   RN notified  -KR     Post Treatment Diastolic BP 80  -KR     Pre Patient Position Supine  -KR     Intra Patient Position Standing  -KR     Post Patient Position Sitting  -KR       Row Name 03/20/25 1002          Positioning and Restraints    Pre-Treatment Position in bed  -KR     Post Treatment Position chair  -KR     In Chair notified nsg;reclined;call light within reach;encouraged to call for assist;exit alarm on;waffle cushion;legs elevated;with OT  -KR               User Key  (r) = Recorded By, (t) = Taken By, (c) = Cosigned By      Initials Name Provider Type    Jackelyn Miguel, PT Physical Therapist                   Outcome Measures       Row Name 03/20/25 1006          How much help from another person do you currently need...    Turning from your back to your side while in flat bed without using bedrails? 3  -KR     Moving from lying on back to sitting on the side of a flat bed without  bedrails? 3  -KR     Moving to and from a bed to a chair (including a wheelchair)? 3  -KR     Standing up from a chair using your arms (e.g., wheelchair, bedside chair)? 3  -KR     Climbing 3-5 steps with a railing? 2  -KR     To walk in hospital room? 2  -KR     AM-PAC 6 Clicks Score (PT) 16  -KR     Highest Level of Mobility Goal 5 --> Static standing  -KR       Row Name 03/20/25 1006          Modified [place] Scale    Pre-Stroke Modified [place] Scale 6 - Unable to determine (UTD) from the medical record documentation  -KR     Modified [place] Scale 4 - Moderately severe disability.  Unable to walk without assistance, and unable to attend to own bodily needs without assistance.  -KR       Row Name 03/20/25 1006          Functional Assessment    Outcome Measure Options AM-PAC 6 Clicks Basic Mobility (PT);Modified [place]  -KR               User Key  (r) = Recorded By, (t) = Taken By, (c) = Cosigned By      Initials Name Provider Type    Jackelyn Miguel PT Physical Therapist                                 Physical Therapy Education       Title: PT OT SLP Therapies (In Progress)       Topic: Physical Therapy (In Progress)       Point: Mobility training (In Progress)       Learning Progress Summary            Patient Acceptance, E, NR by  at 3/20/2025 1006                      Point: Home exercise program (Not Started)       Learner Progress:  Not documented in this visit.              Point: Body mechanics (In Progress)       Learning Progress Summary            Patient Acceptance, E, NR by KR at 3/20/2025 1006                      Point: Precautions (In Progress)       Learning Progress Summary            Patient Acceptance, E, NR by  at 3/20/2025 1006                                      User Key       Initials Effective Dates Name Provider Type Discipline    SHAHZAD 12/30/22 -  Jackelyn Mccormick PT Physical Therapist PT                  PT Recommendation and Plan  Planned Therapy Interventions (PT): balance  training, bed mobility training, gait training, home exercise program, neuromuscular re-education, patient/family education, postural re-education, transfer training, stretching, strengthening, stair training, ROM (range of motion), motor coordination training  Outcome Evaluation: Pt presents with B hand pain impacting all aspects of mobility, as well as strength, balance, endurance, safety awareness, and insight into deficits below baseline contributing to bed mobility, transfer, and ambulation deficits. Increased time required for all mobility tasks 2/2 pt perseverative on IV pain and cold food throughout evaluation, as well as tangential in conversation requiring frequent redirection to task. Pt will benefit from PT to address aforementioned deficits. Pt is unsafe to return home at this point, PT rec SNF upon dc.     Time Calculation:   PT Evaluation Complexity  History, PT Evaluation Complexity: 3 or more personal factors and/or comorbidities  Examination of Body Systems (PT Eval Complexity): total of 4 or more elements  Clinical Presentation (PT Evaluation Complexity): evolving  Clinical Decision Making (PT Evaluation Complexity): moderate complexity  Overall Complexity (PT Evaluation Complexity): moderate complexity     PT Charges       Row Name 03/20/25 1007             Time Calculation    Start Time 0829  -KR      PT Received On 03/20/25  -KR      PT Goal Re-Cert Due Date 03/30/25  -KR         Timed Charges    05578 - PT Therapeutic Activity Minutes 8  -KR         Untimed Charges    PT Eval/Re-eval Minutes 46  -KR         Total Minutes    Timed Charges Total Minutes 8  -KR      Untimed Charges Total Minutes 46  -KR       Total Minutes 54  -KR                User Key  (r) = Recorded By, (t) = Taken By, (c) = Cosigned By      Initials Name Provider Type    Jackelyn Miguel PT Physical Therapist                  Therapy Charges for Today       Code Description Service Date Service Provider Modifiers Qty     65899087090 HC PT THERAPEUTIC ACT EA 15 MIN 3/20/2025 Jackelyn Mccormick, PT GP 1    13691762501 HC PT EVAL MOD COMPLEXITY 4 3/20/2025 Jackelyn Mccormick, PT GP 1            PT G-Codes  Outcome Measure Options: AM-PAC 6 Clicks Basic Mobility (PT), Modified [place]  AM-PAC 6 Clicks Score (PT): 16  Modified [place] Scale: 4 - Moderately severe disability.  Unable to walk without assistance, and unable to attend to own bodily needs without assistance.  PT Discharge Summary  Anticipated Discharge Disposition (PT): skilled nursing facility    Jackelyn Mccormick, SAFIA  3/20/2025

## 2025-03-20 NOTE — PLAN OF CARE
Goal Outcome Evaluation:  Plan of Care Reviewed With: patient           Outcome Evaluation: Pt presents with B hand pain impacting all aspects of mobility, as well as strength, balance, endurance, safety awareness, and insight into deficits below baseline contributing to bed mobility, transfer, and ambulation deficits. Increased time required for all mobility tasks 2/2 pt perseverative on IV pain and cold food throughout evaluation, as well as tangential in conversation requiring frequent redirection to task. Pt will benefit from PT to address aforementioned deficits. Pt is unsafe to return home at this point, PT rec SNF upon dc.    Anticipated Discharge Disposition (PT): skilled nursing facility

## 2025-03-20 NOTE — THERAPY EVALUATION
Acute Care - Speech Language Pathology Initial Evaluation  [facility]  Cognitive-Communication Evaluation      Patient Name: Marilyn Kunz  : 1945  MRN: [id number]  Today's Date: 3/20/2025               Admit Date: 3/19/2025     Visit Dx:    ICD-10-CM ICD-9-CM   1. Transient neurological symptoms  R29.818 781.99   2. History of diabetes mellitus  Z86.39 V12.29   3. Contusion of finger of left hand, initial encounter  S60.00XA 923.3   4. Cognitive communication deficit  R41.841 799.52     Patient Active Problem List   Diagnosis    Essential hypertension    Factor V Leiden    Psoriatic arthritis    At risk for venous thromboembolism (VTE)    Hypothyroidism    CKD (chronic kidney disease) stage 3, GFR 30-59 ml/min    Hyperlipidemia LDL goal <70    PDA (patent ductus arteriosus)    Demyelinating disease    Spondylolisthesis, grade 2    Type 1 diabetes mellitus    History of stroke    Demyelinating disease of central nervous system, unspecified    Acute on chronic heart failure with preserved ejection fraction    Chronic diastolic congestive heart failure    Peripheral polyneuropathy    Immunosuppression due to drug therapy    Erosive osteoarthritis    Cervical radiculopathy    Rash    UTI (urinary tract infection)    Type 1 diabetes mellitus with hyperglycemia    Syncope    Elevated serum creatinine    Sepsis    Closed fracture of left foot    TIFFANIE (obstructive sleep apnea)    COPD (chronic obstructive pulmonary disease)    Elevated CK    Acute exacerbation of CHF (congestive heart failure)    Transient neurological symptoms     Past Medical History:   Diagnosis Date    Abnormal ECG Check A Date or [facility]    [facility]    Acute sinusitis 2023    Anemia     Asthma     CAP (community acquired pneumonia) 2023    CHF (congestive heart failure) 2023    Chronic kidney disease     pt told it was dx from Dr Baca and to not eat much protein     Congenital heart disease 1950's Patent 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    Surgery [facility] 1950s close    COPD (chronic obstructive pulmonary disease) 11/15/2024    CTS (carpal tunnel syndrome)     Psoriatic arthritis hands could be    Deep vein thrombosis No on blood clots but have a blood clotting disorder called Leiden Factor 5    Demyelinating disease of central nervous system, unspecified 03/09/2023    Diabetes     Impression: Patient states that her diabetes is not doing well.  I asked her to go ahead and try to talk to her endocrinokogist.  She is still having the lower extremity neuropathic pan, but not enough for me to administer medication.    Disease of thyroid gland     Diverticulitis of colon     Erosive osteoarthritis 05/30/2024    Factor 5 Leiden mutation, heterozygous 2012    Head injury     Hyperlipidemia     Hypertension     Medication monitoring encounter     Impression:  She has renal insufficiency.  She is just taking Tylenol.    Movement disorder 10/2021    knee replacement left knww    Murmur, cardiac     Neuropathy in diabetes     redness swelling legs    TIFFANIE (obstructive sleep apnea) 11/15/2024    Osteopenia     Story: Femoral Neck Impression: Up-ro-date on bone density scan.  Follow up bone density every 2 years.    Peripheral neuropathy 05/03/2022    hands, arms. 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  I went over her exercises and told her to get a shoe insert,    Psoriasis     Impression: No rash    Psoriatic arthritis     hands    Sleep apnea Always    diabetic do not sleep well up and down    Stroke had one don't know when    showed on MRI Brain    Syncope 11/15/2024    Type 1 diabetes      Past Surgical History:   Procedure Laterality Date    CARDIAC CATHETERIZATION  1952 2 of them    Patent Dictus operation    CATARACT EXTRACTION Bilateral     COLONOSCOPY      ENDOSCOPY N/A 10/20/2020    Procedure:  ESOPHAGOGASTRODUODENOSCOPY;  Surgeon: Brunner, Mark I, MD;  Location: [facility] ENDOSCOPY;  Service: Gastroenterology;  Laterality: N/A;    HAND SURGERY Left 1987    no hardware    KNEE ARTHROPLASTY      KNEE SURGERY Left     PATENT DUCTUS ARTERIOUS LIGATION      REPLACEMENT TOTAL KNEE Left        SLP Recommendation and Plan  SLP Diagnosis: (P) mild-moderate, cognitive-linguistic disorder (03/20/25 1005)              SLC Criteria for Skilled Therapy Interventions Met: (P) yes (03/20/25 1005)  Anticipated Discharge Disposition (SLP): (P) home with OP services (03/20/25 1005)        Therapy Frequency (SLP SLC): (P) 5 days per week (03/20/25 1005)  Predicted Duration Therapy Intervention (Days): (P) 1 week (03/20/25 1005)                             Progress: (P) improving (03/20/25 1150)      SLP EVALUATION (Last 72 Hours)       SLP SLC Evaluation       Row Name 03/20/25 1005                   Communication Assessment/Intervention    Document Type evaluation (P)   -RW        Subjective Information no complaints (P)   -RW        Patient Observations alert;cooperative (P)   -RW        Patient/Family/Caregiver Comments/Observations no family present (P)   -RW        Patient Effort good (P)   -RW           General Information    Patient Profile Reviewed yes (P)   -RW        Pertinent History Of Current Problem Pt admitted for transient neurological symptoms. Past medical history includes heart failure, chronic airway obstruction, T1D, chronic left cerebellar infarct. Pts symptoms of slurred speech, confusion, and repetitiveness resolved upon arrival to [facility] ED. TIA is not ruled out at this time. Hypoglycemic event vs. metabolic encephalopothy. (P)   -RW        Precautions/Limitations, Vision WFL;for purposes of eval (P)   -RW        Precautions/Limitations, Hearing WFL;for purposes of eval (P)   -RW        Patient Level of Education Bachelor's degree (P)   -RW        Plans/Goals Discussed with patient;agreed upon (P)   -RW         Barriers to Rehab none identified (P)   -RW        Patient's Goals for Discharge patient did not state (P)   -RW           Pain    Pretreatment Pain Rating 0/10 - no pain (P)   -RW        Posttreatment Pain Rating 0/10 - no pain (P)   -RW           Comprehension Assessment/Intervention    Comprehension Assessment/Intervention Auditory Comprehension (P)   -RW           Auditory Comprehension Assessment/Intervention    Auditory Comprehension Communication, Comment Cognition impacting topic maintenance and able to follow simple commands and answer questions. (P)   -RW           Expression Assessment/Intervention    Expression Assessment/Intervention verbal expression (P)   -RW           Verbal Expression Assessment/Intervention    Automatic Speech (Communication) WFL;response to greeting (P)   -RW        Repetition mild impairment;moderate impairment;sentences (P)   -RW        Verbal Expression, Comment Able to convey wants and needs, cognition deficits, and overall language impact. (P)   -RW           Motor Speech Assessment/Intervention    Motor Speech Function WFL (P)   -RW        Motor Speech, Comment Intermittent and inconsistent. Did not impact speech or intelligibility. (P)   -RW           Cursory Voice Assessment/Intervention    Quality and Resonance (Voice) WFL (P)   -RW           Cognitive Assessment Intervention- SLP    Orientation Status (Cognition) WFL;situation;person;awareness of basic personal information;moderate impairment;time;other (see comments) (P)   Pt stated that it was the afternoon because she already had lunch, but it was still AM. Pt was repetitive when explaing her situation, telling me about her fall x2.  -RW        Memory (Cognitive) moderate impairment;delayed;other (see comments) (P)   Pt recalled 4/5 words immediately and 0/5 after imposed delay. Able to remember 2/5 after provided with cue.  -RW        Attention (Cognitive) mild impairment;selective;sustained (P)   -RW         Executive Function (Cognition) mild impairment;deficit awareness (P)   -RW        Pragmatics (Communication) mild impairment;topic maintenance;turn taking (P)   -RW           SLP Evaluation Clinical Impressions    SLP Diagnosis mild-moderate;cognitive-linguistic disorder (P)   -RW        Rehab Potential/Prognosis good (P)   -RW        SLC Criteria for Skilled Therapy Interventions Met yes (P)   -RW        Functional Impact difficulty in expressing complex messages (P)   -RW           Recommendations    Therapy Frequency (SLP SLC) 5 days per week (P)   -RW        Predicted Duration Therapy Intervention (Days) 1 week (P)   -RW        Anticipated Discharge Disposition (SLP) home with  services (P)   -RW                  User Key  (r) = Recorded By, (t) = Taken By, (c) = Cosigned By      Initials Name Effective Dates    RW Flavia Knox, Speech Therapy Student 01/16/25 -                        EDUCATION  The patient has been educated in the following areas:     Cognitive Impairment Communication Impairment.           SLP GOALS       Row Name 03/20/25 1010             Patient will demonstrate functional cognitive-linguistic skills for return to discharge environment    [place] Independently (P)   -RW      Time frame 1 week (P)   -RW      Progress/Outcomes new goal (P)   -RW         SLP Diagnostic Treatment     Patient will participate in further assessment in the following areas cognitive-linguistic (P)   -RW      Time Frame (Diagnostic) 1 week (P)   -RW      Progress/Outcomes (Additional Goal 1, SLP) new goal (P)   -RW         Orientation Goal 1 (SLP)    Improve Orientation Through Goal 1 (SLP) other (see comments);90%;independently (over 90% accuracy) (P)   time of day  -RW      Time Frame (Orientation Goal 1, SLP) 1 week (P)   -RW      Progress/Outcomes (Orientation Goal 1, SLP) new goal (P)   -RW         Memory Skills Goal 1 (SLP)    Improve Memory Skills Through Goal 1 (SLP) repeat sentence;recalling  unrelated word lists with an imposed delay;80%;independently (over 90% accuracy) (P)   -RW      Time Frame (Memory Skills Goal 1, SLP) 1 week (P)   -RW      Progress/Outcomes (Memory Skills Goal 1, SLP) new goal (P)   -RW         Organizational Skills Goal 1 (SLP)    Improve Thought Organization Through Goal 1 (SLP) completing a divergent naming task;completing a convergent naming task;90%;independently (over 90% accuracy) (P)   -RW      Progress/Outcomes (Thought Organization Skills Goal 1, SLP) new goal (P)   -RW                User Key  (r) = Recorded By, (t) = Taken By, (c) = Cosigned By      Initials Name Provider Type    Flavia Simpson, Speech Therapy Student SLP Student                              Time Calculation:      Time Calculation- SLP       Row Name 03/20/25 1150             Time Calculation- SLP    SLP Start Time 1005 (P)   -RW      SLP Received On 03/20/25 (P)   -RW         Untimed Charges    45364-MT Eval Speech and Production w/ Language Minutes 55 (P)   -RW         Total Minutes    Untimed Charges Total Minutes 55 (P)   -RW       Total Minutes 55 (P)   -RW                User Key  (r) = Recorded By, (t) = Taken By, (c) = Cosigned By      Initials Name Provider Type    Flavia Simpson, Speech Therapy Student SLP Student                    Therapy Charges for Today       Code Description Service Date Service Provider Modifiers Qty    [id number]  ST EVAL SPEECH AND PROD W LANG  4 3/20/2025 Flavia Knox, Speech Therapy Student GN 1                       Flavia Knox Speech Therapy Student  3/20/2025

## 2025-03-21 ENCOUNTER — READMISSION MANAGEMENT (OUTPATIENT)
Dept: CALL CENTER | Facility: HOSPITAL | Age: 80
End: 2025-03-21
Payer: MEDICARE

## 2025-03-21 ENCOUNTER — APPOINTMENT (OUTPATIENT)
Dept: NEUROLOGY | Facility: HOSPITAL | Age: 80
DRG: 092 | End: 2025-03-21
Payer: MEDICARE

## 2025-03-21 VITALS
OXYGEN SATURATION: 96 % | SYSTOLIC BLOOD PRESSURE: 127 MMHG | WEIGHT: 127 LBS | HEIGHT: 62 IN | BODY MASS INDEX: 23.37 KG/M2 | DIASTOLIC BLOOD PRESSURE: 49 MMHG | TEMPERATURE: 97.7 F | HEART RATE: 66 BPM | RESPIRATION RATE: 18 BRPM

## 2025-03-21 DIAGNOSIS — R00.1 BRADYCARDIA, SINUS: Primary | ICD-10-CM

## 2025-03-21 DIAGNOSIS — I48.0 PAROXYSMAL ATRIAL FIBRILLATION: ICD-10-CM

## 2025-03-21 PROBLEM — R29.818 TRANSIENT NEUROLOGICAL SYMPTOMS: Status: RESOLVED | Noted: 2025-03-19 | Resolved: 2025-03-21

## 2025-03-21 LAB
ANION GAP SERPL CALCULATED.3IONS-SCNC: 9 MMOL/L (ref 5–15)
BUN SERPL-MCNC: 39 MG/DL (ref 8–23)
BUN/CREAT SERPL: 39 (ref 7–25)
CALCIUM SPEC-SCNC: 9 MG/DL (ref 8.6–10.5)
CHLORIDE SERPL-SCNC: 102 MMOL/L (ref 98–107)
CO2 SERPL-SCNC: 26 MMOL/L (ref 22–29)
CREAT SERPL-MCNC: 1 MG/DL (ref 0.57–1)
DEPRECATED RDW RBC AUTO: 47.8 FL (ref 37–54)
EGFRCR SERPLBLD CKD-EPI 2021: 57.4 ML/MIN/1.73
ERYTHROCYTE [DISTWIDTH] IN BLOOD BY AUTOMATED COUNT: 14.1 % (ref 12.3–15.4)
GLUCOSE BLDC GLUCOMTR-MCNC: 281 MG/DL (ref 70–130)
GLUCOSE BLDC GLUCOMTR-MCNC: 371 MG/DL (ref 70–130)
GLUCOSE SERPL-MCNC: 398 MG/DL (ref 65–99)
HCT VFR BLD AUTO: 35.2 % (ref 34–46.6)
HGB BLD-MCNC: 11.3 G/DL (ref 12–15.9)
MAGNESIUM SERPL-MCNC: 2.1 MG/DL (ref 1.6–2.4)
MCH RBC QN AUTO: 29.8 PG (ref 26.6–33)
MCHC RBC AUTO-ENTMCNC: 32.1 G/DL (ref 31.5–35.7)
MCV RBC AUTO: 92.9 FL (ref 79–97)
PLATELET # BLD AUTO: 136 10*3/MM3 (ref 140–450)
PMV BLD AUTO: 12.7 FL (ref 6–12)
POTASSIUM SERPL-SCNC: 4.6 MMOL/L (ref 3.5–5.2)
RBC # BLD AUTO: 3.79 10*6/MM3 (ref 3.77–5.28)
SODIUM SERPL-SCNC: 137 MMOL/L (ref 136–145)
WBC NRBC COR # BLD AUTO: 7.38 10*3/MM3 (ref 3.4–10.8)

## 2025-03-21 PROCEDURE — 82948 REAGENT STRIP/BLOOD GLUCOSE: CPT

## 2025-03-21 PROCEDURE — 99239 HOSP IP/OBS DSCHRG MGMT >30: CPT | Performed by: NURSE PRACTITIONER

## 2025-03-21 PROCEDURE — 63710000001 INSULIN GLARGINE PER 5 UNITS: Performed by: INTERNAL MEDICINE

## 2025-03-21 PROCEDURE — 95816 EEG AWAKE AND DROWSY: CPT | Performed by: PSYCHIATRY & NEUROLOGY

## 2025-03-21 PROCEDURE — 95816 EEG AWAKE AND DROWSY: CPT

## 2025-03-21 PROCEDURE — 83735 ASSAY OF MAGNESIUM: CPT | Performed by: INTERNAL MEDICINE

## 2025-03-21 PROCEDURE — 80048 BASIC METABOLIC PNL TOTAL CA: CPT | Performed by: INTERNAL MEDICINE

## 2025-03-21 PROCEDURE — 99232 SBSQ HOSP IP/OBS MODERATE 35: CPT | Performed by: NURSE PRACTITIONER

## 2025-03-21 PROCEDURE — 63710000001 INSULIN LISPRO (HUMAN) PER 5 UNITS: Performed by: INTERNAL MEDICINE

## 2025-03-21 PROCEDURE — 85027 COMPLETE CBC AUTOMATED: CPT | Performed by: INTERNAL MEDICINE

## 2025-03-21 RX ORDER — CLONIDINE 0.2 MG/24H
1 PATCH, EXTENDED RELEASE TRANSDERMAL WEEKLY
Qty: 4 EACH | Refills: 0 | Status: SHIPPED | OUTPATIENT
Start: 2025-03-27

## 2025-03-21 RX ORDER — INSULIN LISPRO 100 [IU]/ML
2 INJECTION, SOLUTION INTRAVENOUS; SUBCUTANEOUS
Status: DISCONTINUED | OUTPATIENT
Start: 2025-03-21 | End: 2025-03-21 | Stop reason: HOSPADM

## 2025-03-21 RX ADMIN — INSULIN LISPRO 6 UNITS: 100 INJECTION, SOLUTION INTRAVENOUS; SUBCUTANEOUS at 08:16

## 2025-03-21 RX ADMIN — APIXABAN 2.5 MG: 2.5 TABLET, FILM COATED ORAL at 08:17

## 2025-03-21 RX ADMIN — INSULIN LISPRO 2 UNITS: 100 INJECTION, SOLUTION INTRAVENOUS; SUBCUTANEOUS at 08:15

## 2025-03-21 RX ADMIN — NEBIBOLOL 10 MG: 10 TABLET ORAL at 08:17

## 2025-03-21 RX ADMIN — INSULIN LISPRO 2 UNITS: 100 INJECTION, SOLUTION INTRAVENOUS; SUBCUTANEOUS at 12:27

## 2025-03-21 RX ADMIN — PREGABALIN 75 MG: 75 CAPSULE ORAL at 08:17

## 2025-03-21 RX ADMIN — SACUBITRIL AND VALSARTAN 1 TABLET: 97; 103 TABLET, FILM COATED ORAL at 08:17

## 2025-03-21 RX ADMIN — LEVOTHYROXINE SODIUM 75 MCG: 0.07 TABLET ORAL at 06:27

## 2025-03-21 RX ADMIN — Medication 10 ML: at 08:18

## 2025-03-21 RX ADMIN — INSULIN GLARGINE 10 UNITS: 100 INJECTION, SOLUTION SUBCUTANEOUS at 08:15

## 2025-03-21 RX ADMIN — INSULIN LISPRO 4 UNITS: 100 INJECTION, SOLUTION INTRAVENOUS; SUBCUTANEOUS at 12:27

## 2025-03-21 NOTE — DISCHARGE SUMMARY
[facility] Medicine Services  DISCHARGE SUMMARY    Patient Name: Marilyn Kunz  : 1945  MRN: [id number]    Date of Admission: 3/19/2025  4:24 PM  Date of Discharge:  3/21/2025  Primary Care Physician: Peter Baca MD    Consults       Date and Time Order Name Status Description    3/19/2025  8:43 PM Inpatient Neurology Consult Stroke              Hospital Course     Presenting Problem: weakness/ dysarthria    Active Hospital Problems   No active problems to display.      Resolved Hospital Problems    Diagnosis Date Resolved POA    **Transient neurological symptoms [R29.818] 2025 Yes          Hospital Course:  Marilyn Kunz is a 79 y.o. female w/ dm1 presented w/ syncopal spell, transient left weakness & slurred speech. Fsbs was 60 prior to arrival. Stroke work up started and has been negative     Transient left sided weakness, slurred speech  Syncope  Parox Afib  -symptoms and recent syncopal spells...suspect hypoglycemia  -mri brain negative  -u/a benign  -uds neg  -eeg normal  -echo normal  - orthostatic vs normal  -neuro-stroke following: recommends outpatient cardiac monitor (afib, monitor for bradycardic, dysrhythmia, etc) and follow up with primary neurologist  -pt/ot recommend inpt rehab- patient declines as well as home health. Has been doing outpatient PT which she prefers     Hypoglycemia  DM1  -tresiba at home, ~20 units nightly  -glucose 60 by ems  -initially held insulin, got iv dextrose  -fsbs >400's. Have slowly titrated insulin and now in 200s  - DM educator to bs for education. Has follow up with endocrinology 3/24  - instructed to increase low glucose notification to 85mg/dl and use back up glucometer   - encourage nighttime snack     Hx hypercoagulability/DVT  - continue home eliquis     Chronic HFpEF  HTN  HL  -previous echo 2025: EF 66-70%  -nebivolol, bumex 0.5mg every other day, entresto, eliquis, statin; added back clonidine  0.2mg/day patch- rx sent; BP improved  -holding aldactone (hyperkalemia), consider nolding entresto if elevated potassium continues. Currently K+ WNL. Recommend follow up with PCP for further management and continue to hold aldactone        Ckd 3 (baseline cr ~1.2)  Hyperkalemia  -potassium 5.4 => 4.6  -lokelma x 2 given. Stop aldactone     Hypothyroidism  -tsh wnl  -continue levothyroxine      Discharge Follow Up Recommendations for outpatient labs/diagnostics:   Pcp follow up next week  Follow up with endocrinology 3/24 as scheduled  Cardiac monitor ordered to be placed for dc  Follow up with neurology 6 weeks    Day of Discharge     HPI:   No overnight issues. Feeling at baseline.     Review of Systems  Gen- No fevers, chills  CV- No chest pain, palpitations  Resp- No cough, dyspnea  GI- No N/V/D, abd pain      Vital Signs:   Temp:  [97.5 °F (36.4 °C)-98.2 °F (36.8 °C)] 97.7 °F (36.5 °C)  Heart Rate:  [51-66] 66  Resp:  [18] 18  BP: (114-212)/(49-85) 127/49      Physical Exam:  Constitutional: No acute distress, awake, alert  HENT: NCAT, mucous membranes moist  Respiratory: Clear to auscultation bilaterally, respiratory effort normal   Cardiovascular: RRR, no murmurs, rubs, or gallops  Gastrointestinal: Positive bowel sounds, soft, nontender, nondistended  Musculoskeletal: No bilateral ankle edema  Psychiatric: Appropriate affect, cooperative  Neurologic: Oriented x 3, strength symmetric in all extremities, Cranial Nerves grossly intact to confrontation, speech clear  Skin: No rashes      Pertinent  and/or Most Recent Results     LAB RESULTS:      Lab 03/21/25  0422 03/20/25  0314 03/19/25  1707 03/19/25  1706   WBC 7.38 8.03 11.34*  --    HEMOGLOBIN 11.3* 10.8* 13.1  --    HEMOGLOBIN, POC  --   --   --  14.3   HEMATOCRIT 35.2 34.8 42.7  --    HEMATOCRIT POC  --   --   --  42   PLATELETS 136* 131* 144  --    NEUTROS ABS  --  6.09 9.46*  --    IMMATURE GRANS (ABS)  --  0.03 0.03  --    LYMPHS ABS  --  1.33 1.21   --    MONOS ABS  --  0.54 0.61  --    EOS ABS  --  0.02 0.01  --    MCV 92.9 95.6 96.4  --          Lab 03/21/25  0422 03/20/25  0314 03/19/25  1707 03/19/25  1706   SODIUM 137 138 145  --    POTASSIUM 4.6 5.4* 4.7  --    CHLORIDE 102 104 106  --    CO2 26.0 24.0 25.0  --    ANION GAP 9.0 10.0 14.0  --    BUN 39* 39* 42*  --    CREATININE 1.00 1.14* 1.11* 1.10   EGFR 57.4* 49.1* 50.7* 51.2*   GLUCOSE 398* 397* 65  --    CALCIUM 9.0 8.5* 9.5  --    MAGNESIUM 2.1  --  2.4  --    HEMOGLOBIN A1C  --  8.90*  --   --    TSH  --   --  0.700  --          Lab 03/20/25 0314 03/19/25 1707   TOTAL PROTEIN 5.9* 7.4   ALBUMIN 3.5 4.3   GLOBULIN 2.4 3.1   ALT (SGPT) 12 14   AST (SGOT) 16 21   BILIRUBIN 0.6 0.5   ALK PHOS 71 88         Lab 03/19/25  2150 03/19/25 1707   PROBNP  --  575.0   HSTROP T 20* 17*         Lab 03/20/25 0314   CHOLESTEROL 135   LDL CHOL 61   HDL CHOL 64*   TRIGLYCERIDES 41             Brief Urine Lab Results  (Last result in the past 365 days)        Color   Clarity   Blood   Leuk Est   Nitrite   Protein   CREAT   Urine HCG        03/20/25 0545 Yellow   Clear   Negative   Trace   Negative   Negative                 Microbiology Results (last 10 days)       ** No results found for the last 240 hours. **            EEG  Result Date: 3/21/2025  Reason for referral: 79 y.o.female with speech changes, weakness, consideration of seizures Technical Summary:  A 19 channel digital EEG was performed using the international 10-20 placement system, including eye leads and EKG leads. Duration: 20 minutes Findings: The patient is awake and talking.  Diffuse low amplitude intermixed theta and alpha activity is seen symmetrically over both hemispheres.  A clear posterior rhythm is not present.  Photic stimulation does not change the background.  Hyperventilation is not performed.  Sleep is not seen.  No focal features or epileptiform activity are present.  Video: Available Technical quality: Good Rhythm strip: Regular,  40-50 bpm SUMMARY: Normal EEG in the awake state No focal features or epileptiform activity are seen     Normal study This report is transcribed using the Dragon dictation system.      MRI Brain Without Contrast  Result Date: 3/20/2025  MRI BRAIN WO CONTRAST Date of Exam: 3/20/2025 4:02 AM EDT Indication: Stroke, follow up. fall, transient left weakness.  Comparison: CT head with angiography and perfusion 3/19/2025. Technique:  Routine multiplanar/multisequence sequence images of the brain were obtained without contrast administration. Findings: There is no diffusion restriction to suggest acute infarct. There is no evidence of acute or chronic intracranial hemorrhage. There are numerous scattered small round and patchy foci of subcortical and periventricular white matter FLAIR hyperintense signal abnormality. There are chronic infarcts in the left cerebellum. There are prominent perivascular spaces and age-appropriate generalized parenchymal volume loss. No mass effect or midline shift. No abnormal extra-axial collections.  The major vascular flow voids appear intact. The basal ganglia, brainstem and cerebellum appear within normal limits.  Calvarial and superficial soft tissue signal is within normal limits. There is evidence of prior orbital lens replacement. Mild plagiocephaly. There is a chronic microhemorrhage in the anterior left thalamus. The paranasal sinuses and the mastoid air cells appear well aerated.  Midline structures are intact.     Impression: 1.No acute intracranial abnormality. 2.Mild chronic small vessel ischemic change and chronic left cerebellar infarcts. Electronically Signed: Yasir Guerrero MD  3/20/2025 4:29 AM EDT  Workstation ID: [id number]    XR Hand 3+ View Left  Result Date: 3/19/2025  XR HAND 3+ VW LEFT Date of Exam: 3/19/2025 5:31 PM EDT Indication: fall Comparison: Left hand radiograph 10/16/2024 Findings: No acute fracture or malalignment.  No focal soft tissue abnormalities  appreciated. Similar severe polyarticular osteoarthrosis. Similar ankylosis at the interphalangeal joints which could reflect sequelae of erosive osteoarthritis.     Impression: No acute fracture or malalignment. Electronically Signed: Miguel A Tinajero  3/19/2025 6:04 PM EDT  Workstation ID: [id number]    XR Chest 1 View  Result Date: 3/19/2025  XR CHEST 1 VW Date of Exam: 3/19/2025 5:32 PM EDT Indication: stroke eval Comparison: Chest CT and chest radiograph 12/12/2024 Findings: Mediastinum: Cardiac silhouette appears unchanged and top normal in size Lungs: Bandlike opacities in the left mid to lower lobe, likely atelectasis. No focal consolidation appreciated. Pleura: No pleural effusion or pneumothorax. Bones and soft tissues: No acute, displaced fracture seen.     Impression: No radiographic evidence of an acute cardiopulmonary abnormality. Electronically Signed: Miguel A Tinajero  3/19/2025 5:56 PM EDT  Workstation ID: [id number]    CT CEREBRAL PERFUSION WITH & WITHOUT CONTRAST  Result Date: 3/19/2025  CT CEREBRAL PERFUSION W WO CONTRAST, CT ANGIOGRAM HEAD W AI ANALYSIS OF LVO, CT ANGIOGRAM NECK Date of Exam: 3/19/2025 4:46 PM EDT Indication: fall.  Comparison: None available. Technique: Axial CT images of the brain were obtained prior to and after the administration of 115 mL Isovue-370. Core blood volume, core blood flow, mean transit time, and Tmax images were obtained utilizing the Rapid software protocol. A limited CT angiogram of the head was also performed to measure the blood vessel density. The radiation dose reduction device was turned on for each scan per the ALARA (As Low as Reasonably Achievable) protocol. CTA of the head and neck was performed after the uneventful intravenous administration of 115 mL Isovue-370. Reconstructed coronal and sagittal images were also obtained. In addition, a 3-D volume rendered image was created for interpretation. Automated exposure control and iterative reconstruction  methods were used.  Findings: HEAD CTA: Anterior circulation: No proximal large vessel occlusion or major stenosis. Posterior circulation: No proximal large vessel occlusion or major stenosis. Major dural venous sinuses: No evidence of dural venous sinus thrombosis within the limitation of angiographic contrast imaging. NECK CTA: Conventional, three-vessel, aortic arch. Normal contrast enhancement in the common carotid arteries from their origins to the bifurcation. No significant narrowing of the bilateral ICAs at their origins by NASCET criteria. Normal contrast enhancement of the internal carotid arteries from their origins to the proximal intradural portions.mild atherosclerosis at the supraclinoid ICAs. Normal enhancement in the vertebral arteries from their origins to their proximal intradural portions. The left vertebral artery is dominant. Patent subclavian arteries. Angiographic contrast timing precludes accurate assessement of jugular vein patency. SOFT TISSUE NECK: Evaluation of the oral cavity is degraded by dental hardware artifact. No exophytic lesion seen within the aerodigestive tract. No pathologic appearing lymph nodes by imaging criteria. The visualized glands appear unremarkable. No acute or aggressive appearing osseous or soft tissue process.Degenerative changes of the imaged spine. Biapical pleural-parenchymal scarring. Expiratory phase imaging with associated groundglass opacities. CT PERFUSION BRAIN:  The examination appears to be technically adequate. Cerebral blood flow, blood volume, Tmax, and mean transit time maps are symmetric without large perfusion defect. CBF<30% volume: 0 mL Tmax>6sec volume: 0] mL Mismatch volume: 0 mL Mismatch ratio: None     Impression: No proximal large vessel occlusion or severe stenosis of the major arteries of the head and neck. No significant perfusion abnormality by RAPID criteria. Electronically Signed: Miguel A Tinajero  3/19/2025 5:35 PM EDT  Workstation  ID: [id number]    CT Angiogram Head w AI Analysis of LVO  Result Date: 3/19/2025  CT CEREBRAL PERFUSION W WO CONTRAST, CT ANGIOGRAM HEAD W AI ANALYSIS OF LVO, CT ANGIOGRAM NECK Date of Exam: 3/19/2025 4:46 PM EDT Indication: fall.  Comparison: None available. Technique: Axial CT images of the brain were obtained prior to and after the administration of 115 mL Isovue-370. Core blood volume, core blood flow, mean transit time, and Tmax images were obtained utilizing the Rapid software protocol. A limited CT angiogram of the head was also performed to measure the blood vessel density. The radiation dose reduction device was turned on for each scan per the ALARA (As Low as Reasonably Achievable) protocol. CTA of the head and neck was performed after the uneventful intravenous administration of 115 mL Isovue-370. Reconstructed coronal and sagittal images were also obtained. In addition, a 3-D volume rendered image was created for interpretation. Automated exposure control and iterative reconstruction methods were used.  Findings: HEAD CTA: Anterior circulation: No proximal large vessel occlusion or major stenosis. Posterior circulation: No proximal large vessel occlusion or major stenosis. Major dural venous sinuses: No evidence of dural venous sinus thrombosis within the limitation of angiographic contrast imaging. NECK CTA: Conventional, three-vessel, aortic arch. Normal contrast enhancement in the common carotid arteries from their origins to the bifurcation. No significant narrowing of the bilateral ICAs at their origins by NASCET criteria. Normal contrast enhancement of the internal carotid arteries from their origins to the proximal intradural portions.mild atherosclerosis at the supraclinoid ICAs. Normal enhancement in the vertebral arteries from their origins to their proximal intradural portions. The left vertebral artery is dominant. Patent subclavian arteries. Angiographic contrast timing precludes accurate  assessement of jugular vein patency. SOFT TISSUE NECK: Evaluation of the oral cavity is degraded by dental hardware artifact. No exophytic lesion seen within the aerodigestive tract. No pathologic appearing lymph nodes by imaging criteria. The visualized glands appear unremarkable. No acute or aggressive appearing osseous or soft tissue process.Degenerative changes of the imaged spine. Biapical pleural-parenchymal scarring. Expiratory phase imaging with associated groundglass opacities. CT PERFUSION BRAIN:  The examination appears to be technically adequate. Cerebral blood flow, blood volume, Tmax, and mean transit time maps are symmetric without large perfusion defect. CBF<30% volume: 0 mL Tmax>6sec volume: 0] mL Mismatch volume: 0 mL Mismatch ratio: None     Impression: No proximal large vessel occlusion or severe stenosis of the major arteries of the head and neck. No significant perfusion abnormality by RAPID criteria. Electronically Signed: Miguel A Tinajero  3/19/2025 5:35 PM EDT  Workstation ID: [id number]    CT Angiogram Neck  Result Date: 3/19/2025  CT CEREBRAL PERFUSION W WO CONTRAST, CT ANGIOGRAM HEAD W AI ANALYSIS OF LVO, CT ANGIOGRAM NECK Date of Exam: 3/19/2025 4:46 PM EDT Indication: fall.  Comparison: None available. Technique: Axial CT images of the brain were obtained prior to and after the administration of 115 mL Isovue-370. Core blood volume, core blood flow, mean transit time, and Tmax images were obtained utilizing the Rapid software protocol. A limited CT angiogram of the head was also performed to measure the blood vessel density. The radiation dose reduction device was turned on for each scan per the ALARA (As Low as Reasonably Achievable) protocol. CTA of the head and neck was performed after the uneventful intravenous administration of 115 mL Isovue-370. Reconstructed coronal and sagittal images were also obtained. In addition, a 3-D volume rendered image was created for interpretation.  Automated exposure control and iterative reconstruction methods were used.  Findings: HEAD CTA: Anterior circulation: No proximal large vessel occlusion or major stenosis. Posterior circulation: No proximal large vessel occlusion or major stenosis. Major dural venous sinuses: No evidence of dural venous sinus thrombosis within the limitation of angiographic contrast imaging. NECK CTA: Conventional, three-vessel, aortic arch. Normal contrast enhancement in the common carotid arteries from their origins to the bifurcation. No significant narrowing of the bilateral ICAs at their origins by NASCET criteria. Normal contrast enhancement of the internal carotid arteries from their origins to the proximal intradural portions.mild atherosclerosis at the supraclinoid ICAs. Normal enhancement in the vertebral arteries from their origins to their proximal intradural portions. The left vertebral artery is dominant. Patent subclavian arteries. Angiographic contrast timing precludes accurate assessement of jugular vein patency. SOFT TISSUE NECK: Evaluation of the oral cavity is degraded by dental hardware artifact. No exophytic lesion seen within the aerodigestive tract. No pathologic appearing lymph nodes by imaging criteria. The visualized glands appear unremarkable. No acute or aggressive appearing osseous or soft tissue process.Degenerative changes of the imaged spine. Biapical pleural-parenchymal scarring. Expiratory phase imaging with associated groundglass opacities. CT PERFUSION BRAIN:  The examination appears to be technically adequate. Cerebral blood flow, blood volume, Tmax, and mean transit time maps are symmetric without large perfusion defect. CBF<30% volume: 0 mL Tmax>6sec volume: 0] mL Mismatch volume: 0 mL Mismatch ratio: None     Impression: No proximal large vessel occlusion or severe stenosis of the major arteries of the head and neck. No significant perfusion abnormality by RAPID criteria. Electronically  Signed: Miguel A Tinajero  3/19/2025 5:35 PM EDT  Workstation ID: [id number]    CT Head Without Contrast Stroke Protocol  Result Date: 3/19/2025  CT HEAD WO CONTRAST STROKE PROTOCOL Date of Exam: 3/19/2025 4:41 PM EDT Indication: fall Comparison: CT head 11/16/2024 Technique: Axial CT images were obtained of the head without contrast administration.  Reconstructed coronal images were also obtained. Automated exposure control and iterative construction methods were used. Scan Time: 1639 hours Results discussed with stroke navigator at 1648 hours. Findings: No acute intracranial hemorrhage, mass, or mass effect is seen. There is stable mild global volume loss. Mild to moderate periventricular and subcortical white matter hypodensities have not significantly changed. Although nonspecific, these are most commonly seen in the setting of chronic small vessel ischemic change. Probable chronic left cerebellar infarct is stable compared to 11/16/2024 CT. No fractures are seen. There is mild mucosal thickening in ethmoid air cells. Mastoid air cells are clear.     Impression: 1.No acute intracranial abnormality is identified on CT. 2.Stable mild global volume loss, mild to moderate chronic small vessel ischemic change, and probable chronic infarct. Electronically Signed: Sera Mercer  3/19/2025 4:51 PM EDT  Workstation ID: [id number]      Results for orders placed during the hospital encounter of 07/20/23    Duplex Carotid Ultrasound CAR    Interpretation Summary    Right internal carotid artery demonstrates a less than 50% stenosis.    Left internal carotid artery demonstrates a less than 50% stenosis.    Vertebral artery flow antegrade bilaterally      Results for orders placed during the hospital encounter of 07/20/23    Duplex Carotid Ultrasound CAR    Interpretation Summary    Right internal carotid artery demonstrates a less than 50% stenosis.    Left internal carotid artery demonstrates a less than 50% stenosis.     Vertebral artery flow antegrade bilaterally      Results for orders placed during the hospital encounter of 03/19/25    Adult Transthoracic Echo Complete W/ Cont if Necessary Per Protocol (With Agitated Saline)    Interpretation Summary    Left ventricular systolic function is normal. Calculated left ventricular EF = 63.8% Normal left ventricular cavity size and wall thickness noted. All left ventricular wall segments contract normally. Normal left atrial pressure.    Normal right ventricular cavity size and systolic function noted.    Normal left atrial size and volume noted. Saline test results are negative.    The aortic valve exhibits sclerosis. Trace aortic valve regurgitation is present. No aortic valve stenosis is present.    The mitral valve is structurally normal with no significant stenosis present. Mild mitral valve regurgitation is present.    The tricuspid valve is normal in structure. Trace tricuspid valve regurgitation is present. Estimated right ventricular systolic pressure from tricuspid regurgitation is normal (<35 mmHg).      Plan for Follow-up of Pending Labs/Results:     Discharge Details        Discharge Medications        New Medications        Instructions Start Date   cloNIDine 0.2 MG/24HR patch  Commonly known as: CATAPRES-TTS   1 patch, Transdermal, Weekly   Start Date: March 27, 2025            Changes to Medications        Instructions Start Date   Tresiba FlexTouch 100 UNIT/ML solution pen-injector injection  Generic drug: insulin degludec  What changed: additional instructions   Inject 19 Units under the skin into the appropriate area as directed Every Night.             Continue These Medications        Instructions Start Date   acetaminophen 500 MG tablet  Commonly known as: TYLENOL   2 tablets      albuterol sulfate  (90 Base) MCG/ACT inhaler  Commonly known as: PROVENTIL HFA;VENTOLIN HFA;PROAIR HFA   2 puffs, Inhalation, Every 4 Hours PRN      APIXABAN PO   Oral, Every 12  Hours Scheduled      azaTHIOprine 50 MG tablet  Commonly known as: IMURAN   TAKE 2 TABLETS TWICE DAILY      budesonide-formoterol 80-4.5 MCG/ACT inhaler  Commonly known as: Symbicort   2 puffs, Inhalation, 2 Times Daily - RT      bumetanide 0.5 MG tablet  Commonly known as: BUMEX   0.5 mg, Oral, Every Other Day      Entresto  MG tablet  Generic drug: sacubitril-valsartan   1 tablet, Oral, 2 Times Daily      fluticasone 50 MCG/ACT nasal spray  Commonly known as: FLONASE   2 sprays, Daily      insulin aspart 100 UNIT/ML solution pen-injector sc pen  Commonly known as: novoLOG FLEXPEN   3 Times Daily With Meals      levocetirizine 5 MG tablet  Commonly known as: XYZAL   5 mg, Every Evening      levothyroxine 75 MCG tablet  Commonly known as: SYNTHROID, LEVOTHROID   75 mcg, Daily      montelukast 10 MG tablet  Commonly known as: SINGULAIR   10 mg, Every Evening      nebivolol 10 MG tablet  Commonly known as: BYSTOLIC   10 mg, Oral, Daily      nystatin 419680 UNIT/GM powder  Commonly known as: MYCOSTATIN   Topical, 3 Times Daily      oxybutynin 5 MG tablet  Commonly known as: DITROPAN   5 mg, 2 Times Daily      pravastatin 20 MG tablet  Commonly known as: PRAVACHOL   20 mg, Oral, Every Evening      pregabalin 75 MG capsule  Commonly known as: LYRICA   75 mg, 2 Times Daily      Rinvoq 15 MG tablet sustained-release 24 hour  Generic drug: Upadacitinib ER   15 mg, Oral, Daily             Stop These Medications      spironolactone 25 MG tablet  Commonly known as: ALDACTONE     terazosin 2 MG capsule  Commonly known as: HYTRIN              Allergies   Allergen Reactions    Atorvastatin Other (See Comments)    Colesevelam Other (See Comments)    Cymbalta [Duloxetine Hcl] Other (See Comments)     Hyponatremia    Famotidine Other (See Comments)    Cephalexin Rash     Tolerated Ceftriaxone    Clindamycin Rash    Hygroton [Chlorthalidone] Rash     Facial rash    Lidocaine Other (See Comments)     Sores in throat     Penicillins Nausea And Vomiting, Rash and Other (See Comments)     Has tolerated ceftriaxone  TROUBLE BREATHING         Discharge Disposition:  Home or Self Care    Diet:  Hospital:  Diet Order   Procedures    Diet: Cardiac, Renal; Healthy Heart (2-3 Na+); Low Potassium; Fluid Consistency: Thin (IDDSI 0)            Activity: as tolerated      Restrictions or Other Recommendations:         CODE STATUS:    Code Status and Medical Interventions: CPR (Attempt to Resuscitate); Full Support   Ordered at: 03/19/25 1931     Code Status (Patient has no pulse and is not breathing):    CPR (Attempt to Resuscitate)     Medical Interventions (Patient has pulse or is breathing):    Full Support     Level Of Support Discussed With:    Patient       No future appointments.    Additional Instructions for the Follow-ups that You Need to Schedule       Discharge Follow-up with PCP   As directed       Currently Documented PCP:    Peter Baca MD    PCP Phone Number:    [phone]     Follow Up Details: 1 week- bp evaluation        Discharge Follow-up with Specified Provider: Endocrinology 3/24 as scheduled   As directed      To: Endocrinology 3/24 as scheduled        Discharge Follow-up with Specified Provider: primary neurologist; 6 Weeks   As directed      To: primary neurologist   Follow Up: 6 Weeks                      JAGDISH Middleton  03/21/25      Time Spent on Discharge:  I spent  50  minutes on this discharge activity which included: face-to-face encounter with the patient, reviewing the data in the system, coordination of the care with the nursing staff as well as consultants, documentation, and entering orders.        Electronically signed by JAGDISH Middleton, 03/21/25, 1:40 PM EDT.

## 2025-03-21 NOTE — PROGRESS NOTES
Stroke Progress Note       Chief Complaint:  Syncopal episode    Subjective    Subjective     Subjective: Patient is resting comfortably in bed, NAD.  Denies any strokelike symptoms she tells me that she thinks that she may have been hypoglycemic when she passed out.  She tells me that both times her blood sugar had read out between 300-400, she had taken her correction, put on her CPAP (which she tells me is too loud for her to hear her glucose monitor) and then she passed out.  We discussed that while this may be a possibility I would still like for her to have a cardiac event monitor to check for any underlying arrhythmias/bradycardia.  She is in agreement, however she does not want to go to rehab and prefers to go home.      Review of Systems   Constitutional:  Negative for fever.   HENT:  Negative for trouble swallowing.    Eyes:  Negative for visual disturbance.   Respiratory:  Negative for cough and shortness of breath.    Cardiovascular:  Negative for chest pain and palpitations.   Gastrointestinal:  Negative for nausea and vomiting.   Musculoskeletal: Negative.    Skin: Negative.    Neurological:  Negative for facial asymmetry, speech difficulty, weakness, numbness and headaches.   Psychiatric/Behavioral: Negative.              Objective    Objective      Temp:  [97.5 °F (36.4 °C)-98.2 °F (36.8 °C)] 97.5 °F (36.4 °C)  Heart Rate:  [51-65] 51  Resp:  [18] 18  BP: (125-212)/(59-85) 163/71        Neurological Exam  Mental Status  Alert. Oriented to person, place, and time. Speech is normal. Language is fluent with no aphasia. Attention and concentration are normal.    Cranial Nerves  CN II: Visual fields full to confrontation.  CN III, IV, VI: Extraocular movements intact bilaterally. Normal lids and orbits bilaterally. Pupils equal round and reactive to light bilaterally.  CN V: Facial sensation is normal.  CN VII: Full and symmetric facial movement.  CN XI: Shoulder shrug strength is normal.  CN XII: Tongue  midline without atrophy or fasciculations.    Motor  Normal muscle bulk throughout. No fasciculations present. Normal muscle tone. No abnormal involuntary movements. Strength is 5/5 throughout all four extremities.    Sensory  Light touch is normal in upper and lower extremities.     Coordination    Finger-to-nose, rapid alternating movements and heel-to-shin normal bilaterally without dysmetria.    Gait    Not tested.      Physical Exam  Vitals reviewed.   Constitutional:       Appearance: Normal appearance.   HENT:      Head: Normocephalic and atraumatic.   Eyes:      General: Lids are normal.      Extraocular Movements: Extraocular movements intact.      Pupils: Pupils are equal, round, and reactive to light.   Cardiovascular:      Rate and Rhythm: Normal rate and regular rhythm.   Pulmonary:      Effort: Pulmonary effort is normal. No respiratory distress.   Musculoskeletal:         General: No swelling. Normal range of motion.      Cervical back: Normal range of motion and neck supple.   Skin:     General: Skin is warm and dry.   Neurological:      General: No focal deficit present.      Mental Status: She is alert and oriented to person, place, and time. Mental status is at baseline.      Sensory: No sensory deficit.      Motor: Motor strength is normal.No weakness.      Coordination: Coordination is intact.   Psychiatric:         Mood and Affect: Mood normal.         Speech: Speech normal.         Behavior: Behavior normal.         Results Review:    I reviewed the patient's new clinical results.    EEG  Result Date: 3/21/2025  Normal study This report is transcribed using the Dragon dictation system.      MRI Brain Without Contrast  Result Date: 3/20/2025  Impression: 1.No acute intracranial abnormality. 2.Mild chronic small vessel ischemic change and chronic left cerebellar infarcts. Electronically Signed: Yasir Guerrero MD  3/20/2025 4:29 AM EDT  Workstation ID: [id number]    XR Hand 3+ View Left  Result  Date: 3/19/2025  Impression: No acute fracture or malalignment. Electronically Signed: Miguel A Tinajero  3/19/2025 6:04 PM EDT  Workstation ID: [id number]    XR Chest 1 View  Result Date: 3/19/2025  Impression: No radiographic evidence of an acute cardiopulmonary abnormality. Electronically Signed: Miguel A Tinajero  3/19/2025 5:56 PM EDT  Workstation ID: [id number]    CT CEREBRAL PERFUSION WITH & WITHOUT CONTRAST  Result Date: 3/19/2025  Impression: No proximal large vessel occlusion or severe stenosis of the major arteries of the head and neck. No significant perfusion abnormality by RAPID criteria. Electronically Signed: Miguel A Tinajero  3/19/2025 5:35 PM EDT  Workstation ID: [id number]    CT Angiogram Head w AI Analysis of LVO  Result Date: 3/19/2025  Impression: No proximal large vessel occlusion or severe stenosis of the major arteries of the head and neck. No significant perfusion abnormality by RAPID criteria. Electronically Signed: Miguel A Tinajero  3/19/2025 5:35 PM EDT  Workstation ID: [id number]    CT Angiogram Neck  Result Date: 3/19/2025  Impression: No proximal large vessel occlusion or severe stenosis of the major arteries of the head and neck. No significant perfusion abnormality by RAPID criteria. Electronically Signed: Miguel A Tinajero  3/19/2025 5:35 PM EDT  Workstation ID: [id number]    CT Head Without Contrast Stroke Protocol  Result Date: 3/19/2025  Impression: 1.No acute intracranial abnormality is identified on CT. 2.Stable mild global volume loss, mild to moderate chronic small vessel ischemic change, and probable chronic infarct. Electronically Signed: Sera Mercer  3/19/2025 4:51 PM EDT  Workstation ID: [id number]       Results for orders placed during the hospital encounter of 03/19/25    Adult Transthoracic Echo Complete W/ Cont if Necessary Per Protocol (With Agitated Saline)    Interpretation Summary    Left ventricular systolic function is normal. Calculated left ventricular EF = 63.8% Normal left  ventricular cavity size and wall thickness noted. All left ventricular wall segments contract normally. Normal left atrial pressure.    Normal right ventricular cavity size and systolic function noted.    Normal left atrial size and volume noted. Saline test results are negative.    The aortic valve exhibits sclerosis. Trace aortic valve regurgitation is present. No aortic valve stenosis is present.    The mitral valve is structurally normal with no significant stenosis present. Mild mitral valve regurgitation is present.    The tricuspid valve is normal in structure. Trace tricuspid valve regurgitation is present. Estimated right ventricular systolic pressure from tricuspid regurgitation is normal (<35 mmHg).            WBC   Date Value Ref Range Status   03/21/2025 7.38 3.40 - 10.80 10*3/mm3 Final     RBC   Date Value Ref Range Status   03/21/2025 3.79 3.77 - 5.28 10*6/mm3 Final     Hemoglobin   Date Value Ref Range Status   03/21/2025 11.3 (L) 12.0 - 15.9 g/dL Final     Hematocrit   Date Value Ref Range Status   03/21/2025 35.2 34.0 - 46.6 % Final     MCV   Date Value Ref Range Status   03/21/2025 92.9 79.0 - 97.0 fL Final     MCH   Date Value Ref Range Status   03/21/2025 29.8 26.6 - 33.0 pg Final     MCHC   Date Value Ref Range Status   03/21/2025 32.1 31.5 - 35.7 g/dL Final     RDW   Date Value Ref Range Status   03/21/2025 14.1 12.3 - 15.4 % Final     RDW-SD   Date Value Ref Range Status   03/21/2025 47.8 37.0 - 54.0 fl Final     MPV   Date Value Ref Range Status   03/21/2025 12.7 (H) 6.0 - 12.0 fL Final     Platelets   Date Value Ref Range Status   03/21/2025 136 (L) 140 - 450 10*3/mm3 Final     Neutrophil %   Date Value Ref Range Status   03/20/2025 75.9 42.7 - 76.0 % Final     Lymphocyte %   Date Value Ref Range Status   03/20/2025 16.6 (L) 19.6 - 45.3 % Final     Monocyte %   Date Value Ref Range Status   03/20/2025 6.7 5.0 - 12.0 % Final     Eosinophil %   Date Value Ref Range Status   03/20/2025 0.2 (L)  0.3 - 6.2 % Final     Basophil %   Date Value Ref Range Status   03/20/2025 0.2 0.0 - 1.5 % Final     Immature Grans %   Date Value Ref Range Status   03/20/2025 0.4 0.0 - 0.5 % Final     Neutrophils, Absolute   Date Value Ref Range Status   03/20/2025 6.09 1.70 - 7.00 10*3/mm3 Final     Lymphocytes, Absolute   Date Value Ref Range Status   03/20/2025 1.33 0.70 - 3.10 10*3/mm3 Final     Monocytes, Absolute   Date Value Ref Range Status   03/20/2025 0.54 0.10 - 0.90 10*3/mm3 Final     Eosinophils, Absolute   Date Value Ref Range Status   03/20/2025 0.02 0.00 - 0.40 10*3/mm3 Final     Basophils, Absolute   Date Value Ref Range Status   03/20/2025 0.02 0.00 - 0.20 10*3/mm3 Final     Immature Grans, Absolute   Date Value Ref Range Status   03/20/2025 0.03 0.00 - 0.05 10*3/mm3 Final     nRBC   Date Value Ref Range Status   03/20/2025 0.0 0.0 - 0.2 /100 WBC Final       Lab Results   Component Value Date    GLUCOSE 398 (H) 03/21/2025    BUN 39 (H) 03/21/2025    CREATININE 1.00 03/21/2025     03/21/2025    K 4.6 03/21/2025     03/21/2025    CALCIUM 9.0 03/21/2025    PROTEINTOT 5.9 (L) 03/20/2025    ALBUMIN 3.5 03/20/2025    ALT 12 03/20/2025    AST 16 03/20/2025    ALKPHOS 71 03/20/2025    BILITOT 0.6 03/20/2025    GLOB 2.4 03/20/2025    AGRATIO 1.5 03/20/2025    BCR 39.0 (H) 03/21/2025    ANIONGAP 9.0 03/21/2025    EGFR 57.4 (L) 03/21/2025           Lab 03/20/25  0314   HEMOGLOBIN A1C 8.90*       Lipid Panel          3/20/2025    03:14   Lipid Panel   Total Cholesterol 135    Triglycerides 41    HDL Cholesterol 64    VLDL Cholesterol 10    LDL Cholesterol  61    LDL/HDL Ratio 0.98          -[facility] wo on 3/19/2025 images were personally reviewed and showed no acute ischemic or hemorrhagic stroke  -CTA of the head and neck images from 3/19/2025 were personally reviewed and showed no flow limiting stenosis or LVO  -MRI brain images from 3/20/2025 were personally reviewed and showed no ischemic or hemorrhagic  stroke  -TTE showed EF of 63%, normal left atrium, negative bubble study  -A1c from 3/20/2025 was 8.9%  -LDL from 3/20/2025 was 61    Assessment/Plan     Assessment/Plan:  78 yo female with PMH of demyelinating disease, remote history of stroke (possible chronic left cerebellar infarct), CHF, HTN, CKD stage III, HLD, factor V Leiden and type I diabetic who awoke today finding herself on the floor and unable to stand.  Last known well 2200 EST on 03/18/25 when patient went to bed feeling normal. When EMS arrived they found the patient to have some left-sided weakness and slurred speech.  Patient is a type I diabetic, had blood glucose of 60.  NIH 0. CTH without acute abnormality.  Possible chronic infarct of left cerebellum which is present on prior CT imaging and MRIs.  CT perfusion without core infarct or ischemic tissue at risk.  No LVO or significant stenosis identified on CT angiogram head and neck.  Patient not a candidate for IV thrombolytic based on last known well.  MRI brain negative for stroke.       Antiplatelet PTA: none  Anticoagulant PTA: Eliquis 2.5 mg twice daily    Transient left-sided weakness and dysarthria following a fall  -Differentials include suspected cardiogenic syncope v hypoglycemic event   -MRI is negative for evidence of stroke, no significant stenosis on CTA imaging  -EEG was negative for epileptiform activity no active seizures  -Recommend a long-term cardiac monitor to evaluate for arrhythmias/bradycardia  -TTE showed EF of 63%, normal left atrium, negative bubble study  -Continue Eliquis 2.5 mg daily (patient with paroxysmal A-fib and factor V Leiden)  -Continue home pravastatin 20 mg; goal LDL <70  -Normotensive blood pressure goals, avoid hypotension  -Orthostatic blood pressures  -PT/OT following, recommending SNF at discharge  -Follow-up with outpatient neurology at discharge (patient is already established with Dr. Laird)    2.  HTN  -Normotensive goals, BP <140/90 avoid  hypotension as above    3.  HLD, goal LDL <70  -LDL 61  -Continue pravastatin 20 mg daily    4.  Type 1 diabetes  -Patient reported glucose 300-400 which she had corrected just prior to both incidents  -Glucose 68 on admission, received dextrose  -Management per primary team    Plan of care discussed with patient and Dr. Bueno.  Stroke workup is negative and complete, she will not need stroke clinic follow-up.  We will sign off at this time, please call with any questions admitted    JAGDISH Barnes  03/21/25  09:08 EDT

## 2025-03-21 NOTE — CASE MANAGEMENT/SOCIAL WORK
Continued Stay Note   [place]     Patient Name: Marilyn Kunz  MRN: [id number]  Today's Date: 3/21/2025    Admit Date: 3/19/2025    Plan: Home w/Outpatient PT   Discharge Plan       Row Name 03/21/25 1256       Plan    Plan Home w/Outpatient PT    Patient/Family in Agreement with Plan yes    Plan Comments Spoke with patient in room.  Her plan is still toreturn home with outpatient PT.  She is current with outpatient PT at [facility].  No other needs noted at this time.  CM will continue to follow.    Final Discharge Disposition Code 01 - home or self-care                   Discharge Codes    No documentation.                 Expected Discharge Date and Time       Expected Discharge Date Expected Discharge Time    Mar 22, 2025               Lyric Lopes RN

## 2025-03-22 NOTE — OUTREACH NOTE
Prep Survey      Flowsheet Row Responses   [facility] patient discharged from? [place]   Is LACE score < 7 ? No   Eligibility Readm Mgmt   Discharge diagnosis Transient neurological symptoms   Does the patient have one of the following disease processes/diagnoses(primary or secondary)? Other   Does the patient have Home health ordered? No   Is there a DME ordered? No   Prep survey completed? Yes            Natali ROGERS - Registered Nurse

## 2025-03-23 ENCOUNTER — HOSPITAL ENCOUNTER (EMERGENCY)
Facility: HOSPITAL | Age: 80
Discharge: HOME | End: 2025-03-23
Attending: EMERGENCY MEDICINE | Admitting: EMERGENCY MEDICINE
Payer: MEDICARE

## 2025-03-23 VITALS
OXYGEN SATURATION: 96 % | RESPIRATION RATE: 16 BRPM | TEMPERATURE: 97.4 F | BODY MASS INDEX: 25.6 KG/M2 | HEART RATE: 56 BPM | WEIGHT: 127 LBS | HEIGHT: 59 IN | DIASTOLIC BLOOD PRESSURE: 72 MMHG | SYSTOLIC BLOOD PRESSURE: 156 MMHG

## 2025-03-23 DIAGNOSIS — E11.649 HYPOGLYCEMIC EPISODE IN PATIENT WITH DIABETES MELLITUS: Primary | ICD-10-CM

## 2025-03-23 LAB
ALBUMIN SERPL-MCNC: 4 G/DL (ref 3.5–5.2)
ALBUMIN/GLOB SERPL: 1.5 G/DL
ALP SERPL-CCNC: 84 U/L (ref 39–117)
ALT SERPL W P-5'-P-CCNC: 15 U/L (ref 1–33)
ANION GAP SERPL CALCULATED.3IONS-SCNC: 11 MMOL/L (ref 5–15)
AST SERPL-CCNC: 18 U/L (ref 1–32)
BACTERIA UR QL AUTO: NORMAL /HPF
BASOPHILS # BLD AUTO: 0.02 10*3/MM3 (ref 0–0.2)
BASOPHILS NFR BLD AUTO: 0.3 % (ref 0–1.5)
BILIRUB SERPL-MCNC: 0.5 MG/DL (ref 0–1.2)
BILIRUB UR QL STRIP: NEGATIVE
BUN SERPL-MCNC: 50 MG/DL (ref 8–23)
BUN/CREAT SERPL: 42 (ref 7–25)
CALCIUM SPEC-SCNC: 8.5 MG/DL (ref 8.6–10.5)
CHLORIDE SERPL-SCNC: 104 MMOL/L (ref 98–107)
CLARITY UR: CLEAR
CO2 SERPL-SCNC: 28 MMOL/L (ref 22–29)
COLOR UR: YELLOW
CREAT SERPL-MCNC: 1.19 MG/DL (ref 0.57–1)
DEPRECATED RDW RBC AUTO: 49 FL (ref 37–54)
EGFRCR SERPLBLD CKD-EPI 2021: 46.6 ML/MIN/1.73
EOSINOPHIL # BLD AUTO: 0.22 10*3/MM3 (ref 0–0.4)
EOSINOPHIL NFR BLD AUTO: 3 % (ref 0.3–6.2)
ERYTHROCYTE [DISTWIDTH] IN BLOOD BY AUTOMATED COUNT: 14 % (ref 12.3–15.4)
GLOBULIN UR ELPH-MCNC: 2.7 GM/DL
GLUCOSE BLDC GLUCOMTR-MCNC: 156 MG/DL (ref 70–130)
GLUCOSE BLDC GLUCOMTR-MCNC: 163 MG/DL (ref 70–130)
GLUCOSE BLDC GLUCOMTR-MCNC: 98 MG/DL (ref 70–130)
GLUCOSE SERPL-MCNC: 149 MG/DL (ref 65–99)
GLUCOSE UR STRIP-MCNC: NEGATIVE MG/DL
HCT VFR BLD AUTO: 38.7 % (ref 34–46.6)
HGB BLD-MCNC: 12.1 G/DL (ref 12–15.9)
HGB UR QL STRIP.AUTO: NEGATIVE
HYALINE CASTS UR QL AUTO: NORMAL /LPF
IMM GRANULOCYTES # BLD AUTO: 0.03 10*3/MM3 (ref 0–0.05)
IMM GRANULOCYTES NFR BLD AUTO: 0.4 % (ref 0–0.5)
KETONES UR QL STRIP: NEGATIVE
LEUKOCYTE ESTERASE UR QL STRIP.AUTO: ABNORMAL
LYMPHOCYTES # BLD AUTO: 1.63 10*3/MM3 (ref 0.7–3.1)
LYMPHOCYTES NFR BLD AUTO: 22.3 % (ref 19.6–45.3)
MCH RBC QN AUTO: 29.6 PG (ref 26.6–33)
MCHC RBC AUTO-ENTMCNC: 31.3 G/DL (ref 31.5–35.7)
MCV RBC AUTO: 94.6 FL (ref 79–97)
MONOCYTES # BLD AUTO: 0.61 10*3/MM3 (ref 0.1–0.9)
MONOCYTES NFR BLD AUTO: 8.3 % (ref 5–12)
NEUTROPHILS NFR BLD AUTO: 4.8 10*3/MM3 (ref 1.7–7)
NEUTROPHILS NFR BLD AUTO: 65.7 % (ref 42.7–76)
NITRITE UR QL STRIP: NEGATIVE
NRBC BLD AUTO-RTO: 0 /100 WBC (ref 0–0.2)
PH UR STRIP.AUTO: 6.5 [PH] (ref 5–8)
PLATELET # BLD AUTO: 135 10*3/MM3 (ref 140–450)
PMV BLD AUTO: 12.3 FL (ref 6–12)
POTASSIUM SERPL-SCNC: 4.1 MMOL/L (ref 3.5–5.2)
PROT SERPL-MCNC: 6.7 G/DL (ref 6–8.5)
PROT UR QL STRIP: NEGATIVE
RBC # BLD AUTO: 4.09 10*6/MM3 (ref 3.77–5.28)
RBC # UR STRIP: NORMAL /HPF
REF LAB TEST METHOD: NORMAL
SODIUM SERPL-SCNC: 143 MMOL/L (ref 136–145)
SP GR UR STRIP: 1.01 (ref 1–1.03)
SQUAMOUS #/AREA URNS HPF: NORMAL /HPF
UROBILINOGEN UR QL STRIP: ABNORMAL
WBC # UR STRIP: NORMAL /HPF
WBC NRBC COR # BLD AUTO: 7.31 10*3/MM3 (ref 3.4–10.8)

## 2025-03-23 PROCEDURE — 99283 EMERGENCY DEPT VISIT LOW MDM: CPT

## 2025-03-23 PROCEDURE — 81001 URINALYSIS AUTO W/SCOPE: CPT | Performed by: EMERGENCY MEDICINE

## 2025-03-23 PROCEDURE — 82948 REAGENT STRIP/BLOOD GLUCOSE: CPT

## 2025-03-23 PROCEDURE — 80053 COMPREHEN METABOLIC PANEL: CPT | Performed by: EMERGENCY MEDICINE

## 2025-03-23 PROCEDURE — 85025 COMPLETE CBC W/AUTO DIFF WBC: CPT | Performed by: EMERGENCY MEDICINE

## 2025-03-23 RX ORDER — SODIUM CHLORIDE 0.9 % (FLUSH) 0.9 %
10 SYRINGE (ML) INJECTION AS NEEDED
Status: DISCONTINUED | OUTPATIENT
Start: 2025-03-23 | End: 2025-03-23 | Stop reason: HOSPADM

## 2025-03-23 RX ORDER — NICOTINE POLACRILEX 4 MG
15 LOZENGE BUCCAL
Status: DISCONTINUED | OUTPATIENT
Start: 2025-03-23 | End: 2025-03-23 | Stop reason: HOSPADM

## 2025-03-23 RX ORDER — DEXTROSE MONOHYDRATE 25 G/50ML
25 INJECTION, SOLUTION INTRAVENOUS
Status: DISCONTINUED | OUTPATIENT
Start: 2025-03-23 | End: 2025-03-23 | Stop reason: HOSPADM

## 2025-03-23 RX ORDER — IBUPROFEN 600 MG/1
1 TABLET ORAL
Status: DISCONTINUED | OUTPATIENT
Start: 2025-03-23 | End: 2025-03-23 | Stop reason: HOSPADM

## 2025-03-23 NOTE — DISCHARGE INSTRUCTIONS
Please monitor your blood sugars closely over the next few days, follow-up with your endocrinologist tomorrow as discussed, have your glucose nearby in case need for recurrent hypoglycemic episodes.  Please decrease your long-acting insulin as we discussed as well, eat small meals frequently until you are assessed tomorrow.  Return to the ED with any worsening symptoms or further concerns.

## 2025-03-23 NOTE — ED PROVIDER NOTES
[place]    EMERGENCY DEPARTMENT ENCOUNTER      Pt Name: Marilyn Kunz  MRN: [id number]  YOB: 1945  Date of evaluation: 3/23/2025  Provider: Ben Anders DO    CHIEF COMPLAINT       Chief Complaint   Patient presents with    Hypoglycemia       HPI  Stated Reason for Visit: patient arrives to er via ems from home due to hypoglycemia. brother found patient and was unable to wake her up. EMS glucose was 60 upon arrival to scene. pt got 20g of D10. glucose rechecked and was 290.History Obtained From: EMS       HISTORY OF PRESENT ILLNESS  (Location/Symptom, Timing/Onset, Context/Setting, Quality, Duration, Modifying Factors, Severity.)   Marilyn Kunz is a 79 y.o. female who presents to the emergency department for evaluation by EMS secondary to altered state hypoglycemia.  The patient is a history of insulin-dependent diabetes, has had recent hypoglycemic episodes as she had another 1 this morning.  She notes her blood sugar had been below 60 last night, she woke up with her glucometer alerts that she took some oral glucose with improvement of her symptoms.  And then her family found her again with blood sugar in the 50s, called 911 for evaluation.  She was given glucose and route, she states her blood sugars have been stable upon arrival to the ED.  She does follow with endocrinologist through [place] [facility] Dr. Morel.  She states has been taking her short and long-acting as prescribed, her last meal was last night.  Denies any recent illness, no fever or chills, no recurrent infections or signs of UTI.  She denies any other acute systemic complaints at this time.  Taking her medication as prescribed without any recent changes.      Nursing notes were reviewed.      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  I asked her to go ahead and try to talk to her endocrinokogist.  She is still having the lower extremity neuropathic pan, but not enough for me to administer medication.    Disease of thyroid gland     Diverticulitis of colon     Erosive osteoarthritis 05/30/2024    Factor 5 Leiden mutation, heterozygous 2012    Head injury     Hyperlipidemia     Hypertension     Medication monitoring encounter     Impression:  She has renal insufficiency.  She is just taking Tylenol.    Movement disorder 10/2021    knee replacement left knww    Murmur, cardiac     Neuropathy in diabetes     redness swelling legs    TIFFANIE (obstructive sleep apnea) 11/15/2024    Osteopenia     Story: Femoral Neck Impression: Up-ro-date on bone density scan.  Follow up bone density every 2 years.    Peripheral neuropathy 05/03/2022    hands, arms. 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  I went over her exercises and told her to get a shoe insert,    Psoriasis     Impression: No rash    Psoriatic arthritis     hands    Sleep apnea Always    diabetic do not sleep well up and down    Stroke had one don't know when    showed on MRI Brain    Syncope 11/15/2024    Type 1 diabetes          SURGICAL HISTORY       Past Surgical History:    Procedure Laterality Date    CARDIAC CATHETERIZATION  1952 2 of them    Patent Dictus operation    CATARACT EXTRACTION Bilateral     COLONOSCOPY      ENDOSCOPY N/A 10/20/2020    Procedure: ESOPHAGOGASTRODUODENOSCOPY;  Surgeon: Brunner, Mark I, MD;  Location: [facility] ENDOSCOPY;  Service: Gastroenterology;  Laterality: N/A;    HAND SURGERY Left 1987    no hardware    KNEE ARTHROPLASTY      KNEE SURGERY Left     PATENT DUCTUS ARTERIOUS LIGATION      REPLACEMENT TOTAL KNEE Left          CURRENT MEDICATIONS       Current Facility-Administered Medications:     dextrose (D50W) (25 g/50 mL) IV injection 25 g, 25 g, Intravenous, Q15 Min PRN, Ben Anders, DO    dextrose (GLUTOSE) oral gel 15 g, 15 g, Oral, Q15 Min PRN, Ben Anders, DO    glucagon (GLUCAGEN) injection 1 mg, 1 mg, Intramuscular, Q15 Min PRN, Ben Anders, DO    [COMPLETED] Insert Peripheral IV, , , Once **AND** sodium chloride 0.9 % flush 10 mL, 10 mL, Intravenous, PRN, Ben Anders, DO    Current Outpatient Medications:     acetaminophen (TYLENOL) 500 MG tablet, Take 2 tablets by mouth. every 4 - 6 hours as needed not to exceed 8 tablets per 24hrs, Disp: , Rfl:     albuterol sulfate  (90 Base) MCG/ACT inhaler, Inhale 2 puffs Every 4 (Four) Hours As Needed for Wheezing., Disp: 18 g, Rfl: 0    APIXABAN PO, Take  by mouth Every 12 (Twelve) Hours., Disp: , Rfl:     azaTHIOprine (IMURAN) 50 MG tablet, TAKE 2 TABLETS TWICE DAILY, Disp: 360 tablet, Rfl: 3    budesonide-formoterol (Symbicort) 80-4.5 MCG/ACT inhaler, Inhale 2 puffs 2 (Two) Times a Day., Disp: 10.2 g, Rfl: 0    bumetanide (BUMEX) 0.5 MG tablet, Take 1 tablet by mouth Every Other Day., Disp: , Rfl:     [START ON 3/27/2025] cloNIDine (CATAPRES-TTS) 0.2 MG/24HR patch, Place 1 patch on the skin as directed by provider 1 (One) Time Per Week., Disp: 4 each, Rfl: 0    fluticasone (FLONASE) 50 MCG/ACT nasal spray, 2 sprays by Each Nare route Daily. Shake well before  using., Disp: , Rfl:     insulin aspart (novoLOG FLEXPEN) 100 UNIT/ML solution pen-injector sc pen, Inject  under the skin into the appropriate area as directed 3 (Three) Times a Day With Meals. Sliding scale, Disp: , Rfl:     levocetirizine (XYZAL) 5 MG tablet, Take 1 tablet by mouth Every Evening., Disp: , Rfl:     levothyroxine (SYNTHROID, LEVOTHROID) 75 MCG tablet, Take 1 tablet by mouth Daily., Disp: , Rfl:     montelukast (SINGULAIR) 10 MG tablet, Take 1 tablet by mouth Every Evening., Disp: , Rfl:     nebivolol (BYSTOLIC) 10 MG tablet, Take 1 tablet by mouth Daily for 30 days., Disp: 30 tablet, Rfl: 0    nystatin (MYCOSTATIN) 736411 UNIT/GM powder, Apply  topically to the appropriate area as directed 3 (Three) Times a Day., Disp: 60 g, Rfl: 0    oxybutynin (DITROPAN) 5 MG tablet, Take 1 tablet by mouth 2 (Two) Times a Day., Disp: , Rfl:     pravastatin (PRAVACHOL) 20 MG tablet, TAKE 1 TABLET EVERY EVENING, Disp: 30 tablet, Rfl: 0    pregabalin (LYRICA) 75 MG capsule, Take 1 capsule by mouth 2 (Two) Times a Day., Disp: , Rfl:     sacubitril-valsartan (Entresto)  MG tablet, TAKE 1 TABLET BY MOUTH TWICE DAILY, Disp: 180 tablet, Rfl: 1    Tresiba FlexTouch 100 UNIT/ML solution pen-injector injection, Inject 19 Units under the skin into the appropriate area as directed Every Night. (Patient taking differently: Patient takes per sliding scale), Disp: , Rfl:     Upadacitinib ER (Rinvoq) 15 MG tablet sustained-release 24 hour, Take 1 tablet by mouth Daily., Disp: 90 tablet, Rfl: 3    ALLERGIES     Atorvastatin, Colesevelam, Cymbalta [duloxetine hcl], Famotidine, Cephalexin, Clindamycin, Hygroton [chlorthalidone], Lidocaine, and Penicillins    FAMILY HISTORY       Family History   Problem Relation Age of Onset    Cancer Mother     Pancreatic cancer Mother     Stroke Mother         Mini strokes    Cancer Father     Lung cancer Father     Cancer Sister     Colon cancer Sister     Diabetes Sister     Diabetes  Brother     Breast cancer Neg Hx     Ovarian cancer Neg Hx           SOCIAL HISTORY       Social History     Socioeconomic History    Marital status: Single   Tobacco Use    Smoking status: Never     Passive exposure: Never    Smokeless tobacco: Never   Vaping Use    Vaping status: Never Used   Substance and Sexual Activity    Alcohol use: No    Drug use: No    Sexual activity: Not Currently     Partners: Male     Birth control/protection: Abstinence         PHYSICAL EXAM       Vitals:    03/23/25 1316 03/23/25 1330 03/23/25 1400 03/23/25 1430   BP:  154/70 139/78 160/69   Pulse: 50 56 56 56   Resp:       Temp:       SpO2: 98% 97% 97% 98%   Weight:       Height:           Physical Exam  General : Patient is awake, alert, oriented, in no acute distress, nontoxic appearing  HEENT: Pupils are equally round, EOMI, conjunctivae clear, there is no injection no icterus.  Oral mucosa is moist, uvula midline  Neck: Neck is supple, full range of motion, trachea midline  Cardiac: Heart regular rate, rhythm, no murmurs, rubs, or gallops  Lungs: Lungs are clear to auscultation, there is no wheezing, rhonchi, or rales. There is no use of accessory muscles  Abdomen: Abdomen is soft, nontender, nondistended. There are no firm or pulsatile masses, no rebound rigidity or guarding  Musculoskeletal:  No focal muscle deficits are appreciated  Neuro: Motor intact, sensory intact, level of consciousness is normal  Dermatology: Skin is warm and dry  Psych: Mentation is grossly normal, cognition is grossly normal. Affect is appropriate      DIAGNOSTIC RESULTS     EKG:  All EKGs are interpreted by the Emergency Department Physician who either signs or Co-signs this chart in the absence of a cardiologist.    No orders to display             LABS:    I have reviewed and interpreted all of the currently available lab results from this visit (if applicable):  Results for orders placed or performed during the hospital encounter of 03/23/25   POC  Glucose Once    Collection Time: 03/23/25 11:13 AM    Specimen: Blood   Result Value Ref Range    Glucose 163 (H) 70 - 130 mg/dL   Comprehensive Metabolic Panel    Collection Time: 03/23/25 11:42 AM    Specimen: Blood   Result Value Ref Range    Glucose 149 (H) 65 - 99 mg/dL    BUN 50 (H) 8 - 23 mg/dL    Creatinine 1.19 (H) 0.57 - 1.00 mg/dL    Sodium 143 136 - 145 mmol/L    Potassium 4.1 3.5 - 5.2 mmol/L    Chloride 104 98 - 107 mmol/L    CO2 28.0 22.0 - 29.0 mmol/L    Calcium 8.5 (L) 8.6 - 10.5 mg/dL    Total Protein 6.7 6.0 - 8.5 g/dL    Albumin 4.0 3.5 - 5.2 g/dL    ALT (SGPT) 15 1 - 33 U/L    AST (SGOT) 18 1 - 32 U/L    Alkaline Phosphatase 84 39 - 117 U/L    Total Bilirubin 0.5 0.0 - 1.2 mg/dL    Globulin 2.7 gm/dL    A/G Ratio 1.5 g/dL    BUN/Creatinine Ratio 42.0 (H) 7.0 - 25.0    Anion Gap 11.0 5.0 - 15.0 mmol/L    eGFR 46.6 (L) >60.0 mL/min/1.73   CBC Auto Differential    Collection Time: 03/23/25 11:42 AM    Specimen: Blood   Result Value Ref Range    WBC 7.31 3.40 - 10.80 10*3/mm3    RBC 4.09 3.77 - 5.28 10*6/mm3    Hemoglobin 12.1 12.0 - 15.9 g/dL    Hematocrit 38.7 34.0 - 46.6 %    MCV 94.6 79.0 - 97.0 fL    MCH 29.6 26.6 - 33.0 pg    MCHC 31.3 (L) 31.5 - 35.7 g/dL    RDW 14.0 12.3 - 15.4 %    RDW-SD 49.0 37.0 - 54.0 fl    MPV 12.3 (H) 6.0 - 12.0 fL    Platelets 135 (L) 140 - 450 10*3/mm3    Neutrophil % 65.7 42.7 - 76.0 %    Lymphocyte % 22.3 19.6 - 45.3 %    Monocyte % 8.3 5.0 - 12.0 %    Eosinophil % 3.0 0.3 - 6.2 %    Basophil % 0.3 0.0 - 1.5 %    Immature Grans % 0.4 0.0 - 0.5 %    Neutrophils, Absolute 4.80 1.70 - 7.00 10*3/mm3    Lymphocytes, Absolute 1.63 0.70 - 3.10 10*3/mm3    Monocytes, Absolute 0.61 0.10 - 0.90 10*3/mm3    Eosinophils, Absolute 0.22 0.00 - 0.40 10*3/mm3    Basophils, Absolute 0.02 0.00 - 0.20 10*3/mm3    Immature Grans, Absolute 0.03 0.00 - 0.05 10*3/mm3    nRBC 0.0 0.0 - 0.2 /100 WBC   Urinalysis With Microscopic If Indicated (No Culture) - Urine, Clean Catch    Collection  Time: 03/23/25  1:22 PM    Specimen: Urine, Clean Catch   Result Value Ref Range    Color, UA Yellow Yellow, Straw    Appearance, UA Clear Clear    pH, UA 6.5 5.0 - 8.0    Specific Gravity, UA 1.012 1.001 - 1.030    Glucose, UA Negative Negative    Ketones, UA Negative Negative    Bilirubin, UA Negative Negative    Blood, UA Negative Negative    Protein, UA Negative Negative    Leuk Esterase, UA Trace (A) Negative    Nitrite, UA Negative Negative    Urobilinogen, UA 1.0 E.U./dL 0.2 - 1.0 E.U./dL   Urinalysis, Microscopic Only - Urine, Clean Catch    Collection Time: 03/23/25  1:22 PM    Specimen: Urine, Clean Catch   Result Value Ref Range    RBC, UA 0-2 None Seen, 0-2 /HPF    WBC, UA 0-2 None Seen, 0-2 /HPF    Bacteria, UA None Seen None Seen, Trace /HPF    Squamous Epithelial Cells, UA 0-2 None Seen, 0-2 /HPF    Hyaline Casts, UA None Seen 0 - 6 /LPF    Methodology Automated Microscopy    POC Glucose Once    Collection Time: 03/23/25  1:34 PM    Specimen: Blood   Result Value Ref Range    Glucose 156 (H) 70 - 130 mg/dL        If labs were ordered, I independently reviewed the results and considered them in treating the patient.      EMERGENCY DEPARTMENT COURSE and DIFFERENTIAL DIAGNOSIS/MDM:   Vitals:  AS OF 14:59 EDT    BP - 160/69  HR - 56  TEMP - 97.4 °F (36.3 °C)  O2 SATS - 98%      Orders placed during this visit:  Orders Placed This Encounter   Procedures    Comprehensive Metabolic Panel    Urinalysis With Microscopic If Indicated (No Culture) - Urine, Clean Catch    CBC Auto Differential    Urinalysis, Microscopic Only - Urine, Clean Catch    POC Glucose STAT    POC Glucose Once    POC Glucose 4x Daily Before Meals & at Bedtime    POC Glucose Once    Insert Peripheral IV    CBC & Differential       All labs have been independently reviewed by me.  All radiology studies have been reviewed by me and the radiologist dictating the report.  All EKG's have been independently viewed and interpreted by me.       Discussion below represents my analysis of pertinent findings related to patient's condition, differential diagnosis, treatment plan and final disposition.    Differential diagnosis:  The differential diagnosis associated with the patient's presentation includes: Hypoglycemia, electrolyte normalities, dehydration, urinary tract infection, inappropriate insulin ministration    Additional sources  Discussed/ obtained information from independent historians:   [] Spouse  [] Parent  [] Family member  [] Friend  [x] EMS   [] Other:    External (non-ED) record review:   [x] Inpatient record: Patient's recent hospitalization and discharge from a couple days ago where she was evaluated for similar symptoms, hypoglycemia, stroke workup was initiated, negative MRI, negative EEG, negative echo, really unremarkable in-depth workup at that time, felt it was from her hypoglycemia.   [] Office record:   [] Outpatient record:   [] Prior Outpatient labs:   [] Prior Outpatient radiology:   [] Primary Care record:   [] Outside ED record:   [] Other:     Patient's care impacted by:   [x] Diabetes-insulin-dependent  [] Hypertension  [] CHF  [] Hyperlipidemia  [] Coronary Artery Disease   [] COPD   [] Cancer   [] Tobacco Abuse   [] Substance Abuse    [] Other:     Care significantly affected by Social Determinants of Health (housing and economic circumstances, unemployment)    [] Yes     [x] No   If yes, Patient's care significantly limited by Social Determinants of Health including:   [] Inadequate housing   [] Low income   [] Alcoholism and drug addiction in family   [] Problems related to primary support group   [] Unemployment   [] Problems related to employment   [] Other Social Determinants of Health:       MEDICATIONS ADMINISTERED IN ED:  Medications   sodium chloride 0.9 % flush 10 mL (has no administration in time range)   dextrose (GLUTOSE) oral gel 15 g (has no administration in time range)   dextrose (D50W) (25 g/50 mL)  IV injection 25 g (has no administration in time range)   glucagon (GLUCAGEN) injection 1 mg (has no administration in time range)              This a very pleasant 79-year-old female who is insulin-dependent diabetes and fortunately had couple episodes of symptomatic hypoglycemia.  The patient is awake, doing well after glucose administration and route with EMS.  The patient has blood sugar of 123 during my examination.  She had extensive neuro cardiac workup when she was just hospitalized a couple days ago she was admitted with hypoglycemia at that time.  We did discussed obtaining blood work labs and imaging today for further assessment, keep her monitor her blood sugars.  Paper monitored for about 4 hours in the ED without any recurrent hypoglycemic episodes, she is awake and alert, her brother is in town from [place].  Her blood sugars have remained stable, there is no infectious etiology on urinalysis, white count H&H kidney liver function are stable.  She has appointment tomorrow with her endocrinologist, we discussed monitoring her symptoms in the meantime, having her decrease her long-acting overnight tonight, small frequent meals in the meantime, return to the ED with any worsening symptoms or any further concerns.    I had a discussion with the patient/family regarding diagnosis, diagnostic results, treatment plan, and medications.  The patient/family indicated understanding of these instructions.  I spent adequate time at the bedside preceding discharge necessary to personally discuss the aftercare instructions, giving patient education, providing explanations of the results of our evaluations/findings, and my decision making to assure that the patient/family understand the plan of care.  Time was allotted to answer questions at that time and throughout the ED course.  Emphasis was placed on timely follow-up after discharge.  I also discussed the potential for the development of an acute emergent  condition requiring further evaluation, admission, or even surgical intervention. I discussed that we found nothing during the visit today indicating the need for further workup, admission, or the presence of an unstable medical condition.  I encouraged the patient to return to the emergency department immediately for ANY concerns, worsening, new complaints, or if symptoms persist and unable to seek follow-up in a timely fashion.  The patient/family expressed understanding and agreement with this plan.  The patient will follow-up with their PCP in 1-2 days for reevaluation.     PROCEDURES:  Procedures    CRITICAL CARE TIME    Total Critical Care time was 0 minutes, excluding separately reportable procedures.   There was a high probability of clinically significant/life threatening deterioration in the patient's condition which required my urgent intervention.      FINAL IMPRESSION      1. Hypoglycemic episode in patient with diabetes mellitus          DISPOSITION/PLAN     ED Disposition       ED Disposition   Discharge    Condition   Stable    Comment   --               PATIENT REFERRED TO:  Your endocrinologist tomorrow as previously scheduled    Go to       Peter Baca MD  [address] [id number]  [phone]    In 2 days      [facility] EMERGENCY DEPARTMENT  [address] [facility] [address] [phone]    If symptoms worsen      DISCHARGE MEDICATIONS:     Medication List        CHANGE how you take these medications      Tresiba FlexTouch 100 UNIT/ML solution pen-injector injection  Generic drug: insulin degludec  Inject 19 Units under the skin into the appropriate area as directed Every Night.  What changed: additional instructions            CONTINUE taking these medications      acetaminophen 500 MG tablet  Commonly known as: TYLENOL     albuterol sulfate  (90 Base) MCG/ACT inhaler  Commonly known as: PROVENTIL HFA;VENTOLIN  HFA;PROAIR HFA  Inhale 2 puffs Every 4 (Four) Hours As Needed for Wheezing.     APIXABAN PO     azaTHIOprine 50 MG tablet  Commonly known as: IMURAN  TAKE 2 TABLETS TWICE DAILY     budesonide-formoterol 80-4.5 MCG/ACT inhaler  Commonly known as: Symbicort  Inhale 2 puffs 2 (Two) Times a Day.     bumetanide 0.5 MG tablet  Commonly known as: BUMEX  Take 1 tablet by mouth Every Other Day.     cloNIDine 0.2 MG/24HR patch  Commonly known as: CATAPRES-TTS  Place 1 patch on the skin as directed by provider 1 (One) Time Per Week.  Start taking on: March 27, 2025     Entresto  MG tablet  Generic drug: sacubitril-valsartan  TAKE 1 TABLET BY MOUTH TWICE DAILY     fluticasone 50 MCG/ACT nasal spray  Commonly known as: FLONASE     insulin aspart 100 UNIT/ML solution pen-injector sc pen  Commonly known as: novoLOG FLEXPEN     levocetirizine 5 MG tablet  Commonly known as: XYZAL     levothyroxine 75 MCG tablet  Commonly known as: SYNTHROID, LEVOTHROID     montelukast 10 MG tablet  Commonly known as: SINGULAIR     nebivolol 10 MG tablet  Commonly known as: BYSTOLIC  Take 1 tablet by mouth Daily for 30 days.     nystatin [id number] UNIT/GM powder  Commonly known as: MYCOSTATIN  Apply  topically to the appropriate area as directed 3 (Three) Times a Day.     oxybutynin 5 MG tablet  Commonly known as: DITROPAN     pravastatin 20 MG tablet  Commonly known as: PRAVACHOL  TAKE 1 TABLET EVERY EVENING     pregabalin 75 MG capsule  Commonly known as: LYRICA     Rinvoq 15 MG tablet sustained-release 24 hour  Generic drug: Upadacitinib ER  Take 1 tablet by mouth Daily.                  Comment: Please note this report has been produced using speech recognition software.      Ben Anders DO  Attending Emergency Physician         Ben Anders DO  03/23/25 3307

## 2025-03-26 ENCOUNTER — READMISSION MANAGEMENT (OUTPATIENT)
Dept: CALL CENTER | Facility: HOSPITAL | Age: 80
End: 2025-03-26
Payer: MEDICARE

## 2025-03-26 NOTE — OUTREACH NOTE
Medical Week 1 Survey      Flowsheet Row Responses   [facility], [place] patient discharged from? [place]   Does the patient have one of the following disease processes/diagnoses(primary or secondary)? Other   Week 1 attempt successful? Yes   Call start time 0818   Call end time 0820   Discharge diagnosis Transient neurological symptoms   Person spoke with today (if not patient) and relationship Patient   Meds reviewed with patient/caregiver? Yes   Does the patient have all medications ordered at discharge? Yes   Prescription comments Endocrinologist adjusted diabetic medication- Current BG is 156   Is the patient taking all medications as directed (includes completed medication regime)? Yes   Does the patient have a primary care provider?  Yes   Comments regarding PCP Seen pcp yesterday   Has home health visited the patient within 72 hours of discharge? N/A   Psychosocial issues? No   Did the patient receive a copy of their discharge instructions? Yes   Nursing interventions Reviewed instructions with patient   What is the patient's perception of their health status since discharge? Improving   Is the patient/caregiver able to teach back signs and symptoms related to disease process for when to call PCP? Yes   Is the patient/caregiver able to teach back signs and symptoms related to disease process for when to call 911? Yes   Is the patient/caregiver able to teach back the hierarchy of who to call/visit for symptoms/problems? PCP, Specialist, Home health nurse, Urgent Care, ED, 911 Yes   Week 1 call completed? Yes   Graduated Yes   Wrap up additional comments Patient having diarrhea, and was throwing up recently. BG WDL no concerns or questions noted.   Call end time 0820            Heather MARQUEZ - Registered Nurse

## 2025-04-14 ENCOUNTER — HOSPITAL ENCOUNTER (EMERGENCY)
Facility: HOSPITAL | Age: 80
Discharge: HOME | End: 2025-04-14
Attending: EMERGENCY MEDICINE | Admitting: EMERGENCY MEDICINE
Payer: MEDICARE

## 2025-04-14 VITALS
WEIGHT: 127 LBS | SYSTOLIC BLOOD PRESSURE: 174 MMHG | TEMPERATURE: 97.5 F | RESPIRATION RATE: 16 BRPM | BODY MASS INDEX: 25.6 KG/M2 | HEIGHT: 59 IN | DIASTOLIC BLOOD PRESSURE: 78 MMHG | OXYGEN SATURATION: 91 % | HEART RATE: 64 BPM

## 2025-04-14 DIAGNOSIS — E16.2 HYPOGLYCEMIA: Primary | ICD-10-CM

## 2025-04-14 DIAGNOSIS — E10.641 TYPE 1 DIABETES MELLITUS WITH HYPOGLYCEMIC COMA: ICD-10-CM

## 2025-04-14 LAB
ALBUMIN SERPL-MCNC: 4 G/DL (ref 3.5–5.2)
ALBUMIN/GLOB SERPL: 1.3 G/DL
ALP SERPL-CCNC: 91 U/L (ref 39–117)
ALT SERPL W P-5'-P-CCNC: 32 U/L (ref 1–33)
ANION GAP SERPL CALCULATED.3IONS-SCNC: 12 MMOL/L (ref 5–15)
AST SERPL-CCNC: 39 U/L (ref 1–32)
BACTERIA UR QL AUTO: NORMAL /HPF
BASOPHILS # BLD AUTO: 0.03 10*3/MM3 (ref 0–0.2)
BASOPHILS NFR BLD AUTO: 0.3 % (ref 0–1.5)
BILIRUB SERPL-MCNC: 1.1 MG/DL (ref 0–1.2)
BILIRUB UR QL STRIP: NEGATIVE
BUN SERPL-MCNC: 34 MG/DL (ref 8–23)
BUN/CREAT SERPL: 32.1 (ref 7–25)
CALCIUM SPEC-SCNC: 9 MG/DL (ref 8.6–10.5)
CHLORIDE SERPL-SCNC: 104 MMOL/L (ref 98–107)
CLARITY UR: CLEAR
CO2 SERPL-SCNC: 24 MMOL/L (ref 22–29)
COLOR UR: YELLOW
CREAT SERPL-MCNC: 1.06 MG/DL (ref 0.57–1)
DEPRECATED RDW RBC AUTO: 55.3 FL (ref 37–54)
EGFRCR SERPLBLD CKD-EPI 2021: 53.5 ML/MIN/1.73
EOSINOPHIL # BLD AUTO: 0.03 10*3/MM3 (ref 0–0.4)
EOSINOPHIL NFR BLD AUTO: 0.3 % (ref 0.3–6.2)
ERYTHROCYTE [DISTWIDTH] IN BLOOD BY AUTOMATED COUNT: 16 % (ref 12.3–15.4)
GLOBULIN UR ELPH-MCNC: 3.2 GM/DL
GLUCOSE BLDC GLUCOMTR-MCNC: 257 MG/DL (ref 70–130)
GLUCOSE BLDC GLUCOMTR-MCNC: 266 MG/DL (ref 70–130)
GLUCOSE SERPL-MCNC: 259 MG/DL (ref 65–99)
GLUCOSE UR STRIP-MCNC: ABNORMAL MG/DL
HBA1C MFR BLD: 9 % (ref 4.8–5.6)
HCT VFR BLD AUTO: 32.9 % (ref 34–46.6)
HGB BLD-MCNC: 10.2 G/DL (ref 12–15.9)
HGB UR QL STRIP.AUTO: NEGATIVE
HYALINE CASTS UR QL AUTO: NORMAL /LPF
IMM GRANULOCYTES # BLD AUTO: 0.05 10*3/MM3 (ref 0–0.05)
IMM GRANULOCYTES NFR BLD AUTO: 0.6 % (ref 0–0.5)
KETONES UR QL STRIP: ABNORMAL
LEUKOCYTE ESTERASE UR QL STRIP.AUTO: ABNORMAL
LYMPHOCYTES # BLD AUTO: 0.77 10*3/MM3 (ref 0.7–3.1)
LYMPHOCYTES NFR BLD AUTO: 8.8 % (ref 19.6–45.3)
MCH RBC QN AUTO: 29.8 PG (ref 26.6–33)
MCHC RBC AUTO-ENTMCNC: 31 G/DL (ref 31.5–35.7)
MCV RBC AUTO: 96.2 FL (ref 79–97)
MONOCYTES # BLD AUTO: 0.83 10*3/MM3 (ref 0.1–0.9)
MONOCYTES NFR BLD AUTO: 9.5 % (ref 5–12)
NEUTROPHILS NFR BLD AUTO: 7.01 10*3/MM3 (ref 1.7–7)
NEUTROPHILS NFR BLD AUTO: 80.5 % (ref 42.7–76)
NITRITE UR QL STRIP: NEGATIVE
NRBC BLD AUTO-RTO: 0 /100 WBC (ref 0–0.2)
PH UR STRIP.AUTO: 6 [PH] (ref 5–8)
PLATELET # BLD AUTO: 149 10*3/MM3 (ref 140–450)
PMV BLD AUTO: 13.8 FL (ref 6–12)
POTASSIUM SERPL-SCNC: 4.2 MMOL/L (ref 3.5–5.2)
PROT SERPL-MCNC: 7.2 G/DL (ref 6–8.5)
PROT UR QL STRIP: NEGATIVE
QT INTERVAL: 496 MS
QTC INTERVAL: 452 MS
RBC # BLD AUTO: 3.42 10*6/MM3 (ref 3.77–5.28)
RBC # UR STRIP: NORMAL /HPF
REF LAB TEST METHOD: NORMAL
SODIUM SERPL-SCNC: 140 MMOL/L (ref 136–145)
SP GR UR STRIP: 1.01 (ref 1–1.03)
SQUAMOUS #/AREA URNS HPF: NORMAL /HPF
UROBILINOGEN UR QL STRIP: ABNORMAL
WBC # UR STRIP: NORMAL /HPF
WBC NRBC COR # BLD AUTO: 8.72 10*3/MM3 (ref 3.4–10.8)

## 2025-04-14 PROCEDURE — 81001 URINALYSIS AUTO W/SCOPE: CPT

## 2025-04-14 PROCEDURE — 99283 EMERGENCY DEPT VISIT LOW MDM: CPT

## 2025-04-14 PROCEDURE — 93005 ELECTROCARDIOGRAM TRACING: CPT

## 2025-04-14 PROCEDURE — 83036 HEMOGLOBIN GLYCOSYLATED A1C: CPT

## 2025-04-14 PROCEDURE — 85025 COMPLETE CBC W/AUTO DIFF WBC: CPT

## 2025-04-14 PROCEDURE — 82948 REAGENT STRIP/BLOOD GLUCOSE: CPT

## 2025-04-14 PROCEDURE — 80053 COMPREHEN METABOLIC PANEL: CPT

## 2025-04-14 RX ORDER — SODIUM CHLORIDE 0.9 % (FLUSH) 0.9 %
10 SYRINGE (ML) INJECTION AS NEEDED
Status: DISCONTINUED | OUTPATIENT
Start: 2025-04-14 | End: 2025-04-14 | Stop reason: HOSPADM

## 2025-04-14 RX ORDER — DEXTROSE MONOHYDRATE 50 MG/ML
125 INJECTION, SOLUTION INTRAVENOUS ONCE
Status: DISCONTINUED | OUTPATIENT
Start: 2025-04-14 | End: 2025-04-14

## 2025-04-14 NOTE — DISCHARGE INSTRUCTIONS
Decrease your Tresiba to 8 units at night.  Check your blood glucose frequently through the day and follow-up with your primary care provider as well as your new endocrinologist.

## 2025-04-14 NOTE — ED PROVIDER NOTES
"Subjective   History of Present Illness  Patient is a 79-year-old insulin-dependent diabetic, who presents from her home via EMS after being unable to be aroused by her brother-in-law who was staying with her.  Patient reports she took 12 units of long-acting insulin at bedtime per instructions, did not eat an evening snack.  She states, \"I have been having issues like this, where I take my insulin, and they cannot wake me up the next morning until firefighters come.  I am moving to [facility] on April 30 because of this problem.\"  Patient reports that her brother-in-law was staying with her, and when he attempted to arouse her this morning he could not.  At this time she is alert and oriented, reporting her medication history.  She had a hypoglycemic level prehospital was given dextrose with significant mental status improvement following.    Review of Systems   Constitutional: Negative.    Respiratory: Negative.     Cardiovascular: Negative.    Gastrointestinal: Negative.    Genitourinary: Negative.    Neurological:  Positive for syncope.       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  I asked her to go " ahead and try to talk to her endocrinokogist.  She is still having the lower extremity neuropathic pan, but not enough for me to administer medication.    Disease of thyroid gland     Diverticulitis of colon     Erosive osteoarthritis 05/30/2024    Factor 5 Leiden mutation, heterozygous 2012    Head injury     Hyperlipidemia     Hypertension     Medication monitoring encounter     Impression:  She has renal insufficiency.  She is just taking Tylenol.    Movement disorder 10/2021    knee replacement left knww    Murmur, cardiac     Neuropathy in diabetes     redness swelling legs    TIFFANIE (obstructive sleep apnea) 11/15/2024    Osteopenia     Story: Femoral Neck Impression: Up-ro-date on bone density scan.  Follow up bone density every 2 years.    Peripheral neuropathy 05/03/2022    hands, arms. 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  I went over her exercises and told her to get a shoe insert,    Psoriasis     Impression: No rash    Psoriatic arthritis     hands    Sleep apnea Always    diabetic do not sleep well up and down    Stroke had one don't know when    showed on MRI Brain    Syncope 11/15/2024    Type 1 diabetes        Allergies   Allergen Reactions    Atorvastatin Other (See Comments)    Colesevelam Other (See Comments)    Cymbalta [Duloxetine Hcl] Other (See Comments)     Hyponatremia    Famotidine Other (See Comments)    Cephalexin Rash     Tolerated Ceftriaxone    Clindamycin Rash    Hygroton [Chlorthalidone] Rash     Facial rash    Lidocaine Other (See Comments)     Sores in throat    Penicillins Nausea And Vomiting, Rash and Other (See Comments)     Has tolerated ceftriaxone  TROUBLE BREATHING       Past Surgical History:   Procedure Laterality Date    CARDIAC CATHETERIZATION  1952 2 of them    Patent Dictus operation    CATARACT EXTRACTION Bilateral     COLONOSCOPY      ENDOSCOPY N/A 10/20/2020    Procedure: ESOPHAGOGASTRODUODENOSCOPY;   Surgeon: Brunner, Mark I, MD;  Location: [facility] ENDOSCOPY;  Service: Gastroenterology;  Laterality: N/A;    HAND SURGERY Left 1987    no hardware    KNEE ARTHROPLASTY      KNEE SURGERY Left     PATENT DUCTUS ARTERIOUS LIGATION      REPLACEMENT TOTAL KNEE Left        Family History   Problem Relation Age of Onset    Cancer Mother     Pancreatic cancer Mother     Stroke Mother         Mini strokes    Cancer Father     Lung cancer Father     Cancer Sister     Colon cancer Sister     Diabetes Sister     Diabetes Brother     Breast cancer Neg Hx     Ovarian cancer Neg Hx        Social History     Socioeconomic History    Marital status: Single   Tobacco Use    Smoking status: Never     Passive exposure: Never    Smokeless tobacco: Never   Vaping Use    Vaping status: Never Used   Substance and Sexual Activity    Alcohol use: No    Drug use: No    Sexual activity: Not Currently     Partners: Male     Birth control/protection: Abstinence           Objective   Physical Exam  Constitutional:       Appearance: Normal appearance.   HENT:      Head: Normocephalic and atraumatic.   Eyes:      Extraocular Movements: Extraocular movements intact.      Pupils: Pupils are equal, round, and reactive to light.   Cardiovascular:      Rate and Rhythm: Normal rate.   Pulmonary:      Effort: Pulmonary effort is normal.   Abdominal:      Palpations: Abdomen is soft.   Musculoskeletal:         General: Normal range of motion.      Cervical back: Normal range of motion.   Skin:     General: Skin is warm and dry.      Capillary Refill: Capillary refill takes less than 2 seconds.   Neurological:      General: No focal deficit present.      Mental Status: She is alert and oriented to person, place, and time.         Procedures           ED Course  ED Course as of 04/14/25 1328   Mon Apr 14, 2025   0941 Initial evaluation of the patient at the bedside in the ED room 5.  She is alert and oriented, speaking in complete sentences. []   1645  Personally reevaluate the patient, took her nutritional items to eat, we will recheck her glucose once more, she understands we are decreasing her basal long-term insulin dosing, she will follow-up with her new endocrinologist regarding the discrepancies between her CGM and fingerstick readings. [JH]      ED Course User Index  [JH] Nikunj Vasquez APRN                                                       Medical Decision Making  Given the patient's complaints, reported medication history, including using short and long-acting insulins, prior episodes of symptomatic hypoglycemia including altered mental status, cannot exclude syncope, although this is much less likely, in addition to evaluating urinalysis and labs for electrolyte derangement apart from hypoglycemia and urinary tract infection, cardiac arrhythmia.  Patient will have twelve-lead ECG, serum screening labs and urinalysis.  We will perform serial glucose checks, and initiate dextrose crystalloid infusion if indicated.  We will reevaluate the patient, make recommendations regarding her insulin regimen, and close monitoring if she is discharged home.  Patient is agreeable with this plan.    Amount and/or Complexity of Data Reviewed  Labs: ordered.    Risk  Prescription drug management.        Final diagnoses:   Hypoglycemia   Type 1 diabetes mellitus with hypoglycemic coma       ED Disposition  ED Disposition       ED Disposition   Discharge    Condition   Stable    Comment   --               Dg Swift PA-C  [address] [address] [phone]    On 4/28/2025      Peter Baca MD  [address] [address] [phone]      As needed         Medication List        Changed      Tresiba FlexTouch 100 UNIT/ML solution pen-injector injection  Generic drug: insulin degludec  Inject 19 Units under the skin into the appropriate area as directed Every Night.  What changed: additional  instructions                 Nikunj Vasquez, APRN  04/14/25 1535

## 2025-04-18 ENCOUNTER — PATIENT ROUNDING (BHMG ONLY) (OUTPATIENT)
Dept: URGENT CARE | Facility: CLINIC | Age: 80
End: 2025-04-18
Payer: MEDICARE

## 2025-04-28 ENCOUNTER — OFFICE VISIT (OUTPATIENT)
Age: 80
End: 2025-04-28
Payer: MEDICARE

## 2025-04-28 ENCOUNTER — TELEPHONE (OUTPATIENT)
Age: 80
End: 2025-04-28

## 2025-04-28 VITALS
WEIGHT: 116 LBS | BODY MASS INDEX: 23.39 KG/M2 | SYSTOLIC BLOOD PRESSURE: 148 MMHG | HEIGHT: 59 IN | DIASTOLIC BLOOD PRESSURE: 72 MMHG | HEART RATE: 62 BPM | OXYGEN SATURATION: 97 %

## 2025-04-28 DIAGNOSIS — E11.649: ICD-10-CM

## 2025-04-28 DIAGNOSIS — N18.31 STAGE 3A CHRONIC KIDNEY DISEASE: ICD-10-CM

## 2025-04-28 DIAGNOSIS — E10.65 TYPE 1 DIABETES MELLITUS WITH HYPERGLYCEMIA: Primary | ICD-10-CM

## 2025-04-28 RX ORDER — INSULIN DEGLUDEC 100 U/ML
INJECTION, SOLUTION SUBCUTANEOUS
Start: 2025-04-28

## 2025-04-28 NOTE — TELEPHONE ENCOUNTER
Provider: SANTO MOLINA    Caller: CHUY AMEZCUA    Relationship to Patient:     Reason for Call: MR AMEZCUA CAME WITH HIS SISTER TO HER APPT ON 4/28/25. THEY DROPPED OFF PAPERWORK REGARDING INSULIN PAPER. HE STATES THAT IS THE ONLY COPY THEY HAVE. HE NEEDS TO MAKE SURE THE PAPERWORK IS SENT BACK WITH PATIENT.

## 2025-04-28 NOTE — PROGRESS NOTES
Office Note      Date: 2025  Patient Name: Marilyn Kunz  MRN: [id number]  : 1945    Chief Complaint   Patient presents with    Diabetes     Type 2          History of Present Illness:   Marilyn Kunz is a 79 y.o. female who presents for Diabetes type 1. Diagnosed in: unsure, . Treated in past with oral agents on metformin for a few years, but then diagnosed with low cpeptide and type 1 dm in , has seen different endocrinologists through the years.  Current treatments: novolog with carb. Ratio of 15 gm, tresiba 12 units. Number of insulin shots per day: 4. Checks blood sugar 288 times a day. Has low blood sugar: frequent. Aspirin use: Yes. Statin use: No -   . ACE-I/ARB use: Yes.  She has a [employer] pump at home that she has had for 3 years, but never used it.  Patient's prior endocrinologist did not think patient would be able to have the dexterity with her severe arthritis to fill insulin reservoirs, make changes to the pump.      Brought blood glucose log in for my review, fasting blood glucose range from 130-300, preprandial glucoses ranging from 148-421    After review, it does not seem that patient is able to accurately count carbs.  Patient also likes fried foods and sometimes eats potatoes and bread.  Patient is moving to an assisted living facility, but they will not give patient medications.    Some history provided by patient's brother during her visit today.    Baseline glucose levels are high, but patient does have lows in the 50s after taking mealtime insulin.    Last eye exam: this year  Subjective     Review of Systems   Constitutional:  Positive for fatigue.   HENT:  Positive for drooling, hearing loss, postnasal drip, rhinorrhea and trouble swallowing.    Eyes:  Positive for visual disturbance.   Endocrine: Positive for cold intolerance and polydipsia.   Genitourinary:  Positive for urgency.   Musculoskeletal:  Positive for arthralgias and joint swelling.  "  Allergic/Immunologic: Positive for environmental allergies.   Neurological:  Positive for syncope (with hypoglyecmia).         Diabetic Complications:  Eyes: No  Kidneys: Yes -    Feet: No  Heart: Yes - stroke    Diet and Exercise:  Meals per day: 3  Minutes of exercise per week: 0 mins.      The following portions of the patient's history were reviewed and updated as appropriate: allergies, current medications, past family history, past medical history, past social history, past surgical history, and problem list.    Objective     Visit Vitals  /72 (BP Location: Right arm, Patient Position: Sitting)   Pulse 62   Ht 149.9 cm (59\")   Wt 52.6 kg (116 lb)   SpO2 97%   BMI 23.43 kg/m²       Physical Exam:  Physical Exam  Vitals reviewed.   Constitutional:       Appearance: Normal appearance. She is normal weight.   HENT:      Head: Normocephalic and atraumatic.      Nose: Nose normal.   Eyes:      Conjunctiva/sclera: Conjunctivae normal.   Cardiovascular:      Rate and Rhythm: Normal rate.   Pulmonary:      Effort: Pulmonary effort is normal.   Skin:     General: Skin is warm and dry.   Neurological:      General: No focal deficit present.      Mental Status: She is alert and oriented to person, place, and time. Mental status is at baseline.   Psychiatric:         Mood and Affect: Mood normal.         Behavior: Behavior normal.         Thought Content: Thought content normal.         Judgment: Judgment normal.         Labs:    HbA1c  Hemoglobin A1C   Date Value Ref Range Status   04/14/2025 9.00 (H) 4.80 - 5.60 % Final     CMP  Lab Results   Component Value Date    GLUCOSE 259 (H) 04/14/2025    BUN 34 (H) 04/14/2025    CREATININE 1.06 (H) 04/14/2025     04/14/2025    K 4.2 04/14/2025     04/14/2025    CALCIUM 9.0 04/14/2025    PROTEINTOT 7.2 04/14/2025    ALBUMIN 4.0 04/14/2025    ALT 32 04/14/2025    AST 39 (H) 04/14/2025    ALKPHOS 91 04/14/2025    BILITOT 1.1 04/14/2025    GLOB 3.2 04/14/2025    " "AGRATIO 1.3 04/14/2025    BCR 32.1 (H) 04/14/2025    ANIONGAP 12.0 04/14/2025    EGFR 53.5 (L) 04/14/2025         Lipid Panel  Lab Results   Component Value Date    HDL Cholesterol 64 (H) 03/20/2025    LDL Cholesterol  61 03/20/2025    LDL/HDL Ratio 0.98 03/20/2025    Triglycerides 41 03/20/2025        TSH  Lab Results   Component Value Date    TSH 0.700 03/19/2025    FREET4 1.66 03/19/2025        Hemoglobin A1C  No components found for: \"HGBA1C\"     Microalbumin/Creatinine  No results found for: \"MALBCRERATI\"  HAS CKD 3, sees nephrology    Reviewed NIEVES CGM data and discussed with patient.  Sensor glucose average 266 for the past 2 weeks, 15% time in range , less than 1% low 54-69, 0% very low <54, 30% high 180-250, 55 % very high >250.     Patient baseline hyperglycemia, also has postprandial hyperglycemia, but then also has postprandial hypoglycemia at times    Assessment / Plan      Assessment & Plan:  Diagnoses and all orders for this visit:    1. Type 1 diabetes mellitus with hyperglycemia (Primary)  Assessment & Plan:  UnControlled with both hyper and hypoglycemia  Discussed importance of maintaining diabetic diet  Patient to exercise/walk if blood glucose is high and drink water  Discussed different treatment options including pumps, MDI   May consider I let in the future, if patient would be able to fill her insulin reservoir and insert TUBING consistently  Patient advised to:  Start taking 14 units of Tresiba every night  If blood glucose before you eat, is less than 150, 2 units  before eating                                                         Between 150-200, 3 units before eating              Between 201-250, 4 units before eating              Between 251-300, 5 units before eating              Between 301-350, 7 units before eating              Between 351-400, 8 units before eating              Between 401-450, 9 units before eating              Between 451-500, 11 units before " eating          2. Stage 3a chronic kidney disease  Assessment & Plan:  Will try to prevent worsening CKD with better blood glucose control, while preventing hypoglycemia      3. Hypoglycemic event due to diabetes  Assessment & Plan:  Has had recent hypoglycemic events requiring hospitalization         Current Outpatient Medications   Medication Instructions    acetaminophen (TYLENOL) 500 MG tablet 2 tablets    albuterol sulfate  (90 Base) MCG/ACT inhaler 2 puffs, Inhalation, Every 4 Hours PRN    APIXABAN PO Every 12 Hours Scheduled    aspirin (Filemon Aspirin EC Low Dose) 81 MG EC tablet Take  by mouth.    azaTHIOprine (IMURAN) 50 MG tablet TAKE 2 TABLETS TWICE DAILY    budesonide-formoterol (Symbicort) 80-4.5 MCG/ACT inhaler 2 puffs, Inhalation, 2 Times Daily - RT    bumetanide (BUMEX) 0.5 mg, Oral, Every Other Day    cefuroxime (CEFTIN) 500 MG tablet Take 1 tablet every 12 hours by oral route as directed.    cephalexin (KEFLEX) 500 MG capsule 1 capsule, 3 times daily    cholestyramine (QUESTRAN) 4 GM/DOSE powder MIX 4 GRAMS IN 8 OZ. LIQUID AND DRINK TWICE DAILY    cloNIDine (CATAPRES-TTS) 0.2 MG/24HR patch 1 patch, Transdermal, Weekly    doxycycline (VIBRAMYCIN) 100 MG capsule Take 1 capsule twice a day by oral route for 10 days.    Droplet Pen Needles 32G X 4 MM misc     fluticasone (FLONASE) 50 MCG/ACT nasal spray 2 sprays, Daily    FreeStyle Precision Ant Test test strip     insulin aspart (novoLOG FLEXPEN) 100 UNIT/ML solution pen-injector sc pen 3 Times Daily With Meals    levocetirizine (XYZAL) 5 mg, Every Evening    levothyroxine (SYNTHROID, LEVOTHROID) 75 mcg, Daily    montelukast (SINGULAIR) 10 mg, Every Evening    nebivolol (BYSTOLIC) 10 mg, Oral, Daily    nitrofurantoin, macrocrystal-monohydrate, (MACROBID) 100 MG capsule Take 1 capsule every 12 hours by oral route for 7 days.    nystatin (MYCOSTATIN) 051736 UNIT/GM powder Topical, 3 Times Daily    oxybutynin (DITROPAN) 5 mg, 2 Times Daily     pravastatin (PRAVACHOL) 20 mg, Oral, Every Evening    pregabalin (LYRICA) 75 mg, 2 Times Daily    Rinvoq 15 mg, Oral, Daily    sacubitril-valsartan (Entresto)  MG tablet 1 tablet, Oral, 2 Times Daily    Secukinumab (Cosentyx) 150 MG/ML solution prefilled syringe Inject  under the skin into the appropriate area as directed.    solifenacin (VESICARE) 5 mg, Daily    spironolactone (ALDACTONE) 25 MG tablet 1 tablet, Daily    traMADol (ULTRAM) 50 MG tablet TAKE 1 TABL PO Q 6 HRS PRN FOR SEVERE POST SURGICAL PAIN    Tresiba FlexTouch 100 UNIT/ML solution pen-injector injection Inject 19 Units under the skin into the appropriate area as directed Every Night.      Return in about 2 weeks (around 5/12/2025).  72 minutes spent obtaining patient history, reviewing blood glucose log/insulin log/food diary, discussing patient's diabetic complications including neuropathy, nephropathy, and vascular disease.  Also, discussed treatment options including insulin pumps (various types), patient's limitations due to her arthritis.  Also discussed treatment plan in depth with both patient and her brother.  Written instructions reviewed with patient prior to leaving.  Also spent time reviewing patient's chart prior to visit, due to multiple hospitalization recently.  Electronically signed by: Dg Swift PA-C  04/28/2025

## 2025-04-28 NOTE — PATIENT INSTRUCTIONS
Start taking 14 units of Tresiba every night  If blood glucose before you eat, is less than 150, 2 units  before eating                                                         Between 150-200, 3 units before eating              Between 201-250, 4 units before eating              Between 251-300, 5 units before eating              Between 301-350, 7 units before eating              Between 351-400, 8 units before eating              Between 401-450, 9 units before eating              Between 451-500, 11 units before eating

## 2025-04-28 NOTE — ASSESSMENT & PLAN NOTE
Will try to prevent worsening CKD with better blood glucose control, while preventing hypoglycemia

## 2025-04-29 NOTE — TELEPHONE ENCOUNTER
Called patient and left message to inquire about paperwork they dropped off at her appointment 4/28/2025. Ask them to call us back when they could .

## 2025-05-02 LAB
CV ZIO BASELINE AVG BPM: 62 BPM
CV ZIO BASELINE BPM HIGH: 128 BPM
CV ZIO BASELINE BPM LOW: 27 BPM
CV ZIO DEVICE ANALYSIS TIME: NORMAL
CV ZIO ECT SVE COUNT: 4966 EPISODES
CV ZIO ECT SVE CPLT COUNT: 636 EPISODES
CV ZIO ECT SVE CPLT FREQ: NORMAL
CV ZIO ECT SVE FREQ: NORMAL
CV ZIO ECT SVE TPLT COUNT: 258 EPISODES
CV ZIO ECT SVE TPLT FREQ: NORMAL
CV ZIO ECT VE COUNT: 9566 EPISODES
CV ZIO ECT VE CPLT COUNT: 1 EPISODES
CV ZIO ECT VE CPLT FREQ: NORMAL
CV ZIO ECT VE FREQ: NORMAL
CV ZIO ECT VE TPLT COUNT: 3 EPISODES
CV ZIO ECT VE TPLT FREQ: NORMAL
CV ZIO ECTOPIC SVE COUPLET RAW PERCENT: 0.1 %
CV ZIO ECTOPIC SVE ISOLATED PERCENT: 0.4 %
CV ZIO ECTOPIC SVE TRIPLET RAW PERCENT: 0.06 %
CV ZIO ECTOPIC VE COUPLET RAW PERCENT: 0 %
CV ZIO ECTOPIC VE ISOLATED PERCENT: 0.76 %
CV ZIO ECTOPIC VE TRIPLET RAW PERCENT: 0 %
CV ZIO ENROLLMENT END: NORMAL
CV ZIO ENROLLMENT START: NORMAL
CV ZIO L BIGEMINY DUR: 6 SEC
CV ZIO L BIGEMINY END: NORMAL
CV ZIO L BIGEMINY START: NORMAL
CV ZIO L TRIGEMINY DUR: 24.6 SEC
CV ZIO L TRIGEMINY END: NORMAL
CV ZIO L TRIGEMINY START: NORMAL
CV ZIO PATIENT EVENTS DIARIES: 7
CV ZIO PATIENT EVENTS TRIGGERS: 5
CV ZIO PAUSE COUNT: 0
CV ZIO PRESCRIPTION STATUS: NORMAL
CV ZIO SVT AVG BPM: 103 BPM
CV ZIO SVT BPM HIGH: 128 BPM
CV ZIO SVT BPM LOW: 79 BPM
CV ZIO SVT COUNT: 103
CV ZIO SVT F EPI AVG BPM: 122 BPM
CV ZIO SVT F EPI BEATS: 5 BEATS
CV ZIO SVT F EPI BPM HIGH: 128 BPM
CV ZIO SVT F EPI BPM LOW: 114 BPM
CV ZIO SVT F EPI DUR: 2.6 SEC
CV ZIO SVT F EPI END: NORMAL
CV ZIO SVT F EPI START: NORMAL
CV ZIO SVT L EPI AVG BPM: 103 BPM
CV ZIO SVT L EPI BEATS: 24 BEATS
CV ZIO SVT L EPI BPM HIGH: 117 BPM
CV ZIO SVT L EPI BPM LOW: 83 BPM
CV ZIO SVT L EPI DUR: 14.5 SEC
CV ZIO SVT L EPI END: NORMAL
CV ZIO SVT L EPI START: NORMAL
CV ZIO SVT SYMPT IN PT: NORMAL
CV ZIO TOTAL  ENROLLMENT PERIOD: NORMAL
CV ZIO VT COUNT: 0

## 2025-05-07 ENCOUNTER — RESULTS FOLLOW-UP (OUTPATIENT)
Dept: CARDIOLOGY | Facility: CLINIC | Age: 80
End: 2025-05-07
Payer: MEDICARE

## 2025-05-07 DIAGNOSIS — R00.1 BRADYCARDIA: Primary | ICD-10-CM

## 2025-05-16 ENCOUNTER — OFFICE VISIT (OUTPATIENT)
Age: 80
End: 2025-05-16
Payer: MEDICARE

## 2025-05-16 VITALS
HEIGHT: 59 IN | SYSTOLIC BLOOD PRESSURE: 143 MMHG | WEIGHT: 114.9 LBS | HEART RATE: 67 BPM | OXYGEN SATURATION: 98 % | DIASTOLIC BLOOD PRESSURE: 69 MMHG | BODY MASS INDEX: 23.16 KG/M2

## 2025-05-16 DIAGNOSIS — E10.65 TYPE 1 DIABETES MELLITUS WITH HYPERGLYCEMIA: Primary | ICD-10-CM

## 2025-05-16 NOTE — ASSESSMENT & PLAN NOTE
Diabetes is much improved with average glucose over 260 down to 186 over the last 2 weeks  No longer having any extreme hypoglycemic episodes  Continue Tresiba 14 units every night  Will consider increasing to 16 at next visit  Continue good water intake, choosing lower carbohydrate meals/snacks always pairing carbohydrates with protein  Patient instructed to continue sliding scale as follows:    If blood glucose before you eat, is less than 150, 2 units  before eating                                                         Between 150-200, 3 units before eating                                                          Between 201-250, 4 units before eating                                                          Between 251-300, 5 units before eating                                                          Between 301-350, 7 units before eating                                                          Between 351-400, 8 units before eating                                                          Between 401-450, 9 units before eating                                                          Between 451-500, 11 units before eating

## 2025-05-16 NOTE — PROGRESS NOTES
Office Note      Date: 2025  Patient Name: Marilyn Kunz  MRN: [id number]  : 1945    Chief Complaint   Patient presents with    Type 1 diabetes mellitus with hyperglycemia       History of Present Illness:   Marilyn Kunz is a 79 y.o. female who presents for Diabetes type 1. Diagnosed in: unsure, . Treated in past with oral agents on metformin for a few years, but then diagnosed with low cpeptide and type 1 dm in , has seen different endocrinologists through the years.  Current treatments: tresiba 14 units, sliding scale before meals. Number of insulin shots per day: 4. Checks blood sugar 288 times a day. Has low blood sugar: occasionally. Aspirin use: Yes. Statin use: No -   . ACE-I/ARB use: Yes.  She has a Medtronic pump at home that she has had for 3 years, but never used it.  Patient's prior endocrinologist did not think patient would be able to have the dexterity with her severe arthritis to fill insulin reservoirs, make changes to the pump.      Brought blood glucose log in for my review, as well as emily.    Patient staying at assisted living facility now. She is trying to start eating on a schedule, at least four hours between meals.    Some history provided by patient's brother during her visit today.    Baseline glucose levels are in range now, with postprandial spikes.    Last eye exam:   Subjective     Review of Systems   Constitutional:  Positive for fatigue.   HENT:  Positive for drooling, hearing loss, postnasal drip, rhinorrhea and trouble swallowing.    Eyes:  Positive for visual disturbance.   Endocrine: Positive for cold intolerance. Negative for polydipsia and polyuria.   Genitourinary:  Negative for urgency.   Musculoskeletal:  Positive for arthralgias and joint swelling.   Allergic/Immunologic: Positive for environmental allergies.   Neurological:  Negative for syncope (with hypoglyecmia).         Diabetic Complications:  Eyes: No  Kidneys: Yes -   "  Feet: No  Heart: Yes - stroke    Diet and Exercise:  Meals per day: 3  Minutes of exercise per week: 0 mins.      The following portions of the patient's history were reviewed and updated as appropriate: allergies, current medications, past family history, past medical history, past social history, past surgical history, and problem list.    Objective     Visit Vitals  /69 (BP Location: Right arm, Patient Position: Sitting)   Pulse 67   Ht 149.9 cm (59\")   Wt 52.1 kg (114 lb 14.4 oz)   SpO2 98%   BMI 23.21 kg/m²       Physical Exam:  Physical Exam  Vitals reviewed.   Constitutional:       Appearance: Normal appearance. She is normal weight.   HENT:      Head: Normocephalic and atraumatic.      Nose: Nose normal.   Eyes:      Conjunctiva/sclera: Conjunctivae normal.   Cardiovascular:      Rate and Rhythm: Normal rate.   Pulmonary:      Effort: Pulmonary effort is normal.   Skin:     General: Skin is warm and dry.   Neurological:      General: No focal deficit present.      Mental Status: She is alert and oriented to person, place, and time. Mental status is at baseline.   Psychiatric:         Mood and Affect: Mood normal.         Behavior: Behavior normal.         Thought Content: Thought content normal.         Judgment: Judgment normal.         Labs:    HbA1c  Hemoglobin A1C   Date Value Ref Range Status   04/14/2025 9.00 (H) 4.80 - 5.60 % Final     CMP  Lab Results   Component Value Date    GLUCOSE 259 (H) 04/14/2025    BUN 34 (H) 04/14/2025    CREATININE 1.06 (H) 04/14/2025     04/14/2025    K 4.2 04/14/2025     04/14/2025    CALCIUM 9.0 04/14/2025    PROTEINTOT 7.2 04/14/2025    ALBUMIN 4.0 04/14/2025    ALT 32 04/14/2025    AST 39 (H) 04/14/2025    ALKPHOS 91 04/14/2025    BILITOT 1.1 04/14/2025    GLOB 3.2 04/14/2025    AGRATIO 1.3 04/14/2025    BCR 32.1 (H) 04/14/2025    ANIONGAP 12.0 04/14/2025    EGFR 53.5 (L) 04/14/2025         Lipid Panel  Lab Results   Component Value Date    HDL " "Cholesterol 64 (H) 03/20/2025    LDL Cholesterol  61 03/20/2025    LDL/HDL Ratio 0.98 03/20/2025    Triglycerides 41 03/20/2025        TSH  Lab Results   Component Value Date    TSH 0.700 03/19/2025    FREET4 1.66 03/19/2025        Hemoglobin A1C  No components found for: \"HGBA1C\"     Microalbumin/Creatinine  No results found for: \"MALBCRERATI\"  HAS CKD 3, sees nephrology    Reviewed NIEVES CGM data and discussed with patient.  Sensor glucose average 186 for the past 2 weeks, 56% time in range , less than 3% low 54-69, 0% very low <54, 20% high 180-250, 21% very high >250.     Patient baseline mild hyperglycemia to euglycemia, postprandial spikes    Assessment / Plan      Assessment & Plan:  Diagnoses and all orders for this visit:    1. Type 1 diabetes mellitus with hyperglycemia (Primary)  Assessment & Plan:  Diabetes is much improved with average glucose over 260 down to 186 over the last 2 weeks  No longer having any extreme hypoglycemic episodes  Continue Tresiba 14 units every night  Will consider increasing to 16 at next visit  Continue good water intake, choosing lower carbohydrate meals/snacks always pairing carbohydrates with protein  Patient instructed to continue sliding scale as follows:    If blood glucose before you eat, is less than 150, 2 units  before eating                                                         Between 150-200, 3 units before eating                                                          Between 201-250, 4 units before eating                                                          Between 251-300, 5 units before eating                                                          Between 301-350, 7 units before eating                                                          Between 351-400, 8 units before eating                                                          Between 401-450, 9 units before eating                                                          Between " 451-500, 11 units before eating           Current Outpatient Medications   Medication Instructions    acetaminophen (TYLENOL) 500 MG tablet 2 tablets    albuterol sulfate  (90 Base) MCG/ACT inhaler 2 puffs, Inhalation, Every 4 Hours PRN    bumetanide (BUMEX) 0.5 mg, Oral, Every Other Day    cloNIDine (CATAPRES-TTS) 0.2 MG/24HR patch 1 patch, Transdermal, Weekly    Droplet Pen Needles 32G X 4 MM misc     fluticasone (FLONASE) 50 MCG/ACT nasal spray 2 sprays, Daily    FreeStyle Precision Ant Test test strip     insulin aspart (novoLOG FLEXPEN) 100 UNIT/ML solution pen-injector sc pen 3 Times Daily With Meals    levocetirizine (XYZAL) 5 mg, Every Evening    levothyroxine (SYNTHROID, LEVOTHROID) 75 mcg, Daily    montelukast (SINGULAIR) 10 mg, Every Evening    nebivolol (BYSTOLIC) 10 mg, Oral, Daily    nystatin (MYCOSTATIN) 310057 UNIT/GM powder Topical, 3 Times Daily    pravastatin (PRAVACHOL) 20 mg, Oral, Every Evening    sacubitril-valsartan (Entresto)  MG tablet 1 tablet, Oral, 2 Times Daily    solifenacin (VESICARE) 5 mg, Daily    Tresiba FlexTouch 100 UNIT/ML solution pen-injector injection Inject 14 Units under the skin into the appropriate area as directed Every Night.      Return in about 4 weeks (around 6/13/2025).    Electronically signed by: Dg Swift PA-C  05/16/2025

## 2025-06-24 ENCOUNTER — TELEPHONE (OUTPATIENT)
Age: 80
End: 2025-06-24
Payer: MEDICARE

## 2025-06-27 ENCOUNTER — OFFICE VISIT (OUTPATIENT)
Age: 80
End: 2025-06-27
Payer: MEDICARE

## 2025-06-27 VITALS
HEART RATE: 84 BPM | WEIGHT: 108.7 LBS | HEIGHT: 59 IN | SYSTOLIC BLOOD PRESSURE: 152 MMHG | DIASTOLIC BLOOD PRESSURE: 78 MMHG | BODY MASS INDEX: 21.92 KG/M2 | OXYGEN SATURATION: 97 %

## 2025-06-27 DIAGNOSIS — E10.65 TYPE 1 DIABETES MELLITUS WITH HYPERGLYCEMIA: Primary | ICD-10-CM

## 2025-06-27 NOTE — PROGRESS NOTES
Office Note      Date: 2025  Patient Name: Marilyn Kunz  MRN: [id number]  : 1945    Chief Complaint   Patient presents with    Diabetes       History of Present Illness:   Marilyn Kunz is a 79 y.o. female who presents for Diabetes type 1. Diagnosed in: unsure, . Treated in past with oral agents on metformin for a few years, but then diagnosed with low cpeptide and type 1 dm in , has seen different endocrinologists through the years.  Current treatments: tresiba 14 units, sliding scale before meals. Number of insulin shots per day: 4. Checks blood sugar 288 times a day. Has low blood sugar: occasionally. Aspirin use: Yes. Statin use: No -  . ACE-I/ARB use: Yes.    She has a Medtronic pump at home that she has had for 3 years, but never used it.  Patient's prior endocrinologist did not think patient would be able to have the dexterity with her severe arthritis to fill insulin reservoirs, make changes to the pump.      Brought blood glucose log in for my review, as well as emily. Also recorded food intake as well.    Patient staying at assisted living facility now. She is trying to start eating on a schedule, at least four hours between meals, but many meals patient doesn't like so she has a sandwich. Many meals are not diabetic friendly. She has been trying to eat a lot of salads. She is losing weight.        Baseline glucose levels are in range most of the time, running high a lot during the day after meals. NO hypoglycemia.     Had to go to ED for weakness and nausea after colonoscopy and was treated for elevated blood pressure and had physical therapy.    Last eye exam:   Subjective     Review of Systems   Constitutional:  Positive for fatigue.   HENT:  Positive for drooling, hearing loss, postnasal drip, rhinorrhea and trouble swallowing.    Eyes:  Positive for visual disturbance.   Endocrine: Positive for cold intolerance. Negative for polydipsia and polyuria.  "  Genitourinary:  Negative for urgency.   Musculoskeletal:  Positive for arthralgias and joint swelling.   Allergic/Immunologic: Positive for environmental allergies.   Neurological:  Negative for syncope (with hypoglyecmia).         Diabetic Complications:  Eyes: No  Kidneys: Yes -    Feet: No  Heart: Yes - stroke    Diet and Exercise:  Meals per day: 3  Minutes of exercise per week: 0 mins.      The following portions of the patient's history were reviewed and updated as appropriate: allergies, current medications, past family history, past medical history, past social history, past surgical history, and problem list.    Objective     Visit Vitals  /78 (BP Location: Left arm, Patient Position: Sitting, Cuff Size: Adult)   Pulse 84   Ht 149.9 cm (59\")   Wt 49.3 kg (108 lb 11.2 oz)   SpO2 97%   BMI 21.95 kg/m²       Physical Exam:  Physical Exam  Vitals reviewed.   Constitutional:       Appearance: Normal appearance. She is normal weight.   HENT:      Head: Normocephalic and atraumatic.      Nose: Nose normal.   Eyes:      Conjunctiva/sclera: Conjunctivae normal.   Cardiovascular:      Rate and Rhythm: Normal rate.   Pulmonary:      Effort: Pulmonary effort is normal.   Skin:     General: Skin is warm and dry.   Neurological:      General: No focal deficit present.      Mental Status: She is alert and oriented to person, place, and time. Mental status is at baseline.   Psychiatric:         Mood and Affect: Mood normal.         Behavior: Behavior normal.         Thought Content: Thought content normal.         Judgment: Judgment normal.         Labs:    HbA1c  Hemoglobin A1C   Date Value Ref Range Status   04/14/2025 9.00 (H) 4.80 - 5.60 % Final     CMP  Lab Results   Component Value Date    GLUCOSE 259 (H) 04/14/2025    BUN 34 (H) 04/14/2025    CREATININE 1.06 (H) 04/14/2025     04/14/2025    K 4.2 04/14/2025     04/14/2025    CALCIUM 9.0 04/14/2025    PROTEINTOT 7.2 04/14/2025    ALBUMIN 4.0 " "04/14/2025    ALT 32 04/14/2025    AST 39 (H) 04/14/2025    ALKPHOS 91 04/14/2025    BILITOT 1.1 04/14/2025    GLOB 3.2 04/14/2025    AGRATIO 1.3 04/14/2025    BCR 32.1 (H) 04/14/2025    ANIONGAP 12.0 04/14/2025    EGFR 53.5 (L) 04/14/2025         Lipid Panel  Lab Results   Component Value Date    HDL Cholesterol 64 (H) 03/20/2025    LDL Cholesterol  61 03/20/2025    LDL/HDL Ratio 0.98 03/20/2025    Triglycerides 41 03/20/2025        TSH  Lab Results   Component Value Date    TSH 0.700 03/19/2025    FREET4 1.66 03/19/2025        Hemoglobin A1C  No components found for: \"HGBA1C\"     Microalbumin/Creatinine  No results found for: \"MALBCRERATI\"  HAS CKD 3, sees nephrology    Reviewed NIEVES CGM data and discussed with patient.  Sensor glucose average 221 for the past 2 weeks,37% time in range , 1% low 54-69, 0% very low <54, 26% high 180-250, 36% very high >250.     Patient baseline mild hyperglycemia to euglycemia,  extremely elevated postprandial spikes    Assessment / Plan      Assessment & Plan:  Diagnoses and all orders for this visit:    1. Type 1 diabetes mellitus with hyperglycemia (Primary)  Assessment & Plan:  Diabetes is worsening again with average glucose back up to 221 from 186 last visit  Patient asked to start eating more regularly, even if she has to have some carbohydrates to prevent malnutrition  NO hypoglycemia  Patient to continue pairing protein with carbohydrate, eating more cheese and nuts for protein, continue boost, but take insulin with these as well, since they contain carbohydrates.  Patient instructed to:  Continue taking 14 units of Tresiba every night    If blood glucose before you eat, is less than 150, 2 units  before eating                                                         Between 150-200, 3 units before eating                                                          Between 201-250, 4 units before eating                                                          Between " 251-300, 5 units before eating                                                          Between 301-350, 7 units before eating                                                          Between 351-400, 8 units before eating                                                          Between 401-450, 9 units before eating                                                          Between 451-500, 11 units before eating    If high carbohydrate meal, add 4 units to sliding scale  If sensor reading is high and fingerstick glucose is over 350, then take 4 units of insulin             Current Outpatient Medications   Medication Instructions    acetaminophen (TYLENOL) 500 MG tablet 2 tablets    albuterol sulfate  (90 Base) MCG/ACT inhaler 2 puffs, Inhalation, Every 4 Hours PRN    bumetanide (BUMEX) 0.5 mg, Oral, Every Other Day    cloNIDine (CATAPRES-TTS) 0.2 MG/24HR patch 1 patch, Transdermal, Weekly    Droplet Pen Needles 32G X 4 MM misc     fluticasone (FLONASE) 50 MCG/ACT nasal spray 2 sprays, Daily    FreeStyle Precision Ant Test test strip     insulin aspart (novoLOG FLEXPEN) 100 UNIT/ML solution pen-injector sc pen 3 Times Daily With Meals    levocetirizine (XYZAL) 5 mg, Every Evening    levothyroxine (SYNTHROID, LEVOTHROID) 75 mcg, Daily    montelukast (SINGULAIR) 10 mg, Every Evening    nebivolol (BYSTOLIC) 10 mg, Oral, Daily    nystatin (MYCOSTATIN) 903337 UNIT/GM powder Topical, 3 Times Daily    pravastatin (PRAVACHOL) 20 mg, Oral, Every Evening    sacubitril-valsartan (Entresto)  MG tablet 1 tablet, Oral, 2 Times Daily    solifenacin (VESICARE) 5 mg, Daily    Tresiba FlexTouch 100 UNIT/ML solution pen-injector injection Inject 14 Units under the skin into the appropriate area as directed Every Night.      No follow-ups on file.    Electronically signed by: Dg Swift PA-C  06/27/2025

## 2025-06-27 NOTE — ASSESSMENT & PLAN NOTE
Diabetes is worsening again with average glucose back up to 221 from 186 last visit  Patient asked to start eating more regularly, even if she has to have some carbohydrates to prevent malnutrition  NO hypoglycemia  Patient to continue pairing protein with carbohydrate, eating more cheese and nuts for protein, continue boost, but take insulin with these as well, since they contain carbohydrates.  Patient instructed to:  Continue taking 14 units of Tresiba every night    If blood glucose before you eat, is less than 150, 2 units  before eating                                                         Between 150-200, 3 units before eating                                                          Between 201-250, 4 units before eating                                                          Between 251-300, 5 units before eating                                                          Between 301-350, 7 units before eating                                                          Between 351-400, 8 units before eating                                                          Between 401-450, 9 units before eating                                                          Between 451-500, 11 units before eating    If high carbohydrate meal, add 4 units to sliding scale  If sensor reading is high and fingerstick glucose is over 350, then take 4 units of insulin

## 2025-06-27 NOTE — PATIENT INSTRUCTIONS
Continue taking 14 units of Tresiba every night    If blood glucose before you eat, is less than 150, 2 units  before eating                                                         Between 150-200, 3 units before eating                                                          Between 201-250, 4 units before eating                                                          Between 251-300, 5 units before eating                                                          Between 301-350, 7 units before eating                                                          Between 351-400, 8 units before eating                                                          Between 401-450, 9 units before eating                                                          Between 451-500, 11 units before eating    If high carbohydrate meal, add 4 units to sliding scale  If sensor reading is high, then take 4 units of insulin

## 2025-07-22 ENCOUNTER — TELEPHONE (OUTPATIENT)
Age: 80
End: 2025-07-22

## 2025-08-07 ENCOUNTER — TELEPHONE (OUTPATIENT)
Dept: ENDOCRINOLOGY | Facility: CLINIC | Age: 80
End: 2025-08-07
Payer: MEDICARE

## 2025-08-11 ENCOUNTER — TELEPHONE (OUTPATIENT)
Dept: ENDOCRINOLOGY | Facility: CLINIC | Age: 80
End: 2025-08-11
Payer: MEDICARE

## (undated) DEVICE — SOL IRR H2O BTL 1000ML STRL

## (undated) DEVICE — KT ORCA ORCAPOD DISP STRL

## (undated) DEVICE — INTRO ACCSR BLNT TP

## (undated) DEVICE — TUBING, SUCTION, 1/4" X 10', STRAIGHT: Brand: MEDLINE

## (undated) DEVICE — SYR LUERLOK 50ML

## (undated) DEVICE — THE BITE BLOCK MAXI, LATEX FREE STRAP IS USED TO PROTECT THE ENDOSCOPE INSERTION TUBE FROM BEING BITTEN BY THE PATIENT.

## (undated) DEVICE — CONTN GRAD MEAS TRIANG 32OZ BLK

## (undated) DEVICE — LUBE GEL ENDOGLIDE 1.1OZ

## (undated) DEVICE — SINGLE-USE BIOPSY FORCEPS: Brand: RADIAL JAW 4

## (undated) DEVICE — SPNG ENDO BEDSIDE TUB ENZYM

## (undated) DEVICE — HYBRID CO2 TUBING/CAP SET FOR OLYMPUS® SCOPES & CO2 SOURCE: Brand: ERBE